# Patient Record
Sex: MALE | Race: WHITE | NOT HISPANIC OR LATINO | Employment: OTHER | URBAN - METROPOLITAN AREA
[De-identification: names, ages, dates, MRNs, and addresses within clinical notes are randomized per-mention and may not be internally consistent; named-entity substitution may affect disease eponyms.]

---

## 2017-04-04 ENCOUNTER — GENERIC CONVERSION - ENCOUNTER (OUTPATIENT)
Dept: OTHER | Facility: OTHER | Age: 72
End: 2017-04-04

## 2017-06-22 ENCOUNTER — ALLSCRIPTS OFFICE VISIT (OUTPATIENT)
Dept: OTHER | Facility: OTHER | Age: 72
End: 2017-06-22

## 2017-06-22 DIAGNOSIS — E11.9 TYPE 2 DIABETES MELLITUS WITHOUT COMPLICATIONS (HCC): ICD-10-CM

## 2017-06-23 ENCOUNTER — GENERIC CONVERSION - ENCOUNTER (OUTPATIENT)
Dept: OTHER | Facility: OTHER | Age: 72
End: 2017-06-23

## 2017-06-23 ENCOUNTER — LAB CONVERSION - ENCOUNTER (OUTPATIENT)
Dept: OTHER | Facility: OTHER | Age: 72
End: 2017-06-23

## 2017-06-23 LAB
A/G RATIO (HISTORICAL): 1.6 (CALC) (ref 1–2.5)
ALBUMIN SERPL BCP-MCNC: 4 G/DL (ref 3.6–5.1)
ALP SERPL-CCNC: 47 U/L (ref 40–115)
ALT SERPL W P-5'-P-CCNC: 16 U/L (ref 9–46)
AST SERPL W P-5'-P-CCNC: 14 U/L (ref 10–35)
BASOPHILS # BLD AUTO: 0.2 %
BASOPHILS # BLD AUTO: 16 CELLS/UL (ref 0–200)
BILIRUB SERPL-MCNC: 0.8 MG/DL (ref 0.2–1.2)
BUN SERPL-MCNC: 22 MG/DL (ref 7–25)
BUN/CREA RATIO (HISTORICAL): 17 (CALC) (ref 6–22)
CALCIUM SERPL-MCNC: 9.9 MG/DL (ref 8.6–10.3)
CHLORIDE SERPL-SCNC: 104 MMOL/L (ref 98–110)
CHOLEST SERPL-MCNC: 211 MG/DL (ref 125–200)
CHOLEST/HDLC SERPL: 4.8 (CALC)
CO2 SERPL-SCNC: 27 MMOL/L (ref 20–31)
CREAT SERPL-MCNC: 1.26 MG/DL (ref 0.7–1.18)
DEPRECATED RDW RBC AUTO: 14.3 % (ref 11–15)
EGFR AFRICAN AMERICAN (HISTORICAL): 66 ML/MIN/1.73M2
EGFR-AMERICAN CALC (HISTORICAL): 57 ML/MIN/1.73M2
EOSINOPHIL # BLD AUTO: 2.6 %
EOSINOPHIL # BLD AUTO: 203 CELLS/UL (ref 15–500)
GAMMA GLOBULIN (HISTORICAL): 2.5 G/DL (CALC) (ref 1.9–3.7)
GLUCOSE (HISTORICAL): 142 MG/DL (ref 65–99)
HBA1C MFR BLD HPLC: 7.4 % OF TOTAL HGB
HCT VFR BLD AUTO: 45 % (ref 38.5–50)
HDLC SERPL-MCNC: 44 MG/DL
HGB BLD-MCNC: 14.6 G/DL (ref 13.2–17.1)
LDL CHOLESTEROL (HISTORICAL): 130 MG/DL (CALC)
LYMPHOCYTES # BLD AUTO: 2332 CELLS/UL (ref 850–3900)
LYMPHOCYTES # BLD AUTO: 29.9 %
MCH RBC QN AUTO: 28.8 PG (ref 27–33)
MCHC RBC AUTO-ENTMCNC: 32.5 G/DL (ref 32–36)
MCV RBC AUTO: 88.8 FL (ref 80–100)
MONOCYTES # BLD AUTO: 538 CELLS/UL (ref 200–950)
MONOCYTES (HISTORICAL): 6.9 %
NEUTROPHILS # BLD AUTO: 4711 CELLS/UL (ref 1500–7800)
NEUTROPHILS # BLD AUTO: 60.4 %
NON-HDL-CHOL (CHOL-HDL) (HISTORICAL): 167 MG/DL (CALC)
PLATELET # BLD AUTO: 177 THOUSAND/UL (ref 140–400)
PMV BLD AUTO: 9.7 FL (ref 7.5–12.5)
POTASSIUM SERPL-SCNC: 4.3 MMOL/L (ref 3.5–5.3)
RBC # BLD AUTO: 5.07 MILLION/UL (ref 4.2–5.8)
SODIUM SERPL-SCNC: 141 MMOL/L (ref 135–146)
TOTAL PROTEIN (HISTORICAL): 6.5 G/DL (ref 6.1–8.1)
TRIGL SERPL-MCNC: 183 MG/DL
WBC # BLD AUTO: 7.8 THOUSAND/UL (ref 3.8–10.8)

## 2017-07-24 ENCOUNTER — ALLSCRIPTS OFFICE VISIT (OUTPATIENT)
Dept: OTHER | Facility: OTHER | Age: 72
End: 2017-07-24

## 2017-07-24 ENCOUNTER — HOSPITAL ENCOUNTER (OUTPATIENT)
Dept: RADIOLOGY | Facility: HOSPITAL | Age: 72
Discharge: HOME | DRG: 064 | End: 2017-07-24
Attending: ORTHOPAEDIC SURGERY
Payer: MEDICARE

## 2017-07-24 DIAGNOSIS — M25.511 PAIN IN RIGHT SHOULDER: ICD-10-CM

## 2017-07-24 DIAGNOSIS — M75.41 IMPINGEMENT SYNDROME OF RIGHT SHOULDER: ICD-10-CM

## 2017-07-24 PROCEDURE — 73030 X-RAY EXAM OF SHOULDER: CPT

## 2017-07-26 ENCOUNTER — APPOINTMENT (EMERGENCY)
Dept: RADIOLOGY | Facility: HOSPITAL | Age: 72
DRG: 064 | End: 2017-07-26
Payer: MEDICARE

## 2017-07-26 ENCOUNTER — APPOINTMENT (INPATIENT)
Dept: RADIOLOGY | Facility: HOSPITAL | Age: 72
DRG: 064 | End: 2017-07-26
Payer: MEDICARE

## 2017-07-26 ENCOUNTER — HOSPITAL ENCOUNTER (INPATIENT)
Facility: HOSPITAL | Age: 72
LOS: 2 days | DRG: 064 | End: 2017-07-28
Attending: EMERGENCY MEDICINE | Admitting: HOSPITALIST
Payer: MEDICARE

## 2017-07-26 DIAGNOSIS — I63.511 ACUTE RIGHT MCA STROKE (HCC): Primary | ICD-10-CM

## 2017-07-26 DIAGNOSIS — R53.1 LEFT-SIDED WEAKNESS: ICD-10-CM

## 2017-07-26 DIAGNOSIS — I65.21 THROMBOSIS OF RIGHT INTERNAL CAROTID ARTERY: ICD-10-CM

## 2017-07-26 DIAGNOSIS — I63.9 STROKE, ACUTE, EMBOLIC (HCC): ICD-10-CM

## 2017-07-26 PROBLEM — I10 ACCELERATED HYPERTENSION: Status: ACTIVE | Noted: 2017-07-26

## 2017-07-26 PROBLEM — J45.909 ASTHMA: Status: ACTIVE | Noted: 2017-07-26

## 2017-07-26 PROBLEM — I10 BENIGN ESSENTIAL HYPERTENSION: Chronic | Status: ACTIVE | Noted: 2017-07-26

## 2017-07-26 PROBLEM — E66.9 OBESITY: Status: ACTIVE | Noted: 2017-07-26

## 2017-07-26 PROBLEM — I10 BENIGN ESSENTIAL HYPERTENSION: Status: ACTIVE | Noted: 2017-07-26

## 2017-07-26 PROBLEM — J45.909 ASTHMA: Chronic | Status: ACTIVE | Noted: 2017-07-26

## 2017-07-26 PROBLEM — E11.9 TYPE 2 DIABETES MELLITUS (HCC): Status: ACTIVE | Noted: 2017-07-26

## 2017-07-26 PROBLEM — E78.5 HYPERLIPIDEMIA: Status: ACTIVE | Noted: 2017-07-26

## 2017-07-26 PROBLEM — E11.9 TYPE 2 DIABETES MELLITUS (HCC): Chronic | Status: ACTIVE | Noted: 2017-07-26

## 2017-07-26 LAB
ABO GROUP BLD: NORMAL
ANION GAP BLD CALC-SCNC: 14 MMOL/L (ref 4–13)
ANION GAP SERPL CALCULATED.3IONS-SCNC: 4 MMOL/L (ref 4–13)
APTT PPP: 25 SECONDS (ref 23–35)
ATRIAL RATE: 58 BPM
BLD GP AB SCN SERPL QL: NEGATIVE
BUN BLD-MCNC: 26 MG/DL (ref 5–25)
BUN SERPL-MCNC: 26 MG/DL (ref 5–25)
CA-I BLD-SCNC: 1.24 MMOL/L (ref 1.12–1.32)
CALCIUM SERPL-MCNC: 9 MG/DL (ref 8.3–10.1)
CHLORIDE BLD-SCNC: 103 MMOL/L (ref 100–108)
CHLORIDE SERPL-SCNC: 106 MMOL/L (ref 100–108)
CO2 SERPL-SCNC: 31 MMOL/L (ref 21–32)
CREAT BLD-MCNC: 1.1 MG/DL (ref 0.6–1.3)
CREAT SERPL-MCNC: 1.26 MG/DL (ref 0.6–1.3)
ERYTHROCYTE [DISTWIDTH] IN BLOOD BY AUTOMATED COUNT: 13.6 % (ref 11.6–15.1)
GFR SERPL CREATININE-BSD FRML MDRD: 57 ML/MIN/1.73SQ M
GFR SERPL CREATININE-BSD FRML MDRD: 67 ML/MIN/1.73SQ M
GLUCOSE SERPL-MCNC: 130 MG/DL (ref 65–140)
GLUCOSE SERPL-MCNC: 143 MG/DL (ref 65–140)
GLUCOSE SERPL-MCNC: 160 MG/DL (ref 65–140)
GLUCOSE SERPL-MCNC: 161 MG/DL (ref 65–140)
GLUCOSE SERPL-MCNC: 163 MG/DL (ref 65–140)
HCT VFR BLD AUTO: 39.9 % (ref 36.5–49.3)
HCT VFR BLD CALC: 39 % (ref 36.5–49.3)
HGB BLD-MCNC: 13.4 G/DL (ref 12–17)
HGB BLDA-MCNC: 13.3 G/DL (ref 12–17)
INR PPP: 0.95 (ref 0.86–1.16)
MCH RBC QN AUTO: 29.4 PG (ref 26.8–34.3)
MCHC RBC AUTO-ENTMCNC: 33.6 G/DL (ref 31.4–37.4)
MCV RBC AUTO: 88 FL (ref 82–98)
P AXIS: 74 DEGREES
PCO2 BLD: 29 MMOL/L (ref 21–32)
PLATELET # BLD AUTO: 179 THOUSANDS/UL (ref 149–390)
PMV BLD AUTO: 10.3 FL (ref 8.9–12.7)
POTASSIUM BLD-SCNC: 4.2 MMOL/L (ref 3.5–5.3)
POTASSIUM SERPL-SCNC: 4.2 MMOL/L (ref 3.5–5.3)
PR INTERVAL: 352 MS
PROTHROMBIN TIME: 12.7 SECONDS (ref 12.1–14.4)
QRS AXIS: 77 DEGREES
QRSD INTERVAL: 136 MS
QT INTERVAL: 436 MS
QTC INTERVAL: 428 MS
RBC # BLD AUTO: 4.56 MILLION/UL (ref 3.88–5.62)
RH BLD: POSITIVE
SODIUM BLD-SCNC: 141 MMOL/L (ref 136–145)
SODIUM SERPL-SCNC: 141 MMOL/L (ref 136–145)
SPECIMEN EXPIRATION DATE: NORMAL
SPECIMEN SOURCE: ABNORMAL
SPECIMEN SOURCE: NORMAL
T WAVE AXIS: 34 DEGREES
TROPONIN I BLD-MCNC: 0.02 NG/ML (ref 0–0.08)
VENTRICULAR RATE: 58 BPM
WBC # BLD AUTO: 11.11 THOUSAND/UL (ref 4.31–10.16)

## 2017-07-26 PROCEDURE — 85014 HEMATOCRIT: CPT

## 2017-07-26 PROCEDURE — 82948 REAGENT STRIP/BLOOD GLUCOSE: CPT

## 2017-07-26 PROCEDURE — 86900 BLOOD TYPING SEROLOGIC ABO: CPT | Performed by: EMERGENCY MEDICINE

## 2017-07-26 PROCEDURE — 85027 COMPLETE CBC AUTOMATED: CPT | Performed by: EMERGENCY MEDICINE

## 2017-07-26 PROCEDURE — 85730 THROMBOPLASTIN TIME PARTIAL: CPT | Performed by: EMERGENCY MEDICINE

## 2017-07-26 PROCEDURE — 80048 BASIC METABOLIC PNL TOTAL CA: CPT | Performed by: EMERGENCY MEDICINE

## 2017-07-26 PROCEDURE — 85610 PROTHROMBIN TIME: CPT | Performed by: EMERGENCY MEDICINE

## 2017-07-26 PROCEDURE — 36415 COLL VENOUS BLD VENIPUNCTURE: CPT | Performed by: EMERGENCY MEDICINE

## 2017-07-26 PROCEDURE — 86901 BLOOD TYPING SEROLOGIC RH(D): CPT | Performed by: EMERGENCY MEDICINE

## 2017-07-26 PROCEDURE — 71010 HB CHEST X-RAY 1 VIEW FRONTAL (PORTABLE): CPT

## 2017-07-26 PROCEDURE — 84484 ASSAY OF TROPONIN QUANT: CPT

## 2017-07-26 PROCEDURE — 70551 MRI BRAIN STEM W/O DYE: CPT

## 2017-07-26 PROCEDURE — 92610 EVALUATE SWALLOWING FUNCTION: CPT

## 2017-07-26 PROCEDURE — 70496 CT ANGIOGRAPHY HEAD: CPT

## 2017-07-26 PROCEDURE — 70450 CT HEAD/BRAIN W/O DYE: CPT

## 2017-07-26 PROCEDURE — 86850 RBC ANTIBODY SCREEN: CPT | Performed by: EMERGENCY MEDICINE

## 2017-07-26 PROCEDURE — 80047 BASIC METABLC PNL IONIZED CA: CPT

## 2017-07-26 PROCEDURE — 99291 CRITICAL CARE FIRST HOUR: CPT

## 2017-07-26 PROCEDURE — 70498 CT ANGIOGRAPHY NECK: CPT

## 2017-07-26 PROCEDURE — 93005 ELECTROCARDIOGRAM TRACING: CPT | Performed by: EMERGENCY MEDICINE

## 2017-07-26 RX ORDER — PRAVASTATIN SODIUM 40 MG
40 TABLET ORAL
Status: DISCONTINUED | OUTPATIENT
Start: 2017-07-26 | End: 2017-07-26

## 2017-07-26 RX ORDER — ASPIRIN 81 MG/1
81 TABLET, CHEWABLE ORAL DAILY
Status: DISCONTINUED | OUTPATIENT
Start: 2017-07-26 | End: 2017-07-28 | Stop reason: HOSPADM

## 2017-07-26 RX ORDER — DOCUSATE SODIUM 100 MG/1
100 CAPSULE, LIQUID FILLED ORAL 2 TIMES DAILY
Status: DISCONTINUED | OUTPATIENT
Start: 2017-07-26 | End: 2017-07-28 | Stop reason: HOSPADM

## 2017-07-26 RX ORDER — LORATADINE 10 MG/1
10 TABLET ORAL DAILY
Status: DISCONTINUED | OUTPATIENT
Start: 2017-07-26 | End: 2017-07-26

## 2017-07-26 RX ORDER — ATORVASTATIN CALCIUM 40 MG/1
40 TABLET, FILM COATED ORAL
Status: DISCONTINUED | OUTPATIENT
Start: 2017-07-26 | End: 2017-07-28

## 2017-07-26 RX ORDER — ASPIRIN 81 MG/1
324 TABLET, CHEWABLE ORAL ONCE
Status: COMPLETED | OUTPATIENT
Start: 2017-07-26 | End: 2017-07-26

## 2017-07-26 RX ORDER — BUDESONIDE AND FORMOTEROL FUMARATE DIHYDRATE 160; 4.5 UG/1; UG/1
2 AEROSOL RESPIRATORY (INHALATION)
Status: DISCONTINUED | OUTPATIENT
Start: 2017-07-26 | End: 2017-07-26

## 2017-07-26 RX ORDER — FLUTICASONE PROPIONATE 110 UG/1
2 AEROSOL, METERED RESPIRATORY (INHALATION)
Status: DISCONTINUED | OUTPATIENT
Start: 2017-07-26 | End: 2017-07-26

## 2017-07-26 RX ORDER — MONTELUKAST SODIUM 10 MG/1
10 TABLET ORAL
Status: DISCONTINUED | OUTPATIENT
Start: 2017-07-26 | End: 2017-07-26

## 2017-07-26 RX ORDER — MONTELUKAST SODIUM 10 MG/1
10 TABLET ORAL
COMMUNITY
End: 2017-07-26 | Stop reason: ALTCHOICE

## 2017-07-26 RX ORDER — ALBUTEROL SULFATE 90 UG/1
1 AEROSOL, METERED RESPIRATORY (INHALATION) EVERY 4 HOURS PRN
Status: DISCONTINUED | OUTPATIENT
Start: 2017-07-26 | End: 2017-07-28 | Stop reason: HOSPADM

## 2017-07-26 RX ORDER — SENNOSIDES 8.6 MG
1 TABLET ORAL DAILY
Status: DISCONTINUED | OUTPATIENT
Start: 2017-07-26 | End: 2017-07-28 | Stop reason: HOSPADM

## 2017-07-26 RX ORDER — LISINOPRIL AND HYDROCHLOROTHIAZIDE 12.5; 1 MG/1; MG/1
1 TABLET ORAL DAILY
COMMUNITY
End: 2017-08-23 | Stop reason: HOSPADM

## 2017-07-26 RX ORDER — ACETAMINOPHEN 325 MG/1
650 TABLET ORAL EVERY 6 HOURS PRN
Status: DISCONTINUED | OUTPATIENT
Start: 2017-07-26 | End: 2017-07-28 | Stop reason: HOSPADM

## 2017-07-26 RX ORDER — DESLORATADINE 5 MG/1
5 TABLET ORAL DAILY
COMMUNITY
End: 2017-07-26 | Stop reason: ALTCHOICE

## 2017-07-26 RX ORDER — SODIUM CHLORIDE 9 MG/ML
80 INJECTION, SOLUTION INTRAVENOUS CONTINUOUS
Status: DISPENSED | OUTPATIENT
Start: 2017-07-26 | End: 2017-07-26

## 2017-07-26 RX ORDER — MOMETASONE FUROATE 50 UG/1
2 SPRAY, METERED NASAL DAILY
COMMUNITY
End: 2017-08-23 | Stop reason: HOSPADM

## 2017-07-26 RX ORDER — METFORMIN HYDROCHLORIDE 1000 MG/1
1000 TABLET, FILM COATED, EXTENDED RELEASE ORAL
COMMUNITY
End: 2017-08-23 | Stop reason: HOSPADM

## 2017-07-26 RX ADMIN — SODIUM CHLORIDE 1000 ML: 0.9 INJECTION, SOLUTION INTRAVENOUS at 07:47

## 2017-07-26 RX ADMIN — SODIUM CHLORIDE 80 ML/HR: 0.9 INJECTION, SOLUTION INTRAVENOUS at 09:44

## 2017-07-26 RX ADMIN — INSULIN LISPRO 1 UNITS: 100 INJECTION, SOLUTION INTRAVENOUS; SUBCUTANEOUS at 22:01

## 2017-07-26 RX ADMIN — ENOXAPARIN SODIUM 40 MG: 40 INJECTION SUBCUTANEOUS at 13:07

## 2017-07-26 RX ADMIN — ATORVASTATIN CALCIUM 40 MG: 40 TABLET, FILM COATED ORAL at 17:21

## 2017-07-26 RX ADMIN — ASPIRIN 81 MG 324 MG: 81 TABLET ORAL at 07:40

## 2017-07-26 RX ADMIN — SENNOSIDES 8.6 MG: 8.6 TABLET, FILM COATED ORAL at 13:42

## 2017-07-26 RX ADMIN — DOCUSATE SODIUM 100 MG: 100 CAPSULE, LIQUID FILLED ORAL at 17:21

## 2017-07-26 RX ADMIN — LISINOPRIL: 10 TABLET ORAL at 13:43

## 2017-07-27 ENCOUNTER — APPOINTMENT (INPATIENT)
Dept: PHYSICAL THERAPY | Facility: HOSPITAL | Age: 72
DRG: 064 | End: 2017-07-27
Payer: MEDICARE

## 2017-07-27 LAB
ANION GAP SERPL CALCULATED.3IONS-SCNC: 5 MMOL/L (ref 4–13)
BUN SERPL-MCNC: 16 MG/DL (ref 5–25)
CALCIUM SERPL-MCNC: 8.1 MG/DL (ref 8.3–10.1)
CHLORIDE SERPL-SCNC: 109 MMOL/L (ref 100–108)
CHOLEST SERPL-MCNC: 151 MG/DL (ref 50–200)
CO2 SERPL-SCNC: 27 MMOL/L (ref 21–32)
CREAT SERPL-MCNC: 0.95 MG/DL (ref 0.6–1.3)
ERYTHROCYTE [DISTWIDTH] IN BLOOD BY AUTOMATED COUNT: 13.7 % (ref 11.6–15.1)
EST. AVERAGE GLUCOSE BLD GHB EST-MCNC: 180 MG/DL
GFR SERPL CREATININE-BSD FRML MDRD: 80 ML/MIN/1.73SQ M
GLUCOSE SERPL-MCNC: 117 MG/DL (ref 65–140)
GLUCOSE SERPL-MCNC: 127 MG/DL (ref 65–140)
GLUCOSE SERPL-MCNC: 148 MG/DL (ref 65–140)
GLUCOSE SERPL-MCNC: 173 MG/DL (ref 65–140)
HBA1C MFR BLD: 7.9 % (ref 4.2–6.3)
HCT VFR BLD AUTO: 34.6 % (ref 36.5–49.3)
HDLC SERPL-MCNC: 37 MG/DL (ref 40–60)
HGB BLD-MCNC: 11.2 G/DL (ref 12–17)
LDLC SERPL CALC-MCNC: 81 MG/DL (ref 0–100)
MCH RBC QN AUTO: 29 PG (ref 26.8–34.3)
MCHC RBC AUTO-ENTMCNC: 32.4 G/DL (ref 31.4–37.4)
MCV RBC AUTO: 90 FL (ref 82–98)
PLATELET # BLD AUTO: 148 THOUSANDS/UL (ref 149–390)
PMV BLD AUTO: 10.8 FL (ref 8.9–12.7)
POTASSIUM SERPL-SCNC: 3.5 MMOL/L (ref 3.5–5.3)
RBC # BLD AUTO: 3.86 MILLION/UL (ref 3.88–5.62)
SODIUM SERPL-SCNC: 141 MMOL/L (ref 136–145)
TRIGL SERPL-MCNC: 167 MG/DL
WBC # BLD AUTO: 8.24 THOUSAND/UL (ref 4.31–10.16)

## 2017-07-27 PROCEDURE — 85027 COMPLETE CBC AUTOMATED: CPT | Performed by: HOSPITALIST

## 2017-07-27 PROCEDURE — 97167 OT EVAL HIGH COMPLEX 60 MIN: CPT

## 2017-07-27 PROCEDURE — G8988 SELF CARE GOAL STATUS: HCPCS

## 2017-07-27 PROCEDURE — G8978 MOBILITY CURRENT STATUS: HCPCS

## 2017-07-27 PROCEDURE — 80048 BASIC METABOLIC PNL TOTAL CA: CPT | Performed by: HOSPITALIST

## 2017-07-27 PROCEDURE — 97163 PT EVAL HIGH COMPLEX 45 MIN: CPT

## 2017-07-27 PROCEDURE — 80061 LIPID PANEL: CPT | Performed by: HOSPITALIST

## 2017-07-27 PROCEDURE — G8987 SELF CARE CURRENT STATUS: HCPCS

## 2017-07-27 PROCEDURE — G8979 MOBILITY GOAL STATUS: HCPCS

## 2017-07-27 PROCEDURE — 83036 HEMOGLOBIN GLYCOSYLATED A1C: CPT | Performed by: HOSPITALIST

## 2017-07-27 PROCEDURE — 82948 REAGENT STRIP/BLOOD GLUCOSE: CPT

## 2017-07-27 RX ORDER — HYDRALAZINE HYDROCHLORIDE 20 MG/ML
5 INJECTION INTRAMUSCULAR; INTRAVENOUS EVERY 6 HOURS PRN
Status: DISCONTINUED | OUTPATIENT
Start: 2017-07-27 | End: 2017-07-28 | Stop reason: HOSPADM

## 2017-07-27 RX ADMIN — DOCUSATE SODIUM 100 MG: 100 CAPSULE, LIQUID FILLED ORAL at 18:15

## 2017-07-27 RX ADMIN — INSULIN LISPRO 1 UNITS: 100 INJECTION, SOLUTION INTRAVENOUS; SUBCUTANEOUS at 13:08

## 2017-07-27 RX ADMIN — ENOXAPARIN SODIUM 40 MG: 40 INJECTION SUBCUTANEOUS at 08:31

## 2017-07-27 RX ADMIN — LISINOPRIL: 10 TABLET ORAL at 08:31

## 2017-07-27 RX ADMIN — ASPIRIN 81 MG 81 MG: 81 TABLET ORAL at 08:30

## 2017-07-27 RX ADMIN — ATORVASTATIN CALCIUM 40 MG: 40 TABLET, FILM COATED ORAL at 16:52

## 2017-07-27 RX ADMIN — SENNOSIDES 8.6 MG: 8.6 TABLET, FILM COATED ORAL at 08:30

## 2017-07-27 RX ADMIN — DOCUSATE SODIUM 100 MG: 100 CAPSULE, LIQUID FILLED ORAL at 08:32

## 2017-07-28 ENCOUNTER — APPOINTMENT (INPATIENT)
Dept: NON INVASIVE DIAGNOSTICS | Facility: HOSPITAL | Age: 72
DRG: 064 | End: 2017-07-28
Payer: MEDICARE

## 2017-07-28 ENCOUNTER — APPOINTMENT (INPATIENT)
Dept: RADIOLOGY | Facility: HOSPITAL | Age: 72
DRG: 064 | End: 2017-07-28
Payer: MEDICARE

## 2017-07-28 ENCOUNTER — GENERIC CONVERSION - ENCOUNTER (OUTPATIENT)
Dept: OTHER | Facility: OTHER | Age: 72
End: 2017-07-28

## 2017-07-28 ENCOUNTER — HOSPITAL ENCOUNTER (INPATIENT)
Facility: HOSPITAL | Age: 72
LOS: 1 days | DRG: 056 | End: 2017-07-29
Attending: PHYSICAL MEDICINE & REHABILITATION | Admitting: PHYSICAL MEDICINE & REHABILITATION
Payer: MEDICARE

## 2017-07-28 VITALS
HEART RATE: 52 BPM | OXYGEN SATURATION: 95 % | DIASTOLIC BLOOD PRESSURE: 68 MMHG | BODY MASS INDEX: 35.25 KG/M2 | HEIGHT: 71 IN | SYSTOLIC BLOOD PRESSURE: 147 MMHG | WEIGHT: 251.77 LBS | TEMPERATURE: 97.5 F | RESPIRATION RATE: 20 BRPM

## 2017-07-28 DIAGNOSIS — I63.9 ACUTE CVA (CEREBROVASCULAR ACCIDENT) (HCC): Primary | ICD-10-CM

## 2017-07-28 LAB
ANION GAP SERPL CALCULATED.3IONS-SCNC: 10 MMOL/L (ref 4–13)
BASOPHILS # BLD AUTO: 0.02 THOUSANDS/ΜL (ref 0–0.1)
BASOPHILS NFR BLD AUTO: 0 % (ref 0–1)
BUN SERPL-MCNC: 17 MG/DL (ref 5–25)
CALCIUM SERPL-MCNC: 9.8 MG/DL (ref 8.3–10.1)
CHLORIDE SERPL-SCNC: 102 MMOL/L (ref 100–108)
CO2 SERPL-SCNC: 28 MMOL/L (ref 21–32)
CREAT SERPL-MCNC: 1.23 MG/DL (ref 0.6–1.3)
EOSINOPHIL # BLD AUTO: 0.15 THOUSAND/ΜL (ref 0–0.61)
EOSINOPHIL NFR BLD AUTO: 1 % (ref 0–6)
ERYTHROCYTE [DISTWIDTH] IN BLOOD BY AUTOMATED COUNT: 13.4 % (ref 11.6–15.1)
GFR SERPL CREATININE-BSD FRML MDRD: 58 ML/MIN/1.73SQ M
GLUCOSE SERPL-MCNC: 134 MG/DL (ref 65–140)
GLUCOSE SERPL-MCNC: 142 MG/DL (ref 65–140)
GLUCOSE SERPL-MCNC: 148 MG/DL (ref 65–140)
GLUCOSE SERPL-MCNC: 152 MG/DL (ref 65–140)
GLUCOSE SERPL-MCNC: 209 MG/DL (ref 65–140)
HCT VFR BLD AUTO: 43.1 % (ref 36.5–49.3)
HGB BLD-MCNC: 14.6 G/DL (ref 12–17)
LYMPHOCYTES # BLD AUTO: 3.29 THOUSANDS/ΜL (ref 0.6–4.47)
LYMPHOCYTES NFR BLD AUTO: 29 % (ref 14–44)
MAGNESIUM SERPL-MCNC: 2.2 MG/DL (ref 1.6–2.6)
MCH RBC QN AUTO: 29.2 PG (ref 26.8–34.3)
MCHC RBC AUTO-ENTMCNC: 33.9 G/DL (ref 31.4–37.4)
MCV RBC AUTO: 86 FL (ref 82–98)
MONOCYTES # BLD AUTO: 0.95 THOUSAND/ΜL (ref 0.17–1.22)
MONOCYTES NFR BLD AUTO: 8 % (ref 4–12)
NEUTROPHILS # BLD AUTO: 6.91 THOUSANDS/ΜL (ref 1.85–7.62)
NEUTS SEG NFR BLD AUTO: 62 % (ref 43–75)
NRBC BLD AUTO-RTO: 0 /100 WBCS
PHOSPHATE SERPL-MCNC: 3.4 MG/DL (ref 2.3–4.1)
PLATELET # BLD AUTO: 196 THOUSANDS/UL (ref 149–390)
PMV BLD AUTO: 10.6 FL (ref 8.9–12.7)
POTASSIUM SERPL-SCNC: 3.8 MMOL/L (ref 3.5–5.3)
RBC # BLD AUTO: 5 MILLION/UL (ref 3.88–5.62)
SODIUM SERPL-SCNC: 140 MMOL/L (ref 136–145)
WBC # BLD AUTO: 11.35 THOUSAND/UL (ref 4.31–10.16)

## 2017-07-28 PROCEDURE — 93880 EXTRACRANIAL BILAT STUDY: CPT

## 2017-07-28 PROCEDURE — 70450 CT HEAD/BRAIN W/O DYE: CPT

## 2017-07-28 PROCEDURE — 85025 COMPLETE CBC W/AUTO DIFF WBC: CPT | Performed by: INTERNAL MEDICINE

## 2017-07-28 PROCEDURE — 97530 THERAPEUTIC ACTIVITIES: CPT

## 2017-07-28 PROCEDURE — 97116 GAIT TRAINING THERAPY: CPT

## 2017-07-28 PROCEDURE — 82948 REAGENT STRIP/BLOOD GLUCOSE: CPT

## 2017-07-28 PROCEDURE — 97535 SELF CARE MNGMENT TRAINING: CPT

## 2017-07-28 PROCEDURE — 80048 BASIC METABOLIC PNL TOTAL CA: CPT | Performed by: INTERNAL MEDICINE

## 2017-07-28 PROCEDURE — 84100 ASSAY OF PHOSPHORUS: CPT | Performed by: INTERNAL MEDICINE

## 2017-07-28 PROCEDURE — 97110 THERAPEUTIC EXERCISES: CPT

## 2017-07-28 PROCEDURE — 83735 ASSAY OF MAGNESIUM: CPT | Performed by: INTERNAL MEDICINE

## 2017-07-28 RX ORDER — BISACODYL 10 MG
10 SUPPOSITORY, RECTAL RECTAL DAILY PRN
Status: DISCONTINUED | OUTPATIENT
Start: 2017-07-28 | End: 2017-07-29 | Stop reason: HOSPADM

## 2017-07-28 RX ORDER — ONDANSETRON 4 MG/1
4 TABLET, ORALLY DISINTEGRATING ORAL EVERY 6 HOURS PRN
Status: DISCONTINUED | OUTPATIENT
Start: 2017-07-28 | End: 2017-07-29 | Stop reason: HOSPADM

## 2017-07-28 RX ORDER — ALBUTEROL SULFATE 90 UG/1
1 AEROSOL, METERED RESPIRATORY (INHALATION) EVERY 4 HOURS PRN
Status: CANCELLED | OUTPATIENT
Start: 2017-07-28

## 2017-07-28 RX ORDER — SENNOSIDES 8.6 MG
1 TABLET ORAL DAILY
Status: CANCELLED | OUTPATIENT
Start: 2017-07-29

## 2017-07-28 RX ORDER — POLYETHYLENE GLYCOL 3350 17 G/17G
17 POWDER, FOR SOLUTION ORAL DAILY PRN
Status: DISCONTINUED | OUTPATIENT
Start: 2017-07-28 | End: 2017-07-29 | Stop reason: HOSPADM

## 2017-07-28 RX ORDER — ATORVASTATIN CALCIUM 80 MG/1
80 TABLET, FILM COATED ORAL
Status: DISCONTINUED | OUTPATIENT
Start: 2017-07-28 | End: 2017-07-29 | Stop reason: HOSPADM

## 2017-07-28 RX ORDER — ACETAMINOPHEN 325 MG/1
650 TABLET ORAL EVERY 6 HOURS PRN
Status: CANCELLED | OUTPATIENT
Start: 2017-07-28

## 2017-07-28 RX ORDER — SENNOSIDES 8.6 MG
1 TABLET ORAL DAILY
Status: DISCONTINUED | OUTPATIENT
Start: 2017-07-29 | End: 2017-07-28

## 2017-07-28 RX ORDER — ATORVASTATIN CALCIUM 80 MG/1
80 TABLET, FILM COATED ORAL
Status: CANCELLED | OUTPATIENT
Start: 2017-07-28

## 2017-07-28 RX ORDER — DOCUSATE SODIUM 100 MG/1
100 CAPSULE, LIQUID FILLED ORAL 2 TIMES DAILY
Status: DISCONTINUED | OUTPATIENT
Start: 2017-07-28 | End: 2017-07-29 | Stop reason: HOSPADM

## 2017-07-28 RX ORDER — ALBUTEROL SULFATE 90 UG/1
1 AEROSOL, METERED RESPIRATORY (INHALATION) EVERY 4 HOURS PRN
Status: DISCONTINUED | OUTPATIENT
Start: 2017-07-28 | End: 2017-07-29 | Stop reason: HOSPADM

## 2017-07-28 RX ORDER — ATORVASTATIN CALCIUM 80 MG/1
80 TABLET, FILM COATED ORAL
Status: DISCONTINUED | OUTPATIENT
Start: 2017-07-28 | End: 2017-07-28 | Stop reason: HOSPADM

## 2017-07-28 RX ORDER — ACETAMINOPHEN 325 MG/1
650 TABLET ORAL EVERY 6 HOURS PRN
Status: DISCONTINUED | OUTPATIENT
Start: 2017-07-28 | End: 2017-07-29 | Stop reason: HOSPADM

## 2017-07-28 RX ORDER — DOCUSATE SODIUM 100 MG/1
100 CAPSULE, LIQUID FILLED ORAL 2 TIMES DAILY
Status: CANCELLED | OUTPATIENT
Start: 2017-07-28

## 2017-07-28 RX ADMIN — ASPIRIN 81 MG 81 MG: 81 TABLET ORAL at 07:51

## 2017-07-28 RX ADMIN — INSULIN LISPRO 1 UNITS: 100 INJECTION, SOLUTION INTRAVENOUS; SUBCUTANEOUS at 21:20

## 2017-07-28 RX ADMIN — DOCUSATE SODIUM 100 MG: 100 CAPSULE, LIQUID FILLED ORAL at 17:08

## 2017-07-28 RX ADMIN — INSULIN LISPRO 1 UNITS: 100 INJECTION, SOLUTION INTRAVENOUS; SUBCUTANEOUS at 11:20

## 2017-07-28 RX ADMIN — DOCUSATE SODIUM 100 MG: 100 CAPSULE, LIQUID FILLED ORAL at 07:51

## 2017-07-28 RX ADMIN — ACETAMINOPHEN 650 MG: 325 TABLET, FILM COATED ORAL at 13:47

## 2017-07-28 RX ADMIN — ACETAMINOPHEN 650 MG: 325 TABLET, FILM COATED ORAL at 20:44

## 2017-07-28 RX ADMIN — ATORVASTATIN CALCIUM 80 MG: 80 TABLET, FILM COATED ORAL at 17:08

## 2017-07-28 RX ADMIN — SENNOSIDES 8.6 MG: 8.6 TABLET, FILM COATED ORAL at 07:51

## 2017-07-28 RX ADMIN — HYDRALAZINE HYDROCHLORIDE 5 MG: 20 INJECTION INTRAMUSCULAR; INTRAVENOUS at 00:15

## 2017-07-28 RX ADMIN — LISINOPRIL: 10 TABLET ORAL at 07:50
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
## 2017-07-29 ENCOUNTER — APPOINTMENT (INPATIENT)
Dept: RADIOLOGY | Facility: HOSPITAL | Age: 72
DRG: 056 | End: 2017-07-29
Payer: MEDICARE

## 2017-07-29 ENCOUNTER — HOSPITAL ENCOUNTER (INPATIENT)
Facility: HOSPITAL | Age: 72
LOS: 3 days | DRG: 064 | End: 2017-08-01
Attending: INTERNAL MEDICINE | Admitting: EMERGENCY MEDICINE
Payer: MEDICARE

## 2017-07-29 VITALS
HEIGHT: 71 IN | TEMPERATURE: 98 F | SYSTOLIC BLOOD PRESSURE: 140 MMHG | RESPIRATION RATE: 20 BRPM | OXYGEN SATURATION: 93 % | BODY MASS INDEX: 34.51 KG/M2 | DIASTOLIC BLOOD PRESSURE: 68 MMHG | WEIGHT: 246.47 LBS | HEART RATE: 63 BPM

## 2017-07-29 DIAGNOSIS — E78.5 HYPERLIPIDEMIA: ICD-10-CM

## 2017-07-29 DIAGNOSIS — I63.9 CVA (CEREBRAL VASCULAR ACCIDENT) (HCC): Primary | ICD-10-CM

## 2017-07-29 DIAGNOSIS — E11.9 TYPE 2 DIABETES MELLITUS (HCC): Chronic | ICD-10-CM

## 2017-07-29 DIAGNOSIS — E66.9 OBESITY: ICD-10-CM

## 2017-07-29 LAB
ALBUMIN SERPL BCP-MCNC: 3.5 G/DL (ref 3.5–5)
ALP SERPL-CCNC: 56 U/L (ref 46–116)
ALT SERPL W P-5'-P-CCNC: 27 U/L (ref 12–78)
ANION GAP SERPL CALCULATED.3IONS-SCNC: 9 MMOL/L (ref 4–13)
AST SERPL W P-5'-P-CCNC: 34 U/L (ref 5–45)
BILIRUB SERPL-MCNC: 0.78 MG/DL (ref 0.2–1)
BUN SERPL-MCNC: 30 MG/DL (ref 5–25)
CALCIUM SERPL-MCNC: 10.7 MG/DL (ref 8.3–10.1)
CHLORIDE SERPL-SCNC: 98 MMOL/L (ref 100–108)
CO2 SERPL-SCNC: 29 MMOL/L (ref 21–32)
CREAT SERPL-MCNC: 1.6 MG/DL (ref 0.6–1.3)
ERYTHROCYTE [DISTWIDTH] IN BLOOD BY AUTOMATED COUNT: 13.6 % (ref 11.6–15.1)
GFR SERPL CREATININE-BSD FRML MDRD: 42 ML/MIN/1.73SQ M
GLUCOSE SERPL-MCNC: 156 MG/DL (ref 65–140)
GLUCOSE SERPL-MCNC: 160 MG/DL (ref 65–140)
GLUCOSE SERPL-MCNC: 205 MG/DL (ref 65–140)
GLUCOSE SERPL-MCNC: 221 MG/DL (ref 65–140)
HCT VFR BLD AUTO: 46.2 % (ref 36.5–49.3)
HGB BLD-MCNC: 15.5 G/DL (ref 12–17)
MAGNESIUM SERPL-MCNC: 2.4 MG/DL (ref 1.6–2.6)
MCH RBC QN AUTO: 29.6 PG (ref 26.8–34.3)
MCHC RBC AUTO-ENTMCNC: 33.5 G/DL (ref 31.4–37.4)
MCV RBC AUTO: 88 FL (ref 82–98)
PHOSPHATE SERPL-MCNC: 4.6 MG/DL (ref 2.3–4.1)
PLATELET # BLD AUTO: 212 THOUSANDS/UL (ref 149–390)
PMV BLD AUTO: 11.2 FL (ref 8.9–12.7)
POTASSIUM SERPL-SCNC: 4.8 MMOL/L (ref 3.5–5.3)
PROT SERPL-MCNC: 7.5 G/DL (ref 6.4–8.2)
RBC # BLD AUTO: 5.23 MILLION/UL (ref 3.88–5.62)
SODIUM SERPL-SCNC: 136 MMOL/L (ref 136–145)
WBC # BLD AUTO: 12.29 THOUSAND/UL (ref 4.31–10.16)

## 2017-07-29 PROCEDURE — 97110 THERAPEUTIC EXERCISES: CPT

## 2017-07-29 PROCEDURE — 97112 NEUROMUSCULAR REEDUCATION: CPT

## 2017-07-29 PROCEDURE — 82948 REAGENT STRIP/BLOOD GLUCOSE: CPT

## 2017-07-29 PROCEDURE — 84100 ASSAY OF PHOSPHORUS: CPT | Performed by: INTERNAL MEDICINE

## 2017-07-29 PROCEDURE — 85027 COMPLETE CBC AUTOMATED: CPT | Performed by: INTERNAL MEDICINE

## 2017-07-29 PROCEDURE — 70450 CT HEAD/BRAIN W/O DYE: CPT

## 2017-07-29 PROCEDURE — 97530 THERAPEUTIC ACTIVITIES: CPT

## 2017-07-29 PROCEDURE — 80053 COMPREHEN METABOLIC PANEL: CPT | Performed by: INTERNAL MEDICINE

## 2017-07-29 PROCEDURE — 97535 SELF CARE MNGMENT TRAINING: CPT

## 2017-07-29 PROCEDURE — 97167 OT EVAL HIGH COMPLEX 60 MIN: CPT

## 2017-07-29 PROCEDURE — 83735 ASSAY OF MAGNESIUM: CPT | Performed by: INTERNAL MEDICINE

## 2017-07-29 PROCEDURE — 97163 PT EVAL HIGH COMPLEX 45 MIN: CPT

## 2017-07-29 PROCEDURE — 97116 GAIT TRAINING THERAPY: CPT

## 2017-07-29 RX ORDER — HYDRALAZINE HYDROCHLORIDE 20 MG/ML
5 INJECTION INTRAMUSCULAR; INTRAVENOUS EVERY 6 HOURS PRN
Status: DISCONTINUED | OUTPATIENT
Start: 2017-07-29 | End: 2017-07-29

## 2017-07-29 RX ORDER — ACETAMINOPHEN 650 MG/1
650 SUPPOSITORY RECTAL EVERY 6 HOURS PRN
Status: DISCONTINUED | OUTPATIENT
Start: 2017-07-29 | End: 2017-08-01 | Stop reason: HOSPADM

## 2017-07-29 RX ORDER — LEVALBUTEROL 1.25 MG/.5ML
1.25 SOLUTION, CONCENTRATE RESPIRATORY (INHALATION) EVERY 4 HOURS PRN
Status: DISCONTINUED | OUTPATIENT
Start: 2017-07-29 | End: 2017-08-01 | Stop reason: HOSPADM

## 2017-07-29 RX ORDER — ONDANSETRON 2 MG/ML
4 INJECTION INTRAMUSCULAR; INTRAVENOUS EVERY 6 HOURS PRN
Status: DISCONTINUED | OUTPATIENT
Start: 2017-07-29 | End: 2017-08-01 | Stop reason: HOSPADM

## 2017-07-29 RX ORDER — ACETAMINOPHEN 325 MG/1
650 TABLET ORAL EVERY 4 HOURS PRN
Status: DISCONTINUED | OUTPATIENT
Start: 2017-07-29 | End: 2017-07-29

## 2017-07-29 RX ORDER — FLUTICASONE PROPIONATE 50 MCG
2 SPRAY, SUSPENSION (ML) NASAL DAILY
Status: DISCONTINUED | OUTPATIENT
Start: 2017-07-30 | End: 2017-08-01 | Stop reason: HOSPADM

## 2017-07-29 RX ORDER — ATORVASTATIN CALCIUM 80 MG/1
80 TABLET, FILM COATED ORAL EVERY EVENING
Status: DISCONTINUED | OUTPATIENT
Start: 2017-07-29 | End: 2017-08-01 | Stop reason: HOSPADM

## 2017-07-29 RX ORDER — MORPHINE SULFATE 2 MG/ML
1 INJECTION, SOLUTION INTRAMUSCULAR; INTRAVENOUS EVERY 4 HOURS PRN
Status: DISCONTINUED | OUTPATIENT
Start: 2017-07-29 | End: 2017-08-01 | Stop reason: HOSPADM

## 2017-07-29 RX ORDER — FLUTICASONE PROPIONATE 220 UG/1
2 AEROSOL, METERED RESPIRATORY (INHALATION)
Status: DISCONTINUED | OUTPATIENT
Start: 2017-07-29 | End: 2017-08-01 | Stop reason: HOSPADM

## 2017-07-29 RX ORDER — ASPIRIN 300 MG/1
300 SUPPOSITORY RECTAL DAILY
Status: DISCONTINUED | OUTPATIENT
Start: 2017-07-29 | End: 2017-07-30

## 2017-07-29 RX ORDER — SODIUM CHLORIDE 9 MG/ML
125 INJECTION, SOLUTION INTRAVENOUS CONTINUOUS
Status: DISCONTINUED | OUTPATIENT
Start: 2017-07-29 | End: 2017-08-01

## 2017-07-29 RX ORDER — HYDRALAZINE HYDROCHLORIDE 20 MG/ML
5 INJECTION INTRAMUSCULAR; INTRAVENOUS EVERY 6 HOURS PRN
Status: DISCONTINUED | OUTPATIENT
Start: 2017-07-29 | End: 2017-07-30

## 2017-07-29 RX ADMIN — DOCUSATE SODIUM 100 MG: 100 CAPSULE, LIQUID FILLED ORAL at 10:06

## 2017-07-29 RX ADMIN — FLUTICASONE PROPIONATE 2 PUFF: 220 AEROSOL, METERED RESPIRATORY (INHALATION) at 22:03

## 2017-07-29 RX ADMIN — INSULIN LISPRO 1 UNITS: 100 INJECTION, SOLUTION INTRAVENOUS; SUBCUTANEOUS at 08:05

## 2017-07-29 RX ADMIN — ASPIRIN 300 MG: 300 SUPPOSITORY RECTAL at 20:43

## 2017-07-29 RX ADMIN — SODIUM CHLORIDE 75 ML/HR: 0.9 INJECTION, SOLUTION INTRAVENOUS at 20:03

## 2017-07-29 RX ADMIN — HYDROCHLOROTHIAZIDE: 12.5 TABLET ORAL at 10:05

## 2017-07-29 RX ADMIN — INSULIN LISPRO 1 UNITS: 100 INJECTION, SOLUTION INTRAVENOUS; SUBCUTANEOUS at 11:40

## 2017-07-29 NOTE — PROGRESS NOTES
PT Initial Eval     07/29/17 1300   Patient Data   Rehab Impairment L hemiparesis   Etiologic Diagnosis Right MCA CVA   Date of Onset 07/26/17   Home Setup   First Floor Bathroom Full;Tub   First Floor Setup Available Yes   Current Home Setup Safe Yes   Available Equipment Roller Walker;Rollator;Single Point Cane   Baseline Information   Type of Home Single Level   Prior Level of Function   Self-Care 3  Independent - Patient completed the activities by him/herself, with or without an assistive device, with no assistance from a helper  Indoor-Mobility (Ambulation) 3  Independent - Patient completed the activities by him/herself, with or without an assistive device, with no assistance from a helper  Stairs 3  Independent - Patient completed the activities by him/herself, with or without an assistive device, with no assistance from a helper  Functional Cognition 3  Independent - Patient completed the activities by him/herself, with or without an assistive device, with no assistance from a helper  Patient Preference   Nickname (Patient Preference) Cachorro   Psychosocial   Psychosocial (WDL) WDL   Patient Behaviors/Mood Flat affect   Needs Expressed Physical   Restrictions/Precautions   Weight Bearing Precautions No   Other Precautions Cognitive; Chair Alarm; Fall Risk   QI: Roll Left and Right   Assistance Needed Physical assistance   Assistance Provided by [place] 25%-49%   Roll Left and Right CARE Score 3   Bed Mobility   Able to Roll Left to Right;Right to Left   Positioning Concerns Hemiplegia; Neglect   Findings vc for LE positioning   QI: Sit to Lying   Assistance Needed Physical assistance   Assistance Provided by [place] 25%-49%   Sit to Lying CARE Score 3   QI: Lying to Sitting on Side of Bed   Assistance Needed Physical assistance   Assistance Provided by [place] 50%-74%   Lying to Sitting on Side of Bed CARE Score 2   QI: Sit to Stand   Assistance Needed Physical assistance   Assistance Provided by [place] 50%-74%   Sit to Stand CARE Score 2   QI: Chair/Bed-to-Chair Transfer   Assistance Needed Physical assistance   Assistance Provided by [place] 75% or more   Chair/Bed-to-Chair Transfer CARE Score 2   QI: Car Transfer   Reason if not Attempted Safety concerns   Car Transfer CARE Score 88   Transfer Bed/Chair/Wheelchair   Limitations Noted In Balance;Problem Solving;Sensation; Sequencing;UE Strength   Sit Pivot Maximum Assist   Sit to Stand Moderate Assist   Stand to Sit Moderate Assist   Supine to Sit Maximum Assist   Sit to Supine Moderate Assist   Findings requires vc for head/trunk relationship, sequencing   Bed, Chair, Wheelchair Transfer (FIM) 2 - [place] needs to lift or boost to rise AND assist to sit   QI: Walk 10 Feet   Assistance Needed Physical assistance   Assistance Provided by [place] 50%-74%   Walk 10 Feet CARE Score 2   QI: Walk 50 Feet with Two Turns   Reason if not Attempted Safety concerns   Walk 50 Feet with Two Turns CARE Score 88   QI: Walk 150 Feet   Reason if not Attempted Safety concerns   Walk 150 Feet CARE Score 88   QI: Walking 10 Feet on Uneven Surfaces   Reason if not Attempted Safety concerns   Walking 10 Feet on Uneven Surfaces CARE Score 88   Ambulation   Does the patient walk? 2  Yes   Discharge Locomotion Wheelchair;Walk   Walk Assist Level Moderate Assist;Maximum Assist   Gait Pattern L foot drag;L hemiparesis; Inconsistant Margarita; Improper weight shift;Narrow KUMAR; Forward Flexion;Decreased foot clearance   Assist Device Other  (hallway handrail)   Distance Walked 25 ft   Limitations Noted In Balance; Heel Strike;Posture; Safety; Sequencing;Speed;Strenght;Swing   Findings mod/maxA at handrail; maxA wtih RW (double-sided tape on handgrip), with A for RW management, prevent excessive L lateral wgt shift, achieve and maintain R lateral wgt shift   vc's for R wgt shift and increased L step length and heel strike   Walking (FIM) 1 - Patient requires assist of two people   QI: Wheel 50 Feet with [address]  Needed Physical assistance   Assistance Provided by [place] 75% or more   Wheel 50 Feet with Two Turns CARE Score 2   QI: Wheel 150 Feet   Assistance Needed Physical assistance   Assistance Provided by [place] Total assistance   Wheel 150 Feet CARE Score 1   Wheelchair mobility   QI: Does the patient use a wheelchair? 1  Yes   QI: Indicate the type of wheelchair 1  Manual   Wheelchair Assist Level Maximum Assist   Method Right upper extremity   Needs Assist With Remove Leg Rest;Replace Leg Rest;Obstacles; Other  (turning, prevent veering to left)   Distance Level Surface 75 ft   Wheelchair (FIM) 2 - Patient completes 25 - 49% of all tasks AND wheels distance 150 feet or more, no rest   QI: 1 Step (Curb)   Reason if not Attempted Safety concerns   1 Step (Curb) CARE Score 88   QI: 4 Steps   Reason if not Attempted Safety concerns   4 Steps CARE Score 88   QI: 12 Steps   Reason if not Attempted Safety concerns   12 Steps CARE Score 88   Stairs   Findings TBA as standing balance improves   Stairs (FIM) 1 - Patient completes less than 25% of all tasks   Comprehension   QI: Comprehension 3  Usually Understands: Understands most conversations, but misses some part/intent of message  Requires cues at times to understand  Comprehension (FIM) 5 - Needs help/cues, repetition only RARELY ( < 10% of the time)   Expression   QI: Expression 3   Exhibits some difficulty with expressing needs and ideas (e g , some words or finishing thoughts) or speech is not clear   Expression (FIM) 5 - Needs help/cues only RARELY (< 10% of the time)   Social Interaction   Social Interaction (FIM) 5 - Interacts appropriately with others 90% of time   Problem Solving   Problem solving (FIM) 2 - Solves basic problems 25-49% of time   Memory   Memory (FIM) 3 - Recognizes, recalls/performs 50-74%   RLE Assessment   RLE Assessment WFL   LLE Assessment   LLE Assessment X   Strength LLE   L Hip Flexion 3+/5   L Knee Flexion 4+/5   L Knee Extension 4+/5   L Ankle Dorsiflexion 4+/5   Coordination   Movements are Fluid and Coordinated 0   Sensation   Light Touch Partial deficits in the LUE   Sharp/Dull Partial deficits in the LUE   Stereognosis Partial deficits in the LUE   Perception   Inattention/Neglect Cues to attend left visual field;Cues to attend to left side of body;Cues to maintain midline in sitting;Cues to maintain midline in standing   Motor Planning Cues to use objects appropriately   Discharge Information   Patient's Discharge Plan home with wife, and support from family   Patient's Rehab Expectations return home with wife, regain strength, walk   Barriers to Discharge Home Decreased Strength; Safety Considerations; Other  (caregiver ability)   Impressions pt presents with good rehab potention to meet LTG and return home wtih family support  Refer to ICP for LTG's and daily PT note for additional details

## 2017-07-29 NOTE — PROGRESS NOTES
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   Date of Onset 07/26/17   Home Setup   First Floor Bathroom Full;Tub   Second Floor Bathroom None  (walk in shower in basement)   First Floor Setup Available Yes   Current Home Setup Safe Yes   Available Equipment Roller Walker;Single Point Cane   Baseline Information   Type of Home Single Level   Prior Level of Function   Self-Care 3  Independent - Patient completed the activities by him/herself, with or without an assistive device, with no assistance from a helper  Indoor-Mobility (Ambulation) 3  Independent - Patient completed the activities by him/herself, with or without an assistive device, with no assistance from a helper  Stairs 3  Independent - Patient completed the activities by him/herself, with or without an assistive device, with no assistance from a helper  Functional Cognition 3  Independent - Patient completed the activities by him/herself, with or without an assistive device, with no assistance from a helper  Patient Preference   Nickname (Patient Preference) Marlen Hernandez   Psychosocial   Patient Behaviors/Mood Flat affect   Restrictions/Precautions   Other Precautions Fall Risk;Cognitive; Chair Alarm; Bed Alarm   QI: Eating   Assistance Needed Supervision;Verbal cues   Eating CARE Score 4   Eating Assessment   Findings Pt is R handed    Eating (FIM) 5 - Patient requires supervision, cueing or coaxing   QI: Oral Hygiene   Assistance Needed Set-up / clean-up;Supervision   Oral Hygiene CARE Score 4   Grooming   Able To Brush/Clean Teeth;Wash/Dry Face;Wash/Dry Hands   Grooming (FIM) 5 - Patient requires supervision/monitoring   QI: Shower/Bathe Self   Assistance Needed Physical assistance   Assistance Provided by [place] 50%-74%   Shower/Bathe Self CARE Score 2   Bathing   Assessed Bath Style Sponge Bath   Anticipated D/C Bath Style Tub   Able to Gather/Transport No   Able to [employer] Temperature No   Able to Wash/Rinse/Dry (body part) Left Arm;L Upper Leg;R Upper Leg;Chest;Perineal Area; Abdomen   Limitations Noted in Balance; Endurance;Neglect;Problem Solving; Safety;ROM; Sequencing   Positioning Seated;Standing   Bathing (FIM) 3 - Patient completes 5/10  6/10 or 7/10 parts   QI: Upper Body Dressing   Assistance Needed Physical assistance   Assistance Provided by [place] 75% or more   Upper Body Dressing CARE Score 2   QI: Lower Body Dressing   Assistance Needed Physical assistance   Assistance Provided by [place] Total assistance   Lower Body Dressing CARE Score 1   QI: Putting On/Taking Off Footwear   Assistance Needed Physical assistance   Assistance Provided by [place] Total assistance   Putting On/Taking Off Footwear CARE Score 1   QI: Picking Up Object   Reason if not Attempted Safety concerns   Picking Up Object CARE Score 88   Dressing/Undressing [address]; Undergarment;Socks   Findings Pt demo significant L Lateral Lean in stance  MAX A for lateral balance, L knee block 2* intermittant buckling  Pt impulsive w/ dressing requires frequent cues for pacing/saafety/sequencing      UB Dressing (FIM) 2 - Patient completes 25-49% of all tasks   LB Dressing (FIM) 1 - Patient completes less than 25% of all tasks   QI: [address] Needed Physical assistance   Assistance Provided by [place] Total assistance   Toileting Hygiene CARE Score 1   Toileting   Toileting (FIM) 1 - Patient completes less than 25% of all tasks   QI: Lying to Sitting on Side of Bed   Assistance Needed Physical assistance   Assistance Provided by [place] 75% or more   Lying to Sitting on Side of Bed CARE Score 2   QI: Sit to Stand   Assistance Needed Physical assistance   Assistance Provided by [place] 75% or more   Sit to Stand CARE Score 2   QI: Chair/Bed-to-Chair Transfer Assistance Needed Physical assistance   Assistance Provided by [place] 75% or more   Chair/Bed-to-Chair Transfer CARE Score 2   Transfer Bed/Chair/Wheelchair   Limitations Noted In Balance; Endurance;Problem Solving;Sensation;UE Strength;LE Strength; Coordination   Adaptive Equipment None   Sit Pivot Maximum Assist   Sit to Stand Maximum Assist   Stand to Sit Moderate Assist   Supine to Sit Maximum Assist   Findings recommend Ax2 for safety   Bed, Chair, Wheelchair Transfer (FIM) 2 - [place] needs to lift or boost to rise AND assist to sit   QI: Toilet Transfer   Assistance Needed Physical assistance   Assistance Provided by [place] 75% or more   Toilet Transfer CARE Score 2   Toilet Transfer   Surface Assessed Bedside Commode   Limitations Noted In Balance; Endurance;Problem Solving; Safety; Sensation;UE Strength;LE Strength; Sequencing   Toilet Transfer (FIM) 2 - [place] needs to lift or boost to rise AND assist to sit   Tub/Shower Transfer   Not Assessed Sponge Bath;Safety   Tub Transfer (FIM) 0 - Activity does not occur   Shower Transfer (FIM) 0 - Activity does not occur   Comprehension   QI: Comprehension 2  Sometimes Understands: Understands only basic conversations or simple, direct phrases  Frequently requires cues to understand   Comprehension (FIM) 4 - Understands basic info/conversation 75-90% of time   Expression   QI: Expression 3   Exhibits some difficulty with expressing needs and ideas (e g , some words or finishing thoughts) or speech is not clear   Expression (FIM) 4 - Expresses basic info/needs 75-90% of time   Social Interaction   Social Interaction (FIM) 4 - Needs redirecting for appropriate language or to initiate interaction   Problem Solving   Problem solving (FIM) 2 - Bed alarm for safety issues more than half the time   Memory   Memory (FIM) 3 - Recognizes, recalls/performs 50-74%   RUE Assessment   RUE Assessment WFL   LUE Assessment   LUE Assessment X   AROM - LUE   L Shoulder Flexion Limited <30 degrees   L Shoulder Extension limited <1/4 range   L Shoulder ABduction limited <1/4 range   L Shoulder ADduction Limited 1/4 range   L Elbow Flexion Limited 1/4 range   L Wrist Flexion Limited 1/4 range   L Wrist Extension Limited 1/4 range   L Wrist ABduction Limited 1/4 range   PROM - LUE   L Shoulder Flexion Limited ~3/4 range   L Shoulder Extension WFL   L Shoulder ABduction WFL   L Shoulder ADduction WFL   L Elbow Flexion WFL   L Wrist Flexion WFL   L Wrist Extension WFL   L Wrist ABduction WFL   Strength - LUE   L Shoulder Flexion (2-/5)   L Shoulder Extension (2-/5)   L Shoulder ADduction (2/5)   L Elbow Flexion 2/5   L Wrist Flexion Impaired   L Wrist Extension Impaired   L Wrist ABduction Impaired   Pain Assessment   Pain Assessment No/denies pain   Pain Score No Pain   Coordination   Movements are Fluid and Coordinated 0   Coordination and Movement Description severe dysmetria, ataxia   Sensation   Light Touch Partial deficits in the LUE   Sharp/Dull Partial deficits in the LUE   Stereognosis Partial deficits in the LUE   Perception   Inattention/Neglect Cues to attend left visual field;Cues to attend to left side of body;Cues to maintain midline in sitting;Cues to maintain midline in standing   Motor Planning Hand over hand to sequence tasks   Discharge Information   Patient's Discharge Plan Home with Wife and family support   Patient's Rehab Expectations To ride my motorcycle   Barriers to Discharge Home Decreased Endurance;Decreased Strength;Decreased Cognitive Function; Safety Considerations   Impressions Pt presents w/ G rehab potentail to meet LTGS and return home w/ family support and assist as needed  Refer to ICP for LTGs and Daily note for further details

## 2017-07-29 NOTE — PROGRESS NOTES
Internal Medicine Progress Note  Patient: Sara Baig  Age/sex: 67 y o  male  Medical Record #: [id number]      ASSESSMENT/PLAN:  Sara Baig is seen and examined and mangement for following issues:    1  Rt MCA CVA:  Currently off ASA due to possible hemorrhagic conversion seen on head CT  Plan for repeat on Monday with resumption of ASA if stable  Continue atorvastatin      2   Rt ICA occlusion and Lt ICA stenosis:  No surgical intervention  Repeat doppler in 6 months      3  DM type 2:  HA1C 7 9  Pt takes metformin 1000mg bid as an outpt  Blood sugars have been stable off medication  Monitor blood sugars and continue SSI  Resume metformin 500mg BID  209 148 152     4  HTN:  Monitor blood pressure q shift  Continue Lisinopril 10mg daily and HCTZ 12 5mg daily  Pt has Prinzide at home      5  Asthma:  Continue Proventil prn      6   Leukocytosis:  Likely reactive  Will continue to monitor  Subjective: Patient seen and examined  No new or overnight issues  Didn't sleep well  Wife at bedside    ROS:   GI: denies abdominal pain, change bowel habits or reflux symptoms  Neuro: No new neurologic changes  Respiratory: No Cough, SOB  Cardiovascular: No CP, palpitations     Scheduled Meds:    atorvastatin 80 mg Oral Daily With Dinner   docusate sodium 100 mg Oral BID   insulin lispro 1-5 Units Subcutaneous TID AC   insulin lispro 1-5 Units Subcutaneous HS   lisinopril-hydrochlorothiazide (PRINZIDE 10/12  5) combo dose  Oral Daily   metFORMIN 500 mg Oral BID With Meals       Labs:       Results from last 7 days  Lab Units 07/28/17  0553 07/27/17  0440   WBC Thousand/uL 11 35* 8 24   HEMOGLOBIN g/dL 14 6 11 2*   HEMATOCRIT % 43 1 34 6*   PLATELETS Thousands/uL 196 148*       Results from last 7 days  Lab Units 07/28/17  0553 07/27/17  0440   SODIUM mmol/L 140 141   POTASSIUM mmol/L 3 8 3 5   CHLORIDE mmol/L 102 109*   CO2 mmol/L 28 27   BUN mg/dL 17 16   CREATININE mg/dL 1 23 0 95 GLUCOSE RANDOM mg/dL 142* 117   CALCIUM mg/dL 9 8 8 1*       Results from last 7 days  Lab Units 07/27/17  0440   HEMOGLOBIN A1C % 7 9*       Results from last 7 days  Lab Units 07/26/17  0641   INR  0 95        Glucose (mg/dL)   Date Value   07/28/2017 142 (H)   07/27/2017 117   07/26/2017 163 (H)   10/04/2016 149 (H)   07/27/2015 121   07/26/2015 191 (H)   02/28/2014 195 (H)   02/27/2014 127     Glucose, i-STAT (mg/dl)   Date Value   07/26/2017 160 (H)       Labs reviewed    Physical Examination:  Vitals:   Vitals:    07/28/17 1515 07/28/17 2026 07/29/17 0507 07/29/17 1003   BP: 142/89 137/73 131/89 113/69   Pulse: 55 55 59 59   Resp: 18 18 18    Temp: 97 7 °F (36 5 °C) 98 1 °F (36 7 °C) 98 2 °F (36 8 °C)    TempSrc: Oral Oral Oral    SpO2: 91% 93% 93%    Weight: 112 kg (246 lb 7 6 oz)      Height: 5' 11" (1 803 m)          GEN: NAD  HEENT: NC/AT, EOMI  RESP: CTAB, no R/R/W  CV: +S1 S2, regular rate, no rubs  ABD: soft, NT, ND, normal BS   : no abbasi  EXT: no LE edema  Skin: no rashes  Neuro: AAO x 3, Left neglect, Left tongue deviation and left facial droop  LUE/LLE 4/5 RUE/RLE 5/5      [x ] Total time spent: 30 Mins and greater than 50% of this time was spent counseling/coordinating care        Adriana Marte PA-C  Internal Medicine

## 2017-07-29 NOTE — PROGRESS NOTES
07/29/17 0830   Pain Assessment   Pain Assessment No/denies pain   Pain Score No Pain   Restrictions/Precautions   Other Precautions Fall Risk;Cognitive; Chair Alarm; Bed Alarm   Lifestyle   Autonomy reports I w/ ADLs, IADLs, driving PTA   Reciprocal Relationships Lives with wife who is present for session   Service to Others Pt reports he is retired   Intrinsic Gratification Pt reports enjoying riding his steve   QI: Eating   Assistance Needed Supervision;Verbal cues   Eating CARE Score 4   Eating Assessment   Findings Pt is R handed    Eating (FIM) 5 - Patient requires supervision, cueing or coaxing   QI: Oral Hygiene   Assistance Needed Set-up / clean-up;Supervision   Oral Hygiene CARE Score 4   Grooming   Able To Brush/Clean Teeth;Wash/Dry Face;Wash/Dry Hands   Grooming (FIM) 5 - Patient requires supervision/monitoring   QI: Shower/Bathe Self   Assistance Needed Physical assistance   Assistance Provided by [employer] 50%-74%   Shower/Bathe Self CARE Score 2   Bathing   Assessed Bath Style Sponge Bath   Anticipated D/C Bath Style Tub   Able to Gather/Transport No   Able to [employer] Temperature No   Able to Wash/Rinse/Dry (body part) Left Arm;L Upper Leg;R Upper Leg;Chest;Perineal Area; Abdomen   Limitations Noted in Balance; Endurance;Neglect;Problem Solving; Safety;ROM; Sequencing   Positioning Seated;Standing   Bathing (FIM) 3 - Patient completes 5/10  6/10 or 7/10 parts   Tub/Shower Transfer   Not Assessed Sponge Bath;Safety   Tub Transfer (FIM) 0 - Activity does not occur   Shower Transfer (FIM) 0 - Activity does not occur   QI: Upper Body Dressing   Assistance Needed Physical assistance   Assistance Provided by [place] 75% or more   Upper Body Dressing CARE Score 2   QI: Lower Body Dressing   Assistance Needed Physical assistance   Assistance Provided by [place] Total assistance   Lower Body Dressing CARE Score 1   QI: Putting On/Taking Off Footwear   Assistance Needed Physical assistance   Assistance Provided by [employer] Total assistance   Putting On/Taking Off Footwear CARE Score 1   QI: Picking Up Object   Reason if not Attempted Safety concerns   Picking Up Object CARE Score 88   Dressing/Undressing Clothing   [address] LB Clothes Shorts; Undergarment;Socks   Findings Pt demo significant L Lateral Lean in stance  MAX A for lateral balance, L knee block 2* intermittant buckling  Pt impulsive w/ dressing requires frequent cues for pacing/saafety/sequencing  UB Dressing (FIM) 2 - Patient completes 25-49% of all tasks   LB Dressing (FIM) 1 - Patient completes less than 25% of all tasks   QI: Lying to Sitting on Side of Bed   Assistance Needed Physical assistance   Assistance Provided by [employer] 75% or more   Lying to Sitting on Side of Bed CARE Score 2   QI: Sit to Stand   Assistance Needed Physical assistance   Assistance Provided by [employer] 75% or more   Sit to Stand CARE Score 2   QI: Chair/Bed-to-Chair Transfer   Assistance Needed Physical assistance   Assistance Provided by [employer] 75% or more   Chair/Bed-to-Chair Transfer CARE Score 2   Transfer Bed/Chair/Wheelchair   Limitations Noted In Balance; Endurance;Problem Solving;Sensation;UE Strength;LE Strength; Coordination   Adaptive Equipment None   Sit Pivot Maximum Assist   Sit to Stand Maximum Assist   Stand to Sit Moderate Assist   Supine to Sit Maximum Assist   Findings recommend Ax2 for safety   Bed, Chair, Wheelchair Transfer (FIM) 2 - [employer] needs to lift or boost to rise AND assist to sit   QI: [address] Needed Physical assistance   Assistance Provided by [employer] Total assistance   Toileting Hygiene CARE Score 1   Toileting   Toileting (FIM) 1 - Patient completes less than 25% of all tasks   QI: Toilet Transfer   Assistance Needed Physical assistance   Assistance Provided by [employer] 75% or more   Toilet Transfer CARE Score 2   Toilet Transfer   Surface Assessed Bedside Commode   Limitations Noted In Balance; Endurance;Problem Solving; Safety; Sensation;UE Strength;LE Strength; Sequencing   Toilet Transfer (FIM) 2 - [place] needs to lift or boost to rise AND assist to sit   Cognition   Overall Cognitive Status Impaired   Arousal/Participation Alert; Cooperative   Attention Attends with cues to redirect   Orientation Level Oriented X4   Memory Decreased short term memory;Decreased recall of recent events;Decreased recall of precautions   Following Commands Follows one step commands with increased time or repetition   Vision   Vision Comments lateral tracking severely limited to midline w/ no VC's, able to break L side midline w/ VC's for attention, decreaesed visual fixation, attention to L, R side head/eye gaze  difficulty w/ upper/lower tracking,    Assessment   Treatment Assessment OT evaluation and assessment of strength, ADL participation, and functional transfers completed  Pt reports I w/ ADLs, IADLs, and driving PTA  Pt resides in single level home w/ supportive wife  Pt using no DME at this time  Pt is currently limited by decreased standing Balance,poor midline orientation, R side head/visual gaze, limited activity tolerance, Limited strength, decreased sitting balance, decreased motor planning and dynamic weight shifting, L hemiparesis, decreased safety awareness  All impacting mobility, safety and  participation in I/ADLs  Educated Pt on safety precautions, importance of and how to use using call bell; currently using chair alarm  Educated Pt on role of occupational therapy in acute setting  Based on Pt extensive Occupational profile review and Pt's PLOF being independent,Pt benefits from from skilled OT services for I/ADL retraining to support the ability to safely participate in daily occupations safely and independently in the most least restrictive way  Anticipate 3 week length of stay  Prognosis Good   Problem List Decreased strength;Decreased endurance; Impaired balance;Decreased mobility; Decreased coordination;Decreased cognition; Impaired judgement;Decreased safety awareness; Impaired vision   Plan   Treatment/Interventions ADL retraining;Functional transfer training   OT Therapy Minutes   OT Time In 0830   OT Time Out 1000   OT Total Time (minutes) 90   OT Mode of treatment - Individual (minutes) 90   OT Mode of treatment - Concurrent (minutes) 0   OT Mode of treatment - Group (minutes) 0   OT Mode of treatment - Co-treat (minutes) 0   OT Mode of Teatment - Total time(minutes) 90 minutes   Therapy Time missed   Time missed?  No
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
## 2017-07-29 NOTE — PLAN OF CARE
Problem: Potential for Falls  Goal: Patient will remain free of falls  INTERVENTIONS:  - Assess patient frequently for physical needs  -  Identify cognitive and physical deficits and behaviors that affect risk of falls    -  [place] fall precautions as indicated by assessment   - Educate patient/family on patient safety including physical limitations  - Instruct patient to call for assistance with activity based on assessment  - Modify environment to reduce risk of injury  - Consider OT/PT consult to assist with strengthening/mobility   Outcome: Progressing      Problem: Prexisting or High Potential for Compromised Skin Integrity  Goal: Skin integrity is maintained or improved  INTERVENTIONS:  - Identify patients at risk for skin breakdown  - Assess and monitor skin integrity  - Assess and monitor nutrition and hydration status  - Monitor labs (i e  albumin)  - Assess for incontinence   - Turn and reposition patient  - Assist with mobility/ambulation  - Relieve pressure over bony prominences  - Avoid friction and shearing  - Provide appropriate hygiene as needed including keeping skin clean and dry  - Evaluate need for skin moisturizer/barrier cream  - Collaborate with interdisciplinary team (i e  Nutrition, Rehabilitation, etc )   - Patient/family teaching   Outcome: Progressing      Problem: PAIN - ADULT  Goal: Verbalizes/displays adequate comfort level or baseline comfort level  Interventions:  - Encourage patient to monitor pain and request assistance  - Assess pain using appropriate pain scale  - Administer analgesics based on type and severity of pain and evaluate response  - Implement non-pharmacological measures as appropriate and evaluate response  - Consider cultural and social influences on pain and pain management  - Notify physician/advanced practitioner if interventions unsuccessful or patient reports new pain   Outcome: Progressing      Problem: SAFETY ADULT  Goal: Maintain or return to baseline ADL function  INTERVENTIONS:  -  Assess patient's ability to carry out ADLs; assess patient's baseline for ADL function and identify physical deficits which impact ability to perform ADLs (bathing, care of mouth/teeth, toileting, grooming, dressing, etc )  - Assess/evaluate cause of self-care deficits   - Assess range of motion  - Assess patient's mobility; develop plan if impaired  - Assess patient's need for assistive devices and provide as appropriate  - Encourage maximum independence but intervene and supervise when necessary  ¯ Involve family in performance of ADLs  ¯ Assess for home care needs following discharge   ¯ Request OT consult to assist with ADL evaluation and planning for discharge  ¯ Provide patient education as appropriate   Outcome: Progressing    Goal: Maintain or return mobility status to optimal level  INTERVENTIONS:  - Assess patient's baseline mobility status (ambulation, transfers, stairs, etc )    - Identify cognitive and physical deficits and behaviors that affect mobility  - Identify mobility aids required to assist with transfers and/or ambulation (gait belt, sit-to-stand, lift, walker, cane, etc )  - [place] fall precautions as indicated by assessment  - Record patient progress and toleration of activity level on Mobility SBAR; progress patient to next Phase/Stage  - Instruct patient to call for assistance with activity based on assessment  - Request Rehabilitation consult to assist with strengthening/weightbearing, etc    Outcome: Progressing      Problem: DISCHARGE PLANNING  Goal: Discharge to home or other facility with appropriate resources  INTERVENTIONS:  - Identify barriers to discharge w/patient and caregiver  - Arrange for needed discharge resources and transportation as appropriate  - Identify discharge learning needs (meds, wound care, etc )  - Arrange for interpretive services to assist at discharge as needed  - Refer to Case Management Department for coordinating discharge planning if the patient needs post-hospital services based on physician/advanced practitioner order or complex needs related to functional status, cognitive ability, or social support system   Outcome: Progressing
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
## 2017-07-29 NOTE — PROGRESS NOTES
07/29/17 1300   Pain Assessment   Pain Assessment No/denies pain   Restrictions/Precautions   Weight Bearing Precautions No   Other Precautions Cognitive; Chair Alarm; Fall Risk   General   Chart Reviewed Yes   Cognition   Overall Cognitive Status Impaired   Arousal/Participation Alert; Responsive; Cooperative   Orientation Level Oriented X4   Following Commands Follows one step commands without difficulty   Subjective   Subjective pt is pleasant and motivated for PT   QI: Roll Left and Right   Assistance Needed Physical assistance   Assistance Provided by [place] 25%-49%   Roll Left and Right CARE Score 3   QI: Sit to Lying   Assistance Needed Physical assistance   Assistance Provided by [place] 25%-49%   Sit to Lying CARE Score 3   QI: Lying to Sitting on Side of Bed   Assistance Needed Physical assistance   Assistance Provided by [place] 50%-74%   Lying to Sitting on Side of Bed CARE Score 2   QI: Sit to Stand   Assistance Needed Physical assistance   Assistance Provided by [place] 50%-74%   Sit to Stand CARE Score 2   QI: Chair/Bed-to-Chair Transfer   Assistance Needed Physical assistance   Assistance Provided by [place] 75% or more   Chair/Bed-to-Chair Transfer CARE Score 2   Transfer Bed/Chair/Wheelchair   Limitations Noted In Balance; Coordination;Problem Solving;Sensation; Sequencing;UE Strength   Sit Pivot Maximum Assist   Sit to Stand Maximum Assist   Stand to Sit Moderate Assist   Supine to Sit Maximum Assist   Sit to Supine Moderate Assist   Bed, Chair, Wheelchair Transfer (FIM) 2 - [place] needs to lift or boost to rise AND assist to sit   QI: Car Transfer   Reason if not Attempted Safety concerns   Car Transfer CARE Score 88   QI: Walk 10 Feet   Assistance Needed Physical assistance   Assistance Provided by [place] 50%-74%   Walk 10 Feet CARE Score 2   QI: Walk 50 Feet with Two Turns   Reason if not Attempted Safety concerns   Walk 50 Feet with Two Turns CARE Score 88   QI: Walk 150 Feet   Reason if not Attempted Safety concerns   Walk 150 Feet CARE Score 88   QI: Walking 10 Feet on Uneven Surfaces   Reason if not Attempted Safety concerns   Walking 10 Feet on Uneven Surfaces CARE Score 88   Ambulation   Does the patient walk? 2  Yes   Discharge Locomotion Wheelchair;Walk   Walk Assist Level Moderate Assist;Maximum Assist   Gait Pattern L foot drag;L hemiparesis; Inconsistant Margarita; Improper weight shift;Narrow KUMAR; Forward Flexion;Decreased foot clearance   Assist Device Other  (hallway handrail)   Distance Walked 25 ft   Limitations Noted In Balance; Heel Strike;Posture; Safety; Sequencing;Speed;Strenght;Swing   Findings mod/maxA at handrail; maxA wtih RW (double-sided tape on handgrip), with A for RW management, prevent excessive L lateral wgt shift, achieve and maintain R lateral wgt shift  vc's for R wgt shift and increased L step length and heel strike   Walking (FIM) 1 - Patient requires assist of two people   QI: Wheel 50 Feet with [place] 149 assistance   Assistance Provided by [place] 75% or more   Wheel 50 Feet with Two Turns CARE Score 2   QI: Wheel 150 Feet   Assistance Needed Physical assistance   Assistance Provided by [place] Total assistance   Wheel 150 Feet CARE Score 1   Wheelchair mobility   QI: Does the patient use a wheelchair? 1  Yes   QI: Indicate the type of wheelchair 1  Manual   Wheelchair Assist Level Maximum Assist   Method Right upper extremity   Needs Assist With Remove Leg Rest;Replace Leg Rest;Obstacles; Other  (turning, prevent veering to left)   Distance Level Surface 75 ft   Wheelchair (FIM) 2 - Patient completes 25 - 49% of all tasks AND wheels distance 150 feet or more, no rest   QI: 1 Step (Curb)   Reason if not Attempted Safety concerns   1 Step (Curb) CARE Score 88   QI: 4 Steps   Reason if not Attempted Safety concerns   4 Steps CARE Score 88   QI: 12 Steps   Reason if not Attempted Safety concerns   12 Steps CARE Score 88   Stairs   Findings TBA as standing balance improves   Stairs (FIM) 1 - Patient completes less than 25% of all tasks   QI: Picking Up Object   Reason if not Attempted Safety concerns   Picking Up Object CARE Score 88   Endurance Deficit   Endurance Deficit Yes   Endurance Deficit Description fatigue after multiple repititions of standing   Exercises   Hip Abduction Supine;25 reps;AAROM; Left   Knee AROM Short Arc Quad Supine;25 reps;AROM; Bilateral   Bridging Supine;25 reps;AROM; Bilateral   Balance training  Sitting at EOM, pt able to maitnain sitting initially with CGA with vc's for midline, pt able to find middle without assistance  Then able to maintain x5min and accept moderate perturbations, reach moderately outside KUMAR with RUE and CGA/CS  Completed multiple reps of scannign to left side to locate objects  Other Comments   Comments Completed sit to/from 1/2 stands from EOM, min/modA with facilitation through LLE and to assist [facility] forward wgt shift, complete 2x5 reps + 10reps with lateral wgt shifting L + R   Assessment   Treatment Assessment PT evaluation completed [facility] assessement of strength, sitting/standing balance, and functional mobillity  pt resides in ran home [facility] 0 [address], with wife and can provide supervision  pt with regression of functional mobility and self care 2/2 impaired strength and coordination in LLE, imapired midline orietnation and standing balance, L neglect/inattention, and decreased motor planning  pt will benefit from skilled IP PT to maximize balance, mobility, and safety before returning home [facility] wife  pt is a good rehab candidate and expect 3-4 week stay  Prognosis Good   Problem List Decreased strength;Decreased endurance; Impaired balance;Decreased mobility; Decreased coordination; Impaired judgement;Decreased safety awareness; Impaired vision; Impaired sensation;Obesity   Barriers to Discharge Other (Comment)  (caregiver ability)   Plan   Treatment/Interventions Functional transfer training;LE strengthening/ROM; Elevations; Therapeutic exercise; Endurance training;Patient/family training;Equipment eval/education; Bed mobility;Gait training;Continued evaluation   Recommendation   Recommendation Post acute IP rehab;Outpatient PT   PT Therapy Minutes   PT Time In 1300   PT Time Out 1430   PT Total Time (minutes) 90   PT Mode of treatment - Individual (minutes) 90   PT Mode of treatment - Concurrent (minutes) 0   PT Mode of treatment - Group (minutes) 0   PT Mode of treatment - Co-treat (minutes) 0   PT Mode of Teatment - Total time(minutes) 90 minutes   Therapy Time missed   Time missed? No      07/29/17 1300   Pain Assessment   Pain Assessment No/denies pain   Restrictions/Precautions   Weight Bearing Precautions No   Other Precautions Cognitive; Chair Alarm; Fall Risk   General   Chart Reviewed Yes   Cognition   Overall Cognitive Status Impaired   Arousal/Participation Alert; Responsive; Cooperative   Orientation Level Oriented X4   Following Commands Follows one step commands without difficulty   Subjective   Subjective pt is pleasant and motivated for PT   QI: Roll Left and Right   Assistance Needed Physical assistance   Assistance Provided by [place] 25%-49%   Roll Left and Right CARE Score 3   QI: Sit to Lying   Assistance Needed Physical assistance   Assistance Provided by [place] 25%-49%   Sit to Lying CARE Score 3   QI: Lying to Sitting on Side of Bed   Assistance Needed Physical assistance   Assistance Provided by [place] 50%-74%   Lying to Sitting on Side of Bed CARE Score 2   QI: Sit to Stand   Assistance Needed Physical assistance   Assistance Provided by [place] 50%-74%   Sit to Stand CARE Score 2   QI: Chair/Bed-to-Chair Transfer   Assistance Needed Physical assistance   Assistance Provided by [place] 75% or more   Chair/Bed-to-Chair Transfer CARE Score 2   Transfer Bed/Chair/Wheelchair   Limitations Noted In Balance; Coordination;Problem Solving;Sensation; Sequencing;UE Strength   Sit Pivot Maximum Assist Sit to Stand Maximum Assist   Stand to Sit Moderate Assist   Supine to Sit Maximum Assist   Sit to Supine Moderate Assist   Bed, Chair, Wheelchair Transfer (FIM) 2 - [place] needs to lift or boost to rise AND assist to sit   QI: Car Transfer   Reason if not Attempted Safety concerns   Car Transfer CARE Score 88   QI: Walk 10 Feet   Assistance Needed Physical assistance   Assistance Provided by [place] 50%-74%   Walk 10 Feet CARE Score 2   QI: Walk 50 Feet with Two Turns   Reason if not Attempted Safety concerns   Walk 50 Feet with Two Turns CARE Score 88   QI: Walk 150 Feet   Reason if not Attempted Safety concerns   Walk 150 Feet CARE Score 88   QI: Walking 10 Feet on Uneven Surfaces   Reason if not Attempted Safety concerns   Walking 10 Feet on Uneven Surfaces CARE Score 88   Ambulation   Does the patient walk? 2  Yes   Discharge Locomotion Wheelchair;Walk   Walk Assist Level Moderate Assist;Maximum Assist   Gait Pattern L foot drag;L hemiparesis; Inconsistant Margarita; Improper weight shift;Narrow KUMAR; Forward Flexion;Decreased foot clearance   Assist Device Other  (hallway handrail)   Distance Walked 25 ft   Limitations Noted In Balance; Heel Strike;Posture; Safety; Sequencing;Speed;Strenght;Swing   Findings mod/maxA at handrail; maxA wtih RW (double-sided tape on handgrip), with A for RW management, prevent excessive L lateral wgt shift, achieve and maintain R lateral wgt shift  vc's for R wgt shift and increased L step length and heel strike   Walking (FIM) 1 - Patient requires assist of two people   QI: Wheel 50 Feet with [place] 149 assistance   Assistance Provided by [place] 75% or more   Wheel 50 Feet with Two Turns CARE Score 2   QI: Wheel 150 Feet   Assistance Needed Physical assistance   Assistance Provided by [place] Total assistance   Wheel 150 Feet CARE Score 1   Wheelchair mobility   QI: Does the patient use a wheelchair? 1  Yes   QI: Indicate the type of wheelchair 1   Manual Wheelchair Assist Level Maximum Assist   Method Right upper extremity   Needs Assist With Remove Leg Rest;Replace Leg Rest;Obstacles; Other  (turning, prevent veering to left)   Distance Level Surface 75 ft   Wheelchair (FIM) 2 - Patient completes 25 - 49% of all tasks AND wheels distance 150 feet or more, no rest   QI: 1 Step (Curb)   Reason if not Attempted Safety concerns   1 Step (Curb) CARE Score 88   QI: 4 Steps   Reason if not Attempted Safety concerns   4 Steps CARE Score 88   QI: 12 Steps   Reason if not Attempted Safety concerns   12 Steps CARE Score 88   Stairs   Findings TBA as standing balance improves   Stairs (FIM) 1 - Patient completes less than 25% of all tasks   QI: Picking Up Object   Reason if not Attempted Safety concerns   Picking Up Object CARE Score 88   Endurance Deficit   Endurance Deficit Yes   Endurance Deficit Description fatigue after multiple repititions of standing   Exercises   Hip Abduction Supine;25 reps;AAROM; Left   Knee AROM Short Arc Quad Supine;25 reps;AROM; Bilateral   Bridging Supine;25 reps;AROM; Bilateral   Balance training  Sitting at EOM, pt able to maitnain sitting initially with CGA with vc's for midline, pt able to find middle without assistance  Then able to maintain x5min and accept moderate perturbations, reach moderately outside KUMAR with RUE and CGA/CS  Completed multiple reps of scannign to left side to locate objects  Other Comments   Comments Completed sit to/from 1/2 stands from EOM, min/modA with facilitation through LLE and to assist wtih forward wgt shift, complete 2x5 reps + 10reps with lateral wgt shifting L + R   Assessment   Treatment Assessment PT evaluation completed [facility] assessement of strength, sitting/standing balance, and functional mobillity  pt resides in ranch home wtih 0 MARYA, with wife and can provide supervision   pt with regression of functional mobility and self care 2/2 impaired strength and coordination in LLE, imapired midline orietnation and standing balance, L neglect/inattention, and decreased motor planning  pt will benefit from skilled IP PT to maximize balance, mobility, and safety before returning home wtih wife  pt is a good rehab candidate and expect 3-4 week stay  Prognosis Good   Problem List Decreased strength;Decreased endurance; Impaired balance;Decreased mobility; Decreased coordination; Impaired judgement;Decreased safety awareness; Impaired vision; Impaired sensation;Obesity   Barriers to Discharge Other (Comment)  (caregiver ability)   Plan   Treatment/Interventions Functional transfer training;LE strengthening/ROM; Elevations; Therapeutic exercise; Endurance training;Patient/family training;Equipment eval/education; Bed mobility;Gait training;Continued evaluation   Recommendation   Recommendation Post acute IP rehab;Outpatient PT   PT Therapy Minutes   PT Time In 1300   PT Time Out 1430   PT Total Time (minutes) 90   PT Mode of treatment - Individual (minutes) 90   PT Mode of treatment - Concurrent (minutes) 0   PT Mode of treatment - Group (minutes) 0   PT Mode of treatment - Co-treat (minutes) 0   PT Mode of Teatment - Total time(minutes) 90 minutes   Therapy Time missed   Time missed?  No

## 2017-07-30 ENCOUNTER — APPOINTMENT (INPATIENT)
Dept: RADIOLOGY | Facility: HOSPITAL | Age: 72
DRG: 064 | End: 2017-07-30
Payer: MEDICARE

## 2017-07-30 ENCOUNTER — GENERIC CONVERSION - ENCOUNTER (OUTPATIENT)
Dept: OTHER | Facility: OTHER | Age: 72
End: 2017-07-30

## 2017-07-30 ENCOUNTER — APPOINTMENT (INPATIENT)
Dept: NON INVASIVE DIAGNOSTICS | Facility: HOSPITAL | Age: 72
DRG: 064 | End: 2017-07-30
Payer: MEDICARE

## 2017-07-30 LAB
ANION GAP SERPL CALCULATED.3IONS-SCNC: 8 MMOL/L (ref 4–13)
BUN SERPL-MCNC: 24 MG/DL (ref 5–25)
CALCIUM SERPL-MCNC: 9.8 MG/DL (ref 8.3–10.1)
CHLORIDE SERPL-SCNC: 102 MMOL/L (ref 100–108)
CO2 SERPL-SCNC: 30 MMOL/L (ref 21–32)
CREAT SERPL-MCNC: 1.29 MG/DL (ref 0.6–1.3)
ERYTHROCYTE [DISTWIDTH] IN BLOOD BY AUTOMATED COUNT: 13.5 % (ref 11.6–15.1)
GFR SERPL CREATININE-BSD FRML MDRD: 55 ML/MIN/1.73SQ M
GLUCOSE SERPL-MCNC: 122 MG/DL (ref 65–140)
GLUCOSE SERPL-MCNC: 131 MG/DL (ref 65–140)
GLUCOSE SERPL-MCNC: 138 MG/DL (ref 65–140)
GLUCOSE SERPL-MCNC: 139 MG/DL (ref 65–140)
GLUCOSE SERPL-MCNC: 155 MG/DL (ref 65–140)
HCT VFR BLD AUTO: 41.9 % (ref 36.5–49.3)
HGB BLD-MCNC: 14.1 G/DL (ref 12–17)
MCH RBC QN AUTO: 29.3 PG (ref 26.8–34.3)
MCHC RBC AUTO-ENTMCNC: 33.7 G/DL (ref 31.4–37.4)
MCV RBC AUTO: 87 FL (ref 82–98)
PLATELET # BLD AUTO: 191 THOUSANDS/UL (ref 149–390)
PMV BLD AUTO: 10.9 FL (ref 8.9–12.7)
POTASSIUM SERPL-SCNC: 3.9 MMOL/L (ref 3.5–5.3)
RBC # BLD AUTO: 4.82 MILLION/UL (ref 3.88–5.62)
SODIUM SERPL-SCNC: 140 MMOL/L (ref 136–145)
WBC # BLD AUTO: 12.14 THOUSAND/UL (ref 4.31–10.16)

## 2017-07-30 PROCEDURE — G8979 MOBILITY GOAL STATUS: HCPCS

## 2017-07-30 PROCEDURE — G8978 MOBILITY CURRENT STATUS: HCPCS

## 2017-07-30 PROCEDURE — G8988 SELF CARE GOAL STATUS: HCPCS

## 2017-07-30 PROCEDURE — 70450 CT HEAD/BRAIN W/O DYE: CPT

## 2017-07-30 PROCEDURE — 82948 REAGENT STRIP/BLOOD GLUCOSE: CPT

## 2017-07-30 PROCEDURE — 85027 COMPLETE CBC AUTOMATED: CPT | Performed by: INTERNAL MEDICINE

## 2017-07-30 PROCEDURE — 05HN33Z INSERTION OF INFUSION DEVICE INTO LEFT INTERNAL JUGULAR VEIN, PERCUTANEOUS APPROACH: ICD-10-PCS | Performed by: EMERGENCY MEDICINE

## 2017-07-30 PROCEDURE — 70551 MRI BRAIN STEM W/O DYE: CPT

## 2017-07-30 PROCEDURE — 97163 PT EVAL HIGH COMPLEX 45 MIN: CPT

## 2017-07-30 PROCEDURE — 93306 TTE W/DOPPLER COMPLETE: CPT

## 2017-07-30 PROCEDURE — 80048 BASIC METABOLIC PNL TOTAL CA: CPT | Performed by: INTERNAL MEDICINE

## 2017-07-30 PROCEDURE — G8987 SELF CARE CURRENT STATUS: HCPCS

## 2017-07-30 PROCEDURE — 71010 HB CHEST X-RAY 1 VIEW FRONTAL (PORTABLE): CPT

## 2017-07-30 PROCEDURE — 94760 N-INVAS EAR/PLS OXIMETRY 1: CPT

## 2017-07-30 PROCEDURE — 97167 OT EVAL HIGH COMPLEX 60 MIN: CPT

## 2017-07-30 RX ADMIN — FLUTICASONE PROPIONATE 2 PUFF: 220 AEROSOL, METERED RESPIRATORY (INHALATION) at 08:46

## 2017-07-30 RX ADMIN — SODIUM CHLORIDE 75 ML/HR: 0.9 INJECTION, SOLUTION INTRAVENOUS at 01:33

## 2017-07-30 RX ADMIN — ASPIRIN 300 MG: 300 SUPPOSITORY RECTAL at 08:45

## 2017-07-30 RX ADMIN — FLUTICASONE PROPIONATE 2 SPRAY: 50 SPRAY, METERED NASAL at 08:46

## 2017-07-30 RX ADMIN — SODIUM CHLORIDE 125 ML/HR: 0.9 INJECTION, SOLUTION INTRAVENOUS at 13:05

## 2017-07-31 ENCOUNTER — APPOINTMENT (INPATIENT)
Dept: RADIOLOGY | Facility: HOSPITAL | Age: 72
DRG: 064 | End: 2017-07-31
Payer: MEDICARE

## 2017-07-31 PROBLEM — I10 ACCELERATED HYPERTENSION: Status: RESOLVED | Noted: 2017-07-26 | Resolved: 2017-07-31

## 2017-07-31 LAB
ANION GAP SERPL CALCULATED.3IONS-SCNC: 9 MMOL/L (ref 4–13)
BASOPHILS # BLD AUTO: 0.02 THOUSANDS/ΜL (ref 0–0.1)
BASOPHILS NFR BLD AUTO: 0 % (ref 0–1)
BUN SERPL-MCNC: 22 MG/DL (ref 5–25)
CALCIUM SERPL-MCNC: 9.1 MG/DL (ref 8.3–10.1)
CHLORIDE SERPL-SCNC: 104 MMOL/L (ref 100–108)
CO2 SERPL-SCNC: 26 MMOL/L (ref 21–32)
CREAT SERPL-MCNC: 1.1 MG/DL (ref 0.6–1.3)
EOSINOPHIL # BLD AUTO: 0.16 THOUSAND/ΜL (ref 0–0.61)
EOSINOPHIL NFR BLD AUTO: 2 % (ref 0–6)
ERYTHROCYTE [DISTWIDTH] IN BLOOD BY AUTOMATED COUNT: 13.4 % (ref 11.6–15.1)
GFR SERPL CREATININE-BSD FRML MDRD: 67 ML/MIN/1.73SQ M
GLUCOSE SERPL-MCNC: 124 MG/DL (ref 65–140)
GLUCOSE SERPL-MCNC: 125 MG/DL (ref 65–140)
GLUCOSE SERPL-MCNC: 132 MG/DL (ref 65–140)
GLUCOSE SERPL-MCNC: 132 MG/DL (ref 65–140)
GLUCOSE SERPL-MCNC: 151 MG/DL (ref 65–140)
GLUCOSE SERPL-MCNC: 192 MG/DL (ref 65–140)
HCT VFR BLD AUTO: 43.2 % (ref 36.5–49.3)
HGB BLD-MCNC: 14.8 G/DL (ref 12–17)
LYMPHOCYTES # BLD AUTO: 2.56 THOUSANDS/ΜL (ref 0.6–4.47)
LYMPHOCYTES NFR BLD AUTO: 24 % (ref 14–44)
MAGNESIUM SERPL-MCNC: 1.9 MG/DL (ref 1.6–2.6)
MCH RBC QN AUTO: 29.8 PG (ref 26.8–34.3)
MCHC RBC AUTO-ENTMCNC: 34.3 G/DL (ref 31.4–37.4)
MCV RBC AUTO: 87 FL (ref 82–98)
MONOCYTES # BLD AUTO: 0.83 THOUSAND/ΜL (ref 0.17–1.22)
MONOCYTES NFR BLD AUTO: 8 % (ref 4–12)
NEUTROPHILS # BLD AUTO: 7.24 THOUSANDS/ΜL (ref 1.85–7.62)
NEUTS SEG NFR BLD AUTO: 66 % (ref 43–75)
NRBC BLD AUTO-RTO: 0 /100 WBCS
PHOSPHATE SERPL-MCNC: 2.6 MG/DL (ref 2.3–4.1)
PLATELET # BLD AUTO: 168 THOUSANDS/UL (ref 149–390)
PMV BLD AUTO: 10.5 FL (ref 8.9–12.7)
POTASSIUM SERPL-SCNC: 3.9 MMOL/L (ref 3.5–5.3)
RBC # BLD AUTO: 4.96 MILLION/UL (ref 3.88–5.62)
SODIUM SERPL-SCNC: 139 MMOL/L (ref 136–145)
WBC # BLD AUTO: 10.85 THOUSAND/UL (ref 4.31–10.16)

## 2017-07-31 PROCEDURE — 85025 COMPLETE CBC W/AUTO DIFF WBC: CPT | Performed by: INTERNAL MEDICINE

## 2017-07-31 PROCEDURE — 84100 ASSAY OF PHOSPHORUS: CPT | Performed by: INTERNAL MEDICINE

## 2017-07-31 PROCEDURE — 82948 REAGENT STRIP/BLOOD GLUCOSE: CPT

## 2017-07-31 PROCEDURE — 83735 ASSAY OF MAGNESIUM: CPT | Performed by: INTERNAL MEDICINE

## 2017-07-31 PROCEDURE — 80048 BASIC METABOLIC PNL TOTAL CA: CPT | Performed by: INTERNAL MEDICINE

## 2017-07-31 PROCEDURE — 92610 EVALUATE SWALLOWING FUNCTION: CPT

## 2017-07-31 PROCEDURE — 97530 THERAPEUTIC ACTIVITIES: CPT

## 2017-07-31 PROCEDURE — 97112 NEUROMUSCULAR REEDUCATION: CPT

## 2017-07-31 PROCEDURE — 94760 N-INVAS EAR/PLS OXIMETRY 1: CPT

## 2017-07-31 PROCEDURE — 97110 THERAPEUTIC EXERCISES: CPT

## 2017-07-31 PROCEDURE — 70450 CT HEAD/BRAIN W/O DYE: CPT

## 2017-07-31 RX ORDER — ASPIRIN 325 MG
325 TABLET ORAL DAILY
Status: DISCONTINUED | OUTPATIENT
Start: 2017-07-31 | End: 2017-08-01 | Stop reason: HOSPADM

## 2017-07-31 RX ADMIN — FLUTICASONE PROPIONATE 2 PUFF: 220 AEROSOL, METERED RESPIRATORY (INHALATION) at 19:32

## 2017-07-31 RX ADMIN — INSULIN LISPRO 1 UNITS: 100 INJECTION, SOLUTION INTRAVENOUS; SUBCUTANEOUS at 12:47

## 2017-07-31 RX ADMIN — INSULIN LISPRO 1 UNITS: 100 INJECTION, SOLUTION INTRAVENOUS; SUBCUTANEOUS at 18:17

## 2017-07-31 RX ADMIN — FLUTICASONE PROPIONATE 2 PUFF: 220 AEROSOL, METERED RESPIRATORY (INHALATION) at 09:00

## 2017-07-31 RX ADMIN — ATORVASTATIN CALCIUM 80 MG: 80 TABLET, FILM COATED ORAL at 18:17

## 2017-07-31 RX ADMIN — FLUTICASONE PROPIONATE 2 SPRAY: 50 SPRAY, METERED NASAL at 09:06

## 2017-07-31 RX ADMIN — FLUTICASONE PROPIONATE 2 PUFF: 220 AEROSOL, METERED RESPIRATORY (INHALATION) at 00:12

## 2017-07-31 RX ADMIN — ASPIRIN 325 MG: 325 TABLET ORAL at 18:00

## 2017-08-01 ENCOUNTER — HOSPITAL ENCOUNTER (INPATIENT)
Facility: HOSPITAL | Age: 72
LOS: 22 days | Discharge: NURSING, REHAB OR LONG-TERM CARE | DRG: 057 | End: 2017-08-23
Attending: PHYSICAL MEDICINE & REHABILITATION | Admitting: PHYSICAL MEDICINE & REHABILITATION
Payer: MEDICARE

## 2017-08-01 VITALS
OXYGEN SATURATION: 95 % | HEART RATE: 52 BPM | SYSTOLIC BLOOD PRESSURE: 131 MMHG | WEIGHT: 238.1 LBS | RESPIRATION RATE: 23 BRPM | DIASTOLIC BLOOD PRESSURE: 63 MMHG | HEIGHT: 71 IN | TEMPERATURE: 98.7 F | BODY MASS INDEX: 33.33 KG/M2

## 2017-08-01 DIAGNOSIS — I63.9 CVA (CEREBRAL VASCULAR ACCIDENT) (HCC): Primary | ICD-10-CM

## 2017-08-01 DIAGNOSIS — L60.2 OVERGROWN TOENAILS: ICD-10-CM

## 2017-08-01 LAB
GLUCOSE SERPL-MCNC: 139 MG/DL (ref 65–140)
GLUCOSE SERPL-MCNC: 198 MG/DL (ref 65–140)
GLUCOSE SERPL-MCNC: 211 MG/DL (ref 65–140)
GLUCOSE SERPL-MCNC: 221 MG/DL (ref 65–140)

## 2017-08-01 PROCEDURE — 92526 ORAL FUNCTION THERAPY: CPT

## 2017-08-01 PROCEDURE — 97110 THERAPEUTIC EXERCISES: CPT | Performed by: PHYSICAL THERAPIST

## 2017-08-01 PROCEDURE — 97535 SELF CARE MNGMENT TRAINING: CPT

## 2017-08-01 PROCEDURE — 82948 REAGENT STRIP/BLOOD GLUCOSE: CPT

## 2017-08-01 PROCEDURE — 97530 THERAPEUTIC ACTIVITIES: CPT

## 2017-08-01 PROCEDURE — 94760 N-INVAS EAR/PLS OXIMETRY 1: CPT

## 2017-08-01 PROCEDURE — 97116 GAIT TRAINING THERAPY: CPT | Performed by: PHYSICAL THERAPIST

## 2017-08-01 PROCEDURE — 97110 THERAPEUTIC EXERCISES: CPT

## 2017-08-01 PROCEDURE — 97530 THERAPEUTIC ACTIVITIES: CPT | Performed by: PHYSICAL THERAPIST

## 2017-08-01 RX ORDER — ACETAMINOPHEN 650 MG/1
650 SUPPOSITORY RECTAL EVERY 6 HOURS PRN
Status: CANCELLED | OUTPATIENT
Start: 2017-08-01

## 2017-08-01 RX ORDER — ATORVASTATIN CALCIUM 80 MG/1
80 TABLET, FILM COATED ORAL EVERY EVENING
Qty: 1 TABLET | Refills: 0
Start: 2017-08-01 | End: 2017-08-23 | Stop reason: HOSPADM

## 2017-08-01 RX ORDER — POLYETHYLENE GLYCOL 3350 17 G/17G
17 POWDER, FOR SOLUTION ORAL DAILY PRN
Status: DISCONTINUED | OUTPATIENT
Start: 2017-08-01 | End: 2017-08-23 | Stop reason: HOSPADM

## 2017-08-01 RX ORDER — FLUTICASONE PROPIONATE 220 UG/1
2 AEROSOL, METERED RESPIRATORY (INHALATION)
Status: CANCELLED | OUTPATIENT
Start: 2017-08-01

## 2017-08-01 RX ORDER — ACETAMINOPHEN 650 MG/1
650 SUPPOSITORY RECTAL EVERY 6 HOURS PRN
Status: DISCONTINUED | OUTPATIENT
Start: 2017-08-01 | End: 2017-08-03

## 2017-08-01 RX ORDER — LEVALBUTEROL 1.25 MG/.5ML
1.25 SOLUTION, CONCENTRATE RESPIRATORY (INHALATION) EVERY 4 HOURS PRN
Status: DISCONTINUED | OUTPATIENT
Start: 2017-08-01 | End: 2017-08-01

## 2017-08-01 RX ORDER — ASPIRIN 325 MG
325 TABLET ORAL DAILY
Status: DISCONTINUED | OUTPATIENT
Start: 2017-08-02 | End: 2017-08-02

## 2017-08-01 RX ORDER — ATORVASTATIN CALCIUM 80 MG/1
80 TABLET, FILM COATED ORAL EVERY EVENING
Status: DISCONTINUED | OUTPATIENT
Start: 2017-08-01 | End: 2017-08-04

## 2017-08-01 RX ORDER — DOCUSATE SODIUM 100 MG/1
100 CAPSULE, LIQUID FILLED ORAL 2 TIMES DAILY
Status: DISCONTINUED | OUTPATIENT
Start: 2017-08-01 | End: 2017-08-12

## 2017-08-01 RX ORDER — ATORVASTATIN CALCIUM 80 MG/1
80 TABLET, FILM COATED ORAL EVERY EVENING
Status: CANCELLED | OUTPATIENT
Start: 2017-08-01

## 2017-08-01 RX ORDER — ONDANSETRON 4 MG/1
4 TABLET, ORALLY DISINTEGRATING ORAL EVERY 6 HOURS PRN
Status: DISCONTINUED | OUTPATIENT
Start: 2017-08-01 | End: 2017-08-23 | Stop reason: HOSPADM

## 2017-08-01 RX ORDER — ALBUTEROL SULFATE 90 UG/1
2 AEROSOL, METERED RESPIRATORY (INHALATION) EVERY 4 HOURS PRN
Status: DISCONTINUED | OUTPATIENT
Start: 2017-08-01 | End: 2017-08-23 | Stop reason: HOSPADM

## 2017-08-01 RX ORDER — ASPIRIN 325 MG
325 TABLET ORAL DAILY
Status: CANCELLED | OUTPATIENT
Start: 2017-08-02

## 2017-08-01 RX ORDER — FLUTICASONE PROPIONATE 50 MCG
2 SPRAY, SUSPENSION (ML) NASAL DAILY
Status: CANCELLED | OUTPATIENT
Start: 2017-08-02

## 2017-08-01 RX ORDER — FLUTICASONE PROPIONATE 50 MCG
2 SPRAY, SUSPENSION (ML) NASAL DAILY
Status: DISCONTINUED | OUTPATIENT
Start: 2017-08-02 | End: 2017-08-23 | Stop reason: HOSPADM

## 2017-08-01 RX ORDER — LEVALBUTEROL 1.25 MG/.5ML
1.25 SOLUTION, CONCENTRATE RESPIRATORY (INHALATION) EVERY 4 HOURS PRN
Status: CANCELLED | OUTPATIENT
Start: 2017-08-01

## 2017-08-01 RX ORDER — BISACODYL 10 MG
10 SUPPOSITORY, RECTAL RECTAL DAILY PRN
Status: DISCONTINUED | OUTPATIENT
Start: 2017-08-01 | End: 2017-08-23 | Stop reason: HOSPADM

## 2017-08-01 RX ORDER — FLUTICASONE PROPIONATE 220 UG/1
2 AEROSOL, METERED RESPIRATORY (INHALATION)
Status: DISCONTINUED | OUTPATIENT
Start: 2017-08-01 | End: 2017-08-02

## 2017-08-01 RX ORDER — ASPIRIN 325 MG
325 TABLET ORAL DAILY
Qty: 1 TABLET | Refills: 0
Start: 2017-08-01 | End: 2017-08-23 | Stop reason: HOSPADM

## 2017-08-01 RX ADMIN — INSULIN LISPRO 1 UNITS: 100 INJECTION, SOLUTION INTRAVENOUS; SUBCUTANEOUS at 12:58

## 2017-08-01 RX ADMIN — FLUTICASONE PROPIONATE 2 SPRAY: 50 SPRAY, METERED NASAL at 09:01

## 2017-08-01 RX ADMIN — INSULIN LISPRO 1 UNITS: 100 INJECTION, SOLUTION INTRAVENOUS; SUBCUTANEOUS at 21:16

## 2017-08-01 RX ADMIN — ATORVASTATIN CALCIUM 80 MG: 80 TABLET, FILM COATED ORAL at 17:42

## 2017-08-01 RX ADMIN — ASPIRIN 325 MG: 325 TABLET ORAL at 09:00

## 2017-08-01 RX ADMIN — FLUTICASONE PROPIONATE 2 PUFF: 220 AEROSOL, METERED RESPIRATORY (INHALATION) at 10:07

## 2017-08-01 RX ADMIN — DOCUSATE SODIUM 100 MG: 100 CAPSULE, LIQUID FILLED ORAL at 17:40

## 2017-08-01 RX ADMIN — FLUTICASONE PROPIONATE 2 PUFF: 220 AEROSOL, METERED RESPIRATORY (INHALATION) at 20:37

## 2017-08-01 RX ADMIN — INSULIN LISPRO 1 UNITS: 100 INJECTION, SOLUTION INTRAVENOUS; SUBCUTANEOUS at 16:57

## 2017-08-02 ENCOUNTER — APPOINTMENT (INPATIENT)
Dept: RADIOLOGY | Facility: HOSPITAL | Age: 72
DRG: 057 | End: 2017-08-02
Payer: MEDICARE

## 2017-08-02 LAB
GLUCOSE SERPL-MCNC: 147 MG/DL (ref 65–140)
GLUCOSE SERPL-MCNC: 207 MG/DL (ref 65–140)
GLUCOSE SERPL-MCNC: 224 MG/DL (ref 65–140)
GLUCOSE SERPL-MCNC: 267 MG/DL (ref 65–140)

## 2017-08-02 PROCEDURE — 97112 NEUROMUSCULAR REEDUCATION: CPT

## 2017-08-02 PROCEDURE — 97542 WHEELCHAIR MNGMENT TRAINING: CPT

## 2017-08-02 PROCEDURE — 97530 THERAPEUTIC ACTIVITIES: CPT

## 2017-08-02 PROCEDURE — 92610 EVALUATE SWALLOWING FUNCTION: CPT

## 2017-08-02 PROCEDURE — 92522 EVALUATE SPEECH PRODUCTION: CPT

## 2017-08-02 PROCEDURE — 97162 PT EVAL MOD COMPLEX 30 MIN: CPT

## 2017-08-02 PROCEDURE — 97535 SELF CARE MNGMENT TRAINING: CPT

## 2017-08-02 PROCEDURE — 97167 OT EVAL HIGH COMPLEX 60 MIN: CPT

## 2017-08-02 PROCEDURE — 70450 CT HEAD/BRAIN W/O DYE: CPT

## 2017-08-02 PROCEDURE — 82948 REAGENT STRIP/BLOOD GLUCOSE: CPT

## 2017-08-02 RX ORDER — CLOPIDOGREL BISULFATE 75 MG/1
300 TABLET ORAL ONCE
Status: COMPLETED | OUTPATIENT
Start: 2017-08-02 | End: 2017-08-02

## 2017-08-02 RX ORDER — CLOPIDOGREL BISULFATE 75 MG/1
75 TABLET ORAL DAILY
Status: DISCONTINUED | OUTPATIENT
Start: 2017-08-03 | End: 2017-08-23 | Stop reason: HOSPADM

## 2017-08-02 RX ORDER — ASPIRIN 81 MG/1
162 TABLET, CHEWABLE ORAL DAILY
Status: DISCONTINUED | OUTPATIENT
Start: 2017-08-03 | End: 2017-08-23 | Stop reason: HOSPADM

## 2017-08-02 RX ADMIN — DOCUSATE SODIUM 100 MG: 100 CAPSULE, LIQUID FILLED ORAL at 18:05

## 2017-08-02 RX ADMIN — FLUTICASONE PROPIONATE 2 PUFF: 220 AEROSOL, METERED RESPIRATORY (INHALATION) at 08:37

## 2017-08-02 RX ADMIN — CLOPIDOGREL BISULFATE 300 MG: 75 TABLET ORAL at 17:07

## 2017-08-02 RX ADMIN — DOCUSATE SODIUM 100 MG: 100 CAPSULE, LIQUID FILLED ORAL at 08:37

## 2017-08-02 RX ADMIN — METFORMIN HYDROCHLORIDE 500 MG: 500 TABLET, FILM COATED ORAL at 17:07

## 2017-08-02 RX ADMIN — ATORVASTATIN CALCIUM 80 MG: 80 TABLET, FILM COATED ORAL at 18:04

## 2017-08-02 RX ADMIN — INSULIN LISPRO 1 UNITS: 100 INJECTION, SOLUTION INTRAVENOUS; SUBCUTANEOUS at 16:55

## 2017-08-02 RX ADMIN — NYSTATIN 500000 UNITS: 100000 SUSPENSION ORAL at 18:05

## 2017-08-02 RX ADMIN — FLUTICASONE PROPIONATE 2 SPRAY: 50 SPRAY, METERED NASAL at 08:37

## 2017-08-02 RX ADMIN — INSULIN LISPRO 1 UNITS: 100 INJECTION, SOLUTION INTRAVENOUS; SUBCUTANEOUS at 21:40

## 2017-08-02 RX ADMIN — NYSTATIN 500000 UNITS: 100000 SUSPENSION ORAL at 21:40

## 2017-08-02 RX ADMIN — ASPIRIN 325 MG: 325 TABLET ORAL at 08:37

## 2017-08-02 RX ADMIN — NYSTATIN 500000 UNITS: 100000 SUSPENSION ORAL at 14:13

## 2017-08-02 RX ADMIN — INSULIN LISPRO 2 UNITS: 100 INJECTION, SOLUTION INTRAVENOUS; SUBCUTANEOUS at 11:22

## 2017-08-03 LAB
ANION GAP SERPL CALCULATED.3IONS-SCNC: 8 MMOL/L (ref 4–13)
BASOPHILS # BLD AUTO: 0.03 THOUSANDS/ΜL (ref 0–0.1)
BASOPHILS NFR BLD AUTO: 0 % (ref 0–1)
BUN SERPL-MCNC: 19 MG/DL (ref 5–25)
CALCIUM SERPL-MCNC: 10 MG/DL (ref 8.3–10.1)
CHLORIDE SERPL-SCNC: 101 MMOL/L (ref 100–108)
CO2 SERPL-SCNC: 28 MMOL/L (ref 21–32)
CREAT SERPL-MCNC: 1.15 MG/DL (ref 0.6–1.3)
EOSINOPHIL # BLD AUTO: 0.13 THOUSAND/ΜL (ref 0–0.61)
EOSINOPHIL NFR BLD AUTO: 1 % (ref 0–6)
ERYTHROCYTE [DISTWIDTH] IN BLOOD BY AUTOMATED COUNT: 13.3 % (ref 11.6–15.1)
GFR SERPL CREATININE-BSD FRML MDRD: 63 ML/MIN/1.73SQ M
GLUCOSE SERPL-MCNC: 153 MG/DL (ref 65–140)
GLUCOSE SERPL-MCNC: 190 MG/DL (ref 65–140)
GLUCOSE SERPL-MCNC: 202 MG/DL (ref 65–140)
GLUCOSE SERPL-MCNC: 211 MG/DL (ref 65–140)
GLUCOSE SERPL-MCNC: 214 MG/DL (ref 65–140)
HCT VFR BLD AUTO: 43.5 % (ref 36.5–49.3)
HGB BLD-MCNC: 14.6 G/DL (ref 12–17)
LYMPHOCYTES # BLD AUTO: 2.95 THOUSANDS/ΜL (ref 0.6–4.47)
LYMPHOCYTES NFR BLD AUTO: 23 % (ref 14–44)
MCH RBC QN AUTO: 29.4 PG (ref 26.8–34.3)
MCHC RBC AUTO-ENTMCNC: 33.6 G/DL (ref 31.4–37.4)
MCV RBC AUTO: 88 FL (ref 82–98)
MONOCYTES # BLD AUTO: 1.23 THOUSAND/ΜL (ref 0.17–1.22)
MONOCYTES NFR BLD AUTO: 9 % (ref 4–12)
NEUTROPHILS # BLD AUTO: 8.66 THOUSANDS/ΜL (ref 1.85–7.62)
NEUTS SEG NFR BLD AUTO: 67 % (ref 43–75)
NRBC BLD AUTO-RTO: 0 /100 WBCS
PLATELET # BLD AUTO: 181 THOUSANDS/UL (ref 149–390)
PMV BLD AUTO: 11 FL (ref 8.9–12.7)
POTASSIUM SERPL-SCNC: 3.9 MMOL/L (ref 3.5–5.3)
RBC # BLD AUTO: 4.97 MILLION/UL (ref 3.88–5.62)
SODIUM SERPL-SCNC: 137 MMOL/L (ref 136–145)
WBC # BLD AUTO: 13.05 THOUSAND/UL (ref 4.31–10.16)

## 2017-08-03 PROCEDURE — 97532 HB COGNITIVE SKILLS DEVELOPMENT: CPT

## 2017-08-03 PROCEDURE — 92526 ORAL FUNCTION THERAPY: CPT

## 2017-08-03 PROCEDURE — 97112 NEUROMUSCULAR REEDUCATION: CPT

## 2017-08-03 PROCEDURE — 82948 REAGENT STRIP/BLOOD GLUCOSE: CPT

## 2017-08-03 PROCEDURE — 85025 COMPLETE CBC W/AUTO DIFF WBC: CPT | Performed by: NURSE PRACTITIONER

## 2017-08-03 PROCEDURE — 84550 ASSAY OF BLOOD/URIC ACID: CPT | Performed by: NURSE PRACTITIONER

## 2017-08-03 PROCEDURE — 97530 THERAPEUTIC ACTIVITIES: CPT

## 2017-08-03 PROCEDURE — 80048 BASIC METABOLIC PNL TOTAL CA: CPT | Performed by: NURSE PRACTITIONER

## 2017-08-03 RX ORDER — ACETAMINOPHEN 325 MG/1
650 TABLET ORAL EVERY 6 HOURS PRN
Status: DISCONTINUED | OUTPATIENT
Start: 2017-08-03 | End: 2017-08-23 | Stop reason: HOSPADM

## 2017-08-03 RX ORDER — LISINOPRIL 5 MG/1
5 TABLET ORAL DAILY
Status: DISCONTINUED | OUTPATIENT
Start: 2017-08-03 | End: 2017-08-14

## 2017-08-03 RX ADMIN — NYSTATIN 500000 UNITS: 100000 SUSPENSION ORAL at 12:01

## 2017-08-03 RX ADMIN — DOCUSATE SODIUM 100 MG: 100 CAPSULE, LIQUID FILLED ORAL at 17:56

## 2017-08-03 RX ADMIN — BISACODYL 10 MG: 10 SUPPOSITORY RECTAL at 20:31

## 2017-08-03 RX ADMIN — LISINOPRIL 5 MG: 5 TABLET ORAL at 12:01

## 2017-08-03 RX ADMIN — INSULIN LISPRO 1 UNITS: 100 INJECTION, SOLUTION INTRAVENOUS; SUBCUTANEOUS at 17:05

## 2017-08-03 RX ADMIN — NYSTATIN 500000 UNITS: 100000 SUSPENSION ORAL at 17:56

## 2017-08-03 RX ADMIN — NYSTATIN 500000 UNITS: 100000 SUSPENSION ORAL at 08:32

## 2017-08-03 RX ADMIN — DOCUSATE SODIUM 100 MG: 100 CAPSULE, LIQUID FILLED ORAL at 08:32

## 2017-08-03 RX ADMIN — NYSTATIN 500000 UNITS: 100000 SUSPENSION ORAL at 22:24

## 2017-08-03 RX ADMIN — INSULIN LISPRO 1 UNITS: 100 INJECTION, SOLUTION INTRAVENOUS; SUBCUTANEOUS at 22:25

## 2017-08-03 RX ADMIN — CLOPIDOGREL BISULFATE 75 MG: 75 TABLET ORAL at 08:32

## 2017-08-03 RX ADMIN — METFORMIN HYDROCHLORIDE 500 MG: 500 TABLET, FILM COATED ORAL at 08:32

## 2017-08-03 RX ADMIN — INSULIN LISPRO 1 UNITS: 100 INJECTION, SOLUTION INTRAVENOUS; SUBCUTANEOUS at 12:01

## 2017-08-03 RX ADMIN — ACETAMINOPHEN 650 MG: 650 SUPPOSITORY RECTAL at 06:15

## 2017-08-03 RX ADMIN — ASPIRIN 81 MG 162 MG: 81 TABLET ORAL at 08:32

## 2017-08-03 RX ADMIN — ATORVASTATIN CALCIUM 80 MG: 80 TABLET, FILM COATED ORAL at 17:56

## 2017-08-03 RX ADMIN — INSULIN LISPRO 1 UNITS: 100 INJECTION, SOLUTION INTRAVENOUS; SUBCUTANEOUS at 08:31

## 2017-08-03 RX ADMIN — METFORMIN HYDROCHLORIDE 1000 MG: 500 TABLET, FILM COATED ORAL at 17:56

## 2017-08-04 LAB
GLUCOSE SERPL-MCNC: 190 MG/DL (ref 65–140)
GLUCOSE SERPL-MCNC: 194 MG/DL (ref 65–140)
GLUCOSE SERPL-MCNC: 220 MG/DL (ref 65–140)
GLUCOSE SERPL-MCNC: 225 MG/DL (ref 65–140)
GLUCOSE SERPL-MCNC: <20 MG/DL (ref 65–140)

## 2017-08-04 PROCEDURE — 82948 REAGENT STRIP/BLOOD GLUCOSE: CPT

## 2017-08-04 PROCEDURE — 97530 THERAPEUTIC ACTIVITIES: CPT

## 2017-08-04 PROCEDURE — 97112 NEUROMUSCULAR REEDUCATION: CPT

## 2017-08-04 PROCEDURE — 97110 THERAPEUTIC EXERCISES: CPT

## 2017-08-04 PROCEDURE — 92526 ORAL FUNCTION THERAPY: CPT

## 2017-08-04 PROCEDURE — 97530 THERAPEUTIC ACTIVITIES: CPT | Performed by: OCCUPATIONAL THERAPY ASSISTANT

## 2017-08-04 RX ORDER — COLCHICINE 0.6 MG/1
0.6 TABLET ORAL EVERY 12 HOURS
Status: COMPLETED | OUTPATIENT
Start: 2017-08-04 | End: 2017-08-05

## 2017-08-04 RX ORDER — ATORVASTATIN CALCIUM 80 MG/1
80 TABLET, FILM COATED ORAL EVERY EVENING
Status: DISCONTINUED | OUTPATIENT
Start: 2017-08-05 | End: 2017-08-05

## 2017-08-04 RX ADMIN — DOCUSATE SODIUM 100 MG: 100 CAPSULE, LIQUID FILLED ORAL at 19:35

## 2017-08-04 RX ADMIN — ACETAMINOPHEN 650 MG: 325 TABLET, FILM COATED ORAL at 12:17

## 2017-08-04 RX ADMIN — METFORMIN HYDROCHLORIDE 1000 MG: 500 TABLET, FILM COATED ORAL at 17:10

## 2017-08-04 RX ADMIN — INSULIN LISPRO 1 UNITS: 100 INJECTION, SOLUTION INTRAVENOUS; SUBCUTANEOUS at 12:10

## 2017-08-04 RX ADMIN — METFORMIN HYDROCHLORIDE 1000 MG: 500 TABLET, FILM COATED ORAL at 08:55

## 2017-08-04 RX ADMIN — COLCHICINE 0.6 MG: 0.6 TABLET, FILM COATED ORAL at 19:36

## 2017-08-04 RX ADMIN — LISINOPRIL 5 MG: 5 TABLET ORAL at 08:59

## 2017-08-04 RX ADMIN — INSULIN LISPRO 1 UNITS: 100 INJECTION, SOLUTION INTRAVENOUS; SUBCUTANEOUS at 08:52

## 2017-08-04 RX ADMIN — INSULIN LISPRO 1 UNITS: 100 INJECTION, SOLUTION INTRAVENOUS; SUBCUTANEOUS at 17:10

## 2017-08-04 RX ADMIN — ASPIRIN 81 MG 162 MG: 81 TABLET ORAL at 08:55

## 2017-08-04 RX ADMIN — CLOPIDOGREL BISULFATE 75 MG: 75 TABLET ORAL at 08:55

## 2017-08-04 RX ADMIN — NYSTATIN 500000 UNITS: 100000 SUSPENSION ORAL at 14:13

## 2017-08-04 RX ADMIN — NYSTATIN 500000 UNITS: 100000 SUSPENSION ORAL at 19:36

## 2017-08-04 RX ADMIN — NYSTATIN 500000 UNITS: 100000 SUSPENSION ORAL at 22:07

## 2017-08-04 RX ADMIN — NYSTATIN 500000 UNITS: 100000 SUSPENSION ORAL at 08:55

## 2017-08-04 RX ADMIN — DOCUSATE SODIUM 100 MG: 100 CAPSULE, LIQUID FILLED ORAL at 08:59

## 2017-08-04 RX ADMIN — INSULIN LISPRO 2 UNITS: 100 INJECTION, SOLUTION INTRAVENOUS; SUBCUTANEOUS at 22:07

## 2017-08-05 LAB
BASOPHILS # BLD AUTO: 0.03 THOUSANDS/ΜL (ref 0–0.1)
BASOPHILS NFR BLD AUTO: 0 % (ref 0–1)
EOSINOPHIL # BLD AUTO: 0.11 THOUSAND/ΜL (ref 0–0.61)
EOSINOPHIL NFR BLD AUTO: 1 % (ref 0–6)
ERYTHROCYTE [DISTWIDTH] IN BLOOD BY AUTOMATED COUNT: 13.6 % (ref 11.6–15.1)
GLUCOSE SERPL-MCNC: 151 MG/DL (ref 65–140)
GLUCOSE SERPL-MCNC: 160 MG/DL (ref 65–140)
GLUCOSE SERPL-MCNC: 164 MG/DL (ref 65–140)
GLUCOSE SERPL-MCNC: 220 MG/DL (ref 65–140)
HCT VFR BLD AUTO: 41.1 % (ref 36.5–49.3)
HGB BLD-MCNC: 13.7 G/DL (ref 12–17)
LYMPHOCYTES # BLD AUTO: 2.93 THOUSANDS/ΜL (ref 0.6–4.47)
LYMPHOCYTES NFR BLD AUTO: 20 % (ref 14–44)
MCH RBC QN AUTO: 29 PG (ref 26.8–34.3)
MCHC RBC AUTO-ENTMCNC: 33.3 G/DL (ref 31.4–37.4)
MCV RBC AUTO: 87 FL (ref 82–98)
MONOCYTES # BLD AUTO: 1.3 THOUSAND/ΜL (ref 0.17–1.22)
MONOCYTES NFR BLD AUTO: 9 % (ref 4–12)
NEUTROPHILS # BLD AUTO: 10.51 THOUSANDS/ΜL (ref 1.85–7.62)
NEUTS SEG NFR BLD AUTO: 70 % (ref 43–75)
NRBC BLD AUTO-RTO: 0 /100 WBCS
PLATELET # BLD AUTO: 196 THOUSANDS/UL (ref 149–390)
PMV BLD AUTO: 11.1 FL (ref 8.9–12.7)
RBC # BLD AUTO: 4.73 MILLION/UL (ref 3.88–5.62)
URATE SERPL-MCNC: 7.7 MG/DL (ref 4.2–8)
URATE SERPL-MCNC: 8.8 MG/DL (ref 4.2–8)
WBC # BLD AUTO: 14.93 THOUSAND/UL (ref 4.31–10.16)

## 2017-08-05 PROCEDURE — 97112 NEUROMUSCULAR REEDUCATION: CPT

## 2017-08-05 PROCEDURE — 85025 COMPLETE CBC W/AUTO DIFF WBC: CPT | Performed by: NURSE PRACTITIONER

## 2017-08-05 PROCEDURE — 84550 ASSAY OF BLOOD/URIC ACID: CPT | Performed by: NURSE PRACTITIONER

## 2017-08-05 PROCEDURE — 82948 REAGENT STRIP/BLOOD GLUCOSE: CPT

## 2017-08-05 PROCEDURE — 92526 ORAL FUNCTION THERAPY: CPT

## 2017-08-05 PROCEDURE — 97530 THERAPEUTIC ACTIVITIES: CPT

## 2017-08-05 RX ORDER — ATORVASTATIN CALCIUM 80 MG/1
80 TABLET, FILM COATED ORAL EVERY EVENING
Status: DISCONTINUED | OUTPATIENT
Start: 2017-08-06 | End: 2017-08-23 | Stop reason: HOSPADM

## 2017-08-05 RX ADMIN — METFORMIN HYDROCHLORIDE 1000 MG: 500 TABLET, FILM COATED ORAL at 16:48

## 2017-08-05 RX ADMIN — NYSTATIN 500000 UNITS: 100000 SUSPENSION ORAL at 11:54

## 2017-08-05 RX ADMIN — ASPIRIN 81 MG 162 MG: 81 TABLET ORAL at 09:36

## 2017-08-05 RX ADMIN — NYSTATIN 500000 UNITS: 100000 SUSPENSION ORAL at 17:43

## 2017-08-05 RX ADMIN — NYSTATIN 500000 UNITS: 100000 SUSPENSION ORAL at 09:36

## 2017-08-05 RX ADMIN — INSULIN LISPRO 1 UNITS: 100 INJECTION, SOLUTION INTRAVENOUS; SUBCUTANEOUS at 21:48

## 2017-08-05 RX ADMIN — INSULIN LISPRO 1 UNITS: 100 INJECTION, SOLUTION INTRAVENOUS; SUBCUTANEOUS at 16:48

## 2017-08-05 RX ADMIN — LISINOPRIL 5 MG: 5 TABLET ORAL at 09:36

## 2017-08-05 RX ADMIN — METFORMIN HYDROCHLORIDE 1000 MG: 500 TABLET, FILM COATED ORAL at 09:37

## 2017-08-05 RX ADMIN — NYSTATIN 500000 UNITS: 100000 SUSPENSION ORAL at 21:48

## 2017-08-05 RX ADMIN — INSULIN LISPRO 1 UNITS: 100 INJECTION, SOLUTION INTRAVENOUS; SUBCUTANEOUS at 09:38

## 2017-08-05 RX ADMIN — COLCHICINE 0.6 MG: 0.6 TABLET, FILM COATED ORAL at 05:18

## 2017-08-05 RX ADMIN — CLOPIDOGREL BISULFATE 75 MG: 75 TABLET ORAL at 09:37

## 2017-08-05 RX ADMIN — DOCUSATE SODIUM 100 MG: 100 CAPSULE, LIQUID FILLED ORAL at 17:43

## 2017-08-05 RX ADMIN — INSULIN LISPRO 1 UNITS: 100 INJECTION, SOLUTION INTRAVENOUS; SUBCUTANEOUS at 11:54

## 2017-08-05 RX ADMIN — FLUTICASONE PROPIONATE 2 SPRAY: 50 SPRAY, METERED NASAL at 09:38

## 2017-08-06 ENCOUNTER — APPOINTMENT (INPATIENT)
Dept: RADIOLOGY | Facility: HOSPITAL | Age: 72
DRG: 057 | End: 2017-08-06
Payer: MEDICARE

## 2017-08-06 LAB
GLUCOSE SERPL-MCNC: 147 MG/DL (ref 65–140)
GLUCOSE SERPL-MCNC: 182 MG/DL (ref 65–140)
GLUCOSE SERPL-MCNC: 197 MG/DL (ref 65–140)
GLUCOSE SERPL-MCNC: 206 MG/DL (ref 65–140)
PREALB SERPL-MCNC: 18.7 MG/DL (ref 18–40)

## 2017-08-06 PROCEDURE — 97530 THERAPEUTIC ACTIVITIES: CPT

## 2017-08-06 PROCEDURE — 97116 GAIT TRAINING THERAPY: CPT

## 2017-08-06 PROCEDURE — 97112 NEUROMUSCULAR REEDUCATION: CPT

## 2017-08-06 PROCEDURE — 82948 REAGENT STRIP/BLOOD GLUCOSE: CPT

## 2017-08-06 PROCEDURE — 97110 THERAPEUTIC EXERCISES: CPT

## 2017-08-06 PROCEDURE — 84134 ASSAY OF PREALBUMIN: CPT | Performed by: STUDENT IN AN ORGANIZED HEALTH CARE EDUCATION/TRAINING PROGRAM

## 2017-08-06 PROCEDURE — 73630 X-RAY EXAM OF FOOT: CPT

## 2017-08-06 RX ORDER — LIDOCAINE HYDROCHLORIDE 10 MG/ML
10 INJECTION, SOLUTION EPIDURAL; INFILTRATION; INTRACAUDAL; PERINEURAL ONCE
Status: DISCONTINUED | OUTPATIENT
Start: 2017-08-07 | End: 2017-08-07

## 2017-08-06 RX ORDER — DEXAMETHASONE SODIUM PHOSPHATE 4 MG/ML
4 INJECTION, SOLUTION INTRA-ARTICULAR; INTRALESIONAL; INTRAMUSCULAR; INTRAVENOUS; SOFT TISSUE ONCE
Status: DISCONTINUED | OUTPATIENT
Start: 2017-08-07 | End: 2017-08-21

## 2017-08-06 RX ADMIN — ATORVASTATIN CALCIUM 80 MG: 80 TABLET, FILM COATED ORAL at 17:54

## 2017-08-06 RX ADMIN — DOCUSATE SODIUM 100 MG: 100 CAPSULE, LIQUID FILLED ORAL at 08:55

## 2017-08-06 RX ADMIN — METFORMIN HYDROCHLORIDE 1000 MG: 500 TABLET, FILM COATED ORAL at 16:52

## 2017-08-06 RX ADMIN — INSULIN LISPRO 1 UNITS: 100 INJECTION, SOLUTION INTRAVENOUS; SUBCUTANEOUS at 12:16

## 2017-08-06 RX ADMIN — INSULIN LISPRO 1 UNITS: 100 INJECTION, SOLUTION INTRAVENOUS; SUBCUTANEOUS at 21:44

## 2017-08-06 RX ADMIN — NYSTATIN 500000 UNITS: 100000 SUSPENSION ORAL at 21:45

## 2017-08-06 RX ADMIN — ASPIRIN 81 MG 162 MG: 81 TABLET ORAL at 08:55

## 2017-08-06 RX ADMIN — NYSTATIN 500000 UNITS: 100000 SUSPENSION ORAL at 08:51

## 2017-08-06 RX ADMIN — FLUTICASONE PROPIONATE 2 SPRAY: 50 SPRAY, METERED NASAL at 08:51

## 2017-08-06 RX ADMIN — LISINOPRIL 5 MG: 5 TABLET ORAL at 08:54

## 2017-08-06 RX ADMIN — NYSTATIN 500000 UNITS: 100000 SUSPENSION ORAL at 17:54

## 2017-08-06 RX ADMIN — METFORMIN HYDROCHLORIDE 1000 MG: 500 TABLET, FILM COATED ORAL at 08:55

## 2017-08-06 RX ADMIN — INSULIN LISPRO 1 UNITS: 100 INJECTION, SOLUTION INTRAVENOUS; SUBCUTANEOUS at 16:52

## 2017-08-06 RX ADMIN — CLOPIDOGREL BISULFATE 75 MG: 75 TABLET ORAL at 08:55

## 2017-08-06 RX ADMIN — NYSTATIN 500000 UNITS: 100000 SUSPENSION ORAL at 12:16

## 2017-08-07 LAB
ANION GAP SERPL CALCULATED.3IONS-SCNC: 9 MMOL/L (ref 4–13)
BASOPHILS # BLD AUTO: 0.02 THOUSANDS/ΜL (ref 0–0.1)
BASOPHILS NFR BLD AUTO: 0 % (ref 0–1)
BUN SERPL-MCNC: 28 MG/DL (ref 5–25)
CALCIUM SERPL-MCNC: 9.8 MG/DL (ref 8.3–10.1)
CHLORIDE SERPL-SCNC: 103 MMOL/L (ref 100–108)
CO2 SERPL-SCNC: 27 MMOL/L (ref 21–32)
CREAT SERPL-MCNC: 1.13 MG/DL (ref 0.6–1.3)
EOSINOPHIL # BLD AUTO: 0.22 THOUSAND/ΜL (ref 0–0.61)
EOSINOPHIL NFR BLD AUTO: 2 % (ref 0–6)
ERYTHROCYTE [DISTWIDTH] IN BLOOD BY AUTOMATED COUNT: 13.5 % (ref 11.6–15.1)
GFR SERPL CREATININE-BSD FRML MDRD: 65 ML/MIN/1.73SQ M
GLUCOSE P FAST SERPL-MCNC: 136 MG/DL (ref 65–99)
GLUCOSE SERPL-MCNC: 119 MG/DL (ref 65–140)
GLUCOSE SERPL-MCNC: 136 MG/DL (ref 65–140)
GLUCOSE SERPL-MCNC: 137 MG/DL (ref 65–140)
GLUCOSE SERPL-MCNC: 184 MG/DL (ref 65–140)
GLUCOSE SERPL-MCNC: 222 MG/DL (ref 65–140)
HCT VFR BLD AUTO: 40.9 % (ref 36.5–49.3)
HGB BLD-MCNC: 13.6 G/DL (ref 12–17)
LYMPHOCYTES # BLD AUTO: 2.66 THOUSANDS/ΜL (ref 0.6–4.47)
LYMPHOCYTES NFR BLD AUTO: 26 % (ref 14–44)
MCH RBC QN AUTO: 29.2 PG (ref 26.8–34.3)
MCHC RBC AUTO-ENTMCNC: 33.3 G/DL (ref 31.4–37.4)
MCV RBC AUTO: 88 FL (ref 82–98)
MONOCYTES # BLD AUTO: 0.82 THOUSAND/ΜL (ref 0.17–1.22)
MONOCYTES NFR BLD AUTO: 8 % (ref 4–12)
NEUTROPHILS # BLD AUTO: 6.4 THOUSANDS/ΜL (ref 1.85–7.62)
NEUTS SEG NFR BLD AUTO: 64 % (ref 43–75)
NRBC BLD AUTO-RTO: 0 /100 WBCS
PLATELET # BLD AUTO: 224 THOUSANDS/UL (ref 149–390)
PMV BLD AUTO: 11 FL (ref 8.9–12.7)
POTASSIUM SERPL-SCNC: 3.9 MMOL/L (ref 3.5–5.3)
RBC # BLD AUTO: 4.66 MILLION/UL (ref 3.88–5.62)
SODIUM SERPL-SCNC: 139 MMOL/L (ref 136–145)
WBC # BLD AUTO: 10.17 THOUSAND/UL (ref 4.31–10.16)

## 2017-08-07 PROCEDURE — 97530 THERAPEUTIC ACTIVITIES: CPT

## 2017-08-07 PROCEDURE — 97116 GAIT TRAINING THERAPY: CPT

## 2017-08-07 PROCEDURE — 3E0U3BZ INTRODUCTION OF ANESTHETIC AGENT INTO JOINTS, PERCUTANEOUS APPROACH: ICD-10-PCS | Performed by: PODIATRIST

## 2017-08-07 PROCEDURE — 80048 BASIC METABOLIC PNL TOTAL CA: CPT | Performed by: PHYSICIAN ASSISTANT

## 2017-08-07 PROCEDURE — 97535 SELF CARE MNGMENT TRAINING: CPT

## 2017-08-07 PROCEDURE — 82948 REAGENT STRIP/BLOOD GLUCOSE: CPT

## 2017-08-07 PROCEDURE — 97032 APPL MODALITY 1+ESTIM EA 15: CPT

## 2017-08-07 PROCEDURE — 85025 COMPLETE CBC W/AUTO DIFF WBC: CPT | Performed by: PHYSICIAN ASSISTANT

## 2017-08-07 PROCEDURE — 3E0U33Z INTRODUCTION OF ANTI-INFLAMMATORY INTO JOINTS, PERCUTANEOUS APPROACH: ICD-10-PCS | Performed by: PODIATRIST

## 2017-08-07 PROCEDURE — 92526 ORAL FUNCTION THERAPY: CPT

## 2017-08-07 PROCEDURE — 97110 THERAPEUTIC EXERCISES: CPT

## 2017-08-07 RX ORDER — HEPARIN SODIUM 5000 [USP'U]/ML
5000 INJECTION, SOLUTION INTRAVENOUS; SUBCUTANEOUS EVERY 8 HOURS SCHEDULED
Status: DISCONTINUED | OUTPATIENT
Start: 2017-08-07 | End: 2017-08-23 | Stop reason: HOSPADM

## 2017-08-07 RX ORDER — LIDOCAINE HYDROCHLORIDE 10 MG/ML
10 INJECTION, SOLUTION EPIDURAL; INFILTRATION; INTRACAUDAL; PERINEURAL ONCE
Status: DISCONTINUED | OUTPATIENT
Start: 2017-08-07 | End: 2017-08-21

## 2017-08-07 RX ADMIN — CLOPIDOGREL BISULFATE 75 MG: 75 TABLET ORAL at 08:34

## 2017-08-07 RX ADMIN — METFORMIN HYDROCHLORIDE 1000 MG: 500 TABLET, FILM COATED ORAL at 16:49

## 2017-08-07 RX ADMIN — INSULIN LISPRO 1 UNITS: 100 INJECTION, SOLUTION INTRAVENOUS; SUBCUTANEOUS at 12:29

## 2017-08-07 RX ADMIN — LISINOPRIL 5 MG: 5 TABLET ORAL at 08:34

## 2017-08-07 RX ADMIN — NYSTATIN 500000 UNITS: 100000 SUSPENSION ORAL at 22:11

## 2017-08-07 RX ADMIN — DOCUSATE SODIUM 100 MG: 100 CAPSULE, LIQUID FILLED ORAL at 08:34

## 2017-08-07 RX ADMIN — FLUTICASONE PROPIONATE 2 SPRAY: 50 SPRAY, METERED NASAL at 08:30

## 2017-08-07 RX ADMIN — METFORMIN HYDROCHLORIDE 1000 MG: 500 TABLET, FILM COATED ORAL at 08:34

## 2017-08-07 RX ADMIN — HEPARIN SODIUM 5000 UNITS: 5000 INJECTION, SOLUTION INTRAVENOUS; SUBCUTANEOUS at 22:11

## 2017-08-07 RX ADMIN — HEPARIN SODIUM 5000 UNITS: 5000 INJECTION, SOLUTION INTRAVENOUS; SUBCUTANEOUS at 13:56

## 2017-08-07 RX ADMIN — NYSTATIN 500000 UNITS: 100000 SUSPENSION ORAL at 12:29

## 2017-08-07 RX ADMIN — ASPIRIN 81 MG 162 MG: 81 TABLET ORAL at 08:34

## 2017-08-07 RX ADMIN — INSULIN LISPRO 1 UNITS: 100 INJECTION, SOLUTION INTRAVENOUS; SUBCUTANEOUS at 16:49

## 2017-08-07 RX ADMIN — NYSTATIN 500000 UNITS: 100000 SUSPENSION ORAL at 08:34

## 2017-08-07 RX ADMIN — ATORVASTATIN CALCIUM 80 MG: 80 TABLET, FILM COATED ORAL at 18:29

## 2017-08-07 RX ADMIN — NYSTATIN 500000 UNITS: 100000 SUSPENSION ORAL at 18:29

## 2017-08-08 LAB
GLUCOSE SERPL-MCNC: 123 MG/DL (ref 65–140)
GLUCOSE SERPL-MCNC: 150 MG/DL (ref 65–140)
GLUCOSE SERPL-MCNC: 162 MG/DL (ref 65–140)
GLUCOSE SERPL-MCNC: 97 MG/DL (ref 65–140)

## 2017-08-08 PROCEDURE — 97530 THERAPEUTIC ACTIVITIES: CPT | Performed by: OCCUPATIONAL THERAPY ASSISTANT

## 2017-08-08 PROCEDURE — 92508 TX SP LANG VOICE COMM GROUP: CPT

## 2017-08-08 PROCEDURE — 82948 REAGENT STRIP/BLOOD GLUCOSE: CPT

## 2017-08-08 PROCEDURE — 97530 THERAPEUTIC ACTIVITIES: CPT

## 2017-08-08 PROCEDURE — 92526 ORAL FUNCTION THERAPY: CPT

## 2017-08-08 PROCEDURE — 97112 NEUROMUSCULAR REEDUCATION: CPT | Performed by: OCCUPATIONAL THERAPY ASSISTANT

## 2017-08-08 PROCEDURE — 97542 WHEELCHAIR MNGMENT TRAINING: CPT

## 2017-08-08 PROCEDURE — 97116 GAIT TRAINING THERAPY: CPT

## 2017-08-08 RX ADMIN — HEPARIN SODIUM 5000 UNITS: 5000 INJECTION, SOLUTION INTRAVENOUS; SUBCUTANEOUS at 22:21

## 2017-08-08 RX ADMIN — NYSTATIN 500000 UNITS: 100000 SUSPENSION ORAL at 22:21

## 2017-08-08 RX ADMIN — DOCUSATE SODIUM 100 MG: 100 CAPSULE, LIQUID FILLED ORAL at 08:25

## 2017-08-08 RX ADMIN — LISINOPRIL 5 MG: 5 TABLET ORAL at 08:25

## 2017-08-08 RX ADMIN — ATORVASTATIN CALCIUM 80 MG: 80 TABLET, FILM COATED ORAL at 17:09

## 2017-08-08 RX ADMIN — METFORMIN HYDROCHLORIDE 1000 MG: 500 TABLET, FILM COATED ORAL at 17:09

## 2017-08-08 RX ADMIN — INSULIN LISPRO 1 UNITS: 100 INJECTION, SOLUTION INTRAVENOUS; SUBCUTANEOUS at 17:08

## 2017-08-08 RX ADMIN — CLOPIDOGREL BISULFATE 75 MG: 75 TABLET ORAL at 08:25

## 2017-08-08 RX ADMIN — FLUTICASONE PROPIONATE 2 SPRAY: 50 SPRAY, METERED NASAL at 08:25

## 2017-08-08 RX ADMIN — METFORMIN HYDROCHLORIDE 1000 MG: 500 TABLET, FILM COATED ORAL at 08:25

## 2017-08-08 RX ADMIN — HEPARIN SODIUM 5000 UNITS: 5000 INJECTION, SOLUTION INTRAVENOUS; SUBCUTANEOUS at 14:01

## 2017-08-08 RX ADMIN — NYSTATIN 500000 UNITS: 100000 SUSPENSION ORAL at 08:25

## 2017-08-08 RX ADMIN — ASPIRIN 81 MG 162 MG: 81 TABLET ORAL at 08:25

## 2017-08-08 RX ADMIN — INSULIN LISPRO 1 UNITS: 100 INJECTION, SOLUTION INTRAVENOUS; SUBCUTANEOUS at 12:18

## 2017-08-08 RX ADMIN — NYSTATIN 500000 UNITS: 100000 SUSPENSION ORAL at 17:10

## 2017-08-08 RX ADMIN — NYSTATIN 500000 UNITS: 100000 SUSPENSION ORAL at 12:17

## 2017-08-08 RX ADMIN — HEPARIN SODIUM 5000 UNITS: 5000 INJECTION, SOLUTION INTRAVENOUS; SUBCUTANEOUS at 06:22

## 2017-08-09 LAB
GLUCOSE SERPL-MCNC: 114 MG/DL (ref 65–140)
GLUCOSE SERPL-MCNC: 114 MG/DL (ref 65–140)
GLUCOSE SERPL-MCNC: 119 MG/DL (ref 65–140)
GLUCOSE SERPL-MCNC: 184 MG/DL (ref 65–140)

## 2017-08-09 PROCEDURE — 97530 THERAPEUTIC ACTIVITIES: CPT

## 2017-08-09 PROCEDURE — 97112 NEUROMUSCULAR REEDUCATION: CPT

## 2017-08-09 PROCEDURE — 97110 THERAPEUTIC EXERCISES: CPT

## 2017-08-09 PROCEDURE — 82948 REAGENT STRIP/BLOOD GLUCOSE: CPT

## 2017-08-09 PROCEDURE — 92508 TX SP LANG VOICE COMM GROUP: CPT

## 2017-08-09 RX ADMIN — HEPARIN SODIUM 5000 UNITS: 5000 INJECTION, SOLUTION INTRAVENOUS; SUBCUTANEOUS at 13:16

## 2017-08-09 RX ADMIN — INSULIN LISPRO 1 UNITS: 100 INJECTION, SOLUTION INTRAVENOUS; SUBCUTANEOUS at 11:22

## 2017-08-09 RX ADMIN — DOCUSATE SODIUM 100 MG: 100 CAPSULE, LIQUID FILLED ORAL at 17:22

## 2017-08-09 RX ADMIN — CLOPIDOGREL BISULFATE 75 MG: 75 TABLET ORAL at 08:34

## 2017-08-09 RX ADMIN — ATORVASTATIN CALCIUM 80 MG: 80 TABLET, FILM COATED ORAL at 17:22

## 2017-08-09 RX ADMIN — LISINOPRIL 5 MG: 5 TABLET ORAL at 08:34

## 2017-08-09 RX ADMIN — HEPARIN SODIUM 5000 UNITS: 5000 INJECTION, SOLUTION INTRAVENOUS; SUBCUTANEOUS at 05:58

## 2017-08-09 RX ADMIN — NYSTATIN 500000 UNITS: 100000 SUSPENSION ORAL at 08:33

## 2017-08-09 RX ADMIN — HEPARIN SODIUM 5000 UNITS: 5000 INJECTION, SOLUTION INTRAVENOUS; SUBCUTANEOUS at 22:23

## 2017-08-09 RX ADMIN — DOCUSATE SODIUM 100 MG: 100 CAPSULE, LIQUID FILLED ORAL at 08:33

## 2017-08-09 RX ADMIN — METFORMIN HYDROCHLORIDE 1000 MG: 500 TABLET, FILM COATED ORAL at 08:34

## 2017-08-09 RX ADMIN — METFORMIN HYDROCHLORIDE 1000 MG: 500 TABLET, FILM COATED ORAL at 17:21

## 2017-08-09 RX ADMIN — ASPIRIN 81 MG 162 MG: 81 TABLET ORAL at 08:34

## 2017-08-09 RX ADMIN — FLUTICASONE PROPIONATE 2 SPRAY: 50 SPRAY, METERED NASAL at 08:34

## 2017-08-10 LAB
ANION GAP SERPL CALCULATED.3IONS-SCNC: 7 MMOL/L (ref 4–13)
BASOPHILS # BLD AUTO: 0.04 THOUSANDS/ΜL (ref 0–0.1)
BASOPHILS NFR BLD AUTO: 0 % (ref 0–1)
BUN SERPL-MCNC: 24 MG/DL (ref 5–25)
CALCIUM SERPL-MCNC: 10.1 MG/DL (ref 8.3–10.1)
CHLORIDE SERPL-SCNC: 101 MMOL/L (ref 100–108)
CO2 SERPL-SCNC: 31 MMOL/L (ref 21–32)
CREAT SERPL-MCNC: 1.14 MG/DL (ref 0.6–1.3)
EOSINOPHIL # BLD AUTO: 0.22 THOUSAND/ΜL (ref 0–0.61)
EOSINOPHIL NFR BLD AUTO: 2 % (ref 0–6)
ERYTHROCYTE [DISTWIDTH] IN BLOOD BY AUTOMATED COUNT: 13.1 % (ref 11.6–15.1)
GFR SERPL CREATININE-BSD FRML MDRD: 64 ML/MIN/1.73SQ M
GLUCOSE SERPL-MCNC: 104 MG/DL (ref 65–140)
GLUCOSE SERPL-MCNC: 126 MG/DL (ref 65–140)
GLUCOSE SERPL-MCNC: 131 MG/DL (ref 65–140)
GLUCOSE SERPL-MCNC: 145 MG/DL (ref 65–140)
GLUCOSE SERPL-MCNC: 181 MG/DL (ref 65–140)
HCT VFR BLD AUTO: 41.9 % (ref 36.5–49.3)
HGB BLD-MCNC: 13.9 G/DL (ref 12–17)
LYMPHOCYTES # BLD AUTO: 2.98 THOUSANDS/ΜL (ref 0.6–4.47)
LYMPHOCYTES NFR BLD AUTO: 32 % (ref 14–44)
MCH RBC QN AUTO: 29 PG (ref 26.8–34.3)
MCHC RBC AUTO-ENTMCNC: 33.2 G/DL (ref 31.4–37.4)
MCV RBC AUTO: 87 FL (ref 82–98)
MONOCYTES # BLD AUTO: 0.7 THOUSAND/ΜL (ref 0.17–1.22)
MONOCYTES NFR BLD AUTO: 8 % (ref 4–12)
NEUTROPHILS # BLD AUTO: 5.36 THOUSANDS/ΜL (ref 1.85–7.62)
NEUTS SEG NFR BLD AUTO: 58 % (ref 43–75)
NRBC BLD AUTO-RTO: 0 /100 WBCS
PLATELET # BLD AUTO: 222 THOUSANDS/UL (ref 149–390)
PMV BLD AUTO: 10.7 FL (ref 8.9–12.7)
POTASSIUM SERPL-SCNC: 4 MMOL/L (ref 3.5–5.3)
RBC # BLD AUTO: 4.8 MILLION/UL (ref 3.88–5.62)
SODIUM SERPL-SCNC: 139 MMOL/L (ref 136–145)
WBC # BLD AUTO: 9.33 THOUSAND/UL (ref 4.31–10.16)

## 2017-08-10 PROCEDURE — 82948 REAGENT STRIP/BLOOD GLUCOSE: CPT

## 2017-08-10 PROCEDURE — 97530 THERAPEUTIC ACTIVITIES: CPT

## 2017-08-10 PROCEDURE — 85025 COMPLETE CBC W/AUTO DIFF WBC: CPT | Performed by: NURSE PRACTITIONER

## 2017-08-10 PROCEDURE — 92508 TX SP LANG VOICE COMM GROUP: CPT

## 2017-08-10 PROCEDURE — 97532 HB COGNITIVE SKILLS DEVELOPMENT: CPT

## 2017-08-10 PROCEDURE — 80048 BASIC METABOLIC PNL TOTAL CA: CPT | Performed by: NURSE PRACTITIONER

## 2017-08-10 PROCEDURE — 97112 NEUROMUSCULAR REEDUCATION: CPT

## 2017-08-10 RX ORDER — DIAPER,BRIEF,INFANT-TODD,DISP
EACH MISCELLANEOUS 4 TIMES DAILY PRN
Status: DISCONTINUED | OUTPATIENT
Start: 2017-08-10 | End: 2017-08-23 | Stop reason: HOSPADM

## 2017-08-10 RX ORDER — FLUOXETINE HYDROCHLORIDE 20 MG/1
20 CAPSULE ORAL
Status: DISCONTINUED | OUTPATIENT
Start: 2017-08-10 | End: 2017-08-11

## 2017-08-10 RX ORDER — DIAPER,BRIEF,INFANT-TODD,DISP
EACH MISCELLANEOUS 4 TIMES DAILY PRN
Status: DISCONTINUED | OUTPATIENT
Start: 2017-08-10 | End: 2017-08-10

## 2017-08-10 RX ADMIN — HEPARIN SODIUM 5000 UNITS: 5000 INJECTION, SOLUTION INTRAVENOUS; SUBCUTANEOUS at 14:35

## 2017-08-10 RX ADMIN — DOCUSATE SODIUM 100 MG: 100 CAPSULE, LIQUID FILLED ORAL at 17:56

## 2017-08-10 RX ADMIN — METFORMIN HYDROCHLORIDE 1000 MG: 500 TABLET, FILM COATED ORAL at 17:57

## 2017-08-10 RX ADMIN — FLUOXETINE 20 MG: 20 CAPSULE ORAL at 21:37

## 2017-08-10 RX ADMIN — ATORVASTATIN CALCIUM 80 MG: 80 TABLET, FILM COATED ORAL at 17:57

## 2017-08-10 RX ADMIN — HEPARIN SODIUM 5000 UNITS: 5000 INJECTION, SOLUTION INTRAVENOUS; SUBCUTANEOUS at 21:37

## 2017-08-10 RX ADMIN — METFORMIN HYDROCHLORIDE 1000 MG: 500 TABLET, FILM COATED ORAL at 08:49

## 2017-08-10 RX ADMIN — INSULIN LISPRO 1 UNITS: 100 INJECTION, SOLUTION INTRAVENOUS; SUBCUTANEOUS at 13:10

## 2017-08-10 RX ADMIN — HEPARIN SODIUM 5000 UNITS: 5000 INJECTION, SOLUTION INTRAVENOUS; SUBCUTANEOUS at 06:05

## 2017-08-10 RX ADMIN — CLOPIDOGREL BISULFATE 75 MG: 75 TABLET ORAL at 08:49

## 2017-08-10 RX ADMIN — ASPIRIN 81 MG 162 MG: 81 TABLET ORAL at 08:49

## 2017-08-10 RX ADMIN — FLUTICASONE PROPIONATE 2 SPRAY: 50 SPRAY, METERED NASAL at 08:51

## 2017-08-10 RX ADMIN — LISINOPRIL 5 MG: 5 TABLET ORAL at 08:48

## 2017-08-11 LAB
GLUCOSE SERPL-MCNC: 113 MG/DL (ref 65–140)
GLUCOSE SERPL-MCNC: 126 MG/DL (ref 65–140)
GLUCOSE SERPL-MCNC: 133 MG/DL (ref 65–140)
GLUCOSE SERPL-MCNC: 165 MG/DL (ref 65–140)

## 2017-08-11 PROCEDURE — 97110 THERAPEUTIC EXERCISES: CPT

## 2017-08-11 PROCEDURE — 97530 THERAPEUTIC ACTIVITIES: CPT

## 2017-08-11 PROCEDURE — 97535 SELF CARE MNGMENT TRAINING: CPT

## 2017-08-11 PROCEDURE — 97542 WHEELCHAIR MNGMENT TRAINING: CPT

## 2017-08-11 PROCEDURE — 82948 REAGENT STRIP/BLOOD GLUCOSE: CPT

## 2017-08-11 PROCEDURE — 92508 TX SP LANG VOICE COMM GROUP: CPT

## 2017-08-11 PROCEDURE — 97116 GAIT TRAINING THERAPY: CPT

## 2017-08-11 RX ORDER — FLUOXETINE 10 MG/1
10 CAPSULE ORAL
Status: DISCONTINUED | OUTPATIENT
Start: 2017-08-11 | End: 2017-08-12

## 2017-08-11 RX ADMIN — HYDROCORTISONE 1 APPLICATION: 1 CREAM TOPICAL at 16:38

## 2017-08-11 RX ADMIN — METFORMIN HYDROCHLORIDE 1000 MG: 500 TABLET, FILM COATED ORAL at 08:38

## 2017-08-11 RX ADMIN — ATORVASTATIN CALCIUM 80 MG: 80 TABLET, FILM COATED ORAL at 19:47

## 2017-08-11 RX ADMIN — HEPARIN SODIUM 5000 UNITS: 5000 INJECTION, SOLUTION INTRAVENOUS; SUBCUTANEOUS at 14:06

## 2017-08-11 RX ADMIN — INSULIN LISPRO 1 UNITS: 100 INJECTION, SOLUTION INTRAVENOUS; SUBCUTANEOUS at 12:10

## 2017-08-11 RX ADMIN — FLUOXETINE 10 MG: 10 CAPSULE ORAL at 22:23

## 2017-08-11 RX ADMIN — FLUTICASONE PROPIONATE 2 SPRAY: 50 SPRAY, METERED NASAL at 08:40

## 2017-08-11 RX ADMIN — METFORMIN HYDROCHLORIDE 1000 MG: 500 TABLET, FILM COATED ORAL at 16:35

## 2017-08-11 RX ADMIN — HEPARIN SODIUM 5000 UNITS: 5000 INJECTION, SOLUTION INTRAVENOUS; SUBCUTANEOUS at 05:35

## 2017-08-11 RX ADMIN — HEPARIN SODIUM 5000 UNITS: 5000 INJECTION, SOLUTION INTRAVENOUS; SUBCUTANEOUS at 22:22

## 2017-08-11 RX ADMIN — CLOPIDOGREL BISULFATE 75 MG: 75 TABLET ORAL at 08:38

## 2017-08-11 RX ADMIN — ASPIRIN 81 MG 162 MG: 81 TABLET ORAL at 08:38

## 2017-08-12 LAB
GLUCOSE SERPL-MCNC: 118 MG/DL (ref 65–140)
GLUCOSE SERPL-MCNC: 120 MG/DL (ref 65–140)
GLUCOSE SERPL-MCNC: 128 MG/DL (ref 65–140)
GLUCOSE SERPL-MCNC: 169 MG/DL (ref 65–140)

## 2017-08-12 PROCEDURE — 97112 NEUROMUSCULAR REEDUCATION: CPT

## 2017-08-12 PROCEDURE — 92508 TX SP LANG VOICE COMM GROUP: CPT

## 2017-08-12 PROCEDURE — 82948 REAGENT STRIP/BLOOD GLUCOSE: CPT

## 2017-08-12 RX ORDER — DOCUSATE SODIUM 100 MG/1
100 CAPSULE, LIQUID FILLED ORAL 2 TIMES DAILY PRN
Status: DISCONTINUED | OUTPATIENT
Start: 2017-08-12 | End: 2017-08-23 | Stop reason: HOSPADM

## 2017-08-12 RX ADMIN — HEPARIN SODIUM 5000 UNITS: 5000 INJECTION, SOLUTION INTRAVENOUS; SUBCUTANEOUS at 05:22

## 2017-08-12 RX ADMIN — HEPARIN SODIUM 5000 UNITS: 5000 INJECTION, SOLUTION INTRAVENOUS; SUBCUTANEOUS at 22:47

## 2017-08-12 RX ADMIN — METFORMIN HYDROCHLORIDE 1000 MG: 500 TABLET, FILM COATED ORAL at 09:17

## 2017-08-12 RX ADMIN — ATORVASTATIN CALCIUM 80 MG: 80 TABLET, FILM COATED ORAL at 18:30

## 2017-08-12 RX ADMIN — INSULIN LISPRO 1 UNITS: 100 INJECTION, SOLUTION INTRAVENOUS; SUBCUTANEOUS at 10:59

## 2017-08-12 RX ADMIN — HEPARIN SODIUM 5000 UNITS: 5000 INJECTION, SOLUTION INTRAVENOUS; SUBCUTANEOUS at 14:37

## 2017-08-12 RX ADMIN — HYDROCORTISONE: 1 CREAM TOPICAL at 09:15

## 2017-08-12 RX ADMIN — CLOPIDOGREL BISULFATE 75 MG: 75 TABLET ORAL at 09:17

## 2017-08-12 RX ADMIN — ASPIRIN 81 MG 162 MG: 81 TABLET ORAL at 09:17

## 2017-08-12 RX ADMIN — METFORMIN HYDROCHLORIDE 1000 MG: 500 TABLET, FILM COATED ORAL at 16:36

## 2017-08-12 RX ADMIN — LISINOPRIL 5 MG: 5 TABLET ORAL at 09:17

## 2017-08-12 RX ADMIN — FLUTICASONE PROPIONATE 2 SPRAY: 50 SPRAY, METERED NASAL at 09:19

## 2017-08-13 LAB
GLUCOSE SERPL-MCNC: 125 MG/DL (ref 65–140)
GLUCOSE SERPL-MCNC: 127 MG/DL (ref 65–140)
GLUCOSE SERPL-MCNC: 128 MG/DL (ref 65–140)
GLUCOSE SERPL-MCNC: 98 MG/DL (ref 65–140)

## 2017-08-13 PROCEDURE — 97112 NEUROMUSCULAR REEDUCATION: CPT

## 2017-08-13 PROCEDURE — 97530 THERAPEUTIC ACTIVITIES: CPT

## 2017-08-13 PROCEDURE — 97116 GAIT TRAINING THERAPY: CPT

## 2017-08-13 PROCEDURE — 82948 REAGENT STRIP/BLOOD GLUCOSE: CPT

## 2017-08-13 RX ADMIN — HEPARIN SODIUM 5000 UNITS: 5000 INJECTION, SOLUTION INTRAVENOUS; SUBCUTANEOUS at 05:47

## 2017-08-13 RX ADMIN — HEPARIN SODIUM 5000 UNITS: 5000 INJECTION, SOLUTION INTRAVENOUS; SUBCUTANEOUS at 22:53

## 2017-08-13 RX ADMIN — ASPIRIN 81 MG 162 MG: 81 TABLET ORAL at 08:45

## 2017-08-13 RX ADMIN — LISINOPRIL 5 MG: 5 TABLET ORAL at 08:45

## 2017-08-13 RX ADMIN — HEPARIN SODIUM 5000 UNITS: 5000 INJECTION, SOLUTION INTRAVENOUS; SUBCUTANEOUS at 13:26

## 2017-08-13 RX ADMIN — METFORMIN HYDROCHLORIDE 1000 MG: 500 TABLET, FILM COATED ORAL at 16:28

## 2017-08-13 RX ADMIN — FLUTICASONE PROPIONATE 2 SPRAY: 50 SPRAY, METERED NASAL at 08:45

## 2017-08-13 RX ADMIN — CLOPIDOGREL BISULFATE 75 MG: 75 TABLET ORAL at 08:44

## 2017-08-13 RX ADMIN — HYDROCORTISONE: 1 CREAM TOPICAL at 08:45

## 2017-08-13 RX ADMIN — ATORVASTATIN CALCIUM 80 MG: 80 TABLET, FILM COATED ORAL at 18:25

## 2017-08-13 RX ADMIN — METFORMIN HYDROCHLORIDE 1000 MG: 500 TABLET, FILM COATED ORAL at 07:38

## 2017-08-14 LAB
ANION GAP SERPL CALCULATED.3IONS-SCNC: 6 MMOL/L (ref 4–13)
BASOPHILS # BLD AUTO: 0.02 THOUSANDS/ΜL (ref 0–0.1)
BASOPHILS NFR BLD AUTO: 0 % (ref 0–1)
BUN SERPL-MCNC: 21 MG/DL (ref 5–25)
CALCIUM SERPL-MCNC: 9.9 MG/DL (ref 8.3–10.1)
CHLORIDE SERPL-SCNC: 101 MMOL/L (ref 100–108)
CO2 SERPL-SCNC: 31 MMOL/L (ref 21–32)
CREAT SERPL-MCNC: 1.16 MG/DL (ref 0.6–1.3)
EOSINOPHIL # BLD AUTO: 0.29 THOUSAND/ΜL (ref 0–0.61)
EOSINOPHIL NFR BLD AUTO: 3 % (ref 0–6)
ERYTHROCYTE [DISTWIDTH] IN BLOOD BY AUTOMATED COUNT: 13.4 % (ref 11.6–15.1)
GFR SERPL CREATININE-BSD FRML MDRD: 63 ML/MIN/1.73SQ M
GLUCOSE P FAST SERPL-MCNC: 115 MG/DL (ref 65–99)
GLUCOSE SERPL-MCNC: 115 MG/DL (ref 65–140)
GLUCOSE SERPL-MCNC: 116 MG/DL (ref 65–140)
GLUCOSE SERPL-MCNC: 129 MG/DL (ref 65–140)
GLUCOSE SERPL-MCNC: 151 MG/DL (ref 65–140)
GLUCOSE SERPL-MCNC: 182 MG/DL (ref 65–140)
HCT VFR BLD AUTO: 40.1 % (ref 36.5–49.3)
HGB BLD-MCNC: 13.5 G/DL (ref 12–17)
LYMPHOCYTES # BLD AUTO: 2.42 THOUSANDS/ΜL (ref 0.6–4.47)
LYMPHOCYTES NFR BLD AUTO: 26 % (ref 14–44)
MCH RBC QN AUTO: 29.2 PG (ref 26.8–34.3)
MCHC RBC AUTO-ENTMCNC: 33.7 G/DL (ref 31.4–37.4)
MCV RBC AUTO: 87 FL (ref 82–98)
MONOCYTES # BLD AUTO: 0.69 THOUSAND/ΜL (ref 0.17–1.22)
MONOCYTES NFR BLD AUTO: 7 % (ref 4–12)
NEUTROPHILS # BLD AUTO: 5.96 THOUSANDS/ΜL (ref 1.85–7.62)
NEUTS SEG NFR BLD AUTO: 64 % (ref 43–75)
NRBC BLD AUTO-RTO: 0 /100 WBCS
PLATELET # BLD AUTO: 200 THOUSANDS/UL (ref 149–390)
PMV BLD AUTO: 11 FL (ref 8.9–12.7)
POTASSIUM SERPL-SCNC: 4 MMOL/L (ref 3.5–5.3)
RBC # BLD AUTO: 4.62 MILLION/UL (ref 3.88–5.62)
SODIUM SERPL-SCNC: 138 MMOL/L (ref 136–145)
WBC # BLD AUTO: 9.43 THOUSAND/UL (ref 4.31–10.16)

## 2017-08-14 PROCEDURE — 97110 THERAPEUTIC EXERCISES: CPT

## 2017-08-14 PROCEDURE — 85025 COMPLETE CBC W/AUTO DIFF WBC: CPT | Performed by: PHYSICIAN ASSISTANT

## 2017-08-14 PROCEDURE — 97530 THERAPEUTIC ACTIVITIES: CPT

## 2017-08-14 PROCEDURE — 97112 NEUROMUSCULAR REEDUCATION: CPT

## 2017-08-14 PROCEDURE — 97116 GAIT TRAINING THERAPY: CPT

## 2017-08-14 PROCEDURE — 92526 ORAL FUNCTION THERAPY: CPT

## 2017-08-14 PROCEDURE — 97542 WHEELCHAIR MNGMENT TRAINING: CPT

## 2017-08-14 PROCEDURE — 80048 BASIC METABOLIC PNL TOTAL CA: CPT | Performed by: PHYSICIAN ASSISTANT

## 2017-08-14 PROCEDURE — 97535 SELF CARE MNGMENT TRAINING: CPT

## 2017-08-14 PROCEDURE — 82948 REAGENT STRIP/BLOOD GLUCOSE: CPT

## 2017-08-14 RX ORDER — LISINOPRIL 2.5 MG/1
2.5 TABLET ORAL DAILY
Status: DISCONTINUED | OUTPATIENT
Start: 2017-08-15 | End: 2017-08-23 | Stop reason: HOSPADM

## 2017-08-14 RX ADMIN — ATORVASTATIN CALCIUM 80 MG: 80 TABLET, FILM COATED ORAL at 17:57

## 2017-08-14 RX ADMIN — HEPARIN SODIUM 5000 UNITS: 5000 INJECTION, SOLUTION INTRAVENOUS; SUBCUTANEOUS at 21:50

## 2017-08-14 RX ADMIN — INSULIN LISPRO 1 UNITS: 100 INJECTION, SOLUTION INTRAVENOUS; SUBCUTANEOUS at 21:49

## 2017-08-14 RX ADMIN — ASPIRIN 81 MG 162 MG: 81 TABLET ORAL at 09:08

## 2017-08-14 RX ADMIN — FLUTICASONE PROPIONATE 2 SPRAY: 50 SPRAY, METERED NASAL at 09:08

## 2017-08-14 RX ADMIN — HEPARIN SODIUM 5000 UNITS: 5000 INJECTION, SOLUTION INTRAVENOUS; SUBCUTANEOUS at 05:45

## 2017-08-14 RX ADMIN — HEPARIN SODIUM 5000 UNITS: 5000 INJECTION, SOLUTION INTRAVENOUS; SUBCUTANEOUS at 15:11

## 2017-08-14 RX ADMIN — METFORMIN HYDROCHLORIDE 1000 MG: 500 TABLET, FILM COATED ORAL at 09:08

## 2017-08-14 RX ADMIN — CLOPIDOGREL BISULFATE 75 MG: 75 TABLET ORAL at 09:08

## 2017-08-14 RX ADMIN — INSULIN LISPRO 1 UNITS: 100 INJECTION, SOLUTION INTRAVENOUS; SUBCUTANEOUS at 11:38

## 2017-08-14 RX ADMIN — METFORMIN HYDROCHLORIDE 1000 MG: 500 TABLET, FILM COATED ORAL at 17:57

## 2017-08-15 LAB
GLUCOSE SERPL-MCNC: 102 MG/DL (ref 65–140)
GLUCOSE SERPL-MCNC: 119 MG/DL (ref 65–140)
GLUCOSE SERPL-MCNC: 198 MG/DL (ref 65–140)
GLUCOSE SERPL-MCNC: 81 MG/DL (ref 65–140)

## 2017-08-15 PROCEDURE — 97116 GAIT TRAINING THERAPY: CPT

## 2017-08-15 PROCEDURE — 97530 THERAPEUTIC ACTIVITIES: CPT

## 2017-08-15 PROCEDURE — 97535 SELF CARE MNGMENT TRAINING: CPT

## 2017-08-15 PROCEDURE — 97110 THERAPEUTIC EXERCISES: CPT

## 2017-08-15 PROCEDURE — 97112 NEUROMUSCULAR REEDUCATION: CPT

## 2017-08-15 PROCEDURE — 82948 REAGENT STRIP/BLOOD GLUCOSE: CPT

## 2017-08-15 RX ADMIN — LISINOPRIL 2.5 MG: 2.5 TABLET ORAL at 09:13

## 2017-08-15 RX ADMIN — ATORVASTATIN CALCIUM 80 MG: 80 TABLET, FILM COATED ORAL at 17:11

## 2017-08-15 RX ADMIN — INSULIN LISPRO 1 UNITS: 100 INJECTION, SOLUTION INTRAVENOUS; SUBCUTANEOUS at 12:16

## 2017-08-15 RX ADMIN — CLOPIDOGREL BISULFATE 75 MG: 75 TABLET ORAL at 09:14

## 2017-08-15 RX ADMIN — HEPARIN SODIUM 5000 UNITS: 5000 INJECTION, SOLUTION INTRAVENOUS; SUBCUTANEOUS at 06:03

## 2017-08-15 RX ADMIN — FLUTICASONE PROPIONATE 2 SPRAY: 50 SPRAY, METERED NASAL at 09:14

## 2017-08-15 RX ADMIN — HEPARIN SODIUM 5000 UNITS: 5000 INJECTION, SOLUTION INTRAVENOUS; SUBCUTANEOUS at 15:35

## 2017-08-15 RX ADMIN — METFORMIN HYDROCHLORIDE 1000 MG: 500 TABLET, FILM COATED ORAL at 17:11

## 2017-08-15 RX ADMIN — METFORMIN HYDROCHLORIDE 1000 MG: 500 TABLET, FILM COATED ORAL at 09:13

## 2017-08-15 RX ADMIN — HEPARIN SODIUM 5000 UNITS: 5000 INJECTION, SOLUTION INTRAVENOUS; SUBCUTANEOUS at 22:09

## 2017-08-15 RX ADMIN — ASPIRIN 81 MG 162 MG: 81 TABLET ORAL at 09:14

## 2017-08-16 LAB
GLUCOSE SERPL-MCNC: 109 MG/DL (ref 65–140)
GLUCOSE SERPL-MCNC: 116 MG/DL (ref 65–140)
GLUCOSE SERPL-MCNC: 129 MG/DL (ref 65–140)
GLUCOSE SERPL-MCNC: 130 MG/DL (ref 65–140)

## 2017-08-16 PROCEDURE — 97530 THERAPEUTIC ACTIVITIES: CPT

## 2017-08-16 PROCEDURE — 82948 REAGENT STRIP/BLOOD GLUCOSE: CPT

## 2017-08-16 PROCEDURE — 97110 THERAPEUTIC EXERCISES: CPT

## 2017-08-16 PROCEDURE — 97112 NEUROMUSCULAR REEDUCATION: CPT

## 2017-08-16 PROCEDURE — 97116 GAIT TRAINING THERAPY: CPT

## 2017-08-16 RX ADMIN — FLUTICASONE PROPIONATE 2 SPRAY: 50 SPRAY, METERED NASAL at 09:38

## 2017-08-16 RX ADMIN — ASPIRIN 81 MG 162 MG: 81 TABLET ORAL at 09:18

## 2017-08-16 RX ADMIN — METFORMIN HYDROCHLORIDE 1000 MG: 500 TABLET, FILM COATED ORAL at 17:13

## 2017-08-16 RX ADMIN — METFORMIN HYDROCHLORIDE 1000 MG: 500 TABLET, FILM COATED ORAL at 09:17

## 2017-08-16 RX ADMIN — HEPARIN SODIUM 5000 UNITS: 5000 INJECTION, SOLUTION INTRAVENOUS; SUBCUTANEOUS at 21:31

## 2017-08-16 RX ADMIN — HEPARIN SODIUM 5000 UNITS: 5000 INJECTION, SOLUTION INTRAVENOUS; SUBCUTANEOUS at 05:43

## 2017-08-16 RX ADMIN — CLOPIDOGREL BISULFATE 75 MG: 75 TABLET ORAL at 09:18

## 2017-08-16 RX ADMIN — ATORVASTATIN CALCIUM 80 MG: 80 TABLET, FILM COATED ORAL at 17:13

## 2017-08-16 RX ADMIN — POLYETHYLENE GLYCOL 3350 17 G: 17 POWDER, FOR SOLUTION ORAL at 17:17

## 2017-08-16 RX ADMIN — HEPARIN SODIUM 5000 UNITS: 5000 INJECTION, SOLUTION INTRAVENOUS; SUBCUTANEOUS at 14:17

## 2017-08-16 RX ADMIN — DOCUSATE SODIUM 100 MG: 100 CAPSULE, LIQUID FILLED ORAL at 17:17

## 2017-08-17 LAB
ANION GAP SERPL CALCULATED.3IONS-SCNC: 7 MMOL/L (ref 4–13)
BASOPHILS # BLD AUTO: 0.02 THOUSANDS/ΜL (ref 0–0.1)
BASOPHILS NFR BLD AUTO: 0 % (ref 0–1)
BUN SERPL-MCNC: 23 MG/DL (ref 5–25)
CALCIUM SERPL-MCNC: 10.2 MG/DL (ref 8.3–10.1)
CHLORIDE SERPL-SCNC: 100 MMOL/L (ref 100–108)
CO2 SERPL-SCNC: 31 MMOL/L (ref 21–32)
CREAT SERPL-MCNC: 1.16 MG/DL (ref 0.6–1.3)
EOSINOPHIL # BLD AUTO: 0.35 THOUSAND/ΜL (ref 0–0.61)
EOSINOPHIL NFR BLD AUTO: 4 % (ref 0–6)
ERYTHROCYTE [DISTWIDTH] IN BLOOD BY AUTOMATED COUNT: 13.3 % (ref 11.6–15.1)
GFR SERPL CREATININE-BSD FRML MDRD: 63 ML/MIN/1.73SQ M
GLUCOSE SERPL-MCNC: 109 MG/DL (ref 65–140)
GLUCOSE SERPL-MCNC: 118 MG/DL (ref 65–140)
GLUCOSE SERPL-MCNC: 124 MG/DL (ref 65–140)
GLUCOSE SERPL-MCNC: 131 MG/DL (ref 65–140)
GLUCOSE SERPL-MCNC: 149 MG/DL (ref 65–140)
HCT VFR BLD AUTO: 41 % (ref 36.5–49.3)
HGB BLD-MCNC: 13.9 G/DL (ref 12–17)
LYMPHOCYTES # BLD AUTO: 2.57 THOUSANDS/ΜL (ref 0.6–4.47)
LYMPHOCYTES NFR BLD AUTO: 28 % (ref 14–44)
MCH RBC QN AUTO: 29.3 PG (ref 26.8–34.3)
MCHC RBC AUTO-ENTMCNC: 33.9 G/DL (ref 31.4–37.4)
MCV RBC AUTO: 86 FL (ref 82–98)
MONOCYTES # BLD AUTO: 0.71 THOUSAND/ΜL (ref 0.17–1.22)
MONOCYTES NFR BLD AUTO: 8 % (ref 4–12)
NEUTROPHILS # BLD AUTO: 5.49 THOUSANDS/ΜL (ref 1.85–7.62)
NEUTS SEG NFR BLD AUTO: 60 % (ref 43–75)
NRBC BLD AUTO-RTO: 0 /100 WBCS
PLATELET # BLD AUTO: 208 THOUSANDS/UL (ref 149–390)
PMV BLD AUTO: 10.9 FL (ref 8.9–12.7)
POTASSIUM SERPL-SCNC: 4.3 MMOL/L (ref 3.5–5.3)
RBC # BLD AUTO: 4.75 MILLION/UL (ref 3.88–5.62)
SODIUM SERPL-SCNC: 138 MMOL/L (ref 136–145)
WBC # BLD AUTO: 9.18 THOUSAND/UL (ref 4.31–10.16)

## 2017-08-17 PROCEDURE — 97112 NEUROMUSCULAR REEDUCATION: CPT

## 2017-08-17 PROCEDURE — 97116 GAIT TRAINING THERAPY: CPT

## 2017-08-17 PROCEDURE — 80048 BASIC METABOLIC PNL TOTAL CA: CPT | Performed by: PHYSICIAN ASSISTANT

## 2017-08-17 PROCEDURE — 85025 COMPLETE CBC W/AUTO DIFF WBC: CPT | Performed by: PHYSICIAN ASSISTANT

## 2017-08-17 PROCEDURE — 97535 SELF CARE MNGMENT TRAINING: CPT

## 2017-08-17 PROCEDURE — 82948 REAGENT STRIP/BLOOD GLUCOSE: CPT

## 2017-08-17 PROCEDURE — 97530 THERAPEUTIC ACTIVITIES: CPT

## 2017-08-17 RX ADMIN — METFORMIN HYDROCHLORIDE 1000 MG: 500 TABLET, FILM COATED ORAL at 08:44

## 2017-08-17 RX ADMIN — ATORVASTATIN CALCIUM 80 MG: 80 TABLET, FILM COATED ORAL at 17:20

## 2017-08-17 RX ADMIN — HEPARIN SODIUM 5000 UNITS: 5000 INJECTION, SOLUTION INTRAVENOUS; SUBCUTANEOUS at 05:29

## 2017-08-17 RX ADMIN — FLUTICASONE PROPIONATE 2 SPRAY: 50 SPRAY, METERED NASAL at 08:43

## 2017-08-17 RX ADMIN — ASPIRIN 81 MG 162 MG: 81 TABLET ORAL at 08:44

## 2017-08-17 RX ADMIN — POLYETHYLENE GLYCOL 3350 17 G: 17 POWDER, FOR SOLUTION ORAL at 12:24

## 2017-08-17 RX ADMIN — HEPARIN SODIUM 5000 UNITS: 5000 INJECTION, SOLUTION INTRAVENOUS; SUBCUTANEOUS at 21:13

## 2017-08-17 RX ADMIN — CLOPIDOGREL BISULFATE 75 MG: 75 TABLET ORAL at 08:44

## 2017-08-17 RX ADMIN — HEPARIN SODIUM 5000 UNITS: 5000 INJECTION, SOLUTION INTRAVENOUS; SUBCUTANEOUS at 15:02

## 2017-08-17 RX ADMIN — DOCUSATE SODIUM 100 MG: 100 CAPSULE, LIQUID FILLED ORAL at 12:24

## 2017-08-17 RX ADMIN — METFORMIN HYDROCHLORIDE 1000 MG: 500 TABLET, FILM COATED ORAL at 17:20

## 2017-08-17 RX ADMIN — LISINOPRIL 2.5 MG: 2.5 TABLET ORAL at 08:44

## 2017-08-18 LAB
GLUCOSE SERPL-MCNC: 118 MG/DL (ref 65–140)
GLUCOSE SERPL-MCNC: 125 MG/DL (ref 65–140)
GLUCOSE SERPL-MCNC: 133 MG/DL (ref 65–140)
GLUCOSE SERPL-MCNC: 93 MG/DL (ref 65–140)

## 2017-08-18 PROCEDURE — 97542 WHEELCHAIR MNGMENT TRAINING: CPT

## 2017-08-18 PROCEDURE — 97535 SELF CARE MNGMENT TRAINING: CPT | Performed by: OCCUPATIONAL THERAPY ASSISTANT

## 2017-08-18 PROCEDURE — 97110 THERAPEUTIC EXERCISES: CPT

## 2017-08-18 PROCEDURE — 97530 THERAPEUTIC ACTIVITIES: CPT | Performed by: OCCUPATIONAL THERAPY ASSISTANT

## 2017-08-18 PROCEDURE — 97530 THERAPEUTIC ACTIVITIES: CPT

## 2017-08-18 PROCEDURE — 82948 REAGENT STRIP/BLOOD GLUCOSE: CPT

## 2017-08-18 RX ADMIN — ASPIRIN 81 MG 162 MG: 81 TABLET ORAL at 09:02

## 2017-08-18 RX ADMIN — LISINOPRIL 2.5 MG: 2.5 TABLET ORAL at 09:02

## 2017-08-18 RX ADMIN — ATORVASTATIN CALCIUM 80 MG: 80 TABLET, FILM COATED ORAL at 17:47

## 2017-08-18 RX ADMIN — HEPARIN SODIUM 5000 UNITS: 5000 INJECTION, SOLUTION INTRAVENOUS; SUBCUTANEOUS at 15:07

## 2017-08-18 RX ADMIN — CLOPIDOGREL BISULFATE 75 MG: 75 TABLET ORAL at 09:02

## 2017-08-18 RX ADMIN — FLUTICASONE PROPIONATE 2 SPRAY: 50 SPRAY, METERED NASAL at 09:11

## 2017-08-18 RX ADMIN — METFORMIN HYDROCHLORIDE 1000 MG: 500 TABLET, FILM COATED ORAL at 09:02

## 2017-08-18 RX ADMIN — HEPARIN SODIUM 5000 UNITS: 5000 INJECTION, SOLUTION INTRAVENOUS; SUBCUTANEOUS at 21:32

## 2017-08-18 RX ADMIN — METFORMIN HYDROCHLORIDE 1000 MG: 500 TABLET, FILM COATED ORAL at 17:47

## 2017-08-18 RX ADMIN — HEPARIN SODIUM 5000 UNITS: 5000 INJECTION, SOLUTION INTRAVENOUS; SUBCUTANEOUS at 05:14

## 2017-08-19 LAB
GLUCOSE SERPL-MCNC: 106 MG/DL (ref 65–140)
GLUCOSE SERPL-MCNC: 113 MG/DL (ref 65–140)
GLUCOSE SERPL-MCNC: 135 MG/DL (ref 65–140)
GLUCOSE SERPL-MCNC: 85 MG/DL (ref 65–140)

## 2017-08-19 PROCEDURE — 97112 NEUROMUSCULAR REEDUCATION: CPT

## 2017-08-19 PROCEDURE — 97530 THERAPEUTIC ACTIVITIES: CPT

## 2017-08-19 PROCEDURE — 82948 REAGENT STRIP/BLOOD GLUCOSE: CPT

## 2017-08-19 PROCEDURE — 97116 GAIT TRAINING THERAPY: CPT

## 2017-08-19 RX ADMIN — HEPARIN SODIUM 5000 UNITS: 5000 INJECTION, SOLUTION INTRAVENOUS; SUBCUTANEOUS at 15:14

## 2017-08-19 RX ADMIN — METFORMIN HYDROCHLORIDE 1000 MG: 500 TABLET, FILM COATED ORAL at 17:03

## 2017-08-19 RX ADMIN — ASPIRIN 81 MG 162 MG: 81 TABLET ORAL at 08:53

## 2017-08-19 RX ADMIN — FLUTICASONE PROPIONATE 2 SPRAY: 50 SPRAY, METERED NASAL at 08:56

## 2017-08-19 RX ADMIN — LISINOPRIL 2.5 MG: 2.5 TABLET ORAL at 08:53

## 2017-08-19 RX ADMIN — ATORVASTATIN CALCIUM 80 MG: 80 TABLET, FILM COATED ORAL at 17:03

## 2017-08-19 RX ADMIN — HEPARIN SODIUM 5000 UNITS: 5000 INJECTION, SOLUTION INTRAVENOUS; SUBCUTANEOUS at 05:16

## 2017-08-19 RX ADMIN — HEPARIN SODIUM 5000 UNITS: 5000 INJECTION, SOLUTION INTRAVENOUS; SUBCUTANEOUS at 21:44

## 2017-08-19 RX ADMIN — METFORMIN HYDROCHLORIDE 1000 MG: 500 TABLET, FILM COATED ORAL at 08:53

## 2017-08-19 RX ADMIN — CLOPIDOGREL BISULFATE 75 MG: 75 TABLET ORAL at 08:53

## 2017-08-20 LAB
GLUCOSE SERPL-MCNC: 122 MG/DL (ref 65–140)
GLUCOSE SERPL-MCNC: 88 MG/DL (ref 65–140)

## 2017-08-20 PROCEDURE — 97112 NEUROMUSCULAR REEDUCATION: CPT

## 2017-08-20 PROCEDURE — 97116 GAIT TRAINING THERAPY: CPT

## 2017-08-20 PROCEDURE — 97530 THERAPEUTIC ACTIVITIES: CPT

## 2017-08-20 PROCEDURE — 97110 THERAPEUTIC EXERCISES: CPT

## 2017-08-20 PROCEDURE — 82948 REAGENT STRIP/BLOOD GLUCOSE: CPT

## 2017-08-20 RX ADMIN — FLUTICASONE PROPIONATE 2 SPRAY: 50 SPRAY, METERED NASAL at 09:14

## 2017-08-20 RX ADMIN — LISINOPRIL 2.5 MG: 2.5 TABLET ORAL at 09:14

## 2017-08-20 RX ADMIN — METFORMIN HYDROCHLORIDE 1000 MG: 500 TABLET, FILM COATED ORAL at 09:13

## 2017-08-20 RX ADMIN — ATORVASTATIN CALCIUM 80 MG: 80 TABLET, FILM COATED ORAL at 17:38

## 2017-08-20 RX ADMIN — HEPARIN SODIUM 5000 UNITS: 5000 INJECTION, SOLUTION INTRAVENOUS; SUBCUTANEOUS at 22:04

## 2017-08-20 RX ADMIN — ASPIRIN 81 MG 162 MG: 81 TABLET ORAL at 09:14

## 2017-08-20 RX ADMIN — METFORMIN HYDROCHLORIDE 1000 MG: 500 TABLET, FILM COATED ORAL at 17:38

## 2017-08-20 RX ADMIN — HEPARIN SODIUM 5000 UNITS: 5000 INJECTION, SOLUTION INTRAVENOUS; SUBCUTANEOUS at 15:07

## 2017-08-20 RX ADMIN — HEPARIN SODIUM 5000 UNITS: 5000 INJECTION, SOLUTION INTRAVENOUS; SUBCUTANEOUS at 05:39

## 2017-08-20 RX ADMIN — CLOPIDOGREL BISULFATE 75 MG: 75 TABLET ORAL at 09:14

## 2017-08-21 LAB
ANION GAP SERPL CALCULATED.3IONS-SCNC: 6 MMOL/L (ref 4–13)
BASOPHILS # BLD AUTO: 0.02 THOUSANDS/ΜL (ref 0–0.1)
BASOPHILS NFR BLD AUTO: 0 % (ref 0–1)
BUN SERPL-MCNC: 18 MG/DL (ref 5–25)
CALCIUM SERPL-MCNC: 9.5 MG/DL (ref 8.3–10.1)
CHLORIDE SERPL-SCNC: 104 MMOL/L (ref 100–108)
CO2 SERPL-SCNC: 28 MMOL/L (ref 21–32)
CREAT SERPL-MCNC: 1.02 MG/DL (ref 0.6–1.3)
EOSINOPHIL # BLD AUTO: 0.36 THOUSAND/ΜL (ref 0–0.61)
EOSINOPHIL NFR BLD AUTO: 4 % (ref 0–6)
ERYTHROCYTE [DISTWIDTH] IN BLOOD BY AUTOMATED COUNT: 13.5 % (ref 11.6–15.1)
GFR SERPL CREATININE-BSD FRML MDRD: 73 ML/MIN/1.73SQ M
GLUCOSE P FAST SERPL-MCNC: 104 MG/DL (ref 65–99)
GLUCOSE SERPL-MCNC: 104 MG/DL (ref 65–140)
HCT VFR BLD AUTO: 40.1 % (ref 36.5–49.3)
HGB BLD-MCNC: 13.4 G/DL (ref 12–17)
LYMPHOCYTES # BLD AUTO: 2.52 THOUSANDS/ΜL (ref 0.6–4.47)
LYMPHOCYTES NFR BLD AUTO: 30 % (ref 14–44)
MCH RBC QN AUTO: 28.9 PG (ref 26.8–34.3)
MCHC RBC AUTO-ENTMCNC: 33.4 G/DL (ref 31.4–37.4)
MCV RBC AUTO: 87 FL (ref 82–98)
MONOCYTES # BLD AUTO: 0.62 THOUSAND/ΜL (ref 0.17–1.22)
MONOCYTES NFR BLD AUTO: 7 % (ref 4–12)
NEUTROPHILS # BLD AUTO: 4.82 THOUSANDS/ΜL (ref 1.85–7.62)
NEUTS SEG NFR BLD AUTO: 59 % (ref 43–75)
NRBC BLD AUTO-RTO: 0 /100 WBCS
PLATELET # BLD AUTO: 202 THOUSANDS/UL (ref 149–390)
PMV BLD AUTO: 10.6 FL (ref 8.9–12.7)
POTASSIUM SERPL-SCNC: 3.9 MMOL/L (ref 3.5–5.3)
RBC # BLD AUTO: 4.63 MILLION/UL (ref 3.88–5.62)
SODIUM SERPL-SCNC: 138 MMOL/L (ref 136–145)
WBC # BLD AUTO: 8.37 THOUSAND/UL (ref 4.31–10.16)

## 2017-08-21 PROCEDURE — 97112 NEUROMUSCULAR REEDUCATION: CPT

## 2017-08-21 PROCEDURE — 85025 COMPLETE CBC W/AUTO DIFF WBC: CPT | Performed by: PHYSICIAN ASSISTANT

## 2017-08-21 PROCEDURE — 97116 GAIT TRAINING THERAPY: CPT

## 2017-08-21 PROCEDURE — 80048 BASIC METABOLIC PNL TOTAL CA: CPT | Performed by: PHYSICIAN ASSISTANT

## 2017-08-21 PROCEDURE — 97110 THERAPEUTIC EXERCISES: CPT

## 2017-08-21 PROCEDURE — 97530 THERAPEUTIC ACTIVITIES: CPT

## 2017-08-21 RX ADMIN — CLOPIDOGREL BISULFATE 75 MG: 75 TABLET ORAL at 09:20

## 2017-08-21 RX ADMIN — METFORMIN HYDROCHLORIDE 1000 MG: 500 TABLET, FILM COATED ORAL at 09:20

## 2017-08-21 RX ADMIN — HEPARIN SODIUM 5000 UNITS: 5000 INJECTION, SOLUTION INTRAVENOUS; SUBCUTANEOUS at 14:50

## 2017-08-21 RX ADMIN — ASPIRIN 81 MG 162 MG: 81 TABLET ORAL at 09:20

## 2017-08-21 RX ADMIN — ATORVASTATIN CALCIUM 80 MG: 80 TABLET, FILM COATED ORAL at 18:45

## 2017-08-21 RX ADMIN — FLUTICASONE PROPIONATE 2 SPRAY: 50 SPRAY, METERED NASAL at 09:20

## 2017-08-21 RX ADMIN — HEPARIN SODIUM 5000 UNITS: 5000 INJECTION, SOLUTION INTRAVENOUS; SUBCUTANEOUS at 05:15

## 2017-08-21 RX ADMIN — HEPARIN SODIUM 5000 UNITS: 5000 INJECTION, SOLUTION INTRAVENOUS; SUBCUTANEOUS at 21:35

## 2017-08-21 RX ADMIN — METFORMIN HYDROCHLORIDE 1000 MG: 500 TABLET, FILM COATED ORAL at 18:45

## 2017-08-22 LAB — GLUCOSE SERPL-MCNC: 114 MG/DL (ref 65–140)

## 2017-08-22 PROCEDURE — 82948 REAGENT STRIP/BLOOD GLUCOSE: CPT

## 2017-08-22 PROCEDURE — 97530 THERAPEUTIC ACTIVITIES: CPT

## 2017-08-22 PROCEDURE — 97116 GAIT TRAINING THERAPY: CPT

## 2017-08-22 PROCEDURE — 97112 NEUROMUSCULAR REEDUCATION: CPT

## 2017-08-22 PROCEDURE — 97535 SELF CARE MNGMENT TRAINING: CPT

## 2017-08-22 RX ADMIN — CLOPIDOGREL BISULFATE 75 MG: 75 TABLET ORAL at 08:50

## 2017-08-22 RX ADMIN — HEPARIN SODIUM 5000 UNITS: 5000 INJECTION, SOLUTION INTRAVENOUS; SUBCUTANEOUS at 05:50

## 2017-08-22 RX ADMIN — LISINOPRIL 2.5 MG: 2.5 TABLET ORAL at 08:50

## 2017-08-22 RX ADMIN — ASPIRIN 81 MG 162 MG: 81 TABLET ORAL at 08:50

## 2017-08-22 RX ADMIN — FLUTICASONE PROPIONATE 2 SPRAY: 50 SPRAY, METERED NASAL at 08:54

## 2017-08-22 RX ADMIN — ATORVASTATIN CALCIUM 80 MG: 80 TABLET, FILM COATED ORAL at 17:12

## 2017-08-22 RX ADMIN — HEPARIN SODIUM 5000 UNITS: 5000 INJECTION, SOLUTION INTRAVENOUS; SUBCUTANEOUS at 14:37

## 2017-08-22 RX ADMIN — HEPARIN SODIUM 5000 UNITS: 5000 INJECTION, SOLUTION INTRAVENOUS; SUBCUTANEOUS at 21:43

## 2017-08-22 RX ADMIN — METFORMIN HYDROCHLORIDE 1000 MG: 500 TABLET, FILM COATED ORAL at 17:12

## 2017-08-22 RX ADMIN — METFORMIN HYDROCHLORIDE 1000 MG: 500 TABLET, FILM COATED ORAL at 08:50

## 2017-08-23 VITALS
DIASTOLIC BLOOD PRESSURE: 55 MMHG | WEIGHT: 233.69 LBS | HEIGHT: 71 IN | TEMPERATURE: 98 F | RESPIRATION RATE: 20 BRPM | BODY MASS INDEX: 32.72 KG/M2 | SYSTOLIC BLOOD PRESSURE: 105 MMHG | HEART RATE: 62 BPM | OXYGEN SATURATION: 95 %

## 2017-08-23 PROCEDURE — 97530 THERAPEUTIC ACTIVITIES: CPT

## 2017-08-23 PROCEDURE — 97535 SELF CARE MNGMENT TRAINING: CPT

## 2017-08-23 PROCEDURE — 97112 NEUROMUSCULAR REEDUCATION: CPT

## 2017-08-23 PROCEDURE — 97110 THERAPEUTIC EXERCISES: CPT

## 2017-08-23 RX ORDER — ASPIRIN 81 MG/1
162 TABLET, CHEWABLE ORAL DAILY
Refills: 0 | Status: ON HOLD | OUTPATIENT
Start: 2017-08-23 | End: 2019-10-16 | Stop reason: SDUPTHER

## 2017-08-23 RX ORDER — ONDANSETRON 4 MG/1
4 TABLET, ORALLY DISINTEGRATING ORAL EVERY 6 HOURS PRN
Qty: 20 TABLET | Refills: 0 | Status: SHIPPED | OUTPATIENT
Start: 2017-08-23 | End: 2017-10-07

## 2017-08-23 RX ORDER — DOCUSATE SODIUM 100 MG/1
100 CAPSULE, LIQUID FILLED ORAL 2 TIMES DAILY PRN
Qty: 10 CAPSULE | Refills: 0 | Status: SHIPPED | OUTPATIENT
Start: 2017-08-23 | End: 2018-02-15 | Stop reason: ALTCHOICE

## 2017-08-23 RX ORDER — ATORVASTATIN CALCIUM 80 MG/1
80 TABLET, FILM COATED ORAL EVERY EVENING
Refills: 0 | Status: SHIPPED | OUTPATIENT
Start: 2017-08-23 | End: 2018-02-15 | Stop reason: ALTCHOICE

## 2017-08-23 RX ORDER — LISINOPRIL 2.5 MG/1
2.5 TABLET ORAL DAILY
Refills: 0 | Status: SHIPPED | OUTPATIENT
Start: 2017-08-23 | End: 2017-10-07

## 2017-08-23 RX ORDER — CLOPIDOGREL BISULFATE 75 MG/1
75 TABLET ORAL DAILY
Refills: 0 | Status: SHIPPED | OUTPATIENT
Start: 2017-08-23 | End: 2018-10-08 | Stop reason: SDDI

## 2017-08-23 RX ORDER — FLUTICASONE PROPIONATE 50 MCG
2 SPRAY, SUSPENSION (ML) NASAL DAILY
Qty: 16 G | Refills: 0 | Status: SHIPPED | OUTPATIENT
Start: 2017-08-23 | End: 2018-02-15 | Stop reason: ALTCHOICE

## 2017-08-23 RX ORDER — ALBUTEROL SULFATE 90 UG/1
2 AEROSOL, METERED RESPIRATORY (INHALATION) EVERY 4 HOURS PRN
Refills: 0 | Status: SHIPPED | OUTPATIENT
Start: 2017-08-23 | End: 2017-10-07

## 2017-08-23 RX ORDER — HEPARIN SODIUM 5000 [USP'U]/ML
5000 INJECTION, SOLUTION INTRAVENOUS; SUBCUTANEOUS EVERY 8 HOURS SCHEDULED
Qty: 1 ML | Refills: 0 | Status: SHIPPED | OUTPATIENT
Start: 2017-08-23 | End: 2017-10-07

## 2017-08-23 RX ORDER — POLYETHYLENE GLYCOL 3350 17 G/17G
17 POWDER, FOR SOLUTION ORAL DAILY PRN
Qty: 14 EACH | Refills: 0 | Status: SHIPPED | OUTPATIENT
Start: 2017-08-23 | End: 2018-02-15 | Stop reason: ALTCHOICE

## 2017-08-23 RX ORDER — ACETAMINOPHEN 325 MG/1
650 TABLET ORAL EVERY 6 HOURS PRN
Qty: 30 TABLET | Refills: 0 | Status: SHIPPED | OUTPATIENT
Start: 2017-08-23 | End: 2018-10-08 | Stop reason: ALTCHOICE

## 2017-08-23 RX ORDER — DIAPER,BRIEF,INFANT-TODD,DISP
EACH MISCELLANEOUS 4 TIMES DAILY PRN
Qty: 30 G | Refills: 0 | Status: SHIPPED | OUTPATIENT
Start: 2017-08-23 | End: 2018-02-15 | Stop reason: ALTCHOICE

## 2017-08-23 RX ADMIN — ASPIRIN 81 MG 162 MG: 81 TABLET ORAL at 08:19

## 2017-08-23 RX ADMIN — METFORMIN HYDROCHLORIDE 1000 MG: 500 TABLET, FILM COATED ORAL at 08:19

## 2017-08-23 RX ADMIN — HEPARIN SODIUM 5000 UNITS: 5000 INJECTION, SOLUTION INTRAVENOUS; SUBCUTANEOUS at 05:14

## 2017-08-23 RX ADMIN — HEPARIN SODIUM 5000 UNITS: 5000 INJECTION, SOLUTION INTRAVENOUS; SUBCUTANEOUS at 12:58

## 2017-08-23 RX ADMIN — CLOPIDOGREL BISULFATE 75 MG: 75 TABLET ORAL at 08:20

## 2017-08-23 RX ADMIN — FLUTICASONE PROPIONATE 2 SPRAY: 50 SPRAY, METERED NASAL at 08:21

## 2017-08-24 ENCOUNTER — GENERIC CONVERSION - ENCOUNTER (OUTPATIENT)
Dept: OTHER | Facility: OTHER | Age: 72
End: 2017-08-24

## 2017-09-08 DIAGNOSIS — I63.233: ICD-10-CM

## 2017-09-15 ENCOUNTER — ALLSCRIPTS OFFICE VISIT (OUTPATIENT)
Dept: OTHER | Facility: OTHER | Age: 72
End: 2017-09-15

## 2017-09-26 ENCOUNTER — APPOINTMENT (OUTPATIENT)
Dept: PHYSICAL THERAPY | Facility: CLINIC | Age: 72
End: 2017-09-26
Payer: MEDICARE

## 2017-09-26 ENCOUNTER — APPOINTMENT (OUTPATIENT)
Dept: OCCUPATIONAL THERAPY | Facility: CLINIC | Age: 72
End: 2017-09-26
Payer: MEDICARE

## 2017-09-26 DIAGNOSIS — I63.233: ICD-10-CM

## 2017-09-26 PROCEDURE — G8991 OTHER PT/OT GOAL STATUS: HCPCS

## 2017-09-26 PROCEDURE — 97162 PT EVAL MOD COMPLEX 30 MIN: CPT

## 2017-09-26 PROCEDURE — G8978 MOBILITY CURRENT STATUS: HCPCS

## 2017-09-26 PROCEDURE — G8979 MOBILITY GOAL STATUS: HCPCS

## 2017-09-26 PROCEDURE — G8990 OTHER PT/OT CURRENT STATUS: HCPCS

## 2017-09-26 PROCEDURE — 97166 OT EVAL MOD COMPLEX 45 MIN: CPT

## 2017-09-29 ENCOUNTER — APPOINTMENT (OUTPATIENT)
Dept: OCCUPATIONAL THERAPY | Facility: CLINIC | Age: 72
End: 2017-09-29
Payer: MEDICARE

## 2017-09-29 ENCOUNTER — APPOINTMENT (OUTPATIENT)
Dept: PHYSICAL THERAPY | Facility: CLINIC | Age: 72
End: 2017-09-29
Payer: MEDICARE

## 2017-09-29 PROCEDURE — 97112 NEUROMUSCULAR REEDUCATION: CPT

## 2017-09-29 PROCEDURE — 97535 SELF CARE MNGMENT TRAINING: CPT

## 2017-09-29 PROCEDURE — 97116 GAIT TRAINING THERAPY: CPT

## 2017-09-29 PROCEDURE — 97140 MANUAL THERAPY 1/> REGIONS: CPT

## 2017-10-02 ENCOUNTER — APPOINTMENT (OUTPATIENT)
Dept: PHYSICAL THERAPY | Facility: CLINIC | Age: 72
End: 2017-10-02
Payer: MEDICARE

## 2017-10-02 ENCOUNTER — APPOINTMENT (OUTPATIENT)
Dept: OCCUPATIONAL THERAPY | Facility: CLINIC | Age: 72
End: 2017-10-02
Payer: MEDICARE

## 2017-10-02 PROCEDURE — 97110 THERAPEUTIC EXERCISES: CPT

## 2017-10-02 PROCEDURE — 97535 SELF CARE MNGMENT TRAINING: CPT

## 2017-10-02 PROCEDURE — 97112 NEUROMUSCULAR REEDUCATION: CPT

## 2017-10-04 ENCOUNTER — APPOINTMENT (OUTPATIENT)
Dept: OCCUPATIONAL THERAPY | Facility: CLINIC | Age: 72
End: 2017-10-04
Payer: MEDICARE

## 2017-10-04 ENCOUNTER — APPOINTMENT (OUTPATIENT)
Dept: PHYSICAL THERAPY | Facility: CLINIC | Age: 72
End: 2017-10-04
Payer: MEDICARE

## 2017-10-04 PROCEDURE — 97112 NEUROMUSCULAR REEDUCATION: CPT

## 2017-10-04 PROCEDURE — 97535 SELF CARE MNGMENT TRAINING: CPT

## 2017-10-07 ENCOUNTER — APPOINTMENT (EMERGENCY)
Dept: RADIOLOGY | Facility: HOSPITAL | Age: 72
End: 2017-10-07
Payer: MEDICARE

## 2017-10-07 ENCOUNTER — HOSPITAL ENCOUNTER (EMERGENCY)
Facility: HOSPITAL | Age: 72
Discharge: HOME | End: 2017-10-07
Attending: EMERGENCY MEDICINE | Admitting: EMERGENCY MEDICINE
Payer: MEDICARE

## 2017-10-07 VITALS
RESPIRATION RATE: 18 BRPM | DIASTOLIC BLOOD PRESSURE: 77 MMHG | SYSTOLIC BLOOD PRESSURE: 152 MMHG | OXYGEN SATURATION: 93 % | BODY MASS INDEX: 31.22 KG/M2 | WEIGHT: 223 LBS | HEART RATE: 66 BPM | HEIGHT: 71 IN | TEMPERATURE: 97.6 F

## 2017-10-07 DIAGNOSIS — M10.9 GOUT: ICD-10-CM

## 2017-10-07 DIAGNOSIS — M79.674 GREAT TOE PAIN, RIGHT: Primary | ICD-10-CM

## 2017-10-07 LAB
ANION GAP SERPL CALCULATED.3IONS-SCNC: 7 MMOL/L (ref 4–13)
BASOPHILS # BLD AUTO: 0.01 THOUSANDS/ΜL (ref 0–0.1)
BASOPHILS NFR BLD AUTO: 0 % (ref 0–1)
BUN SERPL-MCNC: 12 MG/DL (ref 5–25)
CALCIUM SERPL-MCNC: 9.7 MG/DL (ref 8.3–10.1)
CHLORIDE SERPL-SCNC: 105 MMOL/L (ref 100–108)
CO2 SERPL-SCNC: 29 MMOL/L (ref 21–32)
CREAT SERPL-MCNC: 1 MG/DL (ref 0.6–1.3)
EOSINOPHIL # BLD AUTO: 0.12 THOUSAND/ΜL (ref 0–0.61)
EOSINOPHIL NFR BLD AUTO: 2 % (ref 0–6)
ERYTHROCYTE [DISTWIDTH] IN BLOOD BY AUTOMATED COUNT: 13.4 % (ref 11.6–15.1)
GFR SERPL CREATININE-BSD FRML MDRD: 75 ML/MIN/1.73SQ M
GLUCOSE SERPL-MCNC: 129 MG/DL (ref 65–140)
HCT VFR BLD AUTO: 42.6 % (ref 36.5–49.3)
HGB BLD-MCNC: 13.7 G/DL (ref 12–17)
LYMPHOCYTES # BLD AUTO: 2.19 THOUSANDS/ΜL (ref 0.6–4.47)
LYMPHOCYTES NFR BLD AUTO: 31 % (ref 14–44)
MCH RBC QN AUTO: 28.2 PG (ref 26.8–34.3)
MCHC RBC AUTO-ENTMCNC: 32.2 G/DL (ref 31.4–37.4)
MCV RBC AUTO: 88 FL (ref 82–98)
MONOCYTES # BLD AUTO: 0.56 THOUSAND/ΜL (ref 0.17–1.22)
MONOCYTES NFR BLD AUTO: 8 % (ref 4–12)
NEUTROPHILS # BLD AUTO: 4.11 THOUSANDS/ΜL (ref 1.85–7.62)
NEUTS SEG NFR BLD AUTO: 59 % (ref 43–75)
NRBC BLD AUTO-RTO: 0 /100 WBCS
PLATELET # BLD AUTO: 170 THOUSANDS/UL (ref 149–390)
PMV BLD AUTO: 10.7 FL (ref 8.9–12.7)
POTASSIUM SERPL-SCNC: 4.1 MMOL/L (ref 3.5–5.3)
RBC # BLD AUTO: 4.85 MILLION/UL (ref 3.88–5.62)
SODIUM SERPL-SCNC: 141 MMOL/L (ref 136–145)
WBC # BLD AUTO: 7.01 THOUSAND/UL (ref 4.31–10.16)

## 2017-10-07 PROCEDURE — 99283 EMERGENCY DEPT VISIT LOW MDM: CPT

## 2017-10-07 PROCEDURE — 36415 COLL VENOUS BLD VENIPUNCTURE: CPT | Performed by: EMERGENCY MEDICINE

## 2017-10-07 PROCEDURE — 80048 BASIC METABOLIC PNL TOTAL CA: CPT | Performed by: EMERGENCY MEDICINE

## 2017-10-07 PROCEDURE — 73630 X-RAY EXAM OF FOOT: CPT

## 2017-10-07 PROCEDURE — 85025 COMPLETE CBC W/AUTO DIFF WBC: CPT | Performed by: EMERGENCY MEDICINE

## 2017-10-07 RX ORDER — ALBUTEROL SULFATE 2.5 MG/3ML
2.5 SOLUTION RESPIRATORY (INHALATION) EVERY 6 HOURS PRN
COMMUNITY
End: 2018-02-15 | Stop reason: ALTCHOICE

## 2017-10-07 RX ORDER — COLCHICINE 0.6 MG/1
0.6 TABLET ORAL DAILY
Qty: 30 TABLET | Refills: 0 | Status: SHIPPED | OUTPATIENT
Start: 2017-10-07 | End: 2018-02-15 | Stop reason: ALTCHOICE

## 2017-10-07 RX ORDER — IBUPROFEN 400 MG/1
400 TABLET ORAL ONCE
Status: COMPLETED | OUTPATIENT
Start: 2017-10-07 | End: 2017-10-07

## 2017-10-07 RX ADMIN — IBUPROFEN 400 MG: 400 TABLET, FILM COATED ORAL at 12:23
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
## 2017-10-07 NOTE — DISCHARGE INSTRUCTIONS
Follow up with primary care doctor in 1-3 days  If erythema exceeeds outline of brandie, return to the ED immediately

## 2017-10-11 ENCOUNTER — APPOINTMENT (OUTPATIENT)
Dept: OCCUPATIONAL THERAPY | Facility: CLINIC | Age: 72
End: 2017-10-11
Payer: MEDICARE

## 2017-10-11 ENCOUNTER — APPOINTMENT (OUTPATIENT)
Dept: PHYSICAL THERAPY | Facility: CLINIC | Age: 72
End: 2017-10-11
Payer: MEDICARE

## 2017-10-11 PROCEDURE — 97110 THERAPEUTIC EXERCISES: CPT

## 2017-10-11 PROCEDURE — 97112 NEUROMUSCULAR REEDUCATION: CPT

## 2017-10-11 PROCEDURE — 97535 SELF CARE MNGMENT TRAINING: CPT

## 2017-10-13 ENCOUNTER — APPOINTMENT (OUTPATIENT)
Dept: OCCUPATIONAL THERAPY | Facility: CLINIC | Age: 72
End: 2017-10-13
Payer: MEDICARE

## 2017-10-13 ENCOUNTER — APPOINTMENT (OUTPATIENT)
Dept: PHYSICAL THERAPY | Facility: CLINIC | Age: 72
End: 2017-10-13
Payer: MEDICARE

## 2017-10-13 PROCEDURE — 97116 GAIT TRAINING THERAPY: CPT

## 2017-10-13 PROCEDURE — 97110 THERAPEUTIC EXERCISES: CPT

## 2017-10-13 PROCEDURE — 97112 NEUROMUSCULAR REEDUCATION: CPT

## 2017-10-13 PROCEDURE — 97530 THERAPEUTIC ACTIVITIES: CPT

## 2017-10-13 NOTE — ED ATTENDING ATTESTATION
Bridget Rico MD, saw and evaluated the patient  I have discussed the patient with the resident/non-physician practitioner and agree with the resident's/non-physician practitioner's findings, Plan of Care, and MDM as documented in the resident's/non-physician practitioner's note, except where noted  All available labs and Radiology studies were reviewed  At this point I agree with the current assessment done in the Emergency Department  I have conducted an independent evaluation of this patient a history and physical is as follows:    Patient presents for evaluation of 2 days of right 1st toe pain  Patient states that he has been going to physical therapy and noticed it the night following physical therapy session  He denies any trauma or any overuse  He does report having a history in the past of gout  No additional complaints  Exam: AAOx3, NAD, right 1st MTP with tenderness, pain with range of motion, erythema, and warmth  A/P:  Right great toe pain  Symptoms likely due to gout; however, cannot completely rule out early cellulitis  I will treat patient with colchicine for gout and recommended that he monitor the area closely  If he has any worsening of symptoms or any fevers, I have instructed him to return for recheck      Critical Care Time  CritCare Time

## 2017-10-17 ENCOUNTER — APPOINTMENT (OUTPATIENT)
Dept: OCCUPATIONAL THERAPY | Facility: CLINIC | Age: 72
End: 2017-10-17
Payer: MEDICARE

## 2017-10-17 ENCOUNTER — APPOINTMENT (OUTPATIENT)
Dept: PHYSICAL THERAPY | Facility: CLINIC | Age: 72
End: 2017-10-17
Payer: MEDICARE

## 2017-10-17 PROCEDURE — 97112 NEUROMUSCULAR REEDUCATION: CPT

## 2017-10-17 PROCEDURE — 97140 MANUAL THERAPY 1/> REGIONS: CPT

## 2017-10-17 PROCEDURE — 97110 THERAPEUTIC EXERCISES: CPT

## 2017-10-17 PROCEDURE — 97535 SELF CARE MNGMENT TRAINING: CPT

## 2017-10-20 ENCOUNTER — APPOINTMENT (OUTPATIENT)
Dept: OCCUPATIONAL THERAPY | Facility: CLINIC | Age: 72
End: 2017-10-20
Payer: MEDICARE

## 2017-10-20 ENCOUNTER — APPOINTMENT (OUTPATIENT)
Dept: PHYSICAL THERAPY | Facility: CLINIC | Age: 72
End: 2017-10-20
Payer: MEDICARE

## 2017-10-20 PROCEDURE — 97112 NEUROMUSCULAR REEDUCATION: CPT

## 2017-10-20 PROCEDURE — 97530 THERAPEUTIC ACTIVITIES: CPT

## 2017-10-20 PROCEDURE — 97535 SELF CARE MNGMENT TRAINING: CPT

## 2017-10-20 PROCEDURE — 97116 GAIT TRAINING THERAPY: CPT

## 2017-10-23 ENCOUNTER — APPOINTMENT (OUTPATIENT)
Dept: OCCUPATIONAL THERAPY | Facility: CLINIC | Age: 72
End: 2017-10-23
Payer: MEDICARE

## 2017-10-23 ENCOUNTER — APPOINTMENT (OUTPATIENT)
Dept: PHYSICAL THERAPY | Facility: CLINIC | Age: 72
End: 2017-10-23
Payer: MEDICARE

## 2017-10-23 PROCEDURE — 97110 THERAPEUTIC EXERCISES: CPT

## 2017-10-23 PROCEDURE — 97535 SELF CARE MNGMENT TRAINING: CPT

## 2017-10-23 PROCEDURE — 97112 NEUROMUSCULAR REEDUCATION: CPT

## 2017-10-25 ENCOUNTER — APPOINTMENT (OUTPATIENT)
Dept: OCCUPATIONAL THERAPY | Facility: CLINIC | Age: 72
End: 2017-10-25
Payer: MEDICARE

## 2017-10-25 ENCOUNTER — APPOINTMENT (OUTPATIENT)
Dept: PHYSICAL THERAPY | Facility: CLINIC | Age: 72
End: 2017-10-25
Payer: MEDICARE

## 2017-10-25 PROCEDURE — G8990 OTHER PT/OT CURRENT STATUS: HCPCS

## 2017-10-25 PROCEDURE — 97110 THERAPEUTIC EXERCISES: CPT

## 2017-10-25 PROCEDURE — 97112 NEUROMUSCULAR REEDUCATION: CPT

## 2017-10-25 PROCEDURE — G8991 OTHER PT/OT GOAL STATUS: HCPCS

## 2017-10-25 PROCEDURE — G8979 MOBILITY GOAL STATUS: HCPCS

## 2017-10-25 PROCEDURE — G8978 MOBILITY CURRENT STATUS: HCPCS

## 2017-10-25 PROCEDURE — 97750 PHYSICAL PERFORMANCE TEST: CPT

## 2017-10-26 ENCOUNTER — ALLSCRIPTS OFFICE VISIT (OUTPATIENT)
Dept: OTHER | Facility: OTHER | Age: 72
End: 2017-10-26

## 2017-10-27 NOTE — PROGRESS NOTES
Assessment  1  Cerebrovascular accident (CVA) due to bilateral occlusion of carotid arteries (433 31)   (B39 [id number])    Plan  Cerebrovascular accident (CVA) due to bilateral occlusion of carotid arteries    · VAS CAROTID COMPLETE STUDY; SIDE:Bilateral; Status:Hold For - Scheduling;  Requested for:18Jan2018; Perform:[facility]; ETN:[id number];AKXUHPH; For:Cerebrovascular accident (CVA) due to bilateral occlusion of carotid arteries; Ordered By:Napoleon Vanegas;   · Follow-up visit in 3 months Evaluation and Treatment  Follow-up  Status: Hold For -  Scheduling  Requested for: 26Oct2017   Ordered; For: Cerebrovascular accident (CVA) due to bilateral occlusion of carotid arteries; Ordered By: Mariel Garcia Performed:  Due: [id number]    Discussion/Summary  Discussion Summary:   I discussed in detail with the patient and his wife the risks benefits of secondary prevention principally that I would now only revert to a single antiplatelet agent namely aspirin 81 mg a day  I reviewed with them both the near term and long-term data of the use of dual antiplatelets and especially the long-term secondary prevention data not warranting tool agents  this time we have to closely follow the left internal carotid and I scheduled a vascular procedure i e  carotid ultrasound in 3 months along with follow-up with me  I did review for them in detail the cardinal warning signs of stroke and to immediately activate 911  Counseling Documentation With Imm: The patient, patient's family was counseled regarding diagnostic results,-- instructions for management,-- risk factor reductions,-- prognosis,-- patient and family education,-- impressions,-- risks and benefits of treatment options,-- importance of compliance with treatment  total time of encounter was 45 minutes-- and-- 35 minutes was spent counseling        Chief Complaint  Chief Complaint Free Text Note Form: Patient present for Hospital follow up for Stroke      History of Present Illness  HPI: Patient is accompanied by his wife  gentleman with a history of hypertension and diabetes mellitus type 2 sustained a right middle cerebral artery infarction in August of this year secondary to total occlusion of the right internal carotid artery  He was not an interventional candidate  was discharged from hospital on both aspirin 81 mg a day and clopidogrel 75 mg a day along with a statin  He was treated in the acute rehabilitation facility and now outpatient rehabilitation and is doing quite well  He still has a left hemiparesis but improving arm greater than face greater than leg in addition to some very minimal pseudo-bulbar affect  evaluation revealed the entirely occluded right internal carotid but up to 69% asymptomatic some stenosis in the left internal carotid artery extracranial  continues on both aspirin and clopidogrel of which there is no evidence that long-term therapy after the first one to 3 months is beneficial and in fact triples the risk versus benefit  has not had any bleeding issues      Review of Systems  Neurological ROS:   Constitutional: no fever, no chills, no recent weight gain, no recent weight loss, no complaints of feeling tired, no changes in appetite  HEENT:  no sinus problems, not feeling congested, no blurred vision, no dryness of the eyes, no eye pain, no hearing loss, no tinnitus, no mouth sores, no sore throat, no hoarseness, no dysphagia, no masses, no bleeding  Cardiovascular:  no chest pain or pressure, no palpitations present, the heart rate was not rapid or irregular, no swelling in the arms or legs, no poor circulation  Respiratory:  no unusual or persistant cough, no shortness of breath with or without exertion  Gastrointestinal:  no nausea, no vomiting, no diarrhea, no abdominal pain, no changes in bowel habits, no melena, no loss of bowel control     Genitourinary:  no incontinence, no feelings of urinary urgency, no increase in frequency, no urinary hesitancy, no dysuria, no hematuria  Musculoskeletal:  no arthralgias, no myalgias, no immobility or loss of function, no head/neck/back pain, no pain while walking  Integumentary  no masses, no rash, no skin lesions, no livedo reticularis  Psychiatric:  no anxiety, no depression, no mood swings, no psychiatric hospitalizations, no sleep problems  Endocrine  no unusual weight loss or gain, no excessive urination, no excessive thirst, no hair loss or gain, no hot or cold intolerance, no menstrual period change or irregularity, no loss of sexual ability or drive, no erection difficulty, no nipple discharge  Hematologic/Lymphatic:  no unusual bleeding, no tendency for easy bruising, no clotting skin or lumps  Neurological General:  no headache, no nausea or vomiting, no lightheadedness, no convulsions, no blackouts, no syncope, no trauma, no photopsia, no increased sleepiness, no trouble falling asleep, no snoring, no awakening at night  Neurological Mental Status:  no confusion, no mood swings, no alteration or loss of consciousness, no difficulty expressing/understanding speech, no memory problems  Neurological Cranial Nerves:  no blurry or double vision, no loss of vision, no face drooping, no facial numbness or weakness, no taste or smell loss/changes, no hearing loss or ringing, no vertigo or dizziness, no dysphagia, no slurred speech  Neurological Motor findings include:  no tremor, no twitching, no cramping(pre/post exercise), no atrophy  Neurological Coordination:  no unsteadiness, no vertigo or dizziness, no clumsiness, no problems reaching for objects  Neurological Sensory:  no numbness, no pain, no tingling, does not fall when eyes closed or taking a shower  Neurological Gait:  no difficulty walking, not falling to one side, no sensation of being pushed, has not had falls  Active Problems  1  Acquired hallux malleus of right foot (735 3) (M20 31)   2  Allergic rhinitis (477 9) (J30 9)   3  Arthritis (716 90) (M19 90)   4  Asthma (493 90) (J45 909)   5  Benign essential hypertension (401 1) (I10)   6  Cerebrovascular accident (CVA) due to bilateral occlusion of carotid arteries (433 31)   (T67 779)   7  Chronic renal disease, stage I (585 1) (N18 1)   8  Diabetic nephropathy associated with type 2 diabetes mellitus (250 40,583 81) (E11 21)   9  DMII (diabetes mellitus, type 2) (250 00) (E11 9)   10  Gout (274 9) (M10 9)   11  Hyperlipidemia (272 4) (E78 5)   12  Impingement syndrome of right shoulder (726 2) (M75 41)   13  Non-rheumatic aortic sclerosis (440 0) (I70 0)   14  Obstructive sleep apnea (327 23) (G47 33)   15  Popliteal cyst, left (727 51) (M71 22)   16  Pre-ulcerative corn or callous (700) (L84)   17  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)   18  Shoulder pain, right (719 41) (M25 511)    Surgical History  1  History of Shoulder Arthroscopy With Rotator Cuff Repair    Family History  Mother    1  No pertinent family history    Social History   · Denied: Alcohol Use (History)   · Denied: Daily Coffee Consumption (___ Cups/Day)   · Never A Smoker    Current Meds   1  Albuterol Sulfate (2 5 MG/3ML) 0 083% Inhalation Nebulization Solution; Therapy: 12SYC4345 to Recorded   2  Aspirin 81 MG TABS; take 2 tablet daily; Therapy: (Recorded:01Lbt2054) to Recorded   3  Atorvastatin Calcium 80 MG Oral Tablet; Take 1 tablet daily; Therapy: 92Vnc6393 to (Evaluate:08Apr2018)  Requested for: 31PXX9844; Last   Rx:10Oct2017 Ordered   4  Clopidogrel Bisulfate 75 MG Oral Tablet; Take 1 tablet daily; Therapy: 67Ehk7952 to (Evaluate:08Apr2018)  Requested for: 22VHR7706; Last   Rx:10Oct2017 Ordered   5  Fluticasone Propionate 50 MCG/ACT Nasal Suspension; USE 2 SPRAYS IN EACH   NOSTRIL ONCE DAILY; Therapy: (Recorded:82Vtn7104) to Recorded   6  MetFORMIN HCl - 1000 MG Oral Tablet; take 1 tablet twice a day;    Therapy: 91NPX9426 to (Evaluate:35Clb1594)  Requested for: 36UHD9878; Last   GO:59NUP5719 Ordered   7  OneTouch Delica Lancets MISC; TEST BLOOD SUGAR TWICE DAILY; Therapy: 37VVT9919 to (Evaluate:68Ftu5553)  Requested for: 94RHN3378; Last   Rx:18Mar2013 Ordered   8  OneTouch Ultra Blue In Vitro Strip; TEST ONCE DAILY; Therapy: 13OEM8603 to (Evaluate:10Jun2017)  Requested for: 22Nov2016; Last   Rx:22Nov2016 Ordered   9  Proventil  (90 Base) MCG/ACT Inhalation Aerosol Solution; INHALE 2 PUFFS   EVERY 4-6 HOURS AS NEEDED; Therapy: 93QUF2707 to (Last Rx:18Mar2013) Ordered    Allergies  1  Penicillins    Vitals  Signs   Recorded: 97WJD9549 02:22PM   Heart Rate: 96  Systolic: 468  Diastolic: 70  Height: 5 ft 11 in  Weight: 227 lb 2 oz  BMI Calculated: 31 68  BSA Calculated: 2 23    Physical Exam    Musculoskeletal   Gait and station: Abnormal  -- Left isabel-Radha us with mild spasticity arm greater than face greater than leg  Muscle strength: Abnormal     Muscle tone: Abnormal        Future Appointments    Date/Time Provider Specialty Site   11/03/2017 01:00 PM KATIE Calderón   Family Medicine [facility]     Signatures   Electronically signed by : Redd Reddy MD; Oct 26 2017  2:43PM EST                       (Author)

## 2017-10-30 ENCOUNTER — APPOINTMENT (OUTPATIENT)
Dept: OCCUPATIONAL THERAPY | Facility: CLINIC | Age: 72
End: 2017-10-30
Payer: MEDICARE

## 2017-10-30 ENCOUNTER — APPOINTMENT (OUTPATIENT)
Dept: PHYSICAL THERAPY | Facility: CLINIC | Age: 72
End: 2017-10-30
Payer: MEDICARE

## 2017-10-30 PROCEDURE — 97535 SELF CARE MNGMENT TRAINING: CPT

## 2017-10-30 PROCEDURE — 97116 GAIT TRAINING THERAPY: CPT

## 2017-10-30 PROCEDURE — 97112 NEUROMUSCULAR REEDUCATION: CPT

## 2017-11-01 ENCOUNTER — APPOINTMENT (OUTPATIENT)
Dept: PHYSICAL THERAPY | Facility: CLINIC | Age: 72
End: 2017-11-01
Payer: MEDICARE

## 2017-11-01 ENCOUNTER — APPOINTMENT (OUTPATIENT)
Dept: OCCUPATIONAL THERAPY | Facility: CLINIC | Age: 72
End: 2017-11-01
Payer: MEDICARE

## 2017-11-01 PROCEDURE — 97112 NEUROMUSCULAR REEDUCATION: CPT

## 2017-11-01 PROCEDURE — 97116 GAIT TRAINING THERAPY: CPT

## 2017-11-01 PROCEDURE — 97535 SELF CARE MNGMENT TRAINING: CPT

## 2017-11-07 ENCOUNTER — APPOINTMENT (OUTPATIENT)
Dept: PHYSICAL THERAPY | Facility: CLINIC | Age: 72
End: 2017-11-07
Payer: MEDICARE

## 2017-11-07 ENCOUNTER — APPOINTMENT (OUTPATIENT)
Dept: OCCUPATIONAL THERAPY | Facility: CLINIC | Age: 72
End: 2017-11-07
Payer: MEDICARE

## 2017-11-07 PROCEDURE — 97140 MANUAL THERAPY 1/> REGIONS: CPT

## 2017-11-07 PROCEDURE — 97535 SELF CARE MNGMENT TRAINING: CPT

## 2017-11-07 PROCEDURE — 97112 NEUROMUSCULAR REEDUCATION: CPT

## 2017-11-07 PROCEDURE — 97110 THERAPEUTIC EXERCISES: CPT

## 2017-11-09 ENCOUNTER — APPOINTMENT (OUTPATIENT)
Dept: OCCUPATIONAL THERAPY | Facility: CLINIC | Age: 72
End: 2017-11-09
Payer: MEDICARE

## 2017-11-09 ENCOUNTER — APPOINTMENT (OUTPATIENT)
Dept: PHYSICAL THERAPY | Facility: CLINIC | Age: 72
End: 2017-11-09
Payer: MEDICARE

## 2017-11-09 PROCEDURE — 97140 MANUAL THERAPY 1/> REGIONS: CPT

## 2017-11-09 PROCEDURE — 97110 THERAPEUTIC EXERCISES: CPT

## 2017-11-09 PROCEDURE — 97112 NEUROMUSCULAR REEDUCATION: CPT

## 2017-11-09 PROCEDURE — 97116 GAIT TRAINING THERAPY: CPT

## 2017-11-10 ENCOUNTER — GENERIC CONVERSION - ENCOUNTER (OUTPATIENT)
Dept: OTHER | Facility: OTHER | Age: 72
End: 2017-11-10

## 2017-11-10 ENCOUNTER — ALLSCRIPTS OFFICE VISIT (OUTPATIENT)
Dept: OTHER | Facility: OTHER | Age: 72
End: 2017-11-10

## 2017-11-10 DIAGNOSIS — M10.9 GOUT: ICD-10-CM

## 2017-11-10 DIAGNOSIS — E11.9 TYPE 2 DIABETES MELLITUS WITHOUT COMPLICATIONS (HCC): ICD-10-CM

## 2017-11-14 ENCOUNTER — LAB CONVERSION - ENCOUNTER (OUTPATIENT)
Dept: OTHER | Facility: OTHER | Age: 72
End: 2017-11-14

## 2017-11-14 LAB
A/G RATIO (HISTORICAL): 1.4 (CALC) (ref 1–2.5)
ALBUMIN SERPL BCP-MCNC: 3.9 G/DL (ref 3.6–5.1)
ALP SERPL-CCNC: 74 U/L (ref 40–115)
ALT SERPL W P-5'-P-CCNC: 15 U/L (ref 9–46)
AST SERPL W P-5'-P-CCNC: 14 U/L (ref 10–35)
BASOPHILS # BLD AUTO: 0.4 %
BASOPHILS # BLD AUTO: 37 CELLS/UL (ref 0–200)
BILIRUB SERPL-MCNC: 0.7 MG/DL (ref 0.2–1.2)
BUN SERPL-MCNC: 18 MG/DL (ref 7–25)
BUN/CREA RATIO (HISTORICAL): ABNORMAL (CALC) (ref 6–22)
CALCIUM SERPL-MCNC: 9.8 MG/DL (ref 8.6–10.3)
CHLORIDE SERPL-SCNC: 105 MMOL/L (ref 98–110)
CHOLEST SERPL-MCNC: 116 MG/DL
CHOLEST/HDLC SERPL: 3 (CALC)
CO2 SERPL-SCNC: 26 MMOL/L (ref 20–31)
CREAT SERPL-MCNC: 1.08 MG/DL (ref 0.7–1.18)
DEPRECATED RDW RBC AUTO: 13.3 % (ref 11–15)
EGFR AFRICAN AMERICAN (HISTORICAL): 79 ML/MIN/1.73M2
EGFR-AMERICAN CALC (HISTORICAL): 68 ML/MIN/1.73M2
EOSINOPHIL # BLD AUTO: 1.6 %
EOSINOPHIL # BLD AUTO: 147 CELLS/UL (ref 15–500)
GAMMA GLOBULIN (HISTORICAL): 2.7 G/DL (CALC) (ref 1.9–3.7)
GLUCOSE (HISTORICAL): 106 MG/DL (ref 65–99)
HBA1C MFR BLD HPLC: 6.2 % OF TOTAL HGB
HCT VFR BLD AUTO: 45.1 % (ref 38.5–50)
HDLC SERPL-MCNC: 39 MG/DL
HGB BLD-MCNC: 15 G/DL (ref 13.2–17.1)
LDL CHOLESTEROL (HISTORICAL): 54 MG/DL (CALC)
LYMPHOCYTES # BLD AUTO: 2650 CELLS/UL (ref 850–3900)
LYMPHOCYTES # BLD AUTO: 28.8 %
MCH RBC QN AUTO: 28 PG (ref 27–33)
MCHC RBC AUTO-ENTMCNC: 33.3 G/DL (ref 32–36)
MCV RBC AUTO: 84.3 FL (ref 80–100)
MONOCYTES # BLD AUTO: 782 CELLS/UL (ref 200–950)
MONOCYTES (HISTORICAL): 8.5 %
NEUTROPHILS # BLD AUTO: 5584 CELLS/UL (ref 1500–7800)
NEUTROPHILS # BLD AUTO: 60.7 %
NON-HDL-CHOL (CHOL-HDL) (HISTORICAL): 77 MG/DL (CALC)
PLATELET # BLD AUTO: 213 THOUSAND/UL (ref 140–400)
PMV BLD AUTO: 11.3 FL (ref 7.5–12.5)
POTASSIUM SERPL-SCNC: 4.6 MMOL/L (ref 3.5–5.3)
RBC # BLD AUTO: 5.35 MILLION/UL (ref 4.2–5.8)
SODIUM SERPL-SCNC: 142 MMOL/L (ref 135–146)
TOTAL PROTEIN (HISTORICAL): 6.6 G/DL (ref 6.1–8.1)
TRIGL SERPL-MCNC: 145 MG/DL
URIC ACID (HISTORICAL): 7.3 MG/DL (ref 4–8)
WBC # BLD AUTO: 9.2 THOUSAND/UL (ref 3.8–10.8)

## 2017-11-15 ENCOUNTER — APPOINTMENT (OUTPATIENT)
Dept: OCCUPATIONAL THERAPY | Facility: CLINIC | Age: 72
End: 2017-11-15
Payer: MEDICARE

## 2017-11-15 ENCOUNTER — APPOINTMENT (OUTPATIENT)
Dept: PHYSICAL THERAPY | Facility: CLINIC | Age: 72
End: 2017-11-15
Payer: MEDICARE

## 2017-11-15 PROCEDURE — 97140 MANUAL THERAPY 1/> REGIONS: CPT

## 2017-11-15 PROCEDURE — 97535 SELF CARE MNGMENT TRAINING: CPT

## 2017-11-15 PROCEDURE — 97112 NEUROMUSCULAR REEDUCATION: CPT

## 2017-11-15 PROCEDURE — 97110 THERAPEUTIC EXERCISES: CPT

## 2017-11-16 ENCOUNTER — APPOINTMENT (OUTPATIENT)
Dept: OCCUPATIONAL THERAPY | Facility: CLINIC | Age: 72
End: 2017-11-16
Payer: MEDICARE

## 2017-11-16 ENCOUNTER — APPOINTMENT (OUTPATIENT)
Dept: PHYSICAL THERAPY | Facility: CLINIC | Age: 72
End: 2017-11-16
Payer: MEDICARE

## 2017-11-16 PROCEDURE — 97112 NEUROMUSCULAR REEDUCATION: CPT

## 2017-11-16 PROCEDURE — 97110 THERAPEUTIC EXERCISES: CPT

## 2017-11-21 ENCOUNTER — APPOINTMENT (OUTPATIENT)
Dept: OCCUPATIONAL THERAPY | Facility: CLINIC | Age: 72
End: 2017-11-21
Payer: MEDICARE

## 2017-11-21 ENCOUNTER — APPOINTMENT (OUTPATIENT)
Dept: PHYSICAL THERAPY | Facility: CLINIC | Age: 72
End: 2017-11-21
Payer: MEDICARE

## 2017-11-21 PROCEDURE — 97112 NEUROMUSCULAR REEDUCATION: CPT

## 2017-11-21 PROCEDURE — 97150 GROUP THERAPEUTIC PROCEDURES: CPT

## 2017-11-21 PROCEDURE — 97110 THERAPEUTIC EXERCISES: CPT

## 2017-11-24 ENCOUNTER — APPOINTMENT (OUTPATIENT)
Dept: PHYSICAL THERAPY | Facility: CLINIC | Age: 72
End: 2017-11-24
Payer: MEDICARE

## 2017-11-24 PROCEDURE — 97150 GROUP THERAPEUTIC PROCEDURES: CPT

## 2017-11-24 PROCEDURE — 97110 THERAPEUTIC EXERCISES: CPT

## 2017-11-24 PROCEDURE — 97112 NEUROMUSCULAR REEDUCATION: CPT

## 2017-11-28 ENCOUNTER — APPOINTMENT (OUTPATIENT)
Dept: PHYSICAL THERAPY | Facility: CLINIC | Age: 72
End: 2017-11-28
Payer: MEDICARE

## 2017-11-28 ENCOUNTER — APPOINTMENT (OUTPATIENT)
Dept: OCCUPATIONAL THERAPY | Facility: CLINIC | Age: 72
End: 2017-11-28
Payer: MEDICARE

## 2017-11-28 PROCEDURE — 97112 NEUROMUSCULAR REEDUCATION: CPT

## 2017-11-28 PROCEDURE — 97535 SELF CARE MNGMENT TRAINING: CPT

## 2017-11-28 PROCEDURE — G8991 OTHER PT/OT GOAL STATUS: HCPCS

## 2017-11-28 PROCEDURE — G8978 MOBILITY CURRENT STATUS: HCPCS

## 2017-11-28 PROCEDURE — G8990 OTHER PT/OT CURRENT STATUS: HCPCS

## 2017-11-28 PROCEDURE — G8979 MOBILITY GOAL STATUS: HCPCS

## 2017-11-30 ENCOUNTER — APPOINTMENT (OUTPATIENT)
Dept: OCCUPATIONAL THERAPY | Facility: CLINIC | Age: 72
End: 2017-11-30
Payer: MEDICARE

## 2017-11-30 ENCOUNTER — APPOINTMENT (OUTPATIENT)
Dept: PHYSICAL THERAPY | Facility: CLINIC | Age: 72
End: 2017-11-30
Payer: MEDICARE

## 2017-11-30 PROCEDURE — 97535 SELF CARE MNGMENT TRAINING: CPT

## 2017-11-30 PROCEDURE — 97112 NEUROMUSCULAR REEDUCATION: CPT

## 2017-11-30 PROCEDURE — 97110 THERAPEUTIC EXERCISES: CPT

## 2017-12-04 ENCOUNTER — APPOINTMENT (OUTPATIENT)
Dept: OCCUPATIONAL THERAPY | Facility: CLINIC | Age: 72
End: 2017-12-04
Payer: MEDICARE

## 2017-12-04 ENCOUNTER — APPOINTMENT (OUTPATIENT)
Dept: PHYSICAL THERAPY | Facility: CLINIC | Age: 72
End: 2017-12-04
Payer: MEDICARE

## 2017-12-04 PROCEDURE — 97110 THERAPEUTIC EXERCISES: CPT

## 2017-12-04 PROCEDURE — 97112 NEUROMUSCULAR REEDUCATION: CPT

## 2017-12-04 PROCEDURE — G8979 MOBILITY GOAL STATUS: HCPCS | Performed by: PHYSICAL THERAPIST

## 2017-12-04 PROCEDURE — 97530 THERAPEUTIC ACTIVITIES: CPT

## 2017-12-04 PROCEDURE — G8978 MOBILITY CURRENT STATUS: HCPCS | Performed by: PHYSICAL THERAPIST

## 2017-12-06 ENCOUNTER — APPOINTMENT (OUTPATIENT)
Dept: PHYSICAL THERAPY | Facility: CLINIC | Age: 72
End: 2017-12-06
Payer: MEDICARE

## 2017-12-06 ENCOUNTER — APPOINTMENT (OUTPATIENT)
Dept: OCCUPATIONAL THERAPY | Facility: CLINIC | Age: 72
End: 2017-12-06
Payer: MEDICARE

## 2017-12-06 PROCEDURE — 97112 NEUROMUSCULAR REEDUCATION: CPT

## 2017-12-06 PROCEDURE — 97140 MANUAL THERAPY 1/> REGIONS: CPT

## 2017-12-11 ENCOUNTER — APPOINTMENT (OUTPATIENT)
Dept: OCCUPATIONAL THERAPY | Facility: CLINIC | Age: 72
End: 2017-12-11
Payer: MEDICARE

## 2017-12-11 ENCOUNTER — APPOINTMENT (OUTPATIENT)
Dept: PHYSICAL THERAPY | Facility: CLINIC | Age: 72
End: 2017-12-11
Payer: MEDICARE

## 2017-12-11 PROCEDURE — 97112 NEUROMUSCULAR REEDUCATION: CPT

## 2017-12-11 PROCEDURE — 97530 THERAPEUTIC ACTIVITIES: CPT

## 2017-12-11 PROCEDURE — 97110 THERAPEUTIC EXERCISES: CPT

## 2017-12-13 ENCOUNTER — APPOINTMENT (OUTPATIENT)
Dept: OCCUPATIONAL THERAPY | Facility: CLINIC | Age: 72
End: 2017-12-13
Payer: MEDICARE

## 2017-12-13 ENCOUNTER — APPOINTMENT (OUTPATIENT)
Dept: PHYSICAL THERAPY | Facility: CLINIC | Age: 72
End: 2017-12-13
Payer: MEDICARE

## 2017-12-13 PROCEDURE — 97112 NEUROMUSCULAR REEDUCATION: CPT

## 2017-12-13 PROCEDURE — 97110 THERAPEUTIC EXERCISES: CPT

## 2017-12-13 PROCEDURE — 97535 SELF CARE MNGMENT TRAINING: CPT

## 2017-12-18 ENCOUNTER — APPOINTMENT (OUTPATIENT)
Dept: PHYSICAL THERAPY | Facility: CLINIC | Age: 72
End: 2017-12-18
Payer: MEDICARE

## 2017-12-18 ENCOUNTER — APPOINTMENT (OUTPATIENT)
Dept: OCCUPATIONAL THERAPY | Facility: CLINIC | Age: 72
End: 2017-12-18
Payer: MEDICARE

## 2017-12-18 PROCEDURE — 97535 SELF CARE MNGMENT TRAINING: CPT

## 2017-12-18 PROCEDURE — 97110 THERAPEUTIC EXERCISES: CPT

## 2017-12-18 PROCEDURE — 97112 NEUROMUSCULAR REEDUCATION: CPT

## 2017-12-20 ENCOUNTER — GENERIC CONVERSION - ENCOUNTER (OUTPATIENT)
Dept: OTHER | Facility: OTHER | Age: 72
End: 2017-12-20

## 2017-12-20 ENCOUNTER — APPOINTMENT (OUTPATIENT)
Dept: OCCUPATIONAL THERAPY | Facility: CLINIC | Age: 72
End: 2017-12-20
Payer: MEDICARE

## 2017-12-20 ENCOUNTER — APPOINTMENT (OUTPATIENT)
Dept: PHYSICAL THERAPY | Facility: CLINIC | Age: 72
End: 2017-12-20
Payer: MEDICARE

## 2017-12-20 PROCEDURE — 97112 NEUROMUSCULAR REEDUCATION: CPT

## 2017-12-20 PROCEDURE — G8990 OTHER PT/OT CURRENT STATUS: HCPCS

## 2017-12-20 PROCEDURE — 97530 THERAPEUTIC ACTIVITIES: CPT

## 2017-12-20 PROCEDURE — 97110 THERAPEUTIC EXERCISES: CPT

## 2017-12-20 PROCEDURE — G8991 OTHER PT/OT GOAL STATUS: HCPCS

## 2017-12-20 PROCEDURE — 97535 SELF CARE MNGMENT TRAINING: CPT

## 2017-12-20 PROCEDURE — 97116 GAIT TRAINING THERAPY: CPT

## 2017-12-27 ENCOUNTER — APPOINTMENT (OUTPATIENT)
Dept: PHYSICAL THERAPY | Facility: CLINIC | Age: 72
End: 2017-12-27
Payer: MEDICARE

## 2017-12-27 ENCOUNTER — APPOINTMENT (OUTPATIENT)
Dept: OCCUPATIONAL THERAPY | Facility: CLINIC | Age: 72
End: 2017-12-27
Payer: MEDICARE

## 2017-12-27 PROCEDURE — G8979 MOBILITY GOAL STATUS: HCPCS

## 2017-12-27 PROCEDURE — 97535 SELF CARE MNGMENT TRAINING: CPT

## 2017-12-27 PROCEDURE — 97116 GAIT TRAINING THERAPY: CPT

## 2017-12-27 PROCEDURE — 97112 NEUROMUSCULAR REEDUCATION: CPT

## 2017-12-27 PROCEDURE — G8978 MOBILITY CURRENT STATUS: HCPCS

## 2017-12-27 PROCEDURE — 97110 THERAPEUTIC EXERCISES: CPT

## 2017-12-28 ENCOUNTER — APPOINTMENT (OUTPATIENT)
Dept: OCCUPATIONAL THERAPY | Facility: CLINIC | Age: 72
End: 2017-12-28
Payer: MEDICARE

## 2017-12-28 ENCOUNTER — APPOINTMENT (OUTPATIENT)
Dept: PHYSICAL THERAPY | Facility: CLINIC | Age: 72
End: 2017-12-28
Payer: MEDICARE

## 2017-12-28 PROCEDURE — 97530 THERAPEUTIC ACTIVITIES: CPT

## 2017-12-28 PROCEDURE — 97110 THERAPEUTIC EXERCISES: CPT

## 2017-12-28 PROCEDURE — 97112 NEUROMUSCULAR REEDUCATION: CPT

## 2018-01-03 ENCOUNTER — APPOINTMENT (OUTPATIENT)
Dept: PHYSICAL THERAPY | Facility: CLINIC | Age: 73
End: 2018-01-03
Payer: MEDICARE

## 2018-01-03 ENCOUNTER — APPOINTMENT (OUTPATIENT)
Dept: OCCUPATIONAL THERAPY | Facility: CLINIC | Age: 73
End: 2018-01-03
Payer: MEDICARE

## 2018-01-03 PROCEDURE — 97750 PHYSICAL PERFORMANCE TEST: CPT

## 2018-01-03 PROCEDURE — 97110 THERAPEUTIC EXERCISES: CPT

## 2018-01-03 PROCEDURE — 97112 NEUROMUSCULAR REEDUCATION: CPT

## 2018-01-03 PROCEDURE — G8991 OTHER PT/OT GOAL STATUS: HCPCS

## 2018-01-03 PROCEDURE — G8990 OTHER PT/OT CURRENT STATUS: HCPCS

## 2018-01-05 ENCOUNTER — APPOINTMENT (OUTPATIENT)
Dept: OCCUPATIONAL THERAPY | Facility: CLINIC | Age: 73
End: 2018-01-05
Payer: MEDICARE

## 2018-01-05 ENCOUNTER — APPOINTMENT (OUTPATIENT)
Dept: PHYSICAL THERAPY | Facility: CLINIC | Age: 73
End: 2018-01-05
Payer: MEDICARE

## 2018-01-05 PROCEDURE — 97530 THERAPEUTIC ACTIVITIES: CPT

## 2018-01-05 PROCEDURE — 97116 GAIT TRAINING THERAPY: CPT

## 2018-01-05 PROCEDURE — 97535 SELF CARE MNGMENT TRAINING: CPT

## 2018-01-05 PROCEDURE — 97112 NEUROMUSCULAR REEDUCATION: CPT

## 2018-01-08 ENCOUNTER — APPOINTMENT (OUTPATIENT)
Dept: PHYSICAL THERAPY | Facility: CLINIC | Age: 73
End: 2018-01-08
Payer: MEDICARE

## 2018-01-08 ENCOUNTER — APPOINTMENT (OUTPATIENT)
Dept: OCCUPATIONAL THERAPY | Facility: CLINIC | Age: 73
End: 2018-01-08
Payer: MEDICARE

## 2018-01-08 PROCEDURE — 97110 THERAPEUTIC EXERCISES: CPT

## 2018-01-08 PROCEDURE — 97112 NEUROMUSCULAR REEDUCATION: CPT

## 2018-01-08 PROCEDURE — 97535 SELF CARE MNGMENT TRAINING: CPT

## 2018-01-11 ENCOUNTER — APPOINTMENT (OUTPATIENT)
Dept: OCCUPATIONAL THERAPY | Facility: CLINIC | Age: 73
End: 2018-01-11
Payer: MEDICARE

## 2018-01-11 ENCOUNTER — APPOINTMENT (OUTPATIENT)
Dept: PHYSICAL THERAPY | Facility: CLINIC | Age: 73
End: 2018-01-11
Payer: MEDICARE

## 2018-01-11 PROCEDURE — 97112 NEUROMUSCULAR REEDUCATION: CPT

## 2018-01-11 PROCEDURE — 97110 THERAPEUTIC EXERCISES: CPT

## 2018-01-11 PROCEDURE — 97535 SELF CARE MNGMENT TRAINING: CPT

## 2018-01-11 NOTE — MISCELLANEOUS
Assessment    1  Cerebrovascular accident (CVA) due to bilateral occlusion of carotid arteries (433 31)   (Q55 877)   2  DMII (diabetes mellitus, type 2) (250 00) (E11 9)   3  Benign essential hypertension (401 1) (I10)   4  Gout (274 9) (M10 9)   5  Asthma (493 90) (J45 909)    Plan  Gout    · Colchicine 0 6 MG Oral Tablet; TAKE 1 TABLET Every twelve hours PRN   Rx By: Aishwarya Cardozo; Dispense: 15 Days ; #:30 Tablet; Refill: 1; For: Gout; SNOW = N; Verified Transmission to [address]; Last Updated By: SystemEasy Vino; 9/15/2017 10:56:05 AM    Discussion/Summary  Discussion Summary:   In summary then this is a 42-year-old male here for transition of care visit  He suffered a right middle cerebral artery CVA in late July  Converted to hemorrhagic stroke in early August  He was also noted to have a complete occlusion of his right internal carotid as well as a 50-69% stenosis of his left  He's been maintained on atorvastatin 80 as well as low-dose aspirin and Plavix since that time  He continues on metformin for his diabetes and his fasting blood sugars have been below 120  He does have an acute flare of gout presently we'll start him on colchicine when necessary  He is advised as to the risk of myopathy in combination with atorvastatin  His blood pressure is borderline high from a systolic standpoint  His lisinopril was discontinued in the rehabilitation facility based on hypotension according to the patient and his wife  I don't see evidence of this on the transfer summary but we will continue to observe through [facility] and we'll have him return in 4-6 weeks for reevaluation with consideration of instituting a small dose of lisinopril again  His creatinine/renal function is normal  We'll perform fasting blood work at his follow-up visit  He's encouraged to continue with his home rehabilitation exercises  Based on his initial description appears he's made dramatic improvement in his left hemiparesis   He did become tearful briefly twice during the visit  He denies being depressed currently  He does not want to be treated with an SSRI  He has a bright outlook and feels that he will be fully recovered by the spring  We encouraged him to continue with this attitude  Chief Complaint  Chief Complaint Free Text Note Form: C/o R foot pain due to gout  Colchicine for gout      History of Present Illness  TCM Communication [facility]: The patient is being contacted for follow-up after hospitalization  Hospital records were reviewed  He was hospitalized at [facility]  The date of admission: 8/23/17, date of discharge: 9/8/17  Diagnosis: Right MCA Stroke with left hemiparesis and dysarthria  Complete occlusion of the right internal carotid artery and a 50-69% occlusion of the left internal carotid artery  He was discharged to home  He scheduled a follow up appointment  Counseling was provided to patient's family  Topics counseled included diagnostic results, instructions for management, prognosis, patient and family education, activities of daily living and importance of compliance with treatment  Communication performed and completed by Ethyl Massed   HPI: The patient is 72-year-old male with a history of asthma diabetes and hypertension who was admitted to [facility] the end of July with an acute right middle cerebral artery occlusion with resultant left hemiparesis as well as dysarthria  He was found to have a complete right internal carotid artery occlusion as well as a 50-69% stenosis on the left  He was also noted to have uncontrolled blood pressure  His condition was stabilized he was transferred to acute rehabilitation where he developed deterioration shortly after arrival  He was transferred back to [place] [address] and was found that his ischemic CVA was now hemorrhagic  Anticoagulation was temporarily discontinued   His condition stabilized he was transferred back to acute rehabilitation where he remained until the end of August  He was transferred to [facility] for inpatient rehabilitation where he did well and he was discharged to home on September 9  His condition has significantly improved  His dysarthria has essentially resolved though his wife states when he gets very tired he slurs little  His left hemiparesis is much improved  Left lower extremity essentially back to normal  He does have some left upper extremity weakness and ataxia  He cannot  well with his left hand yet but he is much improved  He does have a hemiwalker which he's been using successfully  He did have a gouty flare in the rehabilitation facility which was treated with colchicine effectively  Since arrival home is noted that he does have pain in his right great toe again  There's been some redness and swelling which has inhibited his ambulation somewhat  Any cardiovascular pulmonary complaint  His blood sugars have been well controlled at less than 120 fasting  He has lost nearly 30 pounds since his admission  He's watching his diet at home  Current medications include the addition of a atorvastatin 80, aspirin 162 daily, Plavix 75  He remains on metformin 1000 twice a day as well as Proventil/albuterol for his asthma  Active Problems    1  Acquired hallux malleus of right foot (735 3) (M20 31)   2  Allergic rhinitis (477 9) (J30 9)   3  Arthritis (716 90) (M19 90)   4  Asthma (493 90) (J45 909)   5  Benign essential hypertension (401 1) (I10)   6  Chronic renal disease, stage I (585 1) (N18 1)   7  Diabetic nephropathy associated with type 2 diabetes mellitus (250 40,583 81) (E11 21)   8  DMII (diabetes mellitus, type 2) (250 00) (E11 9)   9  Gout (274 9) (M10 9)   10  Hyperlipidemia (272 4) (E78 5)   11  Impingement syndrome of right shoulder (726 2) (M75 41)   12  Non-rheumatic aortic sclerosis (440 0) (I70 0)   13  Obstructive sleep apnea (327 23) (G47 33)   14  Popliteal cyst, left (727 51) (M71 22)   15   Pre-ulcerative corn or callous (700) (L84)   16  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)   17  Shoulder pain, right (719 41) (M24 511)    Surgical History    1  History of Shoulder Arthroscopy With Rotator Cuff Repair    Family History  Mother    1  No pertinent family history    Social History    · Denied: Alcohol Use (History)   · Denied: Daily Coffee Consumption (___ Cups/Day)   · Never A Smoker    Current Meds   1  Albuterol Sulfate (2 5 MG/3ML) 0 083% Inhalation Nebulization Solution; Therapy: 33OVQ6125 to Recorded   2  Aspirin 81 MG TABS; take 2 tablet daily; Therapy: (Recorded:15Sep2017) to Recorded   3  Fluticasone Propionate 50 MCG/ACT Nasal Suspension; USE 2 SPRAYS IN EACH   NOSTRIL ONCE DAILY; Therapy: (Recorded:15Sep2017) to Recorded   4  MetFORMIN HCl - 1000 MG Oral Tablet; take 1 tablet twice a day; Therapy: 63RFH8037 to (Evaluate:16Obg5382)  Requested for: 52ICE5577; Last   Rx:22May2017 Ordered   5  OneTouch Delica Lancets MISC; TEST BLOOD SUGAR TWICE DAILY; Therapy: 17PFN2481 to (Evaluate:71Zia5308)  Requested for: 21WYB6669; Last   Rx:18Mar2013 Ordered   6  OneTouch Ultra Blue In Vitro Strip; TEST ONCE DAILY; Therapy: 56EUO0906 to (Evaluate:10Jun2017)  Requested for: 22Nov2016; Last   Rx:22Nov2016 Ordered   7  Proventil  (90 Base) MCG/ACT Inhalation Aerosol Solution; INHALE 2 PUFFS   EVERY 4-6 HOURS AS NEEDED; Therapy: 91MOE7174 to (Last Rx:18Mar2013) Ordered    Allergies    1  Penicillins    Vitals  Signs   Recorded: 80VPW2189 96:93KW   Systolic: 628  Diastolic: 82  Height: 5 ft 11 in  Weight: 226 lb   BMI Calculated: 31 52  BSA Calculated: 2 22    Physical Exam    Constitutional   General appearance: Abnormal   Obese and in no acute distress  Eyes   Conjunctiva and lids: No swelling, erythema, or discharge  Ears, Nose, Mouth, and Throat   Otoscopic examination: Tympanic membrance translucent with normal light reflex  Canals patent without erythema      Oropharynx: Normal with no erythema, edema, exudate or lesions  Pulmonary   Respiratory effort: No increased work of breathing or signs of respiratory distress  Auscultation of lungs: Clear to auscultation, equal breath sounds bilaterally, no wheezes, no rales, no rhonci  Lungs are clear to auscultation  Cardiovascular   Auscultation of heart: Normal rate and rhythm, normal S1 and S2, without murmurs  Heart rhythm is regular with a rate in the 70s  Examination of extremities for edema and/or varicosities: Normal     Carotid pulses: Abnormal   He has diminished carotid upstroke bilaterally  Musculoskeletal   Gait and station: Abnormal   He has a hemiparetic gait and he is using a hemiwalker with right upper extremity successfully  He can rise out of chair without assist from other  Digits and nails: Normal without clubbing or cyanosis  Inspection/palpation of joints, bones, and muscles: Abnormal   Right first MTP joint is red swollen and tender  Skin   Skin and subcutaneous tissue: Abnormal   See above  Neurologic   Cranial nerves: Cranial nerves 2-12 intact  Cranial nerve examination appears normal  Sensation and motor function in the face are normal  I do not detect any aphasia presently  Psychiatric   Orientation to person, place and time: Normal     Mood and affect: Abnormal   Becomes tearful at times but regains composure quickly  Future Appointments    Date/Time Provider Specialty Site   11/03/2017 01:00 PM KATIE Anaya   Family Medicine [facility]   10/26/2017 02:10 PM Vinayak Tellez MD Neurology [address]     Signatures   Electronically signed by : KATIE Barragan ; Sep 15 2017  1:02PM EST                       (Author)

## 2018-01-12 VITALS
WEIGHT: 227.13 LBS | DIASTOLIC BLOOD PRESSURE: 70 MMHG | SYSTOLIC BLOOD PRESSURE: 142 MMHG | BODY MASS INDEX: 31.8 KG/M2 | HEART RATE: 96 BPM | HEIGHT: 71 IN

## 2018-01-12 NOTE — MISCELLANEOUS
History of Present Illness  TCM Communication [facility]: The patient is being contacted for follow-up after hospitalization  Hospital records were reviewed  He was hospitalized at [facility]  The date of admission: 8/1/17, date of discharge: 8/23/17  Diagnosis: Right MCA CVA with hemorrhagic conversion  He was discharged to a rehabilitation center, SNF at [facility]  Medications were not reviewed today  He did not schedule a follow up appointment  Communication performed and completed by Suki Turk      Active Problems    1  Acquired hallux malleus of right foot (735 3) (M20 31)   2  Allergic rhinitis (477 9) (J30 9)   3  Arthritis (716 90) (M19 90)   4  Asthma (493 90) (J45 909)   5  Benign essential hypertension (401 1) (I10)   6  Chronic renal disease, stage I (585 1) (N18 1)   7  Diabetic nephropathy associated with type 2 diabetes mellitus (250 40,583 81) (E11 21)   8  DMII (diabetes mellitus, type 2) (250 00) (E11 9)   9  Gout (274 9) (M10 9)   10  Hyperlipidemia (272 4) (E78 5)   11  Impingement syndrome of right shoulder (726 2) (M75 41)   12  Non-rheumatic aortic sclerosis (440 0) (I70 0)   13  Obstructive sleep apnea (327 23) (G47 33)   14  Popliteal cyst, left (727 51) (M71 22)   15  Pre-ulcerative corn or callous (700) (L84)   16  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)   17  Shoulder pain, right (719 41) (M25 511)    Surgical History    1  History of Shoulder Arthroscopy With Rotator Cuff Repair    Family History  Mother    1  No pertinent family history    Social History    · Denied: Alcohol Use (History)   · Denied: Daily Coffee Consumption (___ Cups/Day)   · Never A Smoker    Current Meds   1  Albuterol Sulfate (2 5 MG/3ML) 0 083% Inhalation Nebulization Solution; Therapy: 21ILN2008 to Recorded   2  Lisinopril-Hydrochlorothiazide 10-12 5 MG Oral Tablet; TAKE 1 TABLET AT BEDTIME; Therapy: 37VYH6156 to (Evaluate:56Lku9334)  Requested for: 12Jun2017; Last   Rx:12Jun2017 Ordered   3  MetFORMIN HCl - 1000 MG Oral Tablet; take 1 tablet twice a day; Therapy: 07ITY9708 to (Evaluate:72Jin1526)  Requested for: 86LWH0569; Last   Rx:19Cot4955 Ordered   4  OneTouch Delica Lancets MISC; TEST BLOOD SUGAR TWICE DAILY; Therapy: 79UON6205 to (Evaluate:26Dpk9993)  Requested for: 58FUG6925; Last   Rx:18Mar2013 Ordered   5  OneTouch Ultra Blue In Vitro Strip; TEST ONCE DAILY; Therapy: 69OQP2360 to (Evaluate:10Jun2017)  Requested for: 22Nov2016; Last   Rx:22Nov2016 Ordered   6  Proventil  (90 Base) MCG/ACT Inhalation Aerosol Solution; INHALE 2 PUFFS   EVERY 4-6 HOURS AS NEEDED; Therapy: 45RJL6477 to (Last Rx:18Mar2013) Ordered    Allergies    1  Penicillins    Message   Recorded as Task   Date: 08/23/2017 01:54 PM, Created By: System   Task Name: Hospital [facility]   Assigned To: Gaurav Flores   Regarding Patient: Duy Bell, Status: Active   Comment:    System - 23 Aug 2017 1:54 PM     Patient discharged from hospital   Patient Name: Anna Troncoso  Patient YOB: 1945  Discharge Date: 8/23/2017  [facility]     Future Appointments    Date/Time Provider Specialty Site   09/25/2017 10:40 AM KATIE Guardado   Orthopedic Surgery [facility]   10/26/2017 02:10 PM Lefty Torres MD Neurology [address]     Signatures   Electronically signed by : KATIE Ch ; Aug 25 2017  3:30PM EST                       (Author)

## 2018-01-12 NOTE — RESULT NOTES
Verified Results  (1) CBC/PLT/DIFF 04Oct2016 04:00PM Xochilt Brandee Order Number: [id number]     Test Name Result Flag Reference   WBC COUNT 7 75 Thousand/uL  4 31-10 16   RBC COUNT 5 07 Million/uL  3 88-5 62   HEMOGLOBIN 15 1 g/dL  12 0-17 0   HEMATOCRIT 44 6 %  36 5-49 3   MCV 88 fL  82-98   MCH 29 8 pg  26 8-34 3   MCHC 33 9 g/dL  31 4-37 4   RDW 13 6 %  11 6-15 1   MPV 11 2 fL  8 9-12 7   PLATELET COUNT 826 Thousands/uL  149-390   NEUTROPHILS RELATIVE PERCENT 57 %  43-75   LYMPHOCYTES RELATIVE PERCENT 32 %  14-44   MONOCYTES RELATIVE PERCENT 9 %  4-12   EOSINOPHILS RELATIVE PERCENT 2 %  0-6   BASOPHILS RELATIVE PERCENT 0 %  0-1   NEUTROPHILS ABSOLUTE COUNT 4 40 Thousands/?L  1 85-7 62   LYMPHOCYTES ABSOLUTE COUNT 2 50 Thousands/?L  0 60-4 47   MONOCYTES ABSOLUTE COUNT 0 66 Thousand/?L  0 17-1 22   EOSINOPHILS ABSOLUTE COUNT 0 16 Thousand/?L  0 00-0 61   BASOPHILS ABSOLUTE COUNT 0 02 Thousands/?L  0 00-0 10   - Patient Instructions: This bloodwork is non-fasting  Please drink two glasses of water morning of bloodwork  - Patient Instructions: This bloodwork is non-fasting  Please drink two glasses of water morning of bloodwork  (1) COMPREHENSIVE METABOLIC PANEL 52OJY0382 99:11EO Xochilt Brandee Order Number: [id number]     Test Name Result Flag Reference   GLUCOSE,RANDM 149 mg/dL H    If the patient is fasting, the ADA then defines impaired fasting glucose as > 100 mg/dL and diabetes as > or equal to 123 mg/dL     SODIUM 137 mmol/L  136-145   POTASSIUM 4 3 mmol/L  3 5-5 3   CHLORIDE 102 mmol/L  100-108   CARBON DIOXIDE 29 mmol/L  21-32   ANION GAP (CALC) 6 mmol/L  4-13   BLOOD UREA NITROGEN 25 mg/dL  5-25   CREATININE 1 42 mg/dL H 0 60-1 30   Standardized to IDMS reference method   CALCIUM 9 4 mg/dL  8 3-10 1   BILI, TOTAL 0 60 mg/dL  0 20-1 00   ALK PHOSPHATAS 58 U/L     ALT (SGPT) 24 U/L  12-78   AST(SGOT) 11 U/L  5-45   ALBUMIN 3 9 g/dL  3 5-5 0   TOTAL PROTEIN 7 0 g/dL  6 4-8 2   eGFR Non-African American 49 1 ml/min/1 73sq m     - Patient Instructions: This is a fasting blood test  Water,black tea or black  coffee only after 9:00pm the night before test Drink 2 glasses of water the morning of test   National Kidney Disease Education Program recommendations are as follows:  GFR calculation is accurate only with a steady state creatinine  Chronic Kidney disease less than 60 ml/min/1 73 sq  meters  Kidney failure less than 15 ml/min/1 73 sq  meters  (1) LIPID PANEL, FASTING 04Oct2016 04:00PM Sarah Borja Order Number: [id number]     Test Name Result Flag Reference   CHOLESTEROL 237 mg/dL H    HDL,DIRECT 48 mg/dL  40-60   Specimen collection should occur prior to Metamizole administration due to the potential for falsely depressed results  LDL CHOLESTEROL CALCULATED 156 mg/dL H 0-100   - Patient Instructions: This is a fasting blood test  Water,black tea or black  coffee only after 9:00pm the night before test   Drink 2 glasses of water the morning of test     - Patient Instructions: This is a fasting blood test  Water,black tea or black  coffee only after 9:00pm the night before test Drink 2 glasses of water the morning of test   Triglyceride:         Normal              <150 mg/dl       Borderline High    150-199 mg/dl       High               200-499 mg/dl       Very High          >499 mg/dl  Cholesterol:         Desirable        <200 mg/dl      Borderline High  200-239 mg/dl      High             >239 mg/dl  HDL Cholesterol:        High    >59 mg/dL      Low     <41 mg/dL  LDL CALCULATED:    This screening LDL is a calculated result  It does not have the accuracy of the Direct Measured LDL in the monitoring of patients with hyperlipidemia and/or statin therapy  Direct Measure LDL (UEI908) must be ordered separately in these patients     TRIGLYCERIDES 166 mg/dL H <=150   Specimen collection should occur prior to N-Acetylcysteine or Metamizole administration due to the potential for falsely depressed results  (1) HEMOGLOBIN A1C 04Oct2016 04:00PM Fincastle Rosa Elena Order Number: [id number]     Test Name Result Flag Reference   HEMOGLOBIN A1C 7 3 % H 4 2-6 3   EST  AVG   GLUCOSE 163 mg/dl

## 2018-01-13 VITALS — DIASTOLIC BLOOD PRESSURE: 73 MMHG | HEART RATE: 63 BPM | SYSTOLIC BLOOD PRESSURE: 167 MMHG

## 2018-01-13 NOTE — RESULT NOTES
Verified Results  (1) CBC/PLT/DIFF 46EBC5434 12:00AM Nino Wilson     Test Name Result Flag Reference   WHITE BLOOD CELL COUNT 7 8 Thousand/uL  3 8-10 8   RED BLOOD CELL COUNT 5 07 Million/uL  4 20-5 80   HEMOGLOBIN 14 6 g/dL  13 2-17 1   HEMATOCRIT 45 0 %  38 5-50 0   MCV 88 8 fL  80 0-100 0   MCH 28 8 pg  27 0-33 0   MCHC 32 5 g/dL  32 0-36 0   RDW 14 3 %  11 0-15 0   PLATELET COUNT 131 Thousand/uL  140-400   ABSOLUTE NEUTROPHILS 4711 cells/uL  5510-5351   ABSOLUTE LYMPHOCYTES 2332 cells/uL  850-3900   ABSOLUTE MONOCYTES 538 cells/uL  200-950   ABSOLUTE EOSINOPHILS 203 cells/uL     ABSOLUTE BASOPHILS 16 cells/uL  0-200   NEUTROPHILS 60 4 %     LYMPHOCYTES 29 9 %     MONOCYTES 6 9 %     EOSINOPHILS 2 6 %     BASOPHILS 0 2 %     MPV 9 7 fL  7 5-12 5     (1) COMPREHENSIVE METABOLIC PANEL 53FWK6167 31:85AT Neo Gomez     Test Name Result Flag Reference   GLUCOSE 142 mg/dL H 65-99   Fasting reference interval     For someone without known diabetes, a glucose  value >125 mg/dL indicates that they may have  diabetes and this should be confirmed with a  follow-up test    UREA NITROGEN (BUN) 22 mg/dL  7-25   CREATININE 1 26 mg/dL H 0 70-1 18   For patients >52years of age, the reference limit  for Creatinine is approximately 13% higher for people  identified as -American  eGFR NON-AFR   AMERICAN 57 mL/min/1 73m2 L > OR = 60   eGFR AFRICAN AMERICAN 66 mL/min/1 73m2  > OR = 60   BUN/CREATININE RATIO 17 (calc)  6-22   SODIUM 141 mmol/L  135-146   POTASSIUM 4 3 mmol/L  3 5-5 3   CHLORIDE 104 mmol/L     CARBON DIOXIDE 27 mmol/L  20-31   CALCIUM 9 9 mg/dL  8 6-10 3   PROTEIN, TOTAL 6 5 g/dL  6 1-8 1   ALBUMIN 4 0 g/dL  3 6-5 1   GLOBULIN 2 5 g/dL (calc)  1 9-3 7   ALBUMIN/GLOBULIN RATIO 1 6 (calc)  1 0-2 5   BILIRUBIN, TOTAL 0 8 mg/dL  0 2-1 2   ALKALINE PHOSPHATASE 47 U/L     AST 14 U/L  10-35   ALT 16 U/L  9-46     (1) LIPID PANEL, FASTING 67NWI1443 12:00AM Neo Jason     Test Name Result Flag Reference   CHOLESTEROL, TOTAL 211 mg/dL H 125-200   HDL CHOLESTEROL 44 mg/dL  > OR = 40   TRIGLICERIDES 383 mg/dL H <150   LDL-CHOLESTEROL 130 mg/dL (calc) H <130   Desirable range <100 mg/dL for patients with CHD or  diabetes and <70 mg/dL for diabetic patients with  known heart disease  CHOL/HDLC RATIO 4 8 (calc)  < OR = 5 0   NON HDL CHOLESTEROL 167 mg/dL (calc) H    Target for non-HDL cholesterol is 30 mg/dL higher than   LDL cholesterol target  (Q) HEMOGLOBIN A1c 22Jun2017 12:00AM Danilo Massey     Test Name Result Flag Reference   HEMOGLOBIN A1c 7 4 % of total Hgb H <5 7   For someone without known diabetes, a hemoglobin A1c  value of 6 5% or greater indicates that they may have   diabetes and this should be confirmed with a follow-up   test      For someone with known diabetes, a value <7% indicates   that their diabetes is well controlled and a value   greater than or equal to 7% indicates suboptimal   control  A1c targets should be individualized based on   duration of diabetes, age, comorbid conditions, and   other considerations  Currently, no consensus exists regarding use of  hemoglobin A1c for diagnosis of diabetes for children

## 2018-01-13 NOTE — MISCELLANEOUS
History of Present Illness  TCM Communication [facility]: The patient is being contacted for follow-up after hospitalization  Hospital records were reviewed  He was hospitalized at [facility]  The date of admission: 7/26/17, date of discharge: 7/28/17  Diagnosis: Acute CVA  He was discharged to a rehabilitation center, [facility]  Medications were not reviewed today  He did not schedule a follow up appointment  Communication performed and completed by Marguerite Neal      Active Problems    1  Acquired hallux malleus of right foot (735 3) (M20 31)   2  Allergic rhinitis (477 9) (J30 9)   3  Arthritis (716 90) (M19 90)   4  Asthma (493 90) (J45 909)   5  Benign essential hypertension (401 1) (I10)   6  Chronic renal disease, stage I (585 1) (N18 1)   7  Diabetic nephropathy associated with type 2 diabetes mellitus (250 40,583 81) (E11 21)   8  DMII (diabetes mellitus, type 2) (250 00) (E11 9)   9  Gout (274 9) (M10 9)   10  Hyperlipidemia (272 4) (E78 5)   11  Impingement syndrome of right shoulder (726 2) (M75 41)   12  Non-rheumatic aortic sclerosis (440 0) (I70 0)   13  Obstructive sleep apnea (327 23) (G47 33)   14  Popliteal cyst, left (727 51) (M71 22)   15  Pre-ulcerative corn or callous (700) (L84)   16  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)   17  Shoulder pain, right (719 41) (M25 511)    Surgical History    1  History of Shoulder Arthroscopy With Rotator Cuff Repair    Family History  Mother    1  No pertinent family history    Social History    · Denied: Alcohol Use (History)   · Denied: Daily Coffee Consumption (___ Cups/Day)   · Never A Smoker    Current Meds   1  Albuterol Sulfate (2 5 MG/3ML) 0 083% Inhalation Nebulization Solution; Therapy: 63ZUS9121 to Recorded   2  Lisinopril-Hydrochlorothiazide 10-12 5 MG Oral Tablet; TAKE 1 TABLET AT BEDTIME; Therapy: 21QWG8564 to (Evaluate:42Tnp5450)  Requested for: 12Jun2017; Last   Rx:12Jun2017 Ordered   3   MetFORMIN HCl - 1000 MG Oral Tablet; take 1 tablet twice a day; Therapy: 12CWI6642 to (Evaluate:74Kfg1619)  Requested for: 29VNG3662; Last   Rx:22May2017 Ordered   4  OneTouch Delica Lancets MISC; TEST BLOOD SUGAR TWICE DAILY; Therapy: 29KHJ9707 to (Evaluate:58Qwu2420)  Requested for: 15KWY5675; Last   Rx:18Mar2013 Ordered   5  OneTouch Ultra Blue In Vitro Strip; TEST ONCE DAILY; Therapy: 71CZM4149 to (Evaluate:10Jun2017)  Requested for: 22Nov2016; Last   Rx:22Nov2016 Ordered   6  Proventil  (90 Base) MCG/ACT Inhalation Aerosol Solution; INHALE 2 PUFFS   EVERY 4-6 HOURS AS NEEDED; Therapy: 75YXC7650 to (Last Rx:18Mar2013) Ordered    Allergies    1  Penicillins    Future Appointments    Date/Time Provider Specialty Site   09/25/2017 10:40 AM KATIE Wagner   Orthopedic Surgery [address]     Signatures   Electronically signed by : KATIE Doyle ; Jul 28 2017  5:40PM EST                       (Author)

## 2018-01-14 VITALS
DIASTOLIC BLOOD PRESSURE: 82 MMHG | HEIGHT: 71 IN | SYSTOLIC BLOOD PRESSURE: 156 MMHG | WEIGHT: 226 LBS | BODY MASS INDEX: 31.64 KG/M2

## 2018-01-15 ENCOUNTER — APPOINTMENT (OUTPATIENT)
Dept: PHYSICAL THERAPY | Facility: CLINIC | Age: 73
End: 2018-01-15
Payer: MEDICARE

## 2018-01-15 ENCOUNTER — APPOINTMENT (OUTPATIENT)
Dept: OCCUPATIONAL THERAPY | Facility: CLINIC | Age: 73
End: 2018-01-15
Payer: MEDICARE

## 2018-01-15 VITALS
HEIGHT: 71 IN | DIASTOLIC BLOOD PRESSURE: 70 MMHG | BODY MASS INDEX: 34.3 KG/M2 | WEIGHT: 245 LBS | TEMPERATURE: 98.1 F | SYSTOLIC BLOOD PRESSURE: 130 MMHG

## 2018-01-15 PROCEDURE — 97112 NEUROMUSCULAR REEDUCATION: CPT

## 2018-01-15 PROCEDURE — 97116 GAIT TRAINING THERAPY: CPT

## 2018-01-15 PROCEDURE — 97535 SELF CARE MNGMENT TRAINING: CPT

## 2018-01-15 PROCEDURE — 97110 THERAPEUTIC EXERCISES: CPT

## 2018-01-16 ENCOUNTER — GENERIC CONVERSION - ENCOUNTER (OUTPATIENT)
Dept: OTHER | Facility: OTHER | Age: 73
End: 2018-01-16

## 2018-01-16 ENCOUNTER — ALLSCRIPTS OFFICE VISIT (OUTPATIENT)
Dept: OTHER | Facility: OTHER | Age: 73
End: 2018-01-16

## 2018-01-16 DIAGNOSIS — M79.10 MYALGIA: ICD-10-CM

## 2018-01-16 NOTE — PROCEDURES
Procedures by Sonia Daily PA-C at 7/30/2017  3:44 PM      Author:  Sonia Daily PA-C Service:  Critical Care/ICU Author Type:  Physician Assistant    Filed:  7/30/2017  3:46 PM Date of Service:  7/30/2017  3:44 PM Status:  Attested    :  Sonia Daily PA-C (Physician Assistant)  Cosigner:  Ovidio Kapadia MD at 7/30/2017  5:52 PM      Procedure Orders:       1  CENTRAL LINE [[id number]] ordered by Sonia Daily PA-C at 07/30/17 1544                 Post-procedure Diagnoses:       1  CVA (cerebral vascular accident) [I63 9]              Attestation signed by Ovidio Kapadia MD at 7/30/2017  5:52 PM      I was present and supervised procedure  Time excludes critical care time  Central Line  Insertion  Date/Time: 7/30/2017 3:44 PM  Performed by: Joseph España by: Maira Kurtz     Patient location:  Bedside  Other Assisting Provider:  Yes (comment) Annie Green    Consent:     Consent obtained:  Written    Consent given by:  Patient and spouse    Risks discussed:  Incorrect placement, arterial puncture, infection, nerve damage, pneumothorax and bleeding    Alternatives discussed:  No treatment, alternative treatment and delayed treatment  Universal protocol:     Site/side marked: yes      Immediately prior to procedure, a time out was called: yes      Patient identity confirmed:  Arm band  Pre-procedure details:     Hand hygiene: Hand hygiene performed prior to insertion      Sterile barrier technique: All elements of maximal sterile technique followed      Skin preparation:  ChloraPrep    Skin preparation agent: Skin preparation agent completely dried prior to procedure    Indications:     Central line indications: treatment therapy    Anesthesia (see MAR for exact dosages):      Anesthesia method:  Local infiltration    Local anesthetic:  Lidocaine 1% w/o epi  Procedure details:     Location:  Left internal jugular    Vessel type: vein Laterality:  Left    Approach: percutaneous technique used      Patient position:  Trendelenburg    Catheter type:  Triple lumen 20cm    Catheter size:  7 Fr    Landmarks identified: yes      Ultrasound guidance: yes      Sterile ultrasound techniques: Sterile gel and sterile probe covers were used      Number of attempts:  2    Successful placement: yes      Vessel of catheter tip end:  Svc  Post-procedure details:     Post-procedure:  Line sutured and dressing applied    Assessment:  Blood return through all ports, no pneumothorax on x-ray, placement verified by x-ray and free fluid flow    Patient tolerance of procedure:   Tolerated well, no immediate complications    Observer: Yes      Observer name:  Lino Bernal                      Received for:Provider  [facility]   Jul 30 2017  5:53PM [facility] Standard Time

## 2018-01-17 ENCOUNTER — LAB CONVERSION - ENCOUNTER (OUTPATIENT)
Dept: OTHER | Facility: OTHER | Age: 73
End: 2018-01-17

## 2018-01-17 ENCOUNTER — GENERIC CONVERSION - ENCOUNTER (OUTPATIENT)
Dept: OTHER | Facility: OTHER | Age: 73
End: 2018-01-17

## 2018-01-17 LAB
CK SERPL-CCNC: 63 U/L (ref 44–196)
TSH SERPL DL<=0.05 MIU/L-ACNC: 1.94 MIU/L (ref 0.4–4.5)

## 2018-01-18 ENCOUNTER — APPOINTMENT (OUTPATIENT)
Dept: OCCUPATIONAL THERAPY | Facility: CLINIC | Age: 73
End: 2018-01-18
Payer: MEDICARE

## 2018-01-18 ENCOUNTER — APPOINTMENT (OUTPATIENT)
Dept: PHYSICAL THERAPY | Facility: CLINIC | Age: 73
End: 2018-01-18
Payer: MEDICARE

## 2018-01-18 DIAGNOSIS — I63.233: ICD-10-CM

## 2018-01-18 PROCEDURE — 97530 THERAPEUTIC ACTIVITIES: CPT

## 2018-01-18 PROCEDURE — 97112 NEUROMUSCULAR REEDUCATION: CPT

## 2018-01-18 PROCEDURE — 97110 THERAPEUTIC EXERCISES: CPT

## 2018-01-22 ENCOUNTER — APPOINTMENT (OUTPATIENT)
Dept: PHYSICAL THERAPY | Facility: CLINIC | Age: 73
End: 2018-01-22
Payer: MEDICARE

## 2018-01-22 ENCOUNTER — APPOINTMENT (OUTPATIENT)
Dept: OCCUPATIONAL THERAPY | Facility: CLINIC | Age: 73
End: 2018-01-22
Payer: MEDICARE

## 2018-01-22 VITALS
WEIGHT: 224 LBS | HEIGHT: 71 IN | SYSTOLIC BLOOD PRESSURE: 124 MMHG | BODY MASS INDEX: 31.36 KG/M2 | DIASTOLIC BLOOD PRESSURE: 78 MMHG

## 2018-01-22 PROCEDURE — 97112 NEUROMUSCULAR REEDUCATION: CPT

## 2018-01-22 PROCEDURE — 97110 THERAPEUTIC EXERCISES: CPT

## 2018-01-22 PROCEDURE — 97535 SELF CARE MNGMENT TRAINING: CPT

## 2018-01-22 PROCEDURE — 97116 GAIT TRAINING THERAPY: CPT

## 2018-01-23 NOTE — RESULT NOTES
Verified Results  (1) CBC/PLT/DIFF [id number] 12:00AM Nino Wilson     Test Name Result Flag Reference   WHITE BLOOD CELL COUNT 9 2 Thousand/uL  3 8-10 8   RED BLOOD CELL COUNT 5 35 Million/uL  4 20-5 80   HEMOGLOBIN 15 0 g/dL  13 2-17 1   HEMATOCRIT 45 1 %  38 5-50 0   MCV 84 3 fL  80 0-100 0   MCH 28 0 pg  27 0-33 0   MCHC 33 3 g/dL  32 0-36 0   RDW 13 3 %  11 0-15 0   PLATELET COUNT 670 Thousand/uL  140-400   ABSOLUTE NEUTROPHILS 5584 cells/uL  3430-4920   ABSOLUTE LYMPHOCYTES 2650 cells/uL  850-3900   ABSOLUTE MONOCYTES 782 cells/uL  200-950   ABSOLUTE EOSINOPHILS 147 cells/uL     ABSOLUTE BASOPHILS 37 cells/uL  0-200   NEUTROPHILS 60 7 %     LYMPHOCYTES 28 8 %     MONOCYTES 8 5 %     EOSINOPHILS 1 6 %     BASOPHILS 0 4 %     MPV 11 3 fL  7 5-12 5     (1) COMPREHENSIVE METABOLIC PANEL [id number] 01:54VP Nino Wilson     Test Name Result Flag Reference   GLUCOSE 106 mg/dL H 65-99   Fasting reference interval     For someone without known diabetes, a glucose value  between 100 and 125 mg/dL is consistent with  prediabetes and should be confirmed with a  follow-up test    UREA NITROGEN (BUN) 18 mg/dL  7-25   CREATININE 1 08 mg/dL  0 70-1 18   For patients >52years of age, the reference limit  for Creatinine is approximately 13% higher for people  identified as -American  eGFR NON-AFR   AMERICAN 68 mL/min/1 73m2  > OR = 60   eGFR AFRICAN AMERICAN 79 mL/min/1 73m2  > OR = 60   BUN/CREATININE RATIO   3-81   NOT APPLICABLE (calc)   SODIUM 142 mmol/L  135-146   POTASSIUM 4 6 mmol/L  3 5-5 3   CHLORIDE 105 mmol/L     CARBON DIOXIDE 26 mmol/L  20-31   CALCIUM 9 8 mg/dL  8 6-10 3   PROTEIN, TOTAL 6 6 g/dL  6 1-8 1   ALBUMIN 3 9 g/dL  3 6-5 1   GLOBULIN 2 7 g/dL (calc)  1 9-3 7   ALBUMIN/GLOBULIN RATIO 1 4 (calc)  1 0-2 5   BILIRUBIN, TOTAL 0 7 mg/dL  0 2-1 2   ALKALINE PHOSPHATASE 74 U/L     AST 14 U/L  10-35   ALT 15 U/L  9-46     (1) LIPID PANEL, FASTING [id number] 12:00AM Ariel Wasserman Test Name Result Flag Reference   CHOLESTEROL, TOTAL 116 mg/dL  <200   HDL CHOLESTEROL 39 mg/dL L >98   TRIGLICERIDES 108 mg/dL  <150   LDL-CHOLESTEROL 54 mg/dL (calc)     Reference range: <100     Desirable range <100 mg/dL for patients with CHD or  diabetes and <70 mg/dL for diabetic patients with  known heart disease  LDL-C is now calculated using the [place]   calculation, which is a validated novel method providing   better accuracy than the Friedewald equation in the   estimation of LDL-C  Rosemarie Gupta  Carlanhi Rothman  [id number]): [id number]   (http://.[employer]/faq/ITO307)   CHOL/HDLC RATIO 3 0 (calc)  <5 0   NON HDL CHOLESTEROL 77 mg/dL (calc)  <130   For patients with diabetes plus 1 major ASCVD risk   factor, treating to a non-HDL-C goal of <100 mg/dL   (LDL-C of <70 mg/dL) is considered a therapeutic   option      (1) URIC ACID [id number] 12:00AM Vara Sofia     Test Name Result Flag Reference   URIC ACID 7 3 mg/dL  4 0-8 0   Therapeutic target for gout patients: <6 0 mg/dL     (Q) HEMOGLOBIN A1c [id number] 12:00AM Nino Wilson     Test Name Result Flag Reference   HEMOGLOBIN A1c 6 2 % of total Hgb H <5 7   For someone without known diabetes, a hemoglobin   A1c value between 5 7% and 6 4% is consistent with  prediabetes and should be confirmed with a   follow-up test      For someone with known diabetes, a value <7%  indicates that their diabetes is well controlled  A1c  targets should be individualized based on duration of  diabetes, age, comorbid conditions, and other  considerations  This assay result is consistent with an increased risk  of diabetes  Currently, no consensus exists regarding use of  hemoglobin A1c for diagnosis of diabetes for children

## 2018-01-23 NOTE — RESULT NOTES
Verified Results  (1) CK (CPK) [id number] 12:00AM [employer]     Test Name Result Flag Reference   CREATINE KINASE, TOTAL 63 U/L       (Q) TSH, 3RD GENERATION W/REFLEX TO FT4 [id number] 12:00AM [employer]     Test Name Result Flag Reference   TSH W/REFLEX TO FT4 1 94 mIU/L  0 40-4 50

## 2018-01-24 VITALS
HEIGHT: 71 IN | WEIGHT: 222 LBS | BODY MASS INDEX: 31.08 KG/M2 | SYSTOLIC BLOOD PRESSURE: 136 MMHG | DIASTOLIC BLOOD PRESSURE: 76 MMHG

## 2018-01-25 ENCOUNTER — HOSPITAL ENCOUNTER (OUTPATIENT)
Dept: NON INVASIVE DIAGNOSTICS | Facility: CLINIC | Age: 73
Discharge: HOME | End: 2018-01-25
Payer: MEDICARE

## 2018-01-25 DIAGNOSIS — I63.233: ICD-10-CM

## 2018-01-25 PROCEDURE — 93880 EXTRACRANIAL BILAT STUDY: CPT | Performed by: SURGERY

## 2018-01-25 PROCEDURE — 93880 EXTRACRANIAL BILAT STUDY: CPT

## 2018-01-26 ENCOUNTER — OFFICE VISIT (OUTPATIENT)
Dept: OCCUPATIONAL THERAPY | Facility: CLINIC | Age: 73
End: 2018-01-26
Payer: MEDICARE

## 2018-01-26 ENCOUNTER — EVALUATION (OUTPATIENT)
Dept: PHYSICAL THERAPY | Facility: CLINIC | Age: 73
End: 2018-01-26
Payer: MEDICARE

## 2018-01-26 DIAGNOSIS — I63.9 CEREBROVASCULAR ACCIDENT (CVA), UNSPECIFIED MECHANISM (HCC): Primary | ICD-10-CM

## 2018-01-26 DIAGNOSIS — I63.233: Primary | ICD-10-CM

## 2018-01-26 PROCEDURE — 97164 PT RE-EVAL EST PLAN CARE: CPT

## 2018-01-26 PROCEDURE — G8979 MOBILITY GOAL STATUS: HCPCS

## 2018-01-26 PROCEDURE — 97112 NEUROMUSCULAR REEDUCATION: CPT

## 2018-01-26 PROCEDURE — 97110 THERAPEUTIC EXERCISES: CPT

## 2018-01-26 PROCEDURE — G8980 MOBILITY D/C STATUS: HCPCS

## 2018-01-26 PROCEDURE — 97535 SELF CARE MNGMENT TRAINING: CPT

## 2018-01-26 NOTE — PROGRESS NOTES
PT Discharge    Today's date: 2018  Patient name: Erich Burton  : 1945  MRN: [id number]  Referring provider: Judson Black MD  Dx:   Encounter Diagnosis   Name Primary?  Cerebrovascular accident (CV[facility]), unspecified [facility] ([facility] ) Yes                Assessment  Impairments: abnormal gait, impaired physical strength and lacks appropriate home exercise program    Assessment details: Compared to initial evaluation patient demonstrates signficant improvements in strength per MMT and the 5x STS, improved balance per the TUG which indicated he is at no increased risk for falls with no AD, and improved gait speed and endurance per the 6 MWT  Patient demonstrates minimal functional progress since last re-evaluation and appears to have a plateau in progress  Discussed discharge from PT to updated HEP with patient and his wife, they were in agreement at this time  Understanding of Dx/Px/POC: good   Prognosis: good    Plan  Plan details: Discharge from PT to updated HEP  Subjective Evaluation    History of Present Illness  Mechanism of injury: Patient reports that PT has been going well, reports that he is doing much better with balance and has been walking without a SPC  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  Patient reports pain in the left knee from performing step downs last session     Quality of life: good    Pain  Current pain ratin  At best pain ratin  At worst pain ratin  Location: Left knee  Quality: discomfort and dull ache  Progression: improved    Treatments  Previous treatment: occupational therapy and physical therapy  Current treatment: physical therapy and occupational therapy        Objective     Strength/Myotome Testing     Left Hip   Planes of Motion   Flexion: 4+  Extension: 5  Abduction: 4  External rotation: 5    Right Hip   Planes of Motion   Flexion: 5  Extension: 4-  Abduction: 5  External rotation: 5    Left Knee   Flexion: 4+  Extension: 4    Right Knee   Flexion: 5  Extension: 5    Left Ankle/Foot   Dorsiflexion: 5  Plantar flexion: 3+    Right Ankle/Foot   Dorsiflexion: 5  Plantar flexion: 5    Additional Strength Details  5x STS: 11 50 sec with no UE     Ambulation     Quality of Movement During Gait   Trunk    Trunk (Right): Positive lateral lean over stance limb       Additional Quality of Movement During Gait Details  Decreased left arm swing    Neuro Exam :     Functional Outcome Measures   Gait speed (feet per second):  4  6 minute walk:  1198  TU 9      Precautions: Fall risk, CVA    Daily Treatment Diary     Manual                                                                                   Exercise Diary  18            STS feet staggered 15x2            Lunges 15x2            LAQ 3# L LE 20x            L TKE BTB  20x3"            TM 3 5% 1 6 mph 10 min                                                                                                                                                                                                                   Modalities                                                               Flowsheet Rows    Flowsheet Row Most Recent Value   PT G-Codes   Current Score  53 2   Projected Score  53 2   FOTO information reviewed  Yes   Assessment Type  Discharge   G code set  Mobility: Walking & Moving Around   Mobility: Walking and Moving Around Goal Status ()  CK   Mobility: Walking and Moving Around Discharge Status ()  CK

## 2018-01-26 NOTE — PROGRESS NOTES
Daily Note     Today's date: 2018  Patient name: Reinaldo Deleon  : 1945  MRN: [id number]  Referring provider: Lee Ann Fournier MD  Dx:   Encounter Diagnosis   Name Primary?  Cerebral infarction due to unspecified occlusion or stenosis of bilateral carotid arteries (HCC) Yes                  Subjective: "I can't hold the fork to cut up my meat "      Objective: See treatment diary below      Assessment: Tolerated treatment well  In stance, pt completed L hand-to-target task placing blocks on cones and pegs on blocks with focus on FF of shoulder, extension of elbow, and sustained grasp of hand  Pt frequently knocked cones over due to compensating shoulder flexion with trunk flexion  Pt utilized 20lb hand gripper to remove blocks, downgraded to 10lbs due to hand fatigue, frequent droppage noted during sustained grasp  Bilateral 4lb ball toss/catch using rebounder, improvement noted with mass practice  Plan: Continued skilled OT per POC with focus on strengthening of L hand and improve LUE AROM with dynamic shoulder stabilization and proximal strengthening           INTERVENTION COMMENTS:  Diagnosis: Cerebral infarction due to unspecified 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

## 2018-01-29 ENCOUNTER — APPOINTMENT (OUTPATIENT)
Dept: PHYSICAL THERAPY | Facility: CLINIC | Age: 73
End: 2018-01-29
Payer: MEDICARE

## 2018-01-29 ENCOUNTER — OFFICE VISIT (OUTPATIENT)
Dept: OCCUPATIONAL THERAPY | Facility: CLINIC | Age: 73
End: 2018-01-29
Payer: MEDICARE

## 2018-01-29 DIAGNOSIS — I63.9 CEREBROVASCULAR ACCIDENT (CVA), UNSPECIFIED MECHANISM (HCC): Primary | ICD-10-CM

## 2018-01-29 PROCEDURE — 97112 NEUROMUSCULAR REEDUCATION: CPT

## 2018-01-29 PROCEDURE — 97110 THERAPEUTIC EXERCISES: CPT

## 2018-01-29 NOTE — PROGRESS NOTES
Daily Note     Today's date: 2018  Patient name: Lenin Morales  : 1945  MRN: [id number]  Referring provider: Carolina Marvin MD  Dx:   Encounter Diagnosis   Name Primary?  Cerebrovascular accident (CVA), unspecified mechanism [facility] Yes                  Subjective: "I was raking over the weekend "      Objective: See treatment diary below      Assessment: Tolerated treatment fair  Patient may be nearing discharge from skilled OT  Re-eval scheduled for next week  Completed 5 minutes forward and 5 minutes backward on UEB for endurance training and sustained gross grasp on handle  IASTM to upper traps due to increased stiffness in neck limiting rom when attending to left side  Educated and instructed patient on cervical rom stretches for increasing patient ability to scanning and attend to left side functional tasks  Quadruped "ladder" climbing on mat reaching for bean bag and passing behind in stance for external rotation and attention to left side  Plan: Continued skilled OT per POC with focus on left sided attention, [facility]  INTERVENTION COMMENTS:  Diagnosis: Cerebrovascular accident (CVA), unspecified mechanism [facility] [I63 9]  Precautions:  FOTO:  Insurance  9 of 10  visits, PN due and scheduled on

## 2018-01-31 ENCOUNTER — TELEPHONE (OUTPATIENT)
Dept: NEUROLOGY | Facility: CLINIC | Age: 73
End: 2018-01-31

## 2018-02-01 ENCOUNTER — OFFICE VISIT (OUTPATIENT)
Dept: OCCUPATIONAL THERAPY | Facility: CLINIC | Age: 73
End: 2018-02-01
Payer: MEDICARE

## 2018-02-01 ENCOUNTER — APPOINTMENT (OUTPATIENT)
Dept: PHYSICAL THERAPY | Facility: CLINIC | Age: 73
End: 2018-02-01
Payer: MEDICARE

## 2018-02-01 DIAGNOSIS — I63.9 CEREBROVASCULAR ACCIDENT (CVA), UNSPECIFIED MECHANISM (HCC): Primary | ICD-10-CM

## 2018-02-01 PROCEDURE — 97112 NEUROMUSCULAR REEDUCATION: CPT

## 2018-02-01 PROCEDURE — 97110 THERAPEUTIC EXERCISES: CPT

## 2018-02-01 NOTE — PROGRESS NOTES
Daily Note     Today's date: 2018  Patient name: Reinaldo Deleon  : 1945  MRN: [id number]  Referring provider: Lee Ann Fournier MD  Dx:   Encounter Diagnosis   Name Primary?  Cerebrovascular accident (CVA), unspecified mechanism ([facility] ) Yes                  Subjective: "i've been doing those stretches "        Objective: See treatment diary below      Assessment: Tolerated treatment well  Patient would benefit from transition to HEP  UEB for 5 minutes forward and 5 minutes backward at 1 8 resistance  IASTM to upper traps for tone reduction  5 reps cervical stretches to the left for functional rom and scanning  Prone task of tic-tack-toe for proximal strengthening and accuracy of grasp and release with LUE  Dart throwing with focus on accuracy of LUE and release; moderate difficulty noted and required massed practice  [address] task with focus on manipulation of screwdriver and noted with moderate difficulty and poor supination  Plan: Continued skilled OT per POC with focus on transition to HEP       INTERVENTION COMMENTS:  Diagnosis: Cerebrovascular accident (CVA), unspecified mechanism ([facility] ) [I63 9]  Precautions: L Eh  FOTO:  Insurance: Medicare  10 of 10 visits, PN due

## 2018-02-05 ENCOUNTER — EVALUATION (OUTPATIENT)
Dept: OCCUPATIONAL THERAPY | Facility: CLINIC | Age: 73
End: 2018-02-05
Payer: MEDICARE

## 2018-02-05 ENCOUNTER — APPOINTMENT (OUTPATIENT)
Dept: PHYSICAL THERAPY | Facility: CLINIC | Age: 73
End: 2018-02-05
Payer: MEDICARE

## 2018-02-05 DIAGNOSIS — I63.9 CEREBROVASCULAR ACCIDENT (CVA), UNSPECIFIED MECHANISM (HCC): ICD-10-CM

## 2018-02-05 PROCEDURE — G8990 OTHER PT/OT CURRENT STATUS: HCPCS | Performed by: OCCUPATIONAL THERAPIST

## 2018-02-05 PROCEDURE — G8991 OTHER PT/OT GOAL STATUS: HCPCS | Performed by: OCCUPATIONAL THERAPIST

## 2018-02-05 PROCEDURE — 97535 SELF CARE MNGMENT TRAINING: CPT | Performed by: OCCUPATIONAL THERAPIST

## 2018-02-05 PROCEDURE — 97112 NEUROMUSCULAR REEDUCATION: CPT | Performed by: OCCUPATIONAL THERAPIST

## 2018-02-05 NOTE — PROGRESS NOTES
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 beyond the carotid bifurcation  Patient admitted to the [facility] on   Pt continues with decreased strength/Range of Motion, coordination LUE  Pt has macular degeneration, macular hole in R eye    HOUSE:  1 SH  BATH:  1st floor  LIVES WITH:  wife  GRUPO FRANZ PRIOR/CURRENT:  Pt active and able to ambulate I'ly with no AD Prior; Uses isabel -cane for ambulation now  HAND DOMINANCE:  R handed   PRIOR/CURRENT:  Pt lost  role  WORK:  retired  ADL: Pt requires assisted with bathing and dressing  IADL: Pt used to perform Instrumental activities of daily living previously but unable to perform currently  LEISURE PURSUITS:  working in the yard      PMH:   Past Medical History:   Diagnosis Date    Asthma     DM [facility]diabetes mellitus), type 2 ([facility]     HLD (hyperlipidemia)     Hypertension     Murmur, cardiac     Stroke ([facility] )          Pain Levels:     Restin    With Activity:  5    Objective     Functional Assessment     Comments  See impairment section for further details  Impairment Observations:    1  decreased LUE strength/Range of Motion   improved L hand strength  2  decreased LUE coordination/proprioception  Hardeman Lopez Hardeman Lopez Pt presents with continued poor L proprioception and coordination affecting B/L integrative tasks  3  decreased sensation   resolved  4  Loss of  role  Hardeman Lopez Everardo Lopez Pt reports trialing driving with wife, Pt and Pt's wife reports doing "okay"  5  Decreased Activities of daily living, requires assist with bathing and dressing  Nikki Mandeville Nikki Mandeville Pt performs about 50% of bathing and 80% of dressing    Pt continues to require A with bathing 2* difficulties with washing under BUE and back  Pt with increased abilities to perform dressing routine independently with occasional A from wife to don shirt    Pt independent with bathing and dressing at this time  Pt's wife reports A with washing back from time to time  6  Decreased Instrumental activities of daily living  Wife performs all cooking, cleaning  Nikki Mandeville Nikki Mandeville Nikki Mandeville Pt attempted vacuuming and outside work but unsuccessful    Pt reported increased abilities to vacuum and riding low mower to cut grass with minimal difficulties removing LLE from   Pt reports increased engagement in IADLs-- Pt reports vacuumed and snow plowed yesterday with no difficulties noted at this time  1  Convergence Insufficiency Symptom Survey (CISS):  FAIL    2  Concussion Cognitive Checklist: self report symptom checklist  MEMORY:  -Remembering peoples names  ATTENTION:  - None reported  PROCESSING:  - None reported  EXECUTIVE FUNCTIONING:  - None reported  COMMUNICATION:  - None reported  VISUAL:  -None reported  - EMOTIONAL:  - None reported  INCREASED SENSITIVITIES TO:  - None reported  3  Vikash Cognitive Assessment Version 8 1 (MoCA V 8 1)  Visuospatial/executive functionin/5  Naming: 3/3  Memory: 1st trial: 4/5, 2nd trial: 5/5  Attention/concentration: 2/2  List of letters: 1/1  Serial Seven Subtraction: 3/3 w/ 2 errors  Language/sentence repetition: 2/2  Language Fluency: 0/1  Abstract/Correlational Thinkin/2  Delayed Recall: 4/5  Orientation 6/6  MoCA V1 8 1 Raw Score: 27/30 indicative of normal neurocognitive functioning    4   Vision Screening Recording Form:   vision screen: + glasses progressive worn @ all times, present for OT eval, h/o wet macular degeneration R eye, L inattention since CVA  near acuity: R 20/100, L 20/70   binocularity far: orthophoria  binocularity near:   low exophoria  red green fusion: diplopia @ 3  near point of convergence: diplopia @ 2   sridhar string:  misalignment   pursuits: slightly jerky pursuits with dysmetria, rebeounds to retrieve targets well   saccades: accurate in vertical/horizontal/diagonal planes, no c/o dizziness/HA  ocular ROM:  full/intact  Visual Perceptual Midline shift: midline shift to the L and above horizon, + L perceptual inattention, no neglect or field cut, peripheral vision intact    Fuentes Monreal OTR will be discontinuing all vision goals at this time, as Pt and Pt's wife desire to focus solely on LUE  Dynamometer Position #2:   R: 56  L: 23    Action:  3 point pinch: R 13 and L 5  2 point pinch: R 11 and L 5  Lateral pinch: R 16  and L 9    9-hole Peg Test: RUE: 22 56 seconds, LUE: 1 minute and 25 seconds    Myofilaments:  R hand - inconsistent; 2 83 3 rd and 4th digit; 3 61 palm, 2nd and 5th; 4 31 thumb   3  61 all digits  L hand - 1st and 2nd 4 31; 3, 4, 5th 3 61   3  61 all digits  Myofilaments: intact B/L UEs 3 61    AROM:  LUE - no sublux  scap elevation:  near full   full  seated FF:   1/2 range with short lever  Fuentes Dill [facility] 3/4 range with long lever    near full with long lever    Full with long lever  abd:  1/4 range  Fuentes Dill [facility] 3/4 range with short lever    near full with long lever    Full with long lever  IR full  Fuentes Dill [facility] 3/4 range    near full    full  ER 3/4 range   near full    full  elbow flex:  full   remains  elbow ext:  full   remains  sup:  3/4 range   near full    full  pro:  full   remains  wrist flex: 1/4 range    1/2 range    1/4  Fuentes Monreal Near 1/2  wrist ext:   1/4 range    1/2 range    near full  gross grasp: near full   full  mass extension:  full   full  hook fist: 1/4 range    1/2  dysdiadokokinesis:   decreased coordination LUE, slower pace   remains  sequential  opposition:  thumb to tip of index and lateral of 3rd digit   can oppose all digits, near full to tip 3rd ,4th and 5th digit    Able to oppose each digit to thumb at slowed pace    Pt with significant decreased coordination in L hand affecting B/L integrative tasks  Proprioception:  Finger nose - eyes open:  Remains  Pt continues to present with poor proprioception with vision and vision occluded         Pt demo with fair + functional progression towards goals in POC  Pt continues to demo with poor L proprioception, L neglect, decreased L coordination, decreased /pinch strength, and poor FMC/prehension affecting functional use of LUE  OTR recommending Pt to continue skilled OT services 2x/week for 4 weeks with focus on L attention, FMC/prehension with L hand, increased proprioception of LUE, and increased gross coordination to improve on the above deficits and enhance QOL  Assessment    Assessment details: See Skilled Analysis for further details  Skilled Analysis:  Pt is a 67  y o  male s/p CVA  presenting with decreased LUE strength/Range of Motion, coordination/proprioception and sensation which affects Activities of daily living, Instrumental activities of daily living and  role  Pt to continue to benefit from skilled OT  2x a week x8 weeks for ADL training, neuro re-ed, modalities, manual tx, visual perceptual retraining, and compensatory strategy education  to address above mentioned deficits for return to previous QOL      Short Term Goals:  -  Motor function is improved-- PARTIALLY MET        - Increase automaticity of  LUE to 50% for improved grasp release of tabletop items in 4 weeks-- PARTIALLY MET        - Pt will increase rate of manipulation for all FM tests for improved in 4 weeks-- MET        - Pt will increase LUE to functional assist with <20% cuing for tabletop tasks in 4 weeks-- PARTIALLY MET        - Pt will increase LUE to Gross Assist with <20% cuing for tabletop tasks in 4 weeks-- PARTIALLY MET        - Pt will increase prehension patterns for improved tripod with utencil management with <20% droppage 4 weeks-- PARTIALLY MET        - Pt will increase proprioception of LUE hand to target for improved functional reach vision occluded with ADL tasks, threading coat, axillary hygiene 4 weeks-- NOT MET        - Pt will tolerate BIONESS for improved motor and sensory performance for overall improved hand to target with 80% accuracy 4 weeks- PARTIALLY MET       - Demo good carryover of low vision strategies with grooming, bathing, dressing and meal prep for increased overall safety with functional tasks 4 weeks-- discontinuing         - increase auditory processing to take notes while listening in multimodal classroom symptom free at baseline performance-- MET       - Pt will increase attention to 2+ tasks for improved note taking, pre driving skills-- PARTIALLY MET       -  pt will increase insight into deficits for improved carryover of recommendations, accommodations, improved rate of healing 4 weeks-- MET      - Pt will increase oculomotor control for improved saccades, con/divergent tasks for improved reading, board to table tasks with minimal increase in symptoms-- discontinuing      - Pt will tolerate multimodal envt x 15 min with 80% accuracy of cog load and min increase of symptoms of 2 levels in HA/dizziness/nausea-- MET      Long Term Goals:        -  ADL performance in related activities in improved-- MET        -  Bathing performance in related activities in improved-- MET        -  IADL performance in related activities in improved-- MET          - Pt will increase rate of prehension for all FM tasks for improved use of utencils, writing, ADL fasteners-- PARTIALLY MET        - Ability to perform cooking and cleaning tasks with no discomfort-- NOT MET        - Decrease hypertonicity of LUE to WNL for automatic grasp/ release  and functional reach-- PARTIALLY MET        - Demo good carryover of HEP-- MET        - Increase automaticity of LUE to 100% for resumption of B integrative tasks-- NOT MET        - Patient will be able to dress lower body independently -- MET         - Patient will be able to dress upper body independently-- MET        - Increase oculomotor control for improved dynamic activities with head turns, board/screen to table tasks symptom free, imporoved VMI and return to baseline handwriting-- discontinuing        - Increase oculomotor control for WNL saccades, con/divergent tasks symptom free-- discontinuing    Intervention Comments:  Diagnosis: CVA  Precautions: L isabel, L neglect  FOTO: 46% limitation in UE function  Insurance:   10 of 10 visits, PN due 03/05/2018

## 2018-02-08 ENCOUNTER — APPOINTMENT (OUTPATIENT)
Dept: PHYSICAL THERAPY | Facility: CLINIC | Age: 73
End: 2018-02-08
Payer: MEDICARE

## 2018-02-08 ENCOUNTER — OFFICE VISIT (OUTPATIENT)
Dept: OCCUPATIONAL THERAPY | Facility: CLINIC | Age: 73
End: 2018-02-08
Payer: MEDICARE

## 2018-02-08 DIAGNOSIS — I63.9 CEREBROVASCULAR ACCIDENT (CVA), UNSPECIFIED MECHANISM (HCC): Primary | ICD-10-CM

## 2018-02-08 PROCEDURE — 97112 NEUROMUSCULAR REEDUCATION: CPT

## 2018-02-08 PROCEDURE — 97535 SELF CARE MNGMENT TRAINING: CPT

## 2018-02-08 NOTE — PROGRESS NOTES
Daily Note     Today's date: 2018  Patient name: Charmaine Wilhelm  : 1945  MRN: [id number]  Referring provider: Ronni Watson MD  Dx:   Encounter Diagnosis   Name Primary?  Cerebrovascular accident (CVA), unspecified mechanism ([facility] ) Yes                  Subjective: "I can push the snow but its hard, I have to use my right hand"  Objective: See treatment diary below      Assessment: Tolerated treatment well  UEB for 5 min each way with mod difficulty for sustained grasp on handle  Hand to target using tennis ball and, red and yellow resistive clamps with focus on sustained  and eye hand coordination  Mod droppage noted due to fatigue, cues for wrist extension and dynamic stabilization of shoulder to increase functional movement  No noted shoulder compensation during task  UER on wall mount (AG) for proximal strengthening  Continued skilled OT per POC with focus on coordination of movement and proximal strengthening  Plan: Continue skilled OT with focus on coordination of movement and proximal strengthening       INTERVENTION COMMENTS:  Diagnosis: CVA  Precautions: L isabel, L neglect  Insurance: [facility]  1 of 10 visits, PN due 2018

## 2018-02-12 ENCOUNTER — OFFICE VISIT (OUTPATIENT)
Dept: OCCUPATIONAL THERAPY | Facility: CLINIC | Age: 73
End: 2018-02-12
Payer: MEDICARE

## 2018-02-12 ENCOUNTER — APPOINTMENT (OUTPATIENT)
Dept: PHYSICAL THERAPY | Facility: CLINIC | Age: 73
End: 2018-02-12
Payer: MEDICARE

## 2018-02-12 DIAGNOSIS — I63.9 CEREBROVASCULAR ACCIDENT (CVA), UNSPECIFIED MECHANISM (HCC): ICD-10-CM

## 2018-02-12 PROCEDURE — 97112 NEUROMUSCULAR REEDUCATION: CPT | Performed by: OCCUPATIONAL THERAPIST

## 2018-02-12 PROCEDURE — 97535 SELF CARE MNGMENT TRAINING: CPT | Performed by: OCCUPATIONAL THERAPIST

## 2018-02-12 NOTE — PROGRESS NOTES
Daily Note     Today's date: 2018  Patient name: Chinedu Gutierrez  : 1945  MRN: [id number]  Referring provider: Coco Bah MD  Dx: No diagnosis found  Subjective: "My neck hurts a little bit; I must have slept on it wrong"  Objective: See treatment description below      Assessment: Tolerated treatment well  Patient would benefit from continued OT   Pt with reports of pain on R cervical region 2/10-- Reports sleeping incorrectly  In stance functional reach crossing midline for dowel/peg/marble retrieval and placement in peg board with visual cues (mirror) for improved L attention-- Pt with frequent verbal cues and encouragement to utilize mirror to reduce L inattention  Pt with frequent bouts of poor spatial awareness and L proprioception with knocking over dowels  Hand to target with marble retrieval and placement on pegs utilizing resistive clothes pin with focus on improved FMC/prehension, pinch strength, and improved L proprioception  Pt with frequent dysmetria noted with hand to target demands resulting in frequent droppage x45%  Seated functional forward reach chest level and OH with simulated shoe tying with focus on L attention, FMC/prehension, and B/L coordination-- Pt with Max difficulties with B/L coordination with verbal cues required to attend to LUE for refined A  Ball bounce with focus on B/L integration and coordination--  Pt  With significant improved B/L integration with massed practice with significant increased control of ball  OTR encouraging Pt to perform in home environment for improved functional performance  Plan: Continue per plan of care          INTERVENTION COMMENTS:  Stan Mcdaniel isabel, L neglect  Insurance: [address]  2 ZQ 32 [id number], PN due 2018

## 2018-02-15 ENCOUNTER — OFFICE VISIT (OUTPATIENT)
Dept: NEUROLOGY | Facility: CLINIC | Age: 73
End: 2018-02-15
Payer: MEDICARE

## 2018-02-15 ENCOUNTER — APPOINTMENT (OUTPATIENT)
Dept: PHYSICAL THERAPY | Facility: CLINIC | Age: 73
End: 2018-02-15
Payer: MEDICARE

## 2018-02-15 ENCOUNTER — OFFICE VISIT (OUTPATIENT)
Dept: OCCUPATIONAL THERAPY | Facility: CLINIC | Age: 73
End: 2018-02-15
Payer: MEDICARE

## 2018-02-15 VITALS
WEIGHT: 219 LBS | HEART RATE: 55 BPM | DIASTOLIC BLOOD PRESSURE: 64 MMHG | SYSTOLIC BLOOD PRESSURE: 138 MMHG | BODY MASS INDEX: 30.66 KG/M2 | HEIGHT: 71 IN

## 2018-02-15 DIAGNOSIS — I63.231 CEREBRAL INFARCTION DUE TO OCCLUSION OF RIGHT CAROTID ARTERY (HCC): Primary | ICD-10-CM

## 2018-02-15 DIAGNOSIS — I63.9 CEREBROVASCULAR ACCIDENT (CVA), UNSPECIFIED MECHANISM (HCC): Primary | ICD-10-CM

## 2018-02-15 PROCEDURE — 97112 NEUROMUSCULAR REEDUCATION: CPT

## 2018-02-15 PROCEDURE — 99212 OFFICE O/P EST SF 10 MIN: CPT | Performed by: PSYCHIATRY & NEUROLOGY

## 2018-02-15 PROCEDURE — 97535 SELF CARE MNGMENT TRAINING: CPT

## 2018-02-15 NOTE — PROGRESS NOTES
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    The patient's recent carotid ultrasound reveals complete occlusion of the right carotid artery with 60-69% stenosis of the contralateral left internal carotid artery unchanged at this time we are going to continue with medical therapy  Follow-up in 6 months            Subjective:    HPI patient and his wife report continued improvement in left-sided strength and mobility  He is continuing his physical therapy which he learned as an inpatient at home independently  He states that he is quite active with this performing it daily  He has not had any new focal neurologic deficits  Objective:    Blood pressure 138/64, pulse 55, height 5' 11" (1 803 m), weight 99 3 kg (219 lb)  Physical Exam    Neurological Exam the patient has mild left-sided inattention  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

## 2018-02-15 NOTE — PROGRESS NOTES
Daily Note     Today's date: 2/15/2018  Patient name: Sharmin Ellington  : 1945  MRN: [id number]  Referring provider: Kirby English MD  Dx:   Encounter Diagnosis   Name Primary?  Cerebrovascular accident (CVA), unspecified mechanism ([facility] ) Yes                  Subjective: "I like this one "      Objective: See treatment diary below      Assessment: Tolerated treatment [facility] tasks with knot tying in theraband and screwdriver task  Completed timed trials with manual dexterity test as 3 height levels and provided 30 seconds for 5 trials  Noted with inability to complete level 3    Level 1:  1  3 2  4 3 5 4  3 5  5  Level 2:  1  6 2  5 3  5 4  6 5  4  Level 3  1  0 2  0   Plan: Continued skilled OT per POC with focus on     INTERVENTION COMMENTS:  Diagnosis: Cerebrovascular accident (CVA), unspecified mechanism ([facility] ) [I63 9]  Precautions: L isabel, L inattention  FOTO:  4 of 10 visits, PN due 3/5

## 2018-02-19 ENCOUNTER — OFFICE VISIT (OUTPATIENT)
Dept: OCCUPATIONAL THERAPY | Facility: CLINIC | Age: 73
End: 2018-02-19
Payer: MEDICARE

## 2018-02-19 ENCOUNTER — APPOINTMENT (OUTPATIENT)
Dept: PHYSICAL THERAPY | Facility: CLINIC | Age: 73
End: 2018-02-19
Payer: MEDICARE

## 2018-02-19 DIAGNOSIS — I63.9 CEREBROVASCULAR ACCIDENT (CVA), UNSPECIFIED MECHANISM (HCC): Primary | ICD-10-CM

## 2018-02-19 PROCEDURE — 97112 NEUROMUSCULAR REEDUCATION: CPT

## 2018-02-19 PROCEDURE — 97110 THERAPEUTIC EXERCISES: CPT

## 2018-02-19 NOTE — PROGRESS NOTES
Occupational Therapy Daily Note     Today's date: 2018  Patient name: Lenin Morales  : 1945  MRN: [id number]  Referring provider: Carolina Marvin MD  Dx:   Encounter Diagnosis   Name Primary?  Cerebrovascular accident (CVA), unspecified mechanism ([facility] ) Yes     Subjective: "I think they said the cap increased so I can keep coming back for more therapy"  Objective: See treatment diary below  Assessment: Pt seen for OT treatment session focusing on activity tolerance, UEB prograde/retrograde x5 minutes per direction, Bioness for neuro-edu modulation for 8 minutes w/ large green therapy ball for B/L UE integration and B/L overhead stretching/reachingw/ shoulder flexion as prerequisite task w/ visual feedback via mirror for good body alignment  Pt engaged in stance high level dynamic balance task w/ instruction for LUE to perform lateral pincer grasp supination/pronation tabletop w/ colored abraham cards to turn cards over on L side w/ min cues for increasing L sided awareness and decreasing L inattention, placed abraham card on theraband w/ initially green resistive clips w/ lateral pincer grasp, c/o mild  fatigue, downgraded to yellow resistive clips, retrieve w/ large tongs in LUE matching colored block, frequent supination/pronation w/ LUE w/ large tongs to find matching number from abraham card on colored block w/ eventual overhead dynamic reaching w/ large tongs placement of colored block to top of mirror  Pt tolerated treatment well  Patient would benefit from continued skilled OT      Plan: Continued skilled OT per POC with focus on the following:  Cognition:  Divided, alternating and sustained attention  Internal/external memory strategies  Multimodal envt    Vision:  L peripheral field inattention re-training    ADL:  Adaptive equipment education  LB dressing  UB dressing  Writing    Neuro:  Bioness fitting  IASTM/Graston  FMC/prehension  Seated functional reach: crossing midline  Sidelying scap mobs  Supine/sidelying, seated neuro re-education  Splinting to be established PRN    Modalities:  NMES to tricep for longer lever and/or BIONESS for distal NMES  Fluido for sensory re-ed x10 min    INTERVENTION COMMENTS:  Diagnosis: Cerebrovascular accident (CVA), unspecified mechanism ([facility] ) [I63 9] (R MCA CVA)  Precautions: L isabel, L inattention  FOTO: 58% with 42% limitation  5 of 10 visits, PN due 3/5/2018    Thank you for the consult!   Please call if you have any questions: [phone]  Arian Daily, OTD, OTR/L, C-GCM, CSRS  Director of Outpatient Neuro Occupational Therapy

## 2018-02-19 NOTE — PROGRESS NOTES
Occupational Therapy Daily Note     Today's date: 2018  Patient name: Kirill Michaud  : 1945  MRN: [id number]  Referring provider: Frederic Monaco MD  Dx: Cerebrovascular accident (CVA), unspecified mechanism ([facility] ) [I63 9]    Subjective: ***    Objective: See treatment diary below  Assessment:     Pt tolerated treatment well  Patient would benefit from continued skilled OT  Plan: Continued skilled OT per POC with focus on the following:  Cognition:  Divided, alternating and sustained attention  Temporal awareness: organize the hour    Concussion:  Educate on cog/vision apps  Hypersensitivity education  Internal/external memory strategies  Multimodal envt    Vision:  Ashlie Schmitt scanning sheets  Multimatrix for saccades/visual clutter/attention  Oculomotor control: saccades, conv/divergence  Tracking tube w/ convergence/divergence board to tube    ADL:  Adaptive equipment education  LB dressing  UB dressing  Writing    Neuro:  Bioness fitting  IASTM/Graston  FMC/prehension  Seated functional reach: crossing midline  Sidelying scap mobs  Supine/sidelying, seated neuro re-education  Splinting to be established PRN    Modalities:  NMES to tricep for longer lever and/or BIONESS for distal NMES  Fluido for sensory re-ed x10 min    INTERVENTION COMMENTS:  Diagnosis: Cerebrovascular accident (CVA), unspecified mechanism ([facility] ) [I63 9] (R MCA CVA)  Precautions: L isabel, L inattention  FOTO: 58% with 42% limitation  5 of 10 visits, PN due 3/5    Thank you for the consult!   Please call if you have any questions: [phone]  Teresa Ca, OTGURINDER, OTR/L, C-GCM, CSRS  Director of Outpatient Neuro Occupational Therapy

## 2018-02-22 ENCOUNTER — APPOINTMENT (OUTPATIENT)
Dept: PHYSICAL THERAPY | Facility: CLINIC | Age: 73
End: 2018-02-22
Payer: MEDICARE

## 2018-02-22 ENCOUNTER — OFFICE VISIT (OUTPATIENT)
Dept: OCCUPATIONAL THERAPY | Facility: CLINIC | Age: 73
End: 2018-02-22
Payer: MEDICARE

## 2018-02-22 DIAGNOSIS — I63.9 CEREBROVASCULAR ACCIDENT (CVA), UNSPECIFIED MECHANISM (HCC): Primary | ICD-10-CM

## 2018-02-22 PROCEDURE — 97535 SELF CARE MNGMENT TRAINING: CPT

## 2018-02-22 PROCEDURE — 97112 NEUROMUSCULAR REEDUCATION: CPT

## 2018-02-22 NOTE — PROGRESS NOTES
Daily Note     Today's date: 2018  Patient name: Jacob Chappell  : 1945  MRN: [id number]  Referring provider: Amanda Segura MD  Dx:   Encounter Diagnosis   Name Primary?  Cerebrovascular accident (CVA), unspecified mechanism ([facility] ) Yes                  Subjective: "The grass could grow faster that I raked it"  Objective: See treatment diary below      Assessment: Tolerated treatment well  Patient would benefit from continued OT  UEB for 5 min each way on level 2 5  Pt engaged in [facility] with focus on cylindrical/ Spherical grasp, pad/key pinch and sustained gross  to manipulate the controls upon command of game attending to the left for direction follow  Pt demo-ability to sustain grasp and alternate between various prehension patterns with mod difficulty  Max difficulty to manipulate expandable arm with handle bars to follow target  Environment modified to accomodate visual deficits  G dynamic stabilization of LUE throughout session  Pt completed about 60% of session in stance  Plan: Continued skilled OT per POC with focus on attending to L side and UE strengthening       INTERVENTION COMMENTS:  Diagnosis: Cerebrovascular accident (CVA), unspecified mechanism ([facility] ) [I63 9] (R MCA CVA)  Precautions: L isabel, L inattention  6 of 10 visits, PN due 3/5/2018

## 2018-02-26 ENCOUNTER — APPOINTMENT (OUTPATIENT)
Dept: PHYSICAL THERAPY | Facility: CLINIC | Age: 73
End: 2018-02-26
Payer: MEDICARE

## 2018-02-26 ENCOUNTER — OFFICE VISIT (OUTPATIENT)
Dept: OCCUPATIONAL THERAPY | Facility: CLINIC | Age: 73
End: 2018-02-26
Payer: MEDICARE

## 2018-02-26 DIAGNOSIS — I63.9 CEREBROVASCULAR ACCIDENT (CVA), UNSPECIFIED MECHANISM (HCC): Primary | ICD-10-CM

## 2018-02-26 PROCEDURE — 97110 THERAPEUTIC EXERCISES: CPT

## 2018-02-26 PROCEDURE — 97535 SELF CARE MNGMENT TRAINING: CPT

## 2018-02-26 NOTE — PROGRESS NOTES
Daily Note     Today's date: 2018  Patient name: Lenin Morales  : 1945  MRN: [id number]  Referring provider: Carolina Marvin MD  Dx:   Encounter Diagnosis   Name Primary?  Cerebrovascular accident (CVA), unspecified mechanism ([facility] ) Yes                   Subjective: "I heard your voice saying look to the left side "      Objective: See treatment diary below      Assessment: Tolerated treatment fair  5 minutes each direction on UEB at 2 5 resistance - noted with decreased attention to left side  Completed nut and bolt helicopter building task with moderate difficulty and required assist from [address]  Encouraged use of left hand to tighten nuts  Encouraged patient and wife to continue utilizing LUE at home for increased functional use       Plan: Continued skilled OT per POC with focus on transition to HEP    INTERVENTION COMMENTS:  Diagnosis: Cerebrovascular accident (CVA), unspecified mechanism ([facility] ) [I63 9]  Precautions: L inattention, CVA   FOTO:  7 of 10 visits, PN due 3/6 RE scheduled

## 2018-03-01 ENCOUNTER — OFFICE VISIT (OUTPATIENT)
Dept: OCCUPATIONAL THERAPY | Facility: CLINIC | Age: 73
End: 2018-03-01
Payer: MEDICARE

## 2018-03-01 ENCOUNTER — APPOINTMENT (OUTPATIENT)
Dept: PHYSICAL THERAPY | Facility: CLINIC | Age: 73
End: 2018-03-01
Payer: MEDICARE

## 2018-03-01 DIAGNOSIS — I63.9 CEREBROVASCULAR ACCIDENT (CVA), UNSPECIFIED MECHANISM (HCC): Primary | ICD-10-CM

## 2018-03-01 PROCEDURE — 97110 THERAPEUTIC EXERCISES: CPT

## 2018-03-01 PROCEDURE — 97535 SELF CARE MNGMENT TRAINING: CPT

## 2018-03-01 PROCEDURE — 97112 NEUROMUSCULAR REEDUCATION: CPT

## 2018-03-06 ENCOUNTER — EVALUATION (OUTPATIENT)
Dept: OCCUPATIONAL THERAPY | Facility: CLINIC | Age: 73
End: 2018-03-06
Payer: MEDICARE

## 2018-03-06 DIAGNOSIS — I63.9 CEREBROVASCULAR ACCIDENT (CVA), UNSPECIFIED MECHANISM (HCC): ICD-10-CM

## 2018-03-06 PROCEDURE — 97112 NEUROMUSCULAR REEDUCATION: CPT | Performed by: OCCUPATIONAL THERAPIST

## 2018-03-06 PROCEDURE — G8991 OTHER PT/OT GOAL STATUS: HCPCS | Performed by: OCCUPATIONAL THERAPIST

## 2018-03-06 PROCEDURE — G8992 OTHER PT/OT  D/C STATUS: HCPCS | Performed by: OCCUPATIONAL THERAPIST

## 2018-03-06 PROCEDURE — G8990 OTHER PT/OT CURRENT STATUS: HCPCS | Performed by: OCCUPATIONAL THERAPIST

## 2018-03-06 NOTE — PROGRESS NOTES
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 on   Pt continues with decreased strength/Range of Motion, coordination LUE  Pt has macular degeneration, macular hole in R eye    HOUSE:  1 SH  BATH:  1st floor  LIVES WITH:  wife  GRUPO FRANZ PRIOR/CURRENT:  Pt active and able to ambulate I'ly with no AD Prior; Uses isabel -cane for ambulation now  HAND DOMINANCE:  R handed   PRIOR/CURRENT:  Pt lost  role  WORK:  retired  ADL: Pt requires assisted with bathing and dressing  IADL: Pt used to perform Instrumental activities of daily living previously but unable to perform currently  LEISURE PURSUITS:  working in the yard      PMH:   Past Medical History:   Diagnosis Date    Asthma     DM (diabetes mellitus), type 2 ([address] )     HLD (hyperlipidemia)     Hypertension     Murmur, cardiac     Stroke [address] [address]          Pain Levels:     Restin    With Activity:  2    Objective     Functional Assessment     Comments  See impairment section for further details  Impairment Observations:    1  decreased LUE strength/Range of Motion   improved L hand strength    improving  2  decreased LUE coordination/proprioception  [place] Organ [place] Organ Pt presents with continued poor L proprioception and coordination affecting B/L integrative tasks    remains  3  decreased sensation   resolved  4  Loss of  role  [place] Organ [place] Organ Pt reports trialing driving with wife, Pt and Pt's wife reports doing "okay"    Pt reports no difficulties noted with trialing of driving with wife in the car; wife is still main transportation at this time  5  Decreased Activities of daily living, requires assist with bathing and dressing  Jimy Neville Pt performs about 50% of bathing and 80% of dressing    Pt continues to require A with bathing 2* difficulties with washing under BUE and back  Pt with increased abilities to perform dressing routine independently with occasional A from wife to don shirt    Pt independent with bathing and dressing at this time  Pt's wife reports A with washing back from time to time    remains  6  Decreased Instrumental activities of daily living  Wife performs all cooking, cleaning  Jimy Neville Pt attempted vacuuming and outside work but unsuccessful    Pt reported increased abilities to vacuum and riding low mower to cut grass with minimal difficulties removing LLE from   Pt reports increased engagement in IADLs-- Pt reports vacuumed and snow plowed yesterday with no difficulties noted at this time    Pt with improved abilities with completing dishes and assembling projects in garage  1  Convergence Insufficiency Symptom Survey (CISS):  FAIL    2  Concussion Cognitive Checklist: self report symptom checklist  MEMORY:  -Remembering peoples names  ATTENTION:  - None reported  PROCESSING:  - None reported  EXECUTIVE FUNCTIONING:  - None reported  COMMUNICATION:  - None reported  VISUAL:  -None reported  - EMOTIONAL:  - None reported  INCREASED SENSITIVITIES TO:  - None reported      3  [place] Cognitive Assessment Version 8 1 (MoCA V 8 1)  Visuospatial/executive functionin5  Naming: 3/3  Memory: 1st trial: , 2nd trial: 5  Attention/concentration: 2/2  List of letters:   Serial Seven Subtraction: 3/3 w/ 2 errors  Language/sentence repetition: 2/2  Language Fluency: 0/1  Abstract/Correlational Thinkin/2  Delayed Recall:   Orientation 6/6  MoCA V1 8 1 Raw Score: 27/30 indicative of normal neurocognitive functioning    4  Vision Screening Recording Form:   vision screen: + glasses progressive worn @ all times, present for OT eval, h/o wet macular degeneration R eye, L inattention since CVA  near acuity: R 20/100, L 20/70   binocularity far: orthophoria  binocularity near:   low exophoria  red green fusion: diplopia @ 3  near point of convergence: diplopia @ 2   sridhar string:  misalignment   pursuits: slightly jerky pursuits with dysmetria, rebeounds to retrieve targets well   saccades: accurate in vertical/horizontal/diagonal planes, no c/o dizziness/HA  ocular ROM:  full/intact  Visual Perceptual Midline shift: midline shift to the L and above horizon, + L perceptual inattention, no neglect or field cut, peripheral vision intact    Carlos Ray OTR will be discontinuing all vision goals at this time, as Pt and Pt's wife desire to focus solely on LUE  Dynamometer Position #2:   R: 56    64  L: 23  25    Action:  3 point pinch: R 13 and L 5  R 11, L 6  2 point pinch: R 11 and L 5  R 13, L 5 5  Lateral pinch: R 16  and L 9  R 17 5, L 9 5    9-hole Peg Test: RUE: 22 56 seconds, LUE: 1 minute and 25 seconds    RUE: 25 09 seconds, LUE: 1 minute and 18 seconds    Myofilaments:  R hand - inconsistent; 2 83 3 rd and 4th digit; 3 61 palm, 2nd and 5th; 4 31 thumb   3  61 all digits  L hand - 1st and 2nd 4 31; 3, 4, 5th 3 61   3  61 all digits  Myofilaments: intact B/L UEs 3 61    AROM:  LUE - no sublux  scap elevation:  near full   full  seated FF:   1/2 range with short lever  Carlos Ray 3/4 range with long lever    near full with long lever    Full with long lever  abd:  1/4 range  Gonzalez Cory Carlos Cory 3/4 range with short lever    near full with long lever    Full with long lever  IR full  Carlos Ray 3/4 range    near full    full  ER 3/4 range   near full    full  elbow flex:  full   remains  elbow ext:  full   remains  sup:  3/4 range   near full    remains  pro:  full   remains  wrist flex: 1/4 range    1/2 range    1/4  Uriel Side Near 1/2    1/2  wrist ext:   1/4 range    1/2 range    near full    full  gross grasp: near full   full  mass extension:  full   full  hook fist: 1/4 range   1/2    remains  dysdiadokokinesis:   decreased coordination LUE, slower pace   remains  sequential  opposition:  thumb to tip of index and lateral of 3rd digit   can oppose all digits, near full to tip 3rd ,4th and 5th digit    Able to oppose each digit to thumb at slowed pace    Pt with significant decreased coordination in L hand affecting B/L integrative tasks, remains with delayed reaction with LUE  Proprioception:  Finger nose - eyes open:  Remains  Pt continues to present with poor proprioception with vision and vision occluded    remains    Pt with be D/Reuben at this time due to Maximal level reached and Goals Met  Pt continues with improved engagement in salient tasks with improving functional performance  OTR educated Pt and his wife to notify staff if regression is noted  Assessment    Assessment details: See Skilled Analysis for further details  Skilled Analysis:  Pt is a 67  y o  male s/p CVA  presenting with decreased LUE strength/Range of Motion, coordination/proprioception and sensation which affects Activities of daily living, Instrumental activities of daily living and  role  Pt to continue to benefit from skilled OT  2x a week x8 weeks for ADL training, neuro re-ed, modalities, manual tx, visual perceptual retraining, and compensatory strategy education  to address above mentioned deficits for return to previous QOL      Short Term Goals:  -  Motor function is improved-- MET        - Increase automaticity of  LUE to 50% for improved grasp release of tabletop items in 4 weeks-- PARTIALLY MET        - Pt will increase rate of manipulation for all FM tests for improved in 4 weeks-- MET        - Pt will increase LUE to functional assist with <20% cuing for tabletop tasks in 4 weeks-- MET        - Pt will increase LUE to Gross Assist with <20% cuing for tabletop tasks in 4 weeks-- MET        - Pt will increase prehension patterns for improved tripod with utencil management with <20% droppage 4 weeks-- PARTIALLY MET        - Pt will increase proprioception of LUE hand to target for improved functional reach vision occluded with ADL tasks, threading coat, axillary hygiene 4 weeks-- PARTIALLY MET        - Pt will tolerate BIONESS for improved motor and sensory performance for overall improved hand to target with 80% accuracy 4 weeks- MET       - Demo good carryover of low vision strategies with grooming, bathing, dressing and meal prep for increased overall safety with functional tasks 4 weeks-- discontinuing         - increase auditory processing to take notes while listening in multimodal classroom symptom free at baseline performance-- MET       - Pt will increase attention to 2+ tasks for improved note taking, pre driving skills-- PARTIALLY MET       -  pt will increase insight into deficits for improved carryover of recommendations, accommodations, improved rate of healing 4 weeks-- MET      - Pt will increase oculomotor control for improved saccades, con/divergent tasks for improved reading, board to table tasks with minimal increase in symptoms-- discontinuing      - Pt will tolerate multimodal envt x 15 min with 80% accuracy of cog load and min increase of symptoms of 2 levels in HA/dizziness/nausea-- MET      Long Term Goals:        -  ADL performance in related activities in improved-- MET        -  Bathing performance in related activities in improved-- MET        -  IADL performance in related activities in improved-- MET          - Pt will increase rate of prehension for all FM tasks for improved use of utencils, writing, ADL fasteners-- MET        - Ability to perform cooking and cleaning tasks with no discomfort-- PARTIALLY MET        - Decrease hypertonicity of LUE to WNL for automatic grasp/ release  and functional reach--PARTIALLY MET        - Demo good carryover of HEP-- MET        - Increase automaticity of LUE to 100% for resumption of B integrative tasks-- PARTIALLY MET        - Patient will be able to dress lower body independently -- MET         - Patient will be able to dress upper body independently-- MET        - Increase oculomotor control for improved dynamic activities with head turns, board/screen to table tasks symptom free, imporoved VMI and return to baseline handwriting-- discontinuing        - Increase oculomotor control for WNL saccades, con/divergent tasks symptom free-- discontinuing    Intervention Comments:  Diagnosis: CVA  Precautions: L isabel, L neglect  FOTO: 46% limitation in UE function    43% limitation in UE function  Insurance: [address]  10 of 10 visits

## 2018-03-29 DIAGNOSIS — E11.9 TYPE 2 DIABETES MELLITUS WITHOUT COMPLICATION, UNSPECIFIED LONG TERM INSULIN USE STATUS: Primary | ICD-10-CM

## 2018-04-09 ENCOUNTER — APPOINTMENT (EMERGENCY)
Dept: RADIOLOGY | Facility: HOSPITAL | Age: 73
End: 2018-04-09
Payer: MEDICARE

## 2018-04-09 ENCOUNTER — HOSPITAL ENCOUNTER (EMERGENCY)
Facility: HOSPITAL | Age: 73
Discharge: HOME | End: 2018-04-09
Attending: EMERGENCY MEDICINE | Admitting: EMERGENCY MEDICINE
Payer: MEDICARE

## 2018-04-09 VITALS
DIASTOLIC BLOOD PRESSURE: 63 MMHG | RESPIRATION RATE: 18 BRPM | OXYGEN SATURATION: 99 % | TEMPERATURE: 97.2 F | HEART RATE: 50 BPM | SYSTOLIC BLOOD PRESSURE: 135 MMHG

## 2018-04-09 DIAGNOSIS — M54.50 ACUTE RIGHT-SIDED LOW BACK PAIN WITHOUT SCIATICA: Primary | ICD-10-CM

## 2018-04-09 PROCEDURE — 72100 X-RAY EXAM L-S SPINE 2/3 VWS: CPT

## 2018-04-09 PROCEDURE — 99283 EMERGENCY DEPT VISIT LOW MDM: CPT

## 2018-04-09 RX ORDER — LIDOCAINE 50 MG/G
1 PATCH TOPICAL ONCE
Status: DISCONTINUED | OUTPATIENT
Start: 2018-04-09 | End: 2018-04-09 | Stop reason: HOSPADM

## 2018-04-09 RX ORDER — CYCLOBENZAPRINE HCL 10 MG
10 TABLET ORAL ONCE
Status: COMPLETED | OUTPATIENT
Start: 2018-04-09 | End: 2018-04-09

## 2018-04-09 RX ORDER — CYCLOBENZAPRINE HCL 5 MG
TABLET ORAL
Qty: 12 TABLET | Refills: 0 | Status: SHIPPED | OUTPATIENT
Start: 2018-04-09 | End: 2018-10-08 | Stop reason: ALTCHOICE

## 2018-04-09 RX ADMIN — LIDOCAINE 1 PATCH: 50 PATCH TOPICAL at 15:52

## 2018-04-09 RX ADMIN — CYCLOBENZAPRINE HYDROCHLORIDE 10 MG: 10 TABLET, FILM COATED ORAL at 15:53
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
## 2018-04-09 NOTE — DISCHARGE INSTRUCTIONS
Acute Low Back Pain   WHAT YOU NEED TO KNOW:   Acute low back pain is sudden discomfort in your lower back area that lasts for up to 6 weeks  The discomfort makes it difficult to tolerate activity  DISCHARGE INSTRUCTIONS:   Return to the emergency department if:   · You have severe pain  · You have sudden stiffness and heaviness on both buttocks down to both legs  · You have numbness or weakness in one leg, or pain in both legs  · You have numbness in your genital area or across your lower back  · You cannot control your urine or bowel movements  Contact your healthcare provider if:   · You have a fever  · You have pain at night or when you rest     · Your pain does not get better with treatment  · You have pain that worsens when you cough or sneeze  · You suddenly feel something pop or snap in your back  · You have questions or concerns about your condition or care  Medicines: The following medicines may be ordered by your healthcare provider:  · Acetaminophen  decreases pain  It is available without a doctor's order  Ask how much to take and how often to take it  Follow directions  Acetaminophen can cause liver damage if not taken correctly  · NSAIDs  help decrease swelling and pain  This medicine is available with or without a doctor's order  NSAIDs can cause stomach bleeding or kidney problems in certain people  If you take blood thinner medicine, always ask your healthcare provider if NSAIDs are safe for you  Always read the medicine label and follow directions  · Prescription pain medicine  may be given  Ask your healthcare provider how to take this medicine safely  · Muscle relaxers  decrease pain by relaxing the muscles in your lower spine  · Take your medicine as directed  Contact your healthcare provider if you think your medicine is not helping or if you have side effects  Tell him of her if you are allergic to any medicine   Keep a list of the medicines, vitamins, and herbs you take  Include the amounts, and when and why you take them  Bring the list or the pill bottles to follow-up visits  Carry your medicine list with you in case of an emergency  Self-care:   · Stay active  as much as you can without causing more pain  Bed rest could make your back pain worse  Start with some light exercises such as walking  Avoid heavy lifting until your pain is gone  Ask for more information about the activities or exercises that are right for you  · Ice  helps decrease swelling, pain, and muscle spams  Put crushed ice in a plastic bag  Cover it with a towel  Place it on your lower back for 20 to 30 minutes every 2 hours  Do this for about 2 to 3 days after your pain starts, or as directed  · Heat  helps decrease pain and muscle spasms  Start to use heat after treatment with ice has stopped  Use a small towel dampened with warm water or a heating pad, or sit in a warm bath  Apply heat on the area for 20 to 30 minutes every 2 hours for as many days as directed  Alternate heat and ice  Prevent acute low back pain:   · Use proper body mechanics  ¨ Bend at the hips and knees when you  objects  Do not bend from the waist  Use your leg muscles as you lift the load  Do not use your back  Keep the object close to your chest as you lift it  Try not to twist or lift anything above your waist     ¨ Change your position often when you stand for long periods of time  Rest one foot on a small box or footrest, and then switch to the other foot often  ¨ Try not to sit for long periods of time  When you do, sit in a straight-backed chair with your feet flat on the floor  Never reach, pull, or push while you are sitting  · Do exercises that strengthen your back muscles  Warm up before you exercise  Ask your healthcare provider the best exercises for you  · Maintain a healthy weight  Ask your healthcare provider how much you should weigh   Ask him to help you create a weight loss plan if you are overweight  Follow up with your healthcare provider as directed:  Return for a follow-up visit if you still have pain after 1 to 3 weeks of treatment  You may need to visit an orthopedist if your back pain lasts more than 12 weeks  Write down your questions so you remember to ask them during your visits  © 2017 [address] [employer] Information is for End User's use only and may not be sold, redistributed or otherwise used for commercial purposes  All illustrations and images included in CareNotes® are the copyrighted property of [employer]  or [facility]  The above information is an  only  It is not intended as medical advice for individual conditions or treatments  Talk to your doctor, nurse or pharmacist before following any medical regimen to see if it is safe and effective for you

## 2018-06-04 ENCOUNTER — OFFICE VISIT (OUTPATIENT)
Dept: FAMILY MEDICINE CLINIC | Facility: CLINIC | Age: 73
End: 2018-06-04
Payer: MEDICARE

## 2018-06-04 VITALS
TEMPERATURE: 97.6 F | SYSTOLIC BLOOD PRESSURE: 130 MMHG | HEIGHT: 71 IN | WEIGHT: 222 LBS | BODY MASS INDEX: 31.08 KG/M2 | HEART RATE: 57 BPM | DIASTOLIC BLOOD PRESSURE: 70 MMHG | OXYGEN SATURATION: 95 %

## 2018-06-04 DIAGNOSIS — M72.2 PLANTAR FASCIITIS: ICD-10-CM

## 2018-06-04 DIAGNOSIS — E11.9 TYPE 2 DIABETES MELLITUS WITHOUT COMPLICATION, UNSPECIFIED WHETHER LONG TERM INSULIN USE (HCC): Primary | ICD-10-CM

## 2018-06-04 DIAGNOSIS — B35.6 TINEA CRURIS: ICD-10-CM

## 2018-06-04 DIAGNOSIS — R25.2 CRAMPS OF LEFT LOWER EXTREMITY: ICD-10-CM

## 2018-06-04 PROBLEM — I35.8 NON-RHEUMATIC AORTIC SCLEROSIS: Status: ACTIVE | Noted: 2017-06-22

## 2018-06-04 PROBLEM — M25.511 SHOULDER PAIN, RIGHT: Status: ACTIVE | Noted: 2017-07-24

## 2018-06-04 PROBLEM — I63.233 CEREBROVASCULAR ACCIDENT (CVA) DUE TO BILATERAL OCCLUSION OF CAROTID ARTERIES (HCC): Status: ACTIVE | Noted: 2017-09-15

## 2018-06-04 PROBLEM — M25.511 SHOULDER PAIN, RIGHT: Status: RESOLVED | Noted: 2017-07-24 | Resolved: 2018-06-04

## 2018-06-04 PROBLEM — E11.21 DIABETIC NEPHROPATHY ASSOCIATED WITH TYPE 2 DIABETES MELLITUS (HCC): Status: ACTIVE | Noted: 2017-06-22

## 2018-06-04 PROBLEM — M75.41 IMPINGEMENT SYNDROME OF RIGHT SHOULDER: Status: ACTIVE | Noted: 2017-07-24

## 2018-06-04 PROBLEM — K59.00 CONSTIPATION: Status: ACTIVE | Noted: 2018-01-16

## 2018-06-04 PROBLEM — L21.9 SEBORRHEIC DERMATITIS: Status: ACTIVE | Noted: 2017-11-10

## 2018-06-04 LAB — SL AMB POCT HEMOGLOBIN AIC: 6.5

## 2018-06-04 PROCEDURE — 99214 OFFICE O/P EST MOD 30 MIN: CPT | Performed by: FAMILY MEDICINE

## 2018-06-04 PROCEDURE — 83036 HEMOGLOBIN GLYCOSYLATED A1C: CPT | Performed by: FAMILY MEDICINE

## 2018-06-04 RX ORDER — GABAPENTIN 300 MG/1
300 CAPSULE ORAL 3 TIMES DAILY
Qty: 30 CAPSULE | Refills: 0 | Status: SHIPPED | OUTPATIENT
Start: 2018-06-04 | End: 2018-10-08 | Stop reason: SDUPTHER

## 2018-06-04 RX ORDER — CLOTRIMAZOLE 1 %
CREAM (GRAM) TOPICAL 2 TIMES DAILY
Qty: 30 G | Refills: 0 | Status: SHIPPED | OUTPATIENT
Start: 2018-06-04 | End: 2018-10-08 | Stop reason: ALTCHOICE

## 2018-06-04 NOTE — PATIENT INSTRUCTIONS
Please set up your appointment to re-evaluate your several medical issues  In the meantime use gabapentin for nocturnal cramping as well as plantar fasciitis  Please read handout about plantar fasciitis and follow these instructions  We will re-evaluate at your general medical check later this month  Call sooner as needed

## 2018-06-04 NOTE — ASSESSMENT & PLAN NOTE
Patient appears to have plantar fasciitis  We discussed the etiology of this with him and gave him a handout with exercises another information associated with this condition  He is asked to make sure that he wear shoes with good arch is in the future and not moccasin like he has today, Flats or bare feet  He is going to have follow-up visit within the next month and we will re-evaluate at that time

## 2018-06-04 NOTE — PROGRESS NOTES
Assessment/Plan:  Plantar fasciitis  Patient appears to have plantar fasciitis  We discussed the etiology of this with him and gave him a handout with exercises another information associated with this condition  He is asked to make sure that he wear shoes with good arch is in the future and not moccasin like he has today, Flats or bare feet  He is going to have follow-up visit within the next month and we will re-evaluate at that time  Diabetic nephropathy associated with type 2 diabetes mellitus ([address] )  He had a hemoglobin A1c today which was 6 5 and he was congratulated for this  His diabetic foot exam was normal as well today  Will have a follow-up visit within the month for his general medical check  Cramps of left lower extremity  We gave him some gabapentin to try 300 mg HS  We are going to follow up with him and his check up later this month  Will call sooner as needed  We did discussed possibility of sedation related to this medication  Diagnoses and all orders for this visit:    Type 2 diabetes mellitus without complication, unspecified whether long term insulin use (HCC)  -     POCT hemoglobin A1c  -     Microalbumin, Random Urine (W/Creatinine)    Cramps of left lower extremity  -     gabapentin (NEURONTIN) 300 mg capsule; Take 1 capsule (300 mg total) by mouth 3 (three) times a day    Plantar fasciitis    Tinea cruris  -     clotrimazole (LOTRIMIN) 1 % cream; Apply topically 2 (two) times a day          Subjective:   Chief Complaint   Patient presents with    Leg Pain     pt c/o bilateral foot pain with pain up into his L calf  pt states he L foot is worse than right  pt needs his diabetic foot exam done  i will do his hba1c today  A1c was 6 5     Patient ID: Erick Massey is a 68 y o  male  Rash in groin  Saw dermatologist who said change soap and dont take hot showers     HPI  The patient is a 57-year-old male with type 2 diabetes, essential hypertension, asthma as well as a history of a CVA last year who presents today stating that he has had pain in his left calf which awakens him from sleep at night at times  He also has radiation into his left foot which began recently  He states that in the morning when he 1st tries to walk on the foot it is worse  He has had no injuries  The pain in the calf is described as worse than a cramp  It is not claudicatory by description  He also complains of rash in his Crural region bilaterally  He saw Dermatology who told him that he should stop taking hot showers and change soap  He states that he continues to have symptoms in his left arm as well after his CVA  The following portions of the patient's history were reviewed and updated as appropriate: allergies, current medications, past medical history, past social history and problem list     Review of Systems   Constitution: Negative for decreased appetite, weight gain and weight loss  Cardiovascular: Negative for chest pain, leg swelling, orthopnea and paroxysmal nocturnal dyspnea  Respiratory: Negative for cough, shortness of breath and wheezing  Musculoskeletal: Positive for muscle cramps and muscle weakness  Negative for joint pain  Gastrointestinal: Negative for bowel incontinence  Genitourinary: Negative for bladder incontinence  Neurological: Negative for dizziness and headaches  Psychiatric/Behavioral: Negative for altered mental status and depression  The patient is not nervous/anxious  Objective:    Physical Exam   Constitutional: He is oriented to person, place, and time  He appears well-developed and well-nourished  No distress  Cardiovascular: Pulses are no weak pulses  Pulses:       Dorsalis pedis pulses are 2+ on the right side, and 2+ on the left side  He has normal capillary refill  He is normal dorsalis pedis pulses  Musculoskeletal: He exhibits tenderness  He exhibits no edema or deformity  He has no Teri sign and no cord    No calf tenderness presently  He does have plantar fascial tenderness  He has normal arch  Normal calcaneus  Feet:   Right Foot:   Skin Integrity: Negative for callus or dry skin  Left Foot:   Skin Integrity: Negative for callus or dry skin  Neurological: He is alert and oriented to person, place, and time  Skin: Skin is warm and dry  Rash noted  There is erythema  Crural region bilaterally and to the upper thighs reveals an erythematous eruption with advancing edge which appears consistent with tinea cruris  Psychiatric: He has a normal mood and affect  Thought content normal      Patient's shoes and socks removed  Right Foot/Ankle   Right Foot Inspection  Skin Exam: no dry skin, no callus and no callus                            Sensory       Monofilament testing: intact  Vascular  Capillary refills: < 3 seconds  The right DP pulse is 2+  Left Foot/Ankle  Left Foot Inspection  Skin Exam: no dry skin and no callus                                         Sensory       Monofilament: intact  Vascular  Capillary refills: < 3 seconds  The left DP pulse is 2+  Assign Risk Category:  No deformity present; No loss of protective sensation;  No weak pulses       Risk: 0

## 2018-06-04 NOTE — ASSESSMENT & PLAN NOTE
He had a hemoglobin A1c today which was 6 5 and he was congratulated for this  His diabetic foot exam was normal as well today  Will have a follow-up visit within the month for his general medical check

## 2018-06-04 NOTE — ASSESSMENT & PLAN NOTE
We gave him some gabapentin to try 300 mg HS  We are going to follow up with him and his check up later this month  Will call sooner as needed  We did discussed possibility of sedation related to this medication

## 2018-06-05 LAB
ALBUMIN/CREAT UR: 2 MCG/MG CREAT
CREAT UR-MCNC: 140 MG/DL (ref 20–370)
MICROALBUMIN UR-MCNC: 0.3 MG/DL

## 2018-06-06 LAB
LEFT EYE DIABETIC RETINOPATHY: NORMAL
RIGHT EYE DIABETIC RETINOPATHY: NORMAL

## 2018-08-13 DIAGNOSIS — E11.9 TYPE 2 DIABETES MELLITUS WITHOUT COMPLICATION (HCC): ICD-10-CM

## 2018-08-23 ENCOUNTER — OFFICE VISIT (OUTPATIENT)
Dept: NEUROLOGY | Facility: CLINIC | Age: 73
End: 2018-08-23
Payer: MEDICARE

## 2018-08-23 VITALS
BODY MASS INDEX: 31.06 KG/M2 | WEIGHT: 222.66 LBS | HEART RATE: 78 BPM | DIASTOLIC BLOOD PRESSURE: 68 MMHG | SYSTOLIC BLOOD PRESSURE: 122 MMHG

## 2018-08-23 DIAGNOSIS — I63.411 STROKE DUE TO EMBOLISM OF RIGHT MIDDLE CEREBRAL ARTERY (HCC): Primary | ICD-10-CM

## 2018-08-23 PROCEDURE — 99212 OFFICE O/P EST SF 10 MIN: CPT | Performed by: PSYCHIATRY & NEUROLOGY

## 2018-08-23 NOTE — PROGRESS NOTES
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 greater than leg showing continued improvement  Subjective:    HPI      Patient is status post  Right middle cerebral artery stroke  Doing well  Of very compliant with medication and no symptoms or signs of repeat ischemia       Objective: There were no vitals taken for this visit  Physical Exam    Neurological Exam  A mild left arm greater than leg weakness 4r over 5      ROS:    Review of Systems   Constitutional: Positive for fatigue  HENT: Negative  Eyes: Negative  Respiratory: Negative  Cardiovascular: Negative  Gastrointestinal: Negative  Endocrine: Positive for cold intolerance  Genitourinary: Negative  Musculoskeletal: Negative  Negative for neck pain  Calf Pain sitting on recliner cramping pains 9/10   Skin: Negative  Allergic/Immunologic: Negative  Neurological: Negative  Hematological: Negative  Psychiatric/Behavioral: Negative

## 2018-10-08 ENCOUNTER — OFFICE VISIT (OUTPATIENT)
Dept: FAMILY MEDICINE CLINIC | Facility: CLINIC | Age: 73
End: 2018-10-08
Payer: MEDICARE

## 2018-10-08 VITALS
OXYGEN SATURATION: 95 % | HEIGHT: 71 IN | HEART RATE: 49 BPM | TEMPERATURE: 97.8 F | SYSTOLIC BLOOD PRESSURE: 128 MMHG | BODY MASS INDEX: 32.06 KG/M2 | DIASTOLIC BLOOD PRESSURE: 72 MMHG | WEIGHT: 229 LBS

## 2018-10-08 DIAGNOSIS — I63.233 CEREBROVASCULAR ACCIDENT (CVA) DUE TO BILATERAL OCCLUSION OF CAROTID ARTERIES (HCC): ICD-10-CM

## 2018-10-08 DIAGNOSIS — Z11.59 NEED FOR HEPATITIS C SCREENING TEST: ICD-10-CM

## 2018-10-08 DIAGNOSIS — E78.5 HYPERLIPIDEMIA, UNSPECIFIED HYPERLIPIDEMIA TYPE: ICD-10-CM

## 2018-10-08 DIAGNOSIS — J45.20 MILD INTERMITTENT ASTHMA WITHOUT COMPLICATION: Chronic | ICD-10-CM

## 2018-10-08 DIAGNOSIS — I65.23 BILATERAL CAROTID ARTERY STENOSIS: ICD-10-CM

## 2018-10-08 DIAGNOSIS — Z23 NEED FOR INFLUENZA VACCINATION: Primary | ICD-10-CM

## 2018-10-08 DIAGNOSIS — E11.21 DIABETIC NEPHROPATHY ASSOCIATED WITH TYPE 2 DIABETES MELLITUS (HCC): ICD-10-CM

## 2018-10-08 DIAGNOSIS — I10 BENIGN ESSENTIAL HYPERTENSION: Chronic | ICD-10-CM

## 2018-10-08 DIAGNOSIS — R25.2 CRAMPS OF LEFT LOWER EXTREMITY: ICD-10-CM

## 2018-10-08 DIAGNOSIS — L98.9 DERMATOSIS: ICD-10-CM

## 2018-10-08 DIAGNOSIS — Z13.0 SCREENING FOR IRON DEFICIENCY ANEMIA: ICD-10-CM

## 2018-10-08 PROCEDURE — 36415 COLL VENOUS BLD VENIPUNCTURE: CPT | Performed by: FAMILY MEDICINE

## 2018-10-08 PROCEDURE — 99214 OFFICE O/P EST MOD 30 MIN: CPT | Performed by: FAMILY MEDICINE

## 2018-10-08 PROCEDURE — G0008 ADMIN INFLUENZA VIRUS VAC: HCPCS

## 2018-10-08 PROCEDURE — 90662 IIV NO PRSV INCREASED AG IM: CPT

## 2018-10-08 RX ORDER — ATORVASTATIN CALCIUM 80 MG/1
80 TABLET, FILM COATED ORAL EVERY EVENING
Qty: 30 TABLET | Refills: 5 | Status: SHIPPED | OUTPATIENT
Start: 2018-10-08 | End: 2019-06-21

## 2018-10-08 RX ORDER — GABAPENTIN 300 MG/1
300 CAPSULE ORAL 3 TIMES DAILY
Qty: 30 CAPSULE | Refills: 5 | Status: SHIPPED | OUTPATIENT
Start: 2018-10-08 | End: 2019-01-10 | Stop reason: ALTCHOICE

## 2018-10-08 NOTE — PROGRESS NOTES
Assessment/Plan:  Diabetic nephropathy associated with type 2 diabetes mellitus ([facility] )  Lab Results   Component Value Date    HGBA1C 6 5 06/04/2018     We are going to get hemoglobin A1c today in addition to CBC CMP lipids  He is also going to resume Neurontin  Will follow up with him when blood work results are available  No results for input(s): POCGLU in the last 72 hours  Blood Sugar Average: Last 72 hrs:      Asthma  Currently quiescent  Benign essential hypertension  Blood pressure acceptable today off blood pressure medication  Cerebrovascular accident (CVA) due to bilateral occlusion of carotid arteries ([facility] )  Continue to follow with Neurology  Continue aspirin, reinstitute atorvastatin  Chronic renal disease, stage I  CMP today    Hyperlipidemia  Lipids today  Dermatosis  He is going to try Lotrimin  If no improvement in 7-10 days will call  He and wife agree  A1c       Diagnoses and all orders for this visit:    Need for influenza vaccination  -     influenza vaccine, [id number], high-dose, PF 0 5 mL, for patients 65 yr+ (FLUZONE HIGH-DOSE)    Bilateral carotid artery stenosis  -     CBC  -     Comprehensive metabolic panel  -     Lipid panel; Future  -     VAS carotid complete study; Future  -     atorvastatin (LIPITOR) 80 mg tablet; Take 1 tablet (80 mg total) by mouth every evening  -     co-enzyme Q-10 30 MG capsule; Take 1 capsule (30 mg total) by mouth daily    Need for hepatitis C screening test  -     Hepatitis C Ab W/Refl To HCV RNA, Qn, PCR    Diabetic nephropathy associated with type 2 diabetes mellitus (HCC)    Mild intermittent asthma without complication    Cerebrovascular accident (CVA) due to bilateral occlusion of carotid arteries (HCC)    Hyperlipidemia, unspecified hyperlipidemia type  -     CBC  -     Comprehensive metabolic panel  -     Lipid panel; Future  -     VAS carotid complete study; Future  -     atorvastatin (LIPITOR) 80 mg tablet;  Take 1 tablet (80 mg total) by mouth every evening  -     co-enzyme Q-10 30 MG capsule; Take 1 capsule (30 mg total) by mouth daily    Cramps of left lower extremity  -     gabapentin (NEURONTIN) 300 mg capsule; Take 1 capsule (300 mg total) by mouth 3 (three) times a day Begin by taking 1 at bedtime  Advance dose slowly  Benign essential hypertension    Dermatosis      we spent 35 minutes with the patient  Twenty-five was spent counseling  Subjective:   Chief Complaint   Patient presents with    Diabetes     pt here for his diabetic check and fbw  he will get a flu shot today  Patient ID: Jacob Chappell is a 68 y o  male  My tailbone hurts from riding the tractor  I have 2 sores on my butt  I pee a lot during the day and at night  SMBG always   Slow stream  Neurontin effective for foot pain  Statin caused myalgias  HPI  The patient is a 42-year-old male who presents today for follow-up of multiple medical problems including type 2 diabetes, hyperlipidemia, asthma, history of stroke with left sided weakness of arm greater than leg  He did see Neurology recently  By review his only medications currently are aspirin and metformin  He did decrease his dose of metformin to once a day  We reviewed neurology's note and it is not clear whether Neurology was aware that he had discontinued multiple medications  He did try Neurontin at his last visit as we had encouraged for his diabetic neuropathy  He states that he felt that it was helpful but apparently when it ran out he discontinued  He had myalgias from atorvastatin  We suggested he try Coke you 10 which did not happen  Complains of a sore on the right side of his gluteal cleft which he would like checked  He denies any recurrent /progressive neurologic symptoms  He has no cardiovascular pulmonary complaint  He states that his asthma has been relatively asymptomatic    He does admit to some increased frequency of urination as well as nocturia  The following portions of the patient's history were reviewed and updated as appropriate: allergies, current medications, past medical history, past social history, past surgical history and problem list     ROS    Review of systems as per HPI  Objective:    Physical Exam   Constitutional: He is oriented to person, place, and time  He appears well-developed and well-nourished  No distress  Overweight   Eyes: Right eye exhibits no discharge  Left eye exhibits no discharge  No scleral icterus  Neck: Neck supple  No JVD present  No thyromegaly present  Cardiovascular: Normal rate and regular rhythm  Pulmonary/Chest: Effort normal and breath sounds normal  No respiratory distress  He has no wheezes  He has no rales  Musculoskeletal: He exhibits no edema  Lymphadenopathy:     He has no cervical adenopathy  Neurological: He is alert and oriented to person, place, and time  Some mild weakness of left  strength as well as left leg raise  Skin: Rash noted  He has annular area adjacent to his right superior gluteal cleft  It does appear to have a raised edge with some central scaling  It appears more consistent with tinea corporis than eczema and does not appear consistent with pressure sore currently  Psychiatric: He has a normal mood and affect

## 2018-10-08 NOTE — ASSESSMENT & PLAN NOTE
Lab Results   Component Value Date    HGBA1C 6 5 06/04/2018     We are going to get hemoglobin A1c today in addition to CBC CMP lipids  He is also going to resume Neurontin  Will follow up with him when blood work results are available  No results for input(s): POCGLU in the last 72 hours      Blood Sugar Average: Last 72 hrs:

## 2018-10-09 LAB
ALBUMIN SERPL-MCNC: 4 G/DL (ref 3.6–5.1)
ALBUMIN/GLOB SERPL: 1.7 (CALC) (ref 1–2.5)
ALP SERPL-CCNC: 53 U/L (ref 40–115)
ALT SERPL-CCNC: 13 U/L (ref 9–46)
AST SERPL-CCNC: 14 U/L (ref 10–35)
BILIRUB SERPL-MCNC: 0.6 MG/DL (ref 0.2–1.2)
BUN SERPL-MCNC: 17 MG/DL (ref 7–25)
BUN/CREAT SERPL: ABNORMAL (CALC) (ref 6–22)
CALCIUM SERPL-MCNC: 9.6 MG/DL (ref 8.6–10.3)
CHLORIDE SERPL-SCNC: 106 MMOL/L (ref 98–110)
CHOLEST SERPL-MCNC: 195 MG/DL
CHOLEST/HDLC SERPL: 3.9 (CALC)
CO2 SERPL-SCNC: 25 MMOL/L (ref 20–32)
CREAT SERPL-MCNC: 1.18 MG/DL (ref 0.7–1.18)
ERYTHROCYTE [DISTWIDTH] IN BLOOD BY AUTOMATED COUNT: 13.3 % (ref 11–15)
GLOBULIN SER CALC-MCNC: 2.3 G/DL (CALC) (ref 1.9–3.7)
GLUCOSE SERPL-MCNC: 113 MG/DL (ref 65–99)
HBA1C MFR BLD: 6.4 % OF TOTAL HGB
HCT VFR BLD AUTO: 45.7 % (ref 38.5–50)
HCV AB S/CO SERPL IA: 0.11
HCV AB SERPL QL IA: NORMAL
HDLC SERPL-MCNC: 50 MG/DL
HGB BLD-MCNC: 15.2 G/DL (ref 13.2–17.1)
LDLC SERPL CALC-MCNC: 121 MG/DL (CALC)
MCH RBC QN AUTO: 29.3 PG (ref 27–33)
MCHC RBC AUTO-ENTMCNC: 33.3 G/DL (ref 32–36)
MCV RBC AUTO: 88.1 FL (ref 80–100)
NONHDLC SERPL-MCNC: 145 MG/DL (CALC)
PLATELET # BLD AUTO: 185 THOUSAND/UL (ref 140–400)
PMV BLD REES-ECKER: 10.3 FL (ref 7.5–12.5)
POTASSIUM SERPL-SCNC: 4.2 MMOL/L (ref 3.5–5.3)
PROT SERPL-MCNC: 6.3 G/DL (ref 6.1–8.1)
RBC # BLD AUTO: 5.19 MILLION/UL (ref 4.2–5.8)
SL AMB EGFR AFRICAN AMERICAN: 71 ML/MIN/1.73M2
SL AMB EGFR NON AFRICAN AMERICAN: 61 ML/MIN/1.73M2
SODIUM SERPL-SCNC: 143 MMOL/L (ref 135–146)
TRIGL SERPL-MCNC: 125 MG/DL
WBC # BLD AUTO: 7.6 THOUSAND/UL (ref 3.8–10.8)

## 2018-10-16 ENCOUNTER — HOSPITAL ENCOUNTER (OUTPATIENT)
Dept: NON INVASIVE DIAGNOSTICS | Facility: CLINIC | Age: 73
Discharge: HOME | End: 2018-10-16
Payer: MEDICARE

## 2018-10-16 DIAGNOSIS — I65.23 BILATERAL CAROTID ARTERY STENOSIS: ICD-10-CM

## 2018-10-16 DIAGNOSIS — E78.5 HYPERLIPIDEMIA, UNSPECIFIED HYPERLIPIDEMIA TYPE: ICD-10-CM

## 2018-10-16 PROCEDURE — 93880 EXTRACRANIAL BILAT STUDY: CPT

## 2018-10-16 PROCEDURE — 93880 EXTRACRANIAL BILAT STUDY: CPT | Performed by: SURGERY

## 2018-12-05 LAB
LEFT EYE DIABETIC RETINOPATHY: NORMAL
RIGHT EYE DIABETIC RETINOPATHY: NORMAL
SEVERITY (EYE EXAM): NORMAL

## 2019-01-10 ENCOUNTER — OFFICE VISIT (OUTPATIENT)
Dept: FAMILY MEDICINE CLINIC | Facility: CLINIC | Age: 74
End: 2019-01-10
Payer: MEDICARE

## 2019-01-10 VITALS
HEART RATE: 63 BPM | SYSTOLIC BLOOD PRESSURE: 118 MMHG | WEIGHT: 230 LBS | OXYGEN SATURATION: 95 % | DIASTOLIC BLOOD PRESSURE: 72 MMHG | BODY MASS INDEX: 32.2 KG/M2 | TEMPERATURE: 99.4 F | HEIGHT: 71 IN

## 2019-01-10 DIAGNOSIS — I63.233 CEREBROVASCULAR ACCIDENT (CVA) DUE TO BILATERAL OCCLUSION OF CAROTID ARTERIES (HCC): ICD-10-CM

## 2019-01-10 DIAGNOSIS — J06.9 VIRAL URI: Primary | ICD-10-CM

## 2019-01-10 DIAGNOSIS — E11.21 DIABETIC NEPHROPATHY ASSOCIATED WITH TYPE 2 DIABETES MELLITUS (HCC): ICD-10-CM

## 2019-01-10 DIAGNOSIS — M17.12 PRIMARY OSTEOARTHRITIS OF LEFT KNEE: ICD-10-CM

## 2019-01-10 DIAGNOSIS — E11.8 TYPE 2 DIABETES MELLITUS WITH COMPLICATION, WITHOUT LONG-TERM CURRENT USE OF INSULIN (HCC): ICD-10-CM

## 2019-01-10 DIAGNOSIS — J45.20 MILD INTERMITTENT ASTHMA WITHOUT COMPLICATION: Chronic | ICD-10-CM

## 2019-01-10 LAB — SL AMB POCT HEMOGLOBIN AIC: 6.6 (ref ?–6.5)

## 2019-01-10 PROCEDURE — 83036 HEMOGLOBIN GLYCOSYLATED A1C: CPT | Performed by: FAMILY MEDICINE

## 2019-01-10 PROCEDURE — 99214 OFFICE O/P EST MOD 30 MIN: CPT | Performed by: FAMILY MEDICINE

## 2019-01-10 NOTE — ASSESSMENT & PLAN NOTE
No recurrence of cerebral vascular symptomatology  He did discontinue atorvastatin due to myalgias  We are going to suggested he try Co Q10 to see if that will alleviate his symptomatology

## 2019-01-10 NOTE — ASSESSMENT & PLAN NOTE
Using nebulizer due to tightness in chest   Continues to be effective  Will continue for observation of resolution of symptoms

## 2019-01-10 NOTE — ASSESSMENT & PLAN NOTE
Lab Results   Component Value Date    HGBA1C 6 6 (A) 01/10/2019    Hemoglobin A1c today is at goal   His last GFR was 61 in October  Continue with management of diabetes  No results for input(s): POCGLU in the last 72 hours      Blood Sugar Average: Last 72 hrs:

## 2019-01-10 NOTE — PROGRESS NOTES
Assessment/Plan:  Type 2 diabetes mellitus ([place] [address] )  Lab Results   Component Value Date    HGBA1C 6 6 (A) 01/10/2019    His hemoglobin A1c today is 6 6 at goal   He is going to continue with metformin once a day  Will re-evaluate 6 months  No results for input(s): POCGLU in the last 72 hours  Blood Sugar Average: Last 72 hrs:      Diabetic nephropathy associated with type 2 diabetes mellitus ([place] [address] )  Lab Results   Component Value Date    HGBA1C 6 6 (A) 01/10/2019    Hemoglobin A1c today is at goal   His last GFR was 61 in October  Continue with management of diabetes  No results for input(s): POCGLU in the last 72 hours  Blood Sugar Average: Last 72 hrs:      Asthma  Using nebulizer due to tightness in chest   Continues to be effective  Will continue for observation of resolution of symptoms  Benign essential hypertension  Blood pressure remains well controlled off of agents presently  Continue with salt restriction  CVA (cerebral vascular accident) ([place] [address] )  No recurrence of cerebral vascular symptomatology  He did discontinue atorvastatin due to myalgias  We are going to suggested he try Co Q10 to see if that will alleviate his symptomatology  Viral URI  The patient is going to push fluids and rest   He will use Mucinex  He is asked to call if symptoms have not resolved in a week or more  Will call sooner should his condition deteriorate or seek more urgent medical attention as needed  He agrees  Diagnoses and all orders for this visit:    Viral URI    Type 2 diabetes mellitus with complication, without long-term current use of insulin (HCC)  -     POCT hemoglobin A1c    Diabetic nephropathy associated with type 2 diabetes mellitus (HCC)    Mild intermittent asthma without complication    Cerebrovascular accident (CVA) due to bilateral occlusion of carotid arteries (HCC)  -     co-enzyme Q-10 50 MG capsule;  Take 1 capsule (50 mg total) by mouth daily    Primary osteoarthritis of left knee  -     Ambulatory referral to Orthopedic Surgery; Future          Subjective:   Chief Complaint   Patient presents with    Sinus Problem     head congestion, st better  pt c/o L knee pain    I started with a cold 2 days ago  Had flu vaccine  Stopped Lipitor due to leg aches  Did not try CoQ10  Cough but no sputum  Nasal d/c is clear  No fever  I feel tight though no wheeze or dyspnea  Patient ID: Loreta Garcia is a 68 y o  male  HPI  The patient is a 70-year-old male who presents today with complaint of upper respiratory symptoms of 2 days duration  He did have an influenza vaccine  He also complains that he has chronic left knee pain and has had no follow-up with Orthopedic surgery for some time  Additionally he states that he stopped his atorvastatin due to myalgias in his legs  Regards to his respiratory infection he states he has a cough but no sputum  He does feel tightness chest and has been using his albuterol by nebulization  Prior to this he was having no issues with his asthma such as wheezing coughing X cetera  He has clear nasal discharge  He has had no fever, headaches, myalgias X cetera  No facial pressure  He states that his knee is him daily discomfort  He uses a cane which gives him some relief  He had previously talked about total joint replacement with Orthopedic surgery but was afraid when he had discussion relation to MRSA  He states a friend of his had knee replacement, developed MRSA and had a lot of difficulty related to this  Regards to his asthma he has had no symptoms prior to developing this respiratory infection  Now he has nocturnal tightness in his chest   It is not exertional in nature  He states he had myalgias in his legs which resolved when he stopped his atorvastatin  He has been anxious due to his history of CVA and vascular disease  He has had no headache diplopia or other focal neurologic symptom    The following portions of the patient's history were reviewed and updated as appropriate: allergies, current medications, past family history, past medical history, past social history, past surgical history and problem list     Review of Systems   Constitution: Negative for chills, decreased appetite and fever  Cardiovascular: Negative for chest pain and leg swelling  Respiratory: Positive for cough  Negative for shortness of breath, sputum production and wheezing  Skin: Negative for rash  Gastrointestinal: Negative for constipation, diarrhea, nausea and vomiting  Genitourinary: Positive for frequency and nocturia  Nocturia x 4  Daytime frequency as well  Neurological: Negative for dizziness and headaches  Psychiatric/Behavioral: Negative for depression  The patient does not have insomnia and is not nervous/anxious  Objective:    Physical Exam   Constitutional: He is oriented to person, place, and time  He appears well-developed and well-nourished  No distress  HENT:   Mouth/Throat: No oropharyngeal exudate  He has no ulcer exudate or lesion  Mucosa of oral cavity and oropharynx is moist   He does have some nasal bogginess with watery nasal discharge  No purulent nasal discharge  No paranasal tenderness  Eyes: Conjunctivae are normal  Right eye exhibits no discharge  Left eye exhibits no discharge  Neck: Neck supple  No JVD present  No thyromegaly present  Cardiovascular: Normal rate, regular rhythm and normal heart sounds  Exam reveals no gallop  No murmur heard  Pulmonary/Chest: Effort normal    He has some mild expiratory delay  He has no crackle rhonchi or wheeze presently  Musculoskeletal: He exhibits tenderness and deformity  He has some left medial joint line tenderness  He has synovial thickening  No effusion  Range of motion is relatively normal    Lymphadenopathy:     He has no cervical adenopathy  Neurological: He is alert and oriented to person, place, and time     Psychiatric: He has a normal mood and affect  Thought content normal    Nursing note and vitals reviewed

## 2019-01-10 NOTE — ASSESSMENT & PLAN NOTE
The patient is going to push fluids and rest   He will use Mucinex  He is asked to call if symptoms have not resolved in a week or more  Will call sooner should his condition deteriorate or seek more urgent medical attention as needed  He agrees

## 2019-01-10 NOTE — ASSESSMENT & PLAN NOTE
Lab Results   Component Value Date    HGBA1C 6 6 (A) 01/10/2019    His hemoglobin A1c today is 6 6 at goal   He is going to continue with metformin once a day  Will re-evaluate 6 months  No results for input(s): POCGLU in the last 72 hours      Blood Sugar Average: Last 72 hrs:

## 2019-01-28 ENCOUNTER — OFFICE VISIT (OUTPATIENT)
Dept: OBGYN CLINIC | Facility: CLINIC | Age: 74
End: 2019-01-28
Payer: MEDICARE

## 2019-01-28 ENCOUNTER — APPOINTMENT (OUTPATIENT)
Dept: RADIOLOGY | Facility: CLINIC | Age: 74
End: 2019-01-28
Payer: MEDICARE

## 2019-01-28 VITALS
HEART RATE: 52 BPM | WEIGHT: 230 LBS | BODY MASS INDEX: 32.2 KG/M2 | SYSTOLIC BLOOD PRESSURE: 176 MMHG | DIASTOLIC BLOOD PRESSURE: 78 MMHG | HEIGHT: 71 IN

## 2019-01-28 DIAGNOSIS — M17.12 PRIMARY OSTEOARTHRITIS OF LEFT KNEE: ICD-10-CM

## 2019-01-28 DIAGNOSIS — M17.12 PRIMARY OSTEOARTHRITIS OF LEFT KNEE: Primary | ICD-10-CM

## 2019-01-28 PROCEDURE — 73562 X-RAY EXAM OF KNEE 3: CPT

## 2019-01-28 PROCEDURE — 20610 DRAIN/INJ JOINT/BURSA W/O US: CPT | Performed by: ORTHOPAEDIC SURGERY

## 2019-01-28 PROCEDURE — 99213 OFFICE O/P EST LOW 20 MIN: CPT | Performed by: ORTHOPAEDIC SURGERY

## 2019-01-28 RX ORDER — BUPIVACAINE HYDROCHLORIDE 2.5 MG/ML
1 INJECTION, SOLUTION INFILTRATION; PERINEURAL
Status: COMPLETED | OUTPATIENT
Start: 2019-01-28 | End: 2019-01-28

## 2019-01-28 RX ORDER — BETAMETHASONE SODIUM PHOSPHATE AND BETAMETHASONE ACETATE 3; 3 MG/ML; MG/ML
12 INJECTION, SUSPENSION INTRA-ARTICULAR; INTRALESIONAL; INTRAMUSCULAR; SOFT TISSUE
Status: COMPLETED | OUTPATIENT
Start: 2019-01-28 | End: 2019-01-28

## 2019-01-28 RX ORDER — LIDOCAINE HYDROCHLORIDE 10 MG/ML
1 INJECTION, SOLUTION INFILTRATION; PERINEURAL
Status: COMPLETED | OUTPATIENT
Start: 2019-01-28 | End: 2019-01-28

## 2019-01-28 RX ORDER — MELOXICAM 15 MG/1
15 TABLET ORAL DAILY
Qty: 30 TABLET | Refills: 0 | Status: SHIPPED | OUTPATIENT
Start: 2019-01-28 | End: 2019-01-28

## 2019-01-28 RX ADMIN — BETAMETHASONE SODIUM PHOSPHATE AND BETAMETHASONE ACETATE 12 MG: 3; 3 INJECTION, SUSPENSION INTRA-ARTICULAR; INTRALESIONAL; INTRAMUSCULAR; SOFT TISSUE at 12:31

## 2019-01-28 RX ADMIN — LIDOCAINE HYDROCHLORIDE 1 ML: 10 INJECTION, SOLUTION INFILTRATION; PERINEURAL at 12:31

## 2019-01-28 RX ADMIN — BUPIVACAINE HYDROCHLORIDE 1 ML: 2.5 INJECTION, SOLUTION INFILTRATION; PERINEURAL at 12:31
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
## 2019-04-04 ENCOUNTER — TELEPHONE (OUTPATIENT)
Dept: OBGYN CLINIC | Facility: HOSPITAL | Age: 74
End: 2019-04-04

## 2019-04-19 ENCOUNTER — OFFICE VISIT (OUTPATIENT)
Dept: FAMILY MEDICINE CLINIC | Facility: CLINIC | Age: 74
End: 2019-04-19
Payer: MEDICARE

## 2019-04-19 VITALS
HEART RATE: 50 BPM | TEMPERATURE: 97.8 F | WEIGHT: 231 LBS | BODY MASS INDEX: 32.34 KG/M2 | OXYGEN SATURATION: 97 % | HEIGHT: 71 IN | DIASTOLIC BLOOD PRESSURE: 76 MMHG | SYSTOLIC BLOOD PRESSURE: 130 MMHG

## 2019-04-19 DIAGNOSIS — Z12.11 COLON CANCER SCREENING: ICD-10-CM

## 2019-04-19 DIAGNOSIS — Z23 ENCOUNTER FOR IMMUNIZATION: ICD-10-CM

## 2019-04-19 DIAGNOSIS — I73.9 CLAUDICATION OF BOTH LOWER EXTREMITIES (HCC): ICD-10-CM

## 2019-04-19 DIAGNOSIS — I63.9 CEREBROVASCULAR ACCIDENT (CVA), UNSPECIFIED MECHANISM (HCC): ICD-10-CM

## 2019-04-19 DIAGNOSIS — Z12.5 SCREENING FOR PROSTATE CANCER: ICD-10-CM

## 2019-04-19 DIAGNOSIS — Z13.0 SCREENING FOR IRON DEFICIENCY ANEMIA: ICD-10-CM

## 2019-04-19 DIAGNOSIS — Z00.00 MEDICARE ANNUAL WELLNESS VISIT, INITIAL: Primary | ICD-10-CM

## 2019-04-19 DIAGNOSIS — E11.65 TYPE 2 DIABETES MELLITUS WITH HYPERGLYCEMIA, WITHOUT LONG-TERM CURRENT USE OF INSULIN (HCC): Chronic | ICD-10-CM

## 2019-04-19 PROBLEM — J06.9 VIRAL URI: Status: RESOLVED | Noted: 2019-01-10 | Resolved: 2019-04-19

## 2019-04-19 LAB — SL AMB POCT HEMOGLOBIN AIC: 6.7 (ref ?–6.5)

## 2019-04-19 PROCEDURE — 83036 HEMOGLOBIN GLYCOSYLATED A1C: CPT | Performed by: FAMILY MEDICINE

## 2019-04-19 PROCEDURE — 99214 OFFICE O/P EST MOD 30 MIN: CPT | Performed by: FAMILY MEDICINE

## 2019-04-19 PROCEDURE — G0009 ADMIN PNEUMOCOCCAL VACCINE: HCPCS | Performed by: FAMILY MEDICINE

## 2019-04-19 PROCEDURE — 90670 PCV13 VACCINE IM: CPT | Performed by: FAMILY MEDICINE

## 2019-04-19 PROCEDURE — G0438 PPPS, INITIAL VISIT: HCPCS | Performed by: FAMILY MEDICINE

## 2019-04-20 LAB
ALBUMIN SERPL-MCNC: 3.8 G/DL (ref 3.6–5.1)
ALBUMIN/GLOB SERPL: 1.6 (CALC) (ref 1–2.5)
ALP SERPL-CCNC: 61 U/L (ref 40–115)
ALT SERPL-CCNC: 18 U/L (ref 9–46)
AST SERPL-CCNC: 20 U/L (ref 10–35)
BILIRUB SERPL-MCNC: 0.6 MG/DL (ref 0.2–1.2)
BUN SERPL-MCNC: 13 MG/DL (ref 7–25)
BUN/CREAT SERPL: ABNORMAL (CALC) (ref 6–22)
CALCIUM SERPL-MCNC: 9.3 MG/DL (ref 8.6–10.3)
CHLORIDE SERPL-SCNC: 108 MMOL/L (ref 98–110)
CHOLEST SERPL-MCNC: 106 MG/DL
CHOLEST/HDLC SERPL: 2.4 (CALC)
CO2 SERPL-SCNC: 26 MMOL/L (ref 20–32)
CREAT SERPL-MCNC: 1.09 MG/DL (ref 0.7–1.18)
ERYTHROCYTE [DISTWIDTH] IN BLOOD BY AUTOMATED COUNT: 13.5 % (ref 11–15)
GLOBULIN SER CALC-MCNC: 2.4 G/DL (CALC) (ref 1.9–3.7)
GLUCOSE SERPL-MCNC: 129 MG/DL (ref 65–99)
HCT VFR BLD AUTO: 44.3 % (ref 38.5–50)
HDLC SERPL-MCNC: 45 MG/DL
HGB BLD-MCNC: 14.5 G/DL (ref 13.2–17.1)
LDLC SERPL CALC-MCNC: 47 MG/DL (CALC)
MCH RBC QN AUTO: 28.5 PG (ref 27–33)
MCHC RBC AUTO-ENTMCNC: 32.7 G/DL (ref 32–36)
MCV RBC AUTO: 87 FL (ref 80–100)
NONHDLC SERPL-MCNC: 61 MG/DL (CALC)
PLATELET # BLD AUTO: 171 THOUSAND/UL (ref 140–400)
PMV BLD REES-ECKER: 10.9 FL (ref 7.5–12.5)
POTASSIUM SERPL-SCNC: 4.3 MMOL/L (ref 3.5–5.3)
PROT SERPL-MCNC: 6.2 G/DL (ref 6.1–8.1)
PSA SERPL-MCNC: 1 NG/ML
RBC # BLD AUTO: 5.09 MILLION/UL (ref 4.2–5.8)
SL AMB EGFR AFRICAN AMERICAN: 78 ML/MIN/1.73M2
SL AMB EGFR NON AFRICAN AMERICAN: 67 ML/MIN/1.73M2
SODIUM SERPL-SCNC: 144 MMOL/L (ref 135–146)
TRIGL SERPL-MCNC: 56 MG/DL
WBC # BLD AUTO: 6.6 THOUSAND/UL (ref 3.8–10.8)

## 2019-04-22 ENCOUNTER — OFFICE VISIT (OUTPATIENT)
Dept: OBGYN CLINIC | Facility: MEDICAL CENTER | Age: 74
End: 2019-04-22
Payer: MEDICARE

## 2019-04-22 DIAGNOSIS — M17.12 PRIMARY OSTEOARTHRITIS OF LEFT KNEE: Primary | ICD-10-CM

## 2019-04-22 PROCEDURE — 20610 DRAIN/INJ JOINT/BURSA W/O US: CPT | Performed by: ORTHOPAEDIC SURGERY

## 2019-04-22 RX ORDER — HYALURONATE SODIUM 10 MG/ML
20 SYRINGE (ML) INTRAARTICULAR
Status: COMPLETED | OUTPATIENT
Start: 2019-04-22 | End: 2019-04-22

## 2019-04-22 RX ADMIN — Medication 20 MG: at 13:14

## 2019-04-23 DIAGNOSIS — E11.9 TYPE 2 DIABETES MELLITUS WITHOUT COMPLICATION (HCC): ICD-10-CM

## 2019-04-24 LAB
LEFT EYE DIABETIC RETINOPATHY: NORMAL
RIGHT EYE DIABETIC RETINOPATHY: NORMAL

## 2019-05-01 ENCOUNTER — OFFICE VISIT (OUTPATIENT)
Dept: OBGYN CLINIC | Facility: MEDICAL CENTER | Age: 74
End: 2019-05-01
Payer: MEDICARE

## 2019-05-01 DIAGNOSIS — M17.12 PRIMARY OSTEOARTHRITIS OF LEFT KNEE: Primary | ICD-10-CM

## 2019-05-01 PROCEDURE — 20610 DRAIN/INJ JOINT/BURSA W/O US: CPT | Performed by: ORTHOPAEDIC SURGERY

## 2019-05-01 RX ORDER — HYALURONATE SODIUM 10 MG/ML
20 SYRINGE (ML) INTRAARTICULAR
Status: COMPLETED | OUTPATIENT
Start: 2019-05-01 | End: 2019-05-01

## 2019-05-01 RX ADMIN — Medication 20 MG: at 11:33

## 2019-05-06 ENCOUNTER — OFFICE VISIT (OUTPATIENT)
Dept: OBGYN CLINIC | Facility: MEDICAL CENTER | Age: 74
End: 2019-05-06
Payer: MEDICARE

## 2019-05-06 DIAGNOSIS — M17.12 PRIMARY OSTEOARTHRITIS OF LEFT KNEE: Primary | ICD-10-CM

## 2019-05-06 PROCEDURE — 20610 DRAIN/INJ JOINT/BURSA W/O US: CPT | Performed by: ORTHOPAEDIC SURGERY

## 2019-05-06 RX ORDER — HYALURONATE SODIUM 10 MG/ML
20 SYRINGE (ML) INTRAARTICULAR
Status: COMPLETED | OUTPATIENT
Start: 2019-05-06 | End: 2019-05-06

## 2019-05-06 RX ADMIN — Medication 20 MG: at 13:18

## 2019-05-14 ENCOUNTER — HOSPITAL ENCOUNTER (EMERGENCY)
Facility: HOSPITAL | Age: 74
Discharge: HOME | End: 2019-05-14
Attending: EMERGENCY MEDICINE | Admitting: EMERGENCY MEDICINE
Payer: MEDICARE

## 2019-05-14 ENCOUNTER — APPOINTMENT (EMERGENCY)
Dept: RADIOLOGY | Facility: HOSPITAL | Age: 74
End: 2019-05-14
Payer: MEDICARE

## 2019-05-14 VITALS
OXYGEN SATURATION: 96 % | BODY MASS INDEX: 32.08 KG/M2 | TEMPERATURE: 97 F | HEART RATE: 48 BPM | SYSTOLIC BLOOD PRESSURE: 135 MMHG | WEIGHT: 230 LBS | RESPIRATION RATE: 16 BRPM | DIASTOLIC BLOOD PRESSURE: 66 MMHG

## 2019-05-14 DIAGNOSIS — R10.9 RIGHT FLANK PAIN: Primary | ICD-10-CM

## 2019-05-14 DIAGNOSIS — R10.31 RIGHT GROIN PAIN: ICD-10-CM

## 2019-05-14 LAB
ALBUMIN SERPL BCP-MCNC: 3.5 G/DL (ref 3.5–5)
ALP SERPL-CCNC: 75 U/L (ref 46–116)
ALT SERPL W P-5'-P-CCNC: 26 U/L (ref 12–78)
ANION GAP SERPL CALCULATED.3IONS-SCNC: 3 MMOL/L (ref 4–13)
AST SERPL W P-5'-P-CCNC: 20 U/L (ref 5–45)
BASOPHILS # BLD AUTO: 0.02 THOUSANDS/ΜL (ref 0–0.1)
BASOPHILS NFR BLD AUTO: 0 % (ref 0–1)
BILIRUB SERPL-MCNC: 0.7 MG/DL (ref 0.2–1)
BILIRUB UR QL STRIP: NEGATIVE
BUN SERPL-MCNC: 17 MG/DL (ref 5–25)
CALCIUM SERPL-MCNC: 9 MG/DL (ref 8.3–10.1)
CHLORIDE SERPL-SCNC: 107 MMOL/L (ref 100–108)
CLARITY UR: CLEAR
CLARITY, POC: CLEAR
CO2 SERPL-SCNC: 30 MMOL/L (ref 21–32)
COLOR UR: YELLOW
COLOR, POC: YELLOW
CREAT SERPL-MCNC: 1.2 MG/DL (ref 0.6–1.3)
EOSINOPHIL # BLD AUTO: 0.12 THOUSAND/ΜL (ref 0–0.61)
EOSINOPHIL NFR BLD AUTO: 2 % (ref 0–6)
ERYTHROCYTE [DISTWIDTH] IN BLOOD BY AUTOMATED COUNT: 13.3 % (ref 11.6–15.1)
GFR SERPL CREATININE-BSD FRML MDRD: 59 ML/MIN/1.73SQ M
GLUCOSE SERPL-MCNC: 141 MG/DL (ref 65–140)
GLUCOSE UR STRIP-MCNC: NEGATIVE MG/DL
HCT VFR BLD AUTO: 47.5 % (ref 36.5–49.3)
HGB BLD-MCNC: 15.5 G/DL (ref 12–17)
HGB UR QL STRIP.AUTO: NEGATIVE
IMM GRANULOCYTES # BLD AUTO: 0.02 THOUSAND/UL (ref 0–0.2)
IMM GRANULOCYTES NFR BLD AUTO: 0 % (ref 0–2)
KETONES UR STRIP-MCNC: NEGATIVE MG/DL
LEUKOCYTE ESTERASE UR QL STRIP: NEGATIVE
LYMPHOCYTES # BLD AUTO: 2.44 THOUSANDS/ΜL (ref 0.6–4.47)
LYMPHOCYTES NFR BLD AUTO: 30 % (ref 14–44)
MCH RBC QN AUTO: 28.6 PG (ref 26.8–34.3)
MCHC RBC AUTO-ENTMCNC: 32.6 G/DL (ref 31.4–37.4)
MCV RBC AUTO: 88 FL (ref 82–98)
MONOCYTES # BLD AUTO: 0.67 THOUSAND/ΜL (ref 0.17–1.22)
MONOCYTES NFR BLD AUTO: 8 % (ref 4–12)
NEUTROPHILS # BLD AUTO: 4.83 THOUSANDS/ΜL (ref 1.85–7.62)
NEUTS SEG NFR BLD AUTO: 60 % (ref 43–75)
NITRITE UR QL STRIP: NEGATIVE
NRBC BLD AUTO-RTO: 0 /100 WBCS
PH UR STRIP.AUTO: 5 [PH] (ref 4.5–8)
PLATELET # BLD AUTO: 169 THOUSANDS/UL (ref 149–390)
PMV BLD AUTO: 10.1 FL (ref 8.9–12.7)
POTASSIUM SERPL-SCNC: 4 MMOL/L (ref 3.5–5.3)
PROT SERPL-MCNC: 7.2 G/DL (ref 6.4–8.2)
PROT UR STRIP-MCNC: NEGATIVE MG/DL
RBC # BLD AUTO: 5.42 MILLION/UL (ref 3.88–5.62)
SODIUM SERPL-SCNC: 140 MMOL/L (ref 136–145)
SP GR UR STRIP.AUTO: >=1.03 (ref 1–1.03)
UROBILINOGEN UR QL STRIP.AUTO: 0.2 E.U./DL
WBC # BLD AUTO: 8.1 THOUSAND/UL (ref 4.31–10.16)

## 2019-05-14 PROCEDURE — 85025 COMPLETE CBC W/AUTO DIFF WBC: CPT | Performed by: EMERGENCY MEDICINE

## 2019-05-14 PROCEDURE — 99284 EMERGENCY DEPT VISIT MOD MDM: CPT | Performed by: EMERGENCY MEDICINE

## 2019-05-14 PROCEDURE — 74177 CT ABD & PELVIS W/CONTRAST: CPT

## 2019-05-14 PROCEDURE — 36415 COLL VENOUS BLD VENIPUNCTURE: CPT | Performed by: EMERGENCY MEDICINE

## 2019-05-14 PROCEDURE — 81003 URINALYSIS AUTO W/O SCOPE: CPT

## 2019-05-14 PROCEDURE — 99284 EMERGENCY DEPT VISIT MOD MDM: CPT

## 2019-05-14 PROCEDURE — 80053 COMPREHEN METABOLIC PANEL: CPT | Performed by: EMERGENCY MEDICINE

## 2019-05-14 RX ADMIN — IOHEXOL 100 ML: 350 INJECTION, SOLUTION INTRAVENOUS at 11:57

## 2019-05-17 ENCOUNTER — VBI (OUTPATIENT)
Dept: ADMINISTRATIVE | Facility: OTHER | Age: 74
End: 2019-05-17

## 2019-05-20 ENCOUNTER — HOSPITAL ENCOUNTER (OUTPATIENT)
Dept: ULTRASOUND IMAGING | Facility: HOSPITAL | Age: 74
Discharge: HOME | End: 2019-05-20
Payer: MEDICARE

## 2019-05-20 ENCOUNTER — HOSPITAL ENCOUNTER (OUTPATIENT)
Dept: NON INVASIVE DIAGNOSTICS | Facility: CLINIC | Age: 74
Discharge: HOME | End: 2019-05-20
Payer: MEDICARE

## 2019-05-20 DIAGNOSIS — I73.9 CLAUDICATION OF BOTH LOWER EXTREMITIES (HCC): ICD-10-CM

## 2019-05-20 DIAGNOSIS — E11.65 TYPE 2 DIABETES MELLITUS WITH HYPERGLYCEMIA, WITHOUT LONG-TERM CURRENT USE OF INSULIN (HCC): Chronic | ICD-10-CM

## 2019-05-20 PROCEDURE — 76775 US EXAM ABDO BACK WALL LIM: CPT

## 2019-05-20 PROCEDURE — 93923 UPR/LXTR ART STDY 3+ LVLS: CPT

## 2019-05-20 PROCEDURE — 93923 UPR/LXTR ART STDY 3+ LVLS: CPT | Performed by: SURGERY

## 2019-06-20 ENCOUNTER — ANESTHESIA EVENT (OUTPATIENT)
Dept: GASTROENTEROLOGY | Facility: HOSPITAL | Age: 74
End: 2019-06-20

## 2019-06-21 ENCOUNTER — ANESTHESIA (OUTPATIENT)
Dept: GASTROENTEROLOGY | Facility: HOSPITAL | Age: 74
End: 2019-06-21

## 2019-06-21 ENCOUNTER — HOSPITAL ENCOUNTER (OUTPATIENT)
Dept: GASTROENTEROLOGY | Facility: HOSPITAL | Age: 74
Setting detail: OUTPATIENT SURGERY
Discharge: HOME | End: 2019-06-21
Attending: COLON & RECTAL SURGERY | Admitting: COLON & RECTAL SURGERY
Payer: MEDICARE

## 2019-06-21 VITALS
RESPIRATION RATE: 18 BRPM | HEIGHT: 71 IN | TEMPERATURE: 97.2 F | SYSTOLIC BLOOD PRESSURE: 109 MMHG | OXYGEN SATURATION: 97 % | DIASTOLIC BLOOD PRESSURE: 64 MMHG | HEART RATE: 50 BPM | WEIGHT: 230 LBS | BODY MASS INDEX: 32.2 KG/M2

## 2019-06-21 DIAGNOSIS — Z12.11 COLON CANCER SCREENING: ICD-10-CM

## 2019-06-21 LAB
ATRIAL RATE: 53 BPM
P AXIS: 35 DEGREES
PR INTERVAL: 328 MS
QRS AXIS: 57 DEGREES
QRSD INTERVAL: 142 MS
QT INTERVAL: 488 MS
QTC INTERVAL: 457 MS
T WAVE AXIS: 12 DEGREES
VENTRICULAR RATE: 53 BPM

## 2019-06-21 PROCEDURE — 45385 COLONOSCOPY W/LESION REMOVAL: CPT | Performed by: COLON & RECTAL SURGERY

## 2019-06-21 PROCEDURE — 93005 ELECTROCARDIOGRAM TRACING: CPT

## 2019-06-21 PROCEDURE — 99203 OFFICE O/P NEW LOW 30 MIN: CPT | Performed by: COLON & RECTAL SURGERY

## 2019-06-21 PROCEDURE — 88305 TISSUE EXAM BY PATHOLOGIST: CPT | Performed by: PATHOLOGY

## 2019-06-21 PROCEDURE — 93010 ELECTROCARDIOGRAM REPORT: CPT | Performed by: INTERNAL MEDICINE

## 2019-06-21 PROCEDURE — 1123F ACP DISCUSS/DSCN MKR DOCD: CPT | Performed by: INTERNAL MEDICINE

## 2019-06-21 PROCEDURE — 45384 COLONOSCOPY W/LESION REMOVAL: CPT | Performed by: COLON & RECTAL SURGERY

## 2019-06-21 RX ORDER — PROPOFOL 10 MG/ML
INJECTION, EMULSION INTRAVENOUS CONTINUOUS PRN
Status: DISCONTINUED | OUTPATIENT
Start: 2019-06-21 | End: 2019-06-21 | Stop reason: SURG

## 2019-06-21 RX ORDER — PROPOFOL 10 MG/ML
INJECTION, EMULSION INTRAVENOUS AS NEEDED
Status: DISCONTINUED | OUTPATIENT
Start: 2019-06-21 | End: 2019-06-21 | Stop reason: SURG

## 2019-06-21 RX ORDER — SODIUM CHLORIDE 9 MG/ML
125 INJECTION, SOLUTION INTRAVENOUS CONTINUOUS
Status: DISCONTINUED | OUTPATIENT
Start: 2019-06-21 | End: 2019-06-25 | Stop reason: HOSPADM

## 2019-06-21 RX ORDER — LIDOCAINE HYDROCHLORIDE 10 MG/ML
INJECTION, SOLUTION INFILTRATION; PERINEURAL AS NEEDED
Status: DISCONTINUED | OUTPATIENT
Start: 2019-06-21 | End: 2019-06-21 | Stop reason: SURG

## 2019-06-21 RX ORDER — EPHEDRINE SULFATE 50 MG/ML
INJECTION INTRAVENOUS AS NEEDED
Status: DISCONTINUED | OUTPATIENT
Start: 2019-06-21 | End: 2019-06-21 | Stop reason: SURG

## 2019-06-21 RX ADMIN — EPHEDRINE SULFATE 5 MG: 50 INJECTION, SOLUTION INTRAVENOUS at 08:22

## 2019-06-21 RX ADMIN — LIDOCAINE HYDROCHLORIDE 50 MG: 10 INJECTION, SOLUTION INFILTRATION; PERINEURAL at 07:44

## 2019-06-21 RX ADMIN — PROPOFOL 30 MG: 10 INJECTION, EMULSION INTRAVENOUS at 08:11

## 2019-06-21 RX ADMIN — EPHEDRINE SULFATE 5 MG: 50 INJECTION, SOLUTION INTRAVENOUS at 08:06

## 2019-06-21 RX ADMIN — EPHEDRINE SULFATE 5 MG: 50 INJECTION, SOLUTION INTRAVENOUS at 07:58

## 2019-06-21 RX ADMIN — PROPOFOL 70 MG: 10 INJECTION, EMULSION INTRAVENOUS at 07:45

## 2019-06-21 RX ADMIN — EPHEDRINE SULFATE 5 MG: 50 INJECTION, SOLUTION INTRAVENOUS at 08:26

## 2019-06-21 RX ADMIN — EPHEDRINE SULFATE 10 MG: 50 INJECTION, SOLUTION INTRAVENOUS at 08:10

## 2019-06-21 RX ADMIN — SODIUM CHLORIDE: 0.9 INJECTION, SOLUTION INTRAVENOUS at 07:36

## 2019-06-21 RX ADMIN — EPHEDRINE SULFATE 5 MG: 50 INJECTION, SOLUTION INTRAVENOUS at 07:53

## 2019-06-21 RX ADMIN — PROPOFOL 100 MCG/KG/MIN: 10 INJECTION, EMULSION INTRAVENOUS at 07:46

## 2019-09-11 ENCOUNTER — TELEPHONE (OUTPATIENT)
Dept: NEUROLOGY | Facility: CLINIC | Age: 74
End: 2019-09-11

## 2019-09-11 NOTE — TELEPHONE ENCOUNTER
Patient seen by Dr Benedicto Belcher in the past  Patient asked to be scheduled with Dr Jesus Ragland  Patient scheduled 10/23 in [place] at 1:30 pm  Are you agreeable to this  Or would you like to reschedule

## 2019-10-14 ENCOUNTER — APPOINTMENT (EMERGENCY)
Dept: RADIOLOGY | Facility: HOSPITAL | Age: 74
DRG: 101 | End: 2019-10-14
Payer: MEDICARE

## 2019-10-14 ENCOUNTER — APPOINTMENT (INPATIENT)
Dept: RADIOLOGY | Facility: HOSPITAL | Age: 74
DRG: 101 | End: 2019-10-14
Payer: MEDICARE

## 2019-10-14 ENCOUNTER — APPOINTMENT (INPATIENT)
Dept: NON INVASIVE DIAGNOSTICS | Facility: HOSPITAL | Age: 74
DRG: 101 | End: 2019-10-14
Payer: MEDICARE

## 2019-10-14 ENCOUNTER — HOSPITAL ENCOUNTER (INPATIENT)
Facility: HOSPITAL | Age: 74
LOS: 2 days | Discharge: HOME | DRG: 101 | End: 2019-10-16
Attending: EMERGENCY MEDICINE | Admitting: INTERNAL MEDICINE
Payer: MEDICARE

## 2019-10-14 DIAGNOSIS — I63.9 CEREBROVASCULAR ACCIDENT (CVA), UNSPECIFIED MECHANISM (HCC): ICD-10-CM

## 2019-10-14 DIAGNOSIS — I63.9 STROKE DETERMINED BY CLINICAL ASSESSMENT (HCC): ICD-10-CM

## 2019-10-14 DIAGNOSIS — G45.9 TIA (TRANSIENT ISCHEMIC ATTACK): Primary | ICD-10-CM

## 2019-10-14 PROBLEM — N17.9 AKI (ACUTE KIDNEY INJURY) (HCC): Status: ACTIVE | Noted: 2019-10-14

## 2019-10-14 LAB
ALBUMIN SERPL BCP-MCNC: 3.5 G/DL (ref 3.5–5)
ALP SERPL-CCNC: 49 U/L (ref 46–116)
ALT SERPL W P-5'-P-CCNC: 17 U/L (ref 12–78)
ANION GAP BLD CALC-SCNC: 16 MMOL/L (ref 4–13)
ANION GAP SERPL CALCULATED.3IONS-SCNC: 6 MMOL/L (ref 4–13)
ANION GAP SERPL CALCULATED.3IONS-SCNC: 9 MMOL/L (ref 4–13)
APTT PPP: 19 SECONDS (ref 23–37)
AST SERPL W P-5'-P-CCNC: 13 U/L (ref 5–45)
ATRIAL RATE: 67 BPM
BASE EXCESS BLDA CALC-SCNC: 3 MMOL/L (ref -2–3)
BASOPHILS # BLD AUTO: 0.02 THOUSANDS/ΜL (ref 0–0.1)
BASOPHILS NFR BLD AUTO: 0 % (ref 0–1)
BILIRUB SERPL-MCNC: 0.5 MG/DL (ref 0.2–1)
BILIRUB UR QL STRIP: NEGATIVE
BUN BLD-MCNC: 29 MG/DL (ref 5–25)
BUN SERPL-MCNC: 18 MG/DL (ref 5–25)
BUN SERPL-MCNC: 24 MG/DL (ref 5–25)
CA-I BLD-SCNC: 1.19 MMOL/L (ref 1.12–1.32)
CA-I BLD-SCNC: 1.19 MMOL/L (ref 1.12–1.32)
CALCIUM SERPL-MCNC: 8.7 MG/DL (ref 8.3–10.1)
CALCIUM SERPL-MCNC: 9.5 MG/DL (ref 8.3–10.1)
CHLORIDE BLD-SCNC: 102 MMOL/L (ref 100–108)
CHLORIDE SERPL-SCNC: 106 MMOL/L (ref 100–108)
CHLORIDE SERPL-SCNC: 108 MMOL/L (ref 100–108)
CHOLEST SERPL-MCNC: 159 MG/DL (ref 50–200)
CLARITY UR: CLEAR
CO2 SERPL-SCNC: 28 MMOL/L (ref 21–32)
CO2 SERPL-SCNC: 29 MMOL/L (ref 21–32)
COLOR UR: YELLOW
CREAT BLD-MCNC: 1.2 MG/DL (ref 0.6–1.3)
CREAT SERPL-MCNC: 1.22 MG/DL (ref 0.6–1.3)
CREAT SERPL-MCNC: 1.39 MG/DL (ref 0.6–1.3)
EOSINOPHIL # BLD AUTO: 0.07 THOUSAND/ΜL (ref 0–0.61)
EOSINOPHIL NFR BLD AUTO: 1 % (ref 0–6)
ERYTHROCYTE [DISTWIDTH] IN BLOOD BY AUTOMATED COUNT: 13.2 % (ref 11.6–15.1)
ERYTHROCYTE [DISTWIDTH] IN BLOOD BY AUTOMATED COUNT: 13.2 % (ref 11.6–15.1)
EST. AVERAGE GLUCOSE BLD GHB EST-MCNC: 146 MG/DL
GFR SERPL CREATININE-BSD FRML MDRD: 50 ML/MIN/1.73SQ M
GFR SERPL CREATININE-BSD FRML MDRD: 58 ML/MIN/1.73SQ M
GFR SERPL CREATININE-BSD FRML MDRD: 59 ML/MIN/1.73SQ M
GLUCOSE SERPL-MCNC: 111 MG/DL (ref 65–140)
GLUCOSE SERPL-MCNC: 115 MG/DL (ref 65–140)
GLUCOSE SERPL-MCNC: 115 MG/DL (ref 65–140)
GLUCOSE SERPL-MCNC: 179 MG/DL (ref 65–140)
GLUCOSE SERPL-MCNC: 181 MG/DL (ref 65–140)
GLUCOSE SERPL-MCNC: 183 MG/DL (ref 65–140)
GLUCOSE SERPL-MCNC: 203 MG/DL (ref 65–140)
GLUCOSE SERPL-MCNC: 207 MG/DL (ref 65–140)
GLUCOSE SERPL-MCNC: 95 MG/DL (ref 65–140)
GLUCOSE UR STRIP-MCNC: ABNORMAL MG/DL
HBA1C MFR BLD: 6.7 % (ref 4.2–6.3)
HCO3 BLDA-SCNC: 29.5 MMOL/L (ref 24–30)
HCT VFR BLD AUTO: 41.3 % (ref 36.5–49.3)
HCT VFR BLD AUTO: 47.3 % (ref 36.5–49.3)
HCT VFR BLD CALC: 46 % (ref 36.5–49.3)
HCT VFR BLD CALC: 47 % (ref 36.5–49.3)
HDLC SERPL-MCNC: 40 MG/DL (ref 40–60)
HGB BLD-MCNC: 13.4 G/DL (ref 12–17)
HGB BLD-MCNC: 15.3 G/DL (ref 12–17)
HGB BLDA-MCNC: 15.6 G/DL (ref 12–17)
HGB BLDA-MCNC: 16 G/DL (ref 12–17)
HGB UR QL STRIP.AUTO: NEGATIVE
IMM GRANULOCYTES # BLD AUTO: 0.02 THOUSAND/UL (ref 0–0.2)
IMM GRANULOCYTES NFR BLD AUTO: 0 % (ref 0–2)
INR PPP: 0.95 (ref 0.84–1.19)
KETONES UR STRIP-MCNC: NEGATIVE MG/DL
LDLC SERPL CALC-MCNC: 86 MG/DL (ref 0–100)
LEUKOCYTE ESTERASE UR QL STRIP: NEGATIVE
LYMPHOCYTES # BLD AUTO: 1.83 THOUSANDS/ΜL (ref 0.6–4.47)
LYMPHOCYTES NFR BLD AUTO: 26 % (ref 14–44)
MAGNESIUM SERPL-MCNC: 1.9 MG/DL (ref 1.6–2.6)
MCH RBC QN AUTO: 28.9 PG (ref 26.8–34.3)
MCH RBC QN AUTO: 28.9 PG (ref 26.8–34.3)
MCHC RBC AUTO-ENTMCNC: 32.3 G/DL (ref 31.4–37.4)
MCHC RBC AUTO-ENTMCNC: 32.4 G/DL (ref 31.4–37.4)
MCV RBC AUTO: 89 FL (ref 82–98)
MCV RBC AUTO: 89 FL (ref 82–98)
MONOCYTES # BLD AUTO: 0.46 THOUSAND/ΜL (ref 0.17–1.22)
MONOCYTES NFR BLD AUTO: 7 % (ref 4–12)
NEUTROPHILS # BLD AUTO: 4.6 THOUSANDS/ΜL (ref 1.85–7.62)
NEUTS SEG NFR BLD AUTO: 66 % (ref 43–75)
NITRITE UR QL STRIP: NEGATIVE
NRBC BLD AUTO-RTO: 0 /100 WBCS
P AXIS: 75 DEGREES
PCO2 BLD: 29 MMOL/L (ref 21–32)
PCO2 BLD: 31 MMOL/L (ref 21–32)
PCO2 BLD: 50.2 MM HG (ref 42–50)
PH BLD: 7.38 [PH] (ref 7.3–7.4)
PH UR STRIP.AUTO: 5.5 [PH]
PHOSPHATE SERPL-MCNC: 2.7 MG/DL (ref 2.3–4.1)
PLATELET # BLD AUTO: 141 THOUSANDS/UL (ref 149–390)
PLATELET # BLD AUTO: 166 THOUSANDS/UL (ref 149–390)
PMV BLD AUTO: 10.3 FL (ref 8.9–12.7)
PMV BLD AUTO: 10.5 FL (ref 8.9–12.7)
PO2 BLD: 25 MM HG (ref 35–45)
POTASSIUM BLD-SCNC: 4 MMOL/L (ref 3.5–5.3)
POTASSIUM BLD-SCNC: 4.1 MMOL/L (ref 3.5–5.3)
POTASSIUM SERPL-SCNC: 3.8 MMOL/L (ref 3.5–5.3)
POTASSIUM SERPL-SCNC: 4.1 MMOL/L (ref 3.5–5.3)
PR INTERVAL: 384 MS
PROT SERPL-MCNC: 6.1 G/DL (ref 6.4–8.2)
PROT UR STRIP-MCNC: NEGATIVE MG/DL
PROTHROMBIN TIME: 12.3 SECONDS (ref 11.6–14.5)
QRS AXIS: 83 DEGREES
QRSD INTERVAL: 136 MS
QT INTERVAL: 424 MS
QTC INTERVAL: 448 MS
RBC # BLD AUTO: 4.64 MILLION/UL (ref 3.88–5.62)
RBC # BLD AUTO: 5.3 MILLION/UL (ref 3.88–5.62)
SAO2 % BLD FROM PO2: 43 % (ref 60–85)
SODIUM BLD-SCNC: 142 MMOL/L (ref 136–145)
SODIUM BLD-SCNC: 142 MMOL/L (ref 136–145)
SODIUM SERPL-SCNC: 142 MMOL/L (ref 136–145)
SODIUM SERPL-SCNC: 144 MMOL/L (ref 136–145)
SP GR UR STRIP.AUTO: >1.045 (ref 1–1.03)
SPECIMEN SOURCE: ABNORMAL
SPECIMEN SOURCE: ABNORMAL
T WAVE AXIS: 22 DEGREES
TRIGL SERPL-MCNC: 167 MG/DL
TROPONIN I SERPL-MCNC: <0.02 NG/ML
TSH SERPL DL<=0.05 MIU/L-ACNC: 3.63 UIU/ML (ref 0.36–3.74)
UROBILINOGEN UR QL STRIP.AUTO: 1 E.U./DL
VENTRICULAR RATE: 67 BPM
WBC # BLD AUTO: 7 THOUSAND/UL (ref 4.31–10.16)
WBC # BLD AUTO: 7.02 THOUSAND/UL (ref 4.31–10.16)

## 2019-10-14 PROCEDURE — 82330 ASSAY OF CALCIUM: CPT

## 2019-10-14 PROCEDURE — 85027 COMPLETE CBC AUTOMATED: CPT | Performed by: EMERGENCY MEDICINE

## 2019-10-14 PROCEDURE — 80048 BASIC METABOLIC PNL TOTAL CA: CPT | Performed by: EMERGENCY MEDICINE

## 2019-10-14 PROCEDURE — 96374 THER/PROPH/DIAG INJ IV PUSH: CPT

## 2019-10-14 PROCEDURE — 70498 CT ANGIOGRAPHY NECK: CPT

## 2019-10-14 PROCEDURE — 85610 PROTHROMBIN TIME: CPT | Performed by: EMERGENCY MEDICINE

## 2019-10-14 PROCEDURE — 83735 ASSAY OF MAGNESIUM: CPT | Performed by: INTERNAL MEDICINE

## 2019-10-14 PROCEDURE — 85025 COMPLETE CBC W/AUTO DIFF WBC: CPT | Performed by: INTERNAL MEDICINE

## 2019-10-14 PROCEDURE — 83036 HEMOGLOBIN GLYCOSYLATED A1C: CPT | Performed by: INTERNAL MEDICINE

## 2019-10-14 PROCEDURE — 81003 URINALYSIS AUTO W/O SCOPE: CPT | Performed by: INTERNAL MEDICINE

## 2019-10-14 PROCEDURE — 84443 ASSAY THYROID STIM HORMONE: CPT | Performed by: INTERNAL MEDICINE

## 2019-10-14 PROCEDURE — 99291 CRITICAL CARE FIRST HOUR: CPT

## 2019-10-14 PROCEDURE — 99233 SBSQ HOSP IP/OBS HIGH 50: CPT | Performed by: INTERNAL MEDICINE

## 2019-10-14 PROCEDURE — 82948 REAGENT STRIP/BLOOD GLUCOSE: CPT

## 2019-10-14 PROCEDURE — 85014 HEMATOCRIT: CPT

## 2019-10-14 PROCEDURE — 99223 1ST HOSP IP/OBS HIGH 75: CPT | Performed by: INTERNAL MEDICINE

## 2019-10-14 PROCEDURE — 99223 1ST HOSP IP/OBS HIGH 75: CPT | Performed by: PHYSICAL MEDICINE & REHABILITATION

## 2019-10-14 PROCEDURE — 36415 COLL VENOUS BLD VENIPUNCTURE: CPT | Performed by: EMERGENCY MEDICINE

## 2019-10-14 PROCEDURE — 70551 MRI BRAIN STEM W/O DYE: CPT

## 2019-10-14 PROCEDURE — 99223 1ST HOSP IP/OBS HIGH 75: CPT | Performed by: EMERGENCY MEDICINE

## 2019-10-14 PROCEDURE — 70496 CT ANGIOGRAPHY HEAD: CPT

## 2019-10-14 PROCEDURE — 99291 CRITICAL CARE FIRST HOUR: CPT | Performed by: EMERGENCY MEDICINE

## 2019-10-14 PROCEDURE — 80053 COMPREHEN METABOLIC PANEL: CPT | Performed by: INTERNAL MEDICINE

## 2019-10-14 PROCEDURE — 93010 ELECTROCARDIOGRAM REPORT: CPT | Performed by: INTERNAL MEDICINE

## 2019-10-14 PROCEDURE — 93306 TTE W/DOPPLER COMPLETE: CPT

## 2019-10-14 PROCEDURE — 85730 THROMBOPLASTIN TIME PARTIAL: CPT | Performed by: EMERGENCY MEDICINE

## 2019-10-14 PROCEDURE — 80061 LIPID PANEL: CPT | Performed by: INTERNAL MEDICINE

## 2019-10-14 PROCEDURE — 84295 ASSAY OF SERUM SODIUM: CPT

## 2019-10-14 PROCEDURE — 80047 BASIC METABLC PNL IONIZED CA: CPT

## 2019-10-14 PROCEDURE — 84484 ASSAY OF TROPONIN QUANT: CPT | Performed by: EMERGENCY MEDICINE

## 2019-10-14 PROCEDURE — 93306 TTE W/DOPPLER COMPLETE: CPT | Performed by: INTERNAL MEDICINE

## 2019-10-14 PROCEDURE — 84100 ASSAY OF PHOSPHORUS: CPT | Performed by: INTERNAL MEDICINE

## 2019-10-14 PROCEDURE — 82947 ASSAY GLUCOSE BLOOD QUANT: CPT

## 2019-10-14 PROCEDURE — 82803 BLOOD GASES ANY COMBINATION: CPT

## 2019-10-14 PROCEDURE — 99223 1ST HOSP IP/OBS HIGH 75: CPT | Performed by: PSYCHIATRY & NEUROLOGY

## 2019-10-14 PROCEDURE — 93005 ELECTROCARDIOGRAM TRACING: CPT

## 2019-10-14 PROCEDURE — 84132 ASSAY OF SERUM POTASSIUM: CPT

## 2019-10-14 PROCEDURE — 71045 X-RAY EXAM CHEST 1 VIEW: CPT

## 2019-10-14 RX ORDER — ATORVASTATIN CALCIUM 40 MG/1
40 TABLET, FILM COATED ORAL EVERY EVENING
Status: DISCONTINUED | OUTPATIENT
Start: 2019-10-14 | End: 2019-10-16 | Stop reason: HOSPADM

## 2019-10-14 RX ORDER — ACETAMINOPHEN 325 MG/1
650 TABLET ORAL EVERY 4 HOURS PRN
Status: DISCONTINUED | OUTPATIENT
Start: 2019-10-14 | End: 2019-10-14

## 2019-10-14 RX ORDER — LABETALOL 20 MG/4 ML (5 MG/ML) INTRAVENOUS SYRINGE
10 ONCE
Status: COMPLETED | OUTPATIENT
Start: 2019-10-14 | End: 2019-10-14

## 2019-10-14 RX ORDER — POLYVINYL ALCOHOL 14 MG/ML
1 SOLUTION/ DROPS OPHTHALMIC
Status: DISCONTINUED | OUTPATIENT
Start: 2019-10-14 | End: 2019-10-16 | Stop reason: HOSPADM

## 2019-10-14 RX ORDER — ACETAMINOPHEN 325 MG/1
650 TABLET ORAL EVERY 4 HOURS PRN
Status: DISCONTINUED | OUTPATIENT
Start: 2019-10-14 | End: 2019-10-16 | Stop reason: HOSPADM

## 2019-10-14 RX ORDER — DOCUSATE SODIUM 100 MG/1
100 CAPSULE, LIQUID FILLED ORAL 2 TIMES DAILY PRN
Status: DISCONTINUED | OUTPATIENT
Start: 2019-10-14 | End: 2019-10-16 | Stop reason: HOSPADM

## 2019-10-14 RX ORDER — ACETAMINOPHEN 325 MG/1
650 TABLET ORAL EVERY 6 HOURS PRN
Status: DISCONTINUED | OUTPATIENT
Start: 2019-10-14 | End: 2019-10-14 | Stop reason: SDUPTHER

## 2019-10-14 RX ORDER — SODIUM CHLORIDE, SODIUM GLUCONATE, SODIUM ACETATE, POTASSIUM CHLORIDE, MAGNESIUM CHLORIDE, SODIUM PHOSPHATE, DIBASIC, AND POTASSIUM PHOSPHATE .53; .5; .37; .037; .03; .012; .00082 G/100ML; G/100ML; G/100ML; G/100ML; G/100ML; G/100ML; G/100ML
100 INJECTION, SOLUTION INTRAVENOUS CONTINUOUS
Status: DISPENSED | OUTPATIENT
Start: 2019-10-14 | End: 2019-10-14

## 2019-10-14 RX ORDER — SODIUM CHLORIDE 9 MG/ML
100 INJECTION, SOLUTION INTRAVENOUS ONCE
Status: COMPLETED | OUTPATIENT
Start: 2019-10-14 | End: 2019-10-14

## 2019-10-14 RX ORDER — LABETALOL 20 MG/4 ML (5 MG/ML) INTRAVENOUS SYRINGE
10 EVERY 4 HOURS PRN
Status: DISCONTINUED | OUTPATIENT
Start: 2019-10-14 | End: 2019-10-15

## 2019-10-14 RX ORDER — ONDANSETRON 2 MG/ML
4 INJECTION INTRAMUSCULAR; INTRAVENOUS EVERY 6 HOURS PRN
Status: DISCONTINUED | OUTPATIENT
Start: 2019-10-14 | End: 2019-10-16 | Stop reason: HOSPADM

## 2019-10-14 RX ORDER — MAGNESIUM HYDROXIDE/ALUMINUM HYDROXICE/SIMETHICONE 120; 1200; 1200 MG/30ML; MG/30ML; MG/30ML
30 SUSPENSION ORAL EVERY 6 HOURS PRN
Status: DISCONTINUED | OUTPATIENT
Start: 2019-10-14 | End: 2019-10-16 | Stop reason: HOSPADM

## 2019-10-14 RX ADMIN — SODIUM CHLORIDE, SODIUM GLUCONATE, SODIUM ACETATE, POTASSIUM CHLORIDE, MAGNESIUM CHLORIDE, SODIUM PHOSPHATE, DIBASIC, AND POTASSIUM PHOSPHATE 100 ML/HR: .53; .5; .37; .037; .03; .012; .00082 INJECTION, SOLUTION INTRAVENOUS at 03:58

## 2019-10-14 RX ADMIN — ATORVASTATIN CALCIUM 40 MG: 40 TABLET, FILM COATED ORAL at 18:00

## 2019-10-14 RX ADMIN — SODIUM CHLORIDE 100 ML/HR: 0.9 INJECTION, SOLUTION INTRAVENOUS at 02:23

## 2019-10-14 RX ADMIN — ACETAMINOPHEN 650 MG: 325 TABLET ORAL at 12:51

## 2019-10-14 RX ADMIN — ALTEPLASE 81 MG: KIT at 01:23

## 2019-10-14 RX ADMIN — IOHEXOL 100 ML: 350 INJECTION, SOLUTION INTRAVENOUS at 00:32

## 2019-10-14 RX ADMIN — LABETALOL 20 MG/4 ML (5 MG/ML) INTRAVENOUS SYRINGE 10 MG: at 01:08

## 2019-10-14 NOTE — PLAN OF CARE
Problem: Potential for Falls  Goal: Patient will remain free of falls  Description  INTERVENTIONS:  - Assess patient frequently for physical needs  -  Identify cognitive and physical deficits and behaviors that affect risk of falls  -  [place] fall precautions as indicated by assessment   - Educate patient/family on patient safety including physical limitations  - Instruct patient to call for assistance with activity based on assessment  - Modify environment to reduce risk of injury  - Consider OT/PT consult to assist with strengthening/mobility  Outcome: Progressing     Problem: Prexisting or High Potential for Compromised Skin Integrity  Goal: Skin integrity is maintained or improved  Description  INTERVENTIONS:  - Identify patients at risk for skin breakdown  - Assess and monitor skin integrity  - Assess and monitor nutrition and hydration status  - Monitor labs   - Assess for incontinence   - Turn and reposition patient  - Assist with mobility/ambulation  - Relieve pressure over bony prominences  - Avoid friction and shearing  - Provide appropriate hygiene as needed including keeping skin clean and dry  - Evaluate need for skin moisturizer/barrier cream  - Collaborate with interdisciplinary team   - Patient/family teaching  - Consider wound care consult   Outcome: Progressing     Problem: NEUROSENSORY - ADULT  Goal: Achieves stable or improved neurological status  Description  INTERVENTIONS  - Monitor and report changes in neurological status  - Monitor vital signs such as temperature, blood pressure, glucose, and any other labs ordered      Outcome: Progressing  Goal: Achieves maximal functionality and self care  Description  INTERVENTIONS  - Monitor swallowing and airway patency with patient fatigue and changes in neurological status  - Encourage and assist patient to increase activity and self care     - Encourage visually impaired, hearing impaired and aphasic patients to use assistive/communication devices  Outcome: Progressing
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
## 2019-10-14 NOTE — SPEECH THERAPY NOTE
Speech Language/Pathology    Speech/Language Pathology Progress Note    Patient Name: Huyen Membreno  VVCKO'E Date: 10/14/2019     Problem List  Principal Problem:    CVA (cerebral vascular accident) [facility])  Active Problems:    Benign essential hypertension    Type 2 diabetes mellitus ([facility] [address] )    Hyperlipidemia      Consult received and chart reviewed  Patient is a 77 y/o male with a history of CVA  Patient admitted on stroke pathway with initial left-sided weakness and hemineglect  Patient s/p tPA with subsequent improvement in symptoms per chart review  MRI pending  Patient passed RN dysphagia screening and placed on a regular textured diet  Patient denies difficulty swallowing at this time  Patient informally screened for speech/langauge  Patient previously required speech therapy for cognitive-linguistic skills following stroke in 2017  Patient currently oriented to self, location, situation, month, date, and year  Patient able to correctly name items in environment, follow 1-2 step directions, recall digits forwards and backwards, and count backwards from 10  Patient denies changes in speech, comprehension, expression, or memory  Patient reported he needs to "think for an extra second" in order to obtain the answer  Patient reported he lives with his wife and son and does not drive  RN reported no significant concerns at this time  Speech and language skills appear at/near patient's baseline with informal screening  No further speech or language testing warranted at this time  Please re-consult if needed
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
## 2019-10-14 NOTE — ASSESSMENT & PLAN NOTE
Lab Results   Component Value Date    HGBA1C 6 7 (H) 10/14/2019      check blood sugars before meals and bedtime  Insulin sliding scale    Hold oral hypoglycemics  Recent Labs     10/14/19  0055 10/14/19  0719 10/14/19  1115 10/14/19  1608   POCGLU 207* 95 115 111       Blood Sugar Average: Last 72 hrs:  (P) 132

## 2019-10-14 NOTE — SOCIAL WORK
Met with pt and pt's wife Tom Madden to discuss the role of CM and to discuss any help pt may need prior to dc  Pt lives with his wife Tom Madden and son Mortimer Salk in a 1st floor home with no MARYA  Pt performed ADL's indptly pta, no use of DME  Pt has a hx of -[facility]  Pt's preferred pharmacy is [facility] in [place] or [place] in [facility]  Pt's PCP is Dr Levon Pierson  No hx of mental health or D&A treatment  Contact: Tom Madden (wife) [phone]  Tom Madden will transport home at dc  CM reviewed d/c planning process including the following: identifying help at home, patient preference for d/c planning needs, Discharge Lounge, [facility] Meds to Bed program, availability of treatment team to discuss questions or concerns patient and/or family may have regarding understanding medications and recognizing signs and symptoms once discharged  CM also encouraged patient to follow up with all recommended appointments after discharge  Patient advised of importance for patient and family to participate in managing patients medical well being  Patient/caregiver received discharge checklist  Content reviewed  Patient/caregiver encouraged to participate in discharge plan of care prior to discharge home

## 2019-10-14 NOTE — ED ATTENDING ATTESTATION
10/14/2019  ICristo MD, saw and evaluated the patient  I have discussed the patient with the resident and agree with the resident's findings, Plan of Care, and MDM as documented in the resident's note, unless otherwise documented below  All available labs and Radiology studies were reviewed by myself  I was present for key portions of any procedure(s) performed by the resident and I was immediately available to provide assistance  I agree with the current assessment done in the Emergency Department  I have conducted an independent evaluation of this patient  Briefly, this is a 76 y o  male with PMH of DM, prior R MCA ischemic stroke with hemorrhagic conversion on 7/26/2017 presenting with quivering of his left lip and some facial asymmetry  LKN 2330  According to wife and son, patient has had residual LUE and LLE weakness and some facial asymmetry  Patient was sitting on the sofa this evening, proceeded to have quivering of left lip, was unable to get up, and lost control of his bladder  Remained awake and alert  EMS was called  He had exacerbation of LUE and LLE weakness and his gaze was with rightward preference  He was noted to be extremely hypertensive  He arrives for further evaluation  To EMS, there was concern for confusion as patient stated the year is 2000      Physical Exam  Vitals:    10/14/19 0025 10/14/19 0030 10/14/19 0045 10/14/19 0056   BP: (!) 221/94 (!) 202/89 (!) 175/78    TempSrc:    Oral   Pulse: 72 74 71    Resp: 20 20 16    Patient Position - Orthostatic VS:   Lying    Temp:    98 5 °F (36 9 °C)     Stroke Assessment     Row Name 10/14/19 0024             NIH Stroke Scale    Interval        Level of Consciousness (1a )  0      LOC Questions (1b )  1      LOC Commands (1c )  0      Best Gaze (2 )  0      Visual (3 )  0      Facial Palsy (4 )  1      Motor Arm, Left (5a )  2      Motor Arm, Right (5b )  0      Motor Leg, Left (6a )  2      Motor Leg, Right (6b )  0      Limb Ataxia (7 )  0      Sensory (8 )  1      Best Language (9 )  0      Dysarthria (10 )  0      Extinction and Inattention (11 ) (Formerly Neglect)  1      Total  8            Constitutional:  Awake, alert, reports year is 2000, able to state full name and report he is at the hospital   No acute distress  HEENT:  Normocephalic, atraumatic  Sclera anicteric, conjunctiva not injected  Moist oral mucosa  Cardiac:  Regular rate and rhythm, no murmurs, rubs, or gallops  2+ radial pulses  Respiratory:  Lungs are clear to auscultation bilaterally, no wheezes or rales  Abdomen:  Nondistended  Bowel sounds present  No tenderness to palpation  No rebound or guarding  Extremities:  No deformities, no edema  Integument:  No rashes or exposed areas, cap refill less than 2 seconds  Neurologic:  Awake, alert, oriented to self and location, reports year is 2000  Pupils are 2 mm and reactive  Visual field on the left is limited (no blinking with confrontation on the left), rightward gaze preference without nystagmus  LUE with drift, as is LLE  Extinction is present  See above for NIHSS score  Psychiatric:  Normal affect    ED Course    EKG: NSR, VR 67,  c/w known 1st degree AV block,  with RBBB, QTc 448, normal axis, no ST/T abnormality, no STEMI  76 y o  male presenting with neurologic deficits and hypertension with initial /94  Patient immediately placed on continuous EKG, SpO2 and intermittent BP monitoring and evaluated  On resident physician and my evaluation, concern for stroke, stroke alert called  NIHSS 8  LKN within timeframe for tPA  CT head reveals no intracranial hemorrhage  Patient's blood pressure is spontaneously decreasing  We avoided antihypertensives as SBP was now below 220 and we wanted to allow for permissive hypertension until further guidance from Neurology  Neurologist recommends tPA   In discussion with Neurology, patient still a candidate for tPA even though he had an ischemic stroke with hemorrhagic conversion in the past  I met with patient, his wife, and one of his sons to discuss the risks and benefits  Per son, patient's rightward gaze, LUE and LLE weakness are very similar to his baseline, perhaps mildly worse, but not by much  Given this, I discussed benefits and risks of tPA in a great detail, since there may not have been as much to gain by giving tPA but the risk of bleeding and death were still not negligible  After discussion, family would like to proceed with tPA  I reviewed patient's CXR which revealed no cardiomediastinal widening to point to dissection  CTA head/neck revealed no dissection  Labs are as follows Cr 1 39, glucose 179, rest of BMP WNL, trop WNL, CBC without anemia or thrombocytopenia, Coags WNL  tPA deemed safe to administer and was started within 3 hour window of LKN  Patient admitted to appropriate level of care      Critical Care Time  CriticalCare Time  Performed by: Mara Tran MD  Authorized by: Mara Tran MD     Critical care provider statement:     Critical care time (minutes):  42    Critical care start time:  10/14/2019 12:13 AM    Critical care end time:  10/14/2019 1:44 AM    Critical care time was exclusive of:  Separately billable procedures and treating other patients and teaching time    Critical care was necessary to treat or prevent imminent or life-threatening deterioration of the following conditions:  CNS failure or compromise    Critical care was time spent personally by me on the following activities:  Blood draw for specimens, obtaining history from patient or surrogate, development of treatment plan with patient or surrogate, discussions with consultants, evaluation of patient's response to treatment, examination of patient, ordering and performing treatments and interventions, ordering and review of laboratory studies, ordering and review of radiographic studies, re-evaluation of patient's condition and review of old charts        Clinical Impression  Final diagnoses:   Cerebrovascular accident (CVA), unspecified mechanism ([facility] [address] )

## 2019-10-14 NOTE — QUICK NOTE
Reviewed chart, patient on stroke pathway  Attempted to meet with patient to discuss follow up care with outpatient neurology and stroke education  Patient getting echo at this time  Left a stroke education binder and my card at bedside and introduced myself  Patient familiar with stroke due to personal history  Seen by Dr Del Angel outpatient, I did speak with patient on 9/11 to schedule appointment with Dr Aletha Simpson for 10/23  Patient intends to keep appointment  Will follow up regarding stroke education and additional care after echo

## 2019-10-14 NOTE — UTILIZATION REVIEW
Initial Clinical Review    Admission: Date/Time/Statement: Inpatient Admission Orders (From admission, onward)     Ordered        10/14/19 0225  Inpatient Admission  Once                   Orders Placed This Encounter   Procedures    Inpatient Admission     Standing Status:   Standing     Number of Occurrences:   1     Order Specific Question:   Admitting Physician     Answer:   Angelika Escalante [[id number]]     Order Specific Question:   Level of Care     Answer:   Level 1 Stepdown [13]     Order Specific Question:   Bed request comments     Answer:   post TPA - MICu will follow consult note     Order Specific Question:   Estimated length of stay     Answer:   More than 2 Midnights     Order Specific Question:   Certification     Answer:   I certify that inpatient services are medically necessary for this patient for a duration of greater than two midnights  See H&P and MD Progress Notes for additional information about the patient's course of treatment  ED Arrival Information     Expected Arrival Acuity Means of Arrival Escorted By Service Admission Type    - 10/14/2019 00:12 Immediate Ambulance [facility] General Medicine Emergency    Arrival Complaint    stroke        Chief Complaint   Patient presents with    CVA/TIA-like Symptoms     history of stroke, wife called ems due to facial quivering, Upon ems arrival increased left sided weakness left sided facial droop, not following commands, altered LOC, oriented to person, but not surroundings  EMS noticed a right sided gaze, blood sugar = 257, 20 guage in right ac     Assessment/Plan    76 y o  male who has a past medical history of CVA and this involved the right MCA territory and currently with encephalomalacia at that region  Supposedly, the patient has hypertension however not on any antihypertensive meds  He also has CKD stage 1  Hyperlipidemia  Diabetes mellitus type 2    Asthma      Earlier this evening, the patient had dinner consisting of ham   Within the next hour or so the patient developed dysarthria with dysphonia dysphagia as well as left-sided weakness with note of hemineglect  As the patient's family were concerned about the situation, he was therefore brought to the emergency room of [facility]  He has an elevated blood pressure at that time initially being 206, 221/91 or 94  Labetalol 10 mg was initially given in order to prepare him for the administration of tPA  When the blood pressure was lower tPA was therefore administered  There was subsequent improvement in symptoms  This is also along with the improvement of blood pressure at about 150/72  As the patient became better and vital signs were stable, he was then referred to ICU for possible admission which then deferred to [facility] for step-down 1 admission with them as follow-up  Awaiting Neurology follow-up as well        CVA (cerebral vascular accident) [address]  Initially presenting with left-sided weakness and hemineglect, was noted also to have elevated blood pressure which then became better with administration of labetalol  There was improvement in symptoms as well  However decision was made by Neurology and ER service to administer tPA with subsequent improvement of patient's symptoms  As of moment, patient is admitted under internal medicine service with stroke protocol; MRI, echocardiogram, lipids, TSH and A1c  Neuro checks as per protocol  Occupational and physical therapy with case management    Awaiting further Neurology opinion      Post tPA administration, the patient will need repeat CT scan of the head without contrast in 24 hours time      As per Neurology notes, maintenance of blood pressure should be between 160 to 180       ED Triage Vitals   Temperature Pulse Respirations Blood Pressure SpO2   10/14/19 0056 10/14/19 0016 10/14/19 0016 10/14/19 0016 10/14/19 0016   98 5 °F (36 9 °C) 76 18 (!) 206/91 93 %      Temp Source Heart Rate Source Patient Position - Orthostatic VS BP Location FiO2 (%)   10/14/19 0056 10/14/19 0025 10/14/19 0045 10/14/19 0300 --   Oral Monitor Lying Left arm       Pain Score       10/14/19 0016       No Pain        Wt Readings from Last 1 Encounters:   10/14/19 104 kg (228 lb 2 8 oz)     Additional Vital Signs:   Date/Time  Temp  Pulse  Resp  BP  SpO2  O2 Device  Patient Position - Orthostatic VS   10/14/19 1400    50Abnormal   20  139/59  97 %  None (Room air)  Lying   10/14/19 1300    51Abnormal   18  136/52  96 %  None (Room air)  Lying   10/14/19 1200    50Abnormal   18  128/62  94 %  None (Room air)  Lying   10/14/19 1100    57  18  115/56      Lying   10/14/19 1000  98 2 °F (36 8 °C)  50Abnormal   16  137/54  93 %  None (Room air)  Lying   10/14/19 0930      16  103/63      Lying   10/14/19 0920    52Abnormal              10/14/19 0900      18  118/78      Lying   10/14/19 08:28:31    50Abnormal   18  145/60  96 %    Lying   10/14/19 0800      18  137/72  97 %  None (Room air)  Lying   10/14/19 0751    51Abnormal   16  122/64  97 %    Lying   10/14/19 0700    51Abnormal   16  149/69  96 %  None (Room air)  Lying   10/14/19 0630    54Abnormal   16  138/60  97 %  None (Room air)  Lying   10/14/19 0600    54Abnormal   16  150/76      Lying   10/14/19 0530    50Abnormal   16  137/66  94 %  None (Room air)  Lying   10/14/19 0500    54Abnormal   16  132/65      Lying   10/14/19 0430    53Abnormal   16  163/75      Lying   10/14/19 0400    56  16  161/76      Lying   10/14/19 0330  98 7 °F (37 1 °C)  55  16  184/85Abnormal   96 %  None (Room air)  Lying   10/14/19 0300    54Abnormal   18  181/76Abnormal   95 %  None (Room air)  Lying   10/14/19 0245    54Abnormal   18  164/72  96 %    Lying   10/14/19 0230    54Abnormal   18  158/57  96 %    Lying   10/14/19 0215    56  18  171/74Abnormal   95 %    Lying   10/14/19 0200    58  16  150/72  94 %    Lying 10/14/19 0145    58  16  159/68  94 %    Lying   10/14/19 0130    58  18  175/74Abnormal   94 %    Lying   10/14/19 0115    62  18  166/76  96 %    Lying   10/14/19 0100    67  16  184/72Abnormal   94 %    Lying   10/14/19 0056  98 5 °F (36 9 °C)               10/14/19 0045    71  16  175/78Abnormal   95 %    Lying   10/14/19 0030    74  20  202/89Abnormal   94 %       10/14/19 0025    72  20  221/94Abnormal   97 %             Pertinent Labs/Diagnostic Test Results:     EEG awake or drowsy routine___  MRI pending   CTA  Head and neck  No evidence of acute intracranial hemorrhage  There is a large area of encephalomalacia within the right MCA territory, consistent with patient's history of prior old right MCA territory infarction  There is moderate microangiopathic change  Clinical   correlation is recommended  If there is concern for new infarction, MRI with diffusion-weighted imaging would be suggested, if there are no contraindications      There is either short segment occlusion versus severe stenosis involving the proximal right ICA, just beyond the bifurcation  Contrast is, however, seen more distally within the cervical right ICA  The petrous portion of the right ICA and the cavernous   portion of the right ICA are rather diminutive compared with the contralateral left side, however, both of these areas do demonstrate contrast opacification  On the July 26, 2017 examination, the petrous and cavernous portions of the right ICA were not   opacified and appeared to be occluded  Neuro-interventional consultation is suggested     echo     LEFT VENTRICLE:  Systolic function was normal  Ejection fraction was estimated to be 60 %  There were no regional wall motion abnormalities  Wall thickness was mildly to moderately increased  There was mild concentric hypertrophy    Features were consistent with a pseudonormal left ventricular filling pattern, with concomitant abnormal relaxation and increased filling pressure (grade 2 diastolic dysfunction)      LEFT ATRIUM:  The atrium was mildly dilated      RIGHT ATRIUM:  The atrium was mildly dilated      MITRAL VALVE:  There was mild annular calcification  There was mild regurgitation      AORTIC VALVE:  There was moderate stenosis  Valve mean gradient was 21 mmHg  Estimated aortic valve area (by VTI) was 1 1 cmï¾²  Estimated aortic valve area (by Vmax) was 1 1 cmï¾²  Estimated aortic valve area (by Vmean) was 1 07 cmï¾²     TRICUSPID VALVE:  There was trace regurgitation        Results from last 7 days   Lab Units 10/14/19  1300 10/14/19  0029 10/14/19  0023   WBC Thousand/uL 7 00  --  7 02   HEMOGLOBIN g/dL 13 4  --  15 3   I STAT HEMOGLOBIN g/dl  --  16 0  15 6  --    HEMATOCRIT % 41 3  --  47 3   HEMATOCRIT, ISTAT %  --  47  46  --    PLATELETS Thousands/uL 141*  --  166   NEUTROS ABS Thousands/µL 4 60  --   --          Results from last 7 days   Lab Units 10/14/19  1300 10/14/19  0029 10/14/19  0023   SODIUM mmol/L 142  --  144   POTASSIUM mmol/L 3 8  --  4 1   CHLORIDE mmol/L 108  --  106   CO2 mmol/L 28  --  29   CO2, I-STAT mmol/L  --  29  31  --    AGAP mmol/L  --  16*  --    ANION GAP mmol/L 6  --  9   BUN mg/dL 18  --  24   CREATININE mg/dL 1 22  --  1 39*   EGFR ml/min/1 73sq m 58 59 50   CALCIUM mg/dL 8 7  --  9 5   CALCIUM, IONIZED, ISTAT mmol/L  --  1 19  1 19  --    MAGNESIUM mg/dL 1 9  --   --    PHOSPHORUS mg/dL 2 7  --   --      Results from last 7 days   Lab Units 10/14/19  1300   AST U/L 13   ALT U/L 17   ALK PHOS U/L 49   TOTAL PROTEIN g/dL 6 1*   ALBUMIN g/dL 3 5   TOTAL BILIRUBIN mg/dL 0 50     Results from last 7 days   Lab Units 10/14/19  1115 10/14/19  0719 10/14/19  0055   POC GLUCOSE mg/dl 115 95 207*     Results from last 7 days   Lab Units 10/14/19  1300 10/14/19  0023   GLUCOSE RANDOM mg/dL 203* 179*         Results from last 7 days   Lab Units 10/14/19  0247   HEMOGLOBIN A1C % 6 7*   EAG mg/dl 146     No results found for: BETA-HYDROXYBUTYRATE           Results from last 7 days   Lab Units 10/14/19  0029   PH, ONEYDA I-STAT  7 378   PCO2, ONEYDA ISTAT mm HG 50 2*   PO2, ONEYDA ISTAT mm HG 25 0*   HCO3, ONEYDA ISTAT mmol/L 29 5   I STAT BASE EXC mmol/L 3   I STAT O2 SAT % 43*         Results from last 7 days   Lab Units 10/14/19  0023   TROPONIN I ng/mL <0 02         Results from last 7 days   Lab Units 10/14/19  0023   PROTIME seconds 12 3   INR  0 95   PTT seconds 19*     Results from last 7 days   Lab Units 10/14/19  0247   TSH 3RD GENERATON uIU/mL 3 630         Results from last 7 days   Lab Units 10/14/19  0252   CLARITY UA  Clear   COLOR UA  Yellow   SPEC GRAV UA  >1 045*   PH UA  5 5   GLUCOSE UA mg/dl 100 (1/10%)*   KETONES UA mg/dl Negative   BLOOD UA  Negative   PROTEIN UA mg/dl Negative   NITRITE UA  Negative   BILIRUBIN UA  Negative   UROBILINOGEN UA E U /dl 1 0   LEUKOCYTES UA  Negative       ED Treatment:   Medication Administration from 10/14/2019 0012 to 10/14/2019 0308       Date/Time Order Dose Route     10/14/2019 0122 alteplase (ACTIVASE) bolus 9 mg 9 mg Intravenous     10/14/2019 0123 alteplase (ACTIVASE) infusion 81 mg 81 mg Intravenous     10/14/2019 0223 sodium chloride 0 9 % infusion 100 mL/hr Intravenous     10/14/2019 0108 Labetalol HCl (NORMODYNE) injection 10 mg 10 mg Intravenous     10/14/2019 0053 Labetalol HCl (NORMODYNE) injection 10 mg 0 mg Intravenous        Past Medical History:   Diagnosis Date    Asthma     DM [facility]diabetes mellitus), type 2 ([facility] )     HLD (hyperlipidemia)     Hypertension     Murmur, cardiac     Stroke [facility])      Present on Admission:   Benign essential hypertension   [facility]   CVA (cerebral vascular accident) ([facility] )   Hyperlipidemia      Admitting Diagnosis: Stroke-like symptom [R29 90]  Cerebrovascular accident (CVA), unspecified mechanism ([place] [id number] ) [I63 9]     Age/Sex: 76 y o  male     Admission Orders:    Telemetry  Neuro checks   Every 1 hour x 4 hours, then every 2 hours x 8 hours, then every 4 hours x 72 hours  PT OT Speech eval and treat  Sequential compression device       Medications:  atorvastatin 40 mg Oral QPM   insulin lispro 1-5 Units Subcutaneous HS   insulin lispro 1-6 Units Subcutaneous TID AC       multi-electrolyte 100 mL/hr Intravenous Continuous       acetaminophen 650 mg Oral Q4H PRN   aluminum-magnesium hydroxide-simethicone 30 mL Oral Q6H PRN   docusate sodium 100 mg Oral BID PRN   Labetalol HCl 10 mg Intravenous Q4H PRN   ondansetron 4 mg Intravenous Q6H PRN       IP CONSULT TO NEUROLOGY  IP CONSULT TO PHYSICAL MEDICINE REHAB  IP CONSULT TO NEUROLOGY  IP CONSULT TO CASE MANAGEMENT  IP CONSULT TO NUTRITION SERVICES  IP CONSULT TO MEDICAL CRITICAL CARE    Network Utilization Review Department  Phone: [phone]; Fax [phone]  [email or web address]  ATTENTION: Please call with any questions or concerns to [phone]  and carefully listen to the prompts so that you are directed to the right person  Send all requests for admission clinical reviews, approved or denied determinations and any other requests to fax [phone]   All voicemails are confidential

## 2019-10-14 NOTE — PROGRESS NOTES
Progress Note - Helene Bueno 1945, 76 y o  male MRN: [id number]    Unit/Bed#: [facility] 722-01 Encounter: [id number]    Primary Care Provider: Mortimer Boards, MD   Date and time admitted to hospital: 10/14/2019 12:13 AM        * CVA (cerebral vascular accident) [facility])  Assessment & Plan  Initially presenting with left-sided weakness and hemineglect, status post tPA  Repeat CT scan tonight, monitor blood pressure, keep SBP less than 180  Neuro checks  PT/OT  Neurology consult, appreciate their recommendations  Continue statin, will start Plavix when patient is 24 hours after tPA administration      LB (acute kidney injury) ([facility] )  Assessment & Plan  Creatinine on admission is 1 4  Likely pre renal  Received IV fluid  Monitor BUN creatinine  Avoid nephrotoxin agents    Hyperlipidemia  Assessment & Plan  Currently on Lipitor 40 mg  Type 2 diabetes mellitus (HCC)  Assessment & Plan  Lab Results   Component Value Date    HGBA1C 6 7 (H) 10/14/2019      check blood sugars before meals and bedtime  Insulin sliding scale  Hold oral hypoglycemics  Recent Labs     10/14/19  0055 10/14/19  0719 10/14/19  1115 10/14/19  1608   POCGLU 207* 95 115 111       Blood Sugar Average: Last 72 hrs:  (P) 132    Benign essential hypertension  Assessment & Plan  Patient is to be on blood pressure medications but has not been for last few months  Will likely need to start on blood pressure medications on discharge      VTE Pharmacologic Prophylaxis:   Pharmacologic: Pharmacologic VTE Prophylaxis contraindicated due to TPA administered  Mechanical VTE Prophylaxis in Place: Yes    Patient Centered Rounds: I have performed bedside rounds with nursing staff today  Discussions with Specialists or Other Care Team Provider:  None    Education and Discussions with Family / Patient:  Patient, wife, son    Time Spent for Care: 30 minutes    More than 50% of total time spent on counseling and coordination of care as described above     Current Length of Stay: 0 day(s)    Current Patient Status: Inpatient   Certification Statement: The patient will continue to require additional inpatient hospital stay due to Further monitoring and testing    Discharge Plan:  Likely home within 2-3 days    Code Status: Level 1 - Full Code      Subjective:   No acute overnight events  Patient states he feels well  He denies any chest pain, lightheadedness, shortness of breath, nausea, blurry vision  Objective:     Vitals:   Temp (24hrs), Av 5 °F (36 9 °C), Min:98 2 °F (36 8 °C), Max:98 7 °F (37 1 °C)    Temp:  [98 2 °F (36 8 °C)-98 7 °F (37 1 °C)] 98 2 °F (36 8 °C)  HR:  [50-76] 50  Resp:  [16-20] 19  BP: (103-221)/(52-94) 146/55  SpO2:  [93 %-97 %] 97 %  Body mass index is 31 82 kg/m²  Input and Output Summary (last 24 hours): Intake/Output Summary (Last 24 hours) at 10/14/2019 1715  Last data filed at 10/14/2019 1600  Gross per 24 hour   Intake 406 ml   Output 1150 ml   Net -744 ml       Physical Exam:     Physical Exam   Constitutional: He is oriented to person, place, and time  He appears well-developed and well-nourished  HENT:   Head: Normocephalic and atraumatic  Mouth/Throat: Oropharynx is clear and moist    Eyes: Pupils are equal, round, and reactive to light  Conjunctivae are normal    Neck: Neck supple  No JVD present  Cardiovascular: Normal rate, regular rhythm, normal heart sounds and intact distal pulses  Pulmonary/Chest: Effort normal and breath sounds normal    Abdominal: Soft  Bowel sounds are normal    Musculoskeletal: He exhibits no edema  Neurological: He is alert and oriented to person, place, and time  RUE strength 5/5  LUE strength 4/5  BLE strength 5/5   Skin: Skin is warm and dry  Capillary refill takes less than 2 seconds  Psychiatric: He has a normal mood and affect  His behavior is normal  Judgment and thought content normal    Nursing note and vitals reviewed          Additional Data: Labs:    Results from last 7 days   Lab Units 10/14/19  1300   WBC Thousand/uL 7 00   HEMOGLOBIN g/dL 13 4   HEMATOCRIT % 41 3   PLATELETS Thousands/uL 141*   NEUTROS PCT % 66   LYMPHS PCT % 26   MONOS PCT % 7   EOS PCT % 1     Results from last 7 days   Lab Units 10/14/19  1300   SODIUM mmol/L 142   POTASSIUM mmol/L 3 8   CHLORIDE mmol/L 108   CO2 mmol/L 28   BUN mg/dL 18   CREATININE mg/dL 1 22   ANION GAP mmol/L 6   CALCIUM mg/dL 8 7   ALBUMIN g/dL 3 5   TOTAL BILIRUBIN mg/dL 0 50   ALK PHOS U/L 49   ALT U/L 17   AST U/L 13   GLUCOSE RANDOM mg/dL 203*     Results from last 7 days   Lab Units 10/14/19  0023   INR  0 95     Results from last 7 days   Lab Units 10/14/19  1608 10/14/19  1115 10/14/19  0719 10/14/19  0055   POC GLUCOSE mg/dl 111 115 95 207*     Results from last 7 days   Lab Units 10/14/19  0247   HEMOGLOBIN A1C % 6 7*               * I Have Reviewed All Lab Data Listed Above  * Additional Pertinent Lab Tests Reviewed: All Labs Within Last 24 Hours Reviewed    Imaging:    Imaging Reports Reviewed Today Include: none  Imaging Personally Reviewed by Myself Includes:  none    Recent Cultures (last 7 days):           Last 24 Hours Medication List:     Current Facility-Administered Medications:  acetaminophen 650 mg Oral Q4H PRN Chema Esquivel MD   aluminum-magnesium hydroxide-simethicone 30 mL Oral Q6H PRN Chema Esquivel MD   atorvastatin 40 mg Oral QPM Chema Esquivel MD   docusate sodium 100 mg Oral BID PRN Chema Esquivel MD   insulin lispro 1-5 Units Subcutaneous HS Cheam Esquivel MD   insulin lispro 1-6 Units Subcutaneous TID AC Chema Esquivel MD   Labetalol HCl 10 mg Intravenous Q4H PRN Chema Esquivel MD   ondansetron 4 mg Intravenous Q6H PRN Chema Esquivel MD        Today, Patient Was Seen By: Army Samantha MD    ** Please Note: Dictation voice to text software may have been used in the creation of this document   **

## 2019-10-14 NOTE — ASSESSMENT & PLAN NOTE
Initially presenting with left-sided weakness and hemineglect, was noted also to have elevated blood pressure which then became better with administration of labetalol  There was improvement in symptoms as well  However decision was made by Neurology and ER service to administer tPA with subsequent improvement of patient's symptoms  As of moment, patient is admitted under internal medicine service with stroke protocol; MRI, echocardiogram, lipids, TSH and A1c  Neuro checks as per protocol  Occupational and physical therapy with case management  Awaiting further Neurology opinion  Post tPA administration, the patient will need repeat CT scan of the head without contrast in 24 hours time  As per Neurology notes, maintenance of blood pressure should be between 160 to 180  Consider whether this is recur the sense of stroke secondary to elevated blood pressure on presentation
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
## 2019-10-14 NOTE — ASSESSMENT & PLAN NOTE
Creatinine on admission is 1 4  Likely pre renal  Received IV fluid  Monitor BUN creatinine  Avoid nephrotoxin agents

## 2019-10-14 NOTE — PLAN OF CARE
Problem: Potential for Falls  Goal: Patient will remain free of falls  Description  INTERVENTIONS:  - Assess patient frequently for physical needs  -  Identify cognitive and physical deficits and behaviors that affect risk of falls  -  [place] fall precautions as indicated by assessment   - Educate patient/family on patient safety including physical limitations  - Instruct patient to call for assistance with activity based on assessment  - Modify environment to reduce risk of injury  - Consider OT/PT consult to assist with strengthening/mobility  Outcome: Progressing     Problem: Prexisting or High Potential for Compromised Skin Integrity  Goal: Skin integrity is maintained or improved  Description  INTERVENTIONS:  - Identify patients at risk for skin breakdown  - Assess and monitor skin integrity  - Assess and monitor nutrition and hydration status  - Monitor labs   - Assess for incontinence   - Turn and reposition patient  - Assist with mobility/ambulation  - Relieve pressure over bony prominences  - Avoid friction and shearing  - Provide appropriate hygiene as needed including keeping skin clean and dry  - Evaluate need for skin moisturizer/barrier cream  - Collaborate with interdisciplinary team   - Patient/family teaching  - Consider wound care consult   Outcome: Progressing     Problem: NEUROSENSORY - ADULT  Goal: Achieves stable or improved neurological status  Description  INTERVENTIONS  - Monitor and report changes in neurological status  - Monitor vital signs such as temperature, blood pressure, glucose, and any other labs ordered      Outcome: Progressing  Goal: Achieves maximal functionality and self care  Description  INTERVENTIONS  - Monitor swallowing and airway patency with patient fatigue and changes in neurological status  - Encourage and assist patient to increase activity and self care     - Encourage visually impaired, hearing impaired and aphasic patients to use assistive/communication devices  Outcome: Progressing     Problem: PAIN - ADULT  Goal: Verbalizes/displays adequate comfort level or baseline comfort level  Description  Interventions:  - Encourage patient to monitor pain and request assistance  - Assess pain using appropriate pain scale  - Administer analgesics based on type and severity of pain and evaluate response  - Implement non-pharmacological measures as appropriate and evaluate response  - Consider cultural and social influences on pain and pain management  - Notify physician/advanced practitioner if interventions unsuccessful or patient reports new pain  Outcome: Progressing     Problem: INFECTION - ADULT  Goal: Absence or prevention of progression during hospitalization  Description  INTERVENTIONS:  - Assess and monitor for signs and symptoms of infection  - Monitor lab/diagnostic results  - Monitor all insertion sites, i e  indwelling lines, tubes, and drains  - Monitor endotracheal if appropriate and nasal secretions for changes in amount and color  - [place] appropriate cooling/warming therapies per order  - Administer medications as ordered  - Instruct and encourage patient and family to use good hand hygiene technique  - Identify and instruct in appropriate isolation precautions for identified infection/condition  Outcome: Progressing  Goal: Absence of fever/infection during neutropenic period  Description  INTERVENTIONS:  - Monitor WBC    Outcome: Progressing     Problem: SAFETY ADULT  Goal: Maintain or return to baseline ADL function  Description  INTERVENTIONS:  -  Assess patient's ability to carry out ADLs; assess patient's baseline for ADL function and identify physical deficits which impact ability to perform ADLs (bathing, care of mouth/teeth, toileting, grooming, dressing, etc )  - Assess/evaluate cause of self-care deficits   - Assess range of motion  - Assess patient's mobility; develop plan if impaired  - Assess patient's need for assistive devices and provide as appropriate  - Encourage maximum independence but intervene and supervise when necessary  - Involve family in performance of ADLs  - Assess for home care needs following discharge   - Consider OT consult to assist with ADL evaluation and planning for discharge  - Provide patient education as appropriate  Outcome: Progressing  Goal: Maintain or return mobility status to optimal level  Description  INTERVENTIONS:  - Assess patient's baseline mobility status (ambulation, transfers, stairs, etc )    - Identify cognitive and physical deficits and behaviors that affect mobility  - Identify mobility aids required to assist with transfers and/or ambulation (gait belt, sit-to-stand, lift, walker, cane, etc )  - [place] fall precautions as indicated by assessment  - Record patient progress and toleration of activity level on Mobility SBAR; progress patient to next Phase/Stage  - Instruct patient to call for assistance with activity based on assessment  - Consider rehabilitation consult to assist with strengthening/weightbearing, etc   Outcome: Progressing     Problem: DISCHARGE PLANNING  Goal: Discharge to home or other facility with appropriate resources  Description  INTERVENTIONS:  - Identify barriers to discharge w/patient and caregiver  - Arrange for needed discharge resources and transportation as appropriate  - Identify discharge learning needs (meds, wound care, etc )  - Arrange for interpretive services to assist at discharge as needed  - Refer to Case Management Department for coordinating discharge planning if the patient needs post-hospital services based on physician/advanced practitioner order or complex needs related to functional status, cognitive ability, or social support system  Outcome: Progressing     Problem: Knowledge Deficit  Goal: Patient/family/caregiver demonstrates understanding of disease process, treatment plan, medications, and discharge instructions  Description  Complete learning assessment and assess knowledge base  Interventions:  - Provide teaching at level of understanding  - Provide teaching via preferred learning methods  Outcome: Progressing     Problem: Nutrition/Hydration-ADULT  Goal: Nutrient/Hydration intake appropriate for improving, restoring or maintaining nutritional needs  Description  Monitor and assess patient's nutrition/hydration status for malnutrition  Collaborate with interdisciplinary team and initiate plan and interventions as ordered  Monitor patient's weight and dietary intake as ordered or per policy  Utilize nutrition screening tool and intervene as necessary  Determine patient's food preferences and provide high-protein, high-caloric foods as appropriate       INTERVENTIONS:  - Monitor oral intake, urinary output, labs, and treatment plans  - Assess nutrition and hydration status and recommend course of action  - Evaluate amount of meals eaten  - Assist patient with eating if necessary   - Allow adequate time for meals  - Recommend/ encourage appropriate diets, oral nutritional supplements, and vitamin/mineral supplements  - Order, calculate, and assess calorie counts as needed  - Recommend, monitor, and adjust tube feedings and TPN/PPN based on assessed needs  - Assess need for intravenous fluids  - Provide specific nutrition/hydration education as appropriate  - Include patient/family/caregiver in decisions related to nutrition  Outcome: Progressing

## 2019-10-14 NOTE — ASSESSMENT & PLAN NOTE
Lab Results   Component Value Date    HGBA1C 6 7 (A) 04/19/2019     A1c, check blood sugars before meals and bedtime  Insulin sliding scale  Continue metformin    Recent Labs     10/14/19  0055   POCGLU 207*       Blood Sugar Average: Last 72 hrs:  (P) 207

## 2019-10-14 NOTE — OCCUPATIONAL THERAPY NOTE
Occupational Therapy Cancellation Note  OT orders received, pt's chart reviewed  Per neurology, pt not currently appropriate for OT evaluation at this time due to receiving tPA <24 hours ago  OT to continue to follow and attempt initial OT evaluation as clinically appropriate      LISY Jean, OTR/L

## 2019-10-14 NOTE — ASSESSMENT & PLAN NOTE
Continue to watch blood pressure  After a dose of labetalol, the patient's blood pressure is at 158  Systolic

## 2019-10-14 NOTE — ASSESSMENT & PLAN NOTE
Patient is to be on blood pressure medications but has not been for last few months  Will likely need to start on blood pressure medications on discharge

## 2019-10-14 NOTE — ASSESSMENT & PLAN NOTE
Initially presenting with left-sided weakness and hemineglect, status post tPA  Repeat CT scan tonight, monitor blood pressure, keep SBP less than 180  Neuro checks  PT/OT  Neurology consult, appreciate their recommendations  Continue statin, will start Plavix when patient is 24 hours after tPA administration

## 2019-10-14 NOTE — PHYSICAL THERAPY NOTE
Physical Therapy Cancellation Note  PT orders received; Chart reviewed; Per Neurology, Pt is currently not appropriate secondary to receiving tPA on 10/14/19  PT to continue to follow and will re-attempt PT initial evaluation when medically appropriate       Katy Wilkerson DPT, PT
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
## 2019-10-14 NOTE — QUICK NOTE
Stroke Alert    Paged 12:19am  Responded 12:21am    75 y/o M with hx of prior R MCA territory infarction in 2017 secondary to R ICA occlusion that presents with acute onset of worsened L sided weakness  LKN 11:30pm  NIHSS 12, notable for R gaze preference and neglect (reportedly new) along with worsened hemiparesis on the L from baseline  Last exam from last year reports 4/5 strength on L however unable to sustain antigravity now  /90  HCT and CTA reviewed - no evidence of acute ischemia in addition to chronic ischemia noted when compared to prior CT scan more than 2 years ago; no evidence of hemorrhage; 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

## 2019-10-15 ENCOUNTER — APPOINTMENT (INPATIENT)
Dept: NON INVASIVE DIAGNOSTICS | Facility: HOSPITAL | Age: 74
DRG: 101 | End: 2019-10-15
Payer: MEDICARE

## 2019-10-15 ENCOUNTER — APPOINTMENT (INPATIENT)
Dept: RADIOLOGY | Facility: HOSPITAL | Age: 74
DRG: 101 | End: 2019-10-15
Payer: MEDICARE

## 2019-10-15 ENCOUNTER — APPOINTMENT (INPATIENT)
Dept: NEUROLOGY | Facility: CLINIC | Age: 74
DRG: 101 | End: 2019-10-15
Payer: MEDICARE

## 2019-10-15 LAB
ANION GAP SERPL CALCULATED.3IONS-SCNC: 7 MMOL/L (ref 4–13)
APTT PPP: 25 SECONDS (ref 23–37)
BUN SERPL-MCNC: 16 MG/DL (ref 5–25)
CALCIUM SERPL-MCNC: 9.3 MG/DL (ref 8.3–10.1)
CHLORIDE SERPL-SCNC: 106 MMOL/L (ref 100–108)
CO2 SERPL-SCNC: 30 MMOL/L (ref 21–32)
CREAT SERPL-MCNC: 1.2 MG/DL (ref 0.6–1.3)
ERYTHROCYTE [DISTWIDTH] IN BLOOD BY AUTOMATED COUNT: 13.3 % (ref 11.6–15.1)
GFR SERPL CREATININE-BSD FRML MDRD: 59 ML/MIN/1.73SQ M
GLUCOSE SERPL-MCNC: 104 MG/DL (ref 65–140)
GLUCOSE SERPL-MCNC: 107 MG/DL (ref 65–140)
GLUCOSE SERPL-MCNC: 110 MG/DL (ref 65–140)
GLUCOSE SERPL-MCNC: 131 MG/DL (ref 65–140)
GLUCOSE SERPL-MCNC: 154 MG/DL (ref 65–140)
GLUCOSE SERPL-MCNC: 155 MG/DL (ref 65–140)
HCT VFR BLD AUTO: 42.5 % (ref 36.5–49.3)
HGB BLD-MCNC: 13.7 G/DL (ref 12–17)
INR PPP: 1.06 (ref 0.84–1.19)
MCH RBC QN AUTO: 28.7 PG (ref 26.8–34.3)
MCHC RBC AUTO-ENTMCNC: 32.2 G/DL (ref 31.4–37.4)
MCV RBC AUTO: 89 FL (ref 82–98)
PLATELET # BLD AUTO: 146 THOUSANDS/UL (ref 149–390)
PMV BLD AUTO: 10.5 FL (ref 8.9–12.7)
POTASSIUM SERPL-SCNC: 3.7 MMOL/L (ref 3.5–5.3)
PROTHROMBIN TIME: 13.4 SECONDS (ref 11.6–14.5)
RBC # BLD AUTO: 4.78 MILLION/UL (ref 3.88–5.62)
SODIUM SERPL-SCNC: 143 MMOL/L (ref 136–145)
WBC # BLD AUTO: 7.83 THOUSAND/UL (ref 4.31–10.16)

## 2019-10-15 PROCEDURE — 82948 REAGENT STRIP/BLOOD GLUCOSE: CPT

## 2019-10-15 PROCEDURE — 70450 CT HEAD/BRAIN W/O DYE: CPT

## 2019-10-15 PROCEDURE — G0008 ADMIN INFLUENZA VIRUS VAC: HCPCS | Performed by: INTERNAL MEDICINE

## 2019-10-15 PROCEDURE — 97167 OT EVAL HIGH COMPLEX 60 MIN: CPT

## 2019-10-15 PROCEDURE — 99232 SBSQ HOSP IP/OBS MODERATE 35: CPT | Performed by: PSYCHIATRY & NEUROLOGY

## 2019-10-15 PROCEDURE — G8978 MOBILITY CURRENT STATUS: HCPCS

## 2019-10-15 PROCEDURE — 95819 EEG AWAKE AND ASLEEP: CPT | Performed by: PSYCHIATRY & NEUROLOGY

## 2019-10-15 PROCEDURE — 85610 PROTHROMBIN TIME: CPT | Performed by: INTERNAL MEDICINE

## 2019-10-15 PROCEDURE — 93880 EXTRACRANIAL BILAT STUDY: CPT

## 2019-10-15 PROCEDURE — 90662 IIV NO PRSV INCREASED AG IM: CPT | Performed by: INTERNAL MEDICINE

## 2019-10-15 PROCEDURE — 97163 PT EVAL HIGH COMPLEX 45 MIN: CPT

## 2019-10-15 PROCEDURE — 93880 EXTRACRANIAL BILAT STUDY: CPT | Performed by: SURGERY

## 2019-10-15 PROCEDURE — 85730 THROMBOPLASTIN TIME PARTIAL: CPT | Performed by: INTERNAL MEDICINE

## 2019-10-15 PROCEDURE — G8979 MOBILITY GOAL STATUS: HCPCS

## 2019-10-15 PROCEDURE — 85027 COMPLETE CBC AUTOMATED: CPT | Performed by: INTERNAL MEDICINE

## 2019-10-15 PROCEDURE — NC001 PR NO CHARGE: Performed by: PSYCHIATRY & NEUROLOGY

## 2019-10-15 PROCEDURE — 95816 EEG AWAKE AND DROWSY: CPT

## 2019-10-15 PROCEDURE — G8988 SELF CARE GOAL STATUS: HCPCS

## 2019-10-15 PROCEDURE — 93005 ELECTROCARDIOGRAM TRACING: CPT

## 2019-10-15 PROCEDURE — 80048 BASIC METABOLIC PNL TOTAL CA: CPT | Performed by: INTERNAL MEDICINE

## 2019-10-15 PROCEDURE — 99232 SBSQ HOSP IP/OBS MODERATE 35: CPT | Performed by: INTERNAL MEDICINE

## 2019-10-15 PROCEDURE — G8987 SELF CARE CURRENT STATUS: HCPCS

## 2019-10-15 RX ORDER — ASPIRIN 81 MG/1
81 TABLET ORAL DAILY
Status: DISCONTINUED | OUTPATIENT
Start: 2019-10-16 | End: 2019-10-16 | Stop reason: HOSPADM

## 2019-10-15 RX ORDER — LIDOCAINE 50 MG/G
1 PATCH TOPICAL DAILY
Status: DISCONTINUED | OUTPATIENT
Start: 2019-10-15 | End: 2019-10-16 | Stop reason: HOSPADM

## 2019-10-15 RX ADMIN — LIDOCAINE 1 PATCH: 50 PATCH CUTANEOUS at 17:21

## 2019-10-15 RX ADMIN — INFLUENZA A VIRUS A/MICHIGAN/45/2015 X-275 (H1N1) ANTIGEN (FORMALDEHYDE INACTIVATED), INFLUENZA A VIRUS A/SINGAPORE/INFIMH-16-0019/2016 IVR-186 (H3N2) ANTIGEN (FORMALDEHYDE INACTIVATED), AND INFLUENZA B VIRUS B/MARYLAND/15/2016 BX-69A (A B/COLORADO/6/2017-LIKE VIRUS) ANTIGEN (FORMALDEHYDE INACTIVATED) 0.5 ML: 60; 60; 60 INJECTION, SUSPENSION INTRAMUSCULAR at 08:14

## 2019-10-15 RX ADMIN — ATORVASTATIN CALCIUM 40 MG: 40 TABLET, FILM COATED ORAL at 17:21

## 2019-10-15 RX ADMIN — INSULIN LISPRO 1 UNITS: 100 INJECTION, SOLUTION INTRAVENOUS; SUBCUTANEOUS at 22:57

## 2019-10-15 NOTE — QUICK NOTE
Patient > 24 hours post tpa for stroke symptoms  No new neuro symptoms  CT of the brain today at 0033 appears stable  CCM will sign-off  Please call with any concerns

## 2019-10-15 NOTE — PLAN OF CARE
Problem: OCCUPATIONAL THERAPY ADULT  Goal: Performs self-care activities at highest level of function for planned discharge setting  See evaluation for individualized goals  Description  Treatment Interventions: ADL retraining, Functional transfer training, Endurance training, Patient/family training, Equipment evaluation/education, Compensatory technique education, Continued evaluation, Activityengagement          See flowsheet documentation for full assessment, interventions and recommendations  Note:   Limitation: Decreased ADL status, Decreased self-care trans, Decreased high-level ADLs, Decreased Safe judgement during ADL, Decreased endurance  Prognosis: Good  Assessment: Pt is a 76year old male seen for initial OT evaluation s/p admission to [facility] with L sided weakness, R gaze preference and neglect  tPA administered 10/14/19 at 12:42 AM   CT head 24 hours after tPA appears stable  MRI pending  Additional active problems include: LB, HLD, DM, and HTN  PMHx includes h/o CVA with residual L sided weakness (although no baseline weakness per pt)  Pt resides with his wife and child in a [facility] with ramped entrance  Pt has a full bathroom on the first floor, however prefers to shower in the walk-in shower in the basement  Pt reports being I with ADLs, IADLs, and functional mobility without use of DME PTA  Pt is not a  and wife provides transport  Pt is currently demonstrating the following occupational deficits: UB ADLs with supervision, LB dressing with Juan M, bed mobility with Juan M, functional transfers with modA, and functional mobility with modA with use of rw  Factors currently limiting pt's independence in these areas include: L sided weakness, L sided coordination deficits, balance, visual deficits, functional mobility, and cognitive deficits  Overall, pt scored 65/100 on the Barthel Index    From an OT standpoint, recommend outpatient OT, increased assistance (wife able to provide assist), and use of a shower chair upon d/c (already owns)  Pt is to continue to benefit from skilled occupational therapy services while in the hospital to maximize functioning and independence with daily activities  See below for OT goals to be addressed 3-5x/wk       OT Discharge Recommendation: Outpatient OT  OT - OK to Discharge: Yes(when medically stable)

## 2019-10-15 NOTE — ASSESSMENT & PLAN NOTE
- Initially presenting with left-sided weakness and hemineglect, status post tPA  - Hx of previous right sided MCA stroke 2 years ago with mild residual LLE weakness  - CT 10/14: No acute ICH, sequela of remote right sided MCA infarction noted  - Repeat CT 10/15: Unchanged from 10/14 findings  - MRI Brain w/o contrast 10/14:  No acute infarction  Poor flow void within the right distal cervical and intracranial ICA consistent with the reported history of ipsilateral cervical ICA high-grade stenosis versus occlusion  Sequela of remote right MCA distribution infarction  Remote hematoma cavity in the right subinsular extending into the corona radiata with ipsilateral ex vacuo dilatation and Wallerian degeneration  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  - VAS Carotid complete study 10/15 revealed: RIGHT: 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  Vertebral artery flow is antegrade  There is no significant subclavian artery disease  NOTE: There is an approximate 50% stenosis in the proximal external carotid  Artery  LEFT: There is 50-69% stenosis noted in the internal carotid artery  Plaque is  heterogenous/calcified and irregular  Vertebral artery flow is antegrade   There is no significant subclavian artery disease   - Continue to monitor BP, keep between 140-160 SBP  - Continue regular neuro checks  - PT/OT following, recommend at home therapy on d/c   - Neurology is following, TIA vs Seizure at this time, with absence of CVA evidence on imaging during this admission  - Continue Atorvastatin 40 mg, ASA at 24 hour post tPA administration brandie  - On d/c consider switch to different statin given history of statin induced myalgias leading to discontinuation of statin use in past
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
## 2019-10-15 NOTE — PROGRESS NOTES
[address] Internal Medicine Progress Note  Patient: Antonia Huynh 76 y o  male   MRN: [id number]  PCP: Dexter Quevedo MD  Unit/Bed#: [facility] 722-01 Encounter: [id number]  Date Of Visit: 10/15/19    Assessment/Plan:    LB (acute kidney injury) ([facility] )  Assessment & Plan  - Creatinine on admission is 1 4  - Creatinine down to 1 2 today  - Likely pre renal etiology  - Pt has received IV fluid  - Avoid nephrotoxic agents, renally dose medications  - Continue to monitor/ trend BMP    Hyperlipidemia  Assessment & Plan  - Continue Atorvastatin 40mg  - Lipid panel: Total cholesterol 159, triglycerides 167, HDL 40, LDL 86    Type 2 diabetes mellitus (HCC)  Assessment & Plan  Lab Results   Component Value Date    HGBA1C 6 7 (H) 10/14/2019       Recent Labs     10/14/19  1608 10/14/19  2101 10/15/19  0644 10/15/19  1013   POCGLU 111 115 110 155*       Blood Sugar Average: Last 72 hrs:  (P) 129 [id number]     - Continue Long acting insulin with SSI as needed with meals  - Continue to hold oral hypoglycemics while in hospital  - Continue to monitor Accuchecks    Benign essential hypertension  Assessment & Plan  - Hx of HTN, not currently on any medications  - Continue Hydralazine 10mg IV q4h as needed when SBP over 180  - Goal BP is to maintain 140-160 SBP  - Will need to start BP meds at home on d/c      * CVA (cerebral vascular accident) [facility])  Assessment & Plan  - Initially presenting with left-sided weakness and hemineglect, status post tPA  - Hx of previous right sided MCA stroke 2 years ago with mild residual LLE weakness  - CT 10/14: No acute ICH, sequela of remote right sided MCA infarction noted  - Repeat CT 10/15: Unchanged from 10/14 findings  - MRI Brain w/o contrast 10/14:  No acute infarction  Poor flow void within the right distal cervical and intracranial ICA consistent with the reported history of ipsilateral cervical ICA high-grade stenosis versus occlusion    Sequela of remote right MCA distribution infarction  Remote hematoma cavity in the right subinsular extending into the corona radiata with ipsilateral ex vacuo dilatation and Wallerian degeneration  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  - VAS Carotid complete study ordered  - Continue to monitor BP, keep between 140-160 SBP  - Continue regular neuro checks  - PT/OT following, recommend at home therapy on d/c   - Neurology is following  - Continue Atorvastatin 40 mg, ASA at 24 hour post tPA administration brandie            VTE Pharmacologic Prophylaxis:   Pharmacologic: Pharmacologic VTE Prophylaxis contraindicated due to CVA workup  Mechanical VTE Prophylaxis in Place: Yes    Patient Centered Rounds: I have performed bedside rounds with nursing staff today  Discussions with Specialists or Other Care Team Provider: Neurology, Critical care, PMNR    Education and Discussions with Family / Patient: N/a    Time Spent for Care: 30 minutes  More than 50% of total time spent on counseling and coordination of care as described above  Current Length of Stay: 1 day(s)    Current Patient Status: Inpatient   Certification Statement: The patient, who was admitted as an inpatient, is being discharged sooner than originally anticipated due to: no further intervention needed per neuro    Discharge Plan / Estimated Discharge Date: 10/16/2019    Code Status: Level 1 - Full Code      Subjective:   Pt resting comfortably in bed  Reports continued weakness of left side although he says it has improved some since yesterday  Denies any headache, dizziness, lightheadedness, CP, SOB, abdominal pain, nausea, vomiting, or diarrhea  Only other complaint is some mild pain in his back above his right hip which he is asking for a lidocaine patch      Objective:     Vitals:   Temp (24hrs), Av 3 °F (36 8 °C), Min:98 °F (36 7 °C), Max:98 6 °F (37 °C)    Temp:  [98 °F (36 7 °C)-98 6 °F (37 °C)] 98 4 °F (36 9 °C)  HR:  [48-56] 53  Resp:  [16-20] 16  BP: (134-161)/(62-75) 149/75  SpO2:  [95 %-98 %] 95 %  Body mass index is 31 67 kg/m²  Input and Output Summary (last 24 hours): Intake/Output Summary (Last 24 hours) at 10/15/2019 1607  Last data filed at 10/15/2019 1200  Gross per 24 hour   Intake 540 ml   Output 1250 ml   Net -710 ml       Physical Exam:     Physical Exam   Constitutional: He is oriented to person, place, and time  He appears well-developed and well-nourished  No distress  HENT:   Head: Normocephalic and atraumatic  Eyes: Pupils are equal, round, and reactive to light  EOM are normal    Neck: Neck supple  Cardiovascular: Normal rate and intact distal pulses  Murmur heard  Irregular rhythm  Pulmonary/Chest: Effort normal and breath sounds normal    Abdominal: Soft  Bowel sounds are normal    Musculoskeletal: He exhibits no edema  Neurological: He is alert and oriented to person, place, and time  No cranial nerve deficit or sensory deficit  Left sided weakness on exam, LE weakness more pronounced than UE  Positive left sided pronator drift  Skin: Skin is warm and dry  Psychiatric: He has a normal mood and affect   His behavior is normal        Additional Data:     Labs:    Results from last 7 days   Lab Units 10/15/19  0543 10/14/19  1300   WBC Thousand/uL 7 83 7 00   HEMOGLOBIN g/dL 13 7 13 4   HEMATOCRIT % 42 5 41 3   PLATELETS Thousands/uL 146* 141*   NEUTROS PCT %  --  66   LYMPHS PCT %  --  26   MONOS PCT %  --  7   EOS PCT %  --  1     Results from last 7 days   Lab Units 10/15/19  0543 10/14/19  1300 10/14/19  0029   POTASSIUM mmol/L 3 7 3 8  --    CHLORIDE mmol/L 106 108  --    CO2 mmol/L 30 28  --    CO2, I-STAT mmol/L  --   --  29  31   BUN mg/dL 16 18  --    CREATININE mg/dL 1 20 1 22  --    CALCIUM mg/dL 9 3 8 7  --    ALK PHOS U/L  --  49  --    ALT U/L  --  17  --    AST U/L  --  13  --    GLUCOSE, ISTAT mg/dl  --   --  183*  181*     Results from last 7 days   Lab Units 10/15/19  0543   INR  1 06       * I Have Reviewed All Lab Data Listed Above  * Additional Pertinent Lab Tests Reviewed: [facility] Admission Reviewed    Imaging:    Imaging Reports Reviewed Today Include: CT head, CTA head, MRI brain  Imaging Personally Reviewed by Myself Includes:  CT head, CTA head, MRI brain    Recent Cultures (last 7 days):           Last 24 Hours Medication List:     Current Facility-Administered Medications:  acetaminophen 650 mg Oral Q4H PRN Virgil Ramey MD   aluminum-magnesium hydroxide-simethicone 30 mL Oral Q6H PRN Virgil Ramey MD   atorvastatin 40 mg Oral QPM Virgil Ramey MD   docusate sodium 100 mg Oral BID PRN Virgil Ramey MD   insulin lispro 1-5 Units Subcutaneous HS Virgil Ramey MD   insulin lispro 1-6 Units Subcutaneous TID AC Virgil Ramey MD   Labetalol HCl 10 mg Intravenous Q4H PRN Virgil Ramey MD   lidocaine 1 patch Topical Daily Aba Jeronimo MD   ondansetron 4 mg Intravenous Q6H PRN Virgil Ramey MD   polyvinyl alcohol 1 drop Both Eyes Q3H PRN Abdias Nieves PA-C        Today, Patient Was Seen By: Stew Salas    ** Please Note: This note has been constructed using a voice recognition system   **

## 2019-10-15 NOTE — ASSESSMENT & PLAN NOTE
- Creatinine on admission is 1 4  - Creatinine down to 1 2 today  - Likely pre renal etiology  - Pt has received IV fluid  - Avoid nephrotoxic agents, renally dose medications  - Continue to monitor/ trend BMP

## 2019-10-15 NOTE — ASSESSMENT & PLAN NOTE
Lab Results   Component Value Date    HGBA1C 6 7 (H) 10/14/2019       Recent Labs     10/15/19  1133 10/15/19  1601 10/15/19  2106 10/16/19  0620   POCGLU 131 104 154* 123       Blood Sugar Average: Last 72 hrs:  (P) 129 [id number]     - Continue Long acting insulin with SSI as needed with meals  - Continue to hold oral hypoglycemics while in hospital  - Continue to monitor Accuchecks

## 2019-10-15 NOTE — PLAN OF CARE
Problem: PHYSICAL THERAPY ADULT  Goal: Performs mobility at highest level of function for planned discharge setting  See evaluation for individualized goals  Description  Treatment/Interventions: Functional transfer training, Elevations, Therapeutic exercise, Endurance training, Patient/family training, Gait training, Compensatory technique education, Bed mobility, LE strengthening/ROM          See flowsheet documentation for full assessment, interventions and recommendations  Note:   Prognosis: Good  Problem List: Decreased strength, Decreased endurance, Impaired balance, Decreased mobility, Decreased coordination, Decreased safety awareness, Impaired judgement, Impaired vision  Assessment: Pt is 76 y o  male seen for high complexity PT evaluation s/p [facility] on 10/14/19 for left sided weakness and isabel-neglect, and elevated BP, tPA administered; CT-head negative for acute ischemia, chronic R proximal ICA occlusion with near occlusion at terminal ICA found  Comorbidities affecting pt's physical performance at time of assessment include: h/o CVA, benign essential HTN, type 2DM, and hyperlipidemia  Per hospitalist note, keep SBP less than 180, pre-activity BP: 145/61, post activity BP: 187/77, asymptomatic, RN notified  At baseline, Pt independent with ADLs, IADLs, and functional mobility  Pt reports does not use AD however has hemiwalker, walker, and SPC  Pt lives with his wife and son in [facility] home with 0 MARYA and full bath on the first floor and basement  Upon evaluation, Pt required Min A - supervision for bed mobility, mod A for transfers, min A for toilet transfers, and min a for ambulation  Pt ambulated 150 feet x2 with RW and min A; denies any dizziness or SOB during ambulation  Pt demonstrates decreased BLE hip/knee flexion during swing phase, decreased ankle DF at start of stance phase, and flat foot strike  Pt able to self-correct increased ankle DF with VCs   Patient's decreased mobility level and increased fall risk is secondary to deficits in blood pressure, static/dynamic balance, coordination, L sided strength, gait, gait speed, judgement, safety, activity tolerance, and endurance  Current clinical presentation is unstable/unpredictable seen in pt's presentation of ongoing medical management, use of RW compared to no AD at baseline, ongoing monitoring of blood pressure,  increased reliance on assistance compared to PLOF, impaired judgement/safety awareness, and significant PMH  Pt to benefit from continued PT tx to address deficits as defined above and maximize level of functional independent mobility and consistency  From PT/mobility standpoint, recommendation at time of d/c would be home with increased family support and OOPT pending progress in order to facilitate return to PLOF  End of session patient supine in bed with SCDs on, call bell/phone in reach, and chair/bed alarm on  Recommendation: Home with family support, Outpatient PT     PT - OK to Discharge: Yes(once medically stable)    See flowsheet documentation for full assessment

## 2019-10-15 NOTE — ASSESSMENT & PLAN NOTE
- Continue Atorvastatin 40mg  - Lipid panel:  Total cholesterol 159, triglycerides 167, HDL 40, LDL 86

## 2019-10-15 NOTE — PLAN OF CARE
Problem: Potential for Falls  Goal: Patient will remain free of falls  Description  INTERVENTIONS:  - Assess patient frequently for physical needs  -  Identify cognitive and physical deficits and behaviors that affect risk of falls  -  [place] fall precautions as indicated by assessment   - Educate patient/family on patient safety including physical limitations  - Instruct patient to call for assistance with activity based on assessment  - Modify environment to reduce risk of injury  - Consider OT/PT consult to assist with strengthening/mobility  Outcome: Progressing     Problem: Prexisting or High Potential for Compromised Skin Integrity  Goal: Skin integrity is maintained or improved  Description  INTERVENTIONS:  - Identify patients at risk for skin breakdown  - Assess and monitor skin integrity  - Assess and monitor nutrition and hydration status  - Monitor labs   - Assess for incontinence   - Turn and reposition patient  - Assist with mobility/ambulation  - Relieve pressure over bony prominences  - Avoid friction and shearing  - Provide appropriate hygiene as needed including keeping skin clean and dry  - Evaluate need for skin moisturizer/barrier cream  - Collaborate with interdisciplinary team   - Patient/family teaching  - Consider wound care consult   Outcome: Progressing     Problem: NEUROSENSORY - ADULT  Goal: Achieves stable or improved neurological status  Description  INTERVENTIONS  - Monitor and report changes in neurological status  - Monitor vital signs such as temperature, blood pressure, glucose, and any other labs ordered      Outcome: Progressing  Goal: Achieves maximal functionality and self care  Description  INTERVENTIONS  - Monitor swallowing and airway patency with patient fatigue and changes in neurological status  - Encourage and assist patient to increase activity and self care     - Encourage visually impaired, hearing impaired and aphasic patients to use assistive/communication devices  Outcome: Progressing     Problem: PAIN - ADULT  Goal: Verbalizes/displays adequate comfort level or baseline comfort level  Description  Interventions:  - Encourage patient to monitor pain and request assistance  - Assess pain using appropriate pain scale  - Administer analgesics based on type and severity of pain and evaluate response  - Implement non-pharmacological measures as appropriate and evaluate response  - Notify physician/advanced practitioner if interventions unsuccessful or patient reports new pain   Outcome: Progressing     Problem: INFECTION - ADULT  Goal: Absence or prevention of progression during hospitalization  Description  INTERVENTIONS:  - Assess and monitor for signs and symptoms of infection  - Monitor lab/diagnostic results  - Monitor all insertion sites, i e  indwelling lines, tubes, and drains  - Administer medications as ordered  - Instruct and encourage patient and family to use good hand hygiene technique  - Identify and instruct in appropriate isolation precautions for identified infection/condition   Outcome: Progressing  Goal: Absence of fever/infection during neutropenic period  Description  INTERVENTIONS:  - Monitor WBC    Outcome: Progressing     Problem: SAFETY ADULT  Goal: Maintain or return to baseline ADL function  Description  INTERVENTIONS:  -  Assess patient's ability to carry out ADLs; assess patient's baseline for ADL function and identify physical deficits which impact ability to perform ADLs (bathing, care of mouth/teeth, toileting, grooming, dressing, etc )  - Assess/evaluate cause of self-care deficits   - Assess range of motion  - Assess patient's mobility; develop plan if impaired  - Assess patient's need for assistive devices and provide as appropriate  - Encourage maximum independence but intervene and supervise when necessary  - Involve family in performance of ADLs  - Assess for home care needs following discharge   - Consider OT consult to assist with ADL evaluation and planning for discharge  - Provide patient education as appropriate  Outcome: Progressing  Goal: Maintain or return mobility status to optimal level  Description  INTERVENTIONS:  - Assess patient's baseline mobility status (ambulation, transfers, stairs, etc )    - Identify cognitive and physical deficits and behaviors that affect mobility  - Identify mobility aids required to assist with transfers and/or ambulation (gait belt, sit-to-stand, lift, walker, cane, etc )  - [place] fall precautions as indicated by assessment  - Record patient progress and toleration of activity level on Mobility SBAR; progress patient to next Phase/Stage  - Instruct patient to call for assistance with activity based on assessment  - Consider rehabilitation consult to assist with strengthening/weightbearing, etc   Outcome: Progressing     Problem: DISCHARGE PLANNING  Goal: Discharge to home or other facility with appropriate resources  Description  INTERVENTIONS:  - Identify barriers to discharge w/patient and caregiver  - Arrange for needed discharge resources and transportation as appropriate  - Identify discharge learning needs (meds, wound care, etc )  - Arrange for interpretive services to assist at discharge as needed  - Refer to Case Management Department for coordinating discharge planning if the patient needs post-hospital services based on physician/advanced practitioner order or complex needs related to functional status, cognitive ability, or social support system  Outcome: Progressing     Problem: Knowledge Deficit  Goal: Patient/family/caregiver demonstrates understanding of disease process, treatment plan, medications, and discharge instructions  Description  Complete learning assessment and assess knowledge base    Interventions:  - Provide teaching at level of understanding  - Provide teaching via preferred learning methods  Outcome: Progressing     Problem: Nutrition/Hydration-ADULT  Goal: Nutrient/Hydration intake appropriate for improving, restoring or maintaining nutritional needs  Description  Monitor and assess patient's nutrition/hydration status for malnutrition  Collaborate with interdisciplinary team and initiate plan and interventions as ordered  Monitor patient's weight and dietary intake as ordered or per policy  Utilize nutrition screening tool and intervene as necessary  Determine patient's food preferences and provide high-protein, high-caloric foods as appropriate  INTERVENTIONS:  - Monitor oral intake, urinary output, labs, and treatment plans  - Assess nutrition and hydration status and recommend course of action  - Evaluate amount of meals eaten  - Assist patient with eating if necessary   - Allow adequate time for meals  - Recommend/ encourage appropriate diets, oral nutritional supplements, and vitamin/mineral supplements  - Order, calculate, and assess calorie counts as needed  - Recommend, monitor, and adjust tube feedings and TPN/PPN based on assessed needs  - Assess need for intravenous fluids  - Provide specific nutrition/hydration education as appropriate  - Include patient/family/caregiver in decisions related to nutrition  Outcome: Progressing     Problem: Neurological Deficit  Goal: Neurological status is stable or improving  Description  Interventions:  - Monitor and assess patient's level of consciousness, motor function, sensory function, and level of assistance needed for ADLs  - Monitor and report changes from baseline  Collaborate with interdisciplinary team to initiate plan and implement interventions as ordered  - Provide and maintain a safe environment  - Consider fall precautions  - Consider aspiration precautions  - Consider bleeding precautions  Outcome: Progressing     Problem:  Activity Intolerance/Impaired Mobility  Goal: Mobility/activity is maintained at optimum level for patient  Description  Interventions:  - Assess and monitor patient barriers to mobility and need for assistive/adaptive devices  - Assess patient's emotional response to limitations  - Collaborate with interdisciplinary team and initiate plans and interventions as ordered  - Encourage independent activity per ability   - Maintain proper body alignment  - Perform active/passive rom as tolerated/ordered  - Plan activities to conserve energy   - Turn patient as appropriate  Outcome: Progressing     Problem: Communication Impairment  Goal: Ability to express needs and understand communication  Description  Assess patient's communication skills and ability to understand information  Patient will demonstrate use of effective communication techniques, alternative methods of communication and understanding even if not able to speak  - Encourage communication and provide alternate methods of communication as needed  - Collaborate with case management/ for discharge needs  - Include patient/family/caregiver in decisions related to communication    Outcome: Progressing

## 2019-10-15 NOTE — ASSESSMENT & PLAN NOTE
- Hx of HTN, not currently on any medications  - Continue Hydralazine 10mg IV q4h as needed when SBP over 180  - Goal BP is to maintain 140-160 SBP  - Will need to start BP meds at home on d/c  - Runs low HR, in 50s  - Orthostatic hypotension testing negative

## 2019-10-15 NOTE — PROGRESS NOTES
Met with patient to discuss stroke education  Patient still has stroke education binder at bedside  Explained nurse navigator role  Patient competent in stroke symptoms, risk factors, and some medications  Educated on all personal risk factors, medications, and resources  Patient verbalizes understanding of information  Difficulty reading small print, states he uses a magnifying glass to read  States his wife Mamie Boucher assists with some reading materials  He is independent with medications and care  Answered all his questions  No further questions or concerns at this time  Will follow up

## 2019-10-15 NOTE — PROGRESS NOTES
Progress Note - Neurology   Christine Ryder 76 y o  male MRN: [id number]  Unit/Bed#: [facility] 722-01 Encounter: [id number]    Assessment:  Episode of L  Facial twitching with worsened L sided weakness due to Seizure vs  Hypoperfusion (Stroke ruled out)    MRI: no evidence of new diffusion restriction  Gliosis and encephalomalacia in the R  Frontal and insular cortices  Cavernous angioma in R  Frontal lobe  CTA-head/neck: R  Carotid occlusion or severe stenosis  No large vessel occlusion  Echo: EF 60%, no regional wall motion abnormalities  Plan:  Carotid duplex today - to evaluate flow  Pending routine EEG  Resume ASA 81 mg today(24 hrs after tPA)   Repeat CT-head today (completed, pending results)  PT/OT/ST  con't frequent neuro checks  Maintain SBP < 180  continue medical management as per primary team, call for changes    Subjective:   Pt is awake and alert, denies any acute overnight events  He denies any re-occurrence of symptoms and reports good strength  ROS:  negative, migraine headaches, syncope, seizures, numbness or tingling of hands, numbness or tingling of feet, involuntary movements, tremor, paresis, spasticity    Vitals: Blood pressure 142/64, pulse (!) 52, temperature 98 6 °F (37 °C), temperature source Oral, resp  rate 18, height 5' 11" (1 803 m), weight 103 kg (227 lb 1 2 oz), SpO2 98 %  ,Body mass index is 31 67 kg/m²  Physical Exam:  Neurologic Exam     Mental Status   Oriented to person, place, and time  Attention: normal  Concentration: normal    Speech: speech is normal   Level of consciousness: alert  Knowledge: good  Able to perform simple calculations  Able to name object  Able to read  Able to repeat  Able to write  Normal comprehension  Cranial Nerves   Cranial nerves II through XII intact  Motor Exam   Muscle bulk: normal  Overall muscle tone: normal  Left arm pronator drift: present    Strength   Strength 5/5 throughout       Sensory Exam   Light touch normal  Vibration normal    Proprioception normal    Pinprick normal      Gait, Coordination, and Reflexes     Coordination   Finger to nose coordination: normal  Heel to shin coordination: normal    Reflexes: 2+ throughout, except 1+ on RLE    Lab, Imaging and other studies:   I have personally reviewed pertinent reports  , CBC:   Results from last 7 days   Lab Units 10/15/19  0543 10/14/19  1300 10/14/19  0029 10/14/19  0023   WBC Thousand/uL 7 83 7 00  --  7 02   RBC Million/uL 4 78 4 64  --  5 30   HEMOGLOBIN g/dL 13 7 13 4  --  15 3   I STAT HEMOGLOBIN g/dl  --   --  16 0  15 6  --    HEMATOCRIT % 42 5 41 3  --  47 3   HEMATOCRIT, ISTAT %  --   --  47  46  --    MCV fL 89 89  --  89   PLATELETS Thousands/uL 146* 141*  --  166   , BMP/CMP:   Results from last 7 days   Lab Units 10/15/19  0543 10/14/19  1300 10/14/19  0029 10/14/19  0023   SODIUM mmol/L 143 142  --  144   POTASSIUM mmol/L 3 7 3 8  --  4 1   CHLORIDE mmol/L 106 108  --  106   CO2 mmol/L 30 28  --  29   CO2, I-STAT mmol/L  --   --  29  31  --    BUN mg/dL 16 18  --  24   CREATININE mg/dL 1 20 1 22  --  1 39*   GLUCOSE, ISTAT mg/dl  --   --  183*  181*  --    CALCIUM mg/dL 9 3 8 7  --  9 5   AST U/L  --  13  --   --    ALT U/L  --  17  --   --    ALK PHOS U/L  --  49  --   --    EGFR ml/min/1 73sq m 59 58 59 50     VTE Prophylaxis: Sequential compression device (Venodyne)  and Reason for no pharmacologic prophylaxis pt received tPA    Counseling / Coordination of Care  Total time spent today 45 minutes  Greater than 50% of total time was spent with the patient and / or family counseling and / or coordination of care   A description of the counseling / coordination of care: as above

## 2019-10-15 NOTE — CONSULTS
Duplicate order  Please see original consult performed by Dr Mohit Tidwell and Dr Karla Huynh on 10/14  Neurology to follow
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
## 2019-10-16 VITALS
HEART RATE: 57 BPM | SYSTOLIC BLOOD PRESSURE: 147 MMHG | HEIGHT: 71 IN | TEMPERATURE: 98.8 F | WEIGHT: 226.63 LBS | BODY MASS INDEX: 31.73 KG/M2 | OXYGEN SATURATION: 97 % | DIASTOLIC BLOOD PRESSURE: 68 MMHG | RESPIRATION RATE: 18 BRPM

## 2019-10-16 PROBLEM — G45.9 TIA (TRANSIENT ISCHEMIC ATTACK): Status: ACTIVE | Noted: 2017-07-26

## 2019-10-16 PROBLEM — N17.9 AKI (ACUTE KIDNEY INJURY) (HCC): Status: RESOLVED | Noted: 2019-10-14 | Resolved: 2019-10-16

## 2019-10-16 LAB
ATRIAL RATE: 54 BPM
GLUCOSE SERPL-MCNC: 123 MG/DL (ref 65–140)
P AXIS: 39 DEGREES
QRS AXIS: 61 DEGREES
QRSD INTERVAL: 144 MS
QT INTERVAL: 462 MS
QTC INTERVAL: 438 MS
T WAVE AXIS: 22 DEGREES
VENTRICULAR RATE: 54 BPM

## 2019-10-16 PROCEDURE — 99239 HOSP IP/OBS DSCHRG MGMT >30: CPT | Performed by: INTERNAL MEDICINE

## 2019-10-16 PROCEDURE — 93010 ELECTROCARDIOGRAM REPORT: CPT | Performed by: INTERNAL MEDICINE

## 2019-10-16 PROCEDURE — 82948 REAGENT STRIP/BLOOD GLUCOSE: CPT

## 2019-10-16 RX ORDER — ASPIRIN 81 MG/1
81 TABLET, CHEWABLE ORAL DAILY
Start: 2019-10-16

## 2019-10-16 RX ORDER — ATORVASTATIN CALCIUM 40 MG/1
40 TABLET, FILM COATED ORAL EVERY EVENING
Qty: 30 TABLET | Refills: 0 | Status: SHIPPED | OUTPATIENT
Start: 2019-10-16 | End: 2019-10-25 | Stop reason: SDUPTHER

## 2019-10-16 RX ADMIN — ASPIRIN 81 MG: 81 TABLET, COATED ORAL at 09:29

## 2019-10-16 NOTE — RESTORATIVE TECHNICIAN NOTE
Restorative Specialist Mobility Note       Activity: Ambulate in garcia, Ambulate in room, Bathroom privileges, Chair, Dangle, Stand at bedside(Educated/encouraged pt to ambulate with assistance 3-4 x's/day   Pt callbell, phone/tray within reach )     Assistive Device: Front wheel walker    Jayjay BOYD, Restorative Technician, [employer]

## 2019-10-16 NOTE — SOCIAL WORK
Met with pt to discuss therapies recommendation for outpt therapy  Script and provider list given  Pt's wife to transport home at dc

## 2019-10-16 NOTE — PROGRESS NOTES
[address] Internal Medicine Progress Note  Patient: Kole Dunbar 76 y o  male   MRN: [id number]  PCP: Jermain Ragland MD  Unit/Bed#: [facility] 722-01 Encounter: [id number]  Date Of Visit: 10/16/19    Assessment/Plan:    Hyperlipidemia  Assessment & Plan  - Continue Atorvastatin 40mg  - Lipid panel: Total cholesterol 159, triglycerides 167, HDL 40, LDL 86    Type 2 diabetes mellitus ([address] )  Assessment & Plan  Lab Results   Component Value Date    HGBA1C 6 7 (H) 10/14/2019       Recent Labs     10/15/19  1133 10/15/19  1601 10/15/19  2106 10/16/19  0620   POCGLU 131 104 154* 123       Blood Sugar Average: Last 72 hrs:  (P) 129 [id number]     - Continue Long acting insulin with SSI as needed with meals  - Continue to hold oral hypoglycemics while in hospital  - Continue to monitor Accuchecks    Benign essential hypertension  Assessment & Plan  - Hx of HTN, not currently on any medications  - Continue Hydralazine 10mg IV q4h as needed when SBP over 180  - Goal BP is to maintain 140-160 SBP  - Will need to start BP meds at home on d/c  - Runs low HR, in 50s  - Orthostatic hypotension testing negative      * Right MCA syndome  Assessment & Plan  - Initially presenting with left-sided weakness and hemineglect, status post tPA  - Hx of previous right sided MCA stroke 2 years ago with mild residual LLE weakness  - CT 10/14: No acute ICH, sequela of remote right sided MCA infarction noted  - Repeat CT 10/15: Unchanged from 10/14 findings  - MRI Brain w/o contrast 10/14:  No acute infarction  Poor flow void within the right distal cervical and intracranial ICA consistent with the reported history of ipsilateral cervical ICA high-grade stenosis versus occlusion  Sequela of remote right MCA distribution infarction  Remote hematoma cavity in the right subinsular extending into the corona radiata with ipsilateral ex vacuo dilatation and Wallerian degeneration  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    - VAS Carotid complete study 10/15 revealed: RIGHT: 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  Vertebral artery flow is antegrade  There is no significant subclavian artery disease  NOTE: There is an approximate 50% stenosis in the proximal external carotid  Artery  LEFT: There is 50-69% stenosis noted in the internal carotid artery  Plaque is  heterogenous/calcified and irregular  Vertebral artery flow is antegrade  There is no significant subclavian artery disease   - Continue to monitor BP, keep between 140-160 SBP  - Continue regular neuro checks  - PT/OT following, recommend at home therapy on d/c   - Neurology is following, TIA vs Seizure at this time, with absence of CVA evidence on imaging during this admission  - Continue Atorvastatin 40 mg, ASA at 24 hour post tPA administration brandie  - On d/c consider switch to different statin given history of statin induced myalgias leading to discontinuation of statin use in past      LB (acute kidney injury) (HCC)resolved as of 10/16/2019  Assessment & Plan  - Creatinine on admission is 1 4  - Creatinine down to 1 2 today  - Likely pre renal etiology  - Pt has received IV fluid  - Avoid nephrotoxic agents, renally dose medications  - Continue to monitor/ trend BMP        VTE Pharmacologic Prophylaxis:   Pharmacologic: Pharmacologic VTE Prophylaxis contraindicated due to CVA workup  Mechanical VTE Prophylaxis in Place: Yes    Patient Centered Rounds: I have performed bedside rounds with nursing staff today  Discussions with Specialists or Other Care Team Provider: Neurology, Critical care, PT/OT    Education and Discussions with Family / Patient: Follow up with PCP and Neurology outpatient, PT/OT recommendations    Time Spent for Care: 30 minutes  More than 50% of total time spent on counseling and coordination of care as described above  Current Length of Stay: 2 day(s)    Current Patient Status: Inpatient   Certification Statement:  The patient, who was admitted as an inpatient, is being discharged sooner than originally anticipated due to: Negative CVA workup    Discharge Plan / Estimated Discharge Date: 10/16/19    Code Status: Level 1 - Full Code      Subjective:   Pt resting comfortably in bed this morning with wife at bedside  He has no new complaints  Pt had questions on whether or not he would need to follow up with cardiology outpatient  Reports that he feels he may need some PT after d/c because he is not confident he can walk as well without a walker now compared to before this admission  Objective:     Vitals:   Temp (24hrs), Av 4 °F (36 9 °C), Min:98 °F (36 7 °C), Max:98 8 °F (37 1 °C)    Temp:  [98 °F (36 7 °C)-98 8 °F (37 1 °C)] 98 8 °F (37 1 °C)  HR:  [53-61] 57  Resp:  [16-20] 18  BP: (134-153)/(66-79) 147/68  SpO2:  [95 %-98 %] 97 %  Body mass index is 31 61 kg/m²  Input and Output Summary (last 24 hours): Intake/Output Summary (Last 24 hours) at 10/16/2019 0948  Last data filed at 10/16/2019 0800  Gross per 24 hour   Intake 1080 ml   Output 525 ml   Net 555 ml       Physical Exam:     Physical Exam   Constitutional: He is oriented to person, place, and time  He appears well-developed and well-nourished  No distress  HENT:   Head: Normocephalic and atraumatic  Eyes: Pupils are equal, round, and reactive to light  EOM are normal    Neck: Neck supple  No JVD present  Cardiovascular: Intact distal pulses  Murmur heard  Bradycardic, irregular rhythm   Pulmonary/Chest: Effort normal and breath sounds normal    Abdominal: Soft  Bowel sounds are normal    Musculoskeletal: He exhibits no edema  Neurological: He is alert and oriented to person, place, and time  No cranial nerve deficit  Left sided weakness on exam, LE weakness more pronounced than UE  Mild left sided pronator drift  No facial asymmetry noted  Skin: Skin is warm and dry  Psychiatric: He has a normal mood and affect   His behavior is normal        Additional Data:     Labs:    Results from last 7 days   Lab Units 10/15/19  0543 10/14/19  1300   WBC Thousand/uL 7 83 7 00   HEMOGLOBIN g/dL 13 7 13 4   HEMATOCRIT % 42 5 41 3   PLATELETS Thousands/uL 146* 141*   NEUTROS PCT %  --  66   LYMPHS PCT %  --  26   MONOS PCT %  --  7   EOS PCT %  --  1     Results from last 7 days   Lab Units 10/15/19  0543 10/14/19  1300 10/14/19  0029   POTASSIUM mmol/L 3 7 3 8  --    CHLORIDE mmol/L 106 108  --    CO2 mmol/L 30 28  --    CO2, I-STAT mmol/L  --   --  29  31   BUN mg/dL 16 18  --    CREATININE mg/dL 1 20 1 22  --    CALCIUM mg/dL 9 3 8 7  --    ALK PHOS U/L  --  49  --    ALT U/L  --  17  --    AST U/L  --  13  --    GLUCOSE, ISTAT mg/dl  --   --  183*  181*     Results from last 7 days   Lab Units 10/15/19  0543   INR  1 06       * I Have Reviewed All Lab Data Listed Above  * Additional Pertinent Lab Tests Reviewed: [address] Admission Reviewed    Imaging:    Imaging Reports Reviewed Today Include: VAS carotid study  Imaging Personally Reviewed by Myself Includes:  VAS carotid study    Recent Cultures (last 7 days):           Last 24 Hours Medication List:     Current Facility-Administered Medications:  acetaminophen 650 mg Oral Q4H PRN Tressa Ventura MD   aluminum-magnesium hydroxide-simethicone 30 mL Oral Q6H PRN Tressa Ventura MD   aspirin 81 mg Oral Daily Darwin Thompson MD   atorvastatin 40 mg Oral QPM Tressa Ventura MD   docusate sodium 100 mg Oral BID PRN Tressa Ventura MD   insulin lispro 1-5 Units Subcutaneous HS Tressa Ventura MD   insulin lispro 1-6 Units Subcutaneous TID AC Tressa Ventura MD   lidocaine 1 patch Topical Daily Jordan Rich MD   ondansetron 4 mg Intravenous Q6H PRN Tressa Ventura MD   polyvinyl alcohol 1 drop Both Eyes Q3H PRN Eliecer Lang PA-C        Today, Patient Was Seen By: Rosibel Zaman    ** Please Note: This note has been constructed using a voice recognition system   **

## 2019-10-17 ENCOUNTER — TRANSITIONAL CARE MANAGEMENT (OUTPATIENT)
Dept: FAMILY MEDICINE CLINIC | Facility: CLINIC | Age: 74
End: 2019-10-17

## 2019-10-17 NOTE — DISCHARGE SUMMARY
Discharge Summary - [facility] Internal Medicine    Patient Information: Shireen Abreu 76 y o  male MRN: [id number]  Unit/Bed#: [address] 722-01 Encounter: [id number]    Discharging Physician / Practitioner: Suki Robles MD  PCP: Joleen Kerr MD  Admission Date: 10/14/2019  Discharge Date: 10/16/19    Disposition:     Home    Reason for Admission: Left sided weakness, Rigth gaze preference    Discharge Diagnoses:     Principal Problem:    Right MCA syndome  Active Problems:    Benign essential hypertension    Type 2 diabetes mellitus ([address] )    Hyperlipidemia    Carotid artery disease  Resolved Problems:    LB (acute kidney injury) [facility])      Consultations During Hospital Stay:  · Neurology  · PMR  · Critical care    Procedures Performed:     · TPA administration    Significant Findings / Test Results:     CTA head and neck: No evidence of acute intracranial hemorrhage  There is a large area of encephalomalacia within the right MCA territory, consistent with patient's history of prior old right MCA territory infarction  There is moderate microangiopathic change  Clinical   correlation is recommended  If there is concern for new infarction, MRI with diffusion-weighted imaging would be suggested, if there are no contraindications   Massillon Porteous is either short segment occlusion versus severe stenosis involving the proximal right ICA, just beyond the bifurcation  Contrast is, however, seen more distally within the cervical right ICA  The petrous portion of the right ICA and the cavernous   portion of the right ICA are rather diminutive compared with the contralateral left side, however, both of these areas do demonstrate contrast opacification  On the July 26, 2017 examination, the petrous and cavernous portions of the right ICA were not   opacified and appeared to be occluded  Neuro-interventional consultation is suggested  There is approximately 60% stenosis of the proximal left ICA, just beyond the bifurcation  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, unchanged  MRI brain: No acute infarction  Poor flow void within the right distal cervical and intracranial ICA consistent with the reported history of ipsilateral cervical ICA high-grade stenosis versus occlusion  Sequela of remote right MCA distribution infarction  Remote hematoma cavity in the right subinsular extending into the corona radiata with ipsilateral ex vacuo dilatation and Wallerian degeneration  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  Carotid Doppler: RIGHT: 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  Vertebral artery flow is antegrade  There is no significant subclavian artery disease  NOTE: There is an approximate 50% stenosis in the proximal external carotid artery  LEFT:There is 50-69% stenosis noted in the internal carotid artery  Plaque is heterogenous/calcified and irregular  Vertebral artery flow is antegrade  There is no significant subclavian artery disease  Incidental Findings:   · None    Test Results Pending at Discharge (will require follow up): · None     Outpatient Tests Requested:  · Carotid doppler in 1 year    Complications:  None    Hospital Course:     Tatiana Benavidez is a 76 y o  male patient who originally presented to the hospital on 10/14/2019 due to left hemiparesis and neglect  He had a CT scan and CTA in the ED which showed no new findings  He was given TPA and admitted to level 1 step down  Prior to arrival his symptoms have been improved and returned to baseline neurological status  A repeat CT head was unremarkable  Due to known carotid disease a doppler was preformed and remained stable from his prior study  Due to the possibility of seizure causing his symptoms a routine EEG was performed and negative for seizure activity   He was noted to be mildly bradycardic during his stay, EKG showed sinus bradycardia and echo showed a preserved EF with grade 2 diastolic dysfunction  Orthostatic Bps were negative and he asymptomatic in that regard  Atorvastatin, which he had stopped prior to admission du to myalgias was restarted and he was discharged home to follow up with neurology outpatient  Condition at Discharge: stable     Discharge Day Visit / Exam:     Subjective:  Denies any new neurologic changes, headache, chest pain, dizziness or shortness of breath  Vitals: Blood Pressure: 147/68 (10/16/19 0814)  Pulse: 57 (10/16/19 0814)  Temperature: 98 8 °F (37 1 °C) (10/16/19 0814)  Temp Source: Oral (10/16/19 0814)  Respirations: 18 (10/16/19 0814)  Height: 5' 11" (180 3 cm) (10/14/19 0330)  Weight - Scale: 103 kg (226 lb 10 1 oz) (10/16/19 0549)  SpO2: 97 % (10/16/19 0814)  Exam:   Physical Exam   Constitutional: He is oriented to person, place, and time  He appears well-developed and well-nourished  HENT:   Head: Normocephalic and atraumatic  Eyes: Pupils are equal, round, and reactive to light  EOM are normal    Neck: Neck supple  Cardiovascular: Normal rate and regular rhythm  Pulmonary/Chest: Effort normal    Abdominal: Soft  Musculoskeletal: He exhibits no edema  Neurological: He is alert and oriented to person, place, and time  No cranial nerve deficit  Mild left pronator drift   Skin: Skin is warm and dry  Discussion with Family: wife at bedside    Discharge instructions/Information to patient and family:   See after visit summary for information provided to patient and family  Provisions for Follow-Up Care:  See after visit summary for information related to follow-up care and any pertinent home health orders  Planned Readmission: None     Discharge Statement:  I spent 45 minutes discharging the patient  This time was spent on the day of discharge  I had direct contact with the patient on the day of discharge   Greater than 50% of the total time was spent examining patient, answering all patient questions, arranging and discussing plan of care with patient as well as directly providing post-discharge instructions  Additional time then spent on discharge activities  Discharge Medications:  See after visit summary for reconciled discharge medications provided to patient and family        ** Please Note: This note has been constructed using a voice recognition system **

## 2019-10-23 ENCOUNTER — OFFICE VISIT (OUTPATIENT)
Dept: NEUROLOGY | Facility: CLINIC | Age: 74
End: 2019-10-23
Payer: MEDICARE

## 2019-10-23 VITALS
BODY MASS INDEX: 32.26 KG/M2 | WEIGHT: 230.4 LBS | SYSTOLIC BLOOD PRESSURE: 147 MMHG | DIASTOLIC BLOOD PRESSURE: 50 MMHG | HEIGHT: 71 IN | HEART RATE: 70 BPM

## 2019-10-23 DIAGNOSIS — I10 BENIGN ESSENTIAL HYPERTENSION: Chronic | ICD-10-CM

## 2019-10-23 DIAGNOSIS — G47.33 OBSTRUCTIVE SLEEP APNEA: ICD-10-CM

## 2019-10-23 DIAGNOSIS — E11.49 TYPE 2 DIABETES MELLITUS WITH OTHER NEUROLOGIC COMPLICATION, WITHOUT LONG-TERM CURRENT USE OF INSULIN (HCC): Primary | Chronic | ICD-10-CM

## 2019-10-23 DIAGNOSIS — E78.5 HYPERLIPIDEMIA, UNSPECIFIED HYPERLIPIDEMIA TYPE: ICD-10-CM

## 2019-10-23 DIAGNOSIS — Z86.73 HISTORY OF STROKE: ICD-10-CM

## 2019-10-23 PROCEDURE — 99214 OFFICE O/P EST MOD 30 MIN: CPT | Performed by: PSYCHIATRY & NEUROLOGY

## 2019-10-23 NOTE — PROGRESS NOTES
Patient ID: Antonia Huynh is a 76 y o  male  Assessment/Plan:    No problem-specific Assessment & Plan notes found for this encounter  Diagnoses and all orders for this visit:    Type 2 diabetes mellitus with other neurologic complication, without long-term current use of insulin (HCC)    Obstructive sleep apnea    Benign essential hypertension    Hyperlipidemia, unspecified hyperlipidemia type    History of stroke  -     Ambulatory referral to Physical Therapy; Future  -     Ambulatory referral to Occupational Therapy; Future         Patient Instructions   Stroke:  Christen Larry presents for follow-up with regard to his prior history of stroke  Most recently he came to the hospital with an increase in his left upper extremity weakness with worsening of his right gaze preference and a new onset of left facial twitching with some abnormal sensations in the left face and vertigo  Ultimately he was treated with tPA and the MRI did not reveal any new damage  He reports that his symptoms actually relatively self-limited, and that the new symptoms that were not related to his prior stroke had even resolved prior to him receiving the tPA  Notably he was quite hypertensive when he arrived at the hospital   I would suggest that his symptoms could potentially be consistent with a recrudescence of his prior stroke related to hypertension versus possibly a small seizure versus less likely a transient ischemic attack  At this point his stroke risk factors are well controlled and he reports that he is nearly back to his original post stroke baseline although he continues to have some difficulties with bilateral leg weakness when he wants to arise from a chair and some issues with his left hand      - we will plan to continue his current combination of aspirin, statin, and appropriate glycemic control with regard to secondary stroke prevention     -I would like him to check his blood pressure from time to time and work with his primary care team to target a blood pressure of less than 130/80 on a regular basis  - 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 and left carotid artery stenosis  This is unknown phenomenon for him and has been stable for the last year  We will plan to repeat a Doppler ultrasound in 1 year's time     -although it is possible that this represents a small focal seizure that is far from definite and his EEG is not suggestive of a high risk for recurrent seizure events  His risk is slightly higher than the general population because of the scar related to his old stroke  We will plan for a time of watchful waiting without an antiseizure medicine at this point, however if he would like to begin [address] at a reasonable dose taken twice daily that would also be quite appropriate     - his creatinine was somewhat elevated in the hospital compared to his prior baseline  I would like for him to review this and his blood pressure numbers with his primary care team   We will also defer to the good judgment of his primary care team for monitoring of his cholesterol panel and hemoglobin A1c  - he would benefit from a course of physical and occupational therapy and I will provide him with those prescriptions  I will plan for him to return to the office to see me in 6 months time but I would be happy to see him sooner if the need should arise  If he does have any symptoms concerning for TIA or stroke such as sudden painless loss of vision or double vision, difficulty speaking or swallowing, vertigo/ room spinning that does not quickly resolve, weakness /numbness in 1 side of the face or body that is new, he should proceed by ambulance to the nearest emergency room immediately  Subjective:    WILMAN  John Osman is a 77 y/o M, presenting to the outpatient office for follow-up for evaluation of previous right MCA infarct secondary to right ICA occlusion in 2017, hypertension and hyperlipidemia   He presented to [facility] as a stroke alert on 10/14/19, complaining of acute worsening of baseline left sided UE/LE isabel-neglect in addition to new onset of left sided facial droop/twitching, dysarthria and disorientation described as inattentiveness  In the ED, it was noted that he demonstrated right preferential gaze; he states this is remnant from his previous stroke  CT head demonstrated no acute ischemia or hemorrhage  VAS doppler demonstrated right sided occlusion and 50-69% stenosis of left ICA; no interval change in comparison to 10/16/18   In the ED, his neurologic symptoms resolved to baseline; he received tPA (NIHSS score of 12)  He endorses no new focal neurologic deficits aside from his residual left-sided weakness  He adheres to his medication regimen consisting of low dose Aspirin and Lipitor  He denies bleeding or bruising  Throughout his appointment, we discussed his risk factors for stroke and continuing his medication regimen  We recommend repeat US doppler in 1 year in addition for his right ICA occlusion/left ICA stenosis to consideration to initiate appropriate dose Keppra, seeing as seizure cannot be definitively excluded in the presence of an unremarkable EEG  Also, we agree that Marrion Jews would benefit from a course of physical/occupational therapy  Recommend follow-up in 6 months or earlier if deemed appropriate by the patient  I personally saw and examined the patient  I agree with the history and physical as documented by [place] [facility] D [facility]       At this point, considering his prior area of encephalomalacia it is possible that this represents a small focal seizure although his EEG did not show epileptiform abnormalities    Ultimately his vascular risk factors are well controlled at this point in time and after discussion with regard to potential risks and benefits he has opted not for initial treatment with an antiseizure medication however we will continue to monitor clinically            Past Medical History:   Diagnosis Date    Asthma     DM (diabetes mellitus), type 2 ([facility] )     HLD (hyperlipidemia)     Hypertension     Murmur, cardiac     Stroke ([facility] )        Social History     Socioeconomic History    Marital status: /Civil Union     Spouse name: None    Number of children: None    Years of education: None    Highest education level: None   Occupational History    None   Social Needs    Financial resource strain: None    Food insecurity:     Worry: None     Inability: None    Transportation needs:     Medical: None     Non-medical: None   Tobacco Use    Smoking status: Never Smoker    Smokeless tobacco: Never Used   Substance and Sexual Activity    Alcohol use: Not Currently    Drug use: No    Sexual activity: None   Lifestyle    Physical activity:     Days per week: None     Minutes per session: None    Stress: None   Relationships    Social connections:     Talks on phone: None     Gets together: None     Attends [employer] service: None     Active member of club or organization: None     Attends meetings of clubs or organizations: None     Relationship status: None    Intimate partner violence:     Fear of current or ex partner: None     Emotionally abused: None     Physically abused: None     Forced sexual activity: None   Other Topics Concern    None   Social History Narrative    None       Family History   Problem Relation Age of Onset    Mental illness Son     Cancer Mother     Cancer Brother          Current Outpatient Medications:     aspirin 81 mg chewable tablet, Chew 1 tablet (81 mg total) daily, Disp: , Rfl:     atorvastatin (LIPITOR) 40 mg tablet, Take 1 tablet (40 mg total) by mouth every evening, Disp: 30 tablet, Rfl: 0    metFORMIN (GLUCOPHAGE) 1000 MG tablet, Take 1 tablet (1,000 mg total) by mouth daily, Disp: 90 tablet, Rfl: 1    The following portions of the patient's history were reviewed: allergies, current medications, past family history, past medical history, past social history and problem list          Objective:    Blood pressure 147/50, pulse 70, height 5' 11" (1 803 m), weight 105 kg (230 lb 6 4 oz)  Physical Exam    Neurological Exam  Alert and in no acute distress  Fluent speech; no dysarthria noted  No word-finding difficulty noted  CN 2-12 intact except mild left-sided facial droop noted  Muscle strength 5/5 consisting of his  strength, deltoids, biceps, triceps, iliopsoas, quadriceps, hamstrings and dorsiflexors  Sensory intact to crude touch in his upper/lower extremities bilateral   No ataxia noted  Sateliting noted of his right arm about the left  Stable gait  ROS:    Review of Systems   Constitutional: Negative  Negative for appetite change and fever  HENT: Negative  Negative for hearing loss, tinnitus, trouble swallowing and voice change  Eyes: Negative  Negative for photophobia and pain  Respiratory: Negative  Negative for shortness of breath  Cardiovascular: Negative  Negative for palpitations  Gastrointestinal: Negative  Negative for nausea and vomiting  Endocrine: Negative  Negative for cold intolerance and heat intolerance  Genitourinary: Positive for frequency and urgency (Sometimes)  Negative for dysuria  Musculoskeletal: Positive for back pain  Negative for myalgias and neck pain  Skin: Negative  Negative for rash  Neurological: Negative  Negative for dizziness, tremors, seizures, syncope, facial asymmetry, speech difficulty, weakness, light-headedness, numbness and headaches  Left foot drop   Hematological: Negative  Does not bruise/bleed easily  Psychiatric/Behavioral: Negative  Negative for confusion, hallucinations and sleep disturbance  Reviewed ROS as entered by medical assistant

## 2019-10-23 NOTE — PATIENT INSTRUCTIONS
Stroke:  Redd Lema presents for follow-up with regard to his prior history of stroke  Most recently he came to the hospital with an increase in his left upper extremity weakness with worsening of his right gaze preference and a new onset of left facial twitching with some abnormal sensations in the left face and vertigo  Ultimately he was treated with tPA and the MRI did not reveal any new damage  He reports that his symptoms actually relatively self-limited, and that the new symptoms that were not related to his prior stroke had even resolved prior to him receiving the tPA  Notably he was quite hypertensive when he arrived at the hospital   I would suggest that his symptoms could potentially be consistent with a recrudescence of his prior stroke related to hypertension versus possibly a small seizure versus less likely a transient ischemic attack  At this point his stroke risk factors are well controlled and he reports that he is nearly back to his original post stroke baseline although he continues to have some difficulties with bilateral leg weakness when he wants to arise from a chair and some issues with his left hand  - we will plan to continue his current combination of aspirin, statin, and appropriate glycemic control with regard to secondary stroke prevention     -I would like him to check his blood pressure from time to time and work with his primary care team to target a blood pressure of less than 130/80 on a regular basis  - 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 and left carotid artery stenosis  This is unknown phenomenon for him and has been stable for the last year  We will plan to repeat a Doppler ultrasound in 1 year's time     -although it is possible that this represents a small focal seizure that is far from definite and his EEG is not suggestive of a high risk for recurrent seizure events    His risk is slightly higher than the general population because of the scar related to his old stroke  We will plan for a time of watchful waiting without an antiseizure medicine at this point, however if he would like to begin [address] at a reasonable dose taken twice daily that would also be quite appropriate     - his creatinine was somewhat elevated in the hospital compared to his prior baseline  I would like for him to review this and his blood pressure numbers with his primary care team   We will also defer to the good judgment of his primary care team for monitoring of his cholesterol panel and hemoglobin A1c  - he would benefit from a course of physical and occupational therapy and I will provide him with those prescriptions  I will plan for him to return to the office to see me in 6 months time but I would be happy to see him sooner if the need should arise  If he does have any symptoms concerning for TIA or stroke such as sudden painless loss of vision or double vision, difficulty speaking or swallowing, vertigo/ room spinning that does not quickly resolve, weakness /numbness in 1 side of the face or body that is new, he should proceed by ambulance to the nearest emergency room immediately

## 2019-10-25 ENCOUNTER — OFFICE VISIT (OUTPATIENT)
Dept: FAMILY MEDICINE CLINIC | Facility: CLINIC | Age: 74
End: 2019-10-25
Payer: MEDICARE

## 2019-10-25 VITALS
BODY MASS INDEX: 32.2 KG/M2 | OXYGEN SATURATION: 97 % | SYSTOLIC BLOOD PRESSURE: 150 MMHG | DIASTOLIC BLOOD PRESSURE: 70 MMHG | HEART RATE: 52 BPM | WEIGHT: 230 LBS | HEIGHT: 71 IN | TEMPERATURE: 96.9 F

## 2019-10-25 DIAGNOSIS — I10 ESSENTIAL HYPERTENSION: ICD-10-CM

## 2019-10-25 DIAGNOSIS — I10 BENIGN ESSENTIAL HYPERTENSION: Chronic | ICD-10-CM

## 2019-10-25 DIAGNOSIS — I65.23 BILATERAL CAROTID ARTERY STENOSIS: ICD-10-CM

## 2019-10-25 DIAGNOSIS — G45.9 TIA (TRANSIENT ISCHEMIC ATTACK): ICD-10-CM

## 2019-10-25 DIAGNOSIS — I35.8 NON-RHEUMATIC AORTIC SCLEROSIS: ICD-10-CM

## 2019-10-25 DIAGNOSIS — E11.49 TYPE 2 DIABETES MELLITUS WITH OTHER NEUROLOGIC COMPLICATION, WITHOUT LONG-TERM CURRENT USE OF INSULIN (HCC): Primary | Chronic | ICD-10-CM

## 2019-10-25 DIAGNOSIS — E11.9 TYPE 2 DIABETES MELLITUS WITHOUT COMPLICATION (HCC): ICD-10-CM

## 2019-10-25 PROCEDURE — 99495 TRANSJ CARE MGMT MOD F2F 14D: CPT | Performed by: FAMILY MEDICINE

## 2019-10-25 RX ORDER — ATORVASTATIN CALCIUM 40 MG/1
40 TABLET, FILM COATED ORAL EVERY EVENING
Qty: 90 TABLET | Refills: 1 | Status: SHIPPED | OUTPATIENT
Start: 2019-10-25 | End: 2020-05-11

## 2019-10-25 RX ORDER — LISINOPRIL 5 MG/1
5 TABLET ORAL DAILY
Qty: 30 TABLET | Refills: 0 | Status: SHIPPED | OUTPATIENT
Start: 2019-10-25 | End: 2019-11-26 | Stop reason: SDUPTHER

## 2019-10-25 NOTE — PROGRESS NOTES
Assessment/Plan:     Right MCA syndome  The patient's neurologic status has returned to baseline  He is going to continue to follow with Neurology  Continues to follow with vascular surgery in regards to his carotid disease  He is going to continue on atorvastatin and aspirin  Additionally we are going to add lisinopril today due to his elevated blood pressure  Benign essential hypertension  We are going to resume lisinopril at 5 mg HS  We asked him to return in 3 weeks for blood pressure check as well as fasting blood work to include CMP    Type 2 diabetes mellitus ([facility] [address] )  The patient has A1c was 6 7 consistent with excellent control  He is going to continue with his metformin  We are also going to have him continue with atorvastatin as well as resume lisinopril  Lab Results   Component Value Date    HGBA1C 6 7 (H) 10/14/2019       Non-rheumatic aortic sclerosis (HCC)  We discussed this with patient and his wife today  He has moderate AS with a calculated valve area of 1 1 cm squared  Currently he is asymptomatic  This should be re-evaluated in 6-12 months  They are in agreement with this plan  Bilateral carotid artery stenosis  Continued surveillance  Continue with statin, aspirin, diabetes and hypertension control       Diagnoses and all orders for this visit:    Type 2 diabetes mellitus with other neurologic complication, without long-term current use of insulin (HCC)  -     Microalbumin, Random Urine (W/Creatinine)    Type 2 diabetes mellitus without complication (HCC)  -     metFORMIN (GLUCOPHAGE) 1000 MG tablet; Take 1 tablet (1,000 mg total) by mouth daily    Right MCA syndome  -     atorvastatin (LIPITOR) 40 mg tablet; Take 1 tablet (40 mg total) by mouth every evening    Essential hypertension  -     lisinopril (ZESTRIL) 5 mg tablet;  Take 1 tablet (5 mg total) by mouth daily    Benign essential hypertension    Non-rheumatic aortic sclerosis (HCC)    Bilateral carotid artery stenosis Subjective:     Patient ID: Brayden Mathur is a 76 y o  male  HPI  The patient is a 80-year-old male who presents today for transition of care visit  Recently he was admitted to [facility] with a stroke-like syndrome  He had a history of a right middle cerebral artery CVA  He suffered some recurrent symptoms of facial weakness and dysarthria  His wife called 46 but by the time paramedics arrived they stated that his symptoms had resolved  He was seen in the emergency room he was felt to have increased left hemiparesis and neglect and was given tPA at that point  CT scan showed encephalomalacia consistent with old CVA, no evidence of new infarct  Subsequent MRI confirmed no new infarct  He ultimately he was felt to have returned to baseline  He was seen by Neurology in the hospital and subsequently in follow-up 2 days ago  There was question as to whether this represented TIA, hypertensive urgency or possible seizure equivalent  Was recommended that he be maintained on statin which had been discontinued previously due to myalgias, aspirin, control of blood pressure and blood sugar  Patient wife did state that since he has been home he feels back to baseline  Overall he feels well  He has no cardiovascular pulmonary complaint  They question his cardiac status in regards to his aortic stenosis  Review of Systems   Constitutional: Negative  Respiratory: Negative  Cardiovascular: Negative  Gastrointestinal: Negative  Endocrine: Negative  Genitourinary: Negative  Neurological: Positive for weakness and numbness  Negative for dizziness, seizures and headaches  Hematological: Negative  Psychiatric/Behavioral: Negative  Objective:     Physical Exam   Constitutional: He is oriented to person, place, and time  He appears well-developed and well-nourished  Obese in no distress   Neck: Neck supple  No JVD present     Cardiovascular: Normal rate and regular rhythm  Murmur heard  Injection murmur at base which sounds consistent with aortic stenosis  S2 is preserved   Pulmonary/Chest: Effort normal and breath sounds normal  He has no wheezes  He has no rales  Neurological: He is alert and oriented to person, place, and time  Psychiatric: He has a normal mood and affect  Thought content normal    Nursing note and vitals reviewed  Vitals:    10/25/19 1102 10/25/19 1134 10/25/19 1139   BP: 130/84  150/70   BP Location:   Right arm   Patient Position:   Sitting   Cuff Size:   Large   Pulse: (!) 44 (!) 52    Temp: (!) 96 9 °F (36 1 °C)     SpO2: 99% 97%    Weight: 104 kg (230 lb)     Height: 5' 11" (1 803 m)         Transitional Care Management Review:  Mckenna Banks is a 76 y o  male here for TCM follow up  During the TCM phone call patient stated:    TCM Call (since 9/24/2019)     Date and time call was made  10/17/2019  8:44 AM    Hospital care reviewed  Records reviewed    Patient was hospitialized at  [facility]    Date of Admission  10/14/19    Date of discharge  10/16/19    Diagnosis  Right MCA Syndrome    Disposition  Home    Were the patients medications reviewed and updated  No    Current Symptoms  None      TCM Call (since 9/24/2019)     Post hospital issues  None    Scheduled for follow up?   Yes    Did you obtain your prescribed medications  Yes    Do you need help managing your prescriptions or medications  No    I have advised the patient to call PCP with any new or worsening symptoms  Deepika Lewis MD

## 2019-10-25 NOTE — ASSESSMENT & PLAN NOTE
The patient's neurologic status has returned to baseline  He is going to continue to follow with Neurology  Continues to follow with vascular surgery in regards to his carotid disease  He is going to continue on atorvastatin and aspirin  Additionally we are going to add lisinopril today due to his elevated blood pressure

## 2019-10-25 NOTE — PROGRESS NOTES
BMI Counseling: Body mass index is 32 08 kg/m²  The BMI is above normal  Nutrition recommendations include decreasing portion sizes, consuming healthier snacks and moderation in carbohydrate intake  Exercise recommendations include moderate physical activity 150 minutes/week and exercising 3-5 times per week  No pharmacotherapy was ordered

## 2019-10-25 NOTE — ASSESSMENT & PLAN NOTE
We discussed this with patient and his wife today  He has moderate AS with a calculated valve area of 1 1 cm squared  Currently he is asymptomatic  This should be re-evaluated in 6-12 months  They are in agreement with this plan

## 2019-10-25 NOTE — ASSESSMENT & PLAN NOTE
The patient has A1c was 6 7 consistent with excellent control  He is going to continue with his metformin  We are also going to have him continue with atorvastatin as well as resume lisinopril    Lab Results   Component Value Date    HGBA1C 6 7 (H) 10/14/2019

## 2019-10-25 NOTE — ASSESSMENT & PLAN NOTE
We are going to resume lisinopril at 5 mg HS    We asked him to return in 3 weeks for blood pressure check as well as fasting blood work to include CMP

## 2019-10-28 LAB
ALBUMIN/CREAT UR: 2 MCG/MG CREAT
CREAT UR-MCNC: 161 MG/DL (ref 20–320)
MICROALBUMIN UR-MCNC: 0.4 MG/DL

## 2019-11-01 DIAGNOSIS — Z71.89 COMPLEX CARE COORDINATION: Primary | ICD-10-CM

## 2019-11-03 ENCOUNTER — HOSPITAL ENCOUNTER (EMERGENCY)
Facility: HOSPITAL | Age: 74
Discharge: HOME | End: 2019-11-03
Attending: EMERGENCY MEDICINE | Admitting: EMERGENCY MEDICINE
Payer: MEDICARE

## 2019-11-03 VITALS
RESPIRATION RATE: 20 BRPM | TEMPERATURE: 97.6 F | HEART RATE: 46 BPM | DIASTOLIC BLOOD PRESSURE: 57 MMHG | WEIGHT: 230 LBS | OXYGEN SATURATION: 97 % | SYSTOLIC BLOOD PRESSURE: 116 MMHG | BODY MASS INDEX: 32.08 KG/M2

## 2019-11-03 DIAGNOSIS — M79.2 NEURALGIA: ICD-10-CM

## 2019-11-03 DIAGNOSIS — S09.90XA INJURY OF HEAD, INITIAL ENCOUNTER: Primary | ICD-10-CM

## 2019-11-03 LAB
ATRIAL RATE: 58 BPM
QRS AXIS: 61 DEGREES
QRSD INTERVAL: 140 MS
QT INTERVAL: 496 MS
QTC INTERVAL: 448 MS
T WAVE AXIS: 1 DEGREES
VENTRICULAR RATE: 49 BPM

## 2019-11-03 PROCEDURE — 99284 EMERGENCY DEPT VISIT MOD MDM: CPT | Performed by: EMERGENCY MEDICINE

## 2019-11-03 PROCEDURE — 99283 EMERGENCY DEPT VISIT LOW MDM: CPT

## 2019-11-03 PROCEDURE — 93010 ELECTROCARDIOGRAM REPORT: CPT | Performed by: INTERNAL MEDICINE

## 2019-11-03 PROCEDURE — 93005 ELECTROCARDIOGRAM TRACING: CPT

## 2019-11-03 RX ORDER — ACETAMINOPHEN 325 MG/1
975 TABLET ORAL ONCE
Status: COMPLETED | OUTPATIENT
Start: 2019-11-03 | End: 2019-11-03

## 2019-11-03 RX ORDER — LIDOCAINE 40 MG/G
CREAM TOPICAL ONCE
Status: COMPLETED | OUTPATIENT
Start: 2019-11-03 | End: 2019-11-03

## 2019-11-03 RX ORDER — DIAZEPAM 2 MG/1
2 TABLET ORAL ONCE
Status: COMPLETED | OUTPATIENT
Start: 2019-11-03 | End: 2019-11-03

## 2019-11-03 RX ADMIN — ACETAMINOPHEN 975 MG: 325 TABLET ORAL at 13:22

## 2019-11-03 RX ADMIN — LIDOCAINE 4%: 4 CREAM TOPICAL at 13:22

## 2019-11-03 RX ADMIN — DIAZEPAM 2 MG: 2 TABLET ORAL at 13:22

## 2019-11-03 NOTE — ED ATTENDING ATTESTATION
Kaiser Parker MD, saw and evaluated the patient  All available labs and X-rays were ordered by me or the resident and have been reviewed by myself  I discussed the patient with the resident / non-physician and agree with the resident's / non-physician practitioner's findings and plan as documented in the resident's / non-physician practicitioner's note, except where noted  At this point, I agree with the current assessment done in the ED  I was present during key portions of all procedures performed unless otherwise stated  Chief Complaint   Patient presents with    Head Injury     Pt states that he hit his head a few weeks ago on a low ceiling  Pt states the pain went away and started with new pain on Wednesday  no relief with Tylenol  No Loc Pt on 81mg ASA     This is a 72-year-old male presenting for evaluation pain located in his scalp on the right temporal occipital region  He states that about 1 month ago he remembers hitting cabinet that was solid okay at this region  He had a little bit of pain but it went away  2 weeks ago he had a stroke evaluation/TIA evaluation done  He had she came in on October 14th for evaluation of those symptoms  He had a CT non con done which was negative for calvarium abnormalities  He had the stroke alert CTA done which was abnormal and had a diffusion-weighted MRI done  He had a repeat CT scan done on the 15th  All the scans were demonstrating chronic changes but nothing new that would explain his symptoms  The pain was essentially gone until maybe Wednesday when it started again  When go with Tylenol  No fevers chills nausea vomiting chest pain shortness of breath  No belly pain  No falls no injury since the 1 month ago injury  No urinary tract infection symptoms  No numbness or tingling down the arms or legs  No speech changes  He is actually feeling perfectly well except for that pain    Does mention that maybe there is a week worth of right paravertebral lumbar level back pain which feels like a muscle spasm to him  It is only there when he is rotating  In terms of the head pain is only there if he presses on it  If he sitting up without his head on a pillow he has 0 pain  PE:  Vitals:    11/03/19 1138 11/03/19 1200 11/03/19 1321 11/03/19 1330   BP: 170/91 149/68 116/57 116/57   BP Location:  Right arm Right arm Right arm   Pulse: 60 (!) 48 (!) 46 (!) 46   Resp: 18 18 18 20   Temp: 97 6 °F (36 4 °C)      TempSrc: Oral      SpO2: 99% 95% 95% 97%   Weight: 104 kg (230 lb)      General: VSS, NAD, awake, alert  Well-nourished, well-developed  Appears stated age  Speaking normally in full sentences  Head: Normocephalic, atraumatic, nontender  Eyes: PERRL, EOM-I  No diplopia  No hyphema  No subconjunctival hemorrhages  Symmetrical lids  ENT: Atraumatic external nose and ears  MMM  No malocclusion  No stridor  Normal phonation  No drooling  Normal swallowing  Neck: Symmetric, trachea midline  No JVD  CV: RRR  +S1/S2  No murmurs or gallops  Peripheral pulses +2 throughout  No chest wall tenderness  Lungs:   Unlabored No retractions  CTAB, lungs sounds equal bilateral    No tachypnea  Abd: +BS, soft, NT/ND    MSK:   FROM   Back:   No rashes  Skin: Dry, intact  Neuro: AAOx3, GCS 15, CN II-XII grossly intact  Motor grossly intact  Sensory grossly intact  At the right temporooccipotal area there's a divit (it's bilateral) but this is tender  The rest of the head is non-tender  Ranging neck normally  Psychiatric/Behavioral: Appropriate mood and affect   Exam: deferred  A:  - Scalp pain, not at a hair follicle  P:  - LMX for topical pain  - Reviewed CT and MRI reads ? negative  - Will DC  - discussed MSK relaxant for right paralumbar tenderness  - 13 point ROS was performed and all are normal unless stated in the history above  - Nursing note reviewed  Vitals reviewed     - Orders placed by myself and/or advanced practitioner / resident     - Previous chart was reviewed  - No language barrier    - History obtained from patient wife  - There are no limitations to the history obtained  - Critical care time: Not applicable for this patient  Code Status: Prior  Advance Directive and Living Will:      Power of :    POLST:      Final Diagnosis:  1  Injury of head, initial encounter    2  Neuralgia           Medications   lidocaine (LMX) 4 % cream ( Topical Given 11/3/19 1322)   diazepam (VALIUM) tablet 2 mg (2 mg Oral Given 11/3/19 1322)   acetaminophen (TYLENOL) tablet 975 mg (975 mg Oral Given 11/3/19 1322)     No orders to display     Orders Placed This Encounter   Procedures    ED ECG Documentation Only    EKG RESULTS    ECG 12 lead     Labs Reviewed - No data to display  Time reflects when diagnosis was documented in both MDM as applicable and the Disposition within this note     Time User Action Codes Description Comment    11/3/2019  1:35 PM Deena Diss Add [S09 90XA] Injury of head, initial encounter     11/3/2019  1:35 PM Deena Diss Add [M79 2] Neuralgia       ED Disposition     ED Disposition Condition Date/Time Comment    Discharge Stable Sun Nov 3, 2019  1:35 PM Angie Klinefelter discharge to home/self care              Follow-up Information     Follow up With Specialties Details Why Contact Info Additional Information    Destiny Hinojosa MD Family Medicine Call in 1 day  [address] [place] [address] Emergency Department Emergency Medicine Go to  As needed, If symptoms worsen [address]  [phone]  ED, [address] [place] [address], [id number]   [phone]        Discharge Medication List as of 11/3/2019  1:36 PM      CONTINUE these medications which have NOT CHANGED    Details   aspirin 81 mg chewable tablet Chew 1 tablet (81 mg total) daily, Starting Wed 10/16/2019, No Print      atorvastatin (LIPITOR) 40 mg tablet Take 1 tablet (40 mg total) by mouth every evening, Starting Fri 10/25/2019, Normal      lisinopril (ZESTRIL) 5 mg tablet Take 1 tablet (5 mg total) by mouth daily, Starting Fri 10/25/2019, Normal      metFORMIN (GLUCOPHAGE) 1000 MG tablet Take 1 tablet (1,000 mg total) by mouth daily, Starting Fri 10/25/2019, Normal           No discharge procedures on file  Prior to Admission Medications   Prescriptions Last Dose Informant Patient Reported? Taking?   aspirin 81 mg chewable tablet 11/3/2019 at Unknown time  No Yes   Sig: Chew 1 tablet (81 mg total) daily   atorvastatin (LIPITOR) 40 mg tablet 11/3/2019 at Unknown time  No Yes   Sig: Take 1 tablet (40 mg total) by mouth every evening   lisinopril (ZESTRIL) 5 mg tablet 11/3/2019 at Unknown time  No Yes   Sig: Take 1 tablet (5 mg total) by mouth daily   metFORMIN (GLUCOPHAGE) 1000 MG tablet 11/3/2019 at Unknown time  No Yes   Sig: Take 1 tablet (1,000 mg total) by mouth daily      Facility-Administered Medications: None       Portions of the record may have been created with voice recognition software  Occasional wrong word or "sound a like" substitutions may have occurred due to the inherent limitations of voice recognition software  Read the chart carefully and recognize, using context, where substitutions have occurred      Electronically signed by:  Michoacano Peraza
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
## 2019-11-04 ENCOUNTER — PATIENT OUTREACH (OUTPATIENT)
Dept: CASE MANAGEMENT | Facility: OTHER | Age: 74
End: 2019-11-04

## 2019-11-04 NOTE — PROGRESS NOTES
First outreach attempt  I left a message on patient's voicemail with my contact information requesting a call back

## 2019-11-05 ENCOUNTER — PATIENT OUTREACH (OUTPATIENT)
Dept: CASE MANAGEMENT | Facility: OTHER | Age: 74
End: 2019-11-05

## 2019-11-06 ENCOUNTER — PATIENT OUTREACH (OUTPATIENT)
Dept: CASE MANAGEMENT | Facility: OTHER | Age: 74
End: 2019-11-06

## 2019-11-06 ENCOUNTER — VBI (OUTPATIENT)
Dept: ADMINISTRATIVE | Facility: OTHER | Age: 74
End: 2019-11-06

## 2019-11-06 NOTE — TELEPHONE ENCOUNTER
Latha Hernandez    ED Visit Information     Ed visit date: 11/03/2019  Diagnosis Description: Injury of head, initial encounter; Neuralgia  In Network? Yes [facility]  Discharge status: Home  Discharged with meds ? No  Number of ED visits to date: 3 (1 ED to admission)   ED Severity:n/a     Outreach Information    Outreach successful: Yes 1  Date letter mailed:n/a  Date Finalized:11/06/2019    Care Coordination    Follow up appointment with pcp: yes 11/15/2019  Transportation issues ? No    Value Base Outreach    Reason Patient went to ED instead of Urgent Care or PCP?:  Perceived Severity of Illness  Urgent care Education?:  No  11/06/2019 11:20 AM Phone (Nitesh Fanning) Yoav Bowman (Self) [phone] (H)  Call Complete  Personal communication with patient regarding recent ED visit on 11/3 for Injury of head, initial encounter; Neuralgia  Patient was discharged without medication and advised to follow up with PCP  Patient stated that he is still experiencing head pain  Patient is scheduled to follow up with Dr Phuong Skinner on 11/15/2019  Patient is enrolled in [facility]  Patient and spouse are aware of PCP after hours on-call service  Patient and spouse are aware of their nearest [facility] urgent care facility and what conditions may be treated there

## 2019-11-07 ENCOUNTER — PATIENT OUTREACH (OUTPATIENT)
Dept: CASE MANAGEMENT | Facility: OTHER | Age: 74
End: 2019-11-07

## 2019-11-07 NOTE — LETTER
Date: 11/07/19    Dear Shahnaz Walton,   My name is Celia Barrios; I am a registered nurse care manager working with [facility]  I have not been able to reach you and would like to set a time that I can talk with you over the phone  My work is to help patients that have complex medical conditions get the care they need  This includes patients who may have been in the hospital or emergency room  I have enclosed information for you  Please call me with any questions you may have  I look forward to meeting with you    Sincerely,  Cheli Malin RN  Outpatient Care Manager  Phone: [phone]

## 2019-11-12 ENCOUNTER — PATIENT OUTREACH (OUTPATIENT)
Dept: CASE MANAGEMENT | Facility: OTHER | Age: 74
End: 2019-11-12

## 2019-11-12 NOTE — PROGRESS NOTES
I called patient after receiving a voicemail from him  I introduced myself and explained care management  He declined  I advised him to save my contact information and call me if needs arise

## 2019-11-15 ENCOUNTER — OFFICE VISIT (OUTPATIENT)
Dept: FAMILY MEDICINE CLINIC | Facility: CLINIC | Age: 74
End: 2019-11-15
Payer: MEDICARE

## 2019-11-15 VITALS
TEMPERATURE: 96.4 F | SYSTOLIC BLOOD PRESSURE: 112 MMHG | DIASTOLIC BLOOD PRESSURE: 70 MMHG | HEIGHT: 71 IN | OXYGEN SATURATION: 98 % | BODY MASS INDEX: 31.78 KG/M2 | HEART RATE: 50 BPM | WEIGHT: 227 LBS

## 2019-11-15 DIAGNOSIS — I10 ESSENTIAL HYPERTENSION: ICD-10-CM

## 2019-11-15 DIAGNOSIS — E11.49 TYPE 2 DIABETES MELLITUS WITH OTHER NEUROLOGIC COMPLICATION, WITHOUT LONG-TERM CURRENT USE OF INSULIN (HCC): Primary | Chronic | ICD-10-CM

## 2019-11-15 PROCEDURE — 99213 OFFICE O/P EST LOW 20 MIN: CPT | Performed by: FAMILY MEDICINE

## 2019-11-15 PROCEDURE — 36415 COLL VENOUS BLD VENIPUNCTURE: CPT | Performed by: FAMILY MEDICINE

## 2019-11-15 NOTE — PROGRESS NOTES
Assessment/Plan:  Type 2 diabetes mellitus ([facility] [address] )    Lab Results   Component Value Date    HGBA1C 6 7 (H) 10/14/2019   Blood sugars excellently controlled  Continue with same plan  Benign essential hypertension  Blood pressure appears well controlled with lisinopril  He is going to continue with same  We are going to get a CMP this morning  Right MCA syndome  No recurrence of symptoms  Hyperlipidemia  Continue with atorvastatin         Diagnoses and all orders for this visit:    Type 2 diabetes mellitus with other neurologic complication, without long-term current use of insulin (HCC)    Essential hypertension          Subjective:   Chief Complaint   Patient presents with    Follow-up     here for a 3wk recheck     FBS this am 103  Range    Patient ID: Eric Mcgill is a 76 y o  male  HPI  The patient is a 19-year-old male with type 2 diabetes, essential hypertension, recent CVA in addition to asthma X cetera who was recently started on lisinopril for hypertension  He states he has had no complaints since starting the lisinopril  He has had no dizzy spells no cough X cetera  He states his blood sugars have been running excellent in the  range fasting  It was 103 this morning  He had had headache in the interim went to the emergency room but that has resolved  He states that overall he is feeling well  He is concerned about his wife who was hospitalized yesterday with possible CVA  The following portions of the patient's history were reviewed and updated as appropriate: allergies, current medications, past medical history, past social history, past surgical history and problem list     Review of Systems   Constitution: Negative  Cardiovascular: Negative  Respiratory: Negative  Endocrine: Negative  Hematologic/Lymphatic: Negative  Neurological:        Unchanged from previous visit   Psychiatric/Behavioral: Negative for depression  The patient is nervous/anxious  Objective:    Physical Exam   Constitutional: He is oriented to person, place, and time  He appears well-developed and well-nourished  Overweight in no distress   Neck: No JVD present  Cardiovascular: Normal rate, regular rhythm and normal heart sounds  Pulmonary/Chest: Effort normal and breath sounds normal  No respiratory distress  He has no wheezes  He has no rales  Musculoskeletal: He exhibits no edema  Lymphadenopathy:     He has no cervical adenopathy  Neurological: He is alert and oriented to person, place, and time     Psychiatric: Thought content normal    Dysphoric affect

## 2019-11-15 NOTE — ASSESSMENT & PLAN NOTE
Lab Results   Component Value Date    HGBA1C 6 7 (H) 10/14/2019   Blood sugars excellently controlled  Continue with same plan

## 2019-11-15 NOTE — ASSESSMENT & PLAN NOTE
Blood pressure appears well controlled with lisinopril  He is going to continue with same  We are going to get a CMP this morning

## 2019-11-16 LAB
ALBUMIN SERPL-MCNC: 4.2 G/DL (ref 3.6–5.1)
ALBUMIN/GLOB SERPL: 1.6 (CALC) (ref 1–2.5)
ALP SERPL-CCNC: 55 U/L (ref 40–115)
ALT SERPL-CCNC: 21 U/L (ref 9–46)
AST SERPL-CCNC: 18 U/L (ref 10–35)
BILIRUB SERPL-MCNC: 0.9 MG/DL (ref 0.2–1.2)
BUN SERPL-MCNC: 22 MG/DL (ref 7–25)
BUN/CREAT SERPL: ABNORMAL (CALC) (ref 6–22)
CALCIUM SERPL-MCNC: 10.1 MG/DL (ref 8.6–10.3)
CHLORIDE SERPL-SCNC: 106 MMOL/L (ref 98–110)
CO2 SERPL-SCNC: 31 MMOL/L (ref 20–32)
CREAT SERPL-MCNC: 1.17 MG/DL (ref 0.7–1.18)
GLOBULIN SER CALC-MCNC: 2.7 G/DL (CALC) (ref 1.9–3.7)
GLUCOSE SERPL-MCNC: 130 MG/DL (ref 65–99)
POTASSIUM SERPL-SCNC: 4.5 MMOL/L (ref 3.5–5.3)
PROT SERPL-MCNC: 6.9 G/DL (ref 6.1–8.1)
SL AMB EGFR AFRICAN AMERICAN: 71 ML/MIN/1.73M2
SL AMB EGFR NON AFRICAN AMERICAN: 61 ML/MIN/1.73M2
SODIUM SERPL-SCNC: 143 MMOL/L (ref 135–146)

## 2019-11-26 DIAGNOSIS — I10 ESSENTIAL HYPERTENSION: ICD-10-CM

## 2019-11-26 RX ORDER — LISINOPRIL 5 MG/1
TABLET ORAL
Qty: 30 TABLET | Refills: 0 | Status: SHIPPED | OUTPATIENT
Start: 2019-11-26 | End: 2019-12-16 | Stop reason: SDUPTHER

## 2019-12-16 DIAGNOSIS — I10 ESSENTIAL HYPERTENSION: ICD-10-CM

## 2019-12-16 RX ORDER — LISINOPRIL 5 MG/1
TABLET ORAL
Qty: 30 TABLET | Refills: 0 | Status: SHIPPED | OUTPATIENT
Start: 2019-12-16 | End: 2020-01-27

## 2020-01-25 DIAGNOSIS — I10 ESSENTIAL HYPERTENSION: ICD-10-CM

## 2020-01-27 RX ORDER — LISINOPRIL 5 MG/1
TABLET ORAL
Qty: 30 TABLET | Refills: 0 | Status: SHIPPED | OUTPATIENT
Start: 2020-01-27 | End: 2020-02-21 | Stop reason: DRUGHIGH

## 2020-02-03 ENCOUNTER — EVALUATION (OUTPATIENT)
Dept: OCCUPATIONAL THERAPY | Facility: CLINIC | Age: 75
End: 2020-02-03
Payer: MEDICARE

## 2020-02-03 DIAGNOSIS — Z86.73 HISTORY OF CVA (CEREBROVASCULAR ACCIDENT): Primary | ICD-10-CM

## 2020-02-03 PROCEDURE — 97165 OT EVAL LOW COMPLEX 30 MIN: CPT

## 2020-02-03 NOTE — PROGRESS NOTES
OCCUPATIONAL THERAPY INITIAL EVALUATION:    2/3/2020  Daphane Gosselin  1945  [id number]   Referring Provider: Dr Antonella Lim  1  History of CVA (cerebrovascular accident)        Subjective    "They think I had a mini stroke last year"    PATIENT GOAL: "To use my L hand better, have it be stronger"    HISTORY OF PRESENT ILLNESS:   Pt is a 75 y/o male referred to OP OT services from Dr Senait Araujo office  Pt with a history of right MCA infarct secondary to right ICA occlusion in , hypertension and hyperlipidemia  In , pt presented to [facility] as a stroke alert on, complaining of acute worsening of baseline left sided UE/LE isabel-neglect in addition to new onset of left sided facial droop/twitching, dysarthria and disorientation described as inattentiveness  Pt seen by Dr Antonella Lim following hospitalization with recommendations to complete OT/PT OP treatment  PMH:   Past Medical History:   Diagnosis Date    Asthma     DM [facility]diabetes mellitus), type 2 ([facility] [address] )     HLD (hyperlipidemia)     Hypertension     Murmur, cardiac     Stroke ([facility] [address] )        Lifestyle Performance Model:  Autonomy: Indep with all aspects with exception to fasteners    Reciprocal Relationships: supportive spouse Dylon Sal    Service to Others: retired     Intrinsic Gratification: spending time with spouse, son    Home Setup: [facility] style, no MARYA, full flight to basement, walk in shower in basement level     BP taken today:   145/85    Pain Levels:     Restin    With Activity:  3 (low back)    Objective    Impairment Observations:      UE ROM and Strength Testing (2nd position):     1   Dynamometer (2nd position) and Pinch Meter (in lbs)    R UE Intact     Gross Grasp: 55 lbs     Pincer: 9 lbs     Tripod: 7 lbs     Lateral: 12 lbs      L UE Impaired  Gross Grasp: 40 lbs   Pincer: 5 lbs   Tripod: 7lbs   Lateral: 9lbs    2     9-hole Peg Test:     RUE:   21 8 seconds      LUE: 58 36 seconds      2  Functional dexterity test:     RUE:   26 82 seconds      LUE:    75 42 seconds    3  Myofilaments(3 61 WNL):     R UE: 3 61      L UE:  4 31 along thumb and index finger       4  AROM: Affected UE left    Dominant Hand: right     Shoulder elevation: near full,   Shoulder FF: >3/4,    Shoulder ABD: 3/4  ER/IR: full  Elbow ext/flex:full  Sup/Pron: 3/4 supination  Wrist flex/ext: 3/4 extension  Composite: full  Hook: full  Opposition: bradykinetic,   Finger to nose: impaired proprioception with vision occluded  Dysdiadochokinesia: impaired    4  MMT  Shoulder elevation: 3+/5   Shoulder FF: 3/5    Shoulder ABD: 3+/5    Elbow ext/flex:3+/5  Sup/Pron: 3-/5  Wrist flex/ext: 4/5  Composite:4-/5      Psychosocial    DEFER, PATIENT PREFERENCE    Cognitive Function  DEFER, PT AND SPOUSE REPORT AT [facility] BASELINE FOR COGNITION AND VISION, WISH TO FOCUS ON L UE        Assessment/Plan    Skilled Analysis:  Pt is a 76 y o  male referred to Occupational Therapy s/p History of CVA (cerebrovascular accident) [Z86 73]  Pt participated in skilled OT evaluation and following formalized testing, presents with the following areas of deficit: endurance, forward functional reach, decreased I w/ ADLS/IADLS, strength, ROM, sensation deficits, impaired fine motor skills, coordination deficits and impaired proprioception, dec in hand manipulation  Pt does demo the need for skilled Occupational Therapy services 2x/week for 4-8 weeks with focus on LUE fxnl strength, in hand manipulation, fxnl dexterity, digit isolation, automaticity,fastener use  to address the goals as listed below        Goals:  Short Term Goals: (4 weeks) = LTGs  Tone:  - Pt will demo good carryover of clinic and home tone reduction strategies to for improved AROM initiation with functional reach, dressing, hygiene  Modalities:  - Pt will tolerate BIONESS/NMES/IASTM for improved motor and sensory performance for overall improved hand to target with 80% accuracy  -   Sensation:  - Pt will increase proprioception of  L hand to target for improved functional reach vision occluded with ADL tasks  Coordination:  - Pt will increase rate of manipulation for all FM tests for improved functional performance (pinch pad, tripod, and lateral pincer grasps) with salient and fx'l I/ADL/leisure tasks  ROM/Function:  - Pt will increase L  UE to refined functional assist, with <20% cuing for tabletop tasks for improved functional performance of life roles and salient tasks  - Pt will demo with G carryover of Home Exercise Program for improved functional use of  LUE            INTERVENTION COMMENTS:  Diagnosis: History of CVA (cerebrovascular accident) [Z86 73]  Precautions:  Fall risk    Insurance: Payor: Genevieve Jacob / Plan: MEDICARE A AND B / Product Type: Medicare A & B Fee for Service /   1 of 10 visits, PN due 3/3/2020

## 2020-02-05 ENCOUNTER — OFFICE VISIT (OUTPATIENT)
Dept: OCCUPATIONAL THERAPY | Facility: CLINIC | Age: 75
End: 2020-02-05
Payer: MEDICARE

## 2020-02-05 DIAGNOSIS — Z86.73 HISTORY OF CVA (CEREBROVASCULAR ACCIDENT): Primary | ICD-10-CM

## 2020-02-05 PROCEDURE — 97112 NEUROMUSCULAR REEDUCATION: CPT

## 2020-02-05 PROCEDURE — 97110 THERAPEUTIC EXERCISES: CPT

## 2020-02-05 NOTE — PROGRESS NOTES
Daily Note     Today's date: 2020  Patient name: Ashanti Teran  : 1945  MRN: [id number]  Referring provider: Gavin Gomez MD  Dx:   Encounter Diagnosis   Name Primary?  History of CVA (cerebrovascular accident) Yes                  Subjective: "Wow  I'm really uncoordinated "      Objective: See treatment below  5 minutes prograde and 5 minutes retrograde on UBE at 1 5 resistance to increase proximal strength and endurance  Red flexbar and blue power web for hand strengthening with 3x10 reps in all directions  Large pegboard with use of black resistive grippers and black tongs for strength and coordination  Assessment: Tolerated treatment fair  Moderate droppage of marbles  Plan: Continued skilled OT per POC      INTERVENTION COMMENTS:  Diagnosis: History of CVA (cerebrovascular accident) [Z86 73]  Precautions: Fall risk  2 of 10 visits, PN due 3/3

## 2020-02-07 ENCOUNTER — APPOINTMENT (OUTPATIENT)
Dept: OCCUPATIONAL THERAPY | Facility: CLINIC | Age: 75
End: 2020-02-07
Payer: MEDICARE

## 2020-02-11 ENCOUNTER — OFFICE VISIT (OUTPATIENT)
Dept: OCCUPATIONAL THERAPY | Facility: CLINIC | Age: 75
End: 2020-02-11
Payer: MEDICARE

## 2020-02-11 DIAGNOSIS — Z86.73 HISTORY OF CVA (CEREBROVASCULAR ACCIDENT): Primary | ICD-10-CM

## 2020-02-11 PROCEDURE — 97112 NEUROMUSCULAR REEDUCATION: CPT

## 2020-02-11 PROCEDURE — 97150 GROUP THERAPEUTIC PROCEDURES: CPT

## 2020-02-11 NOTE — PROGRESS NOTES
Daily Note     Today's date: 2020  Patient name: Helene Bueno  : 1945  MRN: [id number]  Referring provider: Brook Mitchell MD  Dx:   Encounter Diagnosis     ICD-10-CM    1  History of CVA (cerebrovascular accident) Z86 73                   Subjective: "These exercises are good "      Objective: See treatment description below    UBE x 5 min prograde and x 5 min retrograde bilaterally in seated position with 1 5 resistance to increase L>RUE proximal strengthening and endurance  OTS educated pt on theraputty (75% red, 25% green) HEP for distal strengthening of LUE  Velcro board activity completed for LUE proprioceptive input, lateral pinch, 3-jaw jeff, and 2-point pinch for simulation of salient tasks in home environment  Hand to target [facility] activity of placing marbles with tripod grasp using yellow resistance clamp onto board for improved LUE coordination, prehension patterns, and dexterity  Assessment: Tolerated treatment well  Patient would benefit from continued OT    Pt tolerated UBE fair with B/L fatigue noted at 7 min, though able to tolerate full 10 min  Pt demo G understanding of theraputty HEP  Pt demo 3/4 full pronation with cylindrical velcro board and G technique of 3-jaw jeff and lateral pinch grasps  Pt with min droppage of marbles in [facility] activity 2* mod fatigue of LUE at end of session  Plan: Continue per plan of care        INTERVENTION COMMENTS:  Diagnosis: History of CVA (cerebrovascular accident) [Z86 73]  Precautions: Fall risk  3 of 10 visits, PN due 3/3

## 2020-02-14 ENCOUNTER — OFFICE VISIT (OUTPATIENT)
Dept: OCCUPATIONAL THERAPY | Facility: CLINIC | Age: 75
End: 2020-02-14
Payer: MEDICARE

## 2020-02-14 DIAGNOSIS — Z86.73 HISTORY OF CVA (CEREBROVASCULAR ACCIDENT): Primary | ICD-10-CM

## 2020-02-14 PROCEDURE — 97530 THERAPEUTIC ACTIVITIES: CPT

## 2020-02-14 PROCEDURE — 97110 THERAPEUTIC EXERCISES: CPT

## 2020-02-14 NOTE — PROGRESS NOTES
Daily Note     Today's date: 2020  Patient name: Wilson Hays  : 1945  MRN: [id number]  Referring provider: Mel Davey MD  Dx:   Encounter Diagnosis   Name Primary?  History of CVA (cerebrovascular accident) Yes                  Subjective: "I can do these at home, I have coins"  Objective: See treatment diary below    Pt participated in skilled OT focusing on strengthening and FMS/FMC  Pt completed therex using 2#tbar focusing on proximal strengthening, endurance, BL integration and symmetrical UE movements, MMP, fwd/bwd rows, 2x10  Blue flex right for  strengthening w/4 sec hold    Pt engaged in hand manipulation task facilitating digit extension holding 2" disc w/sperical grasp stabilizing with D1,4&5 rotating w/D 1&2 to 180*  In hand manipulation with palm to digit translation shifting coin with D1&2 transitioning to foam blocks and small octagon screws  Assessment: Tolerated treatment well  Patient would benefit from continued OT   Pt presented with small lag to LUE when engaging in UE symmetrical movements  Max difficulty initially to rotate 2"disc, improving w/mass practice  Pt unable to complete in-hand shift w/coin, SIMON downgraded task to using small foam bocks grading up w/use of med size screws  Pt able to translate screws palm to digit with difficulty shifting w/thumb to D1  Educated pt on utilizing coins and screws for inhand manipulation carryover at home to improve engagement in self care tasks  Pt expressed difficulty in stabilizing fork in left hand to cut meat  Trialed task w/pt w/minimal difficulty noted, offering pt foam to apply to fork to increase  on fork handle  Pt declined stating he would continue to work on strengthening his        Plan: Continued skilled OT per POC     INTERVENTION COMMENTS:  Diagnosis: History of CVA (cerebrovascular accident) [Z86 73]  Precautions: Fall risk  4 of 10 visits, PN due 3/3

## 2020-02-18 ENCOUNTER — OFFICE VISIT (OUTPATIENT)
Dept: OCCUPATIONAL THERAPY | Facility: CLINIC | Age: 75
End: 2020-02-18
Payer: MEDICARE

## 2020-02-18 DIAGNOSIS — Z86.73 HISTORY OF CVA (CEREBROVASCULAR ACCIDENT): Primary | ICD-10-CM

## 2020-02-18 PROCEDURE — 97110 THERAPEUTIC EXERCISES: CPT

## 2020-02-18 PROCEDURE — 97112 NEUROMUSCULAR REEDUCATION: CPT

## 2020-02-18 NOTE — PROGRESS NOTES
Daily Note     Today's date: 2020  Patient name: Shireen Abreu  : 1945  MRN: [id number]  Referring provider: Birgit Seo MD  Dx:   Encounter Diagnosis   Name Primary?  History of CVA (cerebrovascular accident) Yes                  Subjective: "I was picking up sticks in the back yard yesterday "      Objective: See treatment below  Initiated session with 5 minutes prograde and 5 minutes retrograde on UBE with upgrade to 2 0 resistance  2# free weight for wrist flex/ext/deviation with 20 reps in each direction for strengthening to complete functional tasks at home  Red putty pull with pegs to increase pincer grasp and [facility]  Engaged patient in letter block match with use of reacher for sustained hand grasp and midline crossing simulating picking up sticks from yard  Assessment: Tolerated treatment well  No fatigue at end of session  Making good progress in sessions  Plan: Continued skilled OT per POC      INTERVENTION COMMENTS:  Diagnosis: History of CVA (cerebrovascular accident) [Z86 73]  Precautions: Fall risk  5 of 10 visits, PN scheduled 3/6

## 2020-02-21 ENCOUNTER — OFFICE VISIT (OUTPATIENT)
Dept: OCCUPATIONAL THERAPY | Facility: CLINIC | Age: 75
End: 2020-02-21
Payer: MEDICARE

## 2020-02-21 DIAGNOSIS — I10 BENIGN ESSENTIAL HYPERTENSION: Primary | Chronic | ICD-10-CM

## 2020-02-21 DIAGNOSIS — Z86.73 HISTORY OF CVA (CEREBROVASCULAR ACCIDENT): Primary | ICD-10-CM

## 2020-02-21 PROCEDURE — 97112 NEUROMUSCULAR REEDUCATION: CPT

## 2020-02-21 PROCEDURE — 97110 THERAPEUTIC EXERCISES: CPT

## 2020-02-21 RX ORDER — LISINOPRIL 10 MG/1
10 TABLET ORAL DAILY
Qty: 90 TABLET | Refills: 1 | Status: SHIPPED | OUTPATIENT
Start: 2020-02-21 | End: 2020-08-19

## 2020-02-21 NOTE — PROGRESS NOTES
Occupational Therapy Daily Note:    Today's date: 2020  Patient name: Carolina Tucker  : 1945  MRN: [id number]  Referring provider: Mayur Mcneil MD  Dx:   Encounter Diagnosis   Name Primary?  History of CVA (cerebrovascular accident) Yes                  Subjective: "I cant cut my food"    Objective: Pt seen for OT treatment session focusing on UE endurance, L UE coordination, FMS, Hand to target, and fxnl tool use  Pt tolerated 50 x 3 repetitions of reciprocal ball toss with 5# dowel with focus on promoting UE endurance, proximal strengthening and hand to target  Pt seated at tabletop to perform theraputty tool exercises to simulate management of door knob, key, and scoop  Cutting exercise with use of weighted tools and tputty  OT edu pt on use of dycem to reduce movement of plate with food management  Pt completed with inc ease and improved coordination    BITS for hand to target with 1# wrist wt, downgraded to no wt 2* presence of compensatory shoulder hiking  Assessment: Tolerated treatment well  Patient would benefit from continued skilled OT  Plan: Continued skilled OT per POC      INTERVENTION COMMENTS:  Diagnosis: History of CVA (cerebrovascular accident) [Z86 73]  Precautions: Fall risk  6 of 10 visits, PN scheduled 3/6

## 2020-02-25 ENCOUNTER — OFFICE VISIT (OUTPATIENT)
Dept: OCCUPATIONAL THERAPY | Facility: CLINIC | Age: 75
End: 2020-02-25
Payer: MEDICARE

## 2020-02-25 DIAGNOSIS — Z86.73 HISTORY OF CVA (CEREBROVASCULAR ACCIDENT): Primary | ICD-10-CM

## 2020-02-25 PROCEDURE — 97530 THERAPEUTIC ACTIVITIES: CPT

## 2020-02-25 PROCEDURE — 97112 NEUROMUSCULAR REEDUCATION: CPT

## 2020-02-25 PROCEDURE — 97110 THERAPEUTIC EXERCISES: CPT

## 2020-02-25 NOTE — PROGRESS NOTES
Daily Note     Today's date: 2020  Patient name: Huyen Membreno  : 1945  MRN: [id number]  Referring provider: Arabella Go MD  Dx:   Encounter Diagnosis   Name Primary?  History of CVA (cerebrovascular accident) Yes                  Subjective: "I need a little tune up "      Objective: See treatment below  Initiated session with 5 minutes prograde and 5 minutes retrograde on UBE at 1 8 resistance to increase proximal strength and endurance, as well as shoulder ROM  Seated at [facility], engaged patient in IR/ER with 1# weighted ball, followed by MMP for dynamic stabilization and proximal strengthening  OH reach to place letter blocks on top of vertical mirror board to increase shoulder flexion and decrease compensation of shoulder hike  Pattern match with pieces behind back and distractor pieces to challenge proprioception of left hand  Assessment: Tolerated treatment well  Noted with flexor pattern when placing pieces close to body  Plan: Continued skilled OT per POC      INTERVENTION COMMENTS:  Precautions: Fall risk  7 of 10 visits, PN due 3/

## 2020-02-28 ENCOUNTER — OFFICE VISIT (OUTPATIENT)
Dept: OCCUPATIONAL THERAPY | Facility: CLINIC | Age: 75
End: 2020-02-28
Payer: MEDICARE

## 2020-02-28 DIAGNOSIS — Z86.73 HISTORY OF CVA (CEREBROVASCULAR ACCIDENT): Primary | ICD-10-CM

## 2020-02-28 PROCEDURE — 97110 THERAPEUTIC EXERCISES: CPT

## 2020-02-28 PROCEDURE — 97112 NEUROMUSCULAR REEDUCATION: CPT

## 2020-02-28 NOTE — PROGRESS NOTES
Occupational Therapy Daily Note:    Today's date: 2020  Patient name: Donald Lindsay  : 1945  MRN: [id number]  Referring provider: Romel Madrigal MD  Dx:   Encounter Diagnosis   Name Primary?  History of CVA (cerebrovascular accident) Yes                  Subjective: "Can we not do the arm bike today, my back is bothering me from doing yard work"    Objective: Pt seen for OT treatment session focusing on L UE [address], bimanual coordination, fxnl tool use, [address], in hand manipulation, and proprioceptive awareness  Pt engaged in nuts and bolts board with vision occluded with L UE to place and complete complex rotation isolating pincer grasp  Large pegboard activity with use of hand gripper on 2nd setting and tongs to place/remove pegs to target with L UE  Pt demo mod droppage with use of standard tongs with apraxia with coordination of peg to target  Pt utilizing pincer grasp to then place/remove marbles to pegtop  Impaired proprioception noted with moderate droppage of marbles, improving to min with repetition and demonstration  Assessment: Tolerated treatment well  Continue to address L UE to target and FMC/in hand manipulation tasks to improve fxnl skills such as container management  Pt reports G success with use of dycem under plates in home environment  Pt c/o back pain today s/p yardwork completed over the weekend  BP taken today as pt's medications were adjusted, /89  Patient would benefit from continued skilled OT  Plan: Continued skilled OT per POC      INTERVENTION COMMENTS:  Precautions: Fall risk  Insurance   8 of 10 visits, PN due 3/

## 2020-03-02 ENCOUNTER — OFFICE VISIT (OUTPATIENT)
Dept: OCCUPATIONAL THERAPY | Facility: CLINIC | Age: 75
End: 2020-03-02
Payer: MEDICARE

## 2020-03-02 DIAGNOSIS — Z86.73 HISTORY OF CVA (CEREBROVASCULAR ACCIDENT): Primary | ICD-10-CM

## 2020-03-02 PROCEDURE — 97110 THERAPEUTIC EXERCISES: CPT

## 2020-03-02 PROCEDURE — 97530 THERAPEUTIC ACTIVITIES: CPT

## 2020-03-02 NOTE — PROGRESS NOTES
Daily Note     Today's date: 3/2/2020  Patient name: Eric Mcgill  : 1945  MRN: [id number]  Referring provider: Perla Phan MD  Dx:   Encounter Diagnosis     ICD-10-CM    1  History of CVA (cerebrovascular accident) Z86 73                   Subjective: "Yesterday I did some therapy at home changing my motor but it was hard to hold things with my left hand "      Objective: See treatment description below    Pt engaged in recriprocal ball toss using 3lb dowel for R>LUE proximal muscle strengthening, endurance, and hand to target x 2 sets of 50 reps  Large letters blocks placed on letter board using tongs for improved LUE tripod grasp and shoulder FF abilities for utensil management and salient tasks  Pt completed [address] activity with tweezers to place beads onto small pegs to improve tripod grasp followed by lacing board with LUE to improve proprioception skills with ADL tasks in home environment  Pt engaged in x 2 timed trials with small pegboard using 9 pegs to improve rate of manipulation for FM tests and LUE tripod grasp for ADL management  9 peg Timed Trials with LUE:  Trial 1: 3 min 25 sec  Trial 2: 1 min 11 sec    Assessment: Tolerated treatment well  Patient would benefit from continued OT    Pt required mod v/c to keep LUE in 90* shoulder FF with dowel activity though demo G LUE proximal muscle endurance  Pt with min droppage of large letter blocks 2* poor endurance to maintain tripod grasp on tongs  Pt with mod difficulties in [address] to place beads onto small pegs with tweezers evident by mod droppage  Pt demo G proprioceptive abilities with LUE to complete lacing board activity  Pt with improved FM manipulation speed of small pegs with mass repetition though min droppage evident  Plan: Continue per plan of care        INTERVENTION COMMENTS:  Precautions: Fall risk  9 of 10 visits, PN due 3/6         Written and treated by ASH Yates under direct supervision by Juan Romero MS, OTR/L

## 2020-03-06 ENCOUNTER — EVALUATION (OUTPATIENT)
Dept: OCCUPATIONAL THERAPY | Facility: CLINIC | Age: 75
End: 2020-03-06
Payer: MEDICARE

## 2020-03-06 DIAGNOSIS — Z86.73 HISTORY OF CVA (CEREBROVASCULAR ACCIDENT): Primary | ICD-10-CM

## 2020-03-06 PROCEDURE — 97530 THERAPEUTIC ACTIVITIES: CPT

## 2020-03-06 NOTE — PROGRESS NOTES
OCCUPATIONAL THERAPY PROGRESS UPDATE #1:    3/6/2020  Cintia Moore  1945  [id number]   Referring Provider: Dr Lisa Garcia  1  History of CVA (cerebrovascular accident)         Subjective    "Im able to  coins now"    PATIENT GOAL: "To use my L hand better, have it be stronger"         PMH:   Past Medical History:   Diagnosis Date    Asthma     DM (diabetes mellitus), type 2 ([address] )     HLD (hyperlipidemia)     Hypertension     Murmur, cardiac     Stroke (HCC)          Pain Levels:     Restin    With Activity:  3 (low back)    Objective  See below    Impairment Observations:        UE ROM and Strength Testing (2nd position):     1  Dynamometer (2nd position) and Pinch Meter (in lbs)    R UE Intact     Gross Grasp: 55 lbs   62lbs     Pincer: 9 lbs    11lbs     Tripod: 7 lbs   11lbs     Lateral: 12 lbs    15lbs      L UE Impaired  Gross Grasp: 40 lbs   50lbs   Pincer: 5 lbs   7lbs   Tripod: 7lbs   9lbs   Lateral: 9lbs   12lbs    2     9-hole Peg Test:     RUE:   21 8 22 6 seconds      LUE:    58 36   48 29 seconds      2  Functional dexterity test:     RUE:   26 82   27 24 seconds      LUE:    75 42  Audrea Ramming  78 72 seconds    3  Myofilaments(3 61 WNL):     R UE: 3 61      L UE:  4 31 along thumb and index finger       4  AROM: Affected UE left    Dominant Hand: right     Shoulder elevation: near full,   Shoulder FF: near full    Shoulder ABD: near full  ER/IR: full  Elbow ext/flex:full  Sup/Pron: 3/4 supination  Wrist flex/ext: 3/4 extension  Composite: full  Hook: full  Opposition: bradykinetic,   Finger to nose: impaired proprioception with vision occluded however improved targeting  Dysdiadochokinesia: impaired  +pronator drift    4  MMT  Shoulder elevation: 4/5       Shoulder FF: 4/5    Shoulder ABD: 4-/5    Elbow ext/flex:4/5  Sup/Pron: 3+/5  Wrist flex/ext: 4/5  Composite:4/5      Psychosocial    DEFER, PATIENT PREFERENCE    Cognitive Function  DEFER, PT AND SPOUSE REPORT AT FXNL BASELINE FOR COGNITION AND VISION, WISH TO FOCUS ON L UE        Assessment/Plan    Skilled Analysis:  Pt is a 76 y o  male referred to Occupational Therapy s/p History of CVA (cerebrovascular accident) [Z86 73]  Pt participated in skilled OT re-evaluation and progress update  Pt reports he has improved in the areas of retrieving small items including coins with L UE and improved bimanual coordination   Following formalized testing, presents with improved FMS/FMC/GMC/GMS and hand to target tasks Pt continues to present with impaired LUE FMC/FMS/in hand manipulation and GMS/GMC however fxn and quality of movement has  has drastically improved  Pt appropriate for D/C at this time, no further skilled OT services warranted at this time    Goals:  Short Term Goals: (4 weeks) = LTGs  Tone:  - Pt will demo good carryover of clinic and home tone reduction strategies to for improved AROM initiation with functional reach, dressing, hygiene: MET  Modalities:  - Pt will tolerate BIONESS/NMES/IASTM for improved motor and sensory performance for overall improved hand to target with 80% accuracy: MET  -   Sensation:  - Pt will increase proprioception of  L hand to target for improved functional reach vision occluded with ADL tasks: MET  Coordination:  - Pt will increase rate of manipulation for all FM tests for improved functional performance (pinch pad, tripod, and lateral pincer grasps) with salient and fx'l I/ADL/leisure tasks: MET  ROM/Function:  - Pt will increase L  UE to refined functional assist, with <20% cuing for tabletop tasks for improved functional performance of life roles and salient tasks: MET  - Pt will demo with G carryover of Home Exercise Program for improved functional use of  LUE: MET          INTERVENTION COMMENTS:  Precautions: Fall risk  10 of 10 visits, D/C 3/6

## 2020-04-20 DIAGNOSIS — E11.9 TYPE 2 DIABETES MELLITUS WITHOUT COMPLICATION (HCC): ICD-10-CM

## 2020-05-11 DIAGNOSIS — G45.9 TIA (TRANSIENT ISCHEMIC ATTACK): ICD-10-CM

## 2020-05-11 RX ORDER — ATORVASTATIN CALCIUM 40 MG/1
TABLET, FILM COATED ORAL
Qty: 90 TABLET | Refills: 0 | Status: SHIPPED | OUTPATIENT
Start: 2020-05-11 | End: 2020-08-19

## 2020-06-18 ENCOUNTER — OFFICE VISIT (OUTPATIENT)
Dept: FAMILY MEDICINE CLINIC | Facility: CLINIC | Age: 75
End: 2020-06-18
Payer: MEDICARE

## 2020-06-18 VITALS
DIASTOLIC BLOOD PRESSURE: 70 MMHG | BODY MASS INDEX: 31.15 KG/M2 | SYSTOLIC BLOOD PRESSURE: 134 MMHG | WEIGHT: 222.5 LBS | HEIGHT: 71 IN

## 2020-06-18 DIAGNOSIS — I35.8 NON-RHEUMATIC AORTIC SCLEROSIS: ICD-10-CM

## 2020-06-18 DIAGNOSIS — I10 BENIGN ESSENTIAL HYPERTENSION: Chronic | ICD-10-CM

## 2020-06-18 DIAGNOSIS — R35.1 BENIGN PROSTATIC HYPERPLASIA WITH NOCTURIA: ICD-10-CM

## 2020-06-18 DIAGNOSIS — N18.1 CHRONIC RENAL DISEASE, STAGE I: ICD-10-CM

## 2020-06-18 DIAGNOSIS — E78.5 HYPERLIPIDEMIA, UNSPECIFIED HYPERLIPIDEMIA TYPE: ICD-10-CM

## 2020-06-18 DIAGNOSIS — I70.0 ATHEROSCLEROSIS OF AORTA (HCC): ICD-10-CM

## 2020-06-18 DIAGNOSIS — I69.354 HEMIPLEGIA AND HEMIPARESIS FOLLOWING CEREBRAL INFARCTION AFFECTING LEFT NON-DOMINANT SIDE (HCC): ICD-10-CM

## 2020-06-18 DIAGNOSIS — E11.49 TYPE 2 DIABETES MELLITUS WITH OTHER NEUROLOGIC COMPLICATION, WITHOUT LONG-TERM CURRENT USE OF INSULIN (HCC): Primary | Chronic | ICD-10-CM

## 2020-06-18 DIAGNOSIS — I65.23 BILATERAL CAROTID ARTERY STENOSIS: ICD-10-CM

## 2020-06-18 DIAGNOSIS — N40.1 BENIGN PROSTATIC HYPERPLASIA WITH NOCTURIA: ICD-10-CM

## 2020-06-18 LAB — SL AMB POCT HEMOGLOBIN AIC: 6.8 (ref ?–6.5)

## 2020-06-18 PROCEDURE — 4040F PNEUMOC VAC/ADMIN/RCVD: CPT | Performed by: FAMILY MEDICINE

## 2020-06-18 PROCEDURE — 2022F DILAT RTA XM EVC RTNOPTHY: CPT | Performed by: FAMILY MEDICINE

## 2020-06-18 PROCEDURE — 1160F RVW MEDS BY RX/DR IN RCRD: CPT | Performed by: FAMILY MEDICINE

## 2020-06-18 PROCEDURE — 99214 OFFICE O/P EST MOD 30 MIN: CPT | Performed by: FAMILY MEDICINE

## 2020-06-18 PROCEDURE — 36415 COLL VENOUS BLD VENIPUNCTURE: CPT | Performed by: FAMILY MEDICINE

## 2020-06-18 PROCEDURE — 83036 HEMOGLOBIN GLYCOSYLATED A1C: CPT | Performed by: FAMILY MEDICINE

## 2020-06-18 PROCEDURE — 1036F TOBACCO NON-USER: CPT | Performed by: FAMILY MEDICINE

## 2020-06-18 PROCEDURE — 3008F BODY MASS INDEX DOCD: CPT | Performed by: FAMILY MEDICINE

## 2020-06-18 PROCEDURE — 3075F SYST BP GE 130 - 139MM HG: CPT | Performed by: FAMILY MEDICINE

## 2020-06-18 PROCEDURE — 3044F HG A1C LEVEL LT 7.0%: CPT | Performed by: FAMILY MEDICINE

## 2020-06-18 PROCEDURE — 3078F DIAST BP <80 MM HG: CPT | Performed by: FAMILY MEDICINE

## 2020-06-18 PROCEDURE — 3066F NEPHROPATHY DOC TX: CPT | Performed by: FAMILY MEDICINE

## 2020-06-18 RX ORDER — TAMSULOSIN HYDROCHLORIDE 0.4 MG/1
0.4 CAPSULE ORAL
Qty: 30 CAPSULE | Refills: 5 | Status: SHIPPED | OUTPATIENT
Start: 2020-06-18 | End: 2021-06-10 | Stop reason: ALTCHOICE

## 2020-06-19 LAB
ALBUMIN SERPL-MCNC: 4.1 G/DL (ref 3.6–5.1)
ALBUMIN/GLOB SERPL: 1.6 (CALC) (ref 1–2.5)
ALP SERPL-CCNC: 68 U/L (ref 35–144)
ALT SERPL-CCNC: 18 U/L (ref 9–46)
AST SERPL-CCNC: 18 U/L (ref 10–35)
BILIRUB SERPL-MCNC: 0.8 MG/DL (ref 0.2–1.2)
BUN SERPL-MCNC: 16 MG/DL (ref 7–25)
BUN/CREAT SERPL: ABNORMAL (CALC) (ref 6–22)
CALCIUM SERPL-MCNC: 9.4 MG/DL (ref 8.6–10.3)
CHLORIDE SERPL-SCNC: 106 MMOL/L (ref 98–110)
CHOLEST SERPL-MCNC: 109 MG/DL
CHOLEST/HDLC SERPL: 2.5 (CALC)
CO2 SERPL-SCNC: 29 MMOL/L (ref 20–32)
CREAT SERPL-MCNC: 1.18 MG/DL (ref 0.7–1.18)
ERYTHROCYTE [DISTWIDTH] IN BLOOD BY AUTOMATED COUNT: 13.3 % (ref 11–15)
GLOBULIN SER CALC-MCNC: 2.5 G/DL (CALC) (ref 1.9–3.7)
GLUCOSE SERPL-MCNC: 118 MG/DL (ref 65–99)
HCT VFR BLD AUTO: 45.7 % (ref 38.5–50)
HDLC SERPL-MCNC: 43 MG/DL
HGB BLD-MCNC: 15 G/DL (ref 13.2–17.1)
LDLC SERPL CALC-MCNC: 52 MG/DL (CALC)
MCH RBC QN AUTO: 29.1 PG (ref 27–33)
MCHC RBC AUTO-ENTMCNC: 32.8 G/DL (ref 32–36)
MCV RBC AUTO: 88.7 FL (ref 80–100)
NONHDLC SERPL-MCNC: 66 MG/DL (CALC)
PLATELET # BLD AUTO: 158 THOUSAND/UL (ref 140–400)
PMV BLD REES-ECKER: 10.5 FL (ref 7.5–12.5)
POTASSIUM SERPL-SCNC: 4.6 MMOL/L (ref 3.5–5.3)
PROT SERPL-MCNC: 6.6 G/DL (ref 6.1–8.1)
RBC # BLD AUTO: 5.15 MILLION/UL (ref 4.2–5.8)
SL AMB EGFR AFRICAN AMERICAN: 70 ML/MIN/1.73M2
SL AMB EGFR NON AFRICAN AMERICAN: 60 ML/MIN/1.73M2
SODIUM SERPL-SCNC: 143 MMOL/L (ref 135–146)
TRIGL SERPL-MCNC: 63 MG/DL
WBC # BLD AUTO: 7 THOUSAND/UL (ref 3.8–10.8)

## 2020-07-06 DIAGNOSIS — E11.9 TYPE 2 DIABETES MELLITUS WITHOUT COMPLICATION (HCC): ICD-10-CM

## 2020-08-13 ENCOUNTER — OFFICE VISIT (OUTPATIENT)
Dept: OBGYN CLINIC | Facility: CLINIC | Age: 75
End: 2020-08-13
Payer: MEDICARE

## 2020-08-13 VITALS
BODY MASS INDEX: 31.33 KG/M2 | SYSTOLIC BLOOD PRESSURE: 122 MMHG | WEIGHT: 223.8 LBS | HEART RATE: 44 BPM | HEIGHT: 71 IN | DIASTOLIC BLOOD PRESSURE: 76 MMHG

## 2020-08-13 DIAGNOSIS — M17.12 PRIMARY OSTEOARTHRITIS OF LEFT KNEE: Primary | ICD-10-CM

## 2020-08-13 PROCEDURE — 1036F TOBACCO NON-USER: CPT | Performed by: ORTHOPAEDIC SURGERY

## 2020-08-13 PROCEDURE — 20610 DRAIN/INJ JOINT/BURSA W/O US: CPT | Performed by: ORTHOPAEDIC SURGERY

## 2020-08-13 PROCEDURE — 99213 OFFICE O/P EST LOW 20 MIN: CPT | Performed by: ORTHOPAEDIC SURGERY

## 2020-08-13 PROCEDURE — 3044F HG A1C LEVEL LT 7.0%: CPT | Performed by: ORTHOPAEDIC SURGERY

## 2020-08-13 PROCEDURE — 2022F DILAT RTA XM EVC RTNOPTHY: CPT | Performed by: ORTHOPAEDIC SURGERY

## 2020-08-13 PROCEDURE — 1160F RVW MEDS BY RX/DR IN RCRD: CPT | Performed by: ORTHOPAEDIC SURGERY

## 2020-08-13 PROCEDURE — 3074F SYST BP LT 130 MM HG: CPT | Performed by: ORTHOPAEDIC SURGERY

## 2020-08-13 PROCEDURE — 3008F BODY MASS INDEX DOCD: CPT | Performed by: ORTHOPAEDIC SURGERY

## 2020-08-13 PROCEDURE — 4040F PNEUMOC VAC/ADMIN/RCVD: CPT | Performed by: ORTHOPAEDIC SURGERY

## 2020-08-13 PROCEDURE — 3078F DIAST BP <80 MM HG: CPT | Performed by: ORTHOPAEDIC SURGERY

## 2020-08-13 PROCEDURE — 3066F NEPHROPATHY DOC TX: CPT | Performed by: ORTHOPAEDIC SURGERY

## 2020-08-13 RX ORDER — HYALURONATE SODIUM 10 MG/ML
20 SYRINGE (ML) INTRAARTICULAR
Status: COMPLETED | OUTPATIENT
Start: 2020-08-13 | End: 2020-08-13

## 2020-08-13 RX ADMIN — Medication 20 MG: at 13:29
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
## 2020-08-19 DIAGNOSIS — I10 BENIGN ESSENTIAL HYPERTENSION: Chronic | ICD-10-CM

## 2020-08-19 DIAGNOSIS — G45.9 TIA (TRANSIENT ISCHEMIC ATTACK): ICD-10-CM

## 2020-08-19 RX ORDER — ATORVASTATIN CALCIUM 40 MG/1
TABLET, FILM COATED ORAL
Qty: 90 TABLET | Refills: 0 | Status: SHIPPED | OUTPATIENT
Start: 2020-08-19 | End: 2020-11-17

## 2020-08-19 RX ORDER — LISINOPRIL 10 MG/1
TABLET ORAL
Qty: 90 TABLET | Refills: 0 | Status: SHIPPED | OUTPATIENT
Start: 2020-08-19 | End: 2020-11-17

## 2020-08-20 ENCOUNTER — OFFICE VISIT (OUTPATIENT)
Dept: OBGYN CLINIC | Facility: CLINIC | Age: 75
End: 2020-08-20
Payer: MEDICARE

## 2020-08-20 VITALS
BODY MASS INDEX: 31.36 KG/M2 | SYSTOLIC BLOOD PRESSURE: 122 MMHG | HEART RATE: 47 BPM | HEIGHT: 71 IN | WEIGHT: 224 LBS | DIASTOLIC BLOOD PRESSURE: 68 MMHG

## 2020-08-20 DIAGNOSIS — M17.12 PRIMARY OSTEOARTHRITIS OF LEFT KNEE: Primary | ICD-10-CM

## 2020-08-20 PROCEDURE — 20610 DRAIN/INJ JOINT/BURSA W/O US: CPT | Performed by: PHYSICIAN ASSISTANT

## 2020-08-20 RX ORDER — HYALURONATE SODIUM 10 MG/ML
20 SYRINGE (ML) INTRAARTICULAR
Status: COMPLETED | OUTPATIENT
Start: 2020-08-20 | End: 2020-08-20

## 2020-08-20 RX ADMIN — Medication 20 MG: at 13:14

## 2020-08-28 ENCOUNTER — OFFICE VISIT (OUTPATIENT)
Dept: OBGYN CLINIC | Facility: CLINIC | Age: 75
End: 2020-08-28
Payer: MEDICARE

## 2020-08-28 VITALS
BODY MASS INDEX: 31.78 KG/M2 | SYSTOLIC BLOOD PRESSURE: 144 MMHG | WEIGHT: 227 LBS | HEART RATE: 44 BPM | HEIGHT: 71 IN | DIASTOLIC BLOOD PRESSURE: 80 MMHG

## 2020-08-28 DIAGNOSIS — M17.12 PRIMARY OSTEOARTHRITIS OF LEFT KNEE: Primary | ICD-10-CM

## 2020-08-28 PROCEDURE — 20610 DRAIN/INJ JOINT/BURSA W/O US: CPT | Performed by: ORTHOPAEDIC SURGERY

## 2020-08-28 RX ORDER — HYALURONATE SODIUM 10 MG/ML
20 SYRINGE (ML) INTRAARTICULAR
Status: COMPLETED | OUTPATIENT
Start: 2020-08-28 | End: 2020-08-28

## 2020-08-28 RX ADMIN — Medication 20 MG: at 12:01

## 2020-08-28 NOTE — PROGRESS NOTES
1  Primary osteoarthritis of left knee  diclofenac sodium (VOLTAREN) 1 %    Large joint arthrocentesis     Patient is here for his  3rd injection of  Euflexxa into the left knee  Patient reports improvements  Physical exam of the knee shows no effusion no ecchymosis  Large joint arthrocentesis: L knee  Date/Time: 8/28/2020 12:01 PM  Consent given by: patient  Site marked: site marked  Timeout: Immediately prior to procedure a time out was called to verify the correct patient, procedure, equipment, support staff and site/side marked as required   Supporting Documentation  Indications: pain   Procedure Details  Location: knee - L knee  Preparation: Patient was prepped and draped in the usual sterile fashion  Needle size: 22 G  Ultrasound guidance: no  Approach: anterolateral  Medications administered: 20 mg Sodium Hyaluronate 20 MG/2ML    Patient tolerance: patient tolerated the procedure well with no immediate complications  Dressing:  Sterile dressing applied          Patient tolerated procedure follow up  P r n

## 2020-09-15 ENCOUNTER — OFFICE VISIT (OUTPATIENT)
Dept: NEUROLOGY | Facility: CLINIC | Age: 75
End: 2020-09-15
Payer: MEDICARE

## 2020-09-15 ENCOUNTER — TELEPHONE (OUTPATIENT)
Dept: NEUROLOGY | Facility: CLINIC | Age: 75
End: 2020-09-15

## 2020-09-15 DIAGNOSIS — Z86.73 HISTORY OF STROKE: ICD-10-CM

## 2020-09-15 DIAGNOSIS — G45.9 TIA (TRANSIENT ISCHEMIC ATTACK): ICD-10-CM

## 2020-09-15 DIAGNOSIS — I65.23 BILATERAL CAROTID ARTERY STENOSIS: Primary | ICD-10-CM

## 2020-09-15 DIAGNOSIS — E78.5 HYPERLIPIDEMIA, UNSPECIFIED HYPERLIPIDEMIA TYPE: ICD-10-CM

## 2020-09-15 DIAGNOSIS — E11.49 TYPE 2 DIABETES MELLITUS WITH OTHER NEUROLOGIC COMPLICATION, WITHOUT LONG-TERM CURRENT USE OF INSULIN (HCC): Chronic | ICD-10-CM

## 2020-09-15 PROCEDURE — 99441 PR PHYS/QHP TELEPHONE EVALUATION 5-10 MIN: CPT | Performed by: PSYCHIATRY & NEUROLOGY

## 2020-09-15 NOTE — TELEPHONE ENCOUNTER
Scheduled f/u as requested by patient for [facility] office  Wife states she does not want to schedule his VAS carotid study right now  Mailed out AVS with appointment reminder card and order for VAS carotid complete study (highlighted phone number for scheduling)
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
## 2020-09-15 NOTE — PATIENT INSTRUCTIONS
Stroke:  Jonh Salter is contacted for follow-up with regard to his prior stroke  He reports no new symptoms concerning for recurrent TIA or stroke  He takes his medications as prescribed  He did not endorse any bleeding or bruising issues  He confirms that he is feeling relatively well overall but did endorse some concerns with regard to his carotid stenosis  He does have a known right carotid occlusion and 50-69% stenosis of the left internal carotid artery   -for ongoing stroke prevention should continue his current combination of aspirin, Lipitor, and appropriate blood pressure and glycemic control   -I will defer to the good judgment of his primary care team for monitoring of his cholesterol panel and blood sugar numbers  His current hemoglobin A1c and cholesterol panel are in an excellent range  I would suggest monitoring his blood pressure occasionally outside of the doctor's office setting and targeting numbers less than 130/80   -I would like him to remain physically active in as much she feels capable of doing so  -he is due to have his carotid Doppler ultrasound repeated  I will request that to be performed and we will contact him when those results are available  I will plan for him to return to the office in 8 months time but would be happy to see him sooner if the need should arise  If he has any symptoms concerning for TIA or stroke including sudden painless loss of vision or double vision, difficulty speaking or swallowing, vertigo/room spinning that does not quickly resolve, or weakness/numbness affecting 1 side of the face or body he should proceed by ambulance to the nearest emergency room immediately

## 2020-10-03 DIAGNOSIS — E11.9 TYPE 2 DIABETES MELLITUS WITHOUT COMPLICATION (HCC): ICD-10-CM

## 2020-10-06 ENCOUNTER — IMMUNIZATIONS (OUTPATIENT)
Dept: FAMILY MEDICINE CLINIC | Facility: CLINIC | Age: 75
End: 2020-10-06
Payer: MEDICARE

## 2020-10-06 DIAGNOSIS — Z23 ENCOUNTER FOR IMMUNIZATION: ICD-10-CM

## 2020-10-06 PROCEDURE — 90662 IIV NO PRSV INCREASED AG IM: CPT

## 2020-10-06 PROCEDURE — G0008 ADMIN INFLUENZA VIRUS VAC: HCPCS

## 2020-10-08 ENCOUNTER — APPOINTMENT (OUTPATIENT)
Dept: RADIOLOGY | Facility: AMBULARY SURGERY CENTER | Age: 75
End: 2020-10-08
Attending: ORTHOPAEDIC SURGERY
Payer: MEDICARE

## 2020-10-08 ENCOUNTER — OFFICE VISIT (OUTPATIENT)
Dept: OBGYN CLINIC | Facility: CLINIC | Age: 75
End: 2020-10-08
Payer: MEDICARE

## 2020-10-08 VITALS
WEIGHT: 228 LBS | SYSTOLIC BLOOD PRESSURE: 147 MMHG | BODY MASS INDEX: 31.8 KG/M2 | TEMPERATURE: 98.1 F | DIASTOLIC BLOOD PRESSURE: 69 MMHG | HEART RATE: 46 BPM

## 2020-10-08 DIAGNOSIS — M17.12 PRIMARY OSTEOARTHRITIS OF LEFT KNEE: Primary | ICD-10-CM

## 2020-10-08 DIAGNOSIS — M17.12 PRIMARY OSTEOARTHRITIS OF LEFT KNEE: ICD-10-CM

## 2020-10-08 PROCEDURE — 99212 OFFICE O/P EST SF 10 MIN: CPT | Performed by: ORTHOPAEDIC SURGERY

## 2020-10-08 PROCEDURE — 73562 X-RAY EXAM OF KNEE 3: CPT

## 2020-10-16 ENCOUNTER — HOSPITAL ENCOUNTER (OUTPATIENT)
Dept: NON INVASIVE DIAGNOSTICS | Facility: CLINIC | Age: 75
Discharge: HOME | End: 2020-10-16
Payer: MEDICARE

## 2020-10-16 DIAGNOSIS — I65.23 BILATERAL CAROTID ARTERY STENOSIS: ICD-10-CM

## 2020-10-16 PROCEDURE — 93880 EXTRACRANIAL BILAT STUDY: CPT | Performed by: SURGERY

## 2020-10-16 PROCEDURE — 93880 EXTRACRANIAL BILAT STUDY: CPT

## 2020-10-19 ENCOUNTER — TELEPHONE (OUTPATIENT)
Dept: NEUROLOGY | Facility: CLINIC | Age: 75
End: 2020-10-19

## 2020-11-17 DIAGNOSIS — I10 BENIGN ESSENTIAL HYPERTENSION: Chronic | ICD-10-CM

## 2020-11-17 DIAGNOSIS — G45.9 TIA (TRANSIENT ISCHEMIC ATTACK): ICD-10-CM

## 2020-11-17 RX ORDER — ATORVASTATIN CALCIUM 40 MG/1
TABLET, FILM COATED ORAL
Qty: 90 TABLET | Refills: 0 | Status: SHIPPED | OUTPATIENT
Start: 2020-11-17 | End: 2021-02-24

## 2020-11-17 RX ORDER — LISINOPRIL 10 MG/1
TABLET ORAL
Qty: 90 TABLET | Refills: 0 | Status: SHIPPED | OUTPATIENT
Start: 2020-11-17 | End: 2021-02-24

## 2020-12-10 ENCOUNTER — OFFICE VISIT (OUTPATIENT)
Dept: FAMILY MEDICINE CLINIC | Facility: CLINIC | Age: 75
End: 2020-12-10
Payer: MEDICARE

## 2020-12-10 VITALS
SYSTOLIC BLOOD PRESSURE: 128 MMHG | BODY MASS INDEX: 31.64 KG/M2 | DIASTOLIC BLOOD PRESSURE: 72 MMHG | HEIGHT: 71 IN | TEMPERATURE: 98.6 F | WEIGHT: 226 LBS

## 2020-12-10 DIAGNOSIS — E11.21 DIABETIC NEPHROPATHY ASSOCIATED WITH TYPE 2 DIABETES MELLITUS (HCC): ICD-10-CM

## 2020-12-10 DIAGNOSIS — N18.1 CHRONIC RENAL DISEASE, STAGE I: ICD-10-CM

## 2020-12-10 DIAGNOSIS — E78.5 HYPERLIPIDEMIA, UNSPECIFIED HYPERLIPIDEMIA TYPE: ICD-10-CM

## 2020-12-10 DIAGNOSIS — I10 BENIGN ESSENTIAL HYPERTENSION: Chronic | ICD-10-CM

## 2020-12-10 DIAGNOSIS — I35.8 NON-RHEUMATIC AORTIC SCLEROSIS: ICD-10-CM

## 2020-12-10 DIAGNOSIS — G45.9 TIA (TRANSIENT ISCHEMIC ATTACK): ICD-10-CM

## 2020-12-10 DIAGNOSIS — E11.49 TYPE 2 DIABETES MELLITUS WITH OTHER NEUROLOGIC COMPLICATION, WITHOUT LONG-TERM CURRENT USE OF INSULIN (HCC): Primary | Chronic | ICD-10-CM

## 2020-12-10 DIAGNOSIS — R35.1 BENIGN PROSTATIC HYPERPLASIA WITH NOCTURIA: ICD-10-CM

## 2020-12-10 DIAGNOSIS — N40.1 BENIGN PROSTATIC HYPERPLASIA WITH NOCTURIA: ICD-10-CM

## 2020-12-10 DIAGNOSIS — J45.20 MILD INTERMITTENT ASTHMA WITHOUT COMPLICATION: Chronic | ICD-10-CM

## 2020-12-10 LAB — SL AMB POCT HEMOGLOBIN AIC: 6.5 (ref ?–6.5)

## 2020-12-10 PROCEDURE — 99214 OFFICE O/P EST MOD 30 MIN: CPT | Performed by: FAMILY MEDICINE

## 2020-12-10 PROCEDURE — 36415 COLL VENOUS BLD VENIPUNCTURE: CPT | Performed by: FAMILY MEDICINE

## 2020-12-10 PROCEDURE — 83036 HEMOGLOBIN GLYCOSYLATED A1C: CPT | Performed by: FAMILY MEDICINE

## 2020-12-11 LAB
ALBUMIN SERPL-MCNC: 3.9 G/DL (ref 3.6–5.1)
ALBUMIN/GLOB SERPL: 1.5 (CALC) (ref 1–2.5)
ALP SERPL-CCNC: 62 U/L (ref 35–144)
ALT SERPL-CCNC: 17 U/L (ref 9–46)
AST SERPL-CCNC: 18 U/L (ref 10–35)
BILIRUB SERPL-MCNC: 0.8 MG/DL (ref 0.2–1.2)
BUN SERPL-MCNC: 17 MG/DL (ref 7–25)
BUN/CREAT SERPL: ABNORMAL (CALC) (ref 6–22)
CALCIUM SERPL-MCNC: 9.8 MG/DL (ref 8.6–10.3)
CHLORIDE SERPL-SCNC: 104 MMOL/L (ref 98–110)
CHOLEST SERPL-MCNC: 123 MG/DL
CHOLEST/HDLC SERPL: 2.6 (CALC)
CO2 SERPL-SCNC: 29 MMOL/L (ref 20–32)
CREAT SERPL-MCNC: 1.14 MG/DL (ref 0.7–1.18)
ERYTHROCYTE [DISTWIDTH] IN BLOOD BY AUTOMATED COUNT: 13 % (ref 11–15)
GLOBULIN SER CALC-MCNC: 2.6 G/DL (CALC) (ref 1.9–3.7)
GLUCOSE SERPL-MCNC: 120 MG/DL (ref 65–99)
HCT VFR BLD AUTO: 47.3 % (ref 38.5–50)
HDLC SERPL-MCNC: 48 MG/DL
HGB BLD-MCNC: 15.5 G/DL (ref 13.2–17.1)
LDLC SERPL CALC-MCNC: 60 MG/DL (CALC)
MCH RBC QN AUTO: 29.3 PG (ref 27–33)
MCHC RBC AUTO-ENTMCNC: 32.8 G/DL (ref 32–36)
MCV RBC AUTO: 89.4 FL (ref 80–100)
NONHDLC SERPL-MCNC: 75 MG/DL (CALC)
PLATELET # BLD AUTO: 161 THOUSAND/UL (ref 140–400)
PMV BLD REES-ECKER: 11.4 FL (ref 7.5–12.5)
POTASSIUM SERPL-SCNC: 4.8 MMOL/L (ref 3.5–5.3)
PROT SERPL-MCNC: 6.5 G/DL (ref 6.1–8.1)
RBC # BLD AUTO: 5.29 MILLION/UL (ref 4.2–5.8)
SL AMB EGFR AFRICAN AMERICAN: 73 ML/MIN/1.73M2
SL AMB EGFR NON AFRICAN AMERICAN: 63 ML/MIN/1.73M2
SODIUM SERPL-SCNC: 143 MMOL/L (ref 135–146)
TRIGL SERPL-MCNC: 69 MG/DL
WBC # BLD AUTO: 7.3 THOUSAND/UL (ref 3.8–10.8)

## 2020-12-22 DIAGNOSIS — E11.9 TYPE 2 DIABETES MELLITUS WITHOUT COMPLICATION (HCC): ICD-10-CM

## 2021-02-19 ENCOUNTER — HOSPITAL ENCOUNTER (EMERGENCY)
Facility: HOSPITAL | Age: 76
Discharge: HOME | End: 2021-02-19
Attending: EMERGENCY MEDICINE | Admitting: EMERGENCY MEDICINE
Payer: MEDICARE

## 2021-02-19 ENCOUNTER — APPOINTMENT (EMERGENCY)
Dept: RADIOLOGY | Facility: HOSPITAL | Age: 76
End: 2021-02-19
Payer: MEDICARE

## 2021-02-19 VITALS
HEIGHT: 71 IN | HEART RATE: 54 BPM | RESPIRATION RATE: 18 BRPM | BODY MASS INDEX: 31.64 KG/M2 | DIASTOLIC BLOOD PRESSURE: 72 MMHG | SYSTOLIC BLOOD PRESSURE: 156 MMHG | OXYGEN SATURATION: 99 % | WEIGHT: 226 LBS | TEMPERATURE: 97.8 F

## 2021-02-19 DIAGNOSIS — M54.50 ACUTE RIGHT-SIDED LOW BACK PAIN WITHOUT SCIATICA: Primary | ICD-10-CM

## 2021-02-19 PROCEDURE — 99284 EMERGENCY DEPT VISIT MOD MDM: CPT | Performed by: PHYSICIAN ASSISTANT

## 2021-02-19 PROCEDURE — 99283 EMERGENCY DEPT VISIT LOW MDM: CPT

## 2021-02-19 PROCEDURE — 72100 X-RAY EXAM L-S SPINE 2/3 VWS: CPT

## 2021-02-19 RX ORDER — LIDOCAINE 50 MG/G
1 PATCH TOPICAL ONCE
Status: DISCONTINUED | OUTPATIENT
Start: 2021-02-19 | End: 2021-02-19 | Stop reason: HOSPADM

## 2021-02-19 RX ORDER — METHOCARBAMOL 500 MG/1
500 TABLET, FILM COATED ORAL 2 TIMES DAILY
Qty: 20 TABLET | Refills: 0 | Status: SHIPPED | OUTPATIENT
Start: 2021-02-19 | End: 2021-06-10 | Stop reason: ALTCHOICE

## 2021-02-19 RX ORDER — METHOCARBAMOL 500 MG/1
500 TABLET, FILM COATED ORAL ONCE
Status: COMPLETED | OUTPATIENT
Start: 2021-02-19 | End: 2021-02-19

## 2021-02-19 RX ADMIN — METHOCARBAMOL TABLETS 500 MG: 500 TABLET, COATED ORAL at 16:53

## 2021-02-19 RX ADMIN — LIDOCAINE 5% 1 PATCH: 700 PATCH TOPICAL at 16:53
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
## 2021-02-22 ENCOUNTER — TELEPHONE (OUTPATIENT)
Dept: PHYSICAL THERAPY | Facility: OTHER | Age: 76
End: 2021-02-22

## 2021-02-22 NOTE — TELEPHONE ENCOUNTER
Call placed to the patient per [facility] referral     Female answered the phone and stated the patient was not there  I asked her to take a message for the patient and she stated "he is not interested in your program" and disconnected the call  This is the 1st attempt to reach the patient  Since did not directly speak to the patient will defer per protocol

## 2021-02-23 ENCOUNTER — VBI (OUTPATIENT)
Dept: FAMILY MEDICINE CLINIC | Facility: CLINIC | Age: 76
End: 2021-02-23

## 2021-02-23 NOTE — TELEPHONE ENCOUNTER
Diony Chamorro    ED Visit Information     Ed visit date: 2/19/2021  Diagnosis Description: Acute right-sided low back pain without sciatica  In Network? Yes [address] [facility]  Discharge status: Home  Discharged with meds ? Yes  Number of ED visits to date: 1  ED Severity:NA     Outreach Information    Outreach successful: Yes 1  Date letter mailed:NA  Date Finalized:2/23/2021    Care Coordination    Follow up appointment with pcp: no Declined to schedule but stated will call and schedule if not improving  Transportation issues ? No    [employer] type: 7 Day Outreach  Emergent necessity warranted by diagnosis: No  [facility] PCP: Yes  Transportation: Friend/Family Transport  Called PCP first?: No  Feels able to call PCP for urgent problems ?: Yes  Understands what emergencies can be handled by PCP ?: Yes  Ever any problems getting appointment with PCP for minor emergency/urgency problems?: No  Practice Contacted Patient ?: No  Pt had ED follow up with pcp/staff ?: No    Seen for follow-up out of network ?: No  Reason Patient went to ED instead of Urgent Care or PCP?: Perceived Severity of Illness  Urgent care Education?: Yes  02/23/2021 03:14 PM Phone (Yang Underwood) Nataliia Ward (Self) [phone] (H)   Call Complete  Personal communication with patient regarding his recent ED visit on 2/19/2021 for Acute right-sided low back pain without sciatica  Patient was discharged with methocarbamol and advised to follow up with PCP  Patient stated that his back is still bothersome but declined to schedule a follow up appt with PCP at this time  Patient stated that he will give it some time and call PCP if needed  Patient stated that he was told at ED that his pain was due to arthritis but AVS stateds injury  Patient asked if I could explain information to him  I explained that I am not able to do so  He requested I message PCP requesting a call back  Patient does not meet [facility]   Urgent care education given

## 2021-02-24 ENCOUNTER — TELEPHONE (OUTPATIENT)
Dept: ADMINISTRATIVE | Facility: OTHER | Age: 76
End: 2021-02-24

## 2021-02-24 DIAGNOSIS — I10 BENIGN ESSENTIAL HYPERTENSION: Chronic | ICD-10-CM

## 2021-02-24 DIAGNOSIS — G45.9 TIA (TRANSIENT ISCHEMIC ATTACK): ICD-10-CM

## 2021-02-24 RX ORDER — ATORVASTATIN CALCIUM 40 MG/1
TABLET, FILM COATED ORAL
Qty: 90 TABLET | Refills: 0 | Status: SHIPPED | OUTPATIENT
Start: 2021-02-24 | End: 2021-05-26

## 2021-02-24 RX ORDER — LISINOPRIL 10 MG/1
TABLET ORAL
Qty: 90 TABLET | Refills: 0 | Status: SHIPPED | OUTPATIENT
Start: 2021-02-24 | End: 2021-05-26

## 2021-02-24 NOTE — TELEPHONE ENCOUNTER
----- Message from Rafael Membreno sent at 2/23/2021  5:17 PM EST -----  Regarding: RE: ED Follow up  I spoke with Dr Angus Vang and he advised me that if the patient has any questions he needs to be seen in the office for a followup   ----- Message -----  From: Enmanuel Hill  Sent: 2/23/2021   3:26 PM EST  To: [facility]  Subject: ED Follow up                                     Aydin Harden,    I have recently spoken with Suzette Dykes regarding his recent ED visit on 2/19/2021 for Acute right-sided low back pain without sciatica  Patient was discharged with methocarbamol and advised to follow up with PCP  Patient stated that his back is still bothersome but declined to schedule a follow up appt with PCP at this time  Patient stated that he will give it some time and call PCP if needed  Patient stated that he was told at ED that his pain was due to arthritis but AVS states injury  Patient asked if I could explain information to him  I explained that I am not able to do so  He requested I message PCP requesting a call back  Please call patient to discuss      Thanks,  [employer]

## 2021-02-24 NOTE — TELEPHONE ENCOUNTER
Left message for patient asking him to call and schedule a follow up appt with Dr Salomón Mcbride per office reply

## 2021-02-25 ENCOUNTER — TELEPHONE (OUTPATIENT)
Dept: PHYSICAL THERAPY | Facility: OTHER | Age: 76
End: 2021-02-25

## 2021-02-25 NOTE — TELEPHONE ENCOUNTER
Nurse reached out to discuss recent referral entered for  [facility] program and offerings  Female stated pt not interested in program when called yesterday by other RN  This nurse able to discuss program and recent eD visit  Patient asked nurse if this was muscle problem or an injury  Nurse asked if the provider reviewed imaging with him  Patient stated they did, but does not understand  Nurse let him know there was questionable CF per note, but dx for [facility] is for acute low back pain  Patient is not interested in triage for PT eval at this time  Nurse suggested he reach out to PCP to review imaging and ED visit for further guidance-possible referral to Ortho  Patient agreed and thanked nurse for calling  Patient declined to participate in the program at this time  Nurse confirmed patient has [facility] contact information and encouraged to call program back if needed in the future  Patient agreed and very appreciative of call and discussion  Nurse wished him well and referral closed per protocol

## 2021-03-24 DIAGNOSIS — E11.9 TYPE 2 DIABETES MELLITUS WITHOUT COMPLICATION (HCC): ICD-10-CM

## 2021-03-30 ENCOUNTER — OFFICE VISIT (OUTPATIENT)
Dept: OBGYN CLINIC | Facility: CLINIC | Age: 76
End: 2021-03-30
Payer: MEDICARE

## 2021-03-30 VITALS
WEIGHT: 224 LBS | DIASTOLIC BLOOD PRESSURE: 73 MMHG | HEART RATE: 56 BPM | BODY MASS INDEX: 31.36 KG/M2 | SYSTOLIC BLOOD PRESSURE: 133 MMHG | HEIGHT: 71 IN

## 2021-03-30 DIAGNOSIS — G89.29 CHRONIC BILATERAL LOW BACK PAIN WITHOUT SCIATICA: Primary | ICD-10-CM

## 2021-03-30 DIAGNOSIS — M54.50 CHRONIC BILATERAL LOW BACK PAIN WITHOUT SCIATICA: Primary | ICD-10-CM

## 2021-03-30 PROCEDURE — 99214 OFFICE O/P EST MOD 30 MIN: CPT | Performed by: PHYSICAL MEDICINE & REHABILITATION

## 2021-03-30 RX ORDER — METHOCARBAMOL 500 MG/1
500 TABLET, FILM COATED ORAL
Qty: 20 TABLET | Refills: 0 | Status: SHIPPED | OUTPATIENT
Start: 2021-03-30 | End: 2021-06-10 | Stop reason: ALTCHOICE

## 2021-03-30 RX ORDER — LIDOCAINE 50 MG/G
1 PATCH TOPICAL DAILY
Qty: 30 PATCH | Refills: 0 | Status: SHIPPED | OUTPATIENT
Start: 2021-03-30 | End: 2021-06-10 | Stop reason: ALTCHOICE
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
## 2021-03-30 NOTE — PATIENT INSTRUCTIONS
You will be starting physical therapy  It is important to do home exercises as given by your physical therapist as you go through PT to speed up your recovery  Physical therapy addresses the problems that are causing your pain, instead of covering them up, as some other treatment options do  We will see you back after completing physical therapy  Warm soaks with epsom salt can also help with muscle tightness/cramping  Epsom salt releases magnesium which can be helpful  Over-the-counter salonpas patches can be applied to the area of discomfort to help with pain  There are two types of patches; one contains lidocaine 4% and the other contains menthol, camphor and methyl salicylate  You can try one or the other and see which one works best for you  You can obtain Voltaren (diclofenac) cream for your discomfort over the counter if it is not covered by insurance  This is now available over the counter at [employer] and online at [employer]  If it is more expensive to get it through prescription, it is better to get it over the counter  You can use this up to four times per day  It is best to wash the area with water and then pat dry  The cream absorbs better after this  Be careful not to let any pets or children get in contact with the cream as it is a medication and can be harmful to them    If you develop a skin reaction, stop using the cream

## 2021-04-06 ENCOUNTER — EVALUATION (OUTPATIENT)
Dept: PHYSICAL THERAPY | Facility: REHABILITATION | Age: 76
End: 2021-04-06
Payer: MEDICARE

## 2021-04-06 DIAGNOSIS — G89.29 CHRONIC BILATERAL LOW BACK PAIN WITHOUT SCIATICA: Primary | ICD-10-CM

## 2021-04-06 DIAGNOSIS — M54.50 CHRONIC BILATERAL LOW BACK PAIN WITHOUT SCIATICA: Primary | ICD-10-CM

## 2021-04-06 PROCEDURE — 97161 PT EVAL LOW COMPLEX 20 MIN: CPT

## 2021-04-06 NOTE — PROGRESS NOTES
PT Evaluation     Today's date: 2021  Patient name: Charmaine Wilhelm  : 1945  MRN: [id number]  Referring provider: Giselle Lunsford DO  Dx:   Encounter Diagnosis     ICD-10-CM    1  Chronic bilateral low back pain without sciatica  M54 5 Ambulatory referral to Physical Therapy    G89 [id number]        Start Time: 0945  Stop Time: 1045  Total time in clinic (min): 60 minutes    Assessment  Assessment details: Dionne Hernandez is a 77 yo male referred to PT for R sided LBP and hip pain without radiating symptoms  Pain seems to be positional in nature  Pain is worst with prolonged sitting or standing  Unable to reproduce pain with lumbar AROM and repeated motions inconclusive  Moderate restrictions in lumbar AROM in all directions  Significant tightness in hips throughout  Discussed POC and given HEP  Impairments: abnormal gait, abnormal or restricted ROM, activity intolerance, impaired physical strength, lacks appropriate home exercise program, pain with function and poor body mechanics    Symptom irritability: moderateUnderstanding of Dx/Px/POC: good   Prognosis: good    Goals  Short Term Goals (Week 4):  1  Decreased pain by 50%  2  Improve Hip PROM by 10 degrees in all directions  3  Improve hip strength by 1/2 measure in all directions  Long Term Goals (8 weeks):  1  Pain free with prolonged sitting and standing  2  FOTO score exceeds predicted score  3  Fully independent with HEP by discharge      Plan  Plan details: Plan to see patient 2x/week for the next 8 weeks     Patient would benefit from: skilled physical therapy  Planned modality interventions: low level laser therapy, thermotherapy: hydrocollator packs, electrical stimulation/Russian stimulation and cryotherapy  Planned therapy interventions: ADL training, manual therapy, massage, joint mobilization, IADL retraining, abdominal trunk stabilization, activity modification, balance, neuromuscular re-education, patient education, strengthening, stretching, therapeutic activities, therapeutic exercise, functional ROM exercises, flexibility, graded activity, graded exercise, graded motor and home exercise program  Frequency: 2x week  Duration in weeks: 8  Treatment plan discussed with: patient        Subjective Evaluation    History of Present Illness  Mechanism of injury: Started  following a fall onto the L hip  Hip and midline back pain at times  X-rays in the back revealed DJD  No MRIs done  Hx of R sided CVA with L sided weakness  Pain is primarily on R side in Hip and low back  Pain  Current pain ratin  At worst pain ratin  Quality: dull ache  Relieving factors: heat and medications  Aggravating factors: sitting, standing and walking (bending over )  Progression: no change      Diagnostic Tests  X-ray: abnormal  Patient Goals  Patient goals for therapy: decreased pain, improved balance, increased motion, increased strength and independence with ADLs/IADLs  Patient goal: riding on the lawn mower        Objective     Static Posture     Comments        Passive Range of Motion   Left Hip   Flexion: 100 degrees   Extension: -5 degrees   External rotation (90/90): 45 degrees   Internal rotation (90/90): 15 degrees     Right Hip   Flexion: 110 degrees   Extension: -5 degrees   External rotation (90/90): 45 degrees   Internal rotation (90/90): 15 degrees     General Comments:      Lumbar Comments  Neal  FIS: (no pain, No Change)  Rep FIS: (No pain, No Change)  EIS: (No pain, No Change)  Rep EIS: (No pain, No Change)    Lumbar ROM  Moderate restrictions throughout all lumbar AROM, no reps     Myotomes  4+/5 throughout, pain reproduced with hip flexion    Strength WNL bilaterally  Hip Abduction 3+/5    Dermatomes  Sensation intact bilaterally      Reflexes  WNL on R, increased on L has a hx of CVA    L/S Special Tests:  SLR= (+ for neural tension), Femoral Nerve Traction= (-), TTP= (piriformis, greater troch, glut med), PA Pressures= (-), Catching/Locking= (-)    Hip Special Tests:  FADIRS= (-), FABERS= (+), Scours= (-), Distraction= (-)    SIJ Special Tests:  Compression= (+, on R), FABERS= (+), Gaenslans= (-), Sacral Thrust/PA Pressure= (-)             Precautions: hx of CVA with mild L side weakness      Manuals 4/6            PA Pressures As needed                                                   Neuro Re-Ed             Cat Camel nv            LTR nv            Birddogs nv            VG nv                                                   Ther Ex             Supine Piriformis stretch 30"x3            Hamstring Stretch 30"x3            Bridges 2x10            SLR Flexion 2x10            Clamshells 2x10            SLR abduction nv            STS nv                         Ther Activity                                       Gait Training                                       Modalities

## 2021-04-12 ENCOUNTER — OFFICE VISIT (OUTPATIENT)
Dept: PHYSICAL THERAPY | Facility: REHABILITATION | Age: 76
End: 2021-04-12
Payer: MEDICARE

## 2021-04-12 DIAGNOSIS — G89.29 CHRONIC BILATERAL LOW BACK PAIN WITHOUT SCIATICA: Primary | ICD-10-CM

## 2021-04-12 DIAGNOSIS — M54.50 CHRONIC BILATERAL LOW BACK PAIN WITHOUT SCIATICA: Primary | ICD-10-CM

## 2021-04-12 PROCEDURE — 97110 THERAPEUTIC EXERCISES: CPT

## 2021-04-12 PROCEDURE — 97112 NEUROMUSCULAR REEDUCATION: CPT

## 2021-04-12 NOTE — PROGRESS NOTES
Daily Note     Today's date: 2021  Patient name: Ronda Chawla  : 1945  MRN: [id number]  Referring provider: Christin Garg DO  Dx:   Encounter Diagnosis     ICD-10-CM    1  Chronic bilateral low back pain without sciatica  M54 5     G89 29        Start Time: 1105  Stop Time: 1150  Total time in clinic (min): 45 minutes    Subjective: Patient states he performed HEP but was not as compliant as he could have  States no issues with yardwork over the weekend  Objective: See treatment diary below      Assessment: No improvement in L sided pain today with PA Pressures  Tolerated treatment well  Patient demonstrated fatigue post treatment      Plan: Continue per plan of care        Precautions: hx of CVA with mild L side weakness      Manuals            PA Pressures As needed TN           Sidelying Rotation  nv                                     Neuro Re-Ed             Cat Camel nv 10x           LTR nv 10"x10           Birddogs nv 10x           VG nv 40x DL           Kelsy Pallof Press             Resisted Side Steps  4 laps YHB 10 feet                        Ther Ex             Supine Piriformis stretch 30"x3 HEP           Hamstring Stretch 30"x3 HEP           Bridges 2x10 2x10           SLR Flexion 2x10 2x10           Clamshells 2x10 2x10           SLR abduction nv 2x10           STS nv 20x 7#kb                        Ther Activity                                       Gait Training                                       Modalities

## 2021-04-14 ENCOUNTER — OFFICE VISIT (OUTPATIENT)
Dept: PHYSICAL THERAPY | Facility: REHABILITATION | Age: 76
End: 2021-04-14
Payer: MEDICARE

## 2021-04-14 DIAGNOSIS — G89.29 CHRONIC BILATERAL LOW BACK PAIN WITHOUT SCIATICA: Primary | ICD-10-CM

## 2021-04-14 DIAGNOSIS — M54.50 CHRONIC BILATERAL LOW BACK PAIN WITHOUT SCIATICA: Primary | ICD-10-CM

## 2021-04-14 PROCEDURE — 97112 NEUROMUSCULAR REEDUCATION: CPT

## 2021-04-14 PROCEDURE — 97110 THERAPEUTIC EXERCISES: CPT

## 2021-04-14 NOTE — PROGRESS NOTES
Daily Note     Today's date: 2021  Patient name: Melissa Worrell  : 1945  MRN: [id number]  Referring provider: Magnolia Garrett DO  Dx:   Encounter Diagnosis     ICD-10-CM    1  Chronic bilateral low back pain without sciatica  M54 5     G89 29        Start Time: 1445  Stop Time: 1530  Total time in clinic (min): 45 minutes    Subjective: States his back was feeling good since last visit feels like performing the HEP more helped  Objective: See treatment diary below      Assessment: No increased pain or soreness with progressions  Tolerated treatment well  Patient demonstrated fatigue post treatment and would benefit from continued PT      Plan: Continue per plan of care        Precautions: hx of CVA with mild L side weakness      Manuals           PA Pressures As needed MN           Sidelying Rotation  nv                                     Neuro Re-Ed             Cat Camel nv 10x 20x          LTR nv 10"x10 10"x10          Birddogs nv 10x 20x          VG nv 40x DL 40x DL          Kelsy Pallof Press   7# 20x          Resisted Side Steps  4 laps YHB 10 feet 4 laps YHB 10 feet                       Ther Ex             Supine Piriformis stretch 30"x3 HEP           Hamstring Stretch 30"x3 HEP           Bridges 2x10 2x10 2x10 w/ ball iso          SLR Flexion 2x10 2x10 HEP          Clamshells 2x10 2x10 2x10 otb          SLR abduction nv 2x10 HEP          STS nv 20x 7#kb 20x 7# kb          Pball Rollout   5"x10          Bike   5'          Ther Activity                                       Gait Training                                       Modalities

## 2021-04-19 ENCOUNTER — OFFICE VISIT (OUTPATIENT)
Dept: PHYSICAL THERAPY | Facility: REHABILITATION | Age: 76
End: 2021-04-19
Payer: MEDICARE

## 2021-04-19 DIAGNOSIS — M54.50 CHRONIC BILATERAL LOW BACK PAIN WITHOUT SCIATICA: Primary | ICD-10-CM

## 2021-04-19 DIAGNOSIS — G89.29 CHRONIC BILATERAL LOW BACK PAIN WITHOUT SCIATICA: Primary | ICD-10-CM

## 2021-04-19 PROCEDURE — 97110 THERAPEUTIC EXERCISES: CPT

## 2021-04-19 PROCEDURE — 97112 NEUROMUSCULAR REEDUCATION: CPT

## 2021-04-19 NOTE — PROGRESS NOTES
Daily Note     Today's date: 2021  Patient name: Saint Patch  : 1945  MRN: [id number]  Referring provider: Snow Mora DO  Dx:   Encounter Diagnosis     ICD-10-CM    1  Chronic bilateral low back pain without sciatica  M54 5 PT plan of care cert/re-cert    [id number]        Start Time: 1100  Stop Time: 1145  Total time in clinic (min): 45 minutes    Subjective: Patient states his back continues to feel good  Objective: See treatment diary below      Assessment: Responding well to all progressions  Continue to progress as tolerated  Tolerated treatment well  Patient demonstrated fatigue post treatment and would benefit from continued PT      Plan: Continue per plan of care        Precautions: hx of CVA with mild L side weakness      Manuals          PA Pressures As needed [place]           Sidelying Rotation  nv                                     Neuro Re-Ed             Cat Camel nv 10x 20x np resume         LTR nv 10"x10 10"x10 10"x10         Birddogs nv 10x 20x np resume         VG nv 40x DL 40x DL 40x DL         Kelsy Pallof Press   7# 20x RTB 20x         Resisted Side Steps  4 laps YHB 10 feet 4 laps YHB 10 feet 4 laps yhb 20 feet         Unilateral Farmer Carries    20# 2 laps 40 feet         Wobbleboard Balance    10x ml         Ther Ex             Supine Piriformis stretch 30"x3 HEP           Hamstring Stretch 30"x3 HEP           Bridges 2x10 2x10 2x10 w/ ball iso 2x10 w/ ball iso         SLR Flexion 2x10 2x10 HEP          Clamshells 2x10 2x10 2x10 otb 2x10 OTB         SLR abduction nv 2x10 HEP          STS nv 20x 7#kb 20x 7# kb 20x 10# kb         Pball Rollout   5"x10 5"x10         Bike   5' 5'         Ther Activity                                       Gait Training                                       Modalities

## 2021-04-21 ENCOUNTER — OFFICE VISIT (OUTPATIENT)
Dept: PHYSICAL THERAPY | Facility: REHABILITATION | Age: 76
End: 2021-04-21
Payer: MEDICARE

## 2021-04-21 DIAGNOSIS — G89.29 CHRONIC BILATERAL LOW BACK PAIN WITHOUT SCIATICA: Primary | ICD-10-CM

## 2021-04-21 DIAGNOSIS — M54.50 CHRONIC BILATERAL LOW BACK PAIN WITHOUT SCIATICA: Primary | ICD-10-CM

## 2021-04-21 PROCEDURE — 97110 THERAPEUTIC EXERCISES: CPT

## 2021-04-21 PROCEDURE — 97112 NEUROMUSCULAR REEDUCATION: CPT

## 2021-04-21 NOTE — PROGRESS NOTES
Daily Note     Today's date: 2021  Patient name: Sharmin Ellington  : 1945  MRN: [id number]  Referring provider: Mirna Corona DO  Dx:   Encounter Diagnosis     ICD-10-CM    1  Chronic bilateral low back pain without sciatica  M54 5     G89 29        Start Time: 1400  Stop Time: 1445  Total time in clinic (min): 45 minutes    Subjective: Patient reports he was able to wash shed yesterday states back did good, mild discomfort but resolved quickly  Objective: See treatment diary below      Assessment: Patient responding well to current program  Plan to update HEP next visit and focus on transitioning patient to HEP and potential d/c in 2 weeks  Tolerated treatment well  Patient demonstrated fatigue post treatment      Plan: Continue per plan of care        Precautions: hx of CVA with mild L side weakness      Manuals         PA Pressures As needed MN           Sidelying Rotation  nv                                     Neuro Re-Ed             Cat Camel nv 10x 20x np resume         LTR nv 10"x10 10"x10 10"x10 10"x10        Birddogs nv 10x 20x np resume         VG nv 40x DL 40x DL 40x DL 5#kb        Marion Pallof Press   7# 20x RTB 20x RTB 20x        Resisted Side Steps  4 laps YHB 10 feet 4 laps YHB 10 feet 4 laps yhb 20 feet 4 laps YHB 30 feet        Unilateral Farmer Carries    20# 2 laps 40 feet 20# 2 laps 40 feet        Rockerboard Balance    10x ml 20x M/L        Ther Ex             Supine Piriformis stretch 30"x3 HEP           Hamstring Stretch 30"x3 HEP           Bridges 2x10 2x10 2x10 w/ ball iso 2x10 w/ ball iso 3x10 w/ ball iso        SLR Flexion 2x10 2x10 HEP          Clamshells 2x10 2x10 2x10 otb 2x10 OTB 2x10 OTB        SLR abduction nv 2x10 HEP          STS nv 20x 7#kb 20x 7# kb 20x 10# kb 20x 10# kb        Pball Rollout   5"x10 5"x10 np        Bike   5' 5' 7'        Ther Activity                                       Gait Training Modalities

## 2021-04-26 ENCOUNTER — OFFICE VISIT (OUTPATIENT)
Dept: PHYSICAL THERAPY | Facility: REHABILITATION | Age: 76
End: 2021-04-26
Payer: MEDICARE

## 2021-04-26 DIAGNOSIS — G89.29 CHRONIC BILATERAL LOW BACK PAIN WITHOUT SCIATICA: Primary | ICD-10-CM

## 2021-04-26 DIAGNOSIS — M54.50 CHRONIC BILATERAL LOW BACK PAIN WITHOUT SCIATICA: Primary | ICD-10-CM

## 2021-04-26 PROCEDURE — 97112 NEUROMUSCULAR REEDUCATION: CPT

## 2021-04-26 PROCEDURE — 97110 THERAPEUTIC EXERCISES: CPT

## 2021-04-26 NOTE — PROGRESS NOTES
Daily Note     Today's date: 2021  Patient name: Kirill Michaud  : 1945  MRN: [id number]  Referring provider: Niharika Livingston DO  Dx:   Encounter Diagnosis     ICD-10-CM    1  Chronic bilateral low back pain without sciatica  M54 5     G89 29        Start Time: 1015  Stop Time: 1100  Total time in clinic (min): 45 minutes    Subjective: Patient had no new complaints of pain or soreness  Objective: See treatment diary below      Assessment: Will discuss POC with patient next visit  Tolerated treatment well  Patient demonstrated fatigue post treatment and would benefit from continued PT      Plan: Continue per plan of care        Precautions: hx of CVA with mild L side weakness      Manuals        PA Pressures As needed RI           Sidelying Rotation  nv                                     Neuro Re-Ed             Cat Camel nv 10x 20x np resume         LTR nv 10"x10 10"x10 10"x10 10"x10 10"x10       Birddogs nv 10x 20x np resume         VG Squats nv 40x DL 40x DL 40x DL 5#kb 5# KB DL       [place] Pallof Press   7# 20x RTB 20x 10# 20x 10# 20x       Resisted Side Steps  4 laps YHB 10 feet 4 laps YHB 10 feet 4 laps yhb 20 feet 4 laps YHB 30 feet 4 laps YHB 30 feet       Unilateral Farmer Carries    20# 2 laps 40 feet 20# 2 laps 40 feet 20# 3 laps 40 feet       Rockerboard Balance    10x ml 20x M/L 20z m/l       Ther Ex             Supine Piriformis stretch 30"x3 HEP           Hamstring Stretch 30"x3 HEP           Bridges 2x10 2x10 2x10 w/ ball iso 2x10 w/ ball iso 3x10 w/ ball iso 3x10 w/ ball iso       SLR Flexion 2x10 2x10 HEP          Clamshells 2x10 2x10 2x10 otb 2x10 OTB 2x10 OTB 2x10 yhb       SLR abduction nv 2x10 HEP          STS nv 20x 7#kb 20x 7# kb 20x 10# kb 20x 10# kb 2x15 10# kb       Pball Rollout   5"x10 5"x10 np        Bike   5' 5' 7' 5'       Ther Activity                                       Gait Training                                       Modalities

## 2021-04-29 ENCOUNTER — OFFICE VISIT (OUTPATIENT)
Dept: PHYSICAL THERAPY | Facility: REHABILITATION | Age: 76
End: 2021-04-29
Payer: MEDICARE

## 2021-04-29 DIAGNOSIS — G89.29 CHRONIC BILATERAL LOW BACK PAIN WITHOUT SCIATICA: Primary | ICD-10-CM

## 2021-04-29 DIAGNOSIS — M54.50 CHRONIC BILATERAL LOW BACK PAIN WITHOUT SCIATICA: Primary | ICD-10-CM

## 2021-04-29 PROCEDURE — 97110 THERAPEUTIC EXERCISES: CPT

## 2021-04-29 NOTE — PROGRESS NOTES
Daily Note     Today's date: 2021  Patient name: Kirill Michaud  : 1945  MRN: [id number]  Referring provider: Niharika Livingston DO  Dx:   Encounter Diagnosis     ICD-10-CM    1  Chronic bilateral low back pain without sciatica  M54 5     G89 29        Start Time: 1100  Stop Time: 1145  Total time in clinic (min): 45 minutes   Patient 1 on 1 with PT from [id number]    Subjective: Patient states a GROC of 100%            Objective: See treatment diary below      Assessment: Patient reporting 100% improvement at this time and states he has been without pain for over a week  Plan to d/c patient next week  Added resisted side stepping and resisted clamshells to HEP  Tolerated treatment well  Patient demonstrated fatigue post treatment and would benefit from continued PT      Plan: Continue per plan of care        Precautions: hx of CVA with mild L side weakness      Manuals       PA Pressures As needed [place]           Sidelying Rotation  nv                                     Neuro Re-Ed             Cat Camel nv 10x 20x np resume         LTR nv 10"x10 10"x10 10"x10 10"x10 10"x10 10"x10      Birddogs nv 10x 20x np resume         VG Squats nv 40x DL 40x DL 40x DL 40x 5#kb 40x 5# KB DL 20x 10# KB DL      Kelsy Pallof Press   7# 20x RTB 20x 10# 20x 10# 20x ptb 30x      Resisted Side Steps  4 laps YHB 10 feet 4 laps YHB 10 feet 4 laps yhb 20 feet 4 laps YHB 30 feet 4 laps YHB 30 feet 4 laps yhb 30 feet      Unilateral Aggarwal Carries    20# 2 laps 40 feet 20# 2 laps 40 feet 20# 3 laps 40 feet 20# 4 laps 40 feet      Rockerboard Balance    10x ml 20x M/L 20z m/l 20x m/l      Ther Ex             Supine Piriformis stretch 30"x3 HEP           Hamstring Stretch 30"x3 HEP           Bridges 2x10 2x10 2x10 w/ ball iso 2x10 w/ ball iso 3x10 w/ ball iso 3x10 w/ ball iso 3x10 w/ ball iso      SLR Flexion 2x10 2x10 HEP          Clamshells 2x10 2x10 2x10 otb 2x10 OTB 2x10 OTB 2x10 yhb 2x10 yhb SLR abduction nv 2x10 HEP          STS nv 20x 7#kb 20x 7# kb 20x 10# kb 20x 10# kb 2x15 10# kb 2x15 10# kb      Pball Rollout   5"x10 5"x10 np        Bike   5' 5' 7' 5' 5'      Ther Activity                                       Gait Training                                       Modalities

## 2021-05-03 ENCOUNTER — OFFICE VISIT (OUTPATIENT)
Dept: PHYSICAL THERAPY | Facility: REHABILITATION | Age: 76
End: 2021-05-03
Payer: MEDICARE

## 2021-05-03 DIAGNOSIS — G89.29 CHRONIC BILATERAL LOW BACK PAIN WITHOUT SCIATICA: Primary | ICD-10-CM

## 2021-05-03 DIAGNOSIS — M54.50 CHRONIC BILATERAL LOW BACK PAIN WITHOUT SCIATICA: Primary | ICD-10-CM

## 2021-05-03 PROCEDURE — 97112 NEUROMUSCULAR REEDUCATION: CPT

## 2021-05-03 PROCEDURE — 97110 THERAPEUTIC EXERCISES: CPT

## 2021-05-03 NOTE — PROGRESS NOTES
Daily Note     Today's date: 5/3/2021  Patient name: Sheri Hinton  : 1945  MRN: [id number]  Referring provider: Sofy Luke DO  Dx:   Encounter Diagnosis     ICD-10-CM    1  Chronic bilateral low back pain without sciatica  M54 5     G89 29        Start Time: 0855  Stop Time: 0940  Total time in clinic (min): 45 minutes    Subjective: Patient reports some pain in the back over the weekend after doing some yardwork  Performed HEP next morning and stated significant improvements in pain following  States back was feeling good today  Objective: See treatment diary below      Assessment: Held on progressions this visit secondary to some increased knee and leg soreness from this weekend  Based on how patient is presenting next visit, will plan for a d/c then  Tolerated treatment well  Patient demonstrated fatigue post treatment and would benefit from continued PT      Plan: Continue per plan of care  , Potential Discharge Next Visit        Precautions: hx of CVA with mild L side weakness      Manuals      PA Pressures As needed [facility]           Sidelying Rotation  nv                                     Neuro Re-Ed             Cat Camel nv 10x 20x np resume         LTR nv 10"x10 10"x10 10"x10 10"x10 10"x10 10"x10 10"x10     Birddogs nv 10x 20x np resume         VG Squats nv 40x DL 40x DL 40x DL 40x 5#kb 40x 5# KB DL 20x 10# KB DL 30x 10# KB DL     Kelsy Pallof Press   7# 20x RTB 20x 10# 20x 10# 20x ptb 30x 10# 30x     Resisted Side Steps  4 laps YHB 10 feet 4 laps YHB 10 feet 4 laps yhb 20 feet 4 laps YHB 30 feet 4 laps YHB 30 feet 4 laps yhb 30 feet 4 laps rhb 30 feet     Unilateral Aggarwal Carries    20# 2 laps 40 feet 20# 2 laps 40 feet 20# 3 laps 40 feet 20# 4 laps 40 feet 20# 4laps 40 feet     Rockerboard Balance    10x ml 20x M/L 20z m/l 20x m/l 30x m/l     Ther Ex             Supine Piriformis stretch 30"x3 HEP           Hamstring Stretch 30"x3 HEP Bridges 2x10 2x10 2x10 w/ ball iso 2x10 w/ ball iso 3x10 w/ ball iso 3x10 w/ ball iso 3x10 w/ ball iso 2x20 w/ ball iso     SLR Flexion 2x10 2x10 HEP          Clamshells 2x10 2x10 2x10 otb 2x10 OTB 2x10 OTB 2x10 yhb 2x10 yhb 3x10 yhb     SLR abduction nv 2x10 HEP          STS nv 20x 7#kb 20x 7# kb 20x 10# kb 20x 10# kb 2x15 10# kb 2x15 10# kb 2x10 10# kb     Pball Rollout   5"x10 5"x10 np        Bike   5' 5' 7' 5' 5' 5'     Ther Activity                                       Gait Training                                       Modalities

## 2021-05-05 LAB — RIGHT EYE DIABETIC RETINOPATHY: NORMAL

## 2021-05-06 ENCOUNTER — OFFICE VISIT (OUTPATIENT)
Dept: PHYSICAL THERAPY | Facility: REHABILITATION | Age: 76
End: 2021-05-06
Payer: MEDICARE

## 2021-05-06 DIAGNOSIS — G89.29 CHRONIC BILATERAL LOW BACK PAIN WITHOUT SCIATICA: Primary | ICD-10-CM

## 2021-05-06 DIAGNOSIS — M54.50 CHRONIC BILATERAL LOW BACK PAIN WITHOUT SCIATICA: Primary | ICD-10-CM

## 2021-05-06 PROCEDURE — 97110 THERAPEUTIC EXERCISES: CPT

## 2021-05-06 NOTE — PROGRESS NOTES
PT Discharge    Today's date: 2021  Patient name: Lillian Richey  : 1945  MRN: [id number]  Referring provider: Huber Bennett DO  Dx:   Encounter Diagnosis     ICD-10-CM    1  Chronic bilateral low back pain without sciatica  M54 5     G89 29        Start Time: 1000  Stop Time: 1035  Total time in clinic (min): 35 minutes    Assessment  Assessment details: Nery Claros is a 77 yo male referred to PT for R sided LBP and hip pain without radiating symptoms  Pain has been actively participating in PT for a month now with significant improvement in LBP at this time  GROC 100% today with pain free lumbar AROM  No tenderness in back or hip  Pain free hip ROM  Updated HEP to facilitate improvements made in PT  Will discharge patient at this time as he has met all of PTs goals and his goals  Goals  Short Term Goals (Week 4):  1  Decreased pain by 50% (met)  2  Improve Hip PROM by 10 degrees in all directions (met)  3  Improve hip strength by 1/2 measure in all directions (met)      Long Term Goals (8 weeks):  1  Pain free with prolonged sitting and standing (met)  2  FOTO score exceeds predicted score  (met)  3  Fully independent with HEP by discharge (met)          Subjective Evaluation    History of Present Illness  Mechanism of injury: Started  following a fall onto the L hip  Hip and midline back pain at times  X-rays in the back revealed DJD  No MRIs done  Hx of R sided CVA with L sided weakness  Pain is primarily on R side in Hip and low back  RE:  Patient reporting 100% improvement in back pain  States some occasional soreness after mowing lawn but able to reduce this with HEP  Patient reports he is able to perform all ADLs/IADLs pain free and perform light and heavy activities around the house without any pain     Pain  Exacerbated by: bending over           Objective     Static Posture     Comments        Passive Range of Motion   Left Hip   Flexion: 120 degrees   Extension: -5 degrees External rotation (90/90): 45 degrees   Internal rotation (90/90): 35 degrees     Right Hip   Flexion: 120 degrees   Extension: -5 degrees   External rotation (90/90): 45 degrees   Internal rotation (90/90): 25 degrees     General Comments:      Lumbar Comments  Neal  FIS: (no pain, No Change)  Rep FIS: (No pain, No Change)  EIS: (No pain, No Change)  Rep EIS: (No pain, No Change)    Lumbar ROM  Minimal restrictions throughout all lumbar AROM    Myotomes  4+/5 throughout    Hip Abduction 4/5    Dermatomes  Sensation intact bilaterally      Reflexes  WNL on R, increased on L has a hx of CVA    L/S Special Tests:  SLR= (-), Femoral Nerve Traction= (-), TTP= (-), PA Pressures= (-), Catching/Locking= (-)    Hip Special Tests:  FADIRS= (-), FABERS= (-), Scours= (-), Distraction= (-)    SIJ Special Tests:  Compression= (-), FABERS= (-), Gaenslans= (-), Sacral Thrust/PA Pressure= (-)      Flowsheet Rows      Most Recent Value   PT/OT G-Codes   Current Score  88   Projected Score  58             Precautions: hx of CVA with mild L side weakness    Manuals 4/6 4/12 4/14 4/19 4/21 4/26 4/29 5/03 05/06    PA Pressures As needed [facility]           Sidelying Rotation  nv                        Re-Evaluation Performed         VA 35'    Neuro Re-Ed             Cat Camel nv 10x 20x np resume         LTR nv 10"x10 10"x10 10"x10 10"x10 10"x10 10"x10 10"x10     Birddogs nv 10x 20x np resume         VG Squats nv 40x DL 40x DL 40x DL 40x 5#kb 40x 5# KB DL 20x 10# KB DL 30x 10# KB DL     Kelsy Pallof Press   7# 20x RTB 20x 10# 20x 10# 20x ptb 30x 10# 30x     Resisted Side Steps  4 laps YHB 10 feet 4 laps YHB 10 feet 4 laps yhb 20 feet 4 laps YHB 30 feet 4 laps YHB 30 feet 4 laps yhb 30 feet 4 laps rhb 30 feet     Unilateral Aggarwal Carries    20# 2 laps 40 feet 20# 2 laps 40 feet 20# 3 laps 40 feet 20# 4 laps 40 feet 20# 4laps 40 feet     Rockerboard Balance    10x ml 20x M/L 20z m/l 20x m/l 30x m/l     Ther Ex             Supine Piriformis stretch 30"x3 HEP           Hamstring Stretch 30"x3 HEP           Bridges 2x10 2x10 2x10 w/ ball iso 2x10 w/ ball iso 3x10 w/ ball iso 3x10 w/ ball iso 3x10 w/ ball iso 2x20 w/ ball iso     SLR Flexion 2x10 2x10 HEP          Clamshells 2x10 2x10 2x10 otb 2x10 OTB 2x10 OTB 2x10 yhb 2x10 yhb 3x10 yhb     SLR abduction nv 2x10 HEP          STS nv 20x 7#kb 20x 7# kb 20x 10# kb 20x 10# kb 2x15 10# kb 2x15 10# kb 2x10 10# kb     Pball Rollout   5"x10 5"x10 np        Bike   5' 5' 7' 5' 5' 5'     Ther Activity                                       Gait Training                                       Modalities

## 2021-05-07 PROBLEM — E11.3299 MILD NONPROLIFERATIVE DIABETIC RETINOPATHY ASSOCIATED WITH TYPE 2 DIABETES MELLITUS (HCC): Status: ACTIVE | Noted: 2021-05-07

## 2021-05-10 ENCOUNTER — APPOINTMENT (OUTPATIENT)
Dept: PHYSICAL THERAPY | Facility: REHABILITATION | Age: 76
End: 2021-05-10
Payer: MEDICARE

## 2021-05-11 ENCOUNTER — OFFICE VISIT (OUTPATIENT)
Dept: OBGYN CLINIC | Facility: CLINIC | Age: 76
End: 2021-05-11
Payer: MEDICARE

## 2021-05-11 VITALS
HEART RATE: 47 BPM | DIASTOLIC BLOOD PRESSURE: 72 MMHG | WEIGHT: 224 LBS | SYSTOLIC BLOOD PRESSURE: 132 MMHG | BODY MASS INDEX: 31.36 KG/M2 | HEIGHT: 71 IN

## 2021-05-11 DIAGNOSIS — M54.50 CHRONIC BILATERAL LOW BACK PAIN WITHOUT SCIATICA: Primary | ICD-10-CM

## 2021-05-11 DIAGNOSIS — G89.29 CHRONIC BILATERAL LOW BACK PAIN WITHOUT SCIATICA: Primary | ICD-10-CM

## 2021-05-11 PROCEDURE — 99213 OFFICE O/P EST LOW 20 MIN: CPT | Performed by: PHYSICAL MEDICINE & REHABILITATION
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
## 2021-05-13 ENCOUNTER — APPOINTMENT (OUTPATIENT)
Dept: PHYSICAL THERAPY | Facility: REHABILITATION | Age: 76
End: 2021-05-13
Payer: MEDICARE

## 2021-05-18 ENCOUNTER — OFFICE VISIT (OUTPATIENT)
Dept: OBGYN CLINIC | Facility: CLINIC | Age: 76
End: 2021-05-18
Payer: MEDICARE

## 2021-05-18 VITALS
DIASTOLIC BLOOD PRESSURE: 80 MMHG | HEIGHT: 71 IN | SYSTOLIC BLOOD PRESSURE: 130 MMHG | HEART RATE: 80 BPM | BODY MASS INDEX: 31.78 KG/M2 | WEIGHT: 227 LBS

## 2021-05-18 DIAGNOSIS — N18.1 CHRONIC RENAL DISEASE, STAGE I: Primary | ICD-10-CM

## 2021-05-18 DIAGNOSIS — M17.12 PRIMARY OSTEOARTHRITIS OF LEFT KNEE: ICD-10-CM

## 2021-05-18 PROCEDURE — 99212 OFFICE O/P EST SF 10 MIN: CPT | Performed by: ORTHOPAEDIC SURGERY
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
## 2021-05-26 DIAGNOSIS — I10 BENIGN ESSENTIAL HYPERTENSION: Chronic | ICD-10-CM

## 2021-05-26 DIAGNOSIS — G45.9 TIA (TRANSIENT ISCHEMIC ATTACK): ICD-10-CM

## 2021-05-26 RX ORDER — ATORVASTATIN CALCIUM 40 MG/1
TABLET, FILM COATED ORAL
Qty: 90 TABLET | Refills: 0 | Status: SHIPPED | OUTPATIENT
Start: 2021-05-26 | End: 2021-12-16 | Stop reason: ALTCHOICE

## 2021-05-26 RX ORDER — LISINOPRIL 10 MG/1
TABLET ORAL
Qty: 90 TABLET | Refills: 0 | Status: SHIPPED | OUTPATIENT
Start: 2021-05-26 | End: 2021-08-24 | Stop reason: SDUPTHER

## 2021-06-10 ENCOUNTER — OFFICE VISIT (OUTPATIENT)
Dept: FAMILY MEDICINE CLINIC | Facility: CLINIC | Age: 76
End: 2021-06-10
Payer: MEDICARE

## 2021-06-10 VITALS
OXYGEN SATURATION: 95 % | DIASTOLIC BLOOD PRESSURE: 80 MMHG | SYSTOLIC BLOOD PRESSURE: 128 MMHG | HEIGHT: 71 IN | WEIGHT: 224 LBS | TEMPERATURE: 97.5 F | HEART RATE: 52 BPM | BODY MASS INDEX: 31.36 KG/M2

## 2021-06-10 DIAGNOSIS — I10 BENIGN ESSENTIAL HYPERTENSION: Chronic | ICD-10-CM

## 2021-06-10 DIAGNOSIS — E11.21 DIABETIC NEPHROPATHY ASSOCIATED WITH TYPE 2 DIABETES MELLITUS (HCC): ICD-10-CM

## 2021-06-10 DIAGNOSIS — Z00.00 MEDICARE ANNUAL WELLNESS VISIT, SUBSEQUENT: Primary | ICD-10-CM

## 2021-06-10 DIAGNOSIS — E78.5 HYPERLIPIDEMIA, UNSPECIFIED HYPERLIPIDEMIA TYPE: ICD-10-CM

## 2021-06-10 DIAGNOSIS — I65.23 BILATERAL CAROTID ARTERY STENOSIS: ICD-10-CM

## 2021-06-10 DIAGNOSIS — I69.354 HEMIPLEGIA AND HEMIPARESIS FOLLOWING CEREBRAL INFARCTION AFFECTING LEFT NON-DOMINANT SIDE (HCC): ICD-10-CM

## 2021-06-10 DIAGNOSIS — I35.8 NON-RHEUMATIC AORTIC SCLEROSIS: ICD-10-CM

## 2021-06-10 DIAGNOSIS — E11.49 TYPE 2 DIABETES MELLITUS WITH OTHER NEUROLOGIC COMPLICATION, WITHOUT LONG-TERM CURRENT USE OF INSULIN (HCC): Chronic | ICD-10-CM

## 2021-06-10 DIAGNOSIS — J45.20 MILD INTERMITTENT ASTHMA WITHOUT COMPLICATION: Chronic | ICD-10-CM

## 2021-06-10 LAB
ALBUMIN SERPL BCP-MCNC: 3.6 G/DL (ref 3.5–5)
ALP SERPL-CCNC: 64 U/L (ref 46–116)
ALT SERPL W P-5'-P-CCNC: 26 U/L (ref 12–78)
ANION GAP SERPL CALCULATED.3IONS-SCNC: 6 MMOL/L (ref 4–13)
AST SERPL W P-5'-P-CCNC: 17 U/L (ref 5–45)
BILIRUB SERPL-MCNC: 0.68 MG/DL (ref 0.2–1)
BUN SERPL-MCNC: 22 MG/DL (ref 5–25)
CALCIUM SERPL-MCNC: 9.8 MG/DL (ref 8.3–10.1)
CHLORIDE SERPL-SCNC: 107 MMOL/L (ref 100–108)
CHOLEST SERPL-MCNC: 121 MG/DL (ref 50–200)
CO2 SERPL-SCNC: 27 MMOL/L (ref 21–32)
CREAT SERPL-MCNC: 1.06 MG/DL (ref 0.6–1.3)
ERYTHROCYTE [DISTWIDTH] IN BLOOD BY AUTOMATED COUNT: 13.2 % (ref 11.6–15.1)
GFR SERPL CREATININE-BSD FRML MDRD: 68 ML/MIN/1.73SQ M
GLUCOSE P FAST SERPL-MCNC: 117 MG/DL (ref 65–99)
HCT VFR BLD AUTO: 46.7 % (ref 36.5–49.3)
HDLC SERPL-MCNC: 47 MG/DL
HGB BLD-MCNC: 15.2 G/DL (ref 12–17)
LDLC SERPL CALC-MCNC: 59 MG/DL (ref 0–100)
MCH RBC QN AUTO: 29.5 PG (ref 26.8–34.3)
MCHC RBC AUTO-ENTMCNC: 32.5 G/DL (ref 31.4–37.4)
MCV RBC AUTO: 91 FL (ref 82–98)
NONHDLC SERPL-MCNC: 74 MG/DL
PLATELET # BLD AUTO: 161 THOUSANDS/UL (ref 149–390)
PMV BLD AUTO: 11 FL (ref 8.9–12.7)
POTASSIUM SERPL-SCNC: 4.1 MMOL/L (ref 3.5–5.3)
PROT SERPL-MCNC: 7.1 G/DL (ref 6.4–8.2)
RBC # BLD AUTO: 5.15 MILLION/UL (ref 3.88–5.62)
SL AMB POCT HEMOGLOBIN AIC: 6.7 (ref ?–6.5)
SODIUM SERPL-SCNC: 140 MMOL/L (ref 136–145)
TRIGL SERPL-MCNC: 74 MG/DL
WBC # BLD AUTO: 6.76 THOUSAND/UL (ref 4.31–10.16)

## 2021-06-10 PROCEDURE — 80053 COMPREHEN METABOLIC PANEL: CPT | Performed by: FAMILY MEDICINE

## 2021-06-10 PROCEDURE — NC001 PR NO CHARGE: Performed by: FAMILY MEDICINE

## 2021-06-10 PROCEDURE — 85027 COMPLETE CBC AUTOMATED: CPT | Performed by: FAMILY MEDICINE

## 2021-06-10 PROCEDURE — 1123F ACP DISCUSS/DSCN MKR DOCD: CPT | Performed by: FAMILY MEDICINE

## 2021-06-10 PROCEDURE — 99214 OFFICE O/P EST MOD 30 MIN: CPT | Performed by: FAMILY MEDICINE

## 2021-06-10 PROCEDURE — G0439 PPPS, SUBSEQ VISIT: HCPCS | Performed by: FAMILY MEDICINE

## 2021-06-10 PROCEDURE — 83036 HEMOGLOBIN GLYCOSYLATED A1C: CPT | Performed by: FAMILY MEDICINE

## 2021-06-10 PROCEDURE — 80061 LIPID PANEL: CPT | Performed by: FAMILY MEDICINE

## 2021-06-10 NOTE — PATIENT INSTRUCTIONS
Medicare Preventive Visit Patient Instructions  Thank you for completing your Welcome to Medicare Visit or Medicare Annual Wellness Visit today  Your next wellness visit will be due in one year (6/11/2022)  The screening/preventive services that you may require over the next 5-10 years are detailed below  Some tests may not apply to you based off risk factors and/or age  Screening tests ordered at today's visit but not completed yet may show as past due  Also, please note that scanned in results may not display below  Preventive Screenings:  Service Recommendations Previous Testing/Comments   Colorectal Cancer Screening  · Colonoscopy    · Fecal Occult Blood Test (FOBT)/Fecal Immunochemical Test (FIT)  · Fecal DNA/Cologuard Test  · Flexible Sigmoidoscopy Age: 54-65 years old   Colonoscopy: every 10 years (May be performed more frequently if at higher risk)  OR  FOBT/FIT: every 1 year  OR  Cologuard: every 3 years  OR  Sigmoidoscopy: every 5 years  Screening may be recommended earlier than age 48 if at higher risk for colorectal cancer  Also, an individualized decision between you and your healthcare provider will decide whether screening between the ages of 74-80 would be appropriate   Colonoscopy: 06/21/2019  FOBT/FIT: Not on file  Cologuard: Not on file  Sigmoidoscopy: Not on file          Prostate Cancer Screening Individualized decision between patient and health care provider in men between ages of 53-78   Medicare will cover every 12 months beginning on the day after your 50th birthday PSA: 1 0 ng/mL     Screening Not Indicated     Hepatitis C Screening Once for adults born between 1945 and 1965  More frequently in patients at high risk for Hepatitis C Hep C Antibody: 10/08/2018    Screening Current   Diabetes Screening 1-2 times per year if you're at risk for diabetes or have pre-diabetes Fasting glucose: 104 mg/dL   A1C: 6 5    Screening Not Indicated  History Diabetes   Cholesterol Screening Once every 5 years if you don't have a lipid disorder  May order more often based on risk factors  Lipid panel: 12/10/2020    Screening Not Indicated  History Lipid Disorder      Other Preventive Screenings Covered by Medicare:  1  Abdominal Aortic Aneurysm (AAA) Screening: covered once if your at risk  You're considered to be at risk if you have a family history of AAA or a male between the age of 73-68 who smoking at least 100 cigarettes in your lifetime  2  Lung Cancer Screening: covers low dose CT scan once per year if you meet all of the following conditions: (1) Age 50-69; (2) No signs or symptoms of lung cancer; (3) Current smoker or have quit smoking within the last 15 years; (4) You have a tobacco smoking history of at least 30 pack years (packs per day x number of years you smoked); (5) You get a written order from a healthcare provider  3  Glaucoma Screening: covered annually if you're considered high risk: (1) You have diabetes OR (2) Family history of glaucoma OR (3)  aged 48 and older OR (3)  American aged 72 and older  3  Osteoporosis Screening: covered every 2 years if you meet one of the following conditions: (1) Have a vertebral abnormality; (2) On glucocorticoid therapy for more than 3 months; (3) Have primary hyperparathyroidism; (4) On osteoporosis medications and need to assess response to drug therapy  5  HIV Screening: covered annually if you're between the age of 12-76  Also covered annually if you are younger than 13 and older than 72 with risk factors for HIV infection  For pregnant patients, it is covered up to 3 times per pregnancy      Immunizations:  Immunization Recommendations   Influenza Vaccine Annual influenza vaccination during flu season is recommended for all persons aged >= 6 months who do not have contraindications   Pneumococcal Vaccine (Prevnar and Pneumovax)  * Prevnar = PCV13  * Pneumovax = PPSV23 Adults 25-60 years old: 1-3 doses may be recommended based on certain risk factors  Adults 72 years old: Prevnar (PCV13) vaccine recommended followed by Pneumovax (PPSV23) vaccine  If already received PPSV23 since turning 65, then PCV13 recommended at least one year after PPSV23 dose  Hepatitis B Vaccine 3 dose series if at intermediate or high risk (ex: diabetes, end stage renal disease, liver disease)   Tetanus (Td) Vaccine - COST NOT COVERED BY MEDICARE PART B Following completion of primary series, a booster dose should be given every 10 years to maintain immunity against tetanus  Td may also be given as tetanus wound prophylaxis  Tdap Vaccine - COST NOT COVERED BY MEDICARE PART B Recommended at least once for all adults  For pregnant patients, recommended with each pregnancy  Shingles Vaccine (Shingrix) - COST NOT COVERED BY MEDICARE PART B  2 shot series recommended in those aged 48 and above     Health Maintenance Due:      Topic Date Due    Hepatitis C Screening  Completed     Immunizations Due:  There are no preventive care reminders to display for this patient  Advance Directives   What are advance directives? Advance directives are legal documents that state your wishes and plans for medical care  These plans are made ahead of time in case you lose your ability to make decisions for yourself  Advance directives can apply to any medical decision, such as the treatments you want, and if you want to donate organs  What are the types of advance directives? There are many types of advance directives, and each state has rules about how to use them  You may choose a combination of any of the following:  · Living will: This is a written record of the treatment you want  You can also choose which treatments you do not want, which to limit, and which to stop at a certain time  This includes surgery, medicine, IV fluid, and tube feedings  · Durable power of  for healthcare [place] [facility]):   This is a written record that states who you want to make healthcare choices for you when you are unable to make them for yourself  This person, called a proxy, is usually a family member or a friend  You may choose more than 1 proxy  · Do not resuscitate (DNR) order:  A DNR order is used in case your heart stops beating or you stop breathing  It is a request not to have certain forms of treatment, such as CPR  A DNR order may be included in other types of advance directives  · Medical directive: This covers the care that you want if you are in a coma, near death, or unable to make decisions for yourself  You can list the treatments you want for each condition  Treatment may include pain medicine, surgery, blood transfusions, dialysis, IV or tube feedings, and a ventilator (breathing machine)  · Values history: This document has questions about your views, beliefs, and how you feel and think about life  This information can help others choose the care that you would choose  Why are advance directives important? An advance directive helps you control your care  Although spoken wishes may be used, it is better to have your wishes written down  Spoken wishes can be misunderstood, or not followed  Treatments may be given even if you do not want them  An advance directive may make it easier for your family to make difficult choices about your care  Fall Prevention    Fall prevention  includes ways to make your home and other areas safer  It also includes ways you can move more carefully to prevent a fall  Health conditions that cause changes in your blood pressure, vision, or muscle strength and coordination may increase your risk for falls  Medicines may also increase your risk for falls if they make you dizzy, weak, or sleepy  Fall prevention tips:   · Stand or sit up slowly  · Use assistive devices as directed  · Wear shoes that fit well and have soles that   · Wear a personal alarm  · Stay active  · Manage your medical conditions      Home Safety Tips:  · Add items to prevent falls in the bathroom  · Keep paths clear  · Install bright lights in your home  · Keep items you use often on shelves within reach  · Paint or place reflective tape on the edges of your stairs  Weight Management   Why it is important to manage your weight:  Being overweight increases your risk of health conditions such as heart disease, high blood pressure, type 2 diabetes, and certain types of cancer  It can also increase your risk for osteoarthritis, sleep apnea, and other respiratory problems  Aim for a slow, steady weight loss  Even a small amount of weight loss can lower your risk of health problems  How to lose weight safely:  A safe and healthy way to lose weight is to eat fewer calories and get regular exercise  You can lose up about 1 pound a week by decreasing the number of calories you eat by 500 calories each day  Healthy meal plan for weight management:  A healthy meal plan includes a variety of foods, contains fewer calories, and helps you stay healthy  A healthy meal plan includes the following:  · Eat whole-grain foods more often  A healthy meal plan should contain fiber  Fiber is the part of grains, fruits, and vegetables that is not broken down by your body  Whole-grain foods are healthy and provide extra fiber in your diet  Some examples of whole-grain foods are whole-wheat breads and pastas, oatmeal, brown rice, and bulgur  · Eat a variety of vegetables every day  Include dark, leafy greens such as spinach, kale, carlito greens, and mustard greens  Eat yellow and orange vegetables such as carrots, sweet potatoes, and winter squash  · Eat a variety of fruits every day  Choose fresh or canned fruit (canned in its own juice or light syrup) instead of juice  Fruit juice has very little or no fiber  · Eat low-fat dairy foods  Drink fat-free (skim) milk or 1% milk  Eat fat-free yogurt and low-fat cottage cheese   Try low-fat cheeses such as mozzarella and other reduced-fat cheeses  · Choose meat and other protein foods that are low in fat  Choose beans or other legumes such as split peas or lentils  Choose fish, skinless poultry (chicken or turkey), or lean cuts of red meat (beef or pork)  Before you cook meat or poultry, cut off any visible fat  · Use less fat and oil  Try baking foods instead of frying them  Add less fat, such as margarine, sour cream, regular salad dressing and mayonnaise to foods  Eat fewer high-fat foods  Some examples of high-fat foods include french fries, doughnuts, ice cream, and cakes  · Eat fewer sweets  Limit foods and drinks that are high in sugar  This includes candy, cookies, regular soda, and sweetened drinks  Exercise:  Exercise at least 30 minutes per day on most days of the week  Some examples of exercise include walking, biking, dancing, and swimming  You can also fit in more physical activity by taking the stairs instead of the elevator or parking farther away from stores  Ask your healthcare provider about the best exercise plan for you  © Copyright [address] 2018 Information is for End User's use only and may not be sold, redistributed or otherwise used for commercial purposes  All illustrations and images included in CareNotes® are the copyrighted property of [employer]  or [facility] Preventive Visit Patient Instructions  Thank you for completing your Welcome to Medicare Visit or Medicare Annual Wellness Visit today  Your next wellness visit will be due in one year (6/11/2022)  The screening/preventive services that you may require over the next 5-10 years are detailed below  Some tests may not apply to you based off risk factors and/or age  Screening tests ordered at today's visit but not completed yet may show as past due  Also, please note that scanned in results may not display below    Preventive Screenings:  Service Recommendations Previous Testing/Comments Colorectal Cancer Screening  · Colonoscopy    · Fecal Occult Blood Test (FOBT)/Fecal Immunochemical Test (FIT)  · Fecal DNA/Cologuard Test  · Flexible Sigmoidoscopy Age: 54-65 years old   Colonoscopy: every 10 years (May be performed more frequently if at higher risk)  OR  FOBT/FIT: every 1 year  OR  Cologuard: every 3 years  OR  Sigmoidoscopy: every 5 years  Screening may be recommended earlier than age 48 if at higher risk for colorectal cancer  Also, an individualized decision between you and your healthcare provider will decide whether screening between the ages of 74-80 would be appropriate  Colonoscopy: 06/21/2019  FOBT/FIT: Not on file  Cologuard: Not on file  Sigmoidoscopy: Not on file          Prostate Cancer Screening Individualized decision between patient and health care provider in men between ages of 53-78   Medicare will cover every 12 months beginning on the day after your 50th birthday PSA: 1 0 ng/mL     Screening Not Indicated     Hepatitis C Screening Once for adults born between 1945 and 1965  More frequently in patients at high risk for Hepatitis C Hep C Antibody: 10/08/2018    Screening Current   Diabetes Screening 1-2 times per year if you're at risk for diabetes or have pre-diabetes Fasting glucose: 104 mg/dL   A1C: 6 7    Screening Not Indicated  History Diabetes   Cholesterol Screening Once every 5 years if you don't have a lipid disorder  May order more often based on risk factors  Lipid panel: 12/10/2020    Screening Not Indicated  History Lipid Disorder      Other Preventive Screenings Covered by Medicare:  6  Abdominal Aortic Aneurysm (AAA) Screening: covered once if your at risk  You're considered to be at risk if you have a family history of AAA or a male between the age of 73-68 who smoking at least 100 cigarettes in your lifetime    7  Lung Cancer Screening: covers low dose CT scan once per year if you meet all of the following conditions: (1) Age 50-69; (2) No signs or symptoms of lung cancer; (3) Current smoker or have quit smoking within the last 15 years; (4) You have a tobacco smoking history of at least 30 pack years (packs per day x number of years you smoked); (5) You get a written order from a healthcare provider  8  Glaucoma Screening: covered annually if you're considered high risk: (1) You have diabetes OR (2) Family history of glaucoma OR (3)  aged 48 and older OR (3)  American aged 72 and older  5  Osteoporosis Screening: covered every 2 years if you meet one of the following conditions: (1) Have a vertebral abnormality; (2) On glucocorticoid therapy for more than 3 months; (3) Have primary hyperparathyroidism; (4) On osteoporosis medications and need to assess response to drug therapy  10  HIV Screening: covered annually if you're between the age of 12-76  Also covered annually if you are younger than 13 and older than 72 with risk factors for HIV infection  For pregnant patients, it is covered up to 3 times per pregnancy  Immunizations:  Immunization Recommendations   Influenza Vaccine Annual influenza vaccination during flu season is recommended for all persons aged >= 6 months who do not have contraindications   Pneumococcal Vaccine (Prevnar and Pneumovax)  * Prevnar = PCV13  * Pneumovax = PPSV23 Adults 25-60 years old: 1-3 doses may be recommended based on certain risk factors  Adults 72 years old: Prevnar (PCV13) vaccine recommended followed by Pneumovax (PPSV23) vaccine  If already received PPSV23 since turning 65, then PCV13 recommended at least one year after PPSV23 dose  Hepatitis B Vaccine 3 dose series if at intermediate or high risk (ex: diabetes, end stage renal disease, liver disease)   Tetanus (Td) Vaccine - COST NOT COVERED BY MEDICARE PART B Following completion of primary series, a booster dose should be given every 10 years to maintain immunity against tetanus  Td may also be given as tetanus wound prophylaxis     Tdap Vaccine - COST NOT COVERED BY MEDICARE PART B Recommended at least once for all adults  For pregnant patients, recommended with each pregnancy  Shingles Vaccine (Shingrix) - COST NOT COVERED BY MEDICARE PART B  2 shot series recommended in those aged 48 and above     Health Maintenance Due:      Topic Date Due    Hepatitis C Screening  Completed     Immunizations Due:  There are no preventive care reminders to display for this patient  Advance Directives   What are advance directives? Advance directives are legal documents that state your wishes and plans for medical care  These plans are made ahead of time in case you lose your ability to make decisions for yourself  Advance directives can apply to any medical decision, such as the treatments you want, and if you want to donate organs  What are the types of advance directives? There are many types of advance directives, and each state has rules about how to use them  You may choose a combination of any of the following:  · Living will: This is a written record of the treatment you want  You can also choose which treatments you do not want, which to limit, and which to stop at a certain time  This includes surgery, medicine, IV fluid, and tube feedings  · Durable power of  for healthcare [place] [facility]): This is a written record that states who you want to make healthcare choices for you when you are unable to make them for yourself  This person, called a proxy, is usually a family member or a friend  You may choose more than 1 proxy  · Do not resuscitate (DNR) order:  A DNR order is used in case your heart stops beating or you stop breathing  It is a request not to have certain forms of treatment, such as CPR  A DNR order may be included in other types of advance directives  · Medical directive: This covers the care that you want if you are in a coma, near death, or unable to make decisions for yourself  You can list the treatments you want for each condition  Treatment may include pain medicine, surgery, blood transfusions, dialysis, IV or tube feedings, and a ventilator (breathing machine)  · Values history: This document has questions about your views, beliefs, and how you feel and think about life  This information can help others choose the care that you would choose  Why are advance directives important? An advance directive helps you control your care  Although spoken wishes may be used, it is better to have your wishes written down  Spoken wishes can be misunderstood, or not followed  Treatments may be given even if you do not want them  An advance directive may make it easier for your family to make difficult choices about your care  Fall Prevention    Fall prevention  includes ways to make your home and other areas safer  It also includes ways you can move more carefully to prevent a fall  Health conditions that cause changes in your blood pressure, vision, or muscle strength and coordination may increase your risk for falls  Medicines may also increase your risk for falls if they make you dizzy, weak, or sleepy  Fall prevention tips:   · Stand or sit up slowly  · Use assistive devices as directed  · Wear shoes that fit well and have soles that   · Wear a personal alarm  · Stay active  · Manage your medical conditions  Home Safety Tips:  · Add items to prevent falls in the bathroom  · Keep paths clear  · Install bright lights in your home  · Keep items you use often on shelves within reach  · Paint or place reflective tape on the edges of your stairs  Weight Management   Why it is important to manage your weight:  Being overweight increases your risk of health conditions such as heart disease, high blood pressure, type 2 diabetes, and certain types of cancer  It can also increase your risk for osteoarthritis, sleep apnea, and other respiratory problems  Aim for a slow, steady weight loss   Even a small amount of weight loss can lower your risk of health problems  How to lose weight safely:  A safe and healthy way to lose weight is to eat fewer calories and get regular exercise  You can lose up about 1 pound a week by decreasing the number of calories you eat by 500 calories each day  Healthy meal plan for weight management:  A healthy meal plan includes a variety of foods, contains fewer calories, and helps you stay healthy  A healthy meal plan includes the following:  · Eat whole-grain foods more often  A healthy meal plan should contain fiber  Fiber is the part of grains, fruits, and vegetables that is not broken down by your body  Whole-grain foods are healthy and provide extra fiber in your diet  Some examples of whole-grain foods are whole-wheat breads and pastas, oatmeal, brown rice, and bulgur  · Eat a variety of vegetables every day  Include dark, leafy greens such as spinach, kale, carlito greens, and mustard greens  Eat yellow and orange vegetables such as carrots, sweet potatoes, and winter squash  · Eat a variety of fruits every day  Choose fresh or canned fruit (canned in its own juice or light syrup) instead of juice  Fruit juice has very little or no fiber  · Eat low-fat dairy foods  Drink fat-free (skim) milk or 1% milk  Eat fat-free yogurt and low-fat cottage cheese  Try low-fat cheeses such as mozzarella and other reduced-fat cheeses  · Choose meat and other protein foods that are low in fat  Choose beans or other legumes such as split peas or lentils  Choose fish, skinless poultry (chicken or turkey), or lean cuts of red meat (beef or pork)  Before you cook meat or poultry, cut off any visible fat  · Use less fat and oil  Try baking foods instead of frying them  Add less fat, such as margarine, sour cream, regular salad dressing and mayonnaise to foods  Eat fewer high-fat foods  Some examples of high-fat foods include french fries, doughnuts, ice cream, and cakes  · Eat fewer sweets    Limit foods and drinks that are high in sugar  This includes candy, cookies, regular soda, and sweetened drinks  Exercise:  Exercise at least 30 minutes per day on most days of the week  Some examples of exercise include walking, biking, dancing, and swimming  You can also fit in more physical activity by taking the stairs instead of the elevator or parking farther away from stores  Ask your healthcare provider about the best exercise plan for you  © Copyright [address] 2018 Information is for End User's use only and may not be sold, redistributed or otherwise used for commercial purposes   All illustrations and images included in CareNotes® are the copyrighted property of [employer]  or [address] [place] [address]

## 2021-06-10 NOTE — ASSESSMENT & PLAN NOTE
Lab Results   Component Value Date    HGBA1C 6 7 (A) 06/10/2021   His A1c remains at goal at 6 7  We asked him to continue to watch his diet and remain compliant with his metformin  We also asked him to try to improve his exercise regimen

## 2021-06-10 NOTE — PROGRESS NOTES
Assessment/Plan:  Diabetic nephropathy associated with type 2 diabetes mellitus ([facility] [address] )    Lab Results   Component Value Date    HGBA1C 6 7 (A) 06/10/2021   His A1c remains at goal at 6 7  We asked him to continue to watch his diet and remain compliant with his metformin  We also asked him to try to improve his exercise regimen  Asthma  His symptoms presently remain quiescent  Benign essential hypertension  Blood pressure remains under good control  Continue with lisinopril  CBC and CMP today  Hemiplegia and hemiparesis following cerebral infarction affecting left non-dominant side ([facility] [address] )  He continues with minimal residual   No recurrence/progression  Will continue with his atorvastatin as well as aspirin  Lipid profile today  Bilateral carotid artery stenosis  Surveillance exam recommended  Hyperlipidemia  Continue atorvastatin lipid profile today  Non-rheumatic aortic sclerosis ([facility] )  He remains asymptomatic and the murmur sounds unchanged         Diagnoses and all orders for this visit:    Medicare annual wellness visit, subsequent    Type 2 diabetes mellitus with other neurologic complication, without long-term current use of insulin ([facility] )  -     Comprehensive metabolic panel  -     POCT hemoglobin A1c    Hyperlipidemia, unspecified hyperlipidemia type  -     Lipid panel    Benign essential hypertension  -     CBC    Diabetic nephropathy associated with type 2 diabetes mellitus (HCC)    Mild intermittent asthma without complication    Hemiplegia and hemiparesis following cerebral infarction affecting left non-dominant side (HCC)    Bilateral carotid artery stenosis    Non-rheumatic aortic sclerosis          Subjective:   Chief Complaint   Patient presents with   [facility] Wellness Visit     pt here for subsequent medicare wellness   Follow-up     6m med check and fbw  pt due for diabetic foot exam  bmi f/u plan needed        Patient ID: Surinder Mcmillan is a 68 y o  male   Watching diet and physical work in yard  SMBG 90s  BMI Counseling: Body mass index is 31 24 kg/m²  The BMI is above normal  Nutrition recommendations include decreasing portion sizes, encouraging healthy choices of fruits and vegetables, consuming healthier snacks, limiting drinks that contain sugar and moderation in carbohydrate intake  Exercise recommendations include moderate physical activity 150 minutes/week and exercising 3-5 times per week  No pharmacotherapy was ordered  ADA diet          HPI  Patient is a 70-year-old male who presents today for routine follow-up of multiple medical problems including type 2 diabetes with nephropathy as well as retinopathy in addition to asthma, allergies, essential hypertension, history of carotid stenosis with CVA and hemiplegia as well as gout, BPH among other  He states that overall he has been doing well  He denies any cardiovascular pulmonary complaint  No headache diplopia or focal neurologic symptom  He does complain of stiffness in his knee joint as well as pain which prevents him from being overly physically active though he continues to work around his home with [employer] work [employer]  Does complain of polyuria as well as nocturia times  The following portions of the patient's history were reviewed and updated as appropriate: allergies, current medications, past family history, past medical history, past social history, past surgical history and problem list     Review of Systems   Constitution: Negative for decreased appetite, malaise/fatigue, weight gain and weight loss  Cardiovascular: Negative for chest pain, irregular heartbeat, leg swelling, orthopnea and paroxysmal nocturnal dyspnea  Respiratory: Negative for cough, shortness of breath and wheezing  Endocrine: Positive for polyuria  Negative for polydipsia and polyphagia  Musculoskeletal: Positive for joint pain and stiffness  Gastrointestinal: Negative for constipation, diarrhea and dysphagia  Genitourinary: Positive for frequency and hesitancy  Negative for bladder incontinence  Neurological: Negative for dizziness, headaches and paresthesias  Psychiatric/Behavioral: Negative for depression  The patient does not have insomnia and is not nervous/anxious  Objective:    Physical Exam   Constitutional: He is oriented to person, place, and time  He appears well-developed  Overweight and in NAD   Neck: No JVD present  No thyromegaly present  Cardiovascular: Normal rate and regular rhythm  Pulses are no weak pulses  Murmur heard  Pulses:       Dorsalis pedis pulses are 1+ on the right side and 1+ on the left side  Pulmonary/Chest: Effort normal and breath sounds normal    Abdominal: Soft  He exhibits no mass  There is no abdominal tenderness  Musculoskeletal:         General: No edema  Feet:   Right Foot:   Skin Integrity: Positive for dry skin  Negative for ulcer, warmth or callus  Left Foot:   Skin Integrity: Positive for dry skin  Negative for ulcer, warmth or callus  Lymphadenopathy:     He has no cervical adenopathy  Neurological: He is alert and oriented to person, place, and time  Psychiatric: He has a normal mood and affect  Judgment and thought content normal    Nursing note and vitals reviewed  Patient's shoes and socks removed  Right Foot/Ankle   Right Foot Inspection  Skin Exam: skin normal, skin intact and dry skin no warmth, no callus, no abnormal color, no pre-ulcer, no ulcer and no callus                          Toe Exam: ROM and strength within normal limits  Sensory       Monofilament testing: intact  Vascular  Capillary refills: < 3 seconds  The right DP pulse is 1+       Left Foot/Ankle  Left Foot Inspection  Skin Exam: skin intact and dry skinno warmth, normal color, no pre-ulcer, no ulcer and no callus                         Toe Exam: ROM and strength within normal limits                   Sensory       Monofilament: intact  Vascular  Capillary refills: < 3 seconds  The left DP pulse is 1+  Assign Risk Category:  No deformity present; No loss of protective sensation;  No weak pulses       Risk: 0      Wt Readings from Last 12 Encounters:   06/10/21 102 kg (224 lb)   05/18/21 103 kg (227 lb)   05/11/21 102 kg (224 lb)   03/30/21 102 kg (224 lb)   02/19/21 103 kg (226 lb)   12/10/20 103 kg (226 lb)   10/08/20 103 kg (228 lb)   08/28/20 103 kg (227 lb)   08/20/20 102 kg (224 lb)   08/13/20 102 kg (223 lb 12 8 oz)   06/18/20 101 kg (222 lb 8 oz)   11/15/19 103 kg (227 lb)   ]
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
## 2021-06-10 NOTE — ASSESSMENT & PLAN NOTE
He continues with minimal residual   No recurrence/progression  Will continue with his atorvastatin as well as aspirin  Lipid profile today

## 2021-06-17 ENCOUNTER — OFFICE VISIT (OUTPATIENT)
Dept: NEUROLOGY | Facility: CLINIC | Age: 76
End: 2021-06-17
Payer: MEDICARE

## 2021-06-17 VITALS
HEART RATE: 60 BPM | SYSTOLIC BLOOD PRESSURE: 124 MMHG | WEIGHT: 225.6 LBS | DIASTOLIC BLOOD PRESSURE: 70 MMHG | HEIGHT: 71 IN | BODY MASS INDEX: 31.58 KG/M2

## 2021-06-17 DIAGNOSIS — I65.23 BILATERAL CAROTID ARTERY STENOSIS: ICD-10-CM

## 2021-06-17 DIAGNOSIS — Z86.73 HISTORY OF STROKE: Primary | ICD-10-CM

## 2021-06-17 DIAGNOSIS — G25.81 RLS (RESTLESS LEGS SYNDROME): ICD-10-CM

## 2021-06-17 DIAGNOSIS — I69.354 HEMIPLEGIA AND HEMIPARESIS FOLLOWING CEREBRAL INFARCTION AFFECTING LEFT NON-DOMINANT SIDE (HCC): ICD-10-CM

## 2021-06-17 DIAGNOSIS — E11.49 TYPE 2 DIABETES MELLITUS WITH OTHER NEUROLOGIC COMPLICATION, WITHOUT LONG-TERM CURRENT USE OF INSULIN (HCC): Chronic | ICD-10-CM

## 2021-06-17 DIAGNOSIS — I10 BENIGN ESSENTIAL HYPERTENSION: Chronic | ICD-10-CM

## 2021-06-17 PROBLEM — G45.9 TIA (TRANSIENT ISCHEMIC ATTACK): Status: RESOLVED | Noted: 2017-07-26 | Resolved: 2021-06-17

## 2021-06-17 PROCEDURE — 99215 OFFICE O/P EST HI 40 MIN: CPT | Performed by: PSYCHIATRY & NEUROLOGY

## 2021-06-17 RX ORDER — ROPINIROLE 0.5 MG/1
TABLET, FILM COATED ORAL
Qty: 120 TABLET | Refills: 1 | Status: SHIPPED | OUTPATIENT
Start: 2021-06-17 | End: 2021-12-16 | Stop reason: SDDI

## 2021-06-17 NOTE — PROGRESS NOTES
Patient ID: Melissa Worrell is a 68 y o  male  Assessment/Plan:   Patient Instructions   Stroke:  Jeremy Phan presents for a follow-up evaluation with regard to his prior stroke  He did not report any new stroke-like symptoms  He takes his medications as prescribed  He did not endorse any significant bleeding or bruising issues  He did not endorse any falls  He has started to experience significant discomfort in the left gastrocnemius occurring in the evening when he is either sitting in his recliner or laying in bed concerning for restless leg syndrome  He also reports that he has tiredness and limited exercise capacity in the proximal legs bilaterally  He has reasonably preserved distal circulation which is better in the right leg than the left, and ultimately there is concern that this is related more to Lipitor than to some type of vascular compromise although either is potentially possible  He did have a lower extremity arterial ultrasound 2 years ago which was described as normal     - for ongoing stroke prevention should continue his current combination of aspirin and appropriate blood pressure and glycemic control   - we will defer to his primary care team to monitor his cholesterol panel and blood sugar numbers   - for the time being I would like him to stop Lipitor for the next 3-4 weeks  If he feels that he is clearly significantly better then we will plan to switch to a different statin formulation  If he is uncertain or notices no difference we will plan to restart Lipitor at that time   - once we are reasonably confident with regard to the Lipitor he will begin Requip taken at a dose of 1/2 tablet in the evening for 3 days  If he notices no significant improvement he will take 1 full tablet at night for 1 week and can increase by 1 tablet every week to an initial maximum of 4 tablets at night    He should be wary of any sleep issues, GI side effects, lightheadedness or changes in behavior while on this medicine  - he does have a known carotid artery occlusion on 1 side and stenosis on the other and is due to have a repeat Doppler ultrasound from my standpoint  I will request that study to be performed  I will plan for him to contact us in no more than 1 month to report on his progress and to come back to see us in 6 months time  If he has any symptoms concerning for TIA or stroke including sudden painless loss of vision or double vision, difficulty speaking or swallowing, vertigo/room spinning that does not quickly resolve, or weakness/numbness affecting 1 side of the face or body he should proceed by ambulance to the nearest emergency room immediately  No problem-specific Assessment & Plan notes found for this encounter  Diagnoses and all orders for this visit:    History of stroke    Type 2 diabetes mellitus with other neurologic complication, without long-term current use of insulin (HCC)    Benign essential hypertension    Bilateral carotid artery stenosis  -     VAS carotid complete study; Future    Hemiplegia and hemiparesis following cerebral infarction affecting left non-dominant side (HCC)    RLS (restless legs syndrome)  -     rOPINIRole (REQUIP) 0 5 mg tablet; 1/2 tab HS X 3 days, then 1 tab HS X 1 week, if no better increase by 1 tab weekly to max of 4 tabs hs           Subjective:    HPI      Isaiah Maloney presents with his wife for follow-up with regard to his prior stroke  He did not report any new symptoms concerning for recurrent TIA or stroke  He takes his medications as prescribed  He did not endorse any bleeding or bruising issues  He reports that he experiences an abnormal sensation which he describes as pain but clarifies to meet significant discomfort like a clamp around his gastrocnemius  This affect his left leg and typically is in the evening when he is in his recliner when he is 1st in his bed    He notes that he does get up in the middle the night sometimes and did not report the symptoms in the middle the night, but mostly in the early evening occurring at approximately 2000 hours  He also reports that he experiences ongoing significant bilateral leg fatigue  He notes that this really occurs in the proximal muscles of the legs, the quadriceps and particular  He is able to rise from a seated position with the assistance of his arms but if he has to use his legs in isolation he finds it considerably difficult  He did not particularly endorse bilateral leg pain while walking  I reviewed his prior records and he did have a bilateral lower extremity arterial Doppler with ankle brachial index in 2019 which was described as normal   He does have a unilateral carotid artery occlusion with 50- 69% stenosis on the contralateral side  Objective:    /70 (BP Location: Left arm, Patient Position: Sitting, Cuff Size: Standard)   Pulse 60   Ht 5' 11" (1 803 m)   Wt 102 kg (225 lb 9 6 oz)   BMI 31 46 kg/m²       Physical Exam    Neurological Exam      At the time of our evaluation he was awake, alert, and in no distress  Cranial nerves 2-12 were symmetrically intact bilaterally  Motor testing revealed symmetric strength throughout the bilateral upper lower extremities  He does have evidence of venous stasis in the distal bilateral lower extremities  His capillary refill is approximately 7 seconds distally bilaterally  His feet are cool but pink and well perfused  He has a clear dorsalis pedis pulse on the right but not on the left  He clearly has difficulty when rising from a seated position without the assistance of his arms  ROS:    Review of Systems   Constitutional: Negative  Negative for appetite change and fever  HENT: Negative  Negative for hearing loss, tinnitus, trouble swallowing and voice change  Eyes: Negative  Negative for photophobia and pain  Respiratory: Negative  Negative for shortness of breath  Cardiovascular: Negative  Negative for palpitations  Gastrointestinal: Negative  Negative for nausea and vomiting  Endocrine: Negative  Negative for cold intolerance  Genitourinary: Negative  Negative for dysuria, frequency and urgency  Musculoskeletal: Negative  Negative for myalgias and neck pain  Cramp in left leg   Skin: Negative  Negative for rash  Allergic/Immunologic: Negative  Neurological: Negative  Negative for dizziness, tremors, seizures, syncope, facial asymmetry, speech difficulty, weakness, light-headedness, numbness and headaches  Hematological: Negative  Does not bruise/bleed easily  Psychiatric/Behavioral: Negative  Negative for confusion, hallucinations and sleep disturbance  All other systems reviewed and are negative  Reviewed ROS as entered by medical assistant  Past Medical History:   Diagnosis Date    Asthma     DM [facility] ([facility] )     HLD (hyperlipidemia)     Hypertension     Murmur, cardiac     Stroke ([facility] )        Social History     Socioeconomic History    Marital status: /Civil Union     Spouse name: None    Number of children: None    Years of education: None    Highest education level: None   Occupational History    None   Tobacco Use    Smoking status: Never Smoker    Smokeless tobacco: Never Used   Vaping Use    Vaping Use: Never used   Substance and Sexual Activity    Alcohol use: Not Currently    Drug use: No    Sexual activity: None   Other Topics Concern    None   Social History Narrative    None     Social Determinants of Health     Financial Resource Strain:     Difficulty of Paying Living Expenses:    Food Insecurity:     Worried About Running Out of Food in the Last Year:     Ran Out of Food in the Last Year:    Transportation Needs:     Lack of Transportation (Medical):      Lack of Transportation (Non-Medical):    Physical Activity:     Days of Exercise per Week:     Minutes of Exercise per Session:    Stress:     Feeling of Stress :    Social Connections:     Frequency of Communication with Friends and Family:     Frequency of Social Gatherings with Friends and Family:     Attends Jainism Services:     Active Member of Clubs or Organizations:     Attends Club or Organization Meetings:     Marital Status:    Intimate Partner Violence:     Fear of Current or Ex-Partner:     Emotionally Abused:     Physically Abused:     Sexually Abused:          Current Outpatient Medications:     aspirin 81 mg chewable tablet, Chew 1 tablet (81 mg total) daily, Disp: , Rfl:     atorvastatin (LIPITOR) 40 mg tablet, TAKE 1 TABLET BY MOUTH ONCE DAILY IN THE EVENING, Disp: 90 tablet, Rfl: 0    lisinopril (ZESTRIL) 10 mg tablet, Take 1 tablet by mouth once daily, Disp: 90 tablet, Rfl: 0    metFORMIN (GLUCOPHAGE) 1000 MG tablet, Take 1 tablet by mouth once daily, Disp: 90 tablet, Rfl: 0    Allergies   Allergen Reactions    Penicillins Hives

## 2021-06-17 NOTE — PATIENT INSTRUCTIONS
Stroke:  Drew Benavidez presents for a follow-up evaluation with regard to his prior stroke  He did not report any new stroke-like symptoms  He takes his medications as prescribed  He did not endorse any significant bleeding or bruising issues  He did not endorse any falls  He has started to experience significant discomfort in the left gastrocnemius occurring in the evening when he is either sitting in his recliner or laying in bed concerning for restless leg syndrome  He also reports that he has tiredness and limited exercise capacity in the proximal legs bilaterally  He has reasonably preserved distal circulation which is better in the right leg than the left, and ultimately there is concern that this is related more to Lipitor than to some type of vascular compromise although either is potentially possible  He did have a lower extremity arterial ultrasound 2 years ago which was described as normal     - for ongoing stroke prevention should continue his current combination of aspirin and appropriate blood pressure and glycemic control   - we will defer to his primary care team to monitor his cholesterol panel and blood sugar numbers   - for the time being I would like him to stop Lipitor for the next 3-4 weeks  If he feels that he is clearly significantly better then we will plan to switch to a different statin formulation  If he is uncertain or notices no difference we will plan to restart Lipitor at that time   - once we are reasonably confident with regard to the Lipitor he will begin Requip taken at a dose of 1/2 tablet in the evening for 3 days  If he notices no significant improvement he will take 1 full tablet at night for 1 week and can increase by 1 tablet every week to an initial maximum of 4 tablets at night  He should be wary of any sleep issues, GI side effects, lightheadedness or changes in behavior while on this medicine    - he does have a known carotid artery occlusion on 1 side and stenosis on the other and is due to have a repeat Doppler ultrasound from my standpoint  I will request that study to be performed  I will plan for him to contact us in no more than 1 month to report on his progress and to come back to see us in 6 months time  If he has any symptoms concerning for TIA or stroke including sudden painless loss of vision or double vision, difficulty speaking or swallowing, vertigo/room spinning that does not quickly resolve, or weakness/numbness affecting 1 side of the face or body he should proceed by ambulance to the nearest emergency room immediately

## 2021-06-21 DIAGNOSIS — E11.9 TYPE 2 DIABETES MELLITUS WITHOUT COMPLICATION (HCC): ICD-10-CM

## 2021-07-12 ENCOUNTER — HOSPITAL ENCOUNTER (OUTPATIENT)
Dept: NON INVASIVE DIAGNOSTICS | Facility: CLINIC | Age: 76
Discharge: HOME | End: 2021-07-12
Payer: MEDICARE

## 2021-07-12 DIAGNOSIS — I65.23 BILATERAL CAROTID ARTERY STENOSIS: ICD-10-CM

## 2021-07-12 PROCEDURE — 93880 EXTRACRANIAL BILAT STUDY: CPT

## 2021-07-12 PROCEDURE — 93880 EXTRACRANIAL BILAT STUDY: CPT | Performed by: SURGERY

## 2021-07-14 ENCOUNTER — TELEPHONE (OUTPATIENT)
Dept: NEUROLOGY | Facility: CLINIC | Age: 76
End: 2021-07-14

## 2021-07-14 NOTE — TELEPHONE ENCOUNTER
Patient called in to let Dr Nicole Pelaez know that his legs have indeed improved since discontinuing atorvastatin  He would also like to know if Dr Nicole Pelaez would be calling him about carotid doppler results?  If possible, he would like to talk to Dr Nicole Pelaez but he is aware that a nurse may be calling him regarding this due to the provider seeing patients, etc      Best call back [phone], okay with detailed messages

## 2021-07-14 NOTE — TELEPHONE ENCOUNTER
I did review the doppler results and sent a letter to be sent to him    but basically the doppler is OK  The one side is still blocked as expected and there remains narrowing on the other side but it has not progressed  We would repeat that test in 6 months (order entered)  In regards to his leg symptoms, have they gone away entirely or are they just somewhat better? If they are gone entirely and he does not need requip that is great and we will not start that but consider a different statin formulation that hopefully will not have the same side effects  If they are only somewhat better and if he is having that urge to move his legs at night we can start with the Requip as we planned during his office visit and titrate that up,  but I would not start Requip and a new statin at the same time

## 2021-07-15 DIAGNOSIS — I65.23 BILATERAL CAROTID ARTERY STENOSIS: Primary | ICD-10-CM

## 2021-07-20 NOTE — TELEPHONE ENCOUNTER
Called patient, read Dr Cristian Castellon message  Patient verbalizes understanding of information  States that his legs are only somewhat better  He is willing to start ropinirole  [facility] [place]  Reports they do not have any further questions or concerns at this time  Called [facility], spoke with [facility], confirmed they still have this script  She states she will fill for the patient

## 2021-08-24 DIAGNOSIS — I10 BENIGN ESSENTIAL HYPERTENSION: Chronic | ICD-10-CM

## 2021-08-24 RX ORDER — LISINOPRIL 10 MG/1
10 TABLET ORAL DAILY
Qty: 90 TABLET | Refills: 0 | Status: SHIPPED | OUTPATIENT
Start: 2021-08-24 | End: 2021-11-30

## 2021-09-14 DIAGNOSIS — E11.9 TYPE 2 DIABETES MELLITUS WITHOUT COMPLICATION (HCC): ICD-10-CM

## 2021-11-30 DIAGNOSIS — I10 BENIGN ESSENTIAL HYPERTENSION: Chronic | ICD-10-CM

## 2021-11-30 RX ORDER — LISINOPRIL 10 MG/1
TABLET ORAL
Qty: 90 TABLET | Refills: 0 | Status: SHIPPED | OUTPATIENT
Start: 2021-11-30 | End: 2022-03-07

## 2021-12-14 DIAGNOSIS — E11.9 TYPE 2 DIABETES MELLITUS WITHOUT COMPLICATION (HCC): ICD-10-CM

## 2021-12-16 ENCOUNTER — OFFICE VISIT (OUTPATIENT)
Dept: FAMILY MEDICINE CLINIC | Facility: CLINIC | Age: 76
End: 2021-12-16
Payer: MEDICARE

## 2021-12-16 VITALS
HEART RATE: 52 BPM | OXYGEN SATURATION: 99 % | WEIGHT: 224 LBS | TEMPERATURE: 97.6 F | BODY MASS INDEX: 31.36 KG/M2 | SYSTOLIC BLOOD PRESSURE: 130 MMHG | DIASTOLIC BLOOD PRESSURE: 82 MMHG | HEIGHT: 71 IN

## 2021-12-16 DIAGNOSIS — I10 BENIGN ESSENTIAL HYPERTENSION: Chronic | ICD-10-CM

## 2021-12-16 DIAGNOSIS — I69.354 HEMIPLEGIA AND HEMIPARESIS FOLLOWING CEREBRAL INFARCTION AFFECTING LEFT NON-DOMINANT SIDE (HCC): ICD-10-CM

## 2021-12-16 DIAGNOSIS — E11.49 TYPE 2 DIABETES MELLITUS WITH OTHER NEUROLOGIC COMPLICATION, WITHOUT LONG-TERM CURRENT USE OF INSULIN (HCC): Primary | ICD-10-CM

## 2021-12-16 DIAGNOSIS — E78.5 HYPERLIPIDEMIA, UNSPECIFIED HYPERLIPIDEMIA TYPE: ICD-10-CM

## 2021-12-16 DIAGNOSIS — E11.21 DIABETIC NEPHROPATHY ASSOCIATED WITH TYPE 2 DIABETES MELLITUS (HCC): ICD-10-CM

## 2021-12-16 DIAGNOSIS — I70.0 ATHEROSCLEROSIS OF AORTA (HCC): ICD-10-CM

## 2021-12-16 DIAGNOSIS — I35.8 NON-RHEUMATIC AORTIC SCLEROSIS: ICD-10-CM

## 2021-12-16 DIAGNOSIS — J45.20 MILD INTERMITTENT ASTHMA WITHOUT COMPLICATION: Chronic | ICD-10-CM

## 2021-12-16 PROBLEM — Z12.5 SPECIAL SCREENING FOR MALIGNANT NEOPLASM OF PROSTATE: Status: ACTIVE | Noted: 2021-12-16

## 2021-12-16 LAB
ALBUMIN SERPL BCP-MCNC: 3.8 G/DL (ref 3.5–5)
ALP SERPL-CCNC: 58 U/L (ref 46–116)
ALT SERPL W P-5'-P-CCNC: 22 U/L (ref 12–78)
ANION GAP SERPL CALCULATED.3IONS-SCNC: 5 MMOL/L (ref 4–13)
AST SERPL W P-5'-P-CCNC: 16 U/L (ref 5–45)
BILIRUB SERPL-MCNC: 0.73 MG/DL (ref 0.2–1)
BUN SERPL-MCNC: 21 MG/DL (ref 5–25)
CALCIUM SERPL-MCNC: 9.6 MG/DL (ref 8.3–10.1)
CHLORIDE SERPL-SCNC: 107 MMOL/L (ref 100–108)
CHOLEST SERPL-MCNC: 206 MG/DL
CO2 SERPL-SCNC: 29 MMOL/L (ref 21–32)
CREAT SERPL-MCNC: 1.25 MG/DL (ref 0.6–1.3)
ERYTHROCYTE [DISTWIDTH] IN BLOOD BY AUTOMATED COUNT: 13.4 % (ref 11.6–15.1)
GFR SERPL CREATININE-BSD FRML MDRD: 55 ML/MIN/1.73SQ M
GLUCOSE P FAST SERPL-MCNC: 110 MG/DL (ref 65–99)
HCT VFR BLD AUTO: 46.6 % (ref 36.5–49.3)
HDLC SERPL-MCNC: 51 MG/DL
HGB BLD-MCNC: 15.3 G/DL (ref 12–17)
LDLC SERPL CALC-MCNC: 131 MG/DL (ref 0–100)
MCH RBC QN AUTO: 29 PG (ref 26.8–34.3)
MCHC RBC AUTO-ENTMCNC: 32.8 G/DL (ref 31.4–37.4)
MCV RBC AUTO: 88 FL (ref 82–98)
NONHDLC SERPL-MCNC: 155 MG/DL
PLATELET # BLD AUTO: 176 THOUSANDS/UL (ref 149–390)
PMV BLD AUTO: 10.5 FL (ref 8.9–12.7)
POTASSIUM SERPL-SCNC: 4.2 MMOL/L (ref 3.5–5.3)
PROT SERPL-MCNC: 7.3 G/DL (ref 6.4–8.2)
RBC # BLD AUTO: 5.28 MILLION/UL (ref 3.88–5.62)
SL AMB POCT HEMOGLOBIN AIC: 6.6 (ref ?–6.5)
SODIUM SERPL-SCNC: 141 MMOL/L (ref 136–145)
TRIGL SERPL-MCNC: 119 MG/DL
WBC # BLD AUTO: 6.98 THOUSAND/UL (ref 4.31–10.16)

## 2021-12-16 PROCEDURE — 99214 OFFICE O/P EST MOD 30 MIN: CPT | Performed by: FAMILY MEDICINE

## 2021-12-16 PROCEDURE — 80061 LIPID PANEL: CPT | Performed by: FAMILY MEDICINE

## 2021-12-16 PROCEDURE — 80053 COMPREHEN METABOLIC PANEL: CPT | Performed by: FAMILY MEDICINE

## 2021-12-16 PROCEDURE — 36415 COLL VENOUS BLD VENIPUNCTURE: CPT | Performed by: FAMILY MEDICINE

## 2021-12-16 PROCEDURE — 85027 COMPLETE CBC AUTOMATED: CPT | Performed by: FAMILY MEDICINE

## 2021-12-16 PROCEDURE — 83036 HEMOGLOBIN GLYCOSYLATED A1C: CPT | Performed by: FAMILY MEDICINE

## 2021-12-16 RX ORDER — ROSUVASTATIN CALCIUM 5 MG/1
5 TABLET, COATED ORAL DAILY
Qty: 30 TABLET | Refills: 5 | Status: SHIPPED | OUTPATIENT
Start: 2021-12-16 | End: 2022-06-14

## 2021-12-20 ENCOUNTER — TELEPHONE (OUTPATIENT)
Dept: NEUROLOGY | Facility: CLINIC | Age: 76
End: 2021-12-20

## 2021-12-30 ENCOUNTER — TELEMEDICINE (OUTPATIENT)
Dept: NEUROLOGY | Facility: CLINIC | Age: 76
End: 2021-12-30
Payer: MEDICARE

## 2021-12-30 VITALS — WEIGHT: 222 LBS | HEIGHT: 71 IN | BODY MASS INDEX: 31.08 KG/M2

## 2021-12-30 DIAGNOSIS — E78.5 HYPERLIPIDEMIA, UNSPECIFIED HYPERLIPIDEMIA TYPE: ICD-10-CM

## 2021-12-30 DIAGNOSIS — I65.23 BILATERAL CAROTID ARTERY STENOSIS: ICD-10-CM

## 2021-12-30 DIAGNOSIS — E11.49 TYPE 2 DIABETES MELLITUS WITH OTHER NEUROLOGIC COMPLICATION, WITHOUT LONG-TERM CURRENT USE OF INSULIN (HCC): Chronic | ICD-10-CM

## 2021-12-30 DIAGNOSIS — I69.354 HEMIPLEGIA AND HEMIPARESIS FOLLOWING CEREBRAL INFARCTION AFFECTING LEFT NON-DOMINANT SIDE (HCC): ICD-10-CM

## 2021-12-30 DIAGNOSIS — Z86.73 HISTORY OF STROKE: Primary | ICD-10-CM

## 2021-12-30 DIAGNOSIS — I10 BENIGN ESSENTIAL HYPERTENSION: Chronic | ICD-10-CM

## 2021-12-30 PROCEDURE — 99213 OFFICE O/P EST LOW 20 MIN: CPT | Performed by: PSYCHIATRY & NEUROLOGY
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
## 2022-01-04 ENCOUNTER — TELEPHONE (OUTPATIENT)
Dept: NEUROLOGY | Facility: CLINIC | Age: 77
End: 2022-01-04

## 2022-01-04 NOTE — TELEPHONE ENCOUNTER
Spoke to patient wife Galo Brown to schedule a year follow up and she said she would not schedule at this time but will call office back when it gets closer to year to schedule follow up

## 2022-01-22 NOTE — PATIENT INSTRUCTIONS
Stroke:  Rochelle Sepulveda presents for a follow-up evaluation with regard to his prior stroke  He reports no new stroke-like symptoms  He is taking his medications as prescribed  He did not endorse any significant bleeding or bruising issues and no side effects on his current dose of rosuvastatin  He reports no significant challenges with regard to sleep, mood, or appetite  -for ongoing stroke prevention should continue his current combination of aspirin, rosuvastatin, and appropriate blood pressure and glycemic control  -he should continue to monitor his blood pressure occasionally away from the doctor's office and target numbers less than 130/80  -I would like him to remain physically active in as much as he feels capable of doing so  -we will defer to his primary care team for monitoring of his cholesterol panel and blood sugar numbers  We would recommend targeting an LDL cholesterol of less than 70  In as much as he is due for a cholesterol panel and comprehensive metabolic panel on his Crestor I will provide him with those prescriptions    -he will be due for a carotid Doppler ultrasound in approximately 6 months and I will order that for him today

## 2022-01-24 ENCOUNTER — TELEPHONE (OUTPATIENT)
Dept: NEUROLOGY | Facility: CLINIC | Age: 77
End: 2022-01-24

## 2022-01-24 NOTE — TELEPHONE ENCOUNTER
Called to schedule follow up from virtual appt on 12/30/21 w/ dr Webber Certain   Pt stated they do not want to schedule yet and will call in a fwew months

## 2022-02-03 ENCOUNTER — HOSPITAL ENCOUNTER (EMERGENCY)
Facility: HOSPITAL | Age: 77
Discharge: HOME | End: 2022-02-03
Attending: EMERGENCY MEDICINE | Admitting: EMERGENCY MEDICINE
Payer: MEDICARE

## 2022-02-03 VITALS
RESPIRATION RATE: 17 BRPM | OXYGEN SATURATION: 94 % | SYSTOLIC BLOOD PRESSURE: 136 MMHG | TEMPERATURE: 97.6 F | DIASTOLIC BLOOD PRESSURE: 73 MMHG | HEART RATE: 63 BPM

## 2022-02-03 DIAGNOSIS — M54.31 SCIATICA OF RIGHT SIDE: ICD-10-CM

## 2022-02-03 DIAGNOSIS — M79.18 RIGHT BUTTOCK PAIN: Primary | ICD-10-CM

## 2022-02-03 PROCEDURE — 99283 EMERGENCY DEPT VISIT LOW MDM: CPT

## 2022-02-03 PROCEDURE — 99284 EMERGENCY DEPT VISIT MOD MDM: CPT | Performed by: EMERGENCY MEDICINE

## 2022-02-03 RX ORDER — ACETAMINOPHEN 325 MG/1
650 TABLET ORAL ONCE
Status: COMPLETED | OUTPATIENT
Start: 2022-02-03 | End: 2022-02-03

## 2022-02-03 RX ORDER — OXYCODONE HYDROCHLORIDE 5 MG/1
2.5 TABLET ORAL ONCE
Status: COMPLETED | OUTPATIENT
Start: 2022-02-03 | End: 2022-02-03

## 2022-02-03 RX ORDER — LIDOCAINE 50 MG/G
1 PATCH TOPICAL ONCE
Status: DISCONTINUED | OUTPATIENT
Start: 2022-02-03 | End: 2022-02-03 | Stop reason: HOSPADM

## 2022-02-03 RX ORDER — OXYCODONE HYDROCHLORIDE 5 MG/1
5 CAPSULE ORAL EVERY 6 HOURS PRN
Qty: 10 CAPSULE | Refills: 0 | Status: SHIPPED | OUTPATIENT
Start: 2022-02-03 | End: 2022-03-17 | Stop reason: ALTCHOICE

## 2022-02-03 RX ADMIN — ACETAMINOPHEN 650 MG: 325 TABLET, FILM COATED ORAL at 12:52

## 2022-02-03 RX ADMIN — OXYCODONE HYDROCHLORIDE 2.5 MG: 5 TABLET ORAL at 12:52

## 2022-02-03 RX ADMIN — LIDOCAINE 5% 1 PATCH: 700 PATCH TOPICAL at 13:01

## 2022-02-03 NOTE — DISCHARGE INSTRUCTIONS
Diagnosis; right buttock pain --  likely right sided sciatica - pain from a lumbar herniated disc    -  activity as tolerated     - the  lidocaine  patch can remain on for  up to 12 hrs- can not get warm or w et- can use over the counter lidocaine patches to a sarah     - for pain- over the counter generic tylenol 500 mg every 4 hrs     - only as needed for severe pain- oxycodone 1 tablet on an as needed basis- note this is strong pain medication only take as needed  not every  4-6 hrs     - oxycodone taken regularly can cause constipation - might need an laxative like daily miralax     - please call  [facility] when you  get home to schedule an appointment- I placed a referral in the computer for you    - please return to  the er for any - worsening/ intractable right buttock/ leg pain - any inability to walk-  any new right leg/foot weakness/ loss of sensation/ any  inability to control your urine/stools/ any numbness of your saddle area or any new/ worsening/concerning symptoms to you
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
## 2022-03-07 DIAGNOSIS — I10 BENIGN ESSENTIAL HYPERTENSION: Chronic | ICD-10-CM

## 2022-03-07 RX ORDER — LISINOPRIL 10 MG/1
TABLET ORAL
Qty: 90 TABLET | Refills: 0 | Status: SHIPPED | OUTPATIENT
Start: 2022-03-07 | End: 2022-06-14

## 2022-03-15 DIAGNOSIS — E11.9 TYPE 2 DIABETES MELLITUS WITHOUT COMPLICATION (HCC): ICD-10-CM

## 2022-03-17 ENCOUNTER — OFFICE VISIT (OUTPATIENT)
Dept: FAMILY MEDICINE CLINIC | Facility: CLINIC | Age: 77
End: 2022-03-17
Payer: MEDICARE

## 2022-03-17 VITALS
HEIGHT: 71 IN | WEIGHT: 222 LBS | BODY MASS INDEX: 31.08 KG/M2 | SYSTOLIC BLOOD PRESSURE: 122 MMHG | DIASTOLIC BLOOD PRESSURE: 80 MMHG

## 2022-03-17 DIAGNOSIS — N18.31 STAGE 3A CHRONIC KIDNEY DISEASE (HCC): Primary | ICD-10-CM

## 2022-03-17 DIAGNOSIS — G89.29 CHRONIC BILATERAL LOW BACK PAIN WITHOUT SCIATICA: ICD-10-CM

## 2022-03-17 DIAGNOSIS — I69.354 HEMIPLEGIA AND HEMIPARESIS FOLLOWING CEREBRAL INFARCTION AFFECTING LEFT NON-DOMINANT SIDE (HCC): ICD-10-CM

## 2022-03-17 DIAGNOSIS — M54.50 CHRONIC BILATERAL LOW BACK PAIN WITHOUT SCIATICA: ICD-10-CM

## 2022-03-17 DIAGNOSIS — I70.0 ATHEROSCLEROSIS OF AORTA (HCC): ICD-10-CM

## 2022-03-17 DIAGNOSIS — I10 BENIGN ESSENTIAL HYPERTENSION: Chronic | ICD-10-CM

## 2022-03-17 DIAGNOSIS — E11.21 DIABETIC NEPHROPATHY ASSOCIATED WITH TYPE 2 DIABETES MELLITUS (HCC): ICD-10-CM

## 2022-03-17 PROCEDURE — 81002 URINALYSIS NONAUTO W/O SCOPE: CPT | Performed by: FAMILY MEDICINE

## 2022-03-17 PROCEDURE — 99214 OFFICE O/P EST MOD 30 MIN: CPT | Performed by: FAMILY MEDICINE

## 2022-03-17 RX ORDER — METHOCARBAMOL 750 MG/1
750 TABLET, FILM COATED ORAL EVERY 6 HOURS PRN
Qty: 40 TABLET | Refills: 0 | Status: SHIPPED | OUTPATIENT
Start: 2022-03-17

## 2022-03-17 NOTE — PROGRESS NOTES
Assessment/Plan:  Chronic bilateral low back pain without sciatica  Patient has recurrence of lumbago  It is more right-sided this time with radiation to his right groin  Genitalia are normal by examination  He has no evidence of radiculopathy of his right lower extremity  No tenderness of his trochanteric region  No specific SI tenderness  He does have known lumbar degenerative disease by x-ray  In the past Robaxin seemed to be effective when given to him by Orthopedic surgery  We are going to give him a prescription and asked him to follow-up in 7-10 days with a verbal report by phone as to the effectiveness of this intervention  If this is not helpful we are going to refer him back to physical therapy or potentially Orthopedic surgery  Should he develop any worrisome symptoms we discussed such as weakness, incontinence, constitutional symptoms will call for further evaluation  Again radiographic studies about 1 year ago showed no worrisome finding  History reveals no worrisome symptom  Additionally his urinalysis was negative today  No blood or other is concerning finding  Hemiplegia and hemiparesis following cerebral infarction affecting left non-dominant side ([address] )  He is compliant with his rosuvastatin  Lisinopril on aspirin as well  He continues to see improvement  Benign essential hypertension  Blood pressure well controlled  Continue lisinopril  Diabetic nephropathy associated with type 2 diabetes mellitus (HCC)  Continue metformin    Lab Results   Component Value Date    HGBA1C 6 6 (A) 12/16/2021          Diagnoses and all orders for this visit:    Stage 3a chronic kidney disease ([address] )    Hemiplegia and hemiparesis following cerebral infarction affecting left non-dominant side ([address] )    Diabetic nephropathy associated with type 2 diabetes mellitus ([address] )    Atherosclerosis of aorta (HCC)    Chronic bilateral low back pain without sciatica  -     methocarbamol (Robaxin-750) 750 mg tablet; Take 1 tablet (750 mg total) by mouth every 6 (six) hours as needed for muscle spasms    Benign essential hypertension          Subjective:   Chief Complaint   Patient presents with    Back Pain     Pain in low back and groin for about 1 month   Groin Pain        Patient ID: Kelly Gillette is a 68 y o  male  I had problems with my back in December  I went to the ER  I feel pain at night or when I lay on my R side  Radiates to R groin  No dysuria, hematuria,etc  Nocturia x 2  No slip or fall  Did fall last winter  Went to PT and it improved  Shoveling,  Leg feel weak equally but no incontinence  No constitutional sx  HPI  The patient is a 70-year-old male with history of multiple medical problems including 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, asthma, essential hypertension, vascular disease, history of CVA with hemiplegia, history of degenerative joint disease including history of bilateral low back pain felt to be related to degenerative spine disease  We reviewed that last winter he had bilateral lower back pain  He was sent to orthopedic surgery  X-rays revealed some lumbar degenerative disease  He had no worrisome symptoms such as incontinence or weakness  No constitutional symptoms  He was sent for physical therapy as well as given Robaxin and topicals which seemed to be helpful  Over the winter he developed recurrent low back pain  He went to the ER in February and was given some oxycodone which gave him some temporary relief but he states that he has had continued pain  He notes that it is low down in his back and radiates to his right groin especially at night when he is trying to sleep especially lying on right side  He has had no incontinence of bowel or bladder  No focal weakness of lower extremities although he does feel like they are weak in general   He feels that shoveling snow may have exacerbated it  He has had no slip or fall this year    He did have a fall last winter  Films occurred after this  He has had no constitutional symptoms  The following portions of the patient's history were reviewed and updated as appropriate: allergies, current medications, past family history, past medical history, past social history, past surgical history and problem list     ROS    Per the HPI  Objective:    Physical Exam  Vitals and nursing note reviewed  Constitutional:       Comments: Overweight and in no acute distress   Genitourinary:     Comments: Testicles are somewhat hypo trophic  No nodule, tenderness or other focality  No hernia noted  Musculoskeletal:      Right lower leg: No edema  Left lower leg: No edema  Comments: He indicates the area of the lower lumbar and SI region as area of his discomfort  He has no step-off or deformity  Lateral reach as well as rotation of his spine do not reproduce his symptoms presently  Logroll of his hip is negative  Palpation over his trochanteric bursa is negative as well  Skin:     Findings: No rash  Neurological:      Mental Status: He is alert and oriented to person, place, and time  Coordination: Coordination normal       Gait: Gait normal       Comments: He has somewhat diminished but equal DTRs in his lower extremity  He has no weakness of plantar flexion or dorsiflexion of his feet  He has negative straight leg raise bilaterally  Psychiatric:         Thought Content:  Thought content normal          Judgment: Judgment normal

## 2022-03-17 NOTE — ASSESSMENT & PLAN NOTE
He is compliant with his rosuvastatin  Lisinopril on aspirin as well  He continues to see improvement

## 2022-03-17 NOTE — ASSESSMENT & PLAN NOTE
Patient has recurrence of lumbago  It is more right-sided this time with radiation to his right groin  Genitalia are normal by examination  He has no evidence of radiculopathy of his right lower extremity  No tenderness of his trochanteric region  No specific SI tenderness  He does have known lumbar degenerative disease by x-ray  In the past Robaxin seemed to be effective when given to him by Orthopedic surgery  We are going to give him a prescription and asked him to follow-up in 7-10 days with a verbal report by phone as to the effectiveness of this intervention  If this is not helpful we are going to refer him back to physical therapy or potentially Orthopedic surgery  Should he develop any worrisome symptoms we discussed such as weakness, incontinence, constitutional symptoms will call for further evaluation  Again radiographic studies about 1 year ago showed no worrisome finding  History reveals no worrisome symptom  Additionally his urinalysis was negative today  No blood or other is concerning finding

## 2022-05-26 ENCOUNTER — OFFICE VISIT (OUTPATIENT)
Dept: OBGYN CLINIC | Facility: CLINIC | Age: 77
End: 2022-05-26
Payer: MEDICARE

## 2022-05-26 ENCOUNTER — APPOINTMENT (OUTPATIENT)
Dept: RADIOLOGY | Facility: AMBULARY SURGERY CENTER | Age: 77
End: 2022-05-26
Attending: ORTHOPAEDIC SURGERY
Payer: MEDICARE

## 2022-05-26 VITALS
DIASTOLIC BLOOD PRESSURE: 68 MMHG | SYSTOLIC BLOOD PRESSURE: 135 MMHG | HEIGHT: 71 IN | HEART RATE: 55 BPM | WEIGHT: 224 LBS | BODY MASS INDEX: 31.36 KG/M2

## 2022-05-26 DIAGNOSIS — M17.12 PRIMARY OSTEOARTHRITIS OF LEFT KNEE: Primary | ICD-10-CM

## 2022-05-26 DIAGNOSIS — M17.12 PRIMARY OSTEOARTHRITIS OF LEFT KNEE: ICD-10-CM

## 2022-05-26 PROCEDURE — 20610 DRAIN/INJ JOINT/BURSA W/O US: CPT | Performed by: ORTHOPAEDIC SURGERY

## 2022-05-26 PROCEDURE — 73562 X-RAY EXAM OF KNEE 3: CPT

## 2022-05-26 PROCEDURE — 99212 OFFICE O/P EST SF 10 MIN: CPT | Performed by: ORTHOPAEDIC SURGERY

## 2022-05-26 RX ORDER — BETAMETHASONE SODIUM PHOSPHATE AND BETAMETHASONE ACETATE 3; 3 MG/ML; MG/ML
12 INJECTION, SUSPENSION INTRA-ARTICULAR; INTRALESIONAL; INTRAMUSCULAR; SOFT TISSUE
Status: COMPLETED | OUTPATIENT
Start: 2022-05-26 | End: 2022-05-26

## 2022-05-26 RX ORDER — BUPIVACAINE HYDROCHLORIDE 2.5 MG/ML
1 INJECTION, SOLUTION INFILTRATION; PERINEURAL
Status: COMPLETED | OUTPATIENT
Start: 2022-05-26 | End: 2022-05-26

## 2022-05-26 RX ORDER — LIDOCAINE HYDROCHLORIDE 10 MG/ML
1 INJECTION, SOLUTION INFILTRATION; PERINEURAL
Status: COMPLETED | OUTPATIENT
Start: 2022-05-26 | End: 2022-05-26

## 2022-05-26 RX ADMIN — BETAMETHASONE SODIUM PHOSPHATE AND BETAMETHASONE ACETATE 12 MG: 3; 3 INJECTION, SUSPENSION INTRA-ARTICULAR; INTRALESIONAL; INTRAMUSCULAR; SOFT TISSUE at 11:15

## 2022-05-26 RX ADMIN — LIDOCAINE HYDROCHLORIDE 1 ML: 10 INJECTION, SOLUTION INFILTRATION; PERINEURAL at 11:15

## 2022-05-26 RX ADMIN — BUPIVACAINE HYDROCHLORIDE 1 ML: 2.5 INJECTION, SOLUTION INFILTRATION; PERINEURAL at 11:15
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
## 2022-06-14 DIAGNOSIS — I10 BENIGN ESSENTIAL HYPERTENSION: Chronic | ICD-10-CM

## 2022-06-14 DIAGNOSIS — E11.9 TYPE 2 DIABETES MELLITUS WITHOUT COMPLICATION (HCC): ICD-10-CM

## 2022-06-14 DIAGNOSIS — E11.49 TYPE 2 DIABETES MELLITUS WITH OTHER NEUROLOGIC COMPLICATION, WITHOUT LONG-TERM CURRENT USE OF INSULIN (HCC): ICD-10-CM

## 2022-06-14 RX ORDER — ROSUVASTATIN CALCIUM 5 MG/1
TABLET, COATED ORAL
Qty: 30 TABLET | Refills: 5 | Status: SHIPPED | OUTPATIENT
Start: 2022-06-14

## 2022-06-14 RX ORDER — LISINOPRIL 10 MG/1
TABLET ORAL
Qty: 90 TABLET | Refills: 0 | Status: SHIPPED | OUTPATIENT
Start: 2022-06-14

## 2022-06-16 ENCOUNTER — OFFICE VISIT (OUTPATIENT)
Dept: FAMILY MEDICINE CLINIC | Facility: CLINIC | Age: 77
End: 2022-06-16
Payer: MEDICARE

## 2022-06-16 VITALS
DIASTOLIC BLOOD PRESSURE: 80 MMHG | WEIGHT: 221 LBS | HEIGHT: 71 IN | SYSTOLIC BLOOD PRESSURE: 136 MMHG | BODY MASS INDEX: 30.94 KG/M2

## 2022-06-16 DIAGNOSIS — I65.23 BILATERAL CAROTID ARTERY STENOSIS: ICD-10-CM

## 2022-06-16 DIAGNOSIS — N18.31 STAGE 3A CHRONIC KIDNEY DISEASE (HCC): ICD-10-CM

## 2022-06-16 DIAGNOSIS — E78.5 HYPERLIPIDEMIA, UNSPECIFIED HYPERLIPIDEMIA TYPE: ICD-10-CM

## 2022-06-16 DIAGNOSIS — I10 BENIGN ESSENTIAL HYPERTENSION: ICD-10-CM

## 2022-06-16 DIAGNOSIS — E11.49 TYPE 2 DIABETES MELLITUS WITH OTHER NEUROLOGIC COMPLICATION, WITHOUT LONG-TERM CURRENT USE OF INSULIN (HCC): Primary | ICD-10-CM

## 2022-06-16 DIAGNOSIS — R35.1 BENIGN PROSTATIC HYPERPLASIA WITH NOCTURIA: ICD-10-CM

## 2022-06-16 DIAGNOSIS — Z00.00 MEDICARE ANNUAL WELLNESS VISIT, SUBSEQUENT: ICD-10-CM

## 2022-06-16 DIAGNOSIS — E11.3299 MILD NONPROLIFERATIVE DIABETIC RETINOPATHY ASSOCIATED WITH TYPE 2 DIABETES MELLITUS, MACULAR EDEMA PRESENCE UNSPECIFIED, UNSPECIFIED LATERALITY (HCC): ICD-10-CM

## 2022-06-16 DIAGNOSIS — I69.354 HEMIPLEGIA AND HEMIPARESIS FOLLOWING CEREBRAL INFARCTION AFFECTING LEFT NON-DOMINANT SIDE (HCC): ICD-10-CM

## 2022-06-16 DIAGNOSIS — N40.1 BENIGN PROSTATIC HYPERPLASIA WITH NOCTURIA: ICD-10-CM

## 2022-06-16 DIAGNOSIS — E11.21 DIABETIC NEPHROPATHY ASSOCIATED WITH TYPE 2 DIABETES MELLITUS (HCC): ICD-10-CM

## 2022-06-16 LAB — SL AMB POCT HEMOGLOBIN AIC: 6.9 (ref ?–6.5)

## 2022-06-16 PROCEDURE — 83036 HEMOGLOBIN GLYCOSYLATED A1C: CPT | Performed by: FAMILY MEDICINE

## 2022-06-16 PROCEDURE — G0439 PPPS, SUBSEQ VISIT: HCPCS | Performed by: FAMILY MEDICINE

## 2022-06-16 PROCEDURE — 36415 COLL VENOUS BLD VENIPUNCTURE: CPT | Performed by: FAMILY MEDICINE

## 2022-06-16 PROCEDURE — 99214 OFFICE O/P EST MOD 30 MIN: CPT | Performed by: FAMILY MEDICINE

## 2022-06-16 NOTE — ASSESSMENT & PLAN NOTE
The patient's hemoglobin A1c is at goal today at 6 9  It has risen somewhat and we encouraged him strongly to try to improve his diet and exercise regimen to see if he can reduce this value  He agrees with this plan  We recommended that he follow-up with his next provider in the next 6 months due to our jail    Lab Results   Component Value Date    HGBA1C 6 9 (A) 06/16/2022

## 2022-06-16 NOTE — ASSESSMENT & PLAN NOTE
We are going to refer him to Urology  Tamsulosin did not give him any relief from his symptoms previously    He agrees with this plan

## 2022-06-16 NOTE — ASSESSMENT & PLAN NOTE
Blood pressure remains acceptably controlled continue on lisinopril especially in light of his diagnosis of diabetic nephropathy
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
## 2022-06-16 NOTE — PATIENT INSTRUCTIONS

## 2022-06-16 NOTE — ASSESSMENT & PLAN NOTE
The importance of maintenance of excellent blood sugar control was discussed with the patient today  See note under chronic kidney disease    Lab Results   Component Value Date    HGBA1C 6 9 (A) 06/16/2022

## 2022-06-16 NOTE — ASSESSMENT & PLAN NOTE
Lab Results   Component Value Date    EGFR 55 12/16/2021    EGFR 68 06/10/2021    EGFR 59 10/15/2019    CREATININE 1 25 12/16/2021    CREATININE 1 06 06/10/2021    CREATININE 1 14 12/10/2020   We discussed this diagnosis with him today  His EGFR most recently was 54  Will repeat a CMP today  We discussed this diagnosis today  We discussed the importance of regular exercise, improvement in diet exercise for weight loss as well as compliance with statin and lisinopril  I told him that I did not believe this would interfere with his desire for total knee replacement in the future as long as he did not see significant worsening  He understands the importance of the discussion in this regard today

## 2022-06-16 NOTE — ASSESSMENT & PLAN NOTE
He did have noninvasive studies which did not reveal any evidence of signature vascular disease in lower extremities

## 2022-06-16 NOTE — ASSESSMENT & PLAN NOTE
He had his surveillance examination which was reviewed by Neurology, Dr Herrera Trujillo  He recommended repeat examination in the future as well as continuation of aspirin and statin as well as treatment of blood pressure  The patient will continue to follow up with him as scheduled

## 2022-06-16 NOTE — ASSESSMENT & PLAN NOTE
The patient is 77-year-old male who presents today for Medicare subsequent wellness visit  All components of the visit were addressed with the patient  Gaps in care include need for current diabetic eye exam which we discussed  He is due for his COVID-19 2nd booster which we recommended  Anticipatory guidance was provided  All questions were answered

## 2022-06-17 LAB
ALBUMIN SERPL-MCNC: 4.1 G/DL (ref 3.6–5.1)
ALBUMIN/GLOB SERPL: 1.6 (CALC) (ref 1–2.5)
ALP SERPL-CCNC: 55 U/L (ref 35–144)
ALT SERPL-CCNC: 12 U/L (ref 9–46)
AST SERPL-CCNC: 12 U/L (ref 10–35)
BILIRUB SERPL-MCNC: 0.9 MG/DL (ref 0.2–1.2)
BUN SERPL-MCNC: 22 MG/DL (ref 7–25)
BUN/CREAT SERPL: 17 (CALC) (ref 6–22)
CALCIUM SERPL-MCNC: 10 MG/DL (ref 8.6–10.3)
CHLORIDE SERPL-SCNC: 106 MMOL/L (ref 98–110)
CHOLEST SERPL-MCNC: 135 MG/DL
CHOLEST/HDLC SERPL: 2.8 (CALC)
CO2 SERPL-SCNC: 30 MMOL/L (ref 20–32)
CREAT SERPL-MCNC: 1.29 MG/DL (ref 0.7–1.18)
ERYTHROCYTE [DISTWIDTH] IN BLOOD BY AUTOMATED COUNT: 13 % (ref 11–15)
GLOBULIN SER CALC-MCNC: 2.6 G/DL (CALC) (ref 1.9–3.7)
GLUCOSE SERPL-MCNC: 128 MG/DL (ref 65–99)
HCT VFR BLD AUTO: 44.9 % (ref 38.5–50)
HDLC SERPL-MCNC: 49 MG/DL
HGB BLD-MCNC: 14.9 G/DL (ref 13.2–17.1)
LDLC SERPL CALC-MCNC: 68 MG/DL (CALC)
MCH RBC QN AUTO: 28.9 PG (ref 27–33)
MCHC RBC AUTO-ENTMCNC: 33.2 G/DL (ref 32–36)
MCV RBC AUTO: 87.2 FL (ref 80–100)
NONHDLC SERPL-MCNC: 86 MG/DL (CALC)
PLATELET # BLD AUTO: 162 THOUSAND/UL (ref 140–400)
PMV BLD REES-ECKER: 10.7 FL (ref 7.5–12.5)
POTASSIUM SERPL-SCNC: 4.5 MMOL/L (ref 3.5–5.3)
PROT SERPL-MCNC: 6.7 G/DL (ref 6.1–8.1)
RBC # BLD AUTO: 5.15 MILLION/UL (ref 4.2–5.8)
SL AMB EGFR AFRICAN AMERICAN: 62 ML/MIN/1.73M2
SL AMB EGFR NON AFRICAN AMERICAN: 53 ML/MIN/1.73M2
SODIUM SERPL-SCNC: 141 MMOL/L (ref 135–146)
TRIGL SERPL-MCNC: 94 MG/DL
WBC # BLD AUTO: 7.9 THOUSAND/UL (ref 3.8–10.8)

## 2022-06-22 ENCOUNTER — PROCEDURE VISIT (OUTPATIENT)
Dept: OBGYN CLINIC | Facility: CLINIC | Age: 77
End: 2022-06-22
Payer: MEDICARE

## 2022-06-22 VITALS — WEIGHT: 221 LBS | BODY MASS INDEX: 30.94 KG/M2 | HEIGHT: 71 IN

## 2022-06-22 DIAGNOSIS — M17.12 PRIMARY OSTEOARTHRITIS OF LEFT KNEE: Primary | ICD-10-CM

## 2022-06-22 PROCEDURE — 20610 DRAIN/INJ JOINT/BURSA W/O US: CPT | Performed by: ORTHOPAEDIC SURGERY

## 2022-06-22 RX ORDER — HYALURONATE SODIUM 10 MG/ML
20 SYRINGE (ML) INTRAARTICULAR
Status: COMPLETED | OUTPATIENT
Start: 2022-06-22 | End: 2022-06-22

## 2022-06-22 RX ADMIN — Medication 20 MG: at 13:14

## 2022-06-22 NOTE — PROGRESS NOTES
1  Primary osteoarthritis of left knee       Patient is here for his first injection of Euflexxa into the left knee  Patient reports pain left knee  All organ systems normal  Physical exam of the knee shows no effusion no ecchymosis  Large joint arthrocentesis: L knee  Universal Protocol:  Consent given by: patient  Time out: Immediately prior to procedure a "time out" was called to verify the correct patient, procedure, equipment, support staff and site/side marked as required  Supporting Documentation  Indications: pain   Procedure Details  Location: knee - L knee  Needle size: 22 G  Ultrasound guidance: no  Approach: anterolateral  Medications administered: 20 mg Sodium Hyaluronate 20 MG/2ML    Patient tolerance: patient tolerated the procedure well with no immediate complications          Patient tolerated procedure follow up one week

## 2022-06-29 ENCOUNTER — PROCEDURE VISIT (OUTPATIENT)
Dept: OBGYN CLINIC | Facility: CLINIC | Age: 77
End: 2022-06-29
Payer: MEDICARE

## 2022-06-29 VITALS — HEIGHT: 71 IN | BODY MASS INDEX: 30.94 KG/M2 | WEIGHT: 221 LBS

## 2022-06-29 DIAGNOSIS — M17.12 PRIMARY OSTEOARTHRITIS OF LEFT KNEE: Primary | ICD-10-CM

## 2022-06-29 PROCEDURE — 20610 DRAIN/INJ JOINT/BURSA W/O US: CPT | Performed by: PHYSICIAN ASSISTANT

## 2022-06-29 RX ORDER — HYALURONATE SODIUM 10 MG/ML
20 SYRINGE (ML) INTRAARTICULAR
Status: COMPLETED | OUTPATIENT
Start: 2022-06-29 | End: 2022-06-29

## 2022-06-29 RX ADMIN — Medication 20 MG: at 15:18

## 2022-06-29 NOTE — PROGRESS NOTES
1  Primary osteoarthritis of left knee  Large joint arthrocentesis     Patient is here for his 2nd injection of Euflexxa into the left knee  Patient reports no significant changes in the left knee a  He does report falling out of bed this morning  Has some soreness in his hip as well as some neck pain  He is able to ambulate without pain  Willy Terrell Physical exam of the knee shows no effusion no ecchymosis  All other organ systems normal    Large joint arthrocentesis: L knee  Universal Protocol:  Consent given by: patient  Time out: Immediately prior to procedure a "time out" was called to verify the correct patient, procedure, equipment, support staff and site/side marked as required    Site marked: the operative site was marked  Supporting Documentation  Indications: pain   Procedure Details  Location: knee - L knee  Preparation: Patient was prepped and draped in the usual sterile fashion  Needle size: 22 G  Ultrasound guidance: no  Approach: anterolateral  Medications administered: 20 mg Sodium Hyaluronate 20 MG/2ML    Patient tolerance: patient tolerated the procedure well with no immediate complications  Dressing:  Sterile dressing applied                  Patient tolerated procedure follow up 1 week  Will message his PCP to see if they are okay with us using an NSAID short term for his pain  Advised patient if he has worsening hip or neck pain to proceed to the emergency department

## 2022-07-01 ENCOUNTER — TELEPHONE (OUTPATIENT)
Dept: OBGYN CLINIC | Facility: CLINIC | Age: 77
End: 2022-07-01

## 2022-07-01 DIAGNOSIS — M17.12 PRIMARY OSTEOARTHRITIS OF LEFT KNEE: Primary | ICD-10-CM

## 2022-07-01 RX ORDER — MELOXICAM 7.5 MG/1
7.5 TABLET ORAL DAILY PRN
Qty: 30 TABLET | Refills: 0 | Status: SHIPPED | OUTPATIENT
Start: 2022-07-01 | End: 2022-09-01

## 2022-07-01 NOTE — TELEPHONE ENCOUNTER
----- Message from Myrna Kelly MD sent at 6/30/2022  8:38 AM EDT -----  Regarding: RE: NSAID use  Yes, short-term only please  ----- Message -----  From: Ruslan Blair PA-C  Sent: 6/29/2022   1:23 PM EDT  To: Myrna Kelly MD  Subject: NSAID use                                        Hi Dr Suki Woody,  Would you be ok with us using an NSAID short term for this patient given his CKD 3a? Please get back to me when you cna    Thanks,  Brandy Rodriguez

## 2022-07-01 NOTE — TELEPHONE ENCOUNTER
Please let patient know that we spoke to the family doctor and he was ok with a short course of an NSAID we will do a monthly supply only, he only should take it if needed

## 2022-07-01 NOTE — TELEPHONE ENCOUNTER
Called home number, it was contanstly busy, and called cell number  Lvm with message and instructions

## 2022-07-06 ENCOUNTER — PROCEDURE VISIT (OUTPATIENT)
Dept: OBGYN CLINIC | Facility: CLINIC | Age: 77
End: 2022-07-06
Payer: MEDICARE

## 2022-07-06 VITALS — WEIGHT: 221 LBS | BODY MASS INDEX: 30.94 KG/M2 | HEIGHT: 71 IN

## 2022-07-06 DIAGNOSIS — M17.12 PRIMARY OSTEOARTHRITIS OF LEFT KNEE: Primary | ICD-10-CM

## 2022-07-06 PROCEDURE — 20610 DRAIN/INJ JOINT/BURSA W/O US: CPT | Performed by: ORTHOPAEDIC SURGERY

## 2022-07-06 RX ORDER — HYALURONATE SODIUM 10 MG/ML
20 SYRINGE (ML) INTRAARTICULAR
Status: COMPLETED | OUTPATIENT
Start: 2022-07-06 | End: 2022-07-06

## 2022-07-06 RX ADMIN — Medication 20 MG: at 13:19

## 2022-07-06 NOTE — PROGRESS NOTES
1  Primary osteoarthritis of left knee       Patient is here for his 3rd injection of Euflexxa into the left knee  Patient reports improvement  All organ systems normal  Physical exam of the knee shows no effusion no ecchymosis  Large joint arthrocentesis: L knee  Universal Protocol:  Consent: Verbal consent obtained    Risks and benefits: risks, benefits and alternatives were discussed  Consent given by: patient  Timeout called at: 7/6/2022 1:18 PM   Patient understanding: patient states understanding of the procedure being performed  Patient identity confirmed: verbally with patient    Supporting Documentation  Indications: pain and diagnostic evaluation   Procedure Details  Location: knee - L knee  Preparation: Patient was prepped and draped in the usual sterile fashion  Needle size: 22 G  Ultrasound guidance: no  Medications administered: 20 mg Sodium Hyaluronate 20 MG/2ML    Patient tolerance: patient tolerated the procedure well with no immediate complications  Dressing:  Sterile dressing applied          Patient tolerated procedure follow up PRN
no

## 2022-08-01 LAB
LEFT EYE DIABETIC RETINOPATHY: POSITIVE
RIGHT EYE DIABETIC RETINOPATHY: POSITIVE
SEVERITY (EYE EXAM): NORMAL

## 2022-08-15 ENCOUNTER — APPOINTMENT (EMERGENCY)
Dept: RADIOLOGY | Facility: HOSPITAL | Age: 77
DRG: 243 | End: 2022-08-15
Payer: MEDICARE

## 2022-08-15 ENCOUNTER — HOSPITAL ENCOUNTER (INPATIENT)
Facility: HOSPITAL | Age: 77
LOS: 9 days | DRG: 243 | End: 2022-08-24
Attending: EMERGENCY MEDICINE | Admitting: SURGERY
Payer: MEDICARE

## 2022-08-15 DIAGNOSIS — S22.009A THORACIC SPINE FRACTURE (HCC): ICD-10-CM

## 2022-08-15 DIAGNOSIS — S22.49XA RIB FRACTURES: ICD-10-CM

## 2022-08-15 DIAGNOSIS — S22.089A CLOSED FRACTURE OF TWELFTH THORACIC VERTEBRA, UNSPECIFIED FRACTURE MORPHOLOGY, INITIAL ENCOUNTER (HCC): ICD-10-CM
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
## 2022-08-15 DIAGNOSIS — S32.009A LUMBAR VERTEBRAL FRACTURE (HCC): ICD-10-CM

## 2022-08-15 DIAGNOSIS — R00.1 BRADYCARDIA: ICD-10-CM

## 2022-08-15 LAB
ABO GROUP BLD: NORMAL
BLD GP AB SCN SERPL QL: NEGATIVE
GLUCOSE SERPL-MCNC: 131 MG/DL (ref 65–140)
GLUCOSE SERPL-MCNC: 91 MG/DL (ref 65–140)
PLATELET # BLD AUTO: 146 THOUSANDS/UL (ref 149–390)
PMV BLD AUTO: 10.1 FL (ref 8.9–12.7)
RH BLD: POSITIVE
SPECIMEN EXPIRATION DATE: NORMAL

## 2022-08-15 PROCEDURE — 99285 EMERGENCY DEPT VISIT HI MDM: CPT | Performed by: EMERGENCY MEDICINE

## 2022-08-15 PROCEDURE — 36415 COLL VENOUS BLD VENIPUNCTURE: CPT | Performed by: STUDENT IN AN ORGANIZED HEALTH CARE EDUCATION/TRAINING PROGRAM

## 2022-08-15 PROCEDURE — 74176 CT ABD & PELVIS W/O CONTRAST: CPT

## 2022-08-15 PROCEDURE — 86901 BLOOD TYPING SEROLOGIC RH(D): CPT | Performed by: STUDENT IN AN ORGANIZED HEALTH CARE EDUCATION/TRAINING PROGRAM

## 2022-08-15 PROCEDURE — 99285 EMERGENCY DEPT VISIT HI MDM: CPT

## 2022-08-15 PROCEDURE — 72125 CT NECK SPINE W/O DYE: CPT

## 2022-08-15 PROCEDURE — 96375 TX/PRO/DX INJ NEW DRUG ADDON: CPT

## 2022-08-15 PROCEDURE — 99222 1ST HOSP IP/OBS MODERATE 55: CPT | Performed by: SURGERY

## 2022-08-15 PROCEDURE — 82948 REAGENT STRIP/BLOOD GLUCOSE: CPT

## 2022-08-15 PROCEDURE — 86850 RBC ANTIBODY SCREEN: CPT | Performed by: STUDENT IN AN ORGANIZED HEALTH CARE EDUCATION/TRAINING PROGRAM

## 2022-08-15 PROCEDURE — 73030 X-RAY EXAM OF SHOULDER: CPT

## 2022-08-15 PROCEDURE — 96374 THER/PROPH/DIAG INJ IV PUSH: CPT

## 2022-08-15 PROCEDURE — G1004 CDSM NDSC: HCPCS

## 2022-08-15 PROCEDURE — 71250 CT THORAX DX C-: CPT

## 2022-08-15 PROCEDURE — 85049 AUTOMATED PLATELET COUNT: CPT | Performed by: STUDENT IN AN ORGANIZED HEALTH CARE EDUCATION/TRAINING PROGRAM

## 2022-08-15 PROCEDURE — 86900 BLOOD TYPING SEROLOGIC ABO: CPT | Performed by: STUDENT IN AN ORGANIZED HEALTH CARE EDUCATION/TRAINING PROGRAM

## 2022-08-15 PROCEDURE — 93005 ELECTROCARDIOGRAM TRACING: CPT

## 2022-08-15 RX ORDER — ACETAMINOPHEN 325 MG/1
975 TABLET ORAL ONCE
Status: COMPLETED | OUTPATIENT
Start: 2022-08-15 | End: 2022-08-15

## 2022-08-15 RX ORDER — GABAPENTIN 100 MG/1
100 CAPSULE ORAL
Status: DISCONTINUED | OUTPATIENT
Start: 2022-08-15 | End: 2022-08-22

## 2022-08-15 RX ORDER — ACETAMINOPHEN 325 MG/1
975 TABLET ORAL EVERY 8 HOURS SCHEDULED
Status: DISCONTINUED | OUTPATIENT
Start: 2022-08-15 | End: 2022-08-24 | Stop reason: HOSPADM

## 2022-08-15 RX ORDER — HYDROMORPHONE HCL IN WATER/PF 6 MG/30 ML
0.2 PATIENT CONTROLLED ANALGESIA SYRINGE INTRAVENOUS EVERY 4 HOURS PRN
Status: DISCONTINUED | OUTPATIENT
Start: 2022-08-15 | End: 2022-08-16

## 2022-08-15 RX ORDER — LIDOCAINE 50 MG/G
1 PATCH TOPICAL ONCE
Status: COMPLETED | OUTPATIENT
Start: 2022-08-15 | End: 2022-08-16

## 2022-08-15 RX ORDER — ASPIRIN 81 MG/1
81 TABLET, CHEWABLE ORAL DAILY
Status: DISCONTINUED | OUTPATIENT
Start: 2022-08-16 | End: 2022-08-21

## 2022-08-15 RX ORDER — LIDOCAINE 50 MG/G
1 PATCH TOPICAL DAILY
Status: DISCONTINUED | OUTPATIENT
Start: 2022-08-16 | End: 2022-08-24 | Stop reason: HOSPADM

## 2022-08-15 RX ORDER — ENOXAPARIN SODIUM 100 MG/ML
30 INJECTION SUBCUTANEOUS EVERY 12 HOURS
Status: DISCONTINUED | OUTPATIENT
Start: 2022-08-15 | End: 2022-08-17

## 2022-08-15 RX ORDER — LISINOPRIL 10 MG/1
10 TABLET ORAL DAILY
Status: DISCONTINUED | OUTPATIENT
Start: 2022-08-16 | End: 2022-08-18

## 2022-08-15 RX ORDER — INSULIN LISPRO 100 [IU]/ML
1-6 INJECTION, SOLUTION INTRAVENOUS; SUBCUTANEOUS
Status: DISCONTINUED | OUTPATIENT
Start: 2022-08-15 | End: 2022-08-19

## 2022-08-15 RX ORDER — KETOROLAC TROMETHAMINE 30 MG/ML
15 INJECTION, SOLUTION INTRAMUSCULAR; INTRAVENOUS ONCE
Status: COMPLETED | OUTPATIENT
Start: 2022-08-15 | End: 2022-08-15

## 2022-08-15 RX ORDER — OXYCODONE HYDROCHLORIDE 5 MG/1
5 TABLET ORAL EVERY 4 HOURS PRN
Status: DISCONTINUED | OUTPATIENT
Start: 2022-08-15 | End: 2022-08-16

## 2022-08-15 RX ORDER — HYDROMORPHONE HCL/PF 1 MG/ML
0.4 SYRINGE (ML) INJECTION ONCE
Status: COMPLETED | OUTPATIENT
Start: 2022-08-15 | End: 2022-08-15

## 2022-08-15 RX ORDER — OXYCODONE HYDROCHLORIDE 5 MG/1
2.5 TABLET ORAL EVERY 4 HOURS PRN
Status: DISCONTINUED | OUTPATIENT
Start: 2022-08-15 | End: 2022-08-16

## 2022-08-15 RX ORDER — PRAVASTATIN SODIUM 40 MG
40 TABLET ORAL
Refills: 5 | Status: DISCONTINUED | OUTPATIENT
Start: 2022-08-15 | End: 2022-08-24 | Stop reason: HOSPADM

## 2022-08-15 RX ORDER — METHOCARBAMOL 750 MG/1
750 TABLET, FILM COATED ORAL EVERY 6 HOURS PRN
Status: DISCONTINUED | OUTPATIENT
Start: 2022-08-15 | End: 2022-08-18

## 2022-08-15 RX ADMIN — ACETAMINOPHEN 975 MG: 325 TABLET ORAL at 15:03

## 2022-08-15 RX ADMIN — GABAPENTIN 100 MG: 100 CAPSULE ORAL at 22:01

## 2022-08-15 RX ADMIN — HYDROMORPHONE HYDROCHLORIDE 0.2 MG: 0.2 INJECTION, SOLUTION INTRAMUSCULAR; INTRAVENOUS; SUBCUTANEOUS at 22:07

## 2022-08-15 RX ADMIN — LIDOCAINE 5% 1 PATCH: 700 PATCH TOPICAL at 15:04

## 2022-08-15 RX ADMIN — ENOXAPARIN SODIUM 30 MG: 30 INJECTION SUBCUTANEOUS at 18:21

## 2022-08-15 RX ADMIN — ACETAMINOPHEN 975 MG: 325 TABLET ORAL at 18:20

## 2022-08-15 RX ADMIN — OXYCODONE HYDROCHLORIDE 5 MG: 5 TABLET ORAL at 20:09

## 2022-08-15 RX ADMIN — PRAVASTATIN SODIUM 40 MG: 40 TABLET ORAL at 18:55

## 2022-08-15 RX ADMIN — KETOROLAC TROMETHAMINE 15 MG: 30 INJECTION, SOLUTION INTRAMUSCULAR; INTRAVENOUS at 13:51

## 2022-08-15 RX ADMIN — HYDROMORPHONE HYDROCHLORIDE 0.4 MG: 1 INJECTION, SOLUTION INTRAMUSCULAR; INTRAVENOUS; SUBCUTANEOUS at 17:30

## 2022-08-15 NOTE — Clinical Note
The Alcides Tijerina device was inserted  The leads were placed into the connector and visually verified to be in correct position

## 2022-08-15 NOTE — ASSESSMENT & PLAN NOTE
Lab Results   Component Value Date    HGBA1C 6 9 (A) 06/16/2022       No results for input(s): POCGLU in the last 72 hours      Blood Sugar Average: Last 72 hrs:

## 2022-08-15 NOTE — ASSESSMENT & PLAN NOTE
displaced fracture right posterior 11th rib and nondisplaced fracture right posterior 10th rib    Rib fracture protocol
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
## 2022-08-15 NOTE — Clinical Note
The ECG shows a sinus bradycardia, a bundle branch block and an A-V block   Severe 1st degree and intermittent PERI under anesthesia 98
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
## 2022-08-16 ENCOUNTER — APPOINTMENT (INPATIENT)
Dept: RADIOLOGY | Facility: HOSPITAL | Age: 77
DRG: 243 | End: 2022-08-16
Payer: MEDICARE

## 2022-08-16 ENCOUNTER — APPOINTMENT (OUTPATIENT)
Dept: RADIOLOGY | Facility: HOSPITAL | Age: 77
DRG: 243 | End: 2022-08-16
Payer: MEDICARE

## 2022-08-16 PROBLEM — S32.009A: Status: ACTIVE | Noted: 2022-08-16
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
## 2022-08-16 LAB
APTT PPP: 25 SECONDS (ref 23–37)
ATRIAL RATE: 49 BPM
BASOPHILS # BLD AUTO: 0.03 THOUSANDS/ΜL (ref 0–0.1)
BASOPHILS NFR BLD AUTO: 0 % (ref 0–1)
EOSINOPHIL # BLD AUTO: 0.1 THOUSAND/ΜL (ref 0–0.61)
EOSINOPHIL NFR BLD AUTO: 1 % (ref 0–6)
ERYTHROCYTE [DISTWIDTH] IN BLOOD BY AUTOMATED COUNT: 13.6 % (ref 11.6–15.1)
GLUCOSE SERPL-MCNC: 104 MG/DL (ref 65–140)
GLUCOSE SERPL-MCNC: 150 MG/DL (ref 65–140)
GLUCOSE SERPL-MCNC: 151 MG/DL (ref 65–140)
GLUCOSE SERPL-MCNC: 158 MG/DL (ref 65–140)
HCT VFR BLD AUTO: 41.9 % (ref 36.5–49.3)
HGB BLD-MCNC: 13.5 G/DL (ref 12–17)
IMM GRANULOCYTES # BLD AUTO: 0.03 THOUSAND/UL (ref 0–0.2)
IMM GRANULOCYTES NFR BLD AUTO: 0 % (ref 0–2)
INR PPP: 1.01 (ref 0.84–1.19)
LYMPHOCYTES # BLD AUTO: 2.49 THOUSANDS/ΜL (ref 0.6–4.47)
LYMPHOCYTES NFR BLD AUTO: 31 % (ref 14–44)
MCH RBC QN AUTO: 29.1 PG (ref 26.8–34.3)
MCHC RBC AUTO-ENTMCNC: 32.2 G/DL (ref 31.4–37.4)
MCV RBC AUTO: 90 FL (ref 82–98)
MONOCYTES # BLD AUTO: 0.64 THOUSAND/ΜL (ref 0.17–1.22)
MONOCYTES NFR BLD AUTO: 8 % (ref 4–12)
NEUTROPHILS # BLD AUTO: 4.78 THOUSANDS/ΜL (ref 1.85–7.62)
NEUTS SEG NFR BLD AUTO: 60 % (ref 43–75)
NRBC BLD AUTO-RTO: 0 /100 WBCS
P AXIS: 41 DEGREES
PLATELET # BLD AUTO: 141 THOUSANDS/UL (ref 149–390)
PMV BLD AUTO: 10.7 FL (ref 8.9–12.7)
PR INTERVAL: 464 MS
PROTHROMBIN TIME: 13.5 SECONDS (ref 11.6–14.5)
QRS AXIS: 78 DEGREES
QRSD INTERVAL: 136 MS
QT INTERVAL: 480 MS
QTC INTERVAL: 433 MS
RBC # BLD AUTO: 4.64 MILLION/UL (ref 3.88–5.62)
T WAVE AXIS: 45 DEGREES
VENTRICULAR RATE: 49 BPM
WBC # BLD AUTO: 8.07 THOUSAND/UL (ref 4.31–10.16)

## 2022-08-16 PROCEDURE — 93010 ELECTROCARDIOGRAM REPORT: CPT | Performed by: INTERNAL MEDICINE

## 2022-08-16 PROCEDURE — 97163 PT EVAL HIGH COMPLEX 45 MIN: CPT

## 2022-08-16 PROCEDURE — 85610 PROTHROMBIN TIME: CPT | Performed by: STUDENT IN AN ORGANIZED HEALTH CARE EDUCATION/TRAINING PROGRAM

## 2022-08-16 PROCEDURE — 99223 1ST HOSP IP/OBS HIGH 75: CPT | Performed by: PHYSICIAN ASSISTANT

## 2022-08-16 PROCEDURE — 72100 X-RAY EXAM L-S SPINE 2/3 VWS: CPT

## 2022-08-16 PROCEDURE — 99232 SBSQ HOSP IP/OBS MODERATE 35: CPT | Performed by: STUDENT IN AN ORGANIZED HEALTH CARE EDUCATION/TRAINING PROGRAM

## 2022-08-16 PROCEDURE — 99223 1ST HOSP IP/OBS HIGH 75: CPT | Performed by: INTERNAL MEDICINE

## 2022-08-16 PROCEDURE — 97167 OT EVAL HIGH COMPLEX 60 MIN: CPT

## 2022-08-16 PROCEDURE — 85025 COMPLETE CBC W/AUTO DIFF WBC: CPT | Performed by: STUDENT IN AN ORGANIZED HEALTH CARE EDUCATION/TRAINING PROGRAM

## 2022-08-16 PROCEDURE — 82948 REAGENT STRIP/BLOOD GLUCOSE: CPT

## 2022-08-16 PROCEDURE — 71045 X-RAY EXAM CHEST 1 VIEW: CPT

## 2022-08-16 PROCEDURE — 85730 THROMBOPLASTIN TIME PARTIAL: CPT | Performed by: STUDENT IN AN ORGANIZED HEALTH CARE EDUCATION/TRAINING PROGRAM

## 2022-08-16 RX ORDER — OXYCODONE HYDROCHLORIDE 5 MG/1
5 TABLET ORAL EVERY 4 HOURS PRN
Status: DISCONTINUED | OUTPATIENT
Start: 2022-08-16 | End: 2022-08-18

## 2022-08-16 RX ORDER — OXYCODONE HYDROCHLORIDE 10 MG/1
10 TABLET ORAL EVERY 4 HOURS PRN
Status: DISCONTINUED | OUTPATIENT
Start: 2022-08-16 | End: 2022-08-18

## 2022-08-16 RX ORDER — HYDROMORPHONE HCL/PF 1 MG/ML
0.5 SYRINGE (ML) INJECTION EVERY 4 HOURS PRN
Status: DISCONTINUED | OUTPATIENT
Start: 2022-08-16 | End: 2022-08-18

## 2022-08-16 RX ADMIN — OXYCODONE HYDROCHLORIDE 5 MG: 5 TABLET ORAL at 08:41

## 2022-08-16 RX ADMIN — ACETAMINOPHEN 975 MG: 325 TABLET ORAL at 21:19

## 2022-08-16 RX ADMIN — HYDROMORPHONE HYDROCHLORIDE 0.2 MG: 0.2 INJECTION, SOLUTION INTRAMUSCULAR; INTRAVENOUS; SUBCUTANEOUS at 06:09

## 2022-08-16 RX ADMIN — ENOXAPARIN SODIUM 30 MG: 30 INJECTION SUBCUTANEOUS at 17:18

## 2022-08-16 RX ADMIN — HYDROMORPHONE HYDROCHLORIDE 0.2 MG: 0.2 INJECTION, SOLUTION INTRAMUSCULAR; INTRAVENOUS; SUBCUTANEOUS at 10:20

## 2022-08-16 RX ADMIN — OXYCODONE HYDROCHLORIDE 10 MG: 10 TABLET ORAL at 17:18

## 2022-08-16 RX ADMIN — INSULIN LISPRO 1 UNITS: 100 INJECTION, SOLUTION INTRAVENOUS; SUBCUTANEOUS at 12:25

## 2022-08-16 RX ADMIN — OXYCODONE HYDROCHLORIDE 5 MG: 5 TABLET ORAL at 03:06

## 2022-08-16 RX ADMIN — GABAPENTIN 100 MG: 100 CAPSULE ORAL at 21:20

## 2022-08-16 RX ADMIN — ACETAMINOPHEN 975 MG: 325 TABLET ORAL at 03:07

## 2022-08-16 RX ADMIN — LISINOPRIL 10 MG: 10 TABLET ORAL at 08:41

## 2022-08-16 RX ADMIN — LIDOCAINE 5% 1 PATCH: 700 PATCH TOPICAL at 08:41

## 2022-08-16 RX ADMIN — METHOCARBAMOL TABLETS 750 MG: 750 TABLET, COATED ORAL at 10:21

## 2022-08-16 RX ADMIN — ENOXAPARIN SODIUM 30 MG: 30 INJECTION SUBCUTANEOUS at 06:08

## 2022-08-16 RX ADMIN — ACETAMINOPHEN 975 MG: 325 TABLET ORAL at 13:00

## 2022-08-16 RX ADMIN — PRAVASTATIN SODIUM 40 MG: 40 TABLET ORAL at 17:18

## 2022-08-16 RX ADMIN — ASPIRIN 81 MG CHEWABLE TABLET 81 MG: 81 TABLET CHEWABLE at 08:41

## 2022-08-16 RX ADMIN — OXYCODONE HYDROCHLORIDE 5 MG: 5 TABLET ORAL at 12:58

## 2022-08-16 RX ADMIN — OXYCODONE HYDROCHLORIDE 10 MG: 10 TABLET ORAL at 21:19

## 2022-08-16 NOTE — PLAN OF CARE
Problem: MOBILITY - ADULT  Goal: Maintain or return to baseline ADL function  Description: INTERVENTIONS:  -  Assess patient's ability to carry out ADLs; assess patient's baseline for ADL function and identify physical deficits which impact ability to perform ADLs (bathing, care of mouth/teeth, toileting, grooming, dressing, etc )  - Assess/evaluate cause of self-care deficits   - Assess range of motion  - Assess patient's mobility; develop plan if impaired  - Assess patient's need for assistive devices and provide as appropriate  - Encourage maximum independence but intervene and supervise when necessary  - Involve family in performance of ADLs  - Assess for home care needs following discharge   - Consider OT consult to assist with ADL evaluation and planning for discharge  - Provide patient education as appropriate  Outcome: Progressing  Goal: Maintains/Returns to pre admission functional level  Description: INTERVENTIONS:  - Perform BMAT or MOVE assessment daily    - Set and communicate daily mobility goal to care team and patient/family/caregiver  - Collaborate with rehabilitation services on mobility goals if consulted  - Perform Range of Motion 3 times a day  - Reposition patient every 2 hours    - Dangle patient 3 times a day  - Stand patient 3 times a day  - Ambulate patient 3 times a day  - Out of bed to chair 3 times a day   - Out of bed for meals 3  Problem: Potential for Falls  Goal: Patient will remain free of falls  Description: INTERVENTIONS:  -  Assess patient's ability to carry out ADLs; assess patient's baseline for ADL function and identify physical deficits which impact ability to perform ADLs (bathing, care of mouth/teeth, toileting, grooming, dressing, etc )  - Assess/evaluate cause of self-care deficits   - Assess range of motion  - Assess patient's mobility; develop plan if impaired  - Assess patient's need for assistive devices and provide as appropriate  - Encourage maximum independence but intervene and supervise when necessary  - Involve family in performance of ADLs  - Assess for home care needs following discharge   - Consider OT consult to assist with ADL evaluation and planning for discharge  - Provide patient education as appropriate  Outcome: Progressing     Problem: Prexisting or High Potential for Compromised Skin Integrity  Goal: Skin integrity is maintained or improved  Description: INTERVENTIONS:  - Identify patients at risk for skin breakdown  - Assess and monitor skin integrity  - Assess and monitor nutrition and hydration status  - Monitor labs   - Assess for incontinence   - Turn and reposition patient  - Assist with mobility/ambulation  - Relieve pressure over bony prominences  - Avoid friction and shearing  - Provide appropriate hygiene as needed including keeping skin clean and dry  - Evaluate need for skin moisturizer/barrier cream  - Collaborate with interdisciplinary team   - Patient/family teaching  - Consider wound care consult   Outcome: Progressing     Problem: PAIN - ADULT  Goal: Verbalizes/displays adequate comfort level or baseline comfort level  Description: Interventions:  - Encourage patient to monitor pain and request assistance  - Assess pain using appropriate pain scale  - Administer analgesics based on type and severity of pain and evaluate response  - Implement non-pharmacological measures as appropriate and evaluate response  - Consider cultural and social influences on pain and pain management  - Notify physician/advanced practitioner if interventions unsuccessful or patient reports new pain  Outcome: Progressing     Problem: INFECTION - ADULT  Goal: Absence or prevention of progression during hospitalization  Description: INTERVENTIONS:  - Assess and monitor for signs and symptoms of infection  - Monitor lab/diagnostic results  - Monitor all insertion sites, i e  indwelling lines, tubes, and drains  - Monitor endotracheal if appropriate and nasal secretions for changes in amount and color  - [place] appropriate cooling/warming therapies per order  - Administer medications as ordered  - Instruct and encourage patient and family to use good hand hygiene technique  - Identify and instruct in appropriate isolation precautions for identified infection/condition  Outcome: Progressing  Goal: Absence of fever/infection during neutropenic period  Description: INTERVENTIONS:  - Monitor WBC    Outcome: Progressing     Problem: SAFETY ADULT  Goal: Maintain or return to baseline ADL function  Description: INTERVENTIONS:  -  Assess patient's ability to carry out ADLs; assess patient's baseline for ADL function and identify physical deficits which impact ability to perform ADLs (bathing, care of mouth/teeth, toileting, grooming, dressing, etc )  - Assess/evaluate cause of self-care deficits   - Assess range of motion  - Assess patient's mobility; develop plan if impaired  - Assess patient's need for assistive devices and provide as appropriate  - Encourage maximum independence but intervene and supervise when necessary  - Involve family in performance of ADLs  - Assess for home care needs following discharge   - Consider OT consult to assist with ADL evaluation and planning for discharge  - Provide patient education as appropriate  Outcome: Progressing       Problem: Knowledge Deficit  Goal: Patient/family/caregiver demonstrates understanding of disease process, treatment plan, medications, and discharge instructions  Description: Complete learning assessment and assess knowledge base    Interventions:  - Provide teaching at level of understanding  - Provide teaching via preferred learning methods  Outcome: Progressing
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
## 2022-08-16 NOTE — PROGRESS NOTES
[address] [facility]  Progress Note - Charolette Cockayne 1945, 68 y o  male MRN: [id number]  Unit/Bed#: [facility] 565-37 Encounter: [id number]  Primary Care Provider: Abdias Rod MD   Date and time admitted to hospital: 8/15/2022 12:47 PM    Fall  Assessment & Plan  - Status post fall with the below noted injuries  - Fall precautions   - PT and OT evaluation and treatment as indicated  - Case Management consultation for disposition planning  L3 osteophyte fracture  Assessment & Plan  - L3 fracture, present on admission   - Neurosurgery evaluation and recommendations appreciated  Non-operative management recommended  - Bracing: Maintain LSO brace whenever upright >45 degrees and/or out of bed  - Spine precautions  - Monitor neurovascular exam   - Multimodal analgesic regimen as needed  - Upright spine x-rays pending  - PT and OT evaluation and treatment as indicated  - Outpatient follow up with Neurosurgery for re-evaluation  Lumbar transverse process fracture [facility])  Assessment & Plan  - Transverse spinous fractures of T12, L1, L2  - continue p r n  Pain support  - monitor for any acute changes in exam  - neurovascular checks      Fracture of thoracic transverse process [facility])  Assessment & Plan  - Transverse spinous fractures of T12, L1, L2  - continue p r n  Pain support  - monitor for any acute changes in exam  - neurovascular checks      Type 2 diabetes mellitus [facility])  Assessment & Plan  Lab Results   Component Value Date    HGBA1C 6 9 (A) 06/16/2022       Recent Labs     08/16/22  0838 08/16/22  1139 08/16/22  1222 08/16/22  1704   POCGLU 104 158* 150* 151*       Blood Sugar Average: Last 72 hrs:  (P) 829 [id number]     - Continue sliding scale insulin  - Monitor blood sugars  - Outpatient follow-up with PCP    Benign essential hypertension  Assessment & Plan  - Continue current medication regimen   - Outpatient follow-up with PCP        * Rib fractures  Assessment & Plan  - Multiple right-sided rib fractures (10-11), present on admission   - Continue rib fracture protocol   - Continue to encourage incentive spirometer use and adequate pulmonary hygiene  Currently pulling 4079-3253 mL on I S   - PIC score per nursing  - Continue multimodal analgesic regimen  Appreciate APS evaluation and recommendations   - Supplemental oxygen via nasal cannula as needed to maintain saturations greater than or equal to 94%  - Repeat chest x-ray from 8/16/22 reviewed  - PT and OT evaluation and treatment as indicated  - Outpatient follow-up in the trauma clinic for re-evaluation in approximately 2 weeks  DVT prophylaxis: SCDs and Lovenox  PT and OT: eval and treat    Disposition:  Follow-up repeat chest x-ray dated  Continue p r n  Pain support  Follow-up APS recommendations  TERTIARY TRAUMA SURVEY NOTE    Code status:  Level 1 - Full Code    Consultants:  PT, OT, APS      SUBJECTIVE:     Transfer from:  Not a transfer  Mechanism of Injury:Fall    Chief Complaint:  No new complaints    HPI/Last 24 hour events:  Patient states he continues to pain with his ribs  Otherwise currently resting out of bed to chair  Continues to use his incentive spirometer  Denies any nausea vomiting  Reports the pain is primarily over his ribs  He also has some associated back pain      Active medications:           Current Facility-Administered Medications:     acetaminophen (TYLENOL) tablet 975 mg, 975 mg, Oral, Q8H MOON, 975 mg at 08/16/22 1300    aspirin chewable tablet 81 mg, 81 mg, Oral, Daily, 81 mg at 08/16/22 0841    enoxaparin (LOVENOX) subcutaneous injection 30 mg, 30 mg, Subcutaneous, Q12H, 30 mg at 08/16/22 1718    gabapentin (NEURONTIN) capsule 100 mg, 100 mg, Oral, HS, 100 mg at 08/15/22 2201    HYDROmorphone (DILAUDID) injection 0 5 mg, 0 5 mg, Intravenous, Q4H PRN    insulin lispro (HumaLOG) 100 units/mL subcutaneous injection 1-6 Units, 1-6 Units, Subcutaneous, TID AC, 1 Units at 08/16/22 1225 **AND** Fingerstick Glucose (POCT), , , TID AC    lidocaine (LIDODERM) 5 % patch 1 patch, 1 patch, Topical, Daily, 1 patch at 08/16/22 0841    lisinopril (ZESTRIL) tablet 10 mg, 10 mg, Oral, Daily, 10 mg at 08/16/22 0841    methocarbamol (ROBAXIN) tablet 750 mg, 750 mg, Oral, Q6H PRN, 750 mg at 08/16/22 1021    oxyCODONE (ROXICODONE) immediate release tablet 10 mg, 10 mg, Oral, Q4H PRN, 10 mg at 08/16/22 1718    oxyCODONE (ROXICODONE) IR tablet 5 mg, 5 mg, Oral, Q4H PRN    pravastatin (PRAVACHOL) tablet 40 mg, 40 mg, Oral, Daily With Dinner, 40 mg at 08/16/22 1718      OBJECTIVE:     Vitals:   Vitals:    08/16/22 1531   BP: 159/68   Pulse: (!) 45   Resp: 20   Temp: 97 7 °F (36 5 °C)   SpO2: 95%       Physical Exam:   GENERAL APPEARANCE:  No acute distress  NEURO:  GCS 15  HEENT:  Normocephalic  CV:  Regular rate and rhythm  LUNGS:  CTA bilaterally  GI:  Nontender, nondistended  :  No Vera  MSK:  +2 pulses on extremities  SKIN:  Warm, dry, intact      1  Before the illness or injury that brought you to the Emergency, did you need someone to help you on a regular basis? 0=No   2  Since the illness or injury that brought you to the Emergency, have you needed more help than usual to take care of yourself? 1=Yes   3  Have you been hospitalized for one or more nights during the past 6 months (excluding a stay in the Emergency Department)? 0=No   4  In general, do you see well? 0=Yes   5  In general, do you have serious problems with your memory? 0=No   6  Do you take more than three different medications everyday?  1=Yes   TOTAL   2     Did you order a geriatric consult if the score was 2 or greater?: yes         PIC Score  PIC Pain Score: 1 (8/16/2022  5:18 PM)  [facility] Incentive Spirometry Score: Goal to alert volume (8/16/2022 12:00 PM)  PIC Cough Description: Weak (8/16/2022 12:00 PM)  PIC Total Score: 6 (8/16/2022 12:00 PM)       If the Total PIC Score </=5, did you consult APS and evaluate patient for further intervention?: yes      Pain:    Incentive Spirometry  Cough  3 = Controlled  4 = Above goal volume 3 = Strong  2 = Moderate  3 = Goal to alert volume 2 = Weak  1 = Severe  2 = Below alert volume 1 = Absent     1 = Unable to perform IS           I/O:   I/O       08/14 0701  08/15 0700 08/15 0701 08/16 0700 08/16 0701 08/17 0700    P  O   120     Total Intake(mL/kg)  120 (1 2)     Urine (mL/kg/hr)  225 150 (0 1)    Total Output  225 150    Net  -105 -150                 Invasive Devices: Invasive Devices  Report    Peripheral Intravenous Line  Duration           Peripheral IV 08/15/22 Dorsal (posterior); Left Hand 1 day    Peripheral IV 08/15/22 Right Antecubital 1 day                  Imaging:   CT chest abdomen pelvis wo contrast    Result Date: 8/15/2022  Impression: 1  Acute fractures right T12, L1 and L2 transverse processes  2   Acute mildly displaced fracture right posterior 11th rib and nondisplaced fracture right posterior 10th rib  No pneumothorax  3  No acute intra-abdominal or pelvic pathology within significant limitations of noncontrast imaging  Additional incidental findings as above Limited study without IV or oral contrast  The study was marked in EPIC for immediate notification  Workstation performed: [id number]     XR shoulder 2+ views RIGHT    Result Date: 8/15/2022  Impression: No acute osseous abnormality  Workstation performed: [id number]     CT spine cervical without contrast    Result Date: 8/15/2022  Impression: No cervical spine fracture or traumatic malalignment  Severe multilevel cervical spondylosis  Workstation performed: [id number]     CT recon only thoracolumbar (No Charge)    Result Date: 8/15/2022  Impression: Acute, mildly displaced fractures right T12, L1 and L2 transverse processes  Vertically orientated fracture through base of right L3 superior marginal endplate osteophyte, best seen on coronal imaging    Vertebral bodies demonstrate stable height without compression deformities  Advanced multilevel degenerative disc disease lumbar spine superimposed on background of DISH   Workstation performed: [id number]       Labs:   CBC:   Lab Results   Component Value Date    WBC 8 07 08/16/2022    HGB 13 5 08/16/2022    HCT 41 9 08/16/2022    MCV 90 08/16/2022     (L) 08/16/2022    MCH 29 1 08/16/2022    MCHC 32 2 08/16/2022    RDW 13 6 08/16/2022    MPV 10 7 08/16/2022    NRBC 0 08/16/2022     CMP: No results found for: NA, CL, CO2, ANIONGAP, BUN, CREATININE, GLUCOSE, CALCIUM, AST, ALT, ALKPHOS, PROT, BILITOT, EGFR  Phosphorus: No results found for: PHOS
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
## 2022-08-16 NOTE — ASSESSMENT & PLAN NOTE
- L3 fracture, present on admission   - Neurosurgery evaluation and recommendations appreciated  Non-operative management recommended  - Bracing: Maintain LSO brace whenever upright >45 degrees and/or out of bed  - Spine precautions  - Monitor neurovascular exam   - Multimodal analgesic regimen as needed  - Upright spine x-rays pending  - PT and OT evaluation and treatment as indicated  - Outpatient follow up with Neurosurgery for re-evaluation

## 2022-08-16 NOTE — RESTORATIVE TECHNICIAN NOTE
Restorative Technician Note      Patient Name: Melissa Worrell     Note Type: Bracing, Initial consult  Brace Applied: [place] LSO Flynn Tran)  Additional Brace Ordered: No  Patient Position When Brace Applied: Seated  Bracing Recommendations: None  Education Provided: Yes; Family or social support of family present for education by provider  Nurse Communication: Nurse aware of consult, application of brace          Please call Mobility Coordinator at ext  [phone] or on Tiger text " [email or web address]-PT-Restorative Tech" role in regards to bracing instruction and/or adjustment    Darshana Batres Restorative Technician, BS
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
## 2022-08-16 NOTE — ASSESSMENT & PLAN NOTE
- Transverse spinous fractures of T12, L1, L2  - continue p r n   Pain support  - monitor for any acute changes in exam  - neurovascular checks
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
## 2022-08-16 NOTE — ASSESSMENT & PLAN NOTE
- Multiple right-sided rib fractures (10-11), present on admission   - Continue rib fracture protocol   - Continue to encourage incentive spirometer use and adequate pulmonary hygiene  Currently pulling 0055-6964 mL on I S   - PIC score per nursing  - Continue multimodal analgesic regimen  Appreciate APS evaluation and recommendations   - Supplemental oxygen via nasal cannula as needed to maintain saturations greater than or equal to 94%  - Repeat chest x-ray from 8/16/22 reviewed  - PT and OT evaluation and treatment as indicated  - Outpatient follow-up in the trauma clinic for re-evaluation in approximately 2 weeks

## 2022-08-16 NOTE — PLAN OF CARE
Problem: Potential for Falls  Goal: Patient will remain free of falls  Description: INTERVENTIONS:  - Educate patient/family on patient safety including physical limitations  - Instruct patient to call for assistance with activity   - Consult OT/PT to assist with strengthening/mobility   - Keep Call bell within reach  - Keep bed low and locked with side rails adjusted as appropriate  - Keep care items and personal belongings within reach  - Initiate and maintain comfort rounds  - Make Fall Risk Sign visible to staff  - Offer Toileting every  Hours, in advance of need  - Initiate/Maintain alarm  - Obtain necessary fall risk management equipment:   Problem: PAIN - ADULT  Goal: Verbalizes/displays adequate comfort level or baseline comfort level  Description: Interventions:  - Encourage patient to monitor pain and request assistance  - Assess pain using appropriate pain scale  - Administer analgesics based on type and severity of pain and evaluate response  - Implement non-pharmacological measures as appropriate and evaluate response  - Consider cultural and social influences on pain and pain management  - Notify physician/advanced practitioner if interventions unsuccessful or patient reports new pain  Outcome: Progressing     - Apply yellow socks and bracelet for high fall risk patients  - Consider moving patient to room near nurses station  Outcome: Progressing
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
## 2022-08-16 NOTE — QUICK NOTE
RE-evaluated patient this afternoon and pain is persistently severe, unable to mobilize due to pain  I spoke with the trauma team as well and will increase his oxycodone to 5mg PRN every 4 hours for moderate pain, 10 mg PRN every 4 hours for severe pain and plan to hold lovenox in the morning for possible intervention tomorrow

## 2022-08-17 ENCOUNTER — APPOINTMENT (OUTPATIENT)
Dept: SURGERY | Facility: HOSPITAL | Age: 77
DRG: 243 | End: 2022-08-17
Payer: MEDICARE

## 2022-08-17 ENCOUNTER — ANESTHESIA (INPATIENT)
Dept: ANESTHESIOLOGY | Facility: HOSPITAL | Age: 77
DRG: 243 | End: 2022-08-17
Payer: MEDICARE

## 2022-08-17 ENCOUNTER — ANESTHESIA EVENT (INPATIENT)
Dept: ANESTHESIOLOGY | Facility: HOSPITAL | Age: 77
DRG: 243 | End: 2022-08-17
Payer: MEDICARE

## 2022-08-17 LAB
ANION GAP SERPL CALCULATED.3IONS-SCNC: 5 MMOL/L (ref 4–13)
BASOPHILS # BLD AUTO: 0.03 THOUSANDS/ΜL (ref 0–0.1)
BASOPHILS NFR BLD AUTO: 0 % (ref 0–1)
BUN SERPL-MCNC: 31 MG/DL (ref 5–25)
CALCIUM SERPL-MCNC: 9.2 MG/DL (ref 8.3–10.1)
CHLORIDE SERPL-SCNC: 107 MMOL/L (ref 96–108)
CO2 SERPL-SCNC: 26 MMOL/L (ref 21–32)
CREAT SERPL-MCNC: 1.42 MG/DL (ref 0.6–1.3)
EOSINOPHIL # BLD AUTO: 0.18 THOUSAND/ΜL (ref 0–0.61)
EOSINOPHIL NFR BLD AUTO: 2 % (ref 0–6)
ERYTHROCYTE [DISTWIDTH] IN BLOOD BY AUTOMATED COUNT: 13.6 % (ref 11.6–15.1)
GFR SERPL CREATININE-BSD FRML MDRD: 47 ML/MIN/1.73SQ M
GLUCOSE SERPL-MCNC: 114 MG/DL (ref 65–140)
GLUCOSE SERPL-MCNC: 117 MG/DL (ref 65–140)
GLUCOSE SERPL-MCNC: 134 MG/DL (ref 65–140)
GLUCOSE SERPL-MCNC: 137 MG/DL (ref 65–140)
HCT VFR BLD AUTO: 42.1 % (ref 36.5–49.3)
HGB BLD-MCNC: 13.3 G/DL (ref 12–17)
IMM GRANULOCYTES # BLD AUTO: 0.03 THOUSAND/UL (ref 0–0.2)
IMM GRANULOCYTES NFR BLD AUTO: 0 % (ref 0–2)
LYMPHOCYTES # BLD AUTO: 2.82 THOUSANDS/ΜL (ref 0.6–4.47)
LYMPHOCYTES NFR BLD AUTO: 34 % (ref 14–44)
MAGNESIUM SERPL-MCNC: 2.1 MG/DL (ref 1.6–2.6)
MCH RBC QN AUTO: 28.7 PG (ref 26.8–34.3)
MCHC RBC AUTO-ENTMCNC: 31.6 G/DL (ref 31.4–37.4)
MCV RBC AUTO: 91 FL (ref 82–98)
MONOCYTES # BLD AUTO: 0.77 THOUSAND/ΜL (ref 0.17–1.22)
MONOCYTES NFR BLD AUTO: 9 % (ref 4–12)
NEUTROPHILS # BLD AUTO: 4.59 THOUSANDS/ΜL (ref 1.85–7.62)
NEUTS SEG NFR BLD AUTO: 55 % (ref 43–75)
NRBC BLD AUTO-RTO: 0 /100 WBCS
PHOSPHATE SERPL-MCNC: 4.4 MG/DL (ref 2.3–4.1)
PLATELET # BLD AUTO: 140 THOUSANDS/UL (ref 149–390)
PMV BLD AUTO: 10.9 FL (ref 8.9–12.7)
POTASSIUM SERPL-SCNC: 4.2 MMOL/L (ref 3.5–5.3)
RBC # BLD AUTO: 4.64 MILLION/UL (ref 3.88–5.62)
SODIUM SERPL-SCNC: 138 MMOL/L (ref 135–147)
WBC # BLD AUTO: 8.42 THOUSAND/UL (ref 4.31–10.16)

## 2022-08-17 PROCEDURE — 83735 ASSAY OF MAGNESIUM: CPT | Performed by: PHYSICIAN ASSISTANT

## 2022-08-17 PROCEDURE — 80048 BASIC METABOLIC PNL TOTAL CA: CPT | Performed by: PHYSICIAN ASSISTANT

## 2022-08-17 PROCEDURE — 82948 REAGENT STRIP/BLOOD GLUCOSE: CPT

## 2022-08-17 PROCEDURE — 85025 COMPLETE CBC W/AUTO DIFF WBC: CPT | Performed by: PHYSICIAN ASSISTANT

## 2022-08-17 PROCEDURE — 99232 SBSQ HOSP IP/OBS MODERATE 35: CPT | Performed by: STUDENT IN AN ORGANIZED HEALTH CARE EDUCATION/TRAINING PROGRAM

## 2022-08-17 PROCEDURE — 84100 ASSAY OF PHOSPHORUS: CPT | Performed by: PHYSICIAN ASSISTANT

## 2022-08-17 RX ORDER — FENTANYL CITRATE 50 UG/ML
INJECTION, SOLUTION INTRAMUSCULAR; INTRAVENOUS AS NEEDED
Status: DISCONTINUED | OUTPATIENT
Start: 2022-08-17 | End: 2022-08-17 | Stop reason: HOSPADM

## 2022-08-17 RX ORDER — HEPARIN SODIUM 5000 [USP'U]/ML
5000 INJECTION, SOLUTION INTRAVENOUS; SUBCUTANEOUS EVERY 8 HOURS SCHEDULED
Status: DISCONTINUED | OUTPATIENT
Start: 2022-08-17 | End: 2022-08-22

## 2022-08-17 RX ADMIN — HYDROMORPHONE HYDROCHLORIDE 0.5 MG: 1 INJECTION, SOLUTION INTRAMUSCULAR; INTRAVENOUS; SUBCUTANEOUS at 09:08

## 2022-08-17 RX ADMIN — HYDROMORPHONE HYDROCHLORIDE 0.5 MG: 1 INJECTION, SOLUTION INTRAMUSCULAR; INTRAVENOUS; SUBCUTANEOUS at 13:18

## 2022-08-17 RX ADMIN — FENTANYL CITRATE 50 MCG: 50 INJECTION INTRAMUSCULAR; INTRAVENOUS at 13:58

## 2022-08-17 RX ADMIN — METHOCARBAMOL TABLETS 750 MG: 750 TABLET, COATED ORAL at 09:08

## 2022-08-17 RX ADMIN — ACETAMINOPHEN 975 MG: 325 TABLET ORAL at 13:16

## 2022-08-17 RX ADMIN — FENTANYL CITRATE 50 MCG: 50 INJECTION INTRAMUSCULAR; INTRAVENOUS at 13:56

## 2022-08-17 RX ADMIN — ACETAMINOPHEN 975 MG: 325 TABLET ORAL at 05:48

## 2022-08-17 RX ADMIN — ASPIRIN 81 MG CHEWABLE TABLET 81 MG: 81 TABLET CHEWABLE at 09:08

## 2022-08-17 RX ADMIN — OXYCODONE HYDROCHLORIDE 10 MG: 10 TABLET ORAL at 12:02

## 2022-08-17 RX ADMIN — ACETAMINOPHEN 975 MG: 325 TABLET ORAL at 21:13

## 2022-08-17 RX ADMIN — OXYCODONE HYDROCHLORIDE 10 MG: 10 TABLET ORAL at 16:21

## 2022-08-17 RX ADMIN — ENOXAPARIN SODIUM 30 MG: 30 INJECTION SUBCUTANEOUS at 05:48

## 2022-08-17 RX ADMIN — HEPARIN SODIUM 5000 UNITS: 5000 INJECTION INTRAVENOUS; SUBCUTANEOUS at 21:13

## 2022-08-17 RX ADMIN — OXYCODONE HYDROCHLORIDE 10 MG: 10 TABLET ORAL at 05:48

## 2022-08-17 RX ADMIN — GABAPENTIN 100 MG: 100 CAPSULE ORAL at 21:13

## 2022-08-17 RX ADMIN — LIDOCAINE 5% 1 PATCH: 700 PATCH TOPICAL at 09:08

## 2022-08-17 RX ADMIN — PRAVASTATIN SODIUM 40 MG: 40 TABLET ORAL at 16:21

## 2022-08-17 RX ADMIN — LISINOPRIL 10 MG: 10 TABLET ORAL at 09:08

## 2022-08-17 RX ADMIN — OXYCODONE HYDROCHLORIDE 10 MG: 10 TABLET ORAL at 21:14

## 2022-08-17 NOTE — ASSESSMENT & PLAN NOTE
Lab Results   Component Value Date    HGBA1C 6 9 (A) 06/16/2022       Recent Labs     08/16/22  1222 08/16/22  1704 08/17/22  0707 08/17/22  1147   POCGLU 150* 151* 114 134       Blood Sugar Average: Last 72 hrs:  (P) 129 125     - Continue sliding scale insulin  - Monitor blood sugars  - Outpatient follow-up with PCP

## 2022-08-17 NOTE — ANESTHESIA PREPROCEDURE EVALUATION
Procedure:  REGIONAL NERVE BLOCK    Relevant Problems   CARDIO   (+) Benign essential hypertension   (+) Hyperlipidemia      ENDO   (+) Type 2 diabetes mellitus (HCC)      /RENAL   (+) Diabetic nephropathy associated with type 2 diabetes mellitus (HCC)   (+) Stage 3a chronic kidney disease (HCC)      MUSCULOSKELETAL   (+) Chronic bilateral low back pain without sciatica   (+) Gout   (+) Impingement syndrome of right shoulder   (+) Primary osteoarthritis of left knee      NEURO/PSYCH   (+) Chronic bilateral low back pain without sciatica   (+) Claudication of both lower extremities (HCC)   (+) History of stroke      PULMONARY   (+) Asthma        Physical Exam    Airway       Dental   No notable dental hx     Cardiovascular  Rhythm: regular, Rate: normal,     Pulmonary  Breath sounds clear to auscultation,     Other Findings  Intercisor Distance > 3cm          Anesthesia Plan  ASA Score- 3     Anesthesia Type- regional with ASA Monitors  Additional Monitors:   Airway Plan:     Comment: Discussed right paravertebral catheter for rib fracture analgesia  Risks/benefits discussed  Patient wishes to proceed          Plan Factors-Exercise tolerance (METS): >4 METS  Chart reviewed  EKG reviewed  Existing labs reviewed  Induction-     Postoperative Plan- Plan for postoperative opioid use  Informed Consent- Anesthetic plan and risks discussed with patient  I personally reviewed this patient with the CRNA  Discussed and agreed on the Anesthesia Plan with the CRNA  Robert Arias

## 2022-08-17 NOTE — ASSESSMENT & PLAN NOTE
- Multiple right-sided rib fractures (10-11), present on admission   - Continue rib fracture protocol   - Continue to encourage incentive spirometer use and adequate pulmonary hygiene  Currently pulling 1000 mL on I S   - PIC score per nursing  - Continue multimodal analgesic regimen  Appreciate APS evaluation and recommendations  Tentative plan for paravertebral block today  - Supplemental oxygen via nasal cannula as needed to maintain saturations greater than or equal to 94%  - Repeat chest x-ray from 8/16/22 reviewed  - PT and OT evaluation and treatment as indicated  - Outpatient follow-up in the trauma clinic for re-evaluation in approximately 2 weeks

## 2022-08-17 NOTE — PLAN OF CARE
Problem: MOBILITY - ADULT  Goal: Maintain or return to baseline ADL function  Description: INTERVENTIONS:  -  Assess patient's ability to carry out ADLs; assess patient's baseline for ADL function and identify physical deficits which impact ability to perform ADLs (bathing, care of mouth/teeth, toileting, grooming, dressing, etc )  - Assess/evaluate cause of self-care deficits   - Assess range of motion  - Assess patient's mobility; develop plan if impaired  - Assess patient's need for assistive devices and provide as appropriate  - Encourage maximum independence but intervene and supervise when necessary  - Involve family in performance of ADLs  - Assess for home care needs following discharge   - Consider OT consult to assist with ADL evaluation and planning for discharge  - Provide patient education as appropriate  Outcome: Progressing     Problem: MOBILITY - ADULT  Goal: Maintains/Returns to pre admission functional level  Description: INTERVENTIONS:  - Perform BMAT or MOVE assessment daily    - Set and communicate daily mobility goal to care team and patient/family/caregiver  - Collaborate with rehabilitation services on mobility goals if consulted  - Perform Range of Motion 3 times a day  - Reposition patient every 2 hours    - Dangle patient 3 times a day  - Stand patient 3 times a day  - Ambulate patient 3 times a day  - Out of bed to chair 3 times a day   - Out of bed for meals 3  Problem: Prexisting or High Potential for Compromised Skin Integrity  Goal: Skin integrity is maintained or improved  Description: INTERVENTIONS:  - Identify patients at risk for skin breakdown  - Assess and monitor skin integrity  - Assess and monitor nutrition and hydration status  - Monitor labs   - Assess for incontinence   - Turn and reposition patient  - Assist with mobility/ambulation  - Relieve pressure over bony prominences  - Avoid friction and shearing  - Provide appropriate hygiene as needed including keeping skin clean and dry  - Evaluate need for skin moisturizer/barrier cream  - Collaborate with interdisciplinary team   - Patient/family teaching  - Consider wound care consult   Outcome: Progressing     Problem: INFECTION - ADULT  Goal: Absence or prevention of progression during hospitalization  Description: INTERVENTIONS:  - Assess and monitor for signs and symptoms of infection  - Monitor lab/diagnostic results  - Monitor all insertion sites, i e  indwelling lines, tubes, and drains  - Monitor endotracheal if appropriate and nasal secretions for changes in amount and color  - [place] appropriate cooling/warming therapies per order  - Administer medications as ordered  - Instruct and encourage patient and family to use good hand hygiene technique  - Identify and instruct in appropriate isolation precautions for identified infection/condition  Outcome: Progressing  Goal: Absence of fever/infection during neutropenic period  Description: INTERVENTIONS:  - Monitor WBC    Outcome: Progressing     Problem: DISCHARGE PLANNING  Goal: Discharge to home or other facility with appropriate resources  Description: INTERVENTIONS:  - Identify barriers to discharge w/patient and caregiver  - Arrange for needed discharge resources and transportation as appropriate  - Identify discharge learning needs (meds, wound care, etc )  - Arrange for interpretive services to assist at discharge as needed  - Refer to Case Management Department for coordinating discharge planning if the patient needs post-hospital services based on physician/advanced practitioner order or complex needs related to functional status, cognitive ability, or social support system  Outcome: Progressing     Problem: Knowledge Deficit  Goal: Patient/family/caregiver demonstrates understanding of disease process, treatment plan, medications, and discharge instructions  Description: Complete learning assessment and assess knowledge base    Interventions:  - Provide teaching at level of understanding  - Provide teaching via preferred learning methods  Outcome: Progressing    times a day  - Out of bed for toileting  - Record patient progress and toleration of activity level   Outcome: Progressing

## 2022-08-17 NOTE — TREATMENT PLAN
Imaging:  · X-ray lumbar spine  08/16/2022: Official read pending however per my interpretation transverse process fractures as well as L3 osteophyte fracture not well appreciated  No abnormal alignment        Plan:  · LSO brace as needed for comfort   · Rest of medical care per primary team   · Outpatient follow-up on an as-needed basis or sooner if patient develops worsening symptoms or red flag signs   · Signed off, please call with questions or concerns

## 2022-08-17 NOTE — ANESTHESIA PROCEDURE NOTES
Peripheral Block    Patient location during procedure: holding area  Reason for block: at surgeon's request and post-op pain management  Staffing  Performed: Anesthesiologist   Anesthesiologist: Kennedy Prabhakar MD  Preanesthetic Checklist  Completed: patient identified, IV checked, site marked, risks and benefits discussed, surgical consent, monitors and equipment checked, pre-op evaluation and timeout performed  Peripheral Block  Patient position: sitting  Prep: ChloraPrep  Patient monitoring: continuous pulse ox, frequent blood pressure checks and heart rate  Block type: paravertebral  Injection technique: catheter  Procedures: ultrasound guided, Ultrasound guidance required for the procedure to increase accuracy and safety of medication placement and decrease risk of complications  Needle  Needle type: Tuohy   Needle gauge: 18G  Needle length: 9cm  Needle localization: ultrasound guidance  Catheter type: closed end  Catheter size: 20G    Assessment  Paresthesia pain: none  Additional Notes  ABORTED paravertebral catheter due to poor visualization of relevant anatomy

## 2022-08-17 NOTE — PLAN OF CARE
Problem: MOBILITY - ADULT  Goal: Maintain or return to baseline ADL function  Description: INTERVENTIONS:  -  Assess patient's ability to carry out ADLs; assess patient's baseline for ADL function and identify physical deficits which impact ability to perform ADLs (bathing, care of mouth/teeth, toileting, grooming, dressing, etc )  - Assess/evaluate cause of self-care deficits   - Assess range of motion  - Assess patient's mobility; develop plan if impaired  - Assess patient's need for assistive devices and provide as appropriate  - Encourage maximum independence but intervene and supervise when necessary  - Involve family in performance of ADLs  - Assess for home care needs following discharge   - Consider OT consult to assist with ADL evaluation and planning for discharge  - Provide patient education as appropriate  Outcome: Progressing     Problem: PAIN - ADULT  Goal: Verbalizes/displays adequate comfort level or baseline comfort level  Description: Interventions:  - Encourage patient to monitor pain and request assistance  - Assess pain using appropriate pain scale  - Administer analgesics based on type and severity of pain and evaluate response  - Implement non-pharmacological measures as appropriate and evaluate response  - Consider cultural and social influences on pain and pain management  - Notify physician/advanced practitioner if interventions unsuccessful or patient reports new pain  Outcome: Progressing     Problem: INFECTION - ADULT  Goal: Absence or prevention of progression during hospitalization  Description: INTERVENTIONS:  - Assess and monitor for signs and symptoms of infection  - Monitor lab/diagnostic results  - Monitor all insertion sites, i e  indwelling lines, tubes, and drains  - Monitor endotracheal if appropriate and nasal secretions for changes in amount and color  - [place] appropriate cooling/warming therapies per order  - Administer medications as ordered  - Instruct and encourage patient and family to use good hand hygiene technique  - Identify and instruct in appropriate isolation precautions for identified infection/condition  Outcome: Progressing

## 2022-08-17 NOTE — PROGRESS NOTES
Progress Note - Acute Pain Service    Erich Burton 68 y o  male MRN: [id number]  Unit/Bed#: [facility] 723-84 Encounter: [id number]      Assessment:   Principal Problem:    Rib fractures  Active Problems:    Benign essential hypertension    Type 2 diabetes mellitus ([facility] [address] )    Fracture of thoracic transverse process ([facility] [address] )    Lumbar transverse process fracture (HCC)    L3 osteophyte fracture    Fall    Erich Burton is a 68 y o  male who sustained right-sided posterior 10th and 11th rib fractures, T12, L1 and L2 transverse process fractures  Acute pain service was consulted for evaluation of rib fracture pain, and for epidural/block  Patient continues to achieve 1250 ml on incentive spirometry, and O2 saturations remain stable on room room air while at rest   Pain levels also relatively controlled while at rest, however movement is limited due to exacerbation of pain  Patient has been unable to sit up, roll, or move for exam secondary to severe pain  Unfortunately patient was unable to receive epidural catheter placement for analgesia on 8/16 and 8/17 due to patient receiving subcutaneous Lovenox for DVT prophylaxis  Plan:   Discussed with anesthesiologist, who offered paravertebral catheter placement for analgesia however the procedure was aborted due to poor visualization of relevant anatomy  Will discuss case with backup anesthesiologist who may be able to offer patient epidural catheter this evening, if schedule allows  If not, will plan to hold AM DVT prophylaxis and planned for epidural catheter placement tomorrow morning  Will continue current multimodal regimen:  · Continue Tylenol 975 mg every 8 hours scheduled  · Continue Lidocaine patch, 12 hours on/12 hours off, to rib cage  · Continue Gabapentin 100 mg at bedtime  Estimated Creatinine Clearance: 53 mL/min (A) (by C-G formula based on SCr of 1 42 mg/dL (H))      · Continue Robaxin 750 mg every 6 hours as needed, for muscle spasms  · Continue Oxycodone 5 mg every 4 hours as needed, for moderate pain  · Continue Oxycodone 10 mg every 4 hours as needed, for severe pain  · Continue Dilaudid 0 5 mg every 4 hours as needed, for breakthrough pain    Bowel Regimen:  Recommend addition of stimulant laxative while using opioid pain medication, to prevent opioid induced constipation  Treatment plan was discussed with bedside nursing as well as primary care team     APS will continue to follow  Please contact Acute Pain Service - B via [employer] from [phone] with additional questions or concerns  See TigerText or Angel for additional contacts and after hours information  Pain History  Current pain location(s):  Right posterior ribcage  Pain Scale:   8  Quality:  Sharp, stabbing  24 hour history:  Patient reports he was unable to sleep last night secondary to pain  No acute events  Continues to complain of significant posterior chest wall and back pain, with severe exacerbations with movement  Reports increased pain with deep breaths, unable to cough secondary to pain  Tolerating current regimen, denies nausea/vomiting, constipation/diarrhea      Incentive spirometry: 1250 mL  SpO2:  93% on RA  PIC score: 6    Opioid requirement previous 24 hours:   Oxycodone 40 mg  IV Dilaudid 0 7 mg    Meds/Allergies   all current active meds have been reviewed and current meds:   Current Facility-Administered Medications   Medication Dose Route Frequency    acetaminophen (TYLENOL) tablet 975 mg  975 mg Oral Q8H [address]    aspirin chewable tablet 81 mg  81 mg Oral Daily    gabapentin (NEURONTIN) capsule 100 mg  100 mg Oral HS    heparin (porcine) subcutaneous injection 5,000 Units  5,000 Units Subcutaneous Q8H [address]    HYDROmorphone (DILAUDID) injection 0 5 mg  0 5 mg Intravenous Q4H PRN    insulin lispro (HumaLOG) 100 units/mL subcutaneous injection 1-6 Units  1-6 Units Subcutaneous TID AC    lidocaine (LIDODERM) 5 % patch 1 patch  1 patch Topical Daily    lisinopril (ZESTRIL) tablet 10 mg  10 mg Oral Daily    methocarbamol (ROBAXIN) tablet 750 mg  750 mg Oral Q6H PRN    oxyCODONE (ROXICODONE) immediate release tablet 10 mg  10 mg Oral Q4H PRN    oxyCODONE (ROXICODONE) IR tablet 5 mg  5 mg Oral Q4H PRN    pravastatin (PRAVACHOL) tablet 40 mg  40 mg Oral Daily With Dinner       Allergies   Allergen Reactions    Penicillins Hives       Objective     Temp:  [97 3 °F (36 3 °C)-98 7 °F (37 1 °C)] 98 7 °F (37 1 °C)  HR:  [44-52] 46  Resp:  [16-20] 16  BP: (105-159)/(38-84) 119/84    Physical Exam  Vitals reviewed  Constitutional:       General: He is in acute distress  Appearance: He is not ill-appearing or toxic-appearing  HENT:      Head: Normocephalic and atraumatic  Nose: Nose normal  No congestion or rhinorrhea  Mouth/Throat:      Mouth: Mucous membranes are moist    Eyes:      Extraocular Movements: Extraocular movements intact  Cardiovascular:      Rate and Rhythm: Normal rate  Pulmonary:      Effort: Pulmonary effort is normal  No tachypnea, bradypnea or respiratory distress  Chest:      Chest wall: Tenderness (R rib cage) present  Abdominal:      General: Bowel sounds are normal  There is no distension  Palpations: Abdomen is soft  Tenderness: There is no abdominal tenderness  Musculoskeletal:         General: No swelling  Normal range of motion  Cervical back: Normal range of motion  Right lower leg: No edema  Left lower leg: No edema  Skin:     General: Skin is warm and dry  Coloration: Skin is not pale  Neurological:      Mental Status: He is alert and oriented to person, place, and time  Mental status is at baseline  Sensory: Sensation is intact  No sensory deficit  Motor: Motor function is intact  No weakness or tremor  Psychiatric:         Mood and Affect: Mood and affect normal  Mood is not anxious           Speech: Speech normal          Behavior: Behavior normal  Behavior is cooperative  Lab Results:   Results from last 7 days   Lab Units 08/17/22  0449   WBC Thousand/uL 8 42   HEMOGLOBIN g/dL 13 3   HEMATOCRIT % 42 1   PLATELETS Thousands/uL 140*      Results from last 7 days   Lab Units 08/17/22  0449   POTASSIUM mmol/L 4 2   CHLORIDE mmol/L 107   CO2 mmol/L 26   BUN mg/dL 31*   CREATININE mg/dL 1 42*   CALCIUM mg/dL 9 2       Imaging Studies: I have personally reviewed pertinent reports  Please note that the APS provides consultative services regarding pain management only  With the exception of ketamine and epidural infusions and except when indicated, final decisions regarding starting or changing doses of analgesic medications are at the discretion of the consulting service  Off hours consultation and/or medication management is generally not available      DONTE Arteaga  Acute Pain Service

## 2022-08-18 ENCOUNTER — ANESTHESIA (INPATIENT)
Dept: ANESTHESIOLOGY | Facility: HOSPITAL | Age: 77
DRG: 243 | End: 2022-08-18
Payer: MEDICARE

## 2022-08-18 ENCOUNTER — APPOINTMENT (INPATIENT)
Dept: NON INVASIVE DIAGNOSTICS | Facility: HOSPITAL | Age: 77
DRG: 243 | End: 2022-08-18
Payer: MEDICARE

## 2022-08-18 ENCOUNTER — APPOINTMENT (OUTPATIENT)
Dept: SURGERY | Facility: HOSPITAL | Age: 77
DRG: 243 | End: 2022-08-18
Payer: MEDICARE

## 2022-08-18 ENCOUNTER — ANESTHESIA EVENT (INPATIENT)
Dept: ANESTHESIOLOGY | Facility: HOSPITAL | Age: 77
DRG: 243 | End: 2022-08-18
Payer: MEDICARE

## 2022-08-18 LAB
2HR DELTA HS TROPONIN: 5 NG/L
4HR DELTA HS TROPONIN: 11 NG/L
ANION GAP SERPL CALCULATED.3IONS-SCNC: 3 MMOL/L (ref 4–13)
AORTIC ROOT: 3.2 CM
AORTIC VALVE MEAN VELOCITY: 31.5 M/S
APICAL FOUR CHAMBER EJECTION FRACTION: 66 %
ASCENDING AORTA: 2.7 CM
ATRIAL RATE: 40 BPM
ATRIAL RATE: 40 BPM
ATRIAL RATE: 79 BPM
ATRIAL RATE: 96 BPM
AV AREA BY CONTINUOUS VTI: 1.1 CM2
AV AREA PEAK VELOCITY: 1 CM2
AV LVOT MEAN GRADIENT: 5 MMHG
AV LVOT PEAK GRADIENT: 8 MMHG
AV MEAN GRADIENT: 48 MMHG
AV PEAK GRADIENT: 82 MMHG
AV VALVE AREA: 1.13 CM2
AV VELOCITY RATIO: 0.32
BASE EXCESS BLDA CALC-SCNC: -4 MMOL/L (ref -2–3)
BASOPHILS # BLD AUTO: 0.03 THOUSANDS/ΜL (ref 0–0.1)
BASOPHILS NFR BLD AUTO: 0 % (ref 0–1)
BUN SERPL-MCNC: 30 MG/DL (ref 5–25)
CA-I BLD-SCNC: 1.29 MMOL/L (ref 1.12–1.32)
CALCIUM SERPL-MCNC: 8.9 MG/DL (ref 8.3–10.1)
CARDIAC TROPONIN I PNL SERPL HS: 12 NG/L
CARDIAC TROPONIN I PNL SERPL HS: 17 NG/L
CARDIAC TROPONIN I PNL SERPL HS: 23 NG/L
CHLORIDE SERPL-SCNC: 108 MMOL/L (ref 96–108)
CO2 SERPL-SCNC: 27 MMOL/L (ref 21–32)
CREAT SERPL-MCNC: 1.56 MG/DL (ref 0.6–1.3)
DOP CALC AO PEAK VEL: 4.41 M/S
DOP CALC AO VTI: 80.2 CM
DOP CALC LVOT AREA: 3.14 CM2
DOP CALC LVOT DIAMETER: 2 CM
DOP CALC LVOT PEAK VEL VTI: 28.98 CM
DOP CALC LVOT PEAK VEL: 1.4 M/S
DOP CALC LVOT STROKE INDEX: 41.9 ML/M2
DOP CALC LVOT STROKE VOLUME: 91
E WAVE DECELERATION TIME: 293 MS
EOSINOPHIL # BLD AUTO: 0.14 THOUSAND/ΜL (ref 0–0.61)
EOSINOPHIL NFR BLD AUTO: 2 % (ref 0–6)
ERYTHROCYTE [DISTWIDTH] IN BLOOD BY AUTOMATED COUNT: 13.2 % (ref 11.6–15.1)
FRACTIONAL SHORTENING: 30 (ref 28–44)
GFR SERPL CREATININE-BSD FRML MDRD: 42 ML/MIN/1.73SQ M
GLUCOSE SERPL-MCNC: 106 MG/DL (ref 65–140)
GLUCOSE SERPL-MCNC: 135 MG/DL (ref 65–140)
GLUCOSE SERPL-MCNC: 138 MG/DL (ref 65–140)
GLUCOSE SERPL-MCNC: 141 MG/DL (ref 65–140)
GLUCOSE SERPL-MCNC: 160 MG/DL (ref 65–140)
GLUCOSE SERPL-MCNC: 161 MG/DL (ref 65–140)
HCO3 BLDA-SCNC: 22.1 MMOL/L (ref 24–30)
HCT VFR BLD AUTO: 41.1 % (ref 36.5–49.3)
HCT VFR BLD CALC: 37 % (ref 36.5–49.3)
HGB BLD-MCNC: 13.1 G/DL (ref 12–17)
HGB BLDA-MCNC: 12.6 G/DL (ref 12–17)
IMM GRANULOCYTES # BLD AUTO: 0.04 THOUSAND/UL (ref 0–0.2)
IMM GRANULOCYTES NFR BLD AUTO: 1 % (ref 0–2)
INTERVENTRICULAR SEPTUM IN DIASTOLE (PARASTERNAL SHORT AXIS VIEW): 1.7 CM
INTERVENTRICULAR SEPTUM: 1.7 CM (ref 0.6–1.1)
LAAS-AP2: 22.5 CM2
LAAS-AP4: 25.7 CM2
LACTATE SERPL-SCNC: 1.7 MMOL/L (ref 0.5–2)
LEFT ATRIUM SIZE: 4 CM
LEFT INTERNAL DIMENSION IN SYSTOLE: 3.2 CM (ref 2.1–4)
LEFT VENTRICULAR INTERNAL DIMENSION IN DIASTOLE: 4.6 CM (ref 3.5–6)
LEFT VENTRICULAR POSTERIOR WALL IN END DIASTOLE: 1.4 CM
LEFT VENTRICULAR STROKE VOLUME: 59 ML
LVSV (TEICH): 59 ML
LYMPHOCYTES # BLD AUTO: 2 THOUSANDS/ΜL (ref 0.6–4.47)
LYMPHOCYTES NFR BLD AUTO: 27 % (ref 14–44)
MAGNESIUM SERPL-MCNC: 1.9 MG/DL (ref 1.6–2.6)
MCH RBC QN AUTO: 28.9 PG (ref 26.8–34.3)
MCHC RBC AUTO-ENTMCNC: 31.9 G/DL (ref 31.4–37.4)
MCV RBC AUTO: 91 FL (ref 82–98)
MONOCYTES # BLD AUTO: 0.67 THOUSAND/ΜL (ref 0.17–1.22)
MONOCYTES NFR BLD AUTO: 9 % (ref 4–12)
MV E'TISSUE VEL-SEP: 7 CM/S
MV PEAK A VEL: 0.71 M/S
MV PEAK E VEL: 83 CM/S
MV STENOSIS PRESSURE HALF TIME: 85 MS
MV VALVE AREA P 1/2 METHOD: 2.59
NEUTROPHILS # BLD AUTO: 4.53 THOUSANDS/ΜL (ref 1.85–7.62)
NEUTS SEG NFR BLD AUTO: 61 % (ref 43–75)
NRBC BLD AUTO-RTO: 0 /100 WBCS
P AXIS: 32 DEGREES
P AXIS: 4 DEGREES
P AXIS: 78 DEGREES
PCO2 BLD: 23 MMOL/L (ref 21–32)
PCO2 BLD: 43.1 MM HG (ref 42–50)
PH BLD: 7.32 [PH] (ref 7.3–7.4)
PHOSPHATE SERPL-MCNC: 3.4 MG/DL (ref 2.3–4.1)
PLATELET # BLD AUTO: 144 THOUSANDS/UL (ref 149–390)
PMV BLD AUTO: 10.6 FL (ref 8.9–12.7)
PO2 BLD: 90 MM HG (ref 35–45)
POTASSIUM BLD-SCNC: 4.9 MMOL/L (ref 3.5–5.3)
POTASSIUM SERPL-SCNC: 4.5 MMOL/L (ref 3.5–5.3)
PR INTERVAL: 0 MS
PR INTERVAL: 376 MS
PR INTERVAL: 383 MS
PR INTERVAL: 400 MS
QRS AXIS: 60 DEGREES
QRS AXIS: 64 DEGREES
QRS AXIS: 67 DEGREES
QRS AXIS: 73 DEGREES
QRSD INTERVAL: 133 MS
QRSD INTERVAL: 138 MS
QRSD INTERVAL: 138 MS
QRSD INTERVAL: 140 MS
QT INTERVAL: 346 MS
QT INTERVAL: 454 MS
QT INTERVAL: 482 MS
QT INTERVAL: 488 MS
QTC INTERVAL: 392 MS
QTC INTERVAL: 397 MS
QTC INTERVAL: 438 MS
QTC INTERVAL: 521 MS
RA PRESSURE ESTIMATED: 3 MMHG
RBC # BLD AUTO: 4.53 MILLION/UL (ref 3.88–5.62)
RIGHT ATRIUM AREA SYSTOLE A4C: 22.8 CM2
RIGHT VENTRICLE ID DIMENSION: 4.4 CM
RV PSP: 28 MMHG
SAO2 % BLD FROM PO2: 96 % (ref 60–85)
SL CV LEFT ATRIUM LENGTH A2C: 5.8 CM
SL CV LV EF: 65
SL CV PED ECHO LEFT VENTRICLE DIASTOLIC VOLUME (MOD BIPLANE) 2D: 99 ML
SL CV PED ECHO LEFT VENTRICLE SYSTOLIC VOLUME (MOD BIPLANE) 2D: 40 ML
SODIUM BLD-SCNC: 135 MMOL/L (ref 136–145)
SODIUM SERPL-SCNC: 138 MMOL/L (ref 135–147)
SPECIMEN SOURCE: ABNORMAL
T WAVE AXIS: -18 DEGREES
T WAVE AXIS: -28 DEGREES
T WAVE AXIS: 61 DEGREES
T WAVE AXIS: 63 DEGREES
TR MAX PG: 25 MMHG
TR PEAK VELOCITY: 2.5 M/S
TRICUSPID ANNULAR PLANE SYSTOLIC EXCURSION: 2.7 CM
TRICUSPID VALVE PEAK REGURGITATION VELOCITY: 2.52 M/S
VENTRICULAR RATE: 40 BPM
VENTRICULAR RATE: 40 BPM
VENTRICULAR RATE: 79 BPM
VENTRICULAR RATE: 96 BPM
WBC # BLD AUTO: 7.41 THOUSAND/UL (ref 4.31–10.16)

## 2022-08-18 PROCEDURE — 85025 COMPLETE CBC W/AUTO DIFF WBC: CPT | Performed by: PHYSICIAN ASSISTANT

## 2022-08-18 PROCEDURE — 84132 ASSAY OF SERUM POTASSIUM: CPT

## 2022-08-18 PROCEDURE — 82948 REAGENT STRIP/BLOOD GLUCOSE: CPT

## 2022-08-18 PROCEDURE — 84484 ASSAY OF TROPONIN QUANT: CPT | Performed by: PHYSICIAN ASSISTANT

## 2022-08-18 PROCEDURE — 02HK3JZ INSERTION OF PACEMAKER LEAD INTO RIGHT VENTRICLE, PERCUTANEOUS APPROACH: ICD-10-PCS | Performed by: INTERNAL MEDICINE

## 2022-08-18 PROCEDURE — 02H63JZ INSERTION OF PACEMAKER LEAD INTO RIGHT ATRIUM, PERCUTANEOUS APPROACH: ICD-10-PCS | Performed by: INTERNAL MEDICINE

## 2022-08-18 PROCEDURE — 93010 ELECTROCARDIOGRAM REPORT: CPT | Performed by: INTERNAL MEDICINE

## 2022-08-18 PROCEDURE — 0JH606Z INSERTION OF PACEMAKER, DUAL CHAMBER INTO CHEST SUBCUTANEOUS TISSUE AND FASCIA, OPEN APPROACH: ICD-10-PCS | Performed by: INTERNAL MEDICINE

## 2022-08-18 PROCEDURE — 82330 ASSAY OF CALCIUM: CPT

## 2022-08-18 PROCEDURE — NC001 PR NO CHARGE: Performed by: STUDENT IN AN ORGANIZED HEALTH CARE EDUCATION/TRAINING PROGRAM

## 2022-08-18 PROCEDURE — NC001 PR NO CHARGE: Performed by: INTERNAL MEDICINE

## 2022-08-18 PROCEDURE — 99223 1ST HOSP IP/OBS HIGH 75: CPT | Performed by: INTERNAL MEDICINE

## 2022-08-18 PROCEDURE — 85014 HEMATOCRIT: CPT

## 2022-08-18 PROCEDURE — 83605 ASSAY OF LACTIC ACID: CPT | Performed by: STUDENT IN AN ORGANIZED HEALTH CARE EDUCATION/TRAINING PROGRAM

## 2022-08-18 PROCEDURE — 93306 TTE W/DOPPLER COMPLETE: CPT

## 2022-08-18 PROCEDURE — 82803 BLOOD GASES ANY COMBINATION: CPT

## 2022-08-18 PROCEDURE — 83735 ASSAY OF MAGNESIUM: CPT | Performed by: PHYSICIAN ASSISTANT

## 2022-08-18 PROCEDURE — 93005 ELECTROCARDIOGRAM TRACING: CPT

## 2022-08-18 PROCEDURE — NC001 PR NO CHARGE: Performed by: EMERGENCY MEDICINE

## 2022-08-18 PROCEDURE — 84295 ASSAY OF SERUM SODIUM: CPT

## 2022-08-18 PROCEDURE — 84100 ASSAY OF PHOSPHORUS: CPT | Performed by: PHYSICIAN ASSISTANT

## 2022-08-18 PROCEDURE — 80048 BASIC METABOLIC PNL TOTAL CA: CPT | Performed by: PHYSICIAN ASSISTANT

## 2022-08-18 PROCEDURE — 82947 ASSAY GLUCOSE BLOOD QUANT: CPT

## 2022-08-18 PROCEDURE — 84484 ASSAY OF TROPONIN QUANT: CPT

## 2022-08-18 PROCEDURE — 99291 CRITICAL CARE FIRST HOUR: CPT | Performed by: STUDENT IN AN ORGANIZED HEALTH CARE EDUCATION/TRAINING PROGRAM

## 2022-08-18 PROCEDURE — 93306 TTE W/DOPPLER COMPLETE: CPT | Performed by: INTERNAL MEDICINE

## 2022-08-18 RX ORDER — ONDANSETRON 2 MG/ML
4 INJECTION INTRAMUSCULAR; INTRAVENOUS EVERY 6 HOURS PRN
Status: DISCONTINUED | OUTPATIENT
Start: 2022-08-18 | End: 2022-08-24 | Stop reason: HOSPADM

## 2022-08-18 RX ORDER — DOPAMINE HYDROCHLORIDE 160 MG/100ML
1-20 INJECTION, SOLUTION INTRAVENOUS
Status: DISCONTINUED | OUTPATIENT
Start: 2022-08-18 | End: 2022-08-19

## 2022-08-18 RX ORDER — LANOLIN ALCOHOL/MO/W.PET/CERES
3 CREAM (GRAM) TOPICAL
Status: DISCONTINUED | OUTPATIENT
Start: 2022-08-18 | End: 2022-08-24 | Stop reason: HOSPADM

## 2022-08-18 RX ORDER — VANCOMYCIN HYDROCHLORIDE 1 G/200ML
1000 INJECTION, SOLUTION INTRAVENOUS ONCE
Status: COMPLETED | OUTPATIENT
Start: 2022-08-19 | End: 2022-08-19

## 2022-08-18 RX ORDER — FENTANYL CITRATE 50 UG/ML
INJECTION, SOLUTION INTRAMUSCULAR; INTRAVENOUS AS NEEDED
Status: DISCONTINUED | OUTPATIENT
Start: 2022-08-18 | End: 2022-08-18 | Stop reason: HOSPADM

## 2022-08-18 RX ORDER — MAGNESIUM SULFATE HEPTAHYDRATE 40 MG/ML
2 INJECTION, SOLUTION INTRAVENOUS ONCE
Status: COMPLETED | OUTPATIENT
Start: 2022-08-18 | End: 2022-08-18

## 2022-08-18 RX ORDER — METHOCARBAMOL 500 MG/1
250 TABLET, FILM COATED ORAL EVERY 6 HOURS SCHEDULED
Status: DISCONTINUED | OUTPATIENT
Start: 2022-08-18 | End: 2022-08-24 | Stop reason: HOSPADM

## 2022-08-18 RX ORDER — ROPIVACAINE HYDROCHLORIDE 2 MG/ML
INJECTION, SOLUTION EPIDURAL; INFILTRATION; PERINEURAL AS NEEDED
Status: DISCONTINUED | OUTPATIENT
Start: 2022-08-18 | End: 2022-08-18 | Stop reason: HOSPADM

## 2022-08-18 RX ORDER — OXYCODONE HYDROCHLORIDE 5 MG/1
2.5 TABLET ORAL EVERY 4 HOURS PRN
Status: DISCONTINUED | OUTPATIENT
Start: 2022-08-18 | End: 2022-08-24 | Stop reason: HOSPADM

## 2022-08-18 RX ORDER — FENTANYL CITRATE 50 UG/ML
INJECTION, SOLUTION INTRAMUSCULAR; INTRAVENOUS
Status: COMPLETED
Start: 2022-08-18 | End: 2022-08-18

## 2022-08-18 RX ORDER — OXYCODONE HYDROCHLORIDE 5 MG/1
5 TABLET ORAL EVERY 4 HOURS PRN
Status: DISCONTINUED | OUTPATIENT
Start: 2022-08-18 | End: 2022-08-24 | Stop reason: HOSPADM

## 2022-08-18 RX ORDER — LIDOCAINE HYDROCHLORIDE AND EPINEPHRINE 15; 5 MG/ML; UG/ML
INJECTION, SOLUTION EPIDURAL AS NEEDED
Status: DISCONTINUED | OUTPATIENT
Start: 2022-08-18 | End: 2022-08-18 | Stop reason: HOSPADM

## 2022-08-18 RX ORDER — SENNOSIDES 8.6 MG
1 TABLET ORAL
Status: DISCONTINUED | OUTPATIENT
Start: 2022-08-18 | End: 2022-08-24 | Stop reason: HOSPADM

## 2022-08-18 RX ORDER — HYDROMORPHONE HCL IN WATER/PF 6 MG/30 ML
0.2 PATIENT CONTROLLED ANALGESIA SYRINGE INTRAVENOUS EVERY 4 HOURS PRN
Status: DISCONTINUED | OUTPATIENT
Start: 2022-08-18 | End: 2022-08-23

## 2022-08-18 RX ADMIN — Medication 3 MG: at 20:08

## 2022-08-18 RX ADMIN — DOPAMINE HYDROCHLORIDE IN DEXTROSE 9 MCG/KG/MIN: 1.6 INJECTION, SOLUTION INTRAVENOUS at 15:27

## 2022-08-18 RX ADMIN — GABAPENTIN 100 MG: 100 CAPSULE ORAL at 21:32

## 2022-08-18 RX ADMIN — ASPIRIN 81 MG CHEWABLE TABLET 81 MG: 81 TABLET CHEWABLE at 12:55

## 2022-08-18 RX ADMIN — ACETAMINOPHEN 975 MG: 325 TABLET ORAL at 21:32

## 2022-08-18 RX ADMIN — METHOCARBAMOL 250 MG: 500 TABLET ORAL at 12:56

## 2022-08-18 RX ADMIN — FENTANYL CITRATE 50 MCG: 50 INJECTION INTRAMUSCULAR; INTRAVENOUS at 08:00

## 2022-08-18 RX ADMIN — FENTANYL CITRATE 50 MCG: 50 INJECTION INTRAMUSCULAR; INTRAVENOUS at 08:02

## 2022-08-18 RX ADMIN — ROPIVACAINE HYDROCHLORIDE: 5 INJECTION EPIDURAL; INFILTRATION; PERINEURAL at 22:45

## 2022-08-18 RX ADMIN — DOPAMINE HYDROCHLORIDE IN DEXTROSE 10 MCG/KG/MIN: 1.6 INJECTION, SOLUTION INTRAVENOUS at 11:00

## 2022-08-18 RX ADMIN — METHOCARBAMOL 250 MG: 500 TABLET ORAL at 18:20

## 2022-08-18 RX ADMIN — ROPIVACAINE HYDROCHLORIDE: 5 INJECTION EPIDURAL; INFILTRATION; PERINEURAL at 14:22

## 2022-08-18 RX ADMIN — LIDOCAINE HYDROCHLORIDE AND EPINEPHRINE 3 ML: 15; 5 INJECTION, SOLUTION EPIDURAL at 08:09

## 2022-08-18 RX ADMIN — INSULIN LISPRO 1 UNITS: 100 INJECTION, SOLUTION INTRAVENOUS; SUBCUTANEOUS at 12:58

## 2022-08-18 RX ADMIN — ROPIVACAINE HYDROCHLORIDE 7 ML: 2 INJECTION, SOLUTION EPIDURAL; INFILTRATION at 08:14

## 2022-08-18 RX ADMIN — SENNOSIDES 8.6 MG: 8.6 TABLET, FILM COATED ORAL at 21:32

## 2022-08-18 RX ADMIN — HEPARIN SODIUM 5000 UNITS: 5000 INJECTION INTRAVENOUS; SUBCUTANEOUS at 14:00

## 2022-08-18 RX ADMIN — PRAVASTATIN SODIUM 40 MG: 40 TABLET ORAL at 18:19

## 2022-08-18 RX ADMIN — MAGNESIUM SULFATE HEPTAHYDRATE 2 G: 40 INJECTION, SOLUTION INTRAVENOUS at 13:15

## 2022-08-18 RX ADMIN — ACETAMINOPHEN 975 MG: 325 TABLET ORAL at 13:59

## 2022-08-18 RX ADMIN — HEPARIN SODIUM 5000 UNITS: 5000 INJECTION INTRAVENOUS; SUBCUTANEOUS at 21:31

## 2022-08-18 RX ADMIN — ACETAMINOPHEN 975 MG: 325 TABLET ORAL at 05:15

## 2022-08-18 RX ADMIN — LIDOCAINE 5% 1 PATCH: 700 PATCH TOPICAL at 13:15

## 2022-08-18 NOTE — ANESTHESIA PROCEDURE NOTES
Epidural Block    Start time: 8/18/2022 8:09 AM  Reason for block: procedure for pain and at surgeon's request  Staffing  Performed: Anesthesiologist   Anesthesiologist: Kimo Junior MD  Preanesthetic Checklist  Completed: patient identified, IV checked, site marked, risks and benefits discussed, surgical consent, monitors and equipment checked, pre-op evaluation and timeout performed  Epidural  Patient position: sitting  Prep: ChloraPrep  Patient monitoring: cardiac monitor and frequent blood pressure checks  Approach: right paramedian  Location: thoracic  Injection technique: LYLE air  Needle  Needle type: Tuohy   Needle gauge: 18 G  Catheter type: side hole  Catheter size: 20 G  Catheter at skin depth: 11 cm  Catheter securement method: stabilization device  Test dose: negative  Assessment  Sensory level: T10  Number of attempts: 2negative aspiration for CSF, negative aspiration for heme and no paresthesia on injection  patient tolerated the procedure well with no immediate complications  Additional Notes  Uncomplicated epidural  2 attempts  1st attempt with midline approach resulted in os  Second attempt with R paramedian resulted in success  YLLE with saline @ 6cm   Catheter threaded without issues

## 2022-08-18 NOTE — PROGRESS NOTES
Progress Note - Acute Pain Service    Slim Curiel 68 y o  male MRN: [id number]  Unit/Bed#: ICU 02 Encounter: [id number]      Assessment:   Principal Problem:    Rib fractures  Active Problems:    Benign essential hypertension    Type 2 diabetes mellitus ([facility] )    Fracture of thoracic transverse process (HCC)    Lumbar transverse process fracture (HCC)    L3 osteophyte fracture    Fall    Slim Curiel is a 68 y o  male who sustained right-sided posterior 10th and 11th rib fractures, T12, L1 and L2 transverse process fractures  Acute pain service was consulted for evaluation of rib fracture pain, and for epidural/block  Patient was unable to receive epidural catheter placement for analgesia on 8/16 and 8/17 due to patient receiving subcutaneous Lovenox for DVT prophylaxis  Paravertebral catheter was attempted and aborted due to poor visualization of relevant anatomy  This morning 8/18 a thoracic epidural was placed successfully for analgesia, and a continuous infusion of ropivacaine 0 1% and fentanyl with on demand dosing was initiated  Upon return to floor patient developed symptomatic hypotension and bradycardia resulting in a rapid response and transfer to ICU  Epidural infusion remains on hold and patient was initiated on levophed drip  Cardiology and EP have been consulted  Pain remains controlled while patient is at rest on current regimen, once patient is stabilized will consider restarting epidural with ropivacaine 0 1% only  Plan:   Epidural infusion remains on hold until patient is stabilized    · Continue Tylenol 975 mg every 8 hours scheduled  · Continue lidocaine patch, 12 hours on/12 hours off, to ribcage  · Continue gabapentin 100 mg at bedtime  Estimated Creatinine Clearance: 48 2 mL/min (A) (by C-G formula based on SCr of 1 56 mg/dL (H))      · Continue Robaxin 750 mg every 6 hours as needed, for muscle spasms  · Continue oxycodone 5 mg every 4 hours as needed, for moderate pain  · Continue oxycodone 10 mg every 4 hours as needed, for severe pain  · Continue Dilaudid 0 5 mg every 4 hours as needed, for breakthrough pain      Bowel Regimen:  Recommend addition of stimulant laxative I using opioid pain medication, to prevent opioid induced constipation  Treatment plan was discussed with bedside nursing as well as primary care team     APS will continue to follow  Please contact Acute Pain Service - [facility] via NeXeptionext from [phone] with additional questions or concerns  See TigerText or Angel for additional contacts and after hours information  Pain History  Current pain location(s): right posterior rib cage  Pain Scale:  0  Quality: no pain at rest  24 hour history: patient slept well overnight   PT complaining he is tired an nauseous since placement of epidural this AM  Pain well controlled at rest      Opioid requirement previous 24 hours:   Oxycodone 30 mg  IV Dilaudid 1 mg    Meds/Allergies   all current active meds have been reviewed and current meds:   Current Facility-Administered Medications   Medication Dose Route Frequency    acetaminophen (TYLENOL) tablet 975 mg  975 mg Oral Q8H [facility]    aspirin chewable tablet 81 mg  81 mg Oral Daily    gabapentin (NEURONTIN) capsule 100 mg  100 mg Oral HS    heparin (porcine) subcutaneous injection 5,000 Units  5,000 Units Subcutaneous Q8H [facility]    HYDROmorphone HCl (DILAUDID) injection 0 2 mg  0 2 mg Intravenous Q4H PRN    insulin lispro (HumaLOG) 100 units/mL subcutaneous injection 1-6 Units  1-6 Units Subcutaneous TID AC    lactated ringers bolus 1,000 mL  1,000 mL Intravenous Once    lidocaine (LIDODERM) 5 % patch 1 patch  1 patch Topical Daily    methocarbamol (ROBAXIN) tablet 250 mg  250 mg Oral Q6H [facility]    oxyCODONE (ROXICODONE) IR tablet 2 5 mg  2 5 mg Oral Q4H PRN    oxyCODONE (ROXICODONE) IR tablet 5 mg  5 mg Oral Q4H PRN    pravastatin (PRAVACHOL) tablet 40 mg  40 mg Oral Daily With Dinner    ropivacaine 0 1 % PCEA Epidural Continuous       Allergies   Allergen Reactions    Penicillins Hives       Objective     Temp:  [97 3 °F (36 3 °C)-99 7 °F (37 6 °C)] 97 4 °F (36 3 °C)  HR:  [40-61] 42  Resp:  [16-20] 20  BP: ()/(40-95) 80/58    Physical Exam  Vitals reviewed  Constitutional:       General: He is not in acute distress  Appearance: He is ill-appearing and diaphoretic  He is not toxic-appearing  HENT:      Head: Normocephalic and atraumatic  Nose: Nose normal  No congestion or rhinorrhea  Mouth/Throat:      Mouth: Mucous membranes are dry  Eyes:      Extraocular Movements: Extraocular movements intact  Cardiovascular:      Rate and Rhythm: Bradycardia present  Pulmonary:      Effort: Pulmonary effort is normal  No tachypnea, bradypnea or respiratory distress  Abdominal:      General: There is no distension  Tenderness: There is no abdominal tenderness  Musculoskeletal:         General: Normal range of motion  Cervical back: Normal range of motion  Right lower leg: No edema  Left lower leg: No edema  Skin:     General: Skin is warm  Coloration: Skin is not pale  Findings: No rash  Neurological:      Mental Status: He is alert and oriented to person, place, and time  Mental status is at baseline  Sensory: Sensation is intact  No sensory deficit  Motor: Motor function is intact  No weakness or tremor  Psychiatric:         Mood and Affect: Mood normal          Speech: Speech normal          Behavior: Behavior normal  Behavior is cooperative           Lab Results:   Results from last 7 days   Lab Units 08/18/22  0928   WBC Thousand/uL 7 41   HEMOGLOBIN g/dL 13 1   HEMATOCRIT % 41 1   PLATELETS Thousands/uL 144*      Results from last 7 days   Lab Units 08/18/22  0928   POTASSIUM mmol/L 4 5   CHLORIDE mmol/L 108   CO2 mmol/L 27   BUN mg/dL 30*   CREATININE mg/dL 1 56*   CALCIUM mg/dL 8 9         Please note that the APS provides consultative services regarding pain management only  With the exception of ketamine and epidural infusions and except when indicated, final decisions regarding starting or changing doses of analgesic medications are at the discretion of the consulting service  Off hours consultation and/or medication management is generally not available      DONTE Chang  Acute Pain Service

## 2022-08-18 NOTE — CASE MANAGEMENT
Case Management Assessment & Discharge Planning Note    Patient name Delaney Norton  Location ICU 02/ICU 02 MRN [id number]  : 1945 Date 2022       Current Admission Date: 8/15/2022  Current Admission Diagnosis:Rib fractures   Patient Active Problem List    Diagnosis Date Noted    L3 osteophyte fracture 2022    Fall 2022    Rib fractures 08/15/2022    Fracture of thoracic transverse process ([facility] ) 08/15/2022    Lumbar transverse process fracture ([facility] ) 08/15/2022    Stage 3a chronic kidney disease ([facility] ) 2022    Special screening for malignant neoplasm of prostate 2021    RLS (restless legs syndrome) 2021    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 ([facility] ) 2021    Chronic bilateral low back pain without sciatica 2021    Hemiplegia and hemiparesis following cerebral infarction affecting left non-dominant side ([facility] ) 2020    Benign prostatic hyperplasia with nocturia 2020    Claudication of both lower extremities ([facility] ) 2019    Colon cancer screening 2019    Medicare annual wellness visit, subsequent 2019    Primary osteoarthritis of left knee 01/10/2019    Bilateral carotid artery stenosis 10/08/2018    Dermatosis 10/08/2018    Cramps of left lower extremity 2018    Plantar fasciitis 2018    Tinea cruris 2018    Constipation 2018    Seborrheic dermatitis 11/10/2017    History of stroke 09/15/2017    Asthma 2017    Benign essential hypertension 2017    Type 2 diabetes mellitus ([address] ) 2017    Hyperlipidemia 2017    Obesity 2017    Impingement syndrome of right shoulder 2017    Diabetic nephropathy associated with type 2 diabetes mellitus ([facility] ) 2017    Non-rheumatic aortic sclerosis 2017    Popliteal cyst, left 10/15/2015    Acquired hallux malleus of right foot 10/06/2015    Pre-ulcerative corn or callous 10/06/2015    Gout 12/11/2014    Allergic rhinitis 05/03/2012    Retina disorder 05/03/2012      LOS (days): 3  Geometric Mean LOS (GMLOS) (days): 2 90  Days to GMLOS:0 2     OBJECTIVE:    Risk of Unplanned Readmission Score: 9 11         Current admission status: Inpatient       Preferred Pharmacy:   [address], [address] [facility] [address]  Phone: [phone] Fax: [phone] [facility], [address] [address]  [address]  [facility] [address]  Phone: [phone] Fax: [phone]    Primary Care Provider: Dov Gomez MD    Primary Insurance: MEDICARE  Secondary Insurance: [address],[facility] E SHIELD    ASSESSMENT:  [address], [address] [facility] Representative - Spouse   Primary Phone: [phone] (Home)               Advance Directives  Does patient have a [address]?: No  Was patient offered paperwork?: Yes  Does patient currently have a Health Care decision maker?: Yes, please see Health Care Proxy section  Does patient have Advance Directives?: No  Was patient offered paperwork?: Yes  Primary Contact: Marcial Huang (Spouse) [phone]    Readmission Root Cause  30 Day Readmission: No    Patient Information  Admitted from[de-identified] Home  Mental Status: Alert  During Assessment patient was accompanied by: Not accompanied during assessment  Assessment information provided by[de-identified] Patient  Primary Caregiver: Self  Support Systems: Self, Spouse/significant other  [place] of Residence: [address] [facility] [address] [facility] [address] [facility] do you live in?: [address] entry access options   Select all that apply : No steps to enter home  Type of Current Residence: [place]  In the last 12 months, was there a time when you were not able to pay the mortgage or rent on time?: No  In the last 12 months, how many places have you lived?: 1  In the last 12 months, was there a time when you did not have a steady place to sleep or slept in a shelter (including now)?: No  Homeless/housing insecurity resource given?: N/A  Living Arrangements: Lives w/ Spouse/significant other  Is patient a ?: No    Activities of Daily Living Prior to Admission  Functional Status: Independent  Completes ADLs independently?: Yes  Ambulates independently?: Yes  Does patient use assisted devices?: No  Does patient currently own DME?: Yes  What DME does the patient currently own?: Straight Cane, Bedside Commode  Does patient have a history of Outpatient Therapy (PT/OT)?: Yes  Does the patient have a history of Short-Term Rehab?: No  Does patient have a history of HHC?: No  Does patient currently have [facility]?: No    Patient Information Continued  Income Source: Pension/[employer]CC  Does patient have prescription coverage?: Yes  Within the past 12 months, you worried that your food would run out before you got the money to buy more : Never true  Within the past 12 months, the food you bought just didn't last and you didn't have money to get more : Never true  Food insecurity resource given?: N/A  Does patient receive dialysis treatments?: No  Does patient have a history of substance abuse?: No  Does patient have a history of Mental Health Diagnosis?: No    Means of Transportation  Means of Transport to Appts[de-identified] Drives Self  In the past 12 months, has lack of transportation kept you from medical appointments or from getting medications?: No  In the past 12 months, has lack of transportation kept you from meetings, work, or from getting things needed for daily living?: No  Was application for public transport provided?: N/A    DISCHARGE DETAILS:    Discharge planning discussed with[de-identified] patient and wife  Freedom of Choice: Yes     CM contacted family/caregiver?: Yes  Were Treatment Team discharge recommendations reviewed with patient/caregiver?: Yes  Did patient/caregiver verbalize understanding of patient care needs?: Yes  Were patient/caregiver advised of the risks associated with not following Treatment Team discharge recommendations?: Yes    Contacts  Patient Contacts: Gris Holland (Spouse) [phone]  Relationship to Patient[de-identified] Family  Contact Method: Phone  Phone Number: [phone]  Reason/Outcome: Continuity of Care, Emergency Contact, Discharge [address]         Is the patient interested in [facility] at discharge?: No    DME Referral Provided  Referral made for DME?: No    Treatment Team Recommendation: Home  Discharge Destination Plan[de-identified] Home      Pt was transferred to ICU for higher level of care  Prior to transfer, pt was seen by OT/PT and recommended for a home d/c when medically stable  Pt lives with his wife in a 1 story home which has 0STE  Pt's bathroom is a tub/shower with standard toilet  Pt drives, is retired, and reports being fully independent for all ADL/IADLs  Pt ambulates independently but will use an [facility] for balance  Pt also owns a BS  Pt's had 0 falls in the last 6 months  Pt enjoys yardwork  Pt has a living will  Pt uses [facility] in [facility]  Pt has no documented hx of mental health, substance abuse, IP rehab or VNA  CM will continue to follow for changes in recommendations due to pt's transfer to ICU  Pt has 3 Moderna COVID vaccinations:    Dose 1  02/05/2021 (COVID-19 MODERNA VACC 0 5 ML IM)      Dose 2  03/05/2021 (COVID-19 MODERNA VACC 0 5 ML IM)      Booster dose  10/30/2021 (COVID-19 MODERNA VACC 0 5 ML IM)       CM reviewed d/c planning process including the following: identifying help at home, patient preference for d/c planning needs, Discharge Lounge, [facility] Meds to Bed program, availability of treatment team to discuss questions or concerns patient and/or family may have regarding understanding medications and recognizing signs and symptoms once discharged  CM also encouraged patient to follow up with all recommended appointments after discharge   Patient advised of importance for patient and family to participate in managing patients medical well being
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
## 2022-08-18 NOTE — PROGRESS NOTES
Pt transferred from [address] [place] [address] [address] after rapid response, admitted into ICU 2, trauma team at bedside, wife here also

## 2022-08-18 NOTE — PROGRESS NOTES
Spiritual Care Progress Note    2022  Patient: Loreta Garcia : 1945  Admission Date & Time: 8/15/2022 1247  MRN: [id number] CSN: [id number]         responded to RRT  Patient's wife was waiting outside of patient room  Patient's wife was visibly distressed and expressed concern for patient's wellbeing   provided active listening and silent supportive presence  Spiritual Care will remain available        22 1100   Clinical Encounter Type   Visited With Family   Routine Visit Introduction   Crisis Visit   (RRT)

## 2022-08-18 NOTE — ASSESSMENT & PLAN NOTE
Lab Results   Component Value Date    HGBA1C 6 9 (A) 06/16/2022       Recent Labs     08/17/22  0707 08/17/22  1147 08/17/22  1614 08/18/22  0658   POCGLU 114 134 137 106       Blood Sugar Average: Last 72 hrs:  (P) 127 6     - Continue sliding scale insulin  - Monitor blood sugars  - Outpatient follow-up with PCP
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
## 2022-08-18 NOTE — PLAN OF CARE
Problem: MOBILITY - ADULT  Goal: Maintain or return to baseline ADL function  Description: INTERVENTIONS:  -  Assess patient's ability to carry out ADLs; assess patient's baseline for ADL function and identify physical deficits which impact ability to perform ADLs (bathing, care of mouth/teeth, toileting, grooming, dressing, etc )  - Assess/evaluate cause of self-care deficits   - Assess range of motion  - Assess patient's mobility; develop plan if impaired  - Assess patient's need for assistive devices and provide as appropriate  - Encourage maximum independence but intervene and supervise when necessary  - Involve family in performance of ADLs  - Assess for home care needs following discharge   - Consider OT consult to assist with ADL evaluation and planning for discharge  - Provide patient education as appropriate  Outcome: Progressing  Goal: Maintains/Returns to pre admission functional level  Description: INTERVENTIONS:  - Perform BMAT or MOVE assessment daily    - Set and communicate daily mobility goal to care team and patient/family/caregiver  - Collaborate with rehabilitation services on mobility goals if consulted  - Perform Range of Motion 3 times a day  - Reposition patient every 2 hours    - Dangle patient 3 times a day  - Stand patient 3 times a day  - Ambulate patient 3 times a day  - Out of bed to chair 3 times a day   - Out of bed for meals 3 times a day  - Out of bed for toileting  - Record patient progres  Problem: INFECTION - ADULT  Goal: Absence or prevention of progression during hospitalization  Description: INTERVENTIONS:  - Assess and monitor for signs and symptoms of infection  - Monitor lab/diagnostic results  - Monitor all insertion sites, i e  indwelling lines, tubes, and drains  - Monitor endotracheal if appropriate and nasal secretions for changes in amount and color  - [place] appropriate cooling/warming therapies per order  - Administer medications as ordered  - Instruct and encourage patient and family to use good hand hygiene technique  - Identify and instruct in appropriate isolation precautions for identified infection/condition  Outcome: Progressing  Goal: Absence of fever/infection during neutropenic period  Description: INTERVENTIONS:  - Monitor WBC    Outcome: Progressing     Problem: SAFETY ADULT  Goal: Maintain or return to baseline ADL function  Description: INTERVENTIONS:  -  Assess patient's ability to carry out ADLs; assess patient's baseline for ADL function and identify physical deficits which impact ability to perform ADLs (bathing, care of mouth/teeth, toileting, grooming, dressing, etc )  - Assess/evaluate cause of self-care deficits   - Assess range of motion  - Assess patient's mobility; develop plan if impaired  - Assess patient's need for assistive devices and provide as appropriate  - Encourage maximum independence but intervene and supervise when necessary  - Involve family in performance of ADLs  - Assess for home care needs following discharge   - Consider OT consult to assist with ADL evaluation and planning for discharge  - Provide patient education as appropriate  Outcome: Progressing  Goal: Maintains/Returns to pre admission functional level  Description: INTERVENTIONS:  - Perform BMAT or MOVE assessment daily    - Set and communicate daily mobility goal to care team and patient/family/caregiver  - Collaborate with rehabilitation services on mobility goals if consulted  - Perform Range of Motion 3 times a day  - Reposition patient every 2 hours    - Dangle patient 3 times a day  - Stand patient 3 times a day  - Ambulate patient 3 times a day  - Out of bed to chair 3 times a day   - Out of bed for meals 3 times a day  - Out of bed for toileting  - Record patient progress and toleration of activity level   Outcome: Progressing  Goal: Patient will remain free of falls  Description: INTERVENTIONS:  - Educate patient/family on patient safety including physical limitations  - Instruct patient to call for assistance with activity   - Consult OT/PT to assist with strengthening/mobility   - Keep Call bell within reach  - Keep bed low and locked with side rails adjusted as appropriate  - Keep care items and personal belongings within reach  - Initiate and maintain comfort rounds  - Make Fall Risk Sign visible to staff  - Offer Toileting ever 2 Hours, in advance of need  - Initiate/Maintain on alarm  - Obtain necessary fall risk management equipment:   Problem: DISCHARGE PLANNING  Goal: Discharge to home or other facility with appropriate resources  Description: INTERVENTIONS:  - Identify barriers to discharge w/patient and caregiver  - Arrange for needed discharge resources and transportation as appropriate  - Identify discharge learning needs (meds, wound care, etc )  - Arrange for interpretive services to assist at discharge as needed  - Refer to Case Management Department for coordinating discharge planning if the patient needs post-hospital services based on physician/advanced practitioner order or complex needs related to functional status, cognitive ability, or social support system  Outcome: Progressing     - Apply yellow socks and bracelet for high fall risk patients  - Consider moving patient to room near nurses station  Outcome: Progressing   s and toleration of activity level   Outcome: Progressing
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
## 2022-08-18 NOTE — RAPID RESPONSE
Rapid Response Note  Melissa Worrell 68 y o  male MRN: [id number]  [facility] Encounter: [id number]    Rapid Response Notification(s):   Response called date/time:  8/18/2022 9:05 AM  Response team arrival date/time:  8/18/2022 9:06 AM  Response end date/time:  8/18/2022 9:40 AM  Level of care:  [facility]  Rapid response location:  [facility]  Primary reason for rapid response call:  Acute change in BP and acute change in heart rate    Rapid Response Intervention(s):   Airway:  None  Breathing:  Oxygen  Circulation:  Electrocardiogram  Medications administered:  Atropine and dopamine       Assessment:   · Bradycardia, hypotension    Plan:   · EKG revealing NSR with 1st degree AVB  · Stop epidural  · Given 0 5 mg atropine without significant change in bradycardia  · Initiated on dopamine infusion at 10mcg/kg/min-- HR improved with this  If becomes unresponsive to this, consideration for isuprel vs  Need for TVP  · Obtain cardiology/EP consult  · Discontinue anti-HTN medications  · Obtain trop, BMP, VBG, lactic, CBC, Mg  · Obtain TTE  · Transfer to ICU     Rapid Response Outcome:   Transfer:  Transfer to ICU  Primary service notified of transfer: Yes (Dr Augeda Sutherland present)      Family notified of transfer: yes (spouse present at bedside)     Background/Situation:   Melissa Worrell is a 68 y o  male with PMH HTN, HLD, CKD, DM, and AS who is currently being treated under the trauma service for multiple right-sided rib fractures and transverse spinous fractures of T12, L1, and L2  Today, APS placed an epidural for continued pain management  Patient then became hypotensive to 85F systolic and bradycardic to high 30s (38)  RRT called at that time  Patient remained oriented and arousable during this episode, but was lethargic  See plan above for further details  Review of Systems   Constitutional: Positive for fatigue  Negative for chills, diaphoresis and fever  Eyes: Negative for visual disturbance  Respiratory: Negative for cough and shortness of breath  Cardiovascular: Negative for chest pain and palpitations  Gastrointestinal: Negative for abdominal pain  Neurological: Negative for dizziness, weakness, light-headedness, numbness and headaches  Objective:   Vitals:    08/18/22 0908 08/18/22 0911 08/18/22 0915 08/18/22 0918   BP: (!) 80/58      Pulse: (!) 40 (!) 41  (!) 42   Resp: 20      Temp:       TempSrc:       SpO2: (!) 87% 90% 91% 91%   Weight:       Height:         Physical Exam  Vitals reviewed  Constitutional:       General: He is not in acute distress  Appearance: He is diaphoretic  HENT:      Head: Normocephalic  Mouth/Throat:      Mouth: Mucous membranes are moist    Eyes:      Conjunctiva/sclera: Conjunctivae normal    Cardiovascular:      Rate and Rhythm: Regular rhythm  Bradycardia present  Heart sounds: Normal heart sounds  No murmur heard  Pulmonary:      Effort: Pulmonary effort is normal    Abdominal:      General: There is no distension  Palpations: Abdomen is soft  Tenderness: There is no abdominal tenderness  Musculoskeletal:      Cervical back: Neck supple  Right lower leg: No edema  Left lower leg: No edema  Skin:     General: Skin is warm  Capillary Refill: Capillary refill takes less than 2 seconds  Neurological:      General: No focal deficit present  Mental Status: He is oriented to person, place, and time  He is lethargic  Portions of the record may have been created with voice recognition software  Occasional wrong word or "sound a like" substitutions may have occurred due to the inherent limitations of voice recognition software  Read the chart carefully and recognize, using context, where substitutions have occurred      Caitlyn Luna PA-C

## 2022-08-18 NOTE — PROGRESS NOTES
[address] [facility]  Progress Note - Odalys Manzanares 1945, 68 y o  male MRN: [id number]  Unit/Bed#: [facility] 401-06 Encounter: [id number]  Primary Care Provider: Colin Garcia MD   Date and time admitted to hospital: 8/15/2022 12:47 PM    Fall  Assessment & Plan  - Status post fall with the below noted injuries  - Fall precautions   - PT and OT evaluation and treatment as indicated  - Case Management consultation for disposition planning  L3 osteophyte fracture  Assessment & Plan  - L3 fracture, present on admission   - Neurosurgery evaluation and recommendations appreciated  Non-operative management recommended  - Bracing: Maintain LSO brace whenever upright >45 degrees and/or out of bed  - Spine precautions  - Monitor neurovascular exam   - Multimodal analgesic regimen as needed  - Upright spine x-rays pending  - PT and OT evaluation and treatment as indicated  - Outpatient follow up with Neurosurgery for re-evaluation  Lumbar transverse process fracture [facility])  Assessment & Plan  - see plan below    Fracture of thoracic transverse process [facility])  Assessment & Plan  - see plan below    Type 2 diabetes mellitus [facility])  Assessment & Plan  Lab Results   Component Value Date    HGBA1C 6 9 (A) 06/16/2022       Recent Labs     08/17/22  0707 08/17/22  1147 08/17/22  1614 08/18/22  0658   POCGLU 114 134 137 106       Blood Sugar Average: Last 72 hrs:  (P) 127 6     - Continue sliding scale insulin  - Monitor blood sugars  - Outpatient follow-up with PCP    Benign essential hypertension  Assessment & Plan  - Continue current medication regimen   - Outpatient follow-up with PCP  * Rib fractures  Assessment & Plan  - Multiple right-sided rib fractures (10-11), present on admission   - Continue rib fracture protocol   - Continue to encourage incentive spirometer use and adequate pulmonary hygiene    Currently pulling 1000 mL on I S   - PIC score per nursing  - Continue multimodal analgesic regimen  Appreciate APS evaluation and recommendations    - Supplemental oxygen via nasal cannula as needed to maintain saturations greater than or equal to 94%  - Repeat chest x-ray from 8/16/22 reviewed  - PT and OT evaluation and treatment as indicated  - Outpatient follow-up in the trauma clinic for re-evaluation in approximately 2 weeks  Disposition: med/surg    SUBJECTIVE:  Chief Complaint: nauseous, tired    Pt is bradycardic and hypotensive most likely 2/2 epidural medication  Pt is mentating and interactive  Epidural infusion held  Given a dose of atropine and started on dopamine and transferred to the ICU  BP begins to improve before transfer    OBJECTIVE:   Vitals:   Temp:  [97 3 °F (36 3 °C)-99 7 °F (37 6 °C)] 99 7 °F (37 6 °C)  HR:  [46-61] 57  Resp:  [16-20] 19  BP: (101-137)/(60-95) 101/60    Intake/Output:  I/O       08/16 0701  08/17 0700 08/17 0701  08/18 0700 08/18 0701  08/19 0700    P  O  230 200     Total Intake(mL/kg) 230 (2 3) 200 (2)     Urine (mL/kg/hr) 650 (0 3) 325 (0 1)     Stool  0     Total Output 650 325     Net -420 -125            Unmeasured Urine Occurrence 1 x      Unmeasured Stool Occurrence  200 x          Nutrition: Diet Regular; Regular House  GI Proph/Bowel Reg: see mar  VTE Prophylaxis:Heparin     Physical Exam:   GENERAL APPEARANCE: Patient in no acute distress  HEENT: NCAT; PERRL, EOMs intact; Mucous membranes moist  CV: Regular rate and rhythm; no murmur/gallops/rubs appreciated  CHEST / LUNGS: Clear to auscultation  ABD: NABS; soft; non-distended; non-tender  EXT: +2 pulses bilaterally upper & lower extremities; no edema  NEURO: GCS 15; no focal neurologic deficits; neurovascularly intact  SKIN: Warm, dry and well perfused; no rash; no jaundice  Invasive Devices  Report    Peripheral Intravenous Line  Duration           Peripheral IV 08/15/22 Dorsal (posterior); Left Hand 3 days    Peripheral IV 08/15/22 Right Antecubital 2 days          Epidural Line  Duration           Nerve Block Catheter 08/17/22 <1 day                 [facility] Score  PIC Pain Score: 3 (8/18/2022  5:15 AM)  PIC Incentive Spirometry Score: Above goal volume (8/18/2022  4:00 AM)  PIC Cough Description: Weak (8/18/2022  4:00 AM)  PIC Total Score: 9 (8/18/2022  4:00 AM)       If the Total PIC Score </=5, did you consult APS and evaluate patient for further intervention?: no      Pain:    Incentive Spirometry  Cough  3 = Controlled  4 = Above goal volume 3 = Strong  2 = Moderate  3 = Goal to alert volume 2 = Weak  1 = Severe  2 = Below alert volume 1 = Absent     1 = Unable to perform IS         Lab Results: BMP/CMP: No results found for: SODIUM, K, CL, CO2, ANIONGAP, BUN, CREATININE, GLUCOSE, CALCIUM, AST, ALT, ALKPHOS, PROT, BILITOT, EGFR  Imaging/EKG Studies: I have personally reviewed pertinent reports       Other Studies:

## 2022-08-18 NOTE — ASSESSMENT & PLAN NOTE
- Multiple right-sided rib fractures (10-11), present on admission   - Continue rib fracture protocol   - Continue to encourage incentive spirometer use and adequate pulmonary hygiene  Currently pulling 1000 mL on I S   - PIC score per nursing  - Continue multimodal analgesic regimen  Appreciate APS evaluation and recommendations    - Supplemental oxygen via nasal cannula as needed to maintain saturations greater than or equal to 94%  - Repeat chest x-ray from 8/16/22 reviewed  - PT and OT evaluation and treatment as indicated  - Outpatient follow-up in the trauma clinic for re-evaluation in approximately 2 weeks

## 2022-08-18 NOTE — PROGRESS NOTES
On return from pacu from having epidural placed bp 72/40 hr 40, pt stated that he felt nauseated, pt clammy to touch, bp rechecked manually in left arm 80/58 and hr remained 40-41, blood sugar on finger stick 138, pt alert and oriented c/o fatigue,  epidural stopped and rapid response called

## 2022-08-19 ENCOUNTER — ANESTHESIA EVENT (INPATIENT)
Dept: NON INVASIVE DIAGNOSTICS | Facility: HOSPITAL | Age: 77
DRG: 243 | End: 2022-08-19
Payer: MEDICARE

## 2022-08-19 ENCOUNTER — APPOINTMENT (OUTPATIENT)
Dept: RADIOLOGY | Facility: HOSPITAL | Age: 77
DRG: 243 | End: 2022-08-19
Payer: MEDICARE

## 2022-08-19 PROBLEM — R33.9 URINARY RETENTION: Status: ACTIVE | Noted: 2022-08-19

## 2022-08-19 PROBLEM — I35.0 SEVERE AORTIC STENOSIS: Status: ACTIVE | Noted: 2022-08-19

## 2022-08-19 PROBLEM — R00.1 BRADYCARDIA: Status: ACTIVE | Noted: 2022-08-19

## 2022-08-19 LAB
ANION GAP SERPL CALCULATED.3IONS-SCNC: 4 MMOL/L (ref 4–13)
ANION GAP SERPL CALCULATED.3IONS-SCNC: 7 MMOL/L (ref 4–13)
ATRIAL RATE: 60 BPM
ATRIAL RATE: 60 BPM
ATRIAL RATE: 61 BPM
BASE EX.OXY STD BLDV CALC-SCNC: 45.6 % (ref 60–80)
BASE EXCESS BLDV CALC-SCNC: -0.1 MMOL/L
BUN SERPL-MCNC: 36 MG/DL (ref 5–25)
BUN SERPL-MCNC: 40 MG/DL (ref 5–25)
CA-I BLD-SCNC: 1.14 MMOL/L (ref 1.12–1.32)
CALCIUM SERPL-MCNC: 9.1 MG/DL (ref 8.3–10.1)
CALCIUM SERPL-MCNC: 9.2 MG/DL (ref 8.3–10.1)
CHLORIDE SERPL-SCNC: 103 MMOL/L (ref 96–108)
CHLORIDE SERPL-SCNC: 108 MMOL/L (ref 96–108)
CO2 SERPL-SCNC: 24 MMOL/L (ref 21–32)
CO2 SERPL-SCNC: 26 MMOL/L (ref 21–32)
CREAT SERPL-MCNC: 1.79 MG/DL (ref 0.6–1.3)
CREAT SERPL-MCNC: 1.8 MG/DL (ref 0.6–1.3)
ERYTHROCYTE [DISTWIDTH] IN BLOOD BY AUTOMATED COUNT: 12.8 % (ref 11.6–15.1)
GFR SERPL CREATININE-BSD FRML MDRD: 35 ML/MIN/1.73SQ M
GFR SERPL CREATININE-BSD FRML MDRD: 35 ML/MIN/1.73SQ M
GLUCOSE SERPL-MCNC: 137 MG/DL (ref 65–140)
GLUCOSE SERPL-MCNC: 158 MG/DL (ref 65–140)
GLUCOSE SERPL-MCNC: 171 MG/DL (ref 65–140)
GLUCOSE SERPL-MCNC: 194 MG/DL (ref 65–140)
GLUCOSE SERPL-MCNC: 289 MG/DL (ref 65–140)
HCO3 BLDV-SCNC: 27 MMOL/L (ref 24–30)
HCT VFR BLD AUTO: 38.4 % (ref 36.5–49.3)
HCT VFR BLD AUTO: 40.9 % (ref 36.5–49.3)
HGB BLD-MCNC: 12.6 G/DL (ref 12–17)
HGB BLD-MCNC: 13.6 G/DL (ref 12–17)
LACTATE SERPL-SCNC: 1.9 MMOL/L (ref 0.5–2)
MAGNESIUM SERPL-MCNC: 2.5 MG/DL (ref 1.6–2.6)
MAGNESIUM SERPL-MCNC: 2.6 MG/DL (ref 1.6–2.6)
MCH RBC QN AUTO: 28.8 PG (ref 26.8–34.3)
MCHC RBC AUTO-ENTMCNC: 32.8 G/DL (ref 31.4–37.4)
MCV RBC AUTO: 88 FL (ref 82–98)
O2 CT BLDV-SCNC: 9.1 ML/DL
PCO2 BLDV: 53.9 MM HG (ref 42–50)
PH BLDV: 7.32 [PH] (ref 7.3–7.4)
PHOSPHATE SERPL-MCNC: 3.8 MG/DL (ref 2.3–4.1)
PLATELET # BLD AUTO: 141 THOUSANDS/UL (ref 149–390)
PMV BLD AUTO: 10.5 FL (ref 8.9–12.7)
PO2 BLDV: 25 MM HG (ref 35–45)
POTASSIUM SERPL-SCNC: 4.3 MMOL/L (ref 3.5–5.3)
POTASSIUM SERPL-SCNC: 4.9 MMOL/L (ref 3.5–5.3)
PR INTERVAL: 0 MS
QRS AXIS: 84 DEGREES
QRS AXIS: 84 DEGREES
QRS AXIS: 85 DEGREES
QRSD INTERVAL: 142 MS
QRSD INTERVAL: 142 MS
QRSD INTERVAL: 146 MS
QT INTERVAL: 421 MS
QT INTERVAL: 433 MS
QT INTERVAL: 438 MS
QTC INTERVAL: 424 MS
QTC INTERVAL: 433 MS
QTC INTERVAL: 438 MS
RBC # BLD AUTO: 4.38 MILLION/UL (ref 3.88–5.62)
SODIUM SERPL-SCNC: 134 MMOL/L (ref 135–147)
SODIUM SERPL-SCNC: 138 MMOL/L (ref 135–147)
T WAVE AXIS: 65 DEGREES
T WAVE AXIS: 76 DEGREES
T WAVE AXIS: 81 DEGREES
VENTRICULAR RATE: 60 BPM
VENTRICULAR RATE: 60 BPM
VENTRICULAR RATE: 61 BPM
WBC # BLD AUTO: 10.11 THOUSAND/UL (ref 4.31–10.16)

## 2022-08-19 PROCEDURE — 82948 REAGENT STRIP/BLOOD GLUCOSE: CPT

## 2022-08-19 PROCEDURE — 33208 INSRT HEART PM ATRIAL & VENT: CPT | Performed by: INTERNAL MEDICINE

## 2022-08-19 PROCEDURE — C1892 INTRO/SHEATH,FIXED,PEEL-AWAY: HCPCS | Performed by: INTERNAL MEDICINE

## 2022-08-19 PROCEDURE — 93010 ELECTROCARDIOGRAM REPORT: CPT | Performed by: INTERNAL MEDICINE

## 2022-08-19 PROCEDURE — 85027 COMPLETE CBC AUTOMATED: CPT | Performed by: PHYSICIAN ASSISTANT

## 2022-08-19 PROCEDURE — 76604 US EXAM CHEST: CPT | Performed by: PHYSICIAN ASSISTANT

## 2022-08-19 PROCEDURE — 80048 BASIC METABOLIC PNL TOTAL CA: CPT | Performed by: PHYSICIAN ASSISTANT

## 2022-08-19 PROCEDURE — 71045 X-RAY EXAM CHEST 1 VIEW: CPT

## 2022-08-19 PROCEDURE — 83735 ASSAY OF MAGNESIUM: CPT

## 2022-08-19 PROCEDURE — 82330 ASSAY OF CALCIUM: CPT | Performed by: PHYSICIAN ASSISTANT

## 2022-08-19 PROCEDURE — 85014 HEMATOCRIT: CPT | Performed by: PHYSICIAN ASSISTANT

## 2022-08-19 PROCEDURE — 93005 ELECTROCARDIOGRAM TRACING: CPT

## 2022-08-19 PROCEDURE — C1769 GUIDE WIRE: HCPCS | Performed by: INTERNAL MEDICINE

## 2022-08-19 PROCEDURE — 83605 ASSAY OF LACTIC ACID: CPT | Performed by: PHYSICIAN ASSISTANT

## 2022-08-19 PROCEDURE — 85018 HEMOGLOBIN: CPT | Performed by: PHYSICIAN ASSISTANT

## 2022-08-19 PROCEDURE — 99291 CRITICAL CARE FIRST HOUR: CPT | Performed by: EMERGENCY MEDICINE

## 2022-08-19 PROCEDURE — 83735 ASSAY OF MAGNESIUM: CPT | Performed by: PHYSICIAN ASSISTANT

## 2022-08-19 PROCEDURE — 99232 SBSQ HOSP IP/OBS MODERATE 35: CPT | Performed by: ANESTHESIOLOGY

## 2022-08-19 PROCEDURE — 84100 ASSAY OF PHOSPHORUS: CPT | Performed by: PHYSICIAN ASSISTANT

## 2022-08-19 PROCEDURE — 93308 TTE F-UP OR LMTD: CPT | Performed by: PHYSICIAN ASSISTANT

## 2022-08-19 PROCEDURE — C1898 LEAD, PMKR, OTHER THAN TRANS: HCPCS | Performed by: INTERNAL MEDICINE

## 2022-08-19 PROCEDURE — C1785 PMKR, DUAL, RATE-RESP: HCPCS | Performed by: INTERNAL MEDICINE

## 2022-08-19 PROCEDURE — 82805 BLOOD GASES W/O2 SATURATION: CPT | Performed by: PHYSICIAN ASSISTANT

## 2022-08-19 DEVICE — ENVELOPE CMRM6122 ABSORB MED MR
Type: IMPLANTABLE DEVICE | Site: CHEST | Status: FUNCTIONAL
Brand: TYRX™

## 2022-08-19 DEVICE — LEAD 383069 MRI US
Type: IMPLANTABLE DEVICE | Site: HEART | Status: FUNCTIONAL
Brand: SELECTSECURE™ MRI SURESCAN™

## 2022-08-19 DEVICE — IPG W1DR01 AZURE XT DR MRI USA
Type: IMPLANTABLE DEVICE | Site: CHEST | Status: FUNCTIONAL
Brand: AZURE™ XT DR MRI SURESCAN™

## 2022-08-19 DEVICE — LEAD 457453 MRI US BI RCMCRD MVC
Type: IMPLANTABLE DEVICE | Site: HEART | Status: FUNCTIONAL
Brand: CAPSURE SENSE MRI™ SURESCAN™

## 2022-08-19 RX ORDER — GENTAMICIN SULFATE 40 MG/ML
INJECTION, SOLUTION INTRAMUSCULAR; INTRAVENOUS AS NEEDED
Status: DISCONTINUED | OUTPATIENT
Start: 2022-08-19 | End: 2022-08-19 | Stop reason: HOSPADM

## 2022-08-19 RX ORDER — ALBUTEROL SULFATE 2.5 MG/3ML
2.5 SOLUTION RESPIRATORY (INHALATION) ONCE AS NEEDED
Status: DISCONTINUED | OUTPATIENT
Start: 2022-08-19 | End: 2022-08-19 | Stop reason: HOSPADM

## 2022-08-19 RX ORDER — PROPOFOL 10 MG/ML
INJECTION, EMULSION INTRAVENOUS AS NEEDED
Status: DISCONTINUED | OUTPATIENT
Start: 2022-08-19 | End: 2022-08-19

## 2022-08-19 RX ORDER — HYDROMORPHONE HCL/PF 1 MG/ML
0.5 SYRINGE (ML) INJECTION
Status: DISCONTINUED | OUTPATIENT
Start: 2022-08-19 | End: 2022-08-19 | Stop reason: HOSPADM

## 2022-08-19 RX ORDER — SODIUM CHLORIDE, SODIUM GLUCONATE, SODIUM ACETATE, POTASSIUM CHLORIDE, MAGNESIUM CHLORIDE, SODIUM PHOSPHATE, DIBASIC, AND POTASSIUM PHOSPHATE .53; .5; .37; .037; .03; .012; .00082 G/100ML; G/100ML; G/100ML; G/100ML; G/100ML; G/100ML; G/100ML
500 INJECTION, SOLUTION INTRAVENOUS ONCE
Status: COMPLETED | OUTPATIENT
Start: 2022-08-19 | End: 2022-08-19

## 2022-08-19 RX ORDER — LIDOCAINE HYDROCHLORIDE 10 MG/ML
INJECTION, SOLUTION EPIDURAL; INFILTRATION; INTRACAUDAL; PERINEURAL AS NEEDED
Status: DISCONTINUED | OUTPATIENT
Start: 2022-08-19 | End: 2022-08-19

## 2022-08-19 RX ORDER — ONDANSETRON 2 MG/ML
INJECTION INTRAMUSCULAR; INTRAVENOUS AS NEEDED
Status: DISCONTINUED | OUTPATIENT
Start: 2022-08-19 | End: 2022-08-19

## 2022-08-19 RX ORDER — FENTANYL CITRATE/PF 50 MCG/ML
25 SYRINGE (ML) INJECTION
Status: DISCONTINUED | OUTPATIENT
Start: 2022-08-19 | End: 2022-08-19 | Stop reason: HOSPADM

## 2022-08-19 RX ORDER — DEXAMETHASONE SODIUM PHOSPHATE 10 MG/ML
INJECTION, SOLUTION INTRAMUSCULAR; INTRAVENOUS AS NEEDED
Status: DISCONTINUED | OUTPATIENT
Start: 2022-08-19 | End: 2022-08-19

## 2022-08-19 RX ORDER — EPHEDRINE SULFATE 50 MG/ML
INJECTION INTRAVENOUS AS NEEDED
Status: DISCONTINUED | OUTPATIENT
Start: 2022-08-19 | End: 2022-08-19

## 2022-08-19 RX ORDER — ONDANSETRON 2 MG/ML
4 INJECTION INTRAMUSCULAR; INTRAVENOUS ONCE AS NEEDED
Status: DISCONTINUED | OUTPATIENT
Start: 2022-08-19 | End: 2022-08-19 | Stop reason: HOSPADM

## 2022-08-19 RX ORDER — INSULIN LISPRO 100 [IU]/ML
1-6 INJECTION, SOLUTION INTRAVENOUS; SUBCUTANEOUS
Status: DISCONTINUED | OUTPATIENT
Start: 2022-08-19 | End: 2022-08-24 | Stop reason: HOSPADM

## 2022-08-19 RX ORDER — LIDOCAINE HYDROCHLORIDE 10 MG/ML
INJECTION, SOLUTION EPIDURAL; INFILTRATION; INTRACAUDAL; PERINEURAL AS NEEDED
Status: DISCONTINUED | OUTPATIENT
Start: 2022-08-19 | End: 2022-08-19 | Stop reason: HOSPADM

## 2022-08-19 RX ORDER — SODIUM CHLORIDE 9 MG/ML
INJECTION, SOLUTION INTRAVENOUS CONTINUOUS PRN
Status: DISCONTINUED | OUTPATIENT
Start: 2022-08-19 | End: 2022-08-19

## 2022-08-19 RX ADMIN — EPHEDRINE SULFATE 5 MG: 50 INJECTION INTRAVENOUS at 09:24

## 2022-08-19 RX ADMIN — ACETAMINOPHEN 975 MG: 325 TABLET ORAL at 21:57

## 2022-08-19 RX ADMIN — DEXAMETHASONE SODIUM PHOSPHATE 10 MG: 10 INJECTION, SOLUTION INTRAMUSCULAR; INTRAVENOUS at 08:51

## 2022-08-19 RX ADMIN — HEPARIN SODIUM 5000 UNITS: 5000 INJECTION INTRAVENOUS; SUBCUTANEOUS at 13:45

## 2022-08-19 RX ADMIN — ASPIRIN 81 MG CHEWABLE TABLET 81 MG: 81 TABLET CHEWABLE at 12:00

## 2022-08-19 RX ADMIN — INSULIN LISPRO 1 UNITS: 100 INJECTION, SOLUTION INTRAVENOUS; SUBCUTANEOUS at 12:00

## 2022-08-19 RX ADMIN — ACETAMINOPHEN 975 MG: 325 TABLET ORAL at 14:09

## 2022-08-19 RX ADMIN — SODIUM CHLORIDE, SODIUM GLUCONATE, SODIUM ACETATE, POTASSIUM CHLORIDE, MAGNESIUM CHLORIDE, SODIUM PHOSPHATE, DIBASIC, AND POTASSIUM PHOSPHATE 500 ML: .53; .5; .37; .037; .03; .012; .00082 INJECTION, SOLUTION INTRAVENOUS at 11:33

## 2022-08-19 RX ADMIN — METHOCARBAMOL 250 MG: 500 TABLET ORAL at 18:30

## 2022-08-19 RX ADMIN — INSULIN LISPRO 2 UNITS: 100 INJECTION, SOLUTION INTRAVENOUS; SUBCUTANEOUS at 06:28

## 2022-08-19 RX ADMIN — SODIUM CHLORIDE: 0.9 INJECTION, SOLUTION INTRAVENOUS at 08:29

## 2022-08-19 RX ADMIN — ROPIVACAINE HYDROCHLORIDE: 5 INJECTION EPIDURAL; INFILTRATION; PERINEURAL at 21:20

## 2022-08-19 RX ADMIN — GABAPENTIN 100 MG: 100 CAPSULE ORAL at 21:57

## 2022-08-19 RX ADMIN — SENNOSIDES 8.6 MG: 8.6 TABLET, FILM COATED ORAL at 21:58

## 2022-08-19 RX ADMIN — HEPARIN SODIUM 5000 UNITS: 5000 INJECTION INTRAVENOUS; SUBCUTANEOUS at 06:14

## 2022-08-19 RX ADMIN — EPHEDRINE SULFATE 10 MG: 50 INJECTION INTRAVENOUS at 08:54

## 2022-08-19 RX ADMIN — ACETAMINOPHEN 975 MG: 325 TABLET ORAL at 06:14

## 2022-08-19 RX ADMIN — METHOCARBAMOL 250 MG: 500 TABLET ORAL at 12:00

## 2022-08-19 RX ADMIN — INSULIN LISPRO 1 UNITS: 100 INJECTION, SOLUTION INTRAVENOUS; SUBCUTANEOUS at 17:00

## 2022-08-19 RX ADMIN — NOREPINEPHRINE BITARTRATE 2 MCG/MIN: 1 INJECTION, SOLUTION, CONCENTRATE INTRAVENOUS at 14:08

## 2022-08-19 RX ADMIN — PROPOFOL 50 MG: 10 INJECTION, EMULSION INTRAVENOUS at 08:47

## 2022-08-19 RX ADMIN — PRAVASTATIN SODIUM 40 MG: 40 TABLET ORAL at 17:00

## 2022-08-19 RX ADMIN — Medication 3 MG: at 21:57

## 2022-08-19 RX ADMIN — DOPAMINE HYDROCHLORIDE IN DEXTROSE 5 MCG/KG/MIN: 1.6 INJECTION, SOLUTION INTRAVENOUS at 11:01

## 2022-08-19 RX ADMIN — METHOCARBAMOL 250 MG: 500 TABLET ORAL at 06:14

## 2022-08-19 RX ADMIN — DOPAMINE HYDROCHLORIDE IN DEXTROSE 7 MCG/KG/MIN: 1.6 INJECTION, SOLUTION INTRAVENOUS at 00:33

## 2022-08-19 RX ADMIN — ONDANSETRON 4 MG: 2 INJECTION INTRAMUSCULAR; INTRAVENOUS at 08:51

## 2022-08-19 RX ADMIN — HEPARIN SODIUM 5000 UNITS: 5000 INJECTION INTRAVENOUS; SUBCUTANEOUS at 21:57

## 2022-08-19 RX ADMIN — ROPIVACAINE HYDROCHLORIDE: 5 INJECTION EPIDURAL; INFILTRATION; PERINEURAL at 09:15

## 2022-08-19 RX ADMIN — LIDOCAINE HYDROCHLORIDE 50 MG: 10 INJECTION, SOLUTION EPIDURAL; INFILTRATION; INTRACAUDAL; PERINEURAL at 08:46

## 2022-08-19 RX ADMIN — VANCOMYCIN HYDROCHLORIDE 1000 MG: 1 INJECTION, SOLUTION INTRAVENOUS at 08:10

## 2022-08-19 RX ADMIN — SODIUM CHLORIDE, SODIUM GLUCONATE, SODIUM ACETATE, POTASSIUM CHLORIDE, MAGNESIUM CHLORIDE, SODIUM PHOSPHATE, DIBASIC, AND POTASSIUM PHOSPHATE 500 ML: .53; .5; .37; .037; .03; .012; .00082 INJECTION, SOLUTION INTRAVENOUS at 13:44

## 2022-08-19 RX ADMIN — METHOCARBAMOL 250 MG: 500 TABLET ORAL at 00:36

## 2022-08-19 NOTE — DISCHARGE INSTRUCTIONS
PACEMAKER INSTRUCTIONS    Please refer to post pacemaker implantation discharge instructions and restrictions and your pacemaker booklet/temporary card  Keep incision dry for one week  Do not use lotions/powders/creams on incision  Leave outer bandage in place for 1 week - it is water proof, and as long as it is fully adhered to your skin you may shower with it  If it appears as though the bandage is coming off and/or there is any communication to the area of device incision, please then keep the whole area dry for the remaining week  After 1 week, please remove by pulling all edges away from the center of the bandage  No overhead reaching/pushing/pulling/lifting greater than 5-10lbs with left arm for six weeks  Please call the office if you notice redness, swelling, bleeding, or drainage from incision or if you develop fevers  AFTER PACEMAKER CARE:    If you have any questions, please call [phone] to speak with a nurse (8:30am-4pm, or [phone] after hours)  For appointments, please call [phone]  WHAT YOU SHOULD KNOW:   A pacemaker is a small, battery-powered device that is placed under your skin in your upper chest area with wires placed through a vein that lead directly into the heart  It helps regulate your heart rate and prevent your heart from beating too slowly  AFTER YOU LEAVE:     Medicines:     Pain medicine: You may need medicine to take away or decrease pain  Learn how to take your medicine  Ask what medicine and how much you should take  Be sure you know how, when, and how often to take it  Usually Over the counter pain medicine is sufficient to control pain (Acetominophen or Ibuprofen) Ask your doctor if you may take these  If this does not control your pain, narcotic pain killers may be prescribed, please call if you need prescription  Do not wait until the pain is severe before you take your medicine   Tell caregivers if your pain does not decrease  Pain medicine can make you dizzy or sleepy  Prevent falls by calling someone when you get out of bed or if you need help  Take your medicine as directed  Call your healthcare provider if you think your medicine is not helping or if you have side effects  Tell him if you are allergic to any medicine  Follow up with your cardiologist after your procedure: You will need a follow-up visit approximately 2 weeks after you leave the hospital  Your cardiologist will check your wound and make sure that your pacemaker is working correctly  Follow the instructions to check your pacemaker: Your cardiologist or primary healthcare provider will check your pacemaker and the battery regularly  He will use a computer to check your pacemaker over the telephone or wireless device which will be given to you  Pacemaker batteries usually last 8 to 10 years  The pacemaker unit will be replaced when the battery gets low  This is a simpler procedure than the original one to implant your pacemaker  Wound care:  Keep your incision dry for one week  Do not use lotions/powders/creams on incision  Leave outer bandage in place for 1 week - it is water proof, and as long as it is fully adhered to your skin you may shower with it  If it appears as though the bandage is coming off and/or there is any communication to the area of device incision, please then keep the whole area dry for the remaining week  After 1 week, please remove by pulling all edges away from the center of the bandage  Please call the office if you notice redness, swelling, bleeding, or drainage from incision or if you develop fevers  Activity:   Arm movement and lifting:  Be careful using the arm on the side of your pacemaker  Do not move your arm for the first 24 hours after your procedure  Do not  lift your arm above your shoulder or lift more than 10 pounds for one month after your procedure   Avoid pushing, pulling, or repetitive arm movements for one month  This helps the leads stay in place and helps your wound heal  Ask your caregiver when you can drive after your procedure  You may move your arm side to side without lifting above your shoulder, and do not need to wear a sling at home  Driving: you are ok to drive 48 hours after pacemaker is implanted   Sports:  Ask your caregiver when it is okay to play tennis, golf, basketball, or any sport that requires you to lift your arms  Do not play full contact sports, such as football, that could damage your pacemaker  Ask your cardiologist or primary healthcare provider how much and what kinds of physical activity are safe for you  Living with a pacemaker:   Tell all caregivers you have a pacemaker: This includes surgeons, radiologists, and medical technicians  You may want to wear a medical alert ID bracelet or necklace that states that you have a pacemaker  Carry your pacemaker ID card: Make sure you receive a pacemaker ID card  Carry it with you at all times  It lists important information about your pacemaker  Show it to airport security if you travel  Avoid electrical interference:  Avoid welding equipment and other equipment with large magnets or electric fields  These things could interfere with how your pacemaker works  Use your cell phone on the ear opposite from your pacemaker  Do not carry your cell phone in your shirt pocket over your chest      Some Pacemakers are MRI safe  Ask you doctor if it is safe to proceed with MRI and let the radiologist and staff know you have a pacemaker  Do not touch the skin around your pacemaker: This can cause damage to the lead wires or move the pacemaker unit from where it should be  Contact your cardiologist or primary healthcare provider if:   The area around your pacemaker has increasing amount of pain after surgery  The pain should improve over first few days after implantation       The skin around your stitches has increasing redness, swelling, or has drainage  This may mean that you have an infection  You have a fever  You have chills, a cough, and feel weak or achy  These are also signs of infection  Your feet or ankles are more swollen than your baseline  Your Heart rate is less than 50 beats per minute     Seek care immediately if:   Your bandage becomes soaked with blood  Your pacemaker is swelling rapidly    Your stitches open up  You feel your heart suddenly beating very slowly or quickly  You become too weak or dizzy to stand, or you pass out  Your arm or leg feels warm, tender, and painful  It may look swollen and red  You have chest pain that does not go away with rest or medicine  You feel lightheaded, short of breath, and have chest pain  You cough up blood  © 2014 [address] is for End User's use only and may not be sold, redistributed or otherwise used for commercial purposes  All illustrations and images included in CareNotes® are the copyrighted property of [employer]  or Faraz Lewis  The above information is an  only  It is not intended as medical advice for individual conditions or treatments  Talk to your doctor, nurse or pharmacist before following any medical regimen to see if it is safe and effective for you

## 2022-08-19 NOTE — PLAN OF CARE
Problem: MOBILITY - ADULT  Goal: Maintain or return to baseline ADL function  Description: INTERVENTIONS:  -  Assess patient's ability to carry out ADLs; assess patient's baseline for ADL function and identify physical deficits which impact ability to perform ADLs (bathing, care of mouth/teeth, toileting, grooming, dressing, etc )  - Assess/evaluate cause of self-care deficits   - Assess range of motion  - Assess patient's mobility; develop plan if impaired  - Assess patient's need for assistive devices and provide as appropriate  - Encourage maximum independence but intervene and supervise when necessary  - Involve family in performance of ADLs  - Assess for home care needs following discharge   - Consider OT consult to assist with ADL evaluation and planning for discharge  - Provide patient education as appropriate  Outcome: Progressing  Goal: Maintains/Returns to pre admission functional level  Description: INTERVENTIONS:  - Perform BMAT or MOVE assessment daily    - Set and communicate daily mobility goal to care team and patient/family/caregiver  - Collaborate with rehabilitation services on mobility goals if consulted  - Perform Range of Motion  times a day  - Reposition patient every  hours    - Dangle patient  times a day  - Stand patient  times a day  - Ambulate patient  times a day  - Out of bed to chair times a day   - Out of bed for meals  times a day  - Out of bed for toileting  - Record patient progress and toleration of activity level   Outcome: Progressing     Problem: Potential for Falls  Goal: Patient will remain free of falls  Description: INTERVENTIONS:  - Educate patient/family on patient safety including physical limitations  - Instruct patient to call for assistance with activity   - Consult OT/PT to assist with strengthening/mobility   - Keep Call bell within reach  - Keep bed low and locked with side rails adjusted as appropriate  - Keep care items and personal belongings within reach  - Initiate and maintain comfort rounds  - Make Fall Risk Sign visible to staff  - Offer Toileting every  Hours, in advance of need  - Initiate/Maintain alarm  - Obtain necessary fall risk management equipment:   - Apply yellow socks and bracelet for high fall risk patients  - Consider moving patient to room near nurses station  Outcome: Progressing     Problem: Prexisting or High Potential for Compromised Skin Integrity  Goal: Skin integrity is maintained or improved  Description: INTERVENTIONS:  - Identify patients at risk for skin breakdown  - Assess and monitor skin integrity  - Assess and monitor nutrition and hydration status  - Monitor labs   - Assess for incontinence   - Turn and reposition patient  - Assist with mobility/ambulation  - Relieve pressure over bony prominences  - Avoid friction and shearing  - Provide appropriate hygiene as needed including keeping skin clean and dry  - Evaluate need for skin moisturizer/barrier cream  - Collaborate with interdisciplinary team   - Patient/family teaching  - Consider wound care consult   Outcome: Progressing     Problem: PAIN - ADULT  Goal: Verbalizes/displays adequate comfort level or baseline comfort level  Description: Interventions:  - Encourage patient to monitor pain and request assistance  - Assess pain using appropriate pain scale  - Administer analgesics based on type and severity of pain and evaluate response  - Implement non-pharmacological measures as appropriate and evaluate response  - Consider cultural and social influences on pain and pain management  - Notify physician/advanced practitioner if interventions unsuccessful or patient reports new pain  Outcome: Progressing     Problem: INFECTION - ADULT  Goal: Absence or prevention of progression during hospitalization  Description: INTERVENTIONS:  - Assess and monitor for signs and symptoms of infection  - Monitor lab/diagnostic results  - Monitor all insertion sites, i e  indwelling lines, tubes, and drains  - Monitor endotracheal if appropriate and nasal secretions for changes in amount and color  - [place] appropriate cooling/warming therapies per order  - Administer medications as ordered  - Instruct and encourage patient and family to use good hand hygiene technique  - Identify and instruct in appropriate isolation precautions for identified infection/condition  Outcome: Progressing  Goal: Absence of fever/infection during neutropenic period  Description: INTERVENTIONS:  - Monitor WBC    Outcome: Progressing     Problem: SAFETY ADULT  Goal: Maintain or return to baseline ADL function  Description: INTERVENTIONS:  -  Assess patient's ability to carry out ADLs; assess patient's baseline for ADL function and identify physical deficits which impact ability to perform ADLs (bathing, care of mouth/teeth, toileting, grooming, dressing, etc )  - Assess/evaluate cause of self-care deficits   - Assess range of motion  - Assess patient's mobility; develop plan if impaired  - Assess patient's need for assistive devices and provide as appropriate  - Encourage maximum independence but intervene and supervise when necessary  - Involve family in performance of ADLs  - Assess for home care needs following discharge   - Consider OT consult to assist with ADL evaluation and planning for discharge  - Provide patient education as appropriate  Outcome: Progressing  Goal: Maintains/Returns to pre admission functional level  Description: INTERVENTIONS:  - Perform BMAT or MOVE assessment daily    - Set and communicate daily mobility goal to care team and patient/family/caregiver  - Collaborate with rehabilitation services on mobility goals if consulted  - Perform Range of Motion  times a day  - Reposition patient every  hours    - Dangle patient  times a day  - Stand patient times a day  - Ambulate patient  times a day  - Out of bed to chair  times a day   - Out of bed for meals times a day  - Out of bed for toileting  - Record patient progress and toleration of activity level   Outcome: Progressing  Goal: Patient will remain free of falls  Description: INTERVENTIONS:  - Educate patient/family on patient safety including physical limitations  - Instruct patient to call for assistance with activity   - Consult OT/PT to assist with strengthening/mobility   - Keep Call bell within reach  - Keep bed low and locked with side rails adjusted as appropriate  - Keep care items and personal belongings within reach  - Initiate and maintain comfort rounds  - Make Fall Risk Sign visible to staff  - Offer Toileting every  Hours, in advance of need  - Initiate/Maintain alarm  - Obtain necessary fall risk management equipment:   - Apply yellow socks and bracelet for high fall risk patients  - Consider moving patient to room near nurses station  Outcome: Progressing     Problem: DISCHARGE PLANNING  Goal: Discharge to home or other facility with appropriate resources  Description: INTERVENTIONS:  - Identify barriers to discharge w/patient and caregiver  - Arrange for needed discharge resources and transportation as appropriate  - Identify discharge learning needs (meds, wound care, etc )  - Arrange for interpretive services to assist at discharge as needed  - Refer to Case Management Department for coordinating discharge planning if the patient needs post-hospital services based on physician/advanced practitioner order or complex needs related to functional status, cognitive ability, or social support system  Outcome: Progressing     Problem: Knowledge Deficit  Goal: Patient/family/caregiver demonstrates understanding of disease process, treatment plan, medications, and discharge instructions  Description: Complete learning assessment and assess knowledge base    Interventions:  - Provide teaching at level of understanding  - Provide teaching via preferred learning methods  Outcome: Progressing

## 2022-08-19 NOTE — PROGRESS NOTES
Epidural Follow-up Note - Acute Pain Service    Angel Luis Talavera 68 y o  male MRN: [id number]  Unit/Bed#: ICU 02 Encounter: [id number]      Assessment:   Principal Problem:    Rib fractures  Active Problems:    Benign essential hypertension    Type 2 diabetes mellitus ([facility] [address] )    Fracture of thoracic transverse process (HCC)    Lumbar transverse process fracture (HCC)    L3 osteophyte fracture    Fall    Severe aortic stenosis    Bradycardia    Urinary retention      Angel Luis Talavera is a 68y o  year old male who presented with right posterior 10-11th rib fractures and T12-L2 TP fractures  Pain remained poorly controlled on standard multimodal regimen  Paravertebral catheter attempted on 8/17 but aborted due to insufficient ultrasonographic windows  Thoracic epidural successfully placed yesterday morning with resultant symptomatic bradycardia and hypotension  After RRT and ICU admission patient determined to have high grade AV nkechi block and underwent pacemaker placement this morning  After hemodynamic stabilization patient's epidural was resumed with plain local anesthetic for minimization of hemodynamic effects  Upon my evaluation this afternoon he appears quite comfortable at rest albeit in the setting of being extremely drowsy  Per wife he has been unable to sleep and likely 2/2 exhaustion  Endorses exacerbation with deep breathing and coughing though definitely improved since epidural placement  No indication for epidural titration at this point in time      Plan:  Analgesia:  - Continue Thoracic epidural infusion of Ropivacaine 0 1% 8 mL/hr continuous with 5 mL DD q10 mins max 3 doses/hr  - Continue oxycodone 2 5mg/5mg PO q4hrs PRN for moderate/severe pain, with Dilaudid 0 2 mg IV q4hrs PRN for breakthrough pain    Multimodal analgesia:  - Acetaminophen 975 mg PO q8hrs scheduled  - Gabapentin 100 mg PO qhs  - Methocarbamol 250 mg PO q6hrs scheduled  - Anticipate epidural removal and transition to primarily PO regimen tentatively Monday or at the request of the trauma team    Bowel Regimen:  - Bisacodyl 5 mg PO once daily PRN for constipation  - Senna 8 6 mg PO once daily    [facility] will continue to follow  Please contact Acute Pain Service - [facility] via [employer] from [phone] with additional questions or concerns  See Kavitha or Angel for additional contacts and after hours information  Pain History  Current pain location(s): None at rest, right back with coughing or deep inspiraiton  Pain Scale: 0-5/10, currently 0/10  Quality: Stabbing when it occurs  24 hour history: See assessment    PCEA use: None  Opioid requirement previous 24 hours: None    Meds/Allergies   all current active meds have been reviewed    Allergies   Allergen Reactions    Penicillins Hives       Objective     Temp:  [97 3 °F (36 3 °C)-98 5 °F (36 9 °C)] 97 3 °F (36 3 °C)  HR:  [54-70] 58  Resp:  [16-42] 20  BP: (101-178)/() 125/91    Physical Exam  Vitals and nursing note reviewed  Constitutional:       General: He is not in acute distress  Appearance: He is obese  He is ill-appearing  Comments: Sleeping soundly upon entry into room, wife at bedside   HENT:      Head: Normocephalic  Nose: Nose normal       Mouth/Throat:      Mouth: Mucous membranes are dry  Eyes:      Extraocular Movements: Extraocular movements intact  Pulmonary:      Effort: No respiratory distress  Comments: Shallow excursion  Chest:      Chest wall: Tenderness present  Abdominal:      General: Abdomen is flat  There is no distension  Palpations: Abdomen is soft  Musculoskeletal:         General: Normal range of motion  Comments: Bilateral LE motor grossly intact   Skin:     General: Skin is warm  Neurological:      Mental Status: He is alert and oriented to person, place, and time  Mental status is at baseline     Psychiatric:         Mood and Affect: Mood normal          Behavior: Behavior normal      Epidural: Site clean/dry/intact, no surrounding erythema/edema/induration, infusion functioning appropriately    Lab Results:   Results from last 7 days   Lab Units 08/19/22  1227 08/19/22  0751   WBC Thousand/uL  --  10 11   HEMOGLOBIN g/dL 13 6 12 6   HEMATOCRIT % 40 9 38 4   PLATELETS Thousands/uL  --  141*      Results from last 7 days   Lab Units 08/19/22  0514 08/18/22  1104   POTASSIUM mmol/L 4 3  --    CHLORIDE mmol/L 103  --    CO2 mmol/L 24  --    CO2, I-STAT mmol/L  --  23   BUN mg/dL 36*  --    CREATININE mg/dL 1 80*  --    CALCIUM mg/dL 9 1  --    GLUCOSE, ISTAT mg/dl  --  161*      Results from last 7 days   Lab Units 08/16/22  0504   PTT seconds 25   INR  1 01       Imaging Studies: I have personally reviewed pertinent reports  EKG, Pathology, and Other Studies: I have personally reviewed pertinent reports  Please note that the [facility]PS provides consultative services regarding pain management only  With the exception of ketamine, peripheral nerve catheters, and epidural infusions (and except when indicated), final decisions regarding starting or changing doses of analgesic medications are at the discretion of the consulting service  Off hours consultation and/or medication management is generally not available      Becky Casas MD  Acute Pain Service

## 2022-08-19 NOTE — ANESTHESIA PREPROCEDURE EVALUATION
Procedure:  Cardiac pacer implant (N/A Chest)    Relevant Problems   CARDIO   (+) Benign essential hypertension   (+) Hyperlipidemia      ENDO   (+) Type 2 diabetes mellitus (HCC)      /RENAL   (+) Diabetic nephropathy associated with type 2 diabetes mellitus (HCC)   (+) Stage 3a chronic kidney disease (HCC)      MUSCULOSKELETAL   (+) Chronic bilateral low back pain without sciatica   (+) Gout   (+) Impingement syndrome of right shoulder   (+) Primary osteoarthritis of left knee      NEURO/PSYCH   (+) Chronic bilateral low back pain without sciatica   (+) Claudication of both lower extremities (HCC)   (+) History of stroke      PULMONARY   (+) Asthma      HX OF CVA  PT STATES  LEFT ARM WEAKNESS AS ONLY LONG TERM SX'S  Physical Exam    Airway    Mallampati score: III  TM Distance: >3 FB  Neck ROM: full     Dental       Cardiovascular      Pulmonary      Other Findings        Anesthesia Plan  ASA Score- 4     Anesthesia Type-         Additional Monitors:   Airway Plan: LMA  Comment: lma vs ett, all complications discussed w/pt  Possible lanette, ett remaining and brought to icu          Plan Factors-    Chart reviewed  EKG reviewed  Imaging results reviewed  Existing labs reviewed  Patient summary reviewed  Patient is not a current smoker  Patient not instructed to abstain from smoking on day of procedure  Patient did not smoke on day of surgery  Obstructive sleep apnea risk education given perioperatively  There is medical exclusion for perioperative obstructive sleep apnea risk education  Induction- intravenous  Postoperative Plan- Plan for postoperative opioid use  Planned trial extubation    Informed Consent- Anesthetic plan and risks discussed with patient  I personally reviewed this patient with the CRNA  Discussed and agreed on the Anesthesia Plan with the CRNA           Lab Results   Component Value Date    WBC 10 11 08/19/2022    HGB 12 6 08/19/2022     (L) 08/19/2022     Lab Results Component Value Date     11/10/2017    K 4 3 08/19/2022    BUN 36 (H) 08/19/2022    CREATININE 1 80 (H) 08/19/2022    GLUCOSE 161 (H) 08/18/2022     Lab Results   Component Value Date    PTT 25 08/16/2022      Lab Results   Component Value Date    INR 1 01 08/16/2022       Blood type A    Lab Results   Component Value Date    HGBA1C 6 9 (A) 06/16/2022     ECHO  Left Ventricle: Left ventricular cavity size is normal  Wall thickness is moderately increased  There is moderate concentric hypertrophy  The left ventricular ejection fraction is 65%  Systolic function is hyperdynamic  Wall motion is normal  Diastolic function is moderately abnormal, consistent with grade II (pseudonormal) relaxation  Left atrial filling pressure is normal     Right Ventricle: Right ventricular cavity size is mildly dilated  Systolic function is normal     Left Atrium: The atrium is mildly dilated (35-41 mL/m2)    Right Atrium: The atrium is mildly dilated    Aortic Valve: The leaflets are moderately calcified  There is severely reduced mobility  There is severe stenosis  The aortic valve peak gradient is 82 0 mmHg  The aortic valve mean gradient is 48 0 mmHg    Mitral Valve: There is mild annular calcification    Prior study date: 10/14/2019  Changes noted when compared to prior study   Aortic stenosis has progressed      EKG  Narrative & Impression    Normal sinus rhythm with 1st degree A-V block  Right bundle branch block  Possible Inferior infarct , age undetermined  Abnormal ECG

## 2022-08-19 NOTE — PROCEDURES
POC Cardiac US    Date/Time: 8/19/2022 12:14 PM  Performed by: Bebeto Sánchez PA-C  Authorized by: Bebeto Sánchez PA-C     Patient location:  ICU  Procedure details:     Exam Type:  Diagnostic    Indications: hypotension      Assessment / Evaluation for: pericardial effusion      Exam Type: follow-up exam      Image quality: limited diagnostic      Image availability:  Images available in PACS  Patient Details:     Cardiac Rhythm:  Regular    Mechanical ventilation: No    Cardiac findings:     Echo technique: limited 2D      Views obtained: apical      Pericardial effusion: absent      Wall motion: normal

## 2022-08-19 NOTE — PHYSICAL THERAPY NOTE
PT orders received  Chart reviewed  Pt pending PPM placement this AM will hold  Will continue to follow  Anuradha Porras, PT, DPT     08/19/22 0716   PT Last Visit   PT Visit Date 08/19/22   Note Type   Note type Evaluation; Cancelled Session   Cancel Reasons Patient to operating room
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
## 2022-08-19 NOTE — OCCUPATIONAL THERAPY NOTE
OT CANCEL NOTE    Pt chart reviewed  Pt is pending PPM placement this AM  Will continue to follow pt on caseload and see pt when medically stable and as clinically appropriate         08/19/22 0756   OT Last Visit   OT Visit Date 08/19/22   Note Type   Note Type Cancelled Session   Cancel Reasons Medical status       Gloria Gibson MS, OTR/L

## 2022-08-19 NOTE — CASE MANAGEMENT
Case Management Discharge Planning Note    Patient name Jacob Chappell  Location ICU 02/ICU 02 MRN [id number]  : 1945 Date 2022       Current Admission Date: 8/15/2022  Current Admission Diagnosis:Rib fractures   Patient Active Problem List    Diagnosis Date Noted    Severe aortic stenosis 2022    Bradycardia 2022    Urinary retention 2022    L3 osteophyte fracture 2022    Fall 2022    Rib fractures 08/15/2022    Fracture of thoracic transverse process ([facility] [address] ) 08/15/2022    Lumbar transverse process fracture ([facility] [address] ) 08/15/2022    Stage 3a chronic kidney disease ([facility] [address] ) 2022    Special screening for malignant neoplasm of prostate 2021    RLS (restless legs syndrome) 2021    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 ([address] ) 2021    Chronic bilateral low back pain without sciatica 2021    Hemiplegia and hemiparesis following cerebral infarction affecting left non-dominant side ([address] ) 2020    Benign prostatic hyperplasia with nocturia 2020    Claudication of both lower extremities ([facility] [address] ) 2019    Colon cancer screening 2019    Medicare annual wellness visit, subsequent 2019    Primary osteoarthritis of left knee 01/10/2019    Bilateral carotid artery stenosis 10/08/2018    Dermatosis 10/08/2018    Cramps of left lower extremity 2018    Plantar fasciitis 2018    Tinea cruris 2018    Constipation 2018    Seborrheic dermatitis 11/10/2017    History of stroke 09/15/2017    Asthma 2017    Benign essential hypertension 2017    Type 2 diabetes mellitus ([address] ) 2017    Hyperlipidemia 2017    Obesity 2017    Impingement syndrome of right shoulder 2017    Diabetic nephropathy associated with type 2 diabetes mellitus ([address] ) 2017    Non-rheumatic aortic sclerosis 2017    Popliteal cyst, left 10/15/2015    Acquired hallux malleus of right foot 10/06/2015    Pre-ulcerative corn or callous 10/06/2015    Gout 12/11/2014    Allergic rhinitis 05/03/2012    Retina disorder 05/03/2012      LOS (days): 4  Geometric Mean LOS (GMLOS) (days): 2 90  Days to GMLOS:-1 1     OBJECTIVE:  Risk of Unplanned Readmission Score: 9 74         Current admission status: Inpatient   Preferred Pharmacy:   [facility], [address] [facility] [address]  Phone: [phone] Fax: [address]  [address]  [facility] [address]  Phone: [phone] Fax: [phone]    Primary Care Provider: Kentrell Newberry MD    Primary Insurance: MEDICARE  Secondary Insurance: [address] [address] [employer]    DISCHARGE DETAILS:    Pt went for PPM today  Plan to have therapy re-assess once appropriate for their services

## 2022-08-19 NOTE — ANESTHESIA POSTPROCEDURE EVALUATION
Post-Op Assessment Note    CV Status:  Stable  Pain Score: 0    Pain management: adequate     Mental Status:  Awake   Hydration Status:  Stable   PONV Controlled:  None   Airway Patency:  Patent      Post Op Vitals Reviewed: Yes      Staff: Anesthesiologist, CRNA         There were no known complications for this encounter      BP  135/59   Temp   97 3   Pulse  65   Resp   14   SpO2   97

## 2022-08-20 ENCOUNTER — APPOINTMENT (OUTPATIENT)
Dept: RADIOLOGY | Facility: HOSPITAL | Age: 77
DRG: 243 | End: 2022-08-20
Payer: MEDICARE

## 2022-08-20 LAB
ANION GAP SERPL CALCULATED.3IONS-SCNC: 3 MMOL/L (ref 4–13)
BASOPHILS # BLD AUTO: 0.04 THOUSANDS/ΜL (ref 0–0.1)
BASOPHILS NFR BLD AUTO: 0 % (ref 0–1)
BUN SERPL-MCNC: 31 MG/DL (ref 5–25)
CALCIUM SERPL-MCNC: 8.9 MG/DL (ref 8.3–10.1)
CHLORIDE SERPL-SCNC: 107 MMOL/L (ref 96–108)
CO2 SERPL-SCNC: 29 MMOL/L (ref 21–32)
CREAT SERPL-MCNC: 1.31 MG/DL (ref 0.6–1.3)
EOSINOPHIL # BLD AUTO: 0.23 THOUSAND/ΜL (ref 0–0.61)
EOSINOPHIL NFR BLD AUTO: 2 % (ref 0–6)
ERYTHROCYTE [DISTWIDTH] IN BLOOD BY AUTOMATED COUNT: 13.4 % (ref 11.6–15.1)
GFR SERPL CREATININE-BSD FRML MDRD: 52 ML/MIN/1.73SQ M
GLUCOSE SERPL-MCNC: 108 MG/DL (ref 65–140)
GLUCOSE SERPL-MCNC: 129 MG/DL (ref 65–140)
GLUCOSE SERPL-MCNC: 149 MG/DL (ref 65–140)
GLUCOSE SERPL-MCNC: 171 MG/DL (ref 65–140)
HCT VFR BLD AUTO: 40.2 % (ref 36.5–49.3)
HGB BLD-MCNC: 12.9 G/DL (ref 12–17)
IMM GRANULOCYTES # BLD AUTO: 0.04 THOUSAND/UL (ref 0–0.2)
IMM GRANULOCYTES NFR BLD AUTO: 0 % (ref 0–2)
LYMPHOCYTES # BLD AUTO: 2.75 THOUSANDS/ΜL (ref 0.6–4.47)
LYMPHOCYTES NFR BLD AUTO: 27 % (ref 14–44)
MAGNESIUM SERPL-MCNC: 2.3 MG/DL (ref 1.6–2.6)
MCH RBC QN AUTO: 29 PG (ref 26.8–34.3)
MCHC RBC AUTO-ENTMCNC: 32.1 G/DL (ref 31.4–37.4)
MCV RBC AUTO: 90 FL (ref 82–98)
MONOCYTES # BLD AUTO: 0.78 THOUSAND/ΜL (ref 0.17–1.22)
MONOCYTES NFR BLD AUTO: 8 % (ref 4–12)
NEUTROPHILS # BLD AUTO: 6.28 THOUSANDS/ΜL (ref 1.85–7.62)
NEUTS SEG NFR BLD AUTO: 63 % (ref 43–75)
NRBC BLD AUTO-RTO: 0 /100 WBCS
PLATELET # BLD AUTO: 153 THOUSANDS/UL (ref 149–390)
PMV BLD AUTO: 10.8 FL (ref 8.9–12.7)
POTASSIUM SERPL-SCNC: 4.2 MMOL/L (ref 3.5–5.3)
RBC # BLD AUTO: 4.45 MILLION/UL (ref 3.88–5.62)
SODIUM SERPL-SCNC: 139 MMOL/L (ref 135–147)
WBC # BLD AUTO: 10.12 THOUSAND/UL (ref 4.31–10.16)

## 2022-08-20 PROCEDURE — NC001 PR NO CHARGE: Performed by: EMERGENCY MEDICINE

## 2022-08-20 PROCEDURE — 99233 SBSQ HOSP IP/OBS HIGH 50: CPT | Performed by: EMERGENCY MEDICINE

## 2022-08-20 PROCEDURE — 99024 POSTOP FOLLOW-UP VISIT: CPT | Performed by: PHYSICIAN ASSISTANT

## 2022-08-20 PROCEDURE — 82948 REAGENT STRIP/BLOOD GLUCOSE: CPT

## 2022-08-20 PROCEDURE — 83735 ASSAY OF MAGNESIUM: CPT

## 2022-08-20 PROCEDURE — 85025 COMPLETE CBC W/AUTO DIFF WBC: CPT

## 2022-08-20 PROCEDURE — 80048 BASIC METABOLIC PNL TOTAL CA: CPT

## 2022-08-20 PROCEDURE — 71046 X-RAY EXAM CHEST 2 VIEWS: CPT

## 2022-08-20 RX ORDER — BISACODYL 10 MG
10 SUPPOSITORY, RECTAL RECTAL DAILY PRN
Status: DISCONTINUED | OUTPATIENT
Start: 2022-08-20 | End: 2022-08-24 | Stop reason: HOSPADM

## 2022-08-20 RX ADMIN — ASPIRIN 81 MG CHEWABLE TABLET 81 MG: 81 TABLET CHEWABLE at 09:45

## 2022-08-20 RX ADMIN — METHOCARBAMOL 250 MG: 500 TABLET ORAL at 12:44

## 2022-08-20 RX ADMIN — LIDOCAINE 5% 1 PATCH: 700 PATCH TOPICAL at 10:32

## 2022-08-20 RX ADMIN — ACETAMINOPHEN 975 MG: 325 TABLET ORAL at 14:24

## 2022-08-20 RX ADMIN — HEPARIN SODIUM 5000 UNITS: 5000 INJECTION INTRAVENOUS; SUBCUTANEOUS at 14:24

## 2022-08-20 RX ADMIN — Medication 3 MG: at 23:46

## 2022-08-20 RX ADMIN — ROPIVACAINE HYDROCHLORIDE: 5 INJECTION EPIDURAL; INFILTRATION; PERINEURAL at 15:04

## 2022-08-20 RX ADMIN — ROPIVACAINE HYDROCHLORIDE: 5 INJECTION EPIDURAL; INFILTRATION; PERINEURAL at 23:46

## 2022-08-20 RX ADMIN — HEPARIN SODIUM 5000 UNITS: 5000 INJECTION INTRAVENOUS; SUBCUTANEOUS at 05:04

## 2022-08-20 RX ADMIN — METHOCARBAMOL 250 MG: 500 TABLET ORAL at 23:45

## 2022-08-20 RX ADMIN — METHOCARBAMOL 250 MG: 500 TABLET ORAL at 00:30

## 2022-08-20 RX ADMIN — ACETAMINOPHEN 975 MG: 325 TABLET ORAL at 05:04

## 2022-08-20 RX ADMIN — METHOCARBAMOL 250 MG: 500 TABLET ORAL at 17:30

## 2022-08-20 RX ADMIN — PRAVASTATIN SODIUM 40 MG: 40 TABLET ORAL at 17:29

## 2022-08-20 RX ADMIN — METHOCARBAMOL 250 MG: 500 TABLET ORAL at 05:04

## 2022-08-20 RX ADMIN — ROPIVACAINE HYDROCHLORIDE: 5 INJECTION EPIDURAL; INFILTRATION; PERINEURAL at 05:44

## 2022-08-20 RX ADMIN — NOREPINEPHRINE BITARTRATE 4 MCG/MIN: 1 INJECTION, SOLUTION, CONCENTRATE INTRAVENOUS at 05:42

## 2022-08-20 NOTE — ASSESSMENT & PLAN NOTE
Lab Results   Component Value Date    HGBA1C 6 9 (A) 06/16/2022       Recent Labs     08/19/22  0625 08/19/22  1739 08/19/22  2214 08/20/22  1243   POCGLU 194* 171* 137 149*       Blood Sugar Average: Last 72 hrs:  (P) 143 [id number]     - Continue sliding scale insulin  - Monitor blood sugars  - Outpatient follow-up with PCP
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
## 2022-08-20 NOTE — ASSESSMENT & PLAN NOTE
- Bradycardia, HR as low as 30s  - EP following  - s/p Medtronic dual chamber pacemaker implant 8/19/22  - Recommend 2 week device and site check
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
## 2022-08-20 NOTE — ASSESSMENT & PLAN NOTE
- Multiple right-sided rib fractures (10-11), present on admission   - Continue rib fracture protocol   - Continue to encourage incentive spirometer use and adequate pulmonary hygiene  Currently pulling 1000 mL on I S   - PIC score per nursing  - Continue multimodal analgesic regimen  Appreciate APS evaluation and recommendations  - Continue Thoracic epidural infusion of Ropivacaine 0 1% with fentanyl 2 mcg/mL at 8/5/10/3  - Continue IV dilaudid 0 2mg q4hr PRN for breakthrough pain  - Continue PO oxycodone 2 5/5mg q4hr PRN for moderate-severe pain  - Anticipate epidural removal and transition to primarily PO regimen in next 2-3 days or surgery team request  - Continue other MMA: PO tylenol 975mg q8, Gabapentin 100mg qhs, PO robaxin 250mg q8hr scheduled     - Supplemental oxygen via nasal cannula as needed to maintain saturations greater than or equal to 94%  - PT and OT evaluation and treatment as indicated  - Outpatient follow-up in the trauma clinic for re-evaluation in approximately 2 weeks

## 2022-08-20 NOTE — ASSESSMENT & PLAN NOTE
- Echo 8/18 EF 65%, severe aortic stenosis, aortic valve peak gradient 82 0 mmHg, aortic valve mean gradient 48 0 mmHg  - EKG paced rhythm w/RBBB  - EP following

## 2022-08-20 NOTE — ASSESSMENT & PLAN NOTE
- Outpatient medications on hold in setting of recent bradycardia  - Weaned off pressors  S/p pacer placement   - HR sustained in 60s today  - Outpatient follow-up with PCP

## 2022-08-21 LAB
ANION GAP SERPL CALCULATED.3IONS-SCNC: 3 MMOL/L (ref 4–13)
BUN SERPL-MCNC: 27 MG/DL (ref 5–25)
CALCIUM SERPL-MCNC: 9.2 MG/DL (ref 8.3–10.1)
CHLORIDE SERPL-SCNC: 108 MMOL/L (ref 96–108)
CO2 SERPL-SCNC: 28 MMOL/L (ref 21–32)
CREAT SERPL-MCNC: 1.06 MG/DL (ref 0.6–1.3)
ERYTHROCYTE [DISTWIDTH] IN BLOOD BY AUTOMATED COUNT: 13.5 % (ref 11.6–15.1)
GFR SERPL CREATININE-BSD FRML MDRD: 67 ML/MIN/1.73SQ M
GLUCOSE SERPL-MCNC: 109 MG/DL (ref 65–140)
GLUCOSE SERPL-MCNC: 128 MG/DL (ref 65–140)
GLUCOSE SERPL-MCNC: 135 MG/DL (ref 65–140)
GLUCOSE SERPL-MCNC: 146 MG/DL (ref 65–140)
GLUCOSE SERPL-MCNC: 158 MG/DL (ref 65–140)
HCT VFR BLD AUTO: 38.2 % (ref 36.5–49.3)
HGB BLD-MCNC: 12.5 G/DL (ref 12–17)
MCH RBC QN AUTO: 29.1 PG (ref 26.8–34.3)
MCHC RBC AUTO-ENTMCNC: 32.7 G/DL (ref 31.4–37.4)
MCV RBC AUTO: 89 FL (ref 82–98)
PLATELET # BLD AUTO: 136 THOUSANDS/UL (ref 149–390)
PMV BLD AUTO: 10.9 FL (ref 8.9–12.7)
POTASSIUM SERPL-SCNC: 4.2 MMOL/L (ref 3.5–5.3)
RBC # BLD AUTO: 4.3 MILLION/UL (ref 3.88–5.62)
SODIUM SERPL-SCNC: 139 MMOL/L (ref 135–147)
WBC # BLD AUTO: 7.99 THOUSAND/UL (ref 4.31–10.16)

## 2022-08-21 PROCEDURE — 80048 BASIC METABOLIC PNL TOTAL CA: CPT | Performed by: PHYSICIAN ASSISTANT

## 2022-08-21 PROCEDURE — 85027 COMPLETE CBC AUTOMATED: CPT | Performed by: PHYSICIAN ASSISTANT

## 2022-08-21 PROCEDURE — 82948 REAGENT STRIP/BLOOD GLUCOSE: CPT

## 2022-08-21 PROCEDURE — 99232 SBSQ HOSP IP/OBS MODERATE 35: CPT | Performed by: ANESTHESIOLOGY

## 2022-08-21 PROCEDURE — 99232 SBSQ HOSP IP/OBS MODERATE 35: CPT | Performed by: SURGERY

## 2022-08-21 RX ORDER — ASPIRIN 81 MG/1
81 TABLET, CHEWABLE ORAL DAILY
Status: DISCONTINUED | OUTPATIENT
Start: 2022-08-21 | End: 2022-08-24 | Stop reason: HOSPADM

## 2022-08-21 RX ADMIN — SENNOSIDES 8.6 MG: 8.6 TABLET, FILM COATED ORAL at 21:13

## 2022-08-21 RX ADMIN — ROPIVACAINE HYDROCHLORIDE: 5 INJECTION EPIDURAL; INFILTRATION; PERINEURAL at 11:24

## 2022-08-21 RX ADMIN — METHOCARBAMOL 250 MG: 500 TABLET ORAL at 11:28

## 2022-08-21 RX ADMIN — Medication 3 MG: at 21:13

## 2022-08-21 RX ADMIN — INSULIN LISPRO 1 UNITS: 100 INJECTION, SOLUTION INTRAVENOUS; SUBCUTANEOUS at 01:31

## 2022-08-21 RX ADMIN — HEPARIN SODIUM 5000 UNITS: 5000 INJECTION INTRAVENOUS; SUBCUTANEOUS at 08:30

## 2022-08-21 RX ADMIN — HEPARIN SODIUM 5000 UNITS: 5000 INJECTION INTRAVENOUS; SUBCUTANEOUS at 01:32

## 2022-08-21 RX ADMIN — HEPARIN SODIUM 5000 UNITS: 5000 INJECTION INTRAVENOUS; SUBCUTANEOUS at 21:13

## 2022-08-21 RX ADMIN — ACETAMINOPHEN 975 MG: 325 TABLET ORAL at 08:30

## 2022-08-21 RX ADMIN — ACETAMINOPHEN 975 MG: 325 TABLET ORAL at 21:13

## 2022-08-21 RX ADMIN — ACETAMINOPHEN 975 MG: 325 TABLET ORAL at 01:30

## 2022-08-21 RX ADMIN — ACETAMINOPHEN 975 MG: 325 TABLET ORAL at 15:44

## 2022-08-21 RX ADMIN — LIDOCAINE 5% 1 PATCH: 700 PATCH TOPICAL at 08:31

## 2022-08-21 RX ADMIN — PRAVASTATIN SODIUM 40 MG: 40 TABLET ORAL at 15:44

## 2022-08-21 RX ADMIN — ASPIRIN 81 MG CHEWABLE TABLET 81 MG: 81 TABLET CHEWABLE at 10:15

## 2022-08-21 RX ADMIN — METHOCARBAMOL 250 MG: 500 TABLET ORAL at 23:47

## 2022-08-21 RX ADMIN — GABAPENTIN 100 MG: 100 CAPSULE ORAL at 21:13

## 2022-08-21 RX ADMIN — METHOCARBAMOL 250 MG: 500 TABLET ORAL at 05:01

## 2022-08-21 RX ADMIN — METHOCARBAMOL 250 MG: 500 TABLET ORAL at 17:23

## 2022-08-21 RX ADMIN — GABAPENTIN 100 MG: 100 CAPSULE ORAL at 01:30

## 2022-08-21 RX ADMIN — INSULIN LISPRO 1 UNITS: 100 INJECTION, SOLUTION INTRAVENOUS; SUBCUTANEOUS at 17:24

## 2022-08-21 RX ADMIN — SENNOSIDES 8.6 MG: 8.6 TABLET, FILM COATED ORAL at 01:30

## 2022-08-21 RX ADMIN — HEPARIN SODIUM 5000 UNITS: 5000 INJECTION INTRAVENOUS; SUBCUTANEOUS at 15:45

## 2022-08-21 NOTE — ADDENDUM NOTE
Addendum  created 08/21/22 1400 by Dylon Self MD    Intraprocedure Event edited, LDA properties accepted

## 2022-08-21 NOTE — ASSESSMENT & PLAN NOTE
- Multiple right-sided rib fractures (10-11), present on admission   - Continue rib fracture protocol   - Continue to encourage incentive spirometer use and adequate pulmonary hygiene  Currently pulling 1000 mL on I S   - PIC score per nursing  - Continue multimodal analgesic regimen  Appreciate APS evaluation and recommendations  - Continue Thoracic epidural infusion of Ropivacaine 0 1% with fentanyl 2 mcg/mL at 8/5/10/3  - Continue IV dilaudid 0 2mg q4hr PRN for breakthrough pain  - Continue PO oxycodone 2 5/5mg q4hr PRN for moderate-severe pain  - Anticipate epidural removal and transition to primarily PO regimen possibly 8/21  - Continue other MMA: PO tylenol 975mg q8, Gabapentin 100mg qhs, PO robaxin 250mg q8hr scheduled   - Supplemental oxygen via nasal cannula as needed to maintain saturations greater than or equal to 94%  - PT and OT evaluation and treatment as indicated  - Outpatient follow-up in the trauma clinic for re-evaluation in approximately 2 weeks

## 2022-08-21 NOTE — PROGRESS NOTES
Progress Note - Acute Pain Service    Mikie Winters 68 y o  male MRN: [id number]  Unit/Bed#: [facility] 610-01 Encounter: [id number]      Assessment:   Principal Problem:    Rib fractures  Active Problems:    Benign essential hypertension    Type 2 diabetes mellitus ([address] )    Fracture of thoracic transverse process (HCC)    Lumbar transverse process fracture (HCC)    L3 osteophyte fracture    Fall    Severe aortic stenosis    Bradycardia    Urinary retention    Mikie Winters is a 68y o  year old male who presented with right posterior 10-11th rib fractures and T12-L2 TP fractures   Pain remained poorly controlled on standard multimodal regimen  Paravertebral catheter attempted on 8/17 but aborted due to insufficient ultrasonographic windows  Thoracic epidural successfully placed morning of 8/18 with resultant symptomatic bradycardia and hypotension  After RRT and ICU admission patient determined to have high grade AV nkechi block and underwent pacemaker placement  He has done well overnight  He is anticipating epidural removal, but his AM heparin dose precludes that until this afternoon  Plan:   1  Epidural removal today  2  Continue multimodal therapies  3  Continue oxycodone 2 5/5, but this may be adjusted to 5/10 mg q4h PRN mod/severe pain if his pain acutely worsens  APS will continue to follow; please contact APS ( [phone]) with any further questions    Pain History  Current pain location(s): chest wall  Pain Scale:   0/10  Quality: dull  24 hour history: no acute events    Opioid requirement previous 24 hours: none apart from epidural    Meds/Allergies   all current active meds have been reviewed    Allergies   Allergen Reactions    Penicillins Hives       Objective     Temp:  [97 5 °F (36 4 °C)-98 9 °F (37 2 °C)] 97 5 °F (36 4 °C)  HR:  [58-60] 60  Resp:  [16-20] 18  BP: (121-154)/(57-75) 154/68    Physical Exam  Vitals reviewed     Constitutional:       Appearance: Normal appearance  HENT:      Head: Normocephalic and atraumatic  Nose: Nose normal    Pulmonary:      Effort: Pulmonary effort is normal    Musculoskeletal:         General: Normal range of motion  Skin:     General: Skin is warm  Neurological:      General: No focal deficit present  Mental Status: He is alert and oriented to person, place, and time  Mental status is at baseline  Psychiatric:         Mood and Affect: Mood normal          Behavior: Behavior normal          Thought Content: Thought content normal          Judgment: Judgment normal          Lab Results:   Results from last 7 days   Lab Units 08/21/22  0450   WBC Thousand/uL 7 99   HEMOGLOBIN g/dL 12 5   HEMATOCRIT % 38 2   PLATELETS Thousands/uL 136*      Results from last 7 days   Lab Units 08/21/22  0450 08/19/22  0514 08/18/22  1104   POTASSIUM mmol/L 4 2   < >  --    CHLORIDE mmol/L 108   < >  --    CO2 mmol/L 28   < >  --    CO2, I-STAT mmol/L  --   --  23   BUN mg/dL 27*   < >  --    CREATININE mg/dL 1 06   < >  --    CALCIUM mg/dL 9 2   < >  --    GLUCOSE, ISTAT mg/dl  --   --  161*    < > = values in this interval not displayed  Imaging Studies: I have personally reviewed pertinent reports  EKG, Pathology, and Other Studies: I have personally reviewed pertinent reports          Clevleand Rosa MD

## 2022-08-21 NOTE — ASSESSMENT & PLAN NOTE
- p r n  pain management  - continue neurovascular checks  - LSO brace  - outpatient follow-up with Trauma Team

## 2022-08-21 NOTE — PLAN OF CARE
Problem: MOBILITY - ADULT  Goal: Maintain or return to baseline ADL function  Description: INTERVENTIONS:  -  Assess patient's ability to carry out ADLs; assess patient's baseline for ADL function and identify physical deficits which impact ability to perform ADLs (bathing, care of mouth/teeth, toileting, grooming, dressing, etc )  - Assess/evaluate cause of self-care deficits   - Assess range of motion  - Assess patient's mobility; develop plan if impaired  - Assess patient's need for assistive devices and provide as appropriate  - Encourage maximum independence but intervene and supervise when necessary  - Involve family in performance of ADLs  - Assess for home care needs following discharge   - Consider OT consult to assist with ADL evaluation and planning for discharge  - Provide patient education as appropriate  Outcome: Progressing  Goal: Maintains/Returns to pre admission functional level  Description: INTERVENTIONS:  - Perform BMAT or MOVE assessment daily    - Set and communicate daily mobility goal to care team and patient/family/caregiver  - Collaborate with rehabilitation services on mobility goals if consulted  - Perform Range of Motion    times a day  - Reposition patient every    hours    - Dangle patient    times a day  - Stand patient    times a day  - Ambulate patient    times a day  - Out of bed to chair    times a day   - Out of bed for meals    times a day  - Out of bed for toileting  - Record patient progress and toleration of activity level   Outcome: Progressing     Problem: Potential for Falls  Goal: Patient will remain free of falls  Description: INTERVENTIONS:  - Educate patient/family on patient safety including physical limitations  - Instruct patient to call for assistance with activity   - Consult OT/PT to assist with strengthening/mobility   - Keep Call bell within reach  - Keep bed low and locked with side rails adjusted as appropriate  - Keep care items and personal belongings within reach  - Initiate and maintain comfort rounds  - Make Fall Risk Sign visible to staff  - Offer Toileting every    Hours, in advance of need  - Initiate/Maintain   alarm  - Obtain necessary fall risk management equipment:     - Apply yellow socks and bracelet for high fall risk patients  - Consider moving patient to room near nurses station  Outcome: Progressing     Problem: Prexisting or High Potential for Compromised Skin Integrity  Goal: Skin integrity is maintained or improved  Description: INTERVENTIONS:  - Identify patients at risk for skin breakdown  - Assess and monitor skin integrity  - Assess and monitor nutrition and hydration status  - Monitor labs   - Assess for incontinence   - Turn and reposition patient  - Assist with mobility/ambulation  - Relieve pressure over bony prominences  - Avoid friction and shearing  - Provide appropriate hygiene as needed including keeping skin clean and dry  - Evaluate need for skin moisturizer/barrier cream  - Collaborate with interdisciplinary team   - Patient/family teaching  - Consider wound care consult   Outcome: Progressing     Problem: PAIN - ADULT  Goal: Verbalizes/displays adequate comfort level or baseline comfort level  Description: Interventions:  - Encourage patient to monitor pain and request assistance  - Assess pain using appropriate pain scale  - Administer analgesics based on type and severity of pain and evaluate response  - Implement non-pharmacological measures as appropriate and evaluate response  - Consider cultural and social influences on pain and pain management  - Notify physician/advanced practitioner if interventions unsuccessful or patient reports new pain  Outcome: Progressing     Problem: INFECTION - ADULT  Goal: Absence or prevention of progression during hospitalization  Description: INTERVENTIONS:  - Assess and monitor for signs and symptoms of infection  - Monitor lab/diagnostic results  - Monitor all insertion sites, i e  indwelling lines, tubes, and drains  - Monitor endotracheal if appropriate and nasal secretions for changes in amount and color  - [place] appropriate cooling/warming therapies per order  - Administer medications as ordered  - Instruct and encourage patient and family to use good hand hygiene technique  - Identify and instruct in appropriate isolation precautions for identified infection/condition  Outcome: Progressing  Goal: Absence of fever/infection during neutropenic period  Description: INTERVENTIONS:  - Monitor WBC    Outcome: Progressing     Problem: SAFETY ADULT  Goal: Maintain or return to baseline ADL function  Description: INTERVENTIONS:  -  Assess patient's ability to carry out ADLs; assess patient's baseline for ADL function and identify physical deficits which impact ability to perform ADLs (bathing, care of mouth/teeth, toileting, grooming, dressing, etc )  - Assess/evaluate cause of self-care deficits   - Assess range of motion  - Assess patient's mobility; develop plan if impaired  - Assess patient's need for assistive devices and provide as appropriate  - Encourage maximum independence but intervene and supervise when necessary  - Involve family in performance of ADLs  - Assess for home care needs following discharge   - Consider OT consult to assist with ADL evaluation and planning for discharge  - Provide patient education as appropriate  Outcome: Progressing  Goal: Maintains/Returns to pre admission functional level  Description: INTERVENTIONS:  - Perform BMAT or MOVE assessment daily    - Set and communicate daily mobility goal to care team and patient/family/caregiver  - Collaborate with rehabilitation services on mobility goals if consulted  - Perform Range of Motion    times a day  - Reposition patient every    hours    - Dangle patient    times a day  - Stand patient    times a day  - Ambulate patient      times a day  - Out of bed to chair    times a day   - Out of bed for meals times a day  - Out of bed for toileting  - Record patient progress and toleration of activity level   Outcome: Progressing  Goal: Patient will remain free of falls  Description: INTERVENTIONS:  - Educate patient/family on patient safety including physical limitations  - Instruct patient to call for assistance with activity   - Consult OT/PT to assist with strengthening/mobility   - Keep Call bell within reach  - Keep bed low and locked with side rails adjusted as appropriate  - Keep care items and personal belongings within reach  - Initiate and maintain comfort rounds  - Make Fall Risk Sign visible to staff  - Offer Toileting every    Hours, in advance of need  - Initiate/Maintain   alarm  - Obtain necessary fall risk management equipment:   - Apply yellow socks and bracelet for high fall risk patients  - Consider moving patient to room near nurses station  Outcome: Progressing     Problem: DISCHARGE PLANNING  Goal: Discharge to home or other facility with appropriate resources  Description: INTERVENTIONS:  - Identify barriers to discharge w/patient and caregiver  - Arrange for needed discharge resources and transportation as appropriate  - Identify discharge learning needs (meds, wound care, etc )  - Arrange for interpretive services to assist at discharge as needed  - Refer to Case Management Department for coordinating discharge planning if the patient needs post-hospital services based on physician/advanced practitioner order or complex needs related to functional status, cognitive ability, or social support system  Outcome: Progressing     Problem: Knowledge Deficit  Goal: Patient/family/caregiver demonstrates understanding of disease process, treatment plan, medications, and discharge instructions  Description: Complete learning assessment and assess knowledge base    Interventions:  - Provide teaching at level of understanding  - Provide teaching via preferred learning methods  Outcome: Progressing

## 2022-08-21 NOTE — ASSESSMENT & PLAN NOTE
Lab Results   Component Value Date    HGBA1C 6 9 (A) 06/16/2022       Recent Labs     08/19/22  2214 08/20/22  1243 08/20/22  1732 08/20/22  2116   POCGLU 137 149* 108 171*       Blood Sugar Average: Last 72 hrs:  (P) 146 9     - Continue sliding scale insulin  - Monitor blood sugars  - Outpatient follow-up with PCP

## 2022-08-21 NOTE — PROGRESS NOTES
[address] [facility]  Progress Note - Kelly Peers 1945, 68 y o  male MRN: [id number]  Unit/Bed#: [facility] 610-01 Encounter: [id number]  Primary Care Provider: Basilio Holloway MD   Date and time admitted to hospital: 8/15/2022 12:47 PM    Urinary retention  Assessment & Plan  - Vera in place  - Failed urinary retention protocol  - Consider void trial once epidural out    Bradycardia  Assessment & Plan  - Bradycardia, HR as low as 30s  - EP following  - s/p Medtronic dual chamber pacemaker implant 8/19/22  - Recommend 2 week device and site check     Severe aortic stenosis  Assessment & Plan  - Echo 8/18 EF 65%, severe aortic stenosis, aortic valve peak gradient 82 0 mmHg, aortic valve mean gradient 48 0 mmHg  - EKG paced rhythm w/RBBB  - EP following    Fall  Assessment & Plan  - Status post fall with the below noted injuries  - Fall precautions   - PT and OT evaluation and treatment as indicated  - Case Management consultation for disposition planning  L3 osteophyte fracture  Assessment & Plan  - L3 fracture, present on admission   - Neurosurgery evaluation and recommendations appreciated  Non-operative management recommended  - Bracing: Maintain LSO brace whenever upright >45 degrees and/or out of bed  - Spine precautions  - Monitor neurovascular exam   - Multimodal analgesic regimen as needed  - Upright spine x-rays pending  - PT and OT evaluation and treatment as indicated  - Outpatient follow up with Neurosurgery for re-evaluation        Lumbar transverse process fracture (HCC)  Assessment & Plan  - p r n  pain management  - continue neurovascular checks  - LSO brace  - outpatient follow-up with Trauma Team    Fracture of thoracic transverse process ([address] )  Assessment & Plan  - p r n  pain management  - continue neurovascular checks  - LSO brace  - outpatient follow-up with Trauma Team    Type 2 diabetes mellitus [facility])  Assessment & Plan  Lab Results   Component Value Date    HGBA1C 6 9 (A) 06/16/2022       Recent Labs     08/19/22  2214 08/20/22  1243 08/20/22  1732 08/20/22  2116   POCGLU 137 149* 108 171*       Blood Sugar Average: Last 72 hrs:  (P) 146 9     - Continue sliding scale insulin  - Monitor blood sugars  - Outpatient follow-up with PCP    Benign essential hypertension  Assessment & Plan  - Outpatient medications on hold in setting of recent bradycardia  - Weaned off pressors  S/p pacer placement   - HR sustained in 60s today  - Outpatient follow-up with PCP  * Rib fractures  Assessment & Plan  - Multiple right-sided rib fractures (10-11), present on admission   - Continue rib fracture protocol   - Continue to encourage incentive spirometer use and adequate pulmonary hygiene  Currently pulling 1000 mL on I S   - PIC score per nursing  - Continue multimodal analgesic regimen  Appreciate APS evaluation and recommendations  - Continue Thoracic epidural infusion of Ropivacaine 0 1% with fentanyl 2 mcg/mL at 8/5/10/3  - Continue IV dilaudid 0 2mg q4hr PRN for breakthrough pain  - Continue PO oxycodone 2 5/5mg q4hr PRN for moderate-severe pain  - Anticipate epidural removal and transition to primarily PO regimen possibly 8/21  - Continue other MMA: PO tylenol 975mg q8, Gabapentin 100mg qhs, PO robaxin 250mg q8hr scheduled   - Supplemental oxygen via nasal cannula as needed to maintain saturations greater than or equal to 94%  - PT and OT evaluation and treatment as indicated  - Outpatient follow-up in the trauma clinic for re-evaluation in approximately 2 weeks  DVT prophylaxis: SCDs and SQH  PT and OT: eval and treat    Disposition:  Status post pacemaker placement with Cardiology  Patient continues with epidural for p r n  Pain management  Doing well at this time  We discuss with APS regarding possible epidural removal today on 08/21/2022  Out of bed to chair today    Will trial voiding with Vera out once epidural is removed  SUBJECTIVE:  Chief Complaint:  No new complaints    Subjective:  Patient states he is feeling better  Accompanied with his wife today  States his pain is well controlled  OBJECTIVE:   Vitals:   Temp:  [98 °F (36 7 °C)-98 9 °F (37 2 °C)] 98 5 °F (36 9 °C)  HR:  [58-62] 60  Resp:  [16-25] 20  BP: (103-153)/(51-81) 124/57    Intake/Output:  I/O       08/19 0701  08/20 0700 08/20 0701  08/21 0700 08/21 0701 08/22 0700    P  O   400     I V  (mL/kg) 1679 5 (16 5) 234 8 (2 3)     IV Piggyback       Total Intake(mL/kg) 1679 5 (16 5) 634 8 (6 2)     Urine (mL/kg/hr) 3150 (1 3) 2175 (0 9)     Total Output 3150 2175     Net -1470 5 -1540 3                 Nutrition: Diet Cardiac    Physical Exam:   GENERAL APPEARANCE:  No acute distress  NEURO:  GCS 15  HEENT:  Normocephalic  CV:  Regular rate rhythm  LUNGS:  CTA bilaterally  GI:  Nontender, nondistended  :  Vera in place  MSK:  +2 pulses on extremities  SKIN:  Warm, dry intact    Invasive Devices  Report    Peripheral Intravenous Line  Duration           Peripheral IV 08/18/22 Right;Ventral (anterior) 2 days    Peripheral IV 08/19/22 Right;Ventral (anterior) Forearm 2 days    Peripheral IV 08/19/22 Left Hand 1 day          Epidural Line  Duration           Epidural Catheter 08/18/22 2 days          Drain  Duration           Urethral Catheter 16 Fr  2 days                 PIC Score  PIC Pain Score: 3 (8/21/2022  4:00 AM)  PIC Incentive Spirometry Score: Above goal volume (8/21/2022  4:00 AM)  PIC Cough Description: Strong (8/21/2022  4:00 AM)  PIC Total Score: 10 (8/21/2022  4:00 AM)       If the Total PIC Score </=5, did you consult APS and evaluate patient for further intervention?: yes      Pain:    Incentive Spirometry  Cough  3 = Controlled  4 = Above goal volume 3 = Strong  2 = Moderate  3 = Goal to alert volume 2 = Weak  1 = Severe  2 = Below alert volume 1 = Absent     1 = Unable to perform IS         Lab Results:   Results: I have personally reviewed all pertinent laboratory/tests results, BMP/CMP:   Lab Results   Component Value Date    SODIUM 139 08/21/2022    K 4 2 08/21/2022     08/21/2022    CO2 28 08/21/2022    BUN 27 (H) 08/21/2022    CREATININE 1 06 08/21/2022    CALCIUM 9 2 08/21/2022    EGFR 67 08/21/2022    and CBC:   Lab Results   Component Value Date    WBC 7 99 08/21/2022    HGB 12 5 08/21/2022    HCT 38 2 08/21/2022    MCV 89 08/21/2022     (L) 08/21/2022    MCH 29 1 08/21/2022    MCHC 32 7 08/21/2022    RDW 13 5 08/21/2022    MPV 10 9 08/21/2022     Imaging/EKG Studies: I have personally reviewed pertinent reports       Other Studies: no other studies

## 2022-08-22 LAB
ANION GAP SERPL CALCULATED.3IONS-SCNC: 3 MMOL/L (ref 4–13)
BASOPHILS # BLD AUTO: 0.03 THOUSANDS/ΜL (ref 0–0.1)
BASOPHILS NFR BLD AUTO: 0 % (ref 0–1)
BUN SERPL-MCNC: 26 MG/DL (ref 5–25)
CALCIUM SERPL-MCNC: 9.7 MG/DL (ref 8.3–10.1)
CHLORIDE SERPL-SCNC: 107 MMOL/L (ref 96–108)
CO2 SERPL-SCNC: 29 MMOL/L (ref 21–32)
CREAT SERPL-MCNC: 1.05 MG/DL (ref 0.6–1.3)
EOSINOPHIL # BLD AUTO: 0.18 THOUSAND/ΜL (ref 0–0.61)
EOSINOPHIL NFR BLD AUTO: 3 % (ref 0–6)
ERYTHROCYTE [DISTWIDTH] IN BLOOD BY AUTOMATED COUNT: 13.4 % (ref 11.6–15.1)
GFR SERPL CREATININE-BSD FRML MDRD: 68 ML/MIN/1.73SQ M
GLUCOSE SERPL-MCNC: 124 MG/DL (ref 65–140)
GLUCOSE SERPL-MCNC: 137 MG/DL (ref 65–140)
GLUCOSE SERPL-MCNC: 149 MG/DL (ref 65–140)
GLUCOSE SERPL-MCNC: 178 MG/DL (ref 65–140)
HCT VFR BLD AUTO: 40.1 % (ref 36.5–49.3)
HGB BLD-MCNC: 13.2 G/DL (ref 12–17)
IMM GRANULOCYTES # BLD AUTO: 0.04 THOUSAND/UL (ref 0–0.2)
IMM GRANULOCYTES NFR BLD AUTO: 1 % (ref 0–2)
LYMPHOCYTES # BLD AUTO: 1.68 THOUSANDS/ΜL (ref 0.6–4.47)
LYMPHOCYTES NFR BLD AUTO: 24 % (ref 14–44)
MCH RBC QN AUTO: 29.2 PG (ref 26.8–34.3)
MCHC RBC AUTO-ENTMCNC: 32.9 G/DL (ref 31.4–37.4)
MCV RBC AUTO: 89 FL (ref 82–98)
MONOCYTES # BLD AUTO: 0.59 THOUSAND/ΜL (ref 0.17–1.22)
MONOCYTES NFR BLD AUTO: 8 % (ref 4–12)
NEUTROPHILS # BLD AUTO: 4.56 THOUSANDS/ΜL (ref 1.85–7.62)
NEUTS SEG NFR BLD AUTO: 64 % (ref 43–75)
NRBC BLD AUTO-RTO: 0 /100 WBCS
PLATELET # BLD AUTO: 157 THOUSANDS/UL (ref 149–390)
PMV BLD AUTO: 10.5 FL (ref 8.9–12.7)
POTASSIUM SERPL-SCNC: 4 MMOL/L (ref 3.5–5.3)
RBC # BLD AUTO: 4.52 MILLION/UL (ref 3.88–5.62)
SODIUM SERPL-SCNC: 139 MMOL/L (ref 135–147)
WBC # BLD AUTO: 7.08 THOUSAND/UL (ref 4.31–10.16)

## 2022-08-22 PROCEDURE — 80048 BASIC METABOLIC PNL TOTAL CA: CPT | Performed by: PHYSICIAN ASSISTANT

## 2022-08-22 PROCEDURE — 99232 SBSQ HOSP IP/OBS MODERATE 35: CPT | Performed by: PHYSICIAN ASSISTANT

## 2022-08-22 PROCEDURE — 97116 GAIT TRAINING THERAPY: CPT

## 2022-08-22 PROCEDURE — 82948 REAGENT STRIP/BLOOD GLUCOSE: CPT

## 2022-08-22 PROCEDURE — 85025 COMPLETE CBC W/AUTO DIFF WBC: CPT | Performed by: PHYSICIAN ASSISTANT

## 2022-08-22 PROCEDURE — 97530 THERAPEUTIC ACTIVITIES: CPT

## 2022-08-22 PROCEDURE — 97535 SELF CARE MNGMENT TRAINING: CPT

## 2022-08-22 RX ORDER — ENOXAPARIN SODIUM 100 MG/ML
30 INJECTION SUBCUTANEOUS EVERY 12 HOURS SCHEDULED
Status: DISCONTINUED | OUTPATIENT
Start: 2022-08-23 | End: 2022-08-24 | Stop reason: HOSPADM

## 2022-08-22 RX ORDER — HEPARIN SODIUM 5000 [USP'U]/ML
5000 INJECTION, SOLUTION INTRAVENOUS; SUBCUTANEOUS EVERY 8 HOURS SCHEDULED
Status: COMPLETED | OUTPATIENT
Start: 2022-08-22 | End: 2022-08-22

## 2022-08-22 RX ORDER — GABAPENTIN 300 MG/1
300 CAPSULE ORAL
Status: DISCONTINUED | OUTPATIENT
Start: 2022-08-22 | End: 2022-08-24 | Stop reason: HOSPADM

## 2022-08-22 RX ADMIN — ACETAMINOPHEN 975 MG: 325 TABLET ORAL at 22:08

## 2022-08-22 RX ADMIN — METHOCARBAMOL 250 MG: 500 TABLET ORAL at 05:19

## 2022-08-22 RX ADMIN — HEPARIN SODIUM 5000 UNITS: 5000 INJECTION INTRAVENOUS; SUBCUTANEOUS at 05:20

## 2022-08-22 RX ADMIN — METHOCARBAMOL 250 MG: 500 TABLET ORAL at 13:14

## 2022-08-22 RX ADMIN — GABAPENTIN 300 MG: 300 CAPSULE ORAL at 22:08

## 2022-08-22 RX ADMIN — INSULIN LISPRO 1 UNITS: 100 INJECTION, SOLUTION INTRAVENOUS; SUBCUTANEOUS at 17:51

## 2022-08-22 RX ADMIN — ASPIRIN 81 MG CHEWABLE TABLET 81 MG: 81 TABLET CHEWABLE at 08:14

## 2022-08-22 RX ADMIN — OXYCODONE HYDROCHLORIDE 5 MG: 5 TABLET ORAL at 05:24

## 2022-08-22 RX ADMIN — SENNOSIDES 8.6 MG: 8.6 TABLET, FILM COATED ORAL at 22:08

## 2022-08-22 RX ADMIN — ACETAMINOPHEN 975 MG: 325 TABLET ORAL at 13:13

## 2022-08-22 RX ADMIN — PRAVASTATIN SODIUM 40 MG: 40 TABLET ORAL at 17:07

## 2022-08-22 RX ADMIN — LIDOCAINE 5% 1 PATCH: 700 PATCH TOPICAL at 08:14

## 2022-08-22 RX ADMIN — METHOCARBAMOL 250 MG: 500 TABLET ORAL at 17:07

## 2022-08-22 RX ADMIN — ACETAMINOPHEN 975 MG: 325 TABLET ORAL at 05:20

## 2022-08-22 RX ADMIN — OXYCODONE HYDROCHLORIDE 5 MG: 5 TABLET ORAL at 13:13

## 2022-08-22 RX ADMIN — HEPARIN SODIUM 5000 UNITS: 5000 INJECTION INTRAVENOUS; SUBCUTANEOUS at 22:08

## 2022-08-22 RX ADMIN — HEPARIN SODIUM 5000 UNITS: 5000 INJECTION INTRAVENOUS; SUBCUTANEOUS at 13:14

## 2022-08-22 RX ADMIN — Medication 3 MG: at 22:08

## 2022-08-22 NOTE — ASSESSMENT & PLAN NOTE
Lab Results   Component Value Date    HGBA1C 6 9 (A) 06/16/2022       Recent Labs     08/21/22  0842 08/21/22  1157 08/21/22  1646 08/21/22 2044   POCGLU 146* 128 158* 135       Blood Sugar Average: Last 72 hrs:  (P) 149 7     - Continue sliding scale insulin  - Monitor blood sugars  - Outpatient follow-up with PCP

## 2022-08-22 NOTE — PLAN OF CARE
Problem: PHYSICAL THERAPY ADULT  Goal: Performs mobility at highest level of function for planned discharge setting  See evaluation for individualized goals  Description: Treatment/Interventions: Functional transfer training, LE strengthening/ROM, Therapeutic exercise, Elevations, Endurance training, Patient/family training, Equipment eval/education, Bed mobility, Gait training, Spoke to nursing, OT  Equipment Recommended: Lee Hernandez       See flowsheet documentation for full assessment, interventions and recommendations  Outcome: Progressing  Note: Prognosis: Good  Problem List: Decreased strength, Decreased endurance, Impaired balance, Decreased mobility, Pain  Assessment: Pt demonstrated improved functional endurance & was able to perform repeated sit to stand transfers at RW without LOB, but remains well below baseline mobiity due to pain & resulting LE weakness that significantly impacts pt's overall mobiity  Pt performed transfers with repeated attempts prior to each successful trial & anticipate he will have significant trouble from low or soft seats at this time  pt does not own recliner on first floor to use during the day or possibly sleep in, which will likely require additional assist to maintain mobility in his home  Pt did not demonstrate LOB while ambulatory, but reports he was able to perform yardwork without limitations at home, suggesting he remains well below baseline independence  Discussed pt's progress & d/c planning with him & his spouse at conclusion of session  All parties in agreement to d/c to rehab to safely progress to higher level of functional independence prior to transitioning home to reduce burden of care on spouse as pt continues to regain his independence, especially given changes in medical status & new PPM placement since eval that also affect pt's mobility at this time  CM informed of change in discharge recommendations at conclusion of session    Discussed the same with supervising PT Darshan Foster DPT, who is in agreement at this time  Barriers to Discharge: Inaccessible home environment ([address])     PT Discharge Recommendation: Post acute rehabilitation services    See flowsheet documentation for full assessment

## 2022-08-22 NOTE — ASSESSMENT & PLAN NOTE
- Multiple right-sided rib fractures (10-11), present on admission   - Continue rib fracture protocol   - Continue to encourage incentive spirometer use and adequate pulmonary hygiene  Currently pulling 1250 mL on I S   - PIC score per nursing  - Continue multimodal analgesic regimen  Appreciate APS evaluation and recommendations  - Epidural removed on 8/21/22  - Supplemental oxygen via nasal cannula as needed to maintain saturations greater than or equal to 94%  - PT and OT evaluation and treatment as indicated  - Outpatient follow-up in the trauma clinic for re-evaluation in approximately 2 weeks

## 2022-08-22 NOTE — PLAN OF CARE
Problem: OCCUPATIONAL THERAPY ADULT  Goal: Performs self-care activities at highest level of function for planned discharge setting  See evaluation for individualized goals  Description: Treatment Interventions: ADL retraining, Functional transfer training, Endurance training, Patient/family training, Equipment evaluation/education, Compensatory technique education, Energy conservation, Activityengagement          See flowsheet documentation for full assessment, interventions and recommendations  Outcome: Progressing  Note: Limitation: Decreased ADL status, Decreased Safe judgement during ADL, Decreased endurance, Decreased self-care trans, Decreased high-level ADLs  Prognosis: Good  Assessment: Pt was seen this date for OT tx session focusing on self care tasks,  sit to stand progressions, standing tolerance, tranfers, functional mobility, safety awareness, compensatory techniques, energy conservation, and overall activity tolerance  Pt presents OOB in chair and is agreeable to OT tx session  Pt completes previously mentioned tasks at documented assist levels please see above in flow sheet  Pt continues to require overall S-min A for transfers and mobility and mod a for self care tasks  Pt is overall limited by pain, spinal precautions, decreased balance, increased fatigue, decreased strength, endurance, and activity tolerance and continues to function below baseline level  Pt resting OOB in chair at end of session with all needs in reach and alarm on  Pt would benefit from continued OT Tx to improve overall functional abilities and increase independence  Will continue to follow with current POC       OT Discharge Recommendation: Post acute rehabilitation services

## 2022-08-22 NOTE — ASSESSMENT & PLAN NOTE
- L3 fracture, present on admission   - Neurosurgery evaluation and recommendations appreciated  Non-operative management recommended  - Bracing: Maintain LSO brace whenever upright >45 degrees and/or out of bed  - Spine precautions  - Monitor neurovascular exam   - Multimodal analgesic regimen as needed  - Upright spine x-rays completed  - PT and OT evaluation and treatment as indicated  - Outpatient follow up with Neurosurgery for re-evaluation

## 2022-08-22 NOTE — OCCUPATIONAL THERAPY NOTE
Occupational Therapy Progress Note     Patient Name: Surinder Mcmillan  JCANE'U Date: 8/22/2022  Problem List  Principal Problem:    Rib fractures  Active Problems:    Benign essential hypertension    Type 2 diabetes mellitus ([facility] )    Fracture of thoracic transverse process ([facility] )    Lumbar transverse process fracture (HCC)    L3 osteophyte fracture    Fall    Severe aortic stenosis    Bradycardia    Urinary retention              08/22/22 0935   OT Last Visit   OT Visit Date 08/22/22   Note Type   Note Type Treatment   Restrictions/Precautions   Weight Bearing Precautions Per Order Yes   LUE Weight Bearing Per Order   (Pacer precautions)   Braces or Orthoses (S)  LSO   Other Precautions Chair Alarm; Fall Risk;Telemetry;Pain;Spinal precautions   Pain Assessment   Pain Score 9   Pain Location/Orientation Orientation: Lower; Location: Back   ADL   Where Assessed Chair   UB Dressing Assistance 3  Moderate Assistance   UB Dressing Comments to don LSO   LB Dressing Assistance 3  Moderate Assistance   LB Dressing Deficit Don/doff R sock; Don/doff L sock; Thread RLE into pants; Thread LLE into pants;Pull up over hips   LB Dressing Comments increased assist for balance in stance when pulling up over hips   Bed Mobility   Additional Comments OOB upon presentation and at en do fsession   Transfers   Sit to Stand 4  Minimal assistance   Additional items Assist x 1   Stand to Sit 4  Minimal assistance   Additional items Assist x 1   Stand pivot 4  Minimal assistance   Additional items Assist x 1   Additional Comments increased TC and VC   Functional Mobility   Functional Mobility 4  Minimal assistance   Additional Comments slow paced, fatigues quickly   Additional items Rolling walker   Cognition   Overall Cognitive Status [facility]   Arousal/Participation Alert; Cooperative   Attention Within functional limits   Orientation Level Oriented X4   Memory Decreased recall of precautions   Following Commands Follows one step commands without difficulty   Comments Overall pleasant and cooperative  Decreased reacall of spinal and pacer precautions in creased education provided on same   Activity Tolerance   Activity Tolerance Patient limited by fatigue;Patient limited by pain   Medical Staff Made Aware NSG aware   Assessment   Assessment Pt was seen this date for OT tx session focusing on self care tasks,  sit to stand progressions, standing tolerance, tranfers, functional mobility, safety awareness, compensatory techniques, energy conservation, and overall activity tolerance  Pt presents OOB in chair and is agreeable to OT tx session  Pt completes previously mentioned tasks at documented assist levels please see above in flow sheet  Pt continues to require overall S-min A for transfers and mobility and mod a for self care tasks  Pt is overall limited by pain, spinal precautions, decreased balance, increased fatigue, decreased strength, endurance, and activity tolerance and continues to function below baseline level  Pt resting OOB in chair at end of session with all needs in reach and alarm on  Pt would benefit from continued OT Tx to improve overall functional abilities and increase independence  Will continue to follow with current POC  Plan   Treatment Interventions ADL retraining;Functional transfer training; Endurance training;Patient/family training;Equipment evaluation/education; Compensatory technique education; Energy conservation; Activityengagement   Goal Expiration Date 08/30/22   OT Treatment Day 1   OT Frequency 3-5x/wk   Recommendation   OT Discharge Recommendation Post acute rehabilitation services   AM-PAC Daily Activity Inpatient   Lower Body Dressing 2   Bathing 2   Toileting 2   Upper Body Dressing 3   Grooming 4   Eating 4   Daily Activity Raw Score 17   Daily Activity Standardized Score (Calc for Raw Score >=11) 37 26   AM-PAC Applied Cognition Inpatient   Following a Speech/Presentation 4   Understanding Ordinary Conversation 4 Taking Medications 4   Remembering Where Things Are Placed or Put Away 4   Remembering List of 4-5 Errands 4   Taking Care of Complicated Tasks 4   Applied Cognition Raw Score 24   Applied Cognition Standardized Score 62 21

## 2022-08-22 NOTE — CASE MANAGEMENT
Case Management Discharge Planning Note    Patient name Sade Lovelace  Location [facility] 618-85 MRN [id number]  : 1945 Date 2022       Current Admission Date: 8/15/2022  Current Admission Diagnosis:Rib fractures   Patient Active Problem List    Diagnosis Date Noted    Severe aortic stenosis 2022    Bradycardia 2022    Urinary retention 2022    L3 osteophyte fracture 2022    Fall 2022    Rib fractures 08/15/2022    Fracture of thoracic transverse process ([facility] ) 08/15/2022    Lumbar transverse process fracture ([facility] [facility] ) 08/15/2022    Stage 3a chronic kidney disease ([facility] [facility] ) 2022    Special screening for malignant neoplasm of prostate 2021    RLS (restless legs syndrome) 2021    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 ([facility] [facility] ) 2021    Chronic bilateral low back pain without sciatica 2021    Hemiplegia and hemiparesis following cerebral infarction affecting left non-dominant side ([address] ) 2020    Benign prostatic hyperplasia with nocturia 2020    Claudication of both lower extremities ([facility] [facility] ) 2019    Colon cancer screening 2019    Medicare annual wellness visit, subsequent 2019    Primary osteoarthritis of left knee 01/10/2019    Bilateral carotid artery stenosis 10/08/2018    Dermatosis 10/08/2018    Cramps of left lower extremity 2018    Plantar fasciitis 2018    Tinea cruris 2018    Constipation 2018    Seborrheic dermatitis 11/10/2017    History of stroke 09/15/2017    Asthma 2017    Benign essential hypertension 2017    Type 2 diabetes mellitus ([address] ) 2017    Hyperlipidemia 2017    Obesity 2017    Impingement syndrome of right shoulder 2017    Diabetic nephropathy associated with type 2 diabetes mellitus ([address] ) 2017    Non-rheumatic aortic sclerosis 2017    Popliteal cyst, left 10/15/2015    Acquired hallux malleus of right foot 10/06/2015    Pre-ulcerative corn or callous 10/06/2015    Gout 12/11/2014    Allergic rhinitis 05/03/2012    Retina disorder 05/03/2012      LOS (days): 7  Geometric Mean LOS (GMLOS) (days): 2 90  Days to GMLOS:-4     OBJECTIVE:  Risk of Unplanned Readmission Score: 9 38         Current admission status: Inpatient   Preferred Pharmacy:   [address], [address] [facility] [address]  Phone: [phone] Fax: [facility] [address] [facility] [address]  [address]  [place] [address]  Phone: [phone] Fax: [phone]    Primary Care Provider: Jaycee Camacho MD    Primary Insurance: MEDICARE  Secondary Insurance: Rolena Gage SHIELD    DISCHARGE DETAILS:    CM met with pt and his spouse to discuss d/c plan  Pt is now recommended for IP rehab  Pt and wife would like [facility], but if unable to remain in [address] [place] [address] ([facility]) then pt is amenable to [facility]  CM placed referrals to [facility]

## 2022-08-22 NOTE — PROGRESS NOTES
Progress Note - Acute Pain Service    Charolette Cockayne 68 y o  male MRN: [id number]  Unit/Bed#: [facility] 610-01 Encounter: [id number]      Assessment:   Principal Problem:    Rib fractures  Active Problems:    Benign essential hypertension    Type 2 diabetes mellitus ([facility] [address] )    Fracture of thoracic transverse process (HCC)    Lumbar transverse process fracture (HCC)    L3 osteophyte fracture    Fall    Severe aortic stenosis    Bradycardia    Urinary retention    Charolette Cockayne is a 68 y o  male  who presented with right posterior 10-11th rib fractures and T12-L2 transverse process fractures  Initially pain was poorly controlled on standard multimodal regimen, paravertebral catheter attempted on 08/17 but aborted due to insufficient ultrasonographic windows  Thoracic epidural successfully placed on 08/18 with resultant symptomatic bradycardia and hypotension  After rapid response and ICU admission patient determined to have high-grade AV nkechi block and underwent PPM placement on 8/20  After stabilization epidural was resumed with local anesthetic, with good effect  Ultimately thoracic epidural was discontinued on 08/21  This morning pain remains well controlled on current multimodal regimen  Patient has not required any IV opioid medications for breakthrough pain  He denies shortness of breath, maintaining O2 saturation on room air  Achieving 1250 mL on incentive spirometer  He has been ambulating with physical therapy  Patient is cleared for discharge from pain management standpoint  Plan:   Epidural insertion site is clean and dry  There is no hematoma, redness, or blood to the area       · Discontinue IV Dilaudid 0 2 mg every 4 hours as needed, for breakthrough pain    · Tylenol 975 mg every 8 hours scheduled  · Robaxin 250 mg every 6 hours scheduled  · Lidocaine patch, 12 hours on/12 hours off, to ribcage  · Gabapentin 100 mg at bedtime while hospitalized, recommend discontinuation at discharge  Estimated Creatinine Clearance: 71 7 mL/min (by C-G formula based on SCr of 1 05 mg/dL)  · Oxycodone 2 5 mg every 4 hours as needed, for moderate pain  · Oxycodone 5 mg every 4 hours as needed, for severe pain   - Patient may be discharged with short course of oxycodone    Bowel Regimen:  Senna 1 tablet at bedtime    Treatment plan was discussed with bedside nursing as well as primary care team     APS will sign off at this time  Thank you for the consult  All opioids and other analgesics to be written at discretion of primary team  Please contact Acute Pain Service - [facility] via [employer] from [phone] with additional questions or concerns  See TigerText or Angel for additional contacts and after hours information  Pain History  Current pain location(s):  Right chest wall  Pain Scale:   6  Quality:  Aching  24 hour history:  Patient states he slept well, no acute events  Resting comfortably chair s/p physical therapy session  Reports increase in pain s/p epididymal removal, however remains tolerable on current multimodal regimen  Tolerating current regimen no complaints of nausea/vomiting, constipation/diarrhea      Opioid requirement previous 24 hours:   Oxycodone 5 mg    Meds/Allergies   all current active meds have been reviewed and current meds:   Current Facility-Administered Medications   Medication Dose Route Frequency    acetaminophen (TYLENOL) tablet 975 mg  975 mg Oral Q8H [address]    aspirin chewable tablet 81 mg  81 mg Oral Daily    bisacodyl (DULCOLAX) rectal suppository 10 mg  10 mg Rectal Daily PRN    [START ON 8/23/2022] enoxaparin (LOVENOX) subcutaneous injection 30 mg  30 mg Subcutaneous Q12H [address]    gabapentin (NEURONTIN) capsule 300 mg  300 mg Oral HS    heparin (porcine) subcutaneous injection 5,000 Units  5,000 Units Subcutaneous Q8H [address]    HYDROmorphone HCl (DILAUDID) injection 0 2 mg  0 2 mg Intravenous Q4H PRN    insulin lispro (HumaLOG) 100 units/mL subcutaneous injection 1-6 Units  1-6 Units Subcutaneous 4x Daily (AC & HS)    lidocaine (LIDODERM) 5 % patch 1 patch  1 patch Topical Daily    melatonin tablet 3 mg  3 mg Oral HS    methocarbamol (ROBAXIN) tablet 250 mg  250 mg Oral Q6H [facility]    ondansetron (ZOFRAN) injection 4 mg  4 mg Intravenous Q6H PRN    oxyCODONE (ROXICODONE) IR tablet 2 5 mg  2 5 mg Oral Q4H PRN    oxyCODONE (ROXICODONE) IR tablet 5 mg  5 mg Oral Q4H PRN    pravastatin (PRAVACHOL) tablet 40 mg  40 mg Oral Daily With Dinner    senna (SENOKOT) tablet 8 6 mg  1 tablet Oral HS       Allergies   Allergen Reactions    Penicillins Hives       Objective     Temp:  [97 5 °F (36 4 °C)-98 4 °F (36 9 °C)] 98 1 °F (36 7 °C)  HR:  [57-61] 57  Resp:  [16-18] 18  BP: (126-154)/(58-83) 150/79    Physical Exam  Constitutional:       General: He is not in acute distress  Appearance: He is not ill-appearing or toxic-appearing  HENT:      Head: Normocephalic and atraumatic  Mouth/Throat:      Mouth: Mucous membranes are moist    Eyes:      Extraocular Movements: Extraocular movements intact  Cardiovascular:      Rate and Rhythm: Normal rate  Pulmonary:      Effort: Pulmonary effort is normal  No tachypnea, bradypnea or respiratory distress  Breath sounds: Normal breath sounds  No wheezing, rhonchi or rales  Abdominal:      General: Abdomen is flat  There is no distension  Palpations: Abdomen is soft  Tenderness: There is no abdominal tenderness  Musculoskeletal:         General: Normal range of motion  Cervical back: Normal range of motion  Right lower leg: No edema  Left lower leg: No edema  Comments: LSO Brace in place   Skin:     General: Skin is warm and dry  Coloration: Skin is not pale  Findings: No rash  Neurological:      Mental Status: He is alert and oriented to person, place, and time  Mental status is at baseline  Sensory: Sensation is intact  No sensory deficit        Motor: Motor function is intact  No weakness or tremor  Psychiatric:         Attention and Perception: Attention normal          Mood and Affect: Mood normal  Mood is not anxious  Speech: Speech normal          Behavior: Behavior normal  Behavior is cooperative  Lab Results:   Results from last 7 days   Lab Units 08/22/22  0531   WBC Thousand/uL 7 08   HEMOGLOBIN g/dL 13 2   HEMATOCRIT % 40 1   PLATELETS Thousands/uL 157      Results from last 7 days   Lab Units 08/22/22  0531 08/19/22  0514 08/18/22  1104   POTASSIUM mmol/L 4 0   < >  --    CHLORIDE mmol/L 107   < >  --    CO2 mmol/L 29   < >  --    CO2, I-STAT mmol/L  --   --  23   BUN mg/dL 26*   < >  --    CREATININE mg/dL 1 05   < >  --    CALCIUM mg/dL 9 7   < >  --    GLUCOSE, ISTAT mg/dl  --   --  161*    < > = values in this interval not displayed  Imaging Studies: I have personally reviewed pertinent reports  Please note that the APS provides consultative services regarding pain management only  With the exception of ketamine and epidural infusions and except when indicated, final decisions regarding starting or changing doses of analgesic medications are at the discretion of the consulting service  Off hours consultation and/or medication management is generally not available      DONTE Gaitan  Acute Pain Service Other/Cooperative

## 2022-08-22 NOTE — PROGRESS NOTES
[address] [facility]  Progress Note - Pradip Giraldo 1945, 68 y o  male MRN: [id number]  Unit/Bed#: [facility] 610-01 Encounter: [id number]  Primary Care Provider: Hardik Solorzano MD   Date and time admitted to hospital: 8/15/2022 12:47 PM    Urinary retention  Assessment & Plan  - Abbasi in place  - Failed urinary retention protocol  - Trial voiding today without abbasi    Bradycardia  Assessment & Plan  - Bradycardia, HR as low as 30s  - EP following  - s/p Medtronic dual chamber pacemaker implant 8/19/22  - Recommend 2 week device and site check     Severe aortic stenosis  Assessment & Plan  - Echo 8/18 EF 65%, severe aortic stenosis, aortic valve peak gradient 82 0 mmHg, aortic valve mean gradient 48 0 mmHg  - EKG paced rhythm w/RBBB  - EP following    Fall  Assessment & Plan  - Status post fall with the below noted injuries  - Fall precautions   - PT and OT evaluation and treatment as indicated  - Case Management consultation for disposition planning  L3 osteophyte fracture  Assessment & Plan  - L3 fracture, present on admission   - Neurosurgery evaluation and recommendations appreciated  Non-operative management recommended  - Bracing: Maintain LSO brace whenever upright >45 degrees and/or out of bed  - Spine precautions  - Monitor neurovascular exam   - Multimodal analgesic regimen as needed  - Upright spine x-rays completed  - PT and OT evaluation and treatment as indicated  - Outpatient follow up with Neurosurgery for re-evaluation        Lumbar transverse process fracture (HCC)  Assessment & Plan  - p r n  pain management  - continue neurovascular checks  - LSO brace  - outpatient follow-up with Trauma Team    Fracture of thoracic transverse process ([address] )  Assessment & Plan  - p r n  pain management  - continue neurovascular checks  - LSO brace  - outpatient follow-up with Trauma Team    Type 2 diabetes mellitus [facility])  Assessment & Plan  Lab Results   Component Value Date    HGBA1C 6 9 (A) 06/16/2022       Recent Labs     08/21/22  0842 08/21/22  1157 08/21/22  1646 08/21/22  2044   POCGLU 146* 128 158* 135       Blood Sugar Average: Last 72 hrs:  (P) 149 7     - Continue sliding scale insulin  - Monitor blood sugars  - Outpatient follow-up with PCP    Benign essential hypertension  Assessment & Plan  - Outpatient medications on hold in setting of recent bradycardia  - Weaned off pressors  S/p pacer placement   - HR sustained in 60s today  - Outpatient follow-up with PCP  * Rib fractures  Assessment & Plan  - Multiple right-sided rib fractures (10-11), present on admission   - Continue rib fracture protocol   - Continue to encourage incentive spirometer use and adequate pulmonary hygiene  Currently pulling 1250 mL on I S   - PIC score per nursing  - Continue multimodal analgesic regimen  Appreciate APS evaluation and recommendations  - Epidural removed on 8/21/22  - Supplemental oxygen via nasal cannula as needed to maintain saturations greater than or equal to 94%  - PT and OT evaluation and treatment as indicated  - Outpatient follow-up in the trauma clinic for re-evaluation in approximately 2 weeks  DVT prophylaxis: SCDs and SQH; will look to transition to Lovenox today on 8/22/22  PT and OT: eval and treat    Disposition:  Transition from subcutaneous heparin to Lovenox starting on 08/23/2022  Otherwise patient doing clinically well at this time  Vera has been discontinued  Epidural is now out  Will re-evaluate with PT and OT and look to discharge in the next 24 hours  SUBJECTIVE:  Chief Complaint:  No acute complaints    Subjective:  Patient denies any acute complaints today on presentation  Denying any new complaints  Patient reports his pain is well controlled      OBJECTIVE:   Vitals:   Temp:  [97 5 °F (36 4 °C)-98 4 °F (36 9 °C)] 98 1 °F (36 7 °C)  HR:  [57-61] 57  Resp:  [16-18] 18  BP: (126-154)/(58-83) 150/79    Intake/Output:  I/O 08/20 0701  08/21 0700 08/21 0701  08/22 0700 08/22 0701  08/23 0700    P  O  400 480     I V  (mL/kg) 234 8 (2 3) 104 6 (1)     Total Intake(mL/kg) 634 8 (6 2) 584 6 (5 7)     Urine (mL/kg/hr) 2175 (0 9) 2100 (0 9)     Stool  1     Total Output 2175 2101     Net -1540 3 -1516 5                 Nutrition: Diet Cardiac    Physical Exam:   GENERAL APPEARANCE:  No acute distress  NEURO:  GCS 15  HEENT:  Normocephalic  CV:  Regular rate and rhythm  LUNGS:  CTA bilaterally  GI:  Nontender nondistended  :  No Vera  MSK:  +2 pulses on extremities  SKIN:  Warm, dry intact    Invasive Devices  Report    Peripheral Intravenous Line  Duration           Peripheral IV 08/18/22 Right;Ventral (anterior) 3 days    Peripheral IV 08/19/22 Right;Ventral (anterior) Forearm 3 days    Peripheral IV 08/19/22 Left Hand 2 days          Drain  Duration           Urethral Catheter 16 Fr  3 days                 PIC Score  PIC Pain Score: 1 (8/22/2022  5:24 AM)  PIC Incentive Spirometry Score: Above goal volume (8/22/2022 12:00 AM)  PIC Cough Description: Strong (8/22/2022 12:00 AM)  PIC Total Score: 10 (8/22/2022 12:00 AM)       If the Total PIC Score </=5, did you consult APS and evaluate patient for further intervention?: yes      Pain:    Incentive Spirometry  Cough  3 = Controlled  4 = Above goal volume 3 = Strong  2 = Moderate  3 = Goal to alert volume 2 = Weak  1 = Severe  2 = Below alert volume 1 = Absent     1 = Unable to perform IS         Lab Results:   Results: I have personally reviewed all pertinent laboratory/tests results, BMP/CMP:   Lab Results   Component Value Date    SODIUM 139 08/22/2022    K 4 0 08/22/2022     08/22/2022    CO2 29 08/22/2022    BUN 26 (H) 08/22/2022    CREATININE 1 05 08/22/2022    CALCIUM 9 7 08/22/2022    EGFR 68 08/22/2022    and CBC:   Lab Results   Component Value Date    WBC 7 08 08/22/2022    HGB 13 2 08/22/2022    HCT 40 1 08/22/2022    MCV 89 08/22/2022     08/22/2022    MCH 29 2 08/22/2022    MCHC 32 9 08/22/2022    RDW 13 4 08/22/2022    MPV 10 5 08/22/2022    NRBC 0 08/22/2022     Imaging/EKG Studies: I have personally reviewed pertinent reports       Other Studies: no other studies

## 2022-08-22 NOTE — PHYSICAL THERAPY NOTE
Physical Therapy Progress Note     08/22/22 0934   PT Last Visit   PT Visit Date 08/22/22   Note Type   Note Type Treatment   Pain Assessment   Pain Assessment Tool 0-10   Pain Score 9   Pain Location/Orientation Location: Back;Orientation: Lower;Orientation: Mid   Hospital Pain Intervention(s) Repositioned; Ambulation/increased activity; Rest   Restrictions/Precautions   Braces or Orthoses LSO  (for comfort per order)   Other Precautions Pain; Fall Risk;Spinal precautions  (LUE PPM precautions)   Subjective   Subjective Pt encountered supine in bed, pleasant and agreeable to treatment  Reports some increased pain since epidural removed yesterday, but does not appear to be in distress at any point during session  Pt reports 9/10 pain when prompted, but then reports "it's not that bad" when spouse arrived & inquired about the same  Bed Mobility   Supine to Sit 3  Moderate assistance   Additional items Assist x 1   Transfers   Sit to Stand 5  Supervision   Additional items Assist x 1  (multiple attempts each trial)   Stand to Sit 5  Supervision   Additional items Assist x 1; Armrests; Increased time required  (instrucitons for hand placement)   Ambulation/Elevation   Gait pattern Short stride; Foward flexed; Inconsistent yara;Decreased foot clearance; Antalgic; Improper Weight shift   Gait Assistance 5  Supervision   Additional items Assist x 1   Assistive Device Rolling walker   Distance 30' x 2, seated rest, then 40' x 2   Balance   Static Sitting Fair   Static Standing Fair -   Ambulatory Poor +   Activity Tolerance   Activity Tolerance Patient tolerated treatment well;Patient limited by fatigue;Patient limited by pain   Nurse Made Aware yes   Assessment   Prognosis Good   Problem List Decreased strength;Decreased endurance; Impaired balance;Decreased mobility;Pain   Assessment Pt demonstrated improved functional endurance & was able to perform repeated sit to stand transfers at RW without LOB, but remains well below baseline mobiity due to pain & resulting LE weakness that significantly impacts pt's overall mobiity  Pt performed transfers with repeated attempts prior to each successful trial & anticipate he will have significant trouble from low or soft seats at this time  pt does not own recliner on first floor to use during the day or possibly sleep in, which will likely require additional assist to maintain mobility in his home  Pt did not demonstrate LOB while ambulatory, but reports he was able to perform yardwork without limitations at home, suggesting he remains well below baseline independence  Discussed pt's progress & d/c planning with him & his spouse at conclusion of session  All parties in agreement to d/c to rehab to safely progress to higher level of functional independence prior to transitioning home to reduce burden of care on spouse as pt continues to regain his independence, especially given changes in medical status & new PPM placement since eval that also affect pt's mobility at this time  CM informed of change in discharge recommendations at conclusion of session  Discussed the same with supervising PT Charlene Christensen DPT, who is in agreement at this time  Barriers to Discharge Inaccessible home environment  (1 MARYA)   Goals   Patient Goals to get better before going home   STG Expiration Date 08/26/22   PT Treatment Day 1   Plan   Treatment/Interventions Functional transfer training;LE strengthening/ROM; Elevations; Therapeutic exercise; Endurance training;Patient/family training;Equipment eval/education;Gait training;Bed mobility   Progress Progressing toward goals   PT Frequency Other (Comment)  (3-6x/week)   Recommendation   PT Discharge Recommendation Post acute rehabilitation services   Equipment Recommended [facility] Recommended Wheeled walker   AM-PAC Basic Mobility Inpatient   Turning in Bed Without Bedrails 2   Lying on Back to Sitting on Edge of Flat Bed 2   Moving Bed to Chair 3   Standing Up From Chair 3   Walk in Room 4   Climb 3-5 Stairs 2   Basic Mobility Inpatient Raw Score 16   Basic Mobility Standardized Score 38 32   Highest Level Of Mobility   -HLM Goal 5: Stand one or more mins   -HLM Achieved 7: Walk 25 feet or more     Jessica Blake PTA    An [facility] Basic Mobility Standardized Score of 40 78 suggests pt would benefit from post acute rehab

## 2022-08-23 LAB
ANION GAP SERPL CALCULATED.3IONS-SCNC: 2 MMOL/L (ref 4–13)
BASOPHILS # BLD AUTO: 0.02 THOUSANDS/ΜL (ref 0–0.1)
BASOPHILS NFR BLD AUTO: 0 % (ref 0–1)
BUN SERPL-MCNC: 22 MG/DL (ref 5–25)
CALCIUM SERPL-MCNC: 9.6 MG/DL (ref 8.3–10.1)
CHLORIDE SERPL-SCNC: 107 MMOL/L (ref 96–108)
CO2 SERPL-SCNC: 28 MMOL/L (ref 21–32)
CREAT SERPL-MCNC: 1.11 MG/DL (ref 0.6–1.3)
EOSINOPHIL # BLD AUTO: 0.18 THOUSAND/ΜL (ref 0–0.61)
EOSINOPHIL NFR BLD AUTO: 2 % (ref 0–6)
ERYTHROCYTE [DISTWIDTH] IN BLOOD BY AUTOMATED COUNT: 13.5 % (ref 11.6–15.1)
FLUAV RNA RESP QL NAA+PROBE: NEGATIVE
FLUBV RNA RESP QL NAA+PROBE: NEGATIVE
GFR SERPL CREATININE-BSD FRML MDRD: 63 ML/MIN/1.73SQ M
GLUCOSE SERPL-MCNC: 119 MG/DL (ref 65–140)
GLUCOSE SERPL-MCNC: 136 MG/DL (ref 65–140)
GLUCOSE SERPL-MCNC: 166 MG/DL (ref 65–140)
GLUCOSE SERPL-MCNC: 171 MG/DL (ref 65–140)
GLUCOSE SERPL-MCNC: 201 MG/DL (ref 65–140)
HCT VFR BLD AUTO: 43.9 % (ref 36.5–49.3)
HGB BLD-MCNC: 14.6 G/DL (ref 12–17)
IMM GRANULOCYTES # BLD AUTO: 0.03 THOUSAND/UL (ref 0–0.2)
IMM GRANULOCYTES NFR BLD AUTO: 0 % (ref 0–2)
LYMPHOCYTES # BLD AUTO: 1.56 THOUSANDS/ΜL (ref 0.6–4.47)
LYMPHOCYTES NFR BLD AUTO: 18 % (ref 14–44)
MCH RBC QN AUTO: 29.6 PG (ref 26.8–34.3)
MCHC RBC AUTO-ENTMCNC: 33.3 G/DL (ref 31.4–37.4)
MCV RBC AUTO: 89 FL (ref 82–98)
MONOCYTES # BLD AUTO: 0.65 THOUSAND/ΜL (ref 0.17–1.22)
MONOCYTES NFR BLD AUTO: 7 % (ref 4–12)
NEUTROPHILS # BLD AUTO: 6.32 THOUSANDS/ΜL (ref 1.85–7.62)
NEUTS SEG NFR BLD AUTO: 73 % (ref 43–75)
NRBC BLD AUTO-RTO: 0 /100 WBCS
PLATELET # BLD AUTO: 198 THOUSANDS/UL (ref 149–390)
PMV BLD AUTO: 12 FL (ref 8.9–12.7)
POTASSIUM SERPL-SCNC: 4 MMOL/L (ref 3.5–5.3)
RBC # BLD AUTO: 4.93 MILLION/UL (ref 3.88–5.62)
RSV RNA RESP QL NAA+PROBE: NEGATIVE
SARS-COV-2 RNA RESP QL NAA+PROBE: NEGATIVE
SODIUM SERPL-SCNC: 137 MMOL/L (ref 135–147)
WBC # BLD AUTO: 8.76 THOUSAND/UL (ref 4.31–10.16)

## 2022-08-23 PROCEDURE — 80048 BASIC METABOLIC PNL TOTAL CA: CPT | Performed by: PHYSICIAN ASSISTANT

## 2022-08-23 PROCEDURE — 82948 REAGENT STRIP/BLOOD GLUCOSE: CPT

## 2022-08-23 PROCEDURE — 0241U HB NFCT DS VIR RESP RNA 4 TRGT: CPT | Performed by: PHYSICIAN ASSISTANT

## 2022-08-23 PROCEDURE — NC001 PR NO CHARGE

## 2022-08-23 PROCEDURE — 99232 SBSQ HOSP IP/OBS MODERATE 35: CPT | Performed by: PHYSICIAN ASSISTANT

## 2022-08-23 PROCEDURE — 85025 COMPLETE CBC W/AUTO DIFF WBC: CPT | Performed by: PHYSICIAN ASSISTANT

## 2022-08-23 RX ADMIN — METHOCARBAMOL 250 MG: 500 TABLET ORAL at 06:12

## 2022-08-23 RX ADMIN — ENOXAPARIN SODIUM 30 MG: 30 INJECTION SUBCUTANEOUS at 08:04

## 2022-08-23 RX ADMIN — METHOCARBAMOL 250 MG: 500 TABLET ORAL at 13:05

## 2022-08-23 RX ADMIN — ACETAMINOPHEN 975 MG: 325 TABLET ORAL at 06:12

## 2022-08-23 RX ADMIN — GABAPENTIN 300 MG: 300 CAPSULE ORAL at 21:15

## 2022-08-23 RX ADMIN — METHOCARBAMOL 250 MG: 500 TABLET ORAL at 00:18

## 2022-08-23 RX ADMIN — INSULIN LISPRO 1 UNITS: 100 INJECTION, SOLUTION INTRAVENOUS; SUBCUTANEOUS at 21:16

## 2022-08-23 RX ADMIN — ASPIRIN 81 MG CHEWABLE TABLET 81 MG: 81 TABLET CHEWABLE at 08:03

## 2022-08-23 RX ADMIN — LIDOCAINE 5% 1 PATCH: 700 PATCH TOPICAL at 08:04

## 2022-08-23 RX ADMIN — ACETAMINOPHEN 975 MG: 325 TABLET ORAL at 21:15

## 2022-08-23 RX ADMIN — METHOCARBAMOL 250 MG: 500 TABLET ORAL at 17:37

## 2022-08-23 RX ADMIN — ENOXAPARIN SODIUM 30 MG: 30 INJECTION SUBCUTANEOUS at 21:15

## 2022-08-23 RX ADMIN — METHOCARBAMOL 250 MG: 500 TABLET ORAL at 23:53

## 2022-08-23 RX ADMIN — Medication 3 MG: at 21:15

## 2022-08-23 RX ADMIN — SENNOSIDES 8.6 MG: 8.6 TABLET, FILM COATED ORAL at 21:15

## 2022-08-23 RX ADMIN — ACETAMINOPHEN 975 MG: 325 TABLET ORAL at 13:05

## 2022-08-23 RX ADMIN — PRAVASTATIN SODIUM 40 MG: 40 TABLET ORAL at 17:37

## 2022-08-23 RX ADMIN — INSULIN LISPRO 1 UNITS: 100 INJECTION, SOLUTION INTRAVENOUS; SUBCUTANEOUS at 17:38

## 2022-08-23 RX ADMIN — OXYCODONE HYDROCHLORIDE 5 MG: 5 TABLET ORAL at 17:50

## 2022-08-23 NOTE — CASE MANAGEMENT
Case Management Discharge Planning Note    Patient name Charolette Cockayne  Location [facility] 610/[facility] 562-61 MRN [id number]  : 1945 Date 2022       Current Admission Date: 8/15/2022  Current Admission Diagnosis:Rib fractures   Patient Active Problem List    Diagnosis Date Noted    Severe aortic stenosis 2022    Bradycardia 2022    Urinary retention 2022    L3 osteophyte fracture 2022    Fall 2022    Rib fractures 08/15/2022    Fracture of thoracic transverse process ([facility] ) 08/15/2022    Lumbar transverse process fracture ([facility] ) 08/15/2022    Stage 3a chronic kidney disease ([facility] ) 2022    Special screening for malignant neoplasm of prostate 2021    RLS (restless legs syndrome) 2021    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 ([facility] ) 2021    Chronic bilateral low back pain without sciatica 2021    Hemiplegia and hemiparesis following cerebral infarction affecting left non-dominant side ([address] ) 2020    Benign prostatic hyperplasia with nocturia 2020    Claudication of both lower extremities ([facility] ) 2019    Colon cancer screening 2019    Medicare annual wellness visit, subsequent 2019    Primary osteoarthritis of left knee 01/10/2019    Bilateral carotid artery stenosis 10/08/2018    Dermatosis 10/08/2018    Cramps of left lower extremity 2018    Plantar fasciitis 2018    Tinea cruris 2018    Constipation 2018    Seborrheic dermatitis 11/10/2017    History of stroke 09/15/2017    Asthma 2017    Benign essential hypertension 2017    Type 2 diabetes mellitus ([address] ) 2017    Hyperlipidemia 2017    Obesity 2017    Impingement syndrome of right shoulder 2017    Diabetic nephropathy associated with type 2 diabetes mellitus ([address] ) 2017    Non-rheumatic aortic sclerosis 2017    Popliteal cyst, left 10/15/2015    Acquired hallux malleus of right foot 10/06/2015    Pre-ulcerative corn or callous 10/06/2015    Gout 12/11/2014    Allergic rhinitis 05/03/2012    Retina disorder 05/03/2012      LOS (days): 8  Geometric Mean LOS (GMLOS) (days): 2 90  Days to GMLOS:-4 9     OBJECTIVE:  Risk of Unplanned Readmission Score: 8 52         Current admission status: Inpatient   Preferred Pharmacy:   [address] [place] [address], [address] [facility] [address]  Phone: [phone] Fax: [address]  [address]  [facility] [address] [address]  Phone: [phone] Fax: [phone]    Primary Care Provider: Myrna Kelly MD    Primary Insurance: MEDICARE  Secondary Insurance: Chauncey MIN    DISCHARGE DETAILS:    CM informed [facility] liaisons that pt is medically stable for d/c today  CM will await their decision and bed availability

## 2022-08-23 NOTE — PROGRESS NOTES
[facility]  Progress Note - Sade Lovelace 1945, 68 y o  male MRN: [id number]  Unit/Bed#: [facility] 610-01 Encounter: [id number]  Primary Care Provider: Nikolay Maldonado MD   Date and time admitted to hospital: 8/15/2022 12:47 PM    Urinary retention  Assessment & Plan  - Vera removed on 08/23/2022  - tolerated void trial  - monitor urinary output    Bradycardia  Assessment & Plan  - Bradycardia, HR as low as 30s  - EP following  - s/p [employer] dual chamber pacemaker implant 8/19/22  - Recommend 2 week device and site check     Severe aortic stenosis  Assessment & Plan  - Echo 8/18 EF 65%, severe aortic stenosis, aortic valve peak gradient 82 0 mmHg, aortic valve mean gradient 48 0 mmHg  - EKG paced rhythm w/RBBB  - EP following    Fall  Assessment & Plan  - Status post fall with the below noted injuries  - Fall precautions   - PT and OT evaluation and treatment as indicated  - Case Management consultation for disposition planning  L3 osteophyte fracture  Assessment & Plan  - L3 fracture, present on admission   - Neurosurgery evaluation and recommendations appreciated  Non-operative management recommended  - Bracing: Maintain LSO brace whenever upright >45 degrees and/or out of bed  - Spine precautions  - Monitor neurovascular exam   - Multimodal analgesic regimen as needed  - Upright spine x-rays completed  - PT and OT evaluation and treatment as indicated  - Outpatient follow up with Neurosurgery for re-evaluation        Lumbar transverse process fracture (HCC)  Assessment & Plan  - p r n  pain management  - continue neurovascular checks  - LSO brace  - outpatient follow-up with Trauma Team    Fracture of thoracic transverse process ([address] )  Assessment & Plan  - p r n  pain management  - continue neurovascular checks  - LSO brace  - outpatient follow-up with Trauma Team    Type 2 diabetes mellitus [facility])  Assessment & Plan  Lab Results   Component Value Date    HGBA1C 6 9 (A) 06/16/2022       Recent Labs     08/21/22  2044 08/22/22  1141 08/22/22  1716 08/22/22 2025   POCGLU 135 149* 178* 137       Blood Sugar Average: Last 72 hrs:  (P) 145 9     - Continue sliding scale insulin  - Monitor blood sugars  - Outpatient follow-up with PCP    Benign essential hypertension  Assessment & Plan  - Outpatient medications on hold in setting of recent bradycardia  - Weaned off pressors  S/p pacer placement   - HR sustained in 60s today  - Outpatient follow-up with PCP  * Rib fractures  Assessment & Plan  - Multiple right-sided rib fractures (10-11), present on admission   - Continue rib fracture protocol   - Continue to encourage incentive spirometer use and adequate pulmonary hygiene  Currently pulling 1250 mL on I S   - PIC score per nursing  - Continue multimodal analgesic regimen  Appreciate APS evaluation and recommendations  - Epidural removed on 8/21/22  - Supplemental oxygen via nasal cannula as needed to maintain saturations greater than or equal to 94%  - PT and OT evaluation and treatment as indicated  - Outpatient follow-up in the trauma clinic for re-evaluation in approximately 2 weeks  DVT prophylaxis: SCDs and Lovenox  PT and OT: eval and treat    Disposition:  DC planning  Patient medically stable for discharge at this time  Awaiting placement to acute care rehab  SUBJECTIVE:  Chief Complaint:  No new complaints    Subjective:  States that his pain is well controlled and he is offering no new complaints today on presentation  Currently resting in bed  OBJECTIVE:   Vitals:   Temp:  [98 °F (36 7 °C)-98 5 °F (36 9 °C)] 98 1 °F (36 7 °C)  HR:  [60-65] 60  Resp:  [16-21] 19  BP: (126-165)/(68-88) 129/71    Intake/Output:  I/O       08/21 0701  08/22 0700 08/22 0701  08/23 0700 08/23 0701  08/24 0700    P  O  480      I V  (mL/kg) 104 6 (1)      Total Intake(mL/kg) 584 6 (5 7)      Urine (mL/kg/hr) 2100 (0 9) 1900 (0 8)     Stool 1      Total Output 2101 1900     Net -1516 5 -1900                 Nutrition: Diet Cardiac    Physical Exam:   GENERAL APPEARANCE:  No acute distress  NEURO:  GCS 15  HEENT:  Normocephalic  CV:  Regular rate and rhythm  LUNGS:  CTA bilaterally  GI:  Nontender, nondistended  :  No Vera  MSK:  +2 pulses on extremities  SKIN:  Warm, dry, intact    Invasive Devices  Report    Peripheral Intravenous Line  Duration           Peripheral IV 08/23/22 Right Antecubital <1 day                 PIC Score  PIC Pain Score: 3 (8/23/2022  5:45 AM)  PIC Incentive Spirometry Score: Goal to alert volume (8/23/2022  5:45 AM)  PIC Cough Description: Strong (8/23/2022  5:45 AM)  PIC Total Score: 9 (8/23/2022  5:45 AM)       If the Total PIC Score </=5, did you consult APS and evaluate patient for further intervention?: yes      Pain:    Incentive Spirometry  Cough  3 = Controlled  4 = Above goal volume 3 = Strong  2 = Moderate  3 = Goal to alert volume 2 = Weak  1 = Severe  2 = Below alert volume 1 = Absent     1 = Unable to perform IS         Lab Results:   Results: I have personally reviewed all pertinent laboratory/tests results, BMP/CMP: No results found for: SODIUM, K, CL, CO2, ANIONGAP, BUN, CREATININE, GLUCOSE, CALCIUM, AST, ALT, ALKPHOS, PROT, BILITOT, EGFR and CBC:   Lab Results   Component Value Date    WBC 8 76 08/23/2022    HGB 14 6 08/23/2022    HCT 43 9 08/23/2022    MCV 89 08/23/2022     08/23/2022    MCH 29 6 08/23/2022    MCHC 33 3 08/23/2022    RDW 13 5 08/23/2022    MPV 12 0 08/23/2022    NRBC 0 08/23/2022     Imaging/EKG Studies: I have personally reviewed pertinent reports       Other Studies: no other studies

## 2022-08-23 NOTE — ARC ADMISSION
Referral received for Shiraz Oneill  Pending review with [facility] physician

## 2022-08-23 NOTE — ASSESSMENT & PLAN NOTE
Lab Results   Component Value Date    HGBA1C 6 9 (A) 06/16/2022       Recent Labs     08/21/22 2044 08/22/22  1141 08/22/22  1716 08/22/22 2025   POCGLU 135 149* 178* 137       Blood Sugar Average: Last 72 hrs:  (P) 145 9     - Continue sliding scale insulin  - Monitor blood sugars  - Outpatient follow-up with PCP

## 2022-08-23 NOTE — ARC ADMISSION
After review, patient is accepted at [facility] when medically cleared  COVID test is needed before admitting
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
## 2022-08-23 NOTE — CASE MANAGEMENT
Case Management Discharge Planning Note    Patient name Devendra Anand  Location [facility]-69 MRN [id number]  : 1945 Date 2022       Current Admission Date: 8/15/2022  Current Admission Diagnosis:Rib fractures   Patient Active Problem List    Diagnosis Date Noted    Severe aortic stenosis 2022    Bradycardia 2022    Urinary retention 2022    L3 osteophyte fracture 2022    Fall 2022    Rib fractures 08/15/2022    Fracture of thoracic transverse process ([facility] ) 08/15/2022    Lumbar transverse process fracture ([facility] ) 08/15/2022    Stage 3a chronic kidney disease ([facility] ) 2022    Special screening for malignant neoplasm of prostate 2021    RLS (restless legs syndrome) 2021    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 ([facility] ) 2021    Chronic bilateral low back pain without sciatica 2021    Hemiplegia and hemiparesis following cerebral infarction affecting left non-dominant side ([address] ) 2020    Benign prostatic hyperplasia with nocturia 2020    Claudication of both lower extremities ([facility] ) 2019    Colon cancer screening 2019    Medicare annual wellness visit, subsequent 2019    Primary osteoarthritis of left knee 01/10/2019    Bilateral carotid artery stenosis 10/08/2018    Dermatosis 10/08/2018    Cramps of left lower extremity 2018    Plantar fasciitis 2018    Tinea cruris 2018    Constipation 2018    Seborrheic dermatitis 11/10/2017    History of stroke 09/15/2017    Asthma 2017    Benign essential hypertension 2017    Type 2 diabetes mellitus ([address] ) 2017    Hyperlipidemia 2017    Obesity 2017    Impingement syndrome of right shoulder 2017    Diabetic nephropathy associated with type 2 diabetes mellitus ([address] ) 2017    Non-rheumatic aortic sclerosis 2017    Popliteal cyst, left 10/15/2015    Acquired hallux malleus of right foot 10/06/2015    Pre-ulcerative corn or callous 10/06/2015    Gout 12/11/2014    Allergic rhinitis 05/03/2012    Retina disorder 05/03/2012      LOS (days): 8  Geometric Mean LOS (GMLOS) (days): 2 90  Days to GMLOS:-5     OBJECTIVE:  Risk of Unplanned Readmission Score: 7 49         Current admission status: Inpatient   Preferred Pharmacy:   [id number], [address] [facility] [address]  Phone: [phone] Fax: 980 [address]  [address]  [facility] [address]  Phone: [phone] Fax: [phone]    Primary Care Provider: Kentrell Newberry MD    Primary Insurance: MEDICARE  Secondary Insurance: [address] SHIELD    DISCHARGE DETAILS:    Pt clinically accepted to [facility]  Awaiting when bed would be available and at which location  Pt's preference is [facility] but would do [facility] is no bed for a period of time

## 2022-08-24 ENCOUNTER — HOSPITAL ENCOUNTER (INPATIENT)
Facility: HOSPITAL | Age: 77
LOS: 8 days | Discharge: HOME | DRG: 560 | End: 2022-09-01
Attending: STUDENT IN AN ORGANIZED HEALTH CARE EDUCATION/TRAINING PROGRAM | Admitting: STUDENT IN AN ORGANIZED HEALTH CARE EDUCATION/TRAINING PROGRAM
Payer: MEDICARE

## 2022-08-24 VITALS
BODY MASS INDEX: 30.49 KG/M2 | SYSTOLIC BLOOD PRESSURE: 142 MMHG | TEMPERATURE: 97.4 F | HEIGHT: 71 IN | OXYGEN SATURATION: 94 % | HEART RATE: 60 BPM | RESPIRATION RATE: 18 BRPM | DIASTOLIC BLOOD PRESSURE: 70 MMHG | WEIGHT: 217.81 LBS

## 2022-08-24 DIAGNOSIS — R26.2 AMBULATORY DYSFUNCTION: ICD-10-CM

## 2022-08-24 DIAGNOSIS — I35.0 SEVERE AORTIC STENOSIS: Primary | ICD-10-CM

## 2022-08-24 DIAGNOSIS — E78.5 HYPERLIPIDEMIA, UNSPECIFIED HYPERLIPIDEMIA TYPE: ICD-10-CM

## 2022-08-24 DIAGNOSIS — M79.601 RIGHT ARM PAIN: ICD-10-CM

## 2022-08-24 DIAGNOSIS — S32.009A CLOSED FRACTURE OF TRANSVERSE PROCESS OF LUMBAR VERTEBRA, INITIAL ENCOUNTER (HCC): ICD-10-CM

## 2022-08-24 DIAGNOSIS — T07.XXXA MULTIPLE FRACTURES: ICD-10-CM

## 2022-08-24 DIAGNOSIS — E11.49 TYPE 2 DIABETES MELLITUS WITH OTHER NEUROLOGIC COMPLICATION, WITHOUT LONG-TERM CURRENT USE OF INSULIN (HCC): ICD-10-CM

## 2022-08-24 DIAGNOSIS — R33.9 URINARY RETENTION: ICD-10-CM

## 2022-08-24 DIAGNOSIS — J45.20 MILD INTERMITTENT ASTHMA WITHOUT COMPLICATION: ICD-10-CM

## 2022-08-24 DIAGNOSIS — I10 BENIGN ESSENTIAL HYPERTENSION: ICD-10-CM

## 2022-08-24 DIAGNOSIS — Z86.73 HISTORY OF STROKE: ICD-10-CM

## 2022-08-24 DIAGNOSIS — S22.41XA CLOSED FRACTURE OF MULTIPLE RIBS OF RIGHT SIDE, INITIAL ENCOUNTER: ICD-10-CM

## 2022-08-24 DIAGNOSIS — S32.009A CLOSED FRACTURE OF LUMBAR VERTEBRA, UNSPECIFIED FRACTURE MORPHOLOGY, INITIAL ENCOUNTER (HCC): ICD-10-CM

## 2022-08-24 LAB
GLUCOSE SERPL-MCNC: 133 MG/DL (ref 65–140)
GLUCOSE SERPL-MCNC: 178 MG/DL (ref 65–140)
GLUCOSE SERPL-MCNC: 180 MG/DL (ref 65–140)
GLUCOSE SERPL-MCNC: 253 MG/DL (ref 65–140)

## 2022-08-24 PROCEDURE — 82948 REAGENT STRIP/BLOOD GLUCOSE: CPT

## 2022-08-24 PROCEDURE — 99223 1ST HOSP IP/OBS HIGH 75: CPT | Performed by: INTERNAL MEDICINE

## 2022-08-24 PROCEDURE — 99238 HOSP IP/OBS DSCHRG MGMT 30/<: CPT | Performed by: EMERGENCY MEDICINE

## 2022-08-24 PROCEDURE — 99223 1ST HOSP IP/OBS HIGH 75: CPT | Performed by: STUDENT IN AN ORGANIZED HEALTH CARE EDUCATION/TRAINING PROGRAM

## 2022-08-24 RX ORDER — POLYETHYLENE GLYCOL 3350 17 G/17G
17 POWDER, FOR SOLUTION ORAL DAILY PRN
Status: DISCONTINUED | OUTPATIENT
Start: 2022-08-24 | End: 2022-09-01 | Stop reason: HOSPADM

## 2022-08-24 RX ORDER — LANOLIN ALCOHOL/MO/W.PET/CERES
3 CREAM (GRAM) TOPICAL
Status: CANCELLED | OUTPATIENT
Start: 2022-08-24

## 2022-08-24 RX ORDER — INSULIN LISPRO 100 [IU]/ML
1-6 INJECTION, SOLUTION INTRAVENOUS; SUBCUTANEOUS
Status: CANCELLED | OUTPATIENT
Start: 2022-08-24

## 2022-08-24 RX ORDER — OXYCODONE HYDROCHLORIDE 5 MG/1
2.5 TABLET ORAL EVERY 4 HOURS PRN
Status: CANCELLED | OUTPATIENT
Start: 2022-08-24

## 2022-08-24 RX ORDER — OXYCODONE HYDROCHLORIDE 5 MG/1
5 TABLET ORAL EVERY 4 HOURS PRN
Status: DISCONTINUED | OUTPATIENT
Start: 2022-08-24 | End: 2022-08-29

## 2022-08-24 RX ORDER — LANOLIN ALCOHOL/MO/W.PET/CERES
3 CREAM (GRAM) TOPICAL
Status: DISCONTINUED | OUTPATIENT
Start: 2022-08-24 | End: 2022-09-01 | Stop reason: HOSPADM

## 2022-08-24 RX ORDER — INSULIN LISPRO 100 [IU]/ML
1-6 INJECTION, SOLUTION INTRAVENOUS; SUBCUTANEOUS
Status: DISCONTINUED | OUTPATIENT
Start: 2022-08-24 | End: 2022-09-01 | Stop reason: HOSPADM

## 2022-08-24 RX ORDER — OXYCODONE HYDROCHLORIDE 5 MG/1
5 TABLET ORAL EVERY 4 HOURS PRN
Status: CANCELLED | OUTPATIENT
Start: 2022-08-24

## 2022-08-24 RX ORDER — ENOXAPARIN SODIUM 100 MG/ML
30 INJECTION SUBCUTANEOUS EVERY 12 HOURS SCHEDULED
Status: DISCONTINUED | OUTPATIENT
Start: 2022-08-24 | End: 2022-09-01 | Stop reason: HOSPADM

## 2022-08-24 RX ORDER — SENNOSIDES 8.6 MG
1 TABLET ORAL
Status: CANCELLED | OUTPATIENT
Start: 2022-08-24

## 2022-08-24 RX ORDER — ASPIRIN 81 MG/1
81 TABLET, CHEWABLE ORAL DAILY
Status: DISCONTINUED | OUTPATIENT
Start: 2022-08-25 | End: 2022-09-01 | Stop reason: HOSPADM

## 2022-08-24 RX ORDER — OXYCODONE HYDROCHLORIDE 5 MG/1
2.5 TABLET ORAL EVERY 4 HOURS PRN
Status: DISCONTINUED | OUTPATIENT
Start: 2022-08-24 | End: 2022-08-29

## 2022-08-24 RX ORDER — BISACODYL 10 MG
10 SUPPOSITORY, RECTAL RECTAL DAILY PRN
Status: DISCONTINUED | OUTPATIENT
Start: 2022-08-24 | End: 2022-08-25

## 2022-08-24 RX ORDER — ASPIRIN 81 MG/1
81 TABLET, CHEWABLE ORAL DAILY
Status: CANCELLED | OUTPATIENT
Start: 2022-08-25

## 2022-08-24 RX ORDER — ENOXAPARIN SODIUM 100 MG/ML
30 INJECTION SUBCUTANEOUS EVERY 12 HOURS SCHEDULED
Status: CANCELLED | OUTPATIENT
Start: 2022-08-24

## 2022-08-24 RX ORDER — PRAVASTATIN SODIUM 40 MG
40 TABLET ORAL
Status: CANCELLED | OUTPATIENT
Start: 2022-08-24

## 2022-08-24 RX ORDER — GABAPENTIN 300 MG/1
300 CAPSULE ORAL
Status: CANCELLED | OUTPATIENT
Start: 2022-08-24

## 2022-08-24 RX ORDER — SENNOSIDES 8.6 MG
1 TABLET ORAL
Status: DISCONTINUED | OUTPATIENT
Start: 2022-08-24 | End: 2022-08-26

## 2022-08-24 RX ORDER — ACETAMINOPHEN 325 MG/1
975 TABLET ORAL EVERY 8 HOURS SCHEDULED
Status: CANCELLED | OUTPATIENT
Start: 2022-08-24

## 2022-08-24 RX ORDER — ACETAMINOPHEN 325 MG/1
975 TABLET ORAL EVERY 8 HOURS SCHEDULED
Status: DISCONTINUED | OUTPATIENT
Start: 2022-08-24 | End: 2022-09-01 | Stop reason: HOSPADM

## 2022-08-24 RX ORDER — PRAVASTATIN SODIUM 40 MG
40 TABLET ORAL
Status: DISCONTINUED | OUTPATIENT
Start: 2022-08-24 | End: 2022-09-01 | Stop reason: HOSPADM

## 2022-08-24 RX ORDER — LIDOCAINE 50 MG/G
1 PATCH TOPICAL DAILY
Status: CANCELLED | OUTPATIENT
Start: 2022-08-25

## 2022-08-24 RX ORDER — METHOCARBAMOL 500 MG/1
250 TABLET, FILM COATED ORAL EVERY 6 HOURS SCHEDULED
Status: CANCELLED | OUTPATIENT
Start: 2022-08-24

## 2022-08-24 RX ORDER — LIDOCAINE 50 MG/G
3 PATCH TOPICAL DAILY
Status: DISCONTINUED | OUTPATIENT
Start: 2022-08-25 | End: 2022-09-01 | Stop reason: HOSPADM

## 2022-08-24 RX ORDER — BISACODYL 10 MG
10 SUPPOSITORY, RECTAL RECTAL DAILY PRN
Status: CANCELLED | OUTPATIENT
Start: 2022-08-24

## 2022-08-24 RX ORDER — METHOCARBAMOL 500 MG/1
250 TABLET, FILM COATED ORAL EVERY 6 HOURS SCHEDULED
Status: DISCONTINUED | OUTPATIENT
Start: 2022-08-24 | End: 2022-09-01 | Stop reason: HOSPADM

## 2022-08-24 RX ORDER — DOCUSATE SODIUM 100 MG/1
100 CAPSULE, LIQUID FILLED ORAL 2 TIMES DAILY
Status: DISCONTINUED | OUTPATIENT
Start: 2022-08-24 | End: 2022-09-01 | Stop reason: HOSPADM

## 2022-08-24 RX ORDER — LIDOCAINE 50 MG/G
1 PATCH TOPICAL DAILY
Status: DISCONTINUED | OUTPATIENT
Start: 2022-08-25 | End: 2022-08-24

## 2022-08-24 RX ORDER — GABAPENTIN 300 MG/1
300 CAPSULE ORAL
Status: DISCONTINUED | OUTPATIENT
Start: 2022-08-24 | End: 2022-09-01 | Stop reason: HOSPADM

## 2022-08-24 RX ADMIN — ENOXAPARIN SODIUM 30 MG: 30 INJECTION SUBCUTANEOUS at 21:27

## 2022-08-24 RX ADMIN — LIDOCAINE 5% 1 PATCH: 700 PATCH TOPICAL at 08:22

## 2022-08-24 RX ADMIN — OXYCODONE HYDROCHLORIDE 5 MG: 5 TABLET ORAL at 13:05

## 2022-08-24 RX ADMIN — ASPIRIN 81 MG CHEWABLE TABLET 81 MG: 81 TABLET CHEWABLE at 08:22

## 2022-08-24 RX ADMIN — ENOXAPARIN SODIUM 30 MG: 30 INJECTION SUBCUTANEOUS at 08:22

## 2022-08-24 RX ADMIN — INSULIN LISPRO 3 UNITS: 100 INJECTION, SOLUTION INTRAVENOUS; SUBCUTANEOUS at 12:56

## 2022-08-24 RX ADMIN — POLYETHYLENE GLYCOL 3350 17 G: 17 POWDER, FOR SOLUTION ORAL at 17:39

## 2022-08-24 RX ADMIN — METHOCARBAMOL 250 MG: 500 TABLET ORAL at 17:38

## 2022-08-24 RX ADMIN — ACETAMINOPHEN 975 MG: 325 TABLET ORAL at 05:04

## 2022-08-24 RX ADMIN — OXYCODONE HYDROCHLORIDE 5 MG: 5 TABLET ORAL at 05:04

## 2022-08-24 RX ADMIN — INSULIN LISPRO 1 UNITS: 100 INJECTION, SOLUTION INTRAVENOUS; SUBCUTANEOUS at 17:39

## 2022-08-24 RX ADMIN — Medication 3 MG: at 21:27

## 2022-08-24 RX ADMIN — GABAPENTIN 300 MG: 300 CAPSULE ORAL at 21:27

## 2022-08-24 RX ADMIN — METHOCARBAMOL 250 MG: 500 TABLET ORAL at 12:56

## 2022-08-24 RX ADMIN — METHOCARBAMOL 250 MG: 500 TABLET ORAL at 23:39

## 2022-08-24 RX ADMIN — SENNOSIDES 8.6 MG: 8.6 TABLET, FILM COATED ORAL at 21:27

## 2022-08-24 RX ADMIN — DOCUSATE SODIUM 100 MG: 100 CAPSULE, LIQUID FILLED ORAL at 17:38

## 2022-08-24 RX ADMIN — OXYCODONE HYDROCHLORIDE 5 MG: 5 TABLET ORAL at 21:30

## 2022-08-24 RX ADMIN — INSULIN LISPRO 1 UNITS: 100 INJECTION, SOLUTION INTRAVENOUS; SUBCUTANEOUS at 21:30

## 2022-08-24 RX ADMIN — ACETAMINOPHEN 975 MG: 325 TABLET ORAL at 21:27

## 2022-08-24 RX ADMIN — METHOCARBAMOL 250 MG: 500 TABLET ORAL at 05:04

## 2022-08-24 RX ADMIN — ACETAMINOPHEN 975 MG: 325 TABLET ORAL at 13:07

## 2022-08-24 RX ADMIN — PRAVASTATIN SODIUM 40 MG: 40 TABLET ORAL at 17:38
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
## 2022-08-24 NOTE — PLAN OF CARE
Problem: MOBILITY - ADULT  Goal: Maintain or return to baseline ADL function  Description: INTERVENTIONS:  -  Assess patient's ability to carry out ADLs; assess patient's baseline for ADL function and identify physical deficits which impact ability to perform ADLs (bathing, care of mouth/teeth, toileting, grooming, dressing, etc )  - Assess/evaluate cause of self-care deficits   - Assess range of motion  - Assess patient's mobility; develop plan if impaired  - Assess patient's need for assistive devices and provide as appropriate  - Encourage maximum independence but intervene and supervise when necessary  - Involve family in performance of ADLs  - Assess for home care needs following discharge   - Consider OT consult to assist with ADL evaluation and planning for discharge  - Provide patient education as appropriate  Outcome: Progressing  Goal: Maintains/Returns to pre admission functional level  Description: INTERVENTIONS:  - Perform BMAT or MOVE assessment daily    - Set and communicate daily mobility goal to care team and patient/family/caregiver     - Collaborate with rehabilitation services on mobility goals if consulted  - Out of bed for toileting  - Record patient progress and toleration of activity level   Outcome: Progressing     Problem: Potential for Falls  Goal: Patient will remain free of falls  Description: INTERVENTIONS:  - Educate patient/family on patient safety including physical limitations  - Instruct patient to call for assistance with activity   - Consult OT/PT to assist with strengthening/mobility   - Keep Call bell within reach  - Keep bed low and locked with side rails adjusted as appropriate  - Keep care items and personal belongings within reach  - Initiate and maintain comfort rounds  - Make Fall Risk Sign visible to staff  - Offer Toileting   - Obtain necessary fall risk management equipment  - Apply yellow socks and bracelet for high fall risk patients  - Consider moving patient to room near nurses station  Outcome: Progressing     Problem: Prexisting or High Potential for Compromised Skin Integrity  Goal: Skin integrity is maintained or improved  Description: INTERVENTIONS:  - Identify patients at risk for skin breakdown  - Assess and monitor skin integrity  - Assess and monitor nutrition and hydration status  - Monitor labs   - Assess for incontinence   - Turn and reposition patient  - Assist with mobility/ambulation  - Relieve pressure over bony prominences  - Avoid friction and shearing  - Provide appropriate hygiene as needed including keeping skin clean and dry  - Evaluate need for skin moisturizer/barrier cream  - Collaborate with interdisciplinary team   - Patient/family teaching  - Consider wound care consult   Outcome: Progressing     Problem: PAIN - ADULT  Goal: Verbalizes/displays adequate comfort level or baseline comfort level  Description: Interventions:  - Encourage patient to monitor pain and request assistance  - Assess pain using appropriate pain scale  - Administer analgesics based on type and severity of pain and evaluate response  - Implement non-pharmacological measures as appropriate and evaluate response  - Consider cultural and social influences on pain and pain management  - Notify physician/advanced practitioner if interventions unsuccessful or patient reports new pain  Outcome: Progressing     Problem: INFECTION - ADULT  Goal: Absence or prevention of progression during hospitalization  Description: INTERVENTIONS:  - Assess and monitor for signs and symptoms of infection  - Monitor lab/diagnostic results  - Monitor all insertion sites, i e  indwelling lines, tubes, and drains  - Monitor endotracheal if appropriate and nasal secretions for changes in amount and color  - [place] appropriate cooling/warming therapies per order  - Administer medications as ordered  - Instruct and encourage patient and family to use good hand hygiene technique  - Identify and instruct in appropriate isolation precautions for identified infection/condition  Outcome: Progressing  Goal: Absence of fever/infection during neutropenic period  Description: INTERVENTIONS:  - Monitor WBC    Outcome: Progressing     Problem: SAFETY ADULT  Goal: Maintain or return to baseline ADL function  Description: INTERVENTIONS:  -  Assess patient's ability to carry out ADLs; assess patient's baseline for ADL function and identify physical deficits which impact ability to perform ADLs (bathing, care of mouth/teeth, toileting, grooming, dressing, etc )  - Assess/evaluate cause of self-care deficits   - Assess range of motion  - Assess patient's mobility; develop plan if impaired  - Assess patient's need for assistive devices and provide as appropriate  - Encourage maximum independence but intervene and supervise when necessary  - Involve family in performance of ADLs  - Assess for home care needs following discharge   - Consider OT consult to assist with ADL evaluation and planning for discharge  - Provide patient education as appropriate  Outcome: Progressing  Goal: Maintains/Returns to pre admission functional level  Description: INTERVENTIONS:  - Perform BMAT or MOVE assessment daily    - Set and communicate daily mobility goal to care team and patient/family/caregiver     - Collaborate with rehabilitation services on mobility goals if consulted  - Out of bed for toileting  - Record patient progress and toleration of activity level   Outcome: Progressing  Goal: Patient will remain free of falls  Description: INTERVENTIONS:  - Educate patient/family on patient safety including physical limitations  - Instruct patient to call for assistance with activity   - Consult OT/PT to assist with strengthening/mobility   - Keep Call bell within reach  - Keep bed low and locked with side rails adjusted as appropriate  - Keep care items and personal belongings within reach  - Initiate and maintain comfort rounds  - Make Fall Risk Sign visible to staff  - Offer [address] necessary fall risk management equipment  - Apply yellow socks and bracelet for high fall risk patients  - Consider moving patient to room near nurses station  Outcome: Progressing     Problem: DISCHARGE PLANNING  Goal: Discharge to home or other facility with appropriate resources  Description: INTERVENTIONS:  - Identify barriers to discharge w/patient and caregiver  - Arrange for needed discharge resources and transportation as appropriate  - Identify discharge learning needs (meds, wound care, etc )  - Arrange for interpretive services to assist at discharge as needed  - Refer to Case Management Department for coordinating discharge planning if the patient needs post-hospital services based on physician/advanced practitioner order or complex needs related to functional status, cognitive ability, or social support system  Outcome: Progressing     Problem: Knowledge Deficit  Goal: Patient/family/caregiver demonstrates understanding of disease process, treatment plan, medications, and discharge instructions  Description: Complete learning assessment and assess knowledge base    Interventions:  - Provide teaching at level of understanding  - Provide teaching via preferred learning methods  Outcome: Progressing

## 2022-08-24 NOTE — CASE MANAGEMENT
Case Management Discharge Planning Note    Patient name Charolette Cockayne  Location [facility] 925-54 MRN [id number]  : 1945 Date 2022       Current Admission Date: 8/15/2022  Current Admission Diagnosis:Rib fractures   Patient Active Problem List    Diagnosis Date Noted    Severe aortic stenosis 2022    Bradycardia 2022    Urinary retention 2022    L3 osteophyte fracture 2022    Fall 2022    Rib fractures 08/15/2022    Fracture of thoracic transverse process ([facility] ) 08/15/2022    Lumbar transverse process fracture ([facility] ) 08/15/2022    Stage 3a chronic kidney disease ([facility] ) 2022    Special screening for malignant neoplasm of prostate 2021    RLS (restless legs syndrome) 2021    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 ([facility] ) 2021    Chronic bilateral low back pain without sciatica 2021    Hemiplegia and hemiparesis following cerebral infarction affecting left non-dominant side ([address] ) 2020    Benign prostatic hyperplasia with nocturia 2020    Claudication of both lower extremities ([facility] ) 2019    Colon cancer screening 2019    Medicare annual wellness visit, subsequent 2019    Primary osteoarthritis of left knee 01/10/2019    Bilateral carotid artery stenosis 10/08/2018    Dermatosis 10/08/2018    Cramps of left lower extremity 2018    Plantar fasciitis 2018    Tinea cruris 2018    Constipation 2018    Seborrheic dermatitis 11/10/2017    History of stroke 09/15/2017    Asthma 2017    Benign essential hypertension 2017    Type 2 diabetes mellitus ([address] ) 2017    Hyperlipidemia 2017    Obesity 2017    Impingement syndrome of right shoulder 2017    Diabetic nephropathy associated with type 2 diabetes mellitus ([address] ) 2017    Non-rheumatic aortic sclerosis 2017    Popliteal cyst, left 10/15/2015    Acquired hallux malleus of right foot 10/06/2015    Pre-ulcerative corn or callous 10/06/2015    Gout 12/11/2014    Allergic rhinitis 05/03/2012    Retina disorder 05/03/2012      LOS (days): 9  Geometric Mean LOS (GMLOS) (days): 2 90  Days to GMLOS:-5 9     OBJECTIVE:  Risk of Unplanned Readmission Score: 7 52         Current admission status: Inpatient   Preferred Pharmacy:   [id number] [facility], [address] [facility] [address]  Phone: [phone] Fax: 168 [address]  [address]  [facility] [address]  Phone: [phone] Fax: [phone]    Primary Care Provider: Clara Hernandez MD    Primary Insurance: MEDICARE  Secondary Insurance: [address] [place] [address],[facility] E SHIELD    DISCHARGE DETAILS:    Pt accepted to [facility]  Pt will dc into  room 459 with a 2pm transport time    Report can be called to [phone] and fax is [phone]   Pt's wife notified via phone call

## 2022-08-24 NOTE — RESTORATIVE TECHNICIAN NOTE
Restorative Technician Note      Patient Name: Surinder Mcmillan     Restorative Tech Visit Date: 8/24/2022  Note Type: Mobility  Patient Position Upon Consult: Bedside chair  Activity Performed: Ambulated  Assistive Device: Roller walker  Patient Position at End of Consult: Bedside chair;  All needs within reach    Ofelia Goodwin Restorative Technician
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
## 2022-08-24 NOTE — ASSESSMENT & PLAN NOTE
- Vera removed on 08/23/2022  - tolerated void trial  - monitor urinary output - 750 in last 24 hours

## 2022-08-24 NOTE — RESTORATIVE TECHNICIAN NOTE
Restorative Technician Note      Patient Name: Devendra Anand     [facility] Tech Visit Date: 8/24/2022  Note Type: Mobility  Patient Position Upon Consult: Supine  Activity Performed: Ambulated  Assistive Device: Roller walker  Patient Position at End of Consult: Bedside chair;  All needs within reach    Esvin Razo, Restorative Technician

## 2022-08-24 NOTE — ASSESSMENT & PLAN NOTE
Lab Results   Component Value Date    HGBA1C 6 9 (A) 06/16/2022       Recent Labs     08/23/22  1638 08/23/22  2116 08/24/22  0806 08/24/22  1103   POCGLU 171* 166* 133 253*       Blood Sugar Average: Last 72 hrs:  (P) 209 [id number]     - Continue sliding scale insulin  - Monitor blood sugars  - Outpatient follow-up with PCP

## 2022-08-24 NOTE — ASSESSMENT & PLAN NOTE
- p r n  pain management  - continue neurovascular checks  - LSO brace  - outpatient follow-up with Trauma Team in 2 weeks

## 2022-08-24 NOTE — PLAN OF CARE
Problem: MOBILITY - ADULT  Goal: Maintain or return to baseline ADL function  Description: INTERVENTIONS:  -  Assess patient's ability to carry out ADLs; assess patient's baseline for ADL function and identify physical deficits which impact ability to perform ADLs (bathing, care of mouth/teeth, toileting, grooming, dressing, etc )  - Assess/evaluate cause of self-care deficits   - Assess range of motion  - Assess patient's mobility; develop plan if impaired  - Assess patient's need for assistive devices and provide as appropriate  - Encourage maximum independence but intervene and supervise when necessary  - Involve family in performance of ADLs  - Assess for home care needs following discharge   - Consider OT consult to assist with ADL evaluation and planning for discharge  - Provide patient education as appropriate  Outcome: Progressing     Problem: PAIN - ADULT  Goal: Verbalizes/displays adequate comfort level or baseline comfort level  Description: Interventions:  - Encourage patient to monitor pain and request assistance  - Assess pain using appropriate pain scale  - Administer analgesics based on type and severity of pain and evaluate response  - Implement non-pharmacological measures as appropriate and evaluate response  - Consider cultural and social influences on pain and pain management  - Notify physician/advanced practitioner if interventions unsuccessful or patient reports new pain  Outcome: Progressing     Problem: INFECTION - ADULT  Goal: Absence or prevention of progression during hospitalization  Description: INTERVENTIONS:  - Assess and monitor for signs and symptoms of infection  - Monitor lab/diagnostic results  - Monitor all insertion sites, i e  indwelling lines, tubes, and drains  - Monitor endotracheal if appropriate and nasal secretions for changes in amount and color  - [place] appropriate cooling/warming therapies per order  - Administer medications as ordered  - Instruct and encourage patient and family to use good hand hygiene technique  - Identify and instruct in appropriate isolation precautions for identified infection/condition  Outcome: Progressing

## 2022-08-25 LAB
ANION GAP SERPL CALCULATED.3IONS-SCNC: 3 MMOL/L (ref 4–13)
BASOPHILS # BLD AUTO: 0.03 THOUSANDS/ΜL (ref 0–0.1)
BASOPHILS NFR BLD AUTO: 0 % (ref 0–1)
BUN SERPL-MCNC: 25 MG/DL (ref 5–25)
CALCIUM SERPL-MCNC: 9.8 MG/DL (ref 8.3–10.1)
CHLORIDE SERPL-SCNC: 103 MMOL/L (ref 96–108)
CO2 SERPL-SCNC: 31 MMOL/L (ref 21–32)
CREAT SERPL-MCNC: 1.19 MG/DL (ref 0.6–1.3)
EOSINOPHIL # BLD AUTO: 0.21 THOUSAND/ΜL (ref 0–0.61)
EOSINOPHIL NFR BLD AUTO: 2 % (ref 0–6)
ERYTHROCYTE [DISTWIDTH] IN BLOOD BY AUTOMATED COUNT: 13.5 % (ref 11.6–15.1)
GFR SERPL CREATININE-BSD FRML MDRD: 58 ML/MIN/1.73SQ M
GLUCOSE P FAST SERPL-MCNC: 122 MG/DL (ref 65–99)
GLUCOSE SERPL-MCNC: 122 MG/DL (ref 65–140)
GLUCOSE SERPL-MCNC: 126 MG/DL (ref 65–140)
GLUCOSE SERPL-MCNC: 141 MG/DL (ref 65–140)
GLUCOSE SERPL-MCNC: 143 MG/DL (ref 65–140)
GLUCOSE SERPL-MCNC: 233 MG/DL (ref 65–140)
HCT VFR BLD AUTO: 42.5 % (ref 36.5–49.3)
HGB BLD-MCNC: 13.6 G/DL (ref 12–17)
IMM GRANULOCYTES # BLD AUTO: 0.07 THOUSAND/UL (ref 0–0.2)
IMM GRANULOCYTES NFR BLD AUTO: 1 % (ref 0–2)
LYMPHOCYTES # BLD AUTO: 1.97 THOUSANDS/ΜL (ref 0.6–4.47)
LYMPHOCYTES NFR BLD AUTO: 18 % (ref 14–44)
MCH RBC QN AUTO: 29.3 PG (ref 26.8–34.3)
MCHC RBC AUTO-ENTMCNC: 32 G/DL (ref 31.4–37.4)
MCV RBC AUTO: 92 FL (ref 82–98)
MONOCYTES # BLD AUTO: 0.82 THOUSAND/ΜL (ref 0.17–1.22)
MONOCYTES NFR BLD AUTO: 7 % (ref 4–12)
NEUTROPHILS # BLD AUTO: 7.95 THOUSANDS/ΜL (ref 1.85–7.62)
NEUTS SEG NFR BLD AUTO: 72 % (ref 43–75)
NRBC BLD AUTO-RTO: 0 /100 WBCS
PLATELET # BLD AUTO: 190 THOUSANDS/UL (ref 149–390)
PMV BLD AUTO: 10.4 FL (ref 8.9–12.7)
POTASSIUM SERPL-SCNC: 4.3 MMOL/L (ref 3.5–5.3)
RBC # BLD AUTO: 4.64 MILLION/UL (ref 3.88–5.62)
SODIUM SERPL-SCNC: 137 MMOL/L (ref 135–147)
WBC # BLD AUTO: 11.05 THOUSAND/UL (ref 4.31–10.16)

## 2022-08-25 PROCEDURE — 97535 SELF CARE MNGMENT TRAINING: CPT

## 2022-08-25 PROCEDURE — 85025 COMPLETE CBC W/AUTO DIFF WBC: CPT | Performed by: NURSE PRACTITIONER

## 2022-08-25 PROCEDURE — 99232 SBSQ HOSP IP/OBS MODERATE 35: CPT | Performed by: INTERNAL MEDICINE

## 2022-08-25 PROCEDURE — 80048 BASIC METABOLIC PNL TOTAL CA: CPT | Performed by: NURSE PRACTITIONER

## 2022-08-25 PROCEDURE — 97530 THERAPEUTIC ACTIVITIES: CPT

## 2022-08-25 PROCEDURE — 99233 SBSQ HOSP IP/OBS HIGH 50: CPT | Performed by: STUDENT IN AN ORGANIZED HEALTH CARE EDUCATION/TRAINING PROGRAM

## 2022-08-25 PROCEDURE — 97165 OT EVAL LOW COMPLEX 30 MIN: CPT

## 2022-08-25 PROCEDURE — 97112 NEUROMUSCULAR REEDUCATION: CPT

## 2022-08-25 PROCEDURE — 97161 PT EVAL LOW COMPLEX 20 MIN: CPT

## 2022-08-25 PROCEDURE — 82948 REAGENT STRIP/BLOOD GLUCOSE: CPT

## 2022-08-25 RX ORDER — BISACODYL 10 MG
10 SUPPOSITORY, RECTAL RECTAL DAILY PRN
Status: DISCONTINUED | OUTPATIENT
Start: 2022-08-25 | End: 2022-09-01 | Stop reason: HOSPADM

## 2022-08-25 RX ADMIN — INSULIN LISPRO 3 UNITS: 100 INJECTION, SOLUTION INTRAVENOUS; SUBCUTANEOUS at 12:18

## 2022-08-25 RX ADMIN — DOCUSATE SODIUM 100 MG: 100 CAPSULE, LIQUID FILLED ORAL at 17:26

## 2022-08-25 RX ADMIN — METHOCARBAMOL 250 MG: 500 TABLET ORAL at 12:17

## 2022-08-25 RX ADMIN — ACETAMINOPHEN 975 MG: 325 TABLET ORAL at 13:47

## 2022-08-25 RX ADMIN — ACETAMINOPHEN 975 MG: 325 TABLET ORAL at 21:47

## 2022-08-25 RX ADMIN — Medication 3 MG: at 21:47

## 2022-08-25 RX ADMIN — POLYETHYLENE GLYCOL 3350 17 G: 17 POWDER, FOR SOLUTION ORAL at 09:47

## 2022-08-25 RX ADMIN — GABAPENTIN 300 MG: 300 CAPSULE ORAL at 21:47

## 2022-08-25 RX ADMIN — ENOXAPARIN SODIUM 30 MG: 30 INJECTION SUBCUTANEOUS at 09:48

## 2022-08-25 RX ADMIN — ASPIRIN 81 MG CHEWABLE TABLET 81 MG: 81 TABLET CHEWABLE at 09:50

## 2022-08-25 RX ADMIN — DOCUSATE SODIUM 100 MG: 100 CAPSULE, LIQUID FILLED ORAL at 09:48

## 2022-08-25 RX ADMIN — PRAVASTATIN SODIUM 40 MG: 40 TABLET ORAL at 17:27

## 2022-08-25 RX ADMIN — METFORMIN HYDROCHLORIDE 500 MG: 500 TABLET ORAL at 09:48

## 2022-08-25 RX ADMIN — METHOCARBAMOL 250 MG: 500 TABLET ORAL at 17:27

## 2022-08-25 RX ADMIN — SENNOSIDES 8.6 MG: 8.6 TABLET, FILM COATED ORAL at 21:47

## 2022-08-25 RX ADMIN — METHOCARBAMOL 250 MG: 500 TABLET ORAL at 23:52

## 2022-08-25 RX ADMIN — METHOCARBAMOL 250 MG: 500 TABLET ORAL at 05:59

## 2022-08-25 RX ADMIN — ENOXAPARIN SODIUM 30 MG: 30 INJECTION SUBCUTANEOUS at 21:47

## 2022-08-25 RX ADMIN — ACETAMINOPHEN 975 MG: 325 TABLET ORAL at 05:58

## 2022-08-25 RX ADMIN — BISACODYL 10 MG: 10 SUPPOSITORY RECTAL at 18:01

## 2022-08-25 RX ADMIN — LIDOCAINE 5% 3 PATCH: 700 PATCH TOPICAL at 09:49

## 2022-08-25 NOTE — ASSESSMENT & PLAN NOTE
· S/p indwelling abbasi that was d/c'd 8/22  · PVR done x 1 day of abbasi removal for 310 ml but none thereafter  · His PCPs office note recently mentions nocturia as an issue  Pt also notes that he feels he needs to void freq during day  · Per records and discussion with IM team, Flomax was tried but did not help    His PCP referred him to Urology  · Check PVRs x2 and further assess patient high risk for retention and overflow incontinence  · Improved over this past weekend with low residuals
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
## 2022-08-25 NOTE — PROGRESS NOTES
OT LTGs     08/25/22 1230   Rehab Team Goals   ADL Team Goal Patient will be independent with ADLs with least restrictive device upon completion of rehab program   Rehab Team Interventions   OT Interventions Self Care; Therapeutic Exercise;Home Management; Energy Conservation;Patient/Family Education;Cognitive Retraining   Eating Goal   Eating Goal 06  Independent - Patient completes the activity by him/herself with no assistance from a helper  Meal Complete All meals   Status Target goal - one week; Target goal - two weeks  (7-10 days)   Interventions Optimal Position   Grooming Goal   Oral Hygiene Goal 06  Independent - Patient completes the activity by him/herself with no assistance from a helper  Task Wash/Dry Face;Wash/Dry Hands;Comb Hair;Brush Teeth; Shave; Initiate Task;Complete Groom   Environment Stand at [employer] sit; Seated at Sink;Seated in Chair   Status Target goal - one week; Target goal - two weeks  (7-10 days)   Intervention Balance Work;Neuromuscular Education; Tolerance Work; Therapeutic Exercise;Assistive Device   Tub/Shower Transfer Goal   Method Tub Shower  (Goal: CS)   Assist Device Hand Held Shower;Grab Bar;Tub Bench;Seat with Back   Status Target goal - one week; Target goal - two weeks  (7-10 days)   Interventions ADL Training;Assistive Device; Neuromuscular Education   Bathing Goal   Shower/bathe self Goal 04  Supervision or touching assistance- [place] provides VERBAL CUES or supervision throughout activity  Environment Standing;Sponge Bath;Seated; Tub   Adaptive Equipment Reacher;Long Handle Sponge;Seat with back;Transfer bench;Grab Bar;Hand Held Shower   Status Target goal - one week; Target goal - two weeks  (7-10 days)   Intervention ADL Training;Assistive Device; Neuromuscular Education; Therapeutic Exercise   Upper Body Dressing Goal   Upper body dressing Goal 05  Setup or clean-up assistance - [place] SETS UP or CLEANS UP, patient completes activity   [place] assists only prior to or following the activity  Task Upper Body;Arms in/out; Over Head   Environment Seated   Status Target goal - one week; Target goal - two weeks  (7-10 days)   Intervention Assistive Device;Balance Work;Neuromuscular Education; Therapeutic Exercise; Tolerance Work   Lower Body Dressing Goal   Lower body dressing Goal 05  Setup or clean-up assistance - [place] SETS UP or CLEANS UP, patient completes activity  [place] assists only prior to or following the activity  Putting on/taking off footwear Goal 05  Setup or clean-up assistance - [place] SETS UP or CLEANS UP, patient completes activity  [place] assists only prior to or following the activity  Task Lower Body;Shoe/Slipper;Socks; Undergarment;Pants   Adaptive Equipment Reacher;Sock Aide; Shoe Horn;Dressing Stick; Elastic Laces   Environment Seated;Standing   Status Target goal - one week; Target goal - two weeks  (7-10 days)   Toileting Transfer Goal   Toilet transfer Goal 06  Independent - Patient completes the activity by him/herself with no assistance from a helper  Assistive Device Grab Bar;Roller Walker;Single Point Cane;Bedside Commode;Raised Toilet Seat   Status Target goal - one week; Target goal - two weeks  (7-10 days)   Intervention ADL Training;Balance Work;Assistive Device   Toileting Goal   Toileting hygiene Goal 06  Independent - Patient completes the activity by him/herself with no assistance from a helper  Task Pants Up;Pants Down;Hygiene   Safety Balance;Use a Bedside Commode at Night;Use a Bedside Commode during day   Status Target goal - one week; Target goal - two weeks  (7-10 days)   Intervention ADL Training;Balance Work;Assistive Device   Meal Prep and Kitchen Mobility   Assist Level Supervision  (light meal prep)   Status Target goal - one week; Target goal - two weeks  (7-10 days)   Medication Management   Assist Level Independent   Status Target goal - one week; Target goal - two weeks     Gwen Mcelroy MS, OTR/L

## 2022-08-25 NOTE — ASSESSMENT & PLAN NOTE
· Placed Medtronic dual chamber PPM on 8/19/22 for bradycardia  · Remove dressing 8/27  · Pacer precautions

## 2022-08-25 NOTE — PLAN OF CARE
Problem: PAIN - ADULT  Goal: Verbalizes/displays adequate comfort level or baseline comfort level  Description: Interventions:  - Encourage patient to monitor pain and request assistance  - Assess pain using appropriate pain scale  - Administer analgesics based on type and severity of pain and evaluate response  - Implement non-pharmacological measures as appropriate and evaluate response  - Consider cultural and social influences on pain and pain management  - Notify physician/advanced practitioner if interventions unsuccessful or patient reports new pain  8/24/2022 2230 by David Arias RN  Outcome: Progressing  8/24/2022 1613 by David Arias RN  Outcome: Progressing

## 2022-08-25 NOTE — ASSESSMENT & PLAN NOTE
· Related to trauma  · XR negative for any acute or any significant chronic pathology  · Lidoderm patch x1 to arm, diclofenac gel added, [facility] ultrasound  · Continue to monitor in therapy, [address] completed and results below:    Incidentally noted is thrombus within the median cubital vein compatible with superficial vein thrombosis      There is bicipital tendinosis and mild tenosynovitis      There is moderate supraspinatus tendinosis with a partial-thickness articular surface tear measuring 1 1 x 0 4 cm  There is mild subscapularis tendinosis  No convincing full-thickness rotator cuff tear is seen      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

## 2022-08-25 NOTE — ASSESSMENT & PLAN NOTE
Lab Results   Component Value Date    EGFR 59 09/01/2022    EGFR 58 08/25/2022    EGFR 63 08/23/2022    CREATININE 1 18 09/01/2022    CREATININE 1 19 08/25/2022    CREATININE 1 11 08/23/2022     · Avoid nephrotoxins and relative hypotension, due to AS, would prefer BP on higher side, previous recommendations of -160 acceptable  · Baseline Creatinine 1-1 1 with GFR 55-68  · Monitor BMP, fluid I/Os

## 2022-08-25 NOTE — ASSESSMENT & PLAN NOTE
· Was previously on acute rehab at [facility] several years ago  · Strength is full at this time, however fatigues  · Monitor in therapies and will need consideration with more endurance heavy activities

## 2022-08-25 NOTE — TREATMENT PLAN
Individualized Plan of [address] 68 y o  male MRN: [id number]  Unit/Bed#: -01 Encounter: [id number]     PATIENT INFORMATION  ADMISSION DATE: 8/24/2022  3:52 PM [facility] CATEGORY:Orthopedic Disorders:  08 9  Other Orthopedic L3 osteophyte fracture, lumbar transverse process fracture, thoracic transverse process fracture, right posterior 10th and 11th rib fractures   ADMISSION DIAGNOSIS: No admission diagnoses are documented for this encounter  EXPECTED LOS: 10 to 14 days     MEDICAL/FUNCTIONAL PROGNOSIS  Based on my assessment of the patient's medical conditions and current functional status, the prognosis for attaining medical and functional goals or the IRF stay is:  Good    Medical Goals: Patient will be medically stable for discharge to [facility] upon completion of rehab program and Patient will be able to manage medical conditions and comorbid conditions with medications and follow up upon completion of rehab program    Cardiopulmonary function: Ensure cardiopulmonary stability and optimize cardiopulmonary function not only at rest but with activity as patient's activity level significantly increases in acute rehab compared with prior to transfer in preparation for safe discharge from [facility]  Must closely and frequently monitor blood pressure and HR to ensure adequate cardiac output during ADLs and ambulation as patient is at increased risk for orthostatic hypotension/syncope and potential injury if not monitored for and managed adequately  Blood pressure management:    Frequent monitoring of blood pressure with appropriate adjustments in blood pressure medication management to optimize blood pressure control and prevent/limit renal complications  Monitoring impact of blood pressure and side-effects of blood pressure medications at rest and with activity    Hypoxia prevention: Ensure appropriate level of oxygenation at rest and with activity to avoid symptomatic hypoxia, maximize functional performance, and decrease risk of atelectasis/pneumonia through close and frequent monitoring, providing appropriate respiratory treatments (such as incentive spirometry), and when necessary provide/adjust respiratory medications  DM: Patient will improve/maintain blood sugar control to ensure optimal healing and decrease risks of complications associated with DM through medication monitoring, adjustments as indicated, and optimal dietary intake and education  Pain management:  Pain will improve with frequent evaluation of pain, careful adjustments in medications, frequent re-evaluation of patient's pain and medical/neurologic status to ensure optimal pain control, avoidance of potential serious and even life-threatening side-effects and drug interactions, as well as weaning pain medications as soon as possible to decrease risk of short and long-term use  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, ADLs, and gait:  intensive skilled therapies with physical therapy and occupational therapy with close oversight and management by rehab specialized physician in acute rehabilitation setting to most expeditiously and effectively improve functional mobility, transfers, upper and lower body strengthening, conditioning, balance, and gait training with appropriate assistive device  Patient will have optimal supervision and management of patient's underlying neurologic disorder with specialized rehabilitation physician during this period of recovery to ensure most expeditious and optimal recovery with decreased risks of fall/injury and other complications including acute worsening of neuro disorder, decrease risk of VTE, PNA, and skin ulceration    Orthopedic Disorder: L3 osteophyte fracture, lumbar transverse process fracture, thoracic transverse process fracture, right posterior 10th and 11th rib fractures causing impaired mobility, ADLs, and gait:  intensive skilled therapies with physical therapy and occupational therapy with close oversight and management by rehab specialized physician in acute rehabilitation setting to most expeditiously and effectively improve functional mobility, transfers, upper and lower body strengthening, conditioning, balance, and gait training with appropriate assistive device  Patient will have optimal supervision and management of patient's underlying orthopedic disorder with specialized rehabilitation physician during this period of recovery to ensure most expeditious and optimal recovery with decreased risks of fall/injury and other complications including acute worsening of ortho disorder, decrease risk of VTE, PNA, and skin ulceration  Inpatient rehabilitation education/teaching: To be provided to patient and typically family/caregiver (if able to be identified) by all skilled therapists, rehab nursing, case management, and rehab specialized physician to ensure optimal recovery and decrease risks of complications in both acute rehabilitation setting as well as after discharge  Chronic kidney failure management and blood pressure management: Frequent measurements and evaluation of urinary intake, urinary output, and labs which include BUN/Cr and electrolytes with appropriate adjustments in medication and when necessary order additional testing  Frequent monitoring of blood pressure with appropriate adjustments in blood pressure medication management to optimize blood pressure control and prevent/limit renal complications  Bladder dysfunction:  Appropriate bladder management with appropriate toileting program from rehab nursing and staff with oversight management by rehabilitation physicians which include appropriate monitoring and possible adjustments in medications to with goals to optimize bladder function and decrease risk of bladder retention, incontinence, and urinary tract infection     Bowel dysfunction: Appropriate bowel management with appropriate toileting program from rehab nursing and staff with oversight management by rehabilitation physicians which include adjustments in medications to optimize appropriate bowel function and prevent/decrease risk of constipation and bowel obstruction  Obesity:  Close monitoring of nutrition status, nutrition specialist with adjustments in diet   on appropriate short and long term nutrition and activity  Obesity increases complexity of patient's overall condition and causes unique challenges during this part of patient's recovery process  Supervise and if necessary make adjustments in rehab nursing care and skilled therapy care to ensure appropriate toileting, bed mobility, other ADLs, and ambulation to decrease risk of falls/injuries, VTE, skin breakdown/ulceration and optimize functional recovery  Skin wounds/Incisions: Appropriate skin checks for wound/skin evaluation including evaluation of healing, worsening of wounds, or signs of infection  Wound care management from rehab nursing, wound care nursing, physicians  Ensure frequent appropriate turning, positioning in bed, in chair, when mobilizing, and when appropriate with use of appropriate devices to optimize healing and decrease risk of worsening or new skin breakdown  ANTICIPATED DISCHARGE DISPOSITION AND SERVICES  COMMUNITY SETTING: Home - independent/modified independent    ANTICIPATED FOLLOW-UP SERVICE:   Outpatient Therapy Services: PT and OT         DISCIPLINE SPECIFIC PLANS:  Required Disciplines & Services: Rehabillitation Nursing, Case Management and Diabetes Education    REQUIRED THERAPY:  Therapy Hours per Day Days per Week Total Days   Physical Therapy 1 5 5 14   Occupational Therapy 1 5 5 14   NOTE: Additional therapy time(s) or changes to allocation of therapies as appropriate to meet patient needs and to achieve functional goals      Patient will participate in above therapy regimen consisting of PT and OT due to the following medical procedure/condition:Orthopedic Disorders:  08 9  Other Orthopedic L3 osteophyte fracture, lumbar transverse process fracture, thoracic transverse process fracture, right posterior 10th and 11th rib fractures    ANTICIPATED FUNCTIONAL OUTCOMES:  ADL:  Min A-supervision   Bladder/Bowel: Patient will require assist with bladder/bowel management with least restrictive device upon completion of rehab program   Transfers:  Independent-Mod I   Locomotion:  Modified Independent   Cognitive:    Independent     DISCHARGE PLANNING NEEDS  Equipment needs: Discharge needs to be reviewed with team      REHAB ANTICIPATED PARTICIPATION RESTRICTIONS:  Assist with Bathing Shower, Assist with Bathing in Tub, Assist with Mobility, Assist with Tub Shower Transfer, Rquires Assist with ADLS, Requires Assist with Heavy Homemaking, Requires Assit with Homemaking and Requires Assit with Steps      Liliam Garrett,   Physical Medicine and Rossana Carrasco

## 2022-08-25 NOTE — PLAN OF CARE
Problem: PAIN - ADULT  Goal: Verbalizes/displays adequate comfort level or baseline comfort level  Description: Interventions:  - Encourage patient to monitor pain and request assistance  - Assess pain using appropriate pain scale  - Administer analgesics based on type and severity of pain and evaluate response  - Implement non-pharmacological measures as appropriate and evaluate response  - Consider cultural and social influences on pain and pain management  - Notify physician/advanced practitioner if interventions unsuccessful or patient reports new pain  Outcome: Progressing     Problem: INFECTION - ADULT  Goal: Absence or prevention of progression during hospitalization  Description: INTERVENTIONS:  - Assess and monitor for signs and symptoms of infection  - Monitor lab/diagnostic results  - Monitor all insertion sites, i e  indwelling lines, tubes, and drains  - Monitor endotracheal if appropriate and nasal secretions for changes in amount and color  - [place] appropriate cooling/warming therapies per order  - Administer medications as ordered  - Instruct and encourage patient and family to use good hand hygiene technique  - Identify and instruct in appropriate isolation precautions for identified infection/condition  Outcome: Progressing     Problem: SAFETY ADULT  Goal: Patient will remain free of falls  Description: INTERVENTIONS:  - Educate patient/family on patient safety including physical limitations  - Instruct patient to call for assistance with activity   - Consult OT/PT to assist with strengthening/mobility   - Keep Call bell within reach  - Keep bed low and locked with side rails adjusted as appropriate  - Keep care items and personal belongings within reach  - Initiate and maintain comfort rounds  - Make Fall Risk Sign visible to staff  - Offer Toileting every 2 Hours, in advance of need      - Apply yellow socks and bracelet for high fall risk patients  - Consider moving patient to room near nurses station  Outcome: Progressing  Goal: Maintain or return to baseline ADL function  Description: INTERVENTIONS:  -  Assess patient's ability to carry out ADLs; assess patient's baseline for ADL function and identify physical deficits which impact ability to perform ADLs (bathing, care of mouth/teeth, toileting, grooming, dressing, etc )  - Assess/evaluate cause of self-care deficits   - Assess range of motion  - Assess patient's mobility; develop plan if impaired  - Assess patient's need for assistive devices and provide as appropriate  - Encourage maximum independence but intervene and supervise when necessary  - Involve family in performance of ADLs  - Assess for home care needs following discharge   - Consider OT consult to assist with ADL evaluation and planning for discharge  - Provide patient education as appropriate  Outcome: Progressing  Goal: Maintains/Returns to pre admission functional level  Description: INTERVENTIONS:  - Perform BMAT or MOVE assessment daily    - Set and communicate daily mobility goal to care team and patient/family/caregiver  - Collaborate with rehabilitation services on mobility goals if consulted    - Reposition patient every 2 hours    - Dangle patient 3 times a day  - Stand patient 3 times a day  - Ambulate patient 3 times a day  - Out of bed to chair 3 times a day   - Out of bed for meals 3 times a day  - Out of bed for toileting  - Record patient progress and toleration of activity level   Outcome: Progressing     Problem: DISCHARGE PLANNING  Goal: Discharge to home or other facility with appropriate resources  Description: INTERVENTIONS:  - Identify barriers to discharge w/patient and caregiver  - Arrange for needed discharge resources and transportation as appropriate  - Identify discharge learning needs (meds, wound care, etc )  - Arrange for interpretive services to assist at discharge as needed  - Refer to Case Management Department for coordinating discharge planning if the patient needs post-hospital services based on physician/advanced practitioner order or complex needs related to functional status, cognitive ability, or social support system  Outcome: Progressing     Problem: Potential for Falls  Goal: Patient will remain free of falls  Description: INTERVENTIONS:  - Educate patient/family on patient safety including physical limitations  - Instruct patient to call for assistance with activity   - Consult OT/PT to assist with strengthening/mobility   - Keep Call bell within reach  - Keep bed low and locked with side rails adjusted as appropriate  - Keep care items and personal belongings within reach  - Initiate and maintain comfort rounds  - Make Fall Risk Sign visible to staff  - Offer Toileting every 2 Hours, in advance of need      - Apply yellow socks and bracelet for high fall risk patients  - Consider moving patient to room near nurses station  Outcome: Progressing
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
## 2022-08-25 NOTE — PROGRESS NOTES
Internal Medicine Progress Note  Patient: Ronda Chawla  Age/sex: 68 y o  male  Medical Record #: [id number]      ASSESSMENT/PLAN: (Interval History)  Ronda Chawla is seen and examined and management for following issues:    L3 osteophyte fracture  · Neurosurgery saw in consult and felt the L3 fracture did not require surgery  · LSO brace for comfort     Transverse spinous process fractures  · Right T12/L1/L2  · Pain management per PMR     Rib fractures  · Right 10th/11th ribs  · Continue IS     Urine retention/BPH  · S/p abbasi that was d/c'd 8/22  · PVRs acceptable  · His PCPs office note recently mentions nocturia as an issue  Pt also notes that he feels he needs to void freq during day  · In past, Flomax was tried but did not help  His PCP referred him to Urology  · Tx per PMR     PPM  · Placed 8/19/22 for bradycardia  · Is Medtronic dual chamber  · Remove dressing 8/27     Aortic stenosis  · Previously moderate now severe by ECHO   · Needs to follow with Cardiology as OP  · LVEF is 65% with grade 2 DD as well  · Denies CP, palps, SOB, dizziness but says he gets very tired when outside working in the yard     Hx right MCA CVA  · Continue ASA 81mg qd/statin     Hx carotid stenosis  · Last doppler = high grade vs TO of right ICA and the left is 50-69% stenosis  · Continue ASA and statin     HLD  · Home:  Crestor 5mg qd  · Here:  Pravachol     HTN  · Home:  Lisinopril 10mg qd  · Here:  no meds  · Will watch and try to add back some Lisinopril considering DM  · Careful 2/2 severe AS     DM2  · Home:  Metformin 1000 mg qd  · Here: Metformin 500mg daily  · start DM diet and continue QID Accuchecks/SSI     CKD IIIa  · Baseline 1-1 1 with GFR 55-68  · Will watch          Discharge date:  Team    The above assessment and plan was reviewed and updated as determined by my evaluation of the patient on 8/25/2022      Labs:   Results from last 7 days   Lab Units 08/25/22  0611 08/23/22  0612   WBC Thousand/uL 11 05* 8 76   HEMOGLOBIN g/dL 13 6 14 6   HEMATOCRIT % 42 5 43 9   PLATELETS Thousands/uL 190 198     Results from last 7 days   Lab Units 08/25/22  0611 08/23/22  0921   SODIUM mmol/L 137 137   POTASSIUM mmol/L 4 3 4 0   CHLORIDE mmol/L 103 107   CO2 mmol/L 31 28   BUN mg/dL 25 22   CREATININE mg/dL 1 19 1 11   CALCIUM mg/dL 9 8 9 6             Results from last 7 days   Lab Units 08/25/22  1047 08/25/22  0652 08/24/22  2117   POC GLUCOSE mg/dl 233* 126 180*       Review of Scheduled Meds:  Current Facility-Administered Medications   Medication Dose Route Frequency Provider Last Rate    acetaminophen  975 mg Oral Q8H [address] [facility], DO      aspirin  81 mg Oral Daily [facility], DO      bisacodyl  10 mg Rectal Daily PRN [facility], DO      docusate sodium  100 mg Oral BID [facility], DO      enoxaparin  30 mg Subcutaneous Q12H [address] [facility], DO      gabapentin  300 mg Oral HS [facility], DO      insulin lispro  1-6 Units Subcutaneous 4x Daily (AC & HS) [facility], DO      lidocaine  3 patch Topical Daily [facility], DO      melatonin  3 mg Oral HS [facility], DO      metFORMIN  500 mg Oral Daily With Breakfast [facility], CRNP      methocarbamol  250 mg Oral Q6H [address] [facility], DO      oxyCODONE  2 5 mg Oral Q4H PRN [facility], DO      oxyCODONE  5 mg Oral Q4H PRN [facility], DO      polyethylene glycol  17 g Oral Daily PRN [facility], DO      pravastatin  40 mg Oral Daily With Dinner [facility], DO      senna  1 tablet Oral HS [facility], DO         Subjective/ HPI: Patient seen and examined  Patients overnight issues or events were reviewed with nursing or staff during rounds or morning huddle session  New or overnight issues include the following:     Pt seen in his room  His wife was at bedside  He denies any current complaints  ROS:   A 10 point ROS was performed; negative except as noted above         Imaging:     No orders to display *Labs /Radiology studies Reviewed  *Medications  reviewed and reconciled as needed  *Please refer to order section for additional ordered labs studies  *Case discussed with primary attending during morning huddle case rounds    Physical Examination:  Vitals:   Vitals:    08/24/22 1706 08/24/22 2101 08/25/22 0543   BP: 122/64 148/67 133/64   BP Location: Left arm Left arm Left arm   Pulse: 60 57 62   Resp: 18 20 18   Temp: 98 2 °F (36 8 °C) 98 °F (36 7 °C) 98 2 °F (36 8 °C)   TempSrc: Oral Oral Oral   SpO2: 96% 97% 95%   Weight:  99 kg (218 lb 4 1 oz)    Height:  5' 11" (1 803 m)      General Appearance: no distress, conversive  HEENT: PERRLA, conjuctiva normal; oropharynx clear; mucous membranes moist;   Neck:  Supple, no lymphadenopathy or thyromegaly  Lungs: CTA, normal respiratory effort, no retractions, expiratory effort normal  CV: regular rate and rhythm, + murmur  PMI normal   ABD: soft non tender, no masses , no hepatic or splenomegaly  EXT: DP pulses intact, no lymphadenopathy, no edema  Skin: normal turgor, normal texture, no rash  Psych: affect normal, mood normal  Neuro: AAOx3      The above physical exam was reviewed and updated as determined by my evaluation of the patient on 8/25/2022  Invasive Devices  Report    Peripheral Intravenous Line  Duration           Peripheral IV 08/23/22 Right Antecubital 2 days                   VTE Pharmacologic Prophylaxis: Enoxaparin  Code Status: Level 1 - Full Code  Current Length of Stay: 1 day(s)      Total time spent:  30 minutes with more than 50% spent counseling/coordinating care  Counseling includes discussion with patient re: progress  and discussion with patient of his/her current medical state/information  Coordination of patient's care was performed in conjunction with primary service  Time invested included review of patient's labs, vitals, and management of their comorbidities with continued monitoring   In addition, this patient was discussed with medical team including physician and advanced extenders  The care of the patient was extensively discussed and appropriate treatment plan was formulated unique for this patient  ** Please Note:  voice to text software may have been used in the creation of this document   Although proof errors in transcription or interpretation are a potential of such software**
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
## 2022-08-25 NOTE — ASSESSMENT & PLAN NOTE
· Sees Dr Ying Tracy as an outpatient  · Last seen in May 2022 for arthrocentesis/injection  · Monitor in therapies, consider ace wrap or basic knee brace
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
## 2022-08-25 NOTE — ASSESSMENT & PLAN NOTE
· Right 10th and 11th rib fractures  · Continue to encourage incentive spirometry  · Pain is primarily posterior  · Will adjust topical pain regimen to include 2 lidoderm patches to the ribs and an additional patch to the right arm  · Non-surgical, non-flail  · Pain control  · PT/OT

## 2022-08-25 NOTE — ASSESSMENT & PLAN NOTE
· Sees Dr Glenford Brunner as an outpatient  · Monitor blood pressures  · Last doppler = high grade vs TO of right ICA and the left is 50-69% stenosis  · Continue ASA and statin  · Monitor pressures to maintain perfusion

## 2022-08-25 NOTE — ASSESSMENT & PLAN NOTE
· Was on Lisinopril 10mg daily as an outpatient per last PCP note/wellness exam  · On hold currently, monitor Bps, do not want to drop too low with severe aortic stenosis  · Add back when appropriate as pt with diabetes and diabetic nephropathy

## 2022-08-25 NOTE — ASSESSMENT & PLAN NOTE
· Had been on flomax in the past without success  · Was referred to see urology as an outpt  · Monitor urine outpt, PVRs

## 2022-08-25 NOTE — ASSESSMENT & PLAN NOTE
· Assessed by Neurosurgery  · Non-operative management  · Pain control  · LSO brace for comfort  · PT/OT

## 2022-08-25 NOTE — ASSESSMENT & PLAN NOTE
Lab Results   Component Value Date    HGBA1C 6 9 (A) 06/16/2022       Recent Labs     08/31/22  1040 08/31/22  1617 08/31/22 2018 09/01/22  0628   POCGLU 150* 119 126 122     · At home is on Metformin 1g daily  · Currently on sliding scale and accuchecks  · Continue current regimen and add back metformin per recommendations by IM, starting at 500mg daily- would keep based on current BGs  · Consistent carb diet

## 2022-08-25 NOTE — ASSESSMENT & PLAN NOTE
· Previously moderate now severe by ECHO   · Needs to follow with Cardiology as OP, no current outpt cardiologist  · LVEF is 65% with grade 2 DD as well  · No shortness of breath with activity, but fatigued easily doing yard work or with any significant exertion   May be confounding element of prior stroke, but could be related to both

## 2022-08-25 NOTE — PROGRESS NOTES
[facility] PT EVAL- GOALS      08/25/22 0830   Rehab Team Goals   Transfer Team Goal Patient will be independent with transfers with least restrictive device upon completion of rehab program   Locomotion Team Goal Patient will be independent with locomotion with least restrictive device upon completion of rehab program   Rehab Team Interventions   PT Interventions Gait Training; Therapeutic Exercise;Transfer Training;Community Reintegration;Bed Mobility; Neuromuscualr Reeducation;Modalities; Patient/Family Education   Toileting Transfer Goal   Toilet transfer Goal 06  Independent - Patient completes the activity by him/herself with no assistance from a helper  Assistive Device   (LRAD)   Status Ongoing; Target goal - one week; Target goal - two weeks  (7-10 DAYS)   Intervention Balance Work;Assistive Device   PT Transfer Goal   Roll left and right Goal 06  Independent - Patient completes the activity by him/herself with no assistance from a helper  Sit to lying Goal 06  Independent - Patient completes the activity by him/herself with no assistance from a helper  Lying to sitting on side of bed Goal 06  Independent - Patient completes the activity by him/herself with no assistance from a helper  Sit to stand Goal 06  Independent - Patient completes the activity by him/herself with no assistance from a helper  Chair/bed-to-chair transfer Goal 06  Independent - Patient completes the activity by him/herself with no assistance from a helper  Car Transfer Goal 05  Setup or clean-up assistance - [place] SETS UP or CLEANS UP, patient completes activity  [place] assists only prior to or following the activity  Assistive Device   (LRAD)   Environment Level Surface; Well Lit; Tile Floor   Status Ongoing; Target goal - one week; Target goal - two weeks  (7-10 DAYS)   Locomotion Goal   Primary discharge mode of locomotion Walking   Target Walk Distance 300 ft  (NOT LIMITED)   Assist Device   (LRAD)   Gait Pattern Improvement Inconsistant Margarita;L foot drag   Environment Level Surface; Well Lit; Tile Floor   Walk 10 feet Goal 06  Independent - Patient completes the activity by him/herself with no assistance from a helper  Walk 50 feet with 2 turns Goal 06  Independent - Patient completes the activity by him/herself with no assistance from a helper  Walk 150 feet Goal 06  Independent - Patient completes the activity by him/herself with no assistance from a helper  Walking 10 feet on uneven surface 06  Independent - Patient completes the activity by him/herself with no assistance from a helper  Walking Goal Status Ongoing; Target goal - one week; Target goal - two weeks  (7-10 DAYS)   Wheel 50 feet with 2 turns Goal 09  Not applicable   Wheel 206 feet Goal 09  Not applicable   Stairs Goal   1 step or curb goal 04  Supervision or touching assistance- [place] provides VERBAL CUES or supervision throughout activity  4 steps Goal 04  Supervision or touching assistance- [place] provides VERBAL CUES or supervision throughout activity  12 steps Goal 04  Supervision or touching assistance- [place] provides VERBAL CUES or supervision throughout activity  Number of Stairs 12   Hand Rail Bilateral   Status Ongoing; Target goal - one week; Target goal - two weeks  (7-10 DAYS)   Object Retrieval Goal   Picking up object Goal 06  Independent - Patient completes the activity by him/herself with no assistance from a helper     Assistive Device  Reacher   Small Object Picked Up MARKER
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
## 2022-08-26 LAB
GLUCOSE SERPL-MCNC: 118 MG/DL (ref 65–140)
GLUCOSE SERPL-MCNC: 130 MG/DL (ref 65–140)
GLUCOSE SERPL-MCNC: 134 MG/DL (ref 65–140)
GLUCOSE SERPL-MCNC: 237 MG/DL (ref 65–140)

## 2022-08-26 PROCEDURE — 97535 SELF CARE MNGMENT TRAINING: CPT

## 2022-08-26 PROCEDURE — 97530 THERAPEUTIC ACTIVITIES: CPT

## 2022-08-26 PROCEDURE — 97112 NEUROMUSCULAR REEDUCATION: CPT

## 2022-08-26 PROCEDURE — 99233 SBSQ HOSP IP/OBS HIGH 50: CPT | Performed by: STUDENT IN AN ORGANIZED HEALTH CARE EDUCATION/TRAINING PROGRAM

## 2022-08-26 PROCEDURE — 99232 SBSQ HOSP IP/OBS MODERATE 35: CPT | Performed by: INTERNAL MEDICINE

## 2022-08-26 PROCEDURE — 82948 REAGENT STRIP/BLOOD GLUCOSE: CPT

## 2022-08-26 PROCEDURE — 97110 THERAPEUTIC EXERCISES: CPT

## 2022-08-26 RX ORDER — SENNOSIDES 8.6 MG
2 TABLET ORAL
Status: DISCONTINUED | OUTPATIENT
Start: 2022-08-26 | End: 2022-09-01 | Stop reason: HOSPADM

## 2022-08-26 RX ORDER — LACTULOSE 20 G/30ML
30 SOLUTION ORAL ONCE
Status: COMPLETED | OUTPATIENT
Start: 2022-08-26 | End: 2022-08-26

## 2022-08-26 RX ADMIN — METHOCARBAMOL 250 MG: 500 TABLET ORAL at 23:44

## 2022-08-26 RX ADMIN — METHOCARBAMOL 250 MG: 500 TABLET ORAL at 17:09

## 2022-08-26 RX ADMIN — PRAVASTATIN SODIUM 40 MG: 40 TABLET ORAL at 17:09

## 2022-08-26 RX ADMIN — ENOXAPARIN SODIUM 30 MG: 30 INJECTION SUBCUTANEOUS at 08:53

## 2022-08-26 RX ADMIN — ENOXAPARIN SODIUM 30 MG: 30 INJECTION SUBCUTANEOUS at 22:25

## 2022-08-26 RX ADMIN — LACTULOSE 30 G: 20 SOLUTION ORAL at 15:09

## 2022-08-26 RX ADMIN — DOCUSATE SODIUM 100 MG: 100 CAPSULE, LIQUID FILLED ORAL at 08:53

## 2022-08-26 RX ADMIN — METHOCARBAMOL 250 MG: 500 TABLET ORAL at 05:11

## 2022-08-26 RX ADMIN — ACETAMINOPHEN 975 MG: 325 TABLET ORAL at 22:26

## 2022-08-26 RX ADMIN — POLYETHYLENE GLYCOL 3350 17 G: 17 POWDER, FOR SOLUTION ORAL at 08:55

## 2022-08-26 RX ADMIN — LIDOCAINE 5% 3 PATCH: 700 PATCH TOPICAL at 08:54

## 2022-08-26 RX ADMIN — METFORMIN HYDROCHLORIDE 500 MG: 500 TABLET ORAL at 08:52

## 2022-08-26 RX ADMIN — ASPIRIN 81 MG CHEWABLE TABLET 81 MG: 81 TABLET CHEWABLE at 08:53

## 2022-08-26 RX ADMIN — INSULIN LISPRO 3 UNITS: 100 INJECTION, SOLUTION INTRAVENOUS; SUBCUTANEOUS at 12:10

## 2022-08-26 RX ADMIN — ACETAMINOPHEN 975 MG: 325 TABLET ORAL at 15:08

## 2022-08-26 RX ADMIN — METHOCARBAMOL 250 MG: 500 TABLET ORAL at 12:11

## 2022-08-26 RX ADMIN — ACETAMINOPHEN 975 MG: 325 TABLET ORAL at 05:11

## 2022-08-26 RX ADMIN — OXYCODONE HYDROCHLORIDE 5 MG: 5 TABLET ORAL at 05:15

## 2022-08-26 RX ADMIN — GABAPENTIN 300 MG: 300 CAPSULE ORAL at 22:26

## 2022-08-26 RX ADMIN — Medication 3 MG: at 22:25

## 2022-08-26 NOTE — CASE MANAGEMENT
Met w/pt and wife and reviewed rehab routine and cm role  Pt and spouse reside in a ranch home with no anita  Pt familiar with rehab as he was a patient here s/p stroke 5 years ago he states  Pt has had outpt therapy services at [address] [place] [address] and [facility] for [facility]  Pt drives but not much due to his vision  Pt owns a spc and roller walker but not a commode as previously documented  [facility] [place]  Cm reviewed team mtg process and potential los

## 2022-08-26 NOTE — PROGRESS NOTES
08/26/22 0900   Pain Assessment   Pain Assessment Tool 0-10   Pain Score 4   Pain Location/Orientation Orientation: Lower; Location: Back   Restrictions/Precautions   Precautions Fall Risk;Pain;Spinal precautions;Supervision on toilet/commode;Visual deficit; Pacemaker   Weight Bearing Restrictions No   ROM Restrictions Yes   LUE ROM Restriction Other  (unable to ABD > 75-90* for pacemaker precautions)   Braces or Orthoses LSO  (for comfort)   Lifestyle   Autonomy "I want to walk better "   Shower/Bathe Self   Comment PCA A w/ UB bathing  Min A while in stance w/ pt able to bathe harry and partially bathe buttocks  A req for thoroughness  Upper Body Dressing   Type of Assistance Needed Physical assistance   Physical Assistance Level 25% or less   Comment while seated, cues for technique to adhere to pacemaker precautions, A to fully pull OH  Upper Body Dressing CARE Score 3   Lower Body Dressing   Type of Assistance Needed Physical assistance   Physical Assistance Level 25% or less   Comment able to thread BLE without use of LHAE while adhering to spinal precautions, Min A to steady while in stance for clothing management  Lower Body Dressing CARE Score 3   Sit to Stand   Type of Assistance Needed Physical assistance   Physical Assistance Level 25% or less   Sit to Stand CARE Score 3   Bed-Chair Transfer   Type of Assistance Needed Physical assistance; Adaptive equipment   Physical Assistance Level 25% or less   Chair/Bed-to-Chair Transfer CARE Score 3   Toileting Hygiene   Type of Assistance Needed Physical assistance   Physical Assistance Level 25% or less   Comment Min A to steady while in stance for clothing management  Toileting Hygiene CARE Score 3   Toilet Transfer   Comment utilized urinal x2   ROM- Right Upper Extremities   R Shoulder Flexion; Horizontal ABduction; Extension;ABduction   R Elbow Elbow flexion;Elbow extension   R Weight/Reps/Sets 3x10   RUE ROM Comment AROM to maintain ROM and dec overall R shoulder pain  Able to achieve 85-90* w/ mild pain, cues req to def rigidity and guarding  Tolerated well w/ rest break between sets  Cognition   Overall Cognitive Status Impaired   Other Comments   Assessment Pt weaved in between 3 cones placed on floor x3 trials to focus on RW management and navigating environmental barriers  Pt competed at [address] w/ Min vc's especially w/ turn-taking to R to inc safety awareness and dec fall risk  Seated rest breaks taken between trials to manage fatigue  Assessment   Treatment Assessment Pt seen for skilled OT session focusing on LB ADLs, toileting, RW management training, and gentle RUE AROM  Wife present near end of session  Req frequent seated rest breaks to manage fatigue and lower back pain  Cont OT POC: endurance work, fxnl standing tolerance, donning/doffing footwear, LHAE training, RW management, and repetitive safety training  Prognosis Good   Problem List Decreased strength;Decreased endurance; Impaired balance;Decreased mobility;Pain   Plan   Treatment/Interventions ADL retraining;Functional transfer training; Therapeutic exercise; Endurance training;Patient/family training;Equipment eval/education   Progress Progressing toward goals   Recommendation   OT Discharge Recommendation   (pending progress)   OT Therapy Minutes   OT Time In 0900   OT Time Out 1000   OT Total Time (minutes) 60   OT Mode of treatment - Individual (minutes) 60   OT Mode of treatment - Concurrent (minutes) 0   OT Mode of treatment - Group (minutes) 0   OT Mode of treatment - Co-treat (minutes) 0   OT Mode of Treatment - Total time(minutes) 60 minutes   OT Cumulative Minutes 150   Therapy Time missed   Time missed?  No

## 2022-08-26 NOTE — PROGRESS NOTES
08/26/22 1230   Pain Assessment   Pain Assessment Tool 0-10   Pain Score 4   Pain Location/Orientation Orientation: Lower; Location: Back   Restrictions/Precautions   Precautions Fall Risk;Cognitive;Spinal precautions;Supervision on toilet/commode;Pain;Pacemaker   LUE ROM Restriction   (unable to ABD > 75-90* for PPM)   Braces or Orthoses LSO  (for comfort)   Lifestyle   Autonomy "I love to work outside "   Sit to Stand   Type of Assistance Needed Physical assistance   Physical Assistance Level 25% or less   Sit to Stand CARE Score 3   Bed-Chair Transfer   Type of Assistance Needed Physical assistance; Adaptive equipment   Physical Assistance Level 25% or less   Comment w/ RW   Chair/Bed-to-Chair Transfer CARE Score 3   Toileting Hygiene   Type of Assistance Needed Physical assistance   Physical Assistance Level 25% or less   Comment Min A to steady while in stance for clothing management  Toileting Hygiene CARE Score 3   Toilet Transfer   Comment utilized urinal, PCA bladder scanned following   Functional Standing Tolerance   Time 2'16", 3'31", 2'18"   Activity Pt engaged in puzzle ther act x3 trials while in stance at table w/ focus on fxnl reach crossing midline, alternating unilateral release, standing endurance, and static standing balance  Tolerated well w/ CGA-Min A and extended seated rest breaks provided between stand trials to manage fatigue and lower back pain  Cognition   Overall Cognitive Status Impaired   Assessment   Treatment Assessment Pt seen for 30 minute skilled OT session focusing on fxnl standing tolerance, short distance fxnl mobility, and toileting  Req frequent rest breaks to manage back pain and fatigue  Cont OT POC: fxnl standing tolerance, LHAE training, endurance work, RW management, and repetitive safety training  Prognosis Good   Problem List Decreased strength;Decreased endurance; Impaired balance;Decreased mobility; Decreased coordination; Impaired sensation;Orthopedic restrictions;Pain   Plan   Treatment/Interventions ADL retraining;Functional transfer training; Therapeutic exercise; Endurance training;Patient/family training;Equipment eval/education   Progress Progressing toward goals   Recommendation   OT Discharge Recommendation   (pending progress)   OT Therapy Minutes   OT Time In 1230   OT Time Out 1300   OT Total Time (minutes) 30   OT Mode of treatment - Individual (minutes) 30   OT Mode of treatment - Concurrent (minutes) 0   OT Mode of treatment - Group (minutes) 0   OT Mode of treatment - Co-treat (minutes) 0   OT Mode of Treatment - Total time(minutes) 30 minutes   OT Cumulative Minutes 180   Therapy Time missed   Time missed?  No

## 2022-08-26 NOTE — PROGRESS NOTES
Internal Medicine Progress Note  Patient: Melissa Worrell  Age/sex: 68 y o  male  Medical Record #: [id number]      ASSESSMENT/PLAN: (Interval History)  Melissa Worrell is seen and examined and management for following issues:    L3 osteophyte fracture  · Neurosurgery saw in consult and felt the L3 fracture did not require surgery  · LSO brace for comfort     Transverse spinous process fractures  · Right T12/L1/L2  · Pain management per PMR     Rib fractures  · Right 10th/11th ribs  · Continue IS     Urine retention/BPH  · S/p abbasi that was d/c'd 8/22  · Required straight cath again overnight  · His PCPs office note recently mentions nocturia as an issue  Pt also notes that he feels he needs to void freq during day  · In past, Flomax was tried but did not help  His PCP referred him to Urology  · Tx per PMR     PPM  · Placed 8/19/22 for bradycardia  · Is Medtronic dual chamber  · Remove dressing 8/27     Aortic stenosis  · Previously moderate now severe by ECHO   · Needs to follow with Cardiology as OP  · LVEF is 65% with grade 2 DD as well  · Denies CP, palps, SOB, dizziness but says he gets very tired when outside working in the yard     Hx right MCA CVA  · Continue ASA 81mg qd/statin     Hx carotid stenosis  · Last doppler = high grade vs TO of right ICA and the left is 50-69% stenosis  · Continue ASA and statin     HLD  · Home:  Crestor 5mg qd  · Here:  Pravachol     HTN  · Home:  Lisinopril 10mg qd  · Here:  no meds  · Will watch and try to add back some Lisinopril considering DM  · Careful 2/2 severe AS     DM2  · Home:  Metformin 1000 mg qd  · Here: Metformin 500mg daily - started 8/25/22  May need to increase over the weekend  · start DM diet and continue QID Accuchecks/SSI     CKD IIIa  · Baseline 1-1 1 with GFR 55-68  · Will watch          Discharge date:  Team    The above assessment and plan was reviewed and updated as determined by my evaluation of the patient on 8/26/2022      Labs:   Results from last 7 days   Lab Units 08/25/22  0611 08/23/22  0612   WBC Thousand/uL 11 05* 8 76   HEMOGLOBIN g/dL 13 6 14 6   HEMATOCRIT % 42 5 43 9   PLATELETS Thousands/uL 190 198     Results from last 7 days   Lab Units 08/25/22  0611 08/23/22  0921   SODIUM mmol/L 137 137   POTASSIUM mmol/L 4 3 4 0   CHLORIDE mmol/L 103 107   CO2 mmol/L 31 28   BUN mg/dL 25 22   CREATININE mg/dL 1 19 1 11   CALCIUM mg/dL 9 8 9 6             Results from last 7 days   Lab Units 08/26/22  0646 08/25/22  2128 08/25/22  1620   POC GLUCOSE mg/dl 130 141* 143*       Review of Scheduled Meds:  Current Facility-Administered Medications   Medication Dose Route Frequency Provider Last Rate    acetaminophen  975 mg Oral Q8H [facility] Prudy Good, DO      aspirin  81 mg Oral Daily Prudy Good, DO      bisacodyl  10 mg Rectal Daily PRN Prudy Good, DO      docusate sodium  100 mg Oral BID Prudy Good, DO      enoxaparin  30 mg Subcutaneous Q12H [facility] Prudy Good, DO      gabapentin  300 mg Oral HS Prudy Good, DO      insulin lispro  1-6 Units Subcutaneous 4x Daily (AC & HS) Prudy Good, DO      lidocaine  3 patch Topical Daily Prudy Good, DO      melatonin  3 mg Oral HS Prudy Good, DO      metFORMIN  500 mg Oral Daily With Breakfast Amedeo Isidro Soto, CRNP      methocarbamol  250 mg Oral Q6H [facility] Prudy Good, DO      oxyCODONE  2 5 mg Oral Q4H PRN Prudy Good, DO      oxyCODONE  5 mg Oral Q4H PRN Prudy Good, DO      polyethylene glycol  17 g Oral Daily PRN Prudy Good, DO      pravastatin  40 mg Oral Daily With Dinner Prudy Good, DO      senna  1 tablet Oral HS Prudy Good, DO         Subjective/ HPI: Patient seen and examined  Patients overnight issues or events were reviewed with nursing or staff during rounds or morning huddle session  New or overnight issues include the following:     Pt seen in OT  He is frustrated re the urinary retention  He denies any other complaints      ROS:   A 10 point ROS was performed; negative except as noted above  Imaging:     No orders to display       *Labs /Radiology studies Reviewed  *Medications  reviewed and reconciled as needed  *Please refer to order section for additional ordered labs studies  *Case discussed with primary attending during morning huddle case rounds    Physical Examination:  Vitals:   Vitals:    08/25/22 0543 08/25/22 1300 08/25/22 2100 08/26/22 0510   BP: 133/64 131/62 124/62 140/63   BP Location: Left arm Left arm Right arm Left arm   Pulse: 62 60 60 61   Resp: 18 20 18 18   Temp: 98 2 °F (36 8 °C) 98 3 °F (36 8 °C) 98 6 °F (37 °C) 98 3 °F (36 8 °C)   TempSrc: Oral Oral Oral Oral   SpO2: 95% 98% 96% 97%   Weight:       Height:         General Appearance: no distress, conversive  HEENT: PERRLA, conjuctiva normal; oropharynx clear; mucous membranes moist;   Neck:  Supple, no lymphadenopathy or thyromegaly  Lungs: CTA, normal respiratory effort, no retractions, expiratory effort normal  CV: regular rate and rhythm, + murmur  PMI normal   ABD: soft non tender, no masses , no hepatic or splenomegaly  EXT: DP pulses intact, no lymphadenopathy, no edema  Skin: normal turgor, normal texture, no rash  Psych: affect normal, mood normal  Neuro: AAOx3      The above physical exam was reviewed and updated as determined by my evaluation of the patient on 8/26/2022  Invasive Devices  Report    Peripheral Intravenous Line  Duration           Peripheral IV 08/23/22 Right Antecubital 3 days                   VTE Pharmacologic Prophylaxis: Enoxaparin  Code Status: Level 1 - Full Code  Current Length of Stay: 2 day(s)      Total time spent:  30 minutes with more than 50% spent counseling/coordinating care  Counseling includes discussion with patient re: progress  and discussion with patient of his/her current medical state/information  Coordination of patient's care was performed in conjunction with primary service   Time invested included review of patient's labs, vitals, and management of their comorbidities with continued monitoring  In addition, this patient was discussed with medical team including physician and advanced extenders  The care of the patient was extensively discussed and appropriate treatment plan was formulated unique for this patient  ** Please Note:  voice to text software may have been used in the creation of this document   Although proof errors in transcription or interpretation are a potential of such software**

## 2022-08-26 NOTE — PROGRESS NOTES
08/26/22 1330   Pain Assessment   Pain Assessment Tool 0-10   Pain Score 5   Pain Location/Orientation Orientation: Lower; Location: Back   Pain Onset/Description Onset: Ongoing   Restrictions/Precautions   Precautions Fall Risk;Spinal precautions; Pacemaker;Pain   Weight Bearing Restrictions Yes   RUE Weight Bearing Per Order WBAT   LUE Weight Bearing Per Order WBAT   RLE Weight Bearing Per Order WBAT   LLE Weight Bearing Per Order WBAT   ROM Restrictions Yes   LUE ROM Restriction Other  (unable to ABD > 75-90* for PPM)   Braces or Orthoses LSO  (PRN)   Cognition   Overall Cognitive Status Impaired   Arousal/Participation Alert; Cooperative   Attention Attends with cues to redirect   Subjective   Subjective Patient ready to participate in PT session   Sit to Stand   Type of Assistance Needed Physical assistance   Physical Assistance Level 25% or less   Comment CGA/Juan M with RW   Sit to Stand CARE Score 3   Bed-Chair Transfer   Type of Assistance Needed Physical assistance   Physical Assistance Level 25% or less   Comment CGA/Juan M with RW   Chair/Bed-to-Chair Transfer CARE Score 3   Transfer Bed/Chair/Wheelchair   Limitations Noted In Balance; Coordination; Endurance; Sequencing;LE Strength;UE Strength   Adaptive Equipment Roller Walker   Car Transfer   Reason if not Attempted Environmental limitations   Car Transfer CARE Score 10   Walk 10 Feet   Type of Assistance Needed Physical assistance   Physical Assistance Level 25% or less   Comment CGA/Juan M with RW   Walk 10 Feet CARE Score 3   Walk 50 Feet with Two Turns   Type of Assistance Needed Physical assistance   Physical Assistance Level 25% or less   Comment CGA/Juan M with RW   Walk 50 Feet with Two Turns CARE Score 3   Walk 150 Feet   Type of Assistance Needed Physical assistance   Physical Assistance Level 25% or less   Comment CGA/Juan M with RW   Walk 150 Feet CARE Score 3   Walking 10 Feet on Uneven Surfaces   Type of Assistance Needed Physical assistance Physical Assistance Level 26%-50%   Comment with RW over mat   Walking 10 Feet on Uneven Surfaces CARE Score 3   Ambulation   Primary Mode of Locomotion Prior to Admission Walk   Distance Walked (feet) 150 ft  (x2 + 50'x2 with RW)   Assist Device Roller Walker   Gait Pattern L foot drag;Decreased foot clearance; Improper weight shift   Limitations Noted In Balance; Endurance; Heel Strike;Device Management; Sequencing;Speed;Strength;Swing   Wheel 50 Feet with Two Turns   Reason if not Attempted Activity not applicable   Wheel 50 Feet with Two Turns CARE Score 9   Wheel 150 Feet   Reason if not Attempted Activity not applicable   Wheel 360 Feet CARE Score 9   Curb or Single Stair   Style negotiated Curb   Type of Assistance Needed Physical assistance   Physical Assistance Level 25% or less   Comment Juan M with RW on 6" curb step   1 Step (Curb) CARE Score 3   4 Steps   Type of Assistance Needed Physical assistance   Physical Assistance Level 25% or less   Comment Juan M with BL railing on  steps   4 Steps CARE Score 3   12 Steps   Type of Assistance Needed Physical assistance   Physical Assistance Level 25% or less   Comment Juan M with BL railing on  steps   12 Steps CARE Score 3   Stairs   Findings Can trial FF in upcoming sessions   Therapeutic Interventions   Strengthening BLE 2x20 hip flexion   Flexibility BLE heel cord and HS gentle stretch TERT 2 minutes   Balance using unilateral support of rail (L one way/R way back): 60' + 60'x2 stepping over canes + 60' stepping over canes with 2# weight on LLE   Equipment Use   NuStep L1x3 minutes using BLE only (seat felt uncomfortable)   Other Comments   Comments PT and patient speaking about fitness after discharge  PT informing patient that he would likely be recommended to follow up with OPPT services  he has used [facility] in the past ([address] [place] when he had his stroke) and was pleased with his care   We discussed that facilities have Fitness Programs that allow you to use their equipment, etc  Call placed to [address] is 25$ per month   Assessment   Treatment Assessment Patient engaged in PT treatment session focused on balance training , increasing LLE strength and ROM, and improving overall activity tolerance  He remains most steady with use of RW but will benefit from continued neuro re-ed and gait training with use of SPC  Recommend trial of FF in upcoming session in addition to the above activities  He will continue to benefit from skilled PT intervention to maximize overall (I) and safety  Problem List Decreased strength;Decreased endurance; Impaired balance;Decreased mobility; Decreased coordination; Impaired sensation;Orthopedic restrictions;Pain   PT Barriers   Functional Limitation Car transfers; Ramp negotiation;Stair negotiation;Standing;Walking;Transfers   Plan   Progress Progressing toward goals   PT Therapy Minutes   PT Time In 1330   PT Time Out 1430   PT Total Time (minutes) 60   PT Mode of treatment - Individual (minutes) 60   PT Mode of treatment - Concurrent (minutes) 0   PT Mode of treatment - Group (minutes) 0   PT Mode of treatment - Co-treat (minutes) 0   PT Mode of Treatment - Total time(minutes) 60 minutes   PT Cumulative Minutes 180

## 2022-08-26 NOTE — PLAN OF CARE
Problem: PAIN - ADULT  Goal: Verbalizes/displays adequate comfort level or baseline comfort level  Description: Interventions:  - Encourage patient to monitor pain and request assistance  - Assess pain using appropriate pain scale  - Administer analgesics based on type and severity of pain and evaluate response  - Implement non-pharmacological measures as appropriate and evaluate response  - Consider cultural and social influences on pain and pain management  - Notify physician/advanced practitioner if interventions unsuccessful or patient reports new pain  Outcome: Progressing     Problem: INFECTION - ADULT  Goal: Absence or prevention of progression during hospitalization  Description: INTERVENTIONS:  - Assess and monitor for signs and symptoms of infection  - Monitor lab/diagnostic results  - Monitor all insertion sites, i e  indwelling lines, tubes, and drains  - Monitor endotracheal if appropriate and nasal secretions for changes in amount and color  - [place] appropriate cooling/warming therapies per order  - Administer medications as ordered  - Instruct and encourage patient and family to use good hand hygiene technique  - Identify and instruct in appropriate isolation precautions for identified infection/condition  Outcome: Progressing     Problem: SAFETY ADULT  Goal: Patient will remain free of falls  Description: INTERVENTIONS:  - Educate patient/family on patient safety including physical limitations  - Instruct patient to call for assistance with activity   - Consult OT/PT to assist with strengthening/mobility   - Keep Call bell within reach  - Keep bed low and locked with side rails adjusted as appropriate  - Keep care items and personal belongings within reach  - Initiate and maintain comfort rounds  - Make Fall Risk Sign visible to staff  - Apply yellow socks and bracelet for high fall risk patients  - Consider moving patient to room near nurses station  Outcome: Progressing  Goal: Maintain or return to baseline ADL function  Description: INTERVENTIONS:  -  Assess patient's ability to carry out ADLs; assess patient's baseline for ADL function and identify physical deficits which impact ability to perform ADLs (bathing, care of mouth/teeth, toileting, grooming, dressing, etc )  - Assess/evaluate cause of self-care deficits   - Assess range of motion  - Assess patient's mobility; develop plan if impaired  - Assess patient's need for assistive devices and provide as appropriate  - Encourage maximum independence but intervene and supervise when necessary  - Involve family in performance of ADLs  - Assess for home care needs following discharge   - Consider OT consult to assist with ADL evaluation and planning for discharge  - Provide patient education as appropriate  Outcome: Progressing  Goal: Maintains/Returns to pre admission functional level  Description: INTERVENTIONS:  - Perform BMAT or MOVE assessment daily    - Set and communicate daily mobility goal to care team and patient/family/caregiver     - Collaborate with rehabilitation services on mobility goals if consulted  - Out of bed for toileting  - Record patient progress and toleration of activity level   Outcome: Progressing     Problem: DISCHARGE PLANNING  Goal: Discharge to home or other facility with appropriate resources  Description: INTERVENTIONS:  - Identify barriers to discharge w/patient and caregiver  - Arrange for needed discharge resources and transportation as appropriate  - Identify discharge learning needs (meds, wound care, etc )  - Arrange for interpretive services to assist at discharge as needed  - Refer to Case Management Department for coordinating discharge planning if the patient needs post-hospital services based on physician/advanced practitioner order or complex needs related to functional status, cognitive ability, or social support system  Outcome: Progressing     Problem: Potential for Falls  Goal: Patient will remain free of falls  Description: INTERVENTIONS:  - Educate patient/family on patient safety including physical limitations  - Instruct patient to call for assistance with activity   - Consult OT/PT to assist with strengthening/mobility   - Keep Call bell within reach  - Keep bed low and locked with side rails adjusted as appropriate  - Keep care items and personal belongings within reach  - Initiate and maintain comfort rounds  - Make Fall Risk Sign visible to staff  - Apply yellow socks and bracelet for high fall risk patients  - Consider moving patient to room near nurses station  Outcome: Progressing

## 2022-08-26 NOTE — PROGRESS NOTES
08/26/22 1000   Pain Assessment   Pain Assessment Tool 0-10   Pain Score 8   Pain Location/Orientation Orientation: Lower; Location: Back   Pain Onset/Description Onset: Ongoing   Effect of Pain on Daily Activities localized to low back; inc discomfort with use of SPC   Patient's Stated Pain Goal No pain   Restrictions/Precautions   Precautions Fall Risk;Spinal precautions; Pacemaker;Pain   Weight Bearing Restrictions Yes   RUE Weight Bearing Per Order WBAT   LUE Weight Bearing Per Order WBAT   RLE Weight Bearing Per Order WBAT   LLE Weight Bearing Per Order WBAT   ROM Restrictions Yes   LUE ROM Restriction Other  (unable to ABD > 75-90* for PPM)   Braces or Orthoses LSO  (PRN)   Cognition   Overall Cognitive Status Impaired   Arousal/Participation Alert; Cooperative   Attention Attends with cues to redirect   Subjective   Subjective Patient ready to participate in PT session  Just finished working with OT   Sit to Stand   Type of Assistance Needed Physical assistance   Physical Assistance Level 26%-50%   Comment with use of SPC   Sit to Stand CARE Score 3   Bed-Chair Transfer   Type of Assistance Needed Physical assistance   Physical Assistance Level 26%-50%   Comment SPC   Chair/Bed-to-Chair Transfer CARE Score 3   Transfer Bed/Chair/Wheelchair   Limitations Noted In Balance; Coordination; Endurance;LE Strength;Pain Management; Sequencing   Adaptive Equipment Cane   Car Transfer   Reason if not Attempted Environmental limitations   Car Transfer CARE Score 10   Walk 10 Feet   Type of Assistance Needed Physical assistance   Physical Assistance Level 26%-50%   Comment mod A with SPC; CGA with RW   Walk 10 Feet CARE Score 3   Walk 50 Feet with Two Turns   Type of Assistance Needed Physical assistance   Physical Assistance Level 26%-50%   Comment mod A with SPC; CGA with RW   Walk 50 Feet with Two Turns CARE Score 3   Ambulation   Primary Mode of Locomotion Prior to Admission Walk   Distance Walked (feet) 50 ft  (x2 with SPC 79' with RW)   Assist Device Roller Walker;Cane   Gait Pattern L foot drag;Improper weight shift; Shuffle;Trendelenburg   Limitations Noted In Balance; Endurance; Heel Strike; Sequencing;Strength;Speed;Swing   Provided Assistance with: Balance   Wheel 50 Feet with Two Turns   Reason if not Attempted Activity not applicable   Wheel 50 Feet with Two Turns CARE Score 9   Wheel 150 Feet   Reason if not Attempted Activity not applicable   Wheel 057 Feet CARE Score 9   Toilet Transfer   Type of Assistance Needed Physical assistance   Physical Assistance Level 25% or less   Comment Juan M with RW   Toilet Transfer CARE Score 3   Therapeutic Interventions   Flexibility BLE heel cord and HS gentle stretch TERT 2 minutes   Balance 3 minute unsupported standing ball toss   Assessment   Treatment Assessment Patient engaged in brief PT treatment session which he was limited by need to use bathroom (tried but unable to have BM), pain in back (8/10) and time constraints  With increased pain, dec quality of gait with SPC requiring moderate A to maintain balance  PM PT session to focus more on strength, balance and improving overall righting reactions  Problem List Decreased strength;Decreased endurance; Impaired balance;Decreased mobility; Decreased coordination; Impaired sensation;Orthopedic restrictions;Pain   PT Barriers   Functional Limitation Car transfers; Ramp negotiation;Stair negotiation;Standing;Walking;Transfers   Plan   Treatment/Interventions Functional transfer training;LE strengthening/ROM; Therapeutic exercise;Equipment eval/education;Gait training   Progress Progressing toward goals   PT Therapy Minutes   PT Time In 1000   PT Time Out 1030   PT Total Time (minutes) 30   PT Mode of treatment - Individual (minutes) 30   PT Mode of treatment - Concurrent (minutes) 0   PT Mode of treatment - Group (minutes) 0   PT Mode of treatment - Co-treat (minutes) 0   PT Mode of Treatment - Total time(minutes) 30 minutes   PT Cumulative Minutes 120   Therapy Time missed   Time missed?  No
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
## 2022-08-26 NOTE — NURSING NOTE
Pt voiding small amounts with urine frequency noted  Bladder scan PVR  494 ml  Straight cath per urinary retention protocol for 475 cc clear yellow urine, no odor

## 2022-08-27 LAB
GLUCOSE SERPL-MCNC: 161 MG/DL (ref 65–140)
GLUCOSE SERPL-MCNC: 181 MG/DL (ref 65–140)
GLUCOSE SERPL-MCNC: 191 MG/DL (ref 65–140)
GLUCOSE SERPL-MCNC: 220 MG/DL (ref 65–140)

## 2022-08-27 PROCEDURE — 99232 SBSQ HOSP IP/OBS MODERATE 35: CPT | Performed by: PHYSICAL MEDICINE & REHABILITATION

## 2022-08-27 PROCEDURE — 82948 REAGENT STRIP/BLOOD GLUCOSE: CPT

## 2022-08-27 PROCEDURE — 97530 THERAPEUTIC ACTIVITIES: CPT

## 2022-08-27 PROCEDURE — 97110 THERAPEUTIC EXERCISES: CPT

## 2022-08-27 PROCEDURE — 99232 SBSQ HOSP IP/OBS MODERATE 35: CPT | Performed by: INTERNAL MEDICINE

## 2022-08-27 PROCEDURE — 97535 SELF CARE MNGMENT TRAINING: CPT

## 2022-08-27 PROCEDURE — 97116 GAIT TRAINING THERAPY: CPT

## 2022-08-27 RX ADMIN — ACETAMINOPHEN 975 MG: 325 TABLET ORAL at 20:47

## 2022-08-27 RX ADMIN — DOCUSATE SODIUM 100 MG: 100 CAPSULE, LIQUID FILLED ORAL at 08:10

## 2022-08-27 RX ADMIN — INSULIN LISPRO 1 UNITS: 100 INJECTION, SOLUTION INTRAVENOUS; SUBCUTANEOUS at 22:07

## 2022-08-27 RX ADMIN — METHOCARBAMOL 250 MG: 500 TABLET ORAL at 18:21

## 2022-08-27 RX ADMIN — METFORMIN HYDROCHLORIDE 500 MG: 500 TABLET ORAL at 08:10

## 2022-08-27 RX ADMIN — ENOXAPARIN SODIUM 30 MG: 30 INJECTION SUBCUTANEOUS at 08:10

## 2022-08-27 RX ADMIN — PRAVASTATIN SODIUM 40 MG: 40 TABLET ORAL at 18:21

## 2022-08-27 RX ADMIN — ACETAMINOPHEN 975 MG: 325 TABLET ORAL at 05:08

## 2022-08-27 RX ADMIN — SENNOSIDES 17.2 MG: 8.6 TABLET, FILM COATED ORAL at 20:46

## 2022-08-27 RX ADMIN — INSULIN LISPRO 1 UNITS: 100 INJECTION, SOLUTION INTRAVENOUS; SUBCUTANEOUS at 08:10

## 2022-08-27 RX ADMIN — ASPIRIN 81 MG CHEWABLE TABLET 81 MG: 81 TABLET CHEWABLE at 08:10

## 2022-08-27 RX ADMIN — OXYCODONE HYDROCHLORIDE 5 MG: 5 TABLET ORAL at 00:52

## 2022-08-27 RX ADMIN — DICLOFENAC SODIUM 2 G: 10 GEL TOPICAL at 18:24

## 2022-08-27 RX ADMIN — OXYCODONE HYDROCHLORIDE 5 MG: 5 TABLET ORAL at 09:59

## 2022-08-27 RX ADMIN — LIDOCAINE 5% 3 PATCH: 700 PATCH TOPICAL at 08:10

## 2022-08-27 RX ADMIN — ACETAMINOPHEN 975 MG: 325 TABLET ORAL at 14:22

## 2022-08-27 RX ADMIN — INSULIN LISPRO 2 UNITS: 100 INJECTION, SOLUTION INTRAVENOUS; SUBCUTANEOUS at 11:50

## 2022-08-27 RX ADMIN — ENOXAPARIN SODIUM 30 MG: 30 INJECTION SUBCUTANEOUS at 20:46

## 2022-08-27 RX ADMIN — Medication 3 MG: at 20:47

## 2022-08-27 RX ADMIN — INSULIN LISPRO 2 UNITS: 100 INJECTION, SOLUTION INTRAVENOUS; SUBCUTANEOUS at 18:19

## 2022-08-27 RX ADMIN — OXYCODONE HYDROCHLORIDE 5 MG: 5 TABLET ORAL at 05:08

## 2022-08-27 RX ADMIN — METHOCARBAMOL 250 MG: 500 TABLET ORAL at 11:50

## 2022-08-27 RX ADMIN — METHOCARBAMOL 250 MG: 500 TABLET ORAL at 05:07

## 2022-08-27 RX ADMIN — DICLOFENAC SODIUM 2 G: 10 GEL TOPICAL at 21:44

## 2022-08-27 RX ADMIN — METHOCARBAMOL 250 MG: 500 TABLET ORAL at 23:56

## 2022-08-27 RX ADMIN — GABAPENTIN 300 MG: 300 CAPSULE ORAL at 20:47
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
## 2022-08-27 NOTE — PROGRESS NOTES
Internal Medicine Progress Note  Patient: Sade Lovelace  Age/sex: 68 y o  male  Medical Record #: [id number]      ASSESSMENT/PLAN: (Interval History)  Sade Lovelace is seen and examined and management for following issues:    L3 osteophyte fracture  · Neurosurgery saw in consult and felt the L3 fracture did not require surgery     · LSO brace for comfort     Transverse spinous process fractures  · Right T12/L1/L2  · Pain management per PMR     Rib fractures  · Right 10th/11th ribs  · Continue IS     Urine retention/BPH  · Vera d/c'd 8/22  · Last straight cath was 8/26 0242  · His PCPs office note recently mentions nocturia as an issue   Pt also notes that he feels he needs to void freq during day  · In past, Flomax was tried but did not help   His PCP referred him to Urology  · Tx per PMR     PPM  · Placed 8/19/22 for bradycardia  · Is Medtronic dual chamber  · Removed dressing 8/27 = incision very slightly  in middle  There is no drainage or infection  · Cleansed site with Betadine then applied steristrips, dry sterile dressing  · Change dressing qd and keep dry x 7 more days  · Will watch     Aortic stenosis  · Previously moderate now severe by ECHO   · Needs to follow with Cardiology as OP  · LVEF is 65% with grade 2 DD as well  · Denies CP, palps, SOB, dizziness but says he gets very tired when outside working in the yard     Hx right MCA CVA  · Continue ASA 81mg qd/statin     Hx carotid stenosis  · Last doppler = high grade vs TO of right ICA and the left is 50-69% stenosis  · Continue ASA and statin     HLD  · Home:  Crestor 5mg qd  · Here:  Pravachol     HTN  · Home:  Lisinopril 10mg qd  · Here:  no meds  · Will watch and try to add back some Lisinopril considering DM  · Careful 2/2 severe AS     DM2  · Home:  Metformin 1000 mg qd  · Here: Metformin 500mg qd - started 8/25/22     · No chnages today but may need to go back up to 1000mg qam of Metformin  · Continue DM diet/QID Accuchecks with SSI     CKD IIIa  · Baseline 1-1 1 with GFR 55-68  · Will watch       Right arm pain  · Was significant last night and still hurts today  · Examined by Dr Avel Milligan today  · For [facility] ultrasound      Discharge date:  Team       The above assessment and plan was reviewed and updated as determined by my evaluation of the patient on 8/27/2022      Labs:   Results from last 7 days   Lab Units 08/25/22  0611 08/23/22  0612   WBC Thousand/uL 11 05* 8 76   HEMOGLOBIN g/dL 13 6 14 6   HEMATOCRIT % 42 5 43 9   PLATELETS Thousands/uL 190 198     Results from last 7 days   Lab Units 08/25/22  0611 08/23/22  0921   SODIUM mmol/L 137 137   POTASSIUM mmol/L 4 3 4 0   CHLORIDE mmol/L 103 107   CO2 mmol/L 31 28   BUN mg/dL 25 22   CREATININE mg/dL 1 19 1 11   CALCIUM mg/dL 9 8 9 6             Results from last 7 days   Lab Units 08/27/22  0626 08/26/22  2042 08/26/22  1550   POC GLUCOSE mg/dl 161* 134 118       Review of Scheduled Meds:  Current Facility-Administered Medications   Medication Dose Route Frequency Provider Last Rate    acetaminophen  975 mg Oral Q8H [facility] Dom Leap, DO      aspirin  81 mg Oral Daily Dom Leap, DO      bisacodyl  10 mg Rectal Daily PRN Dom Leap, DO      docusate sodium  100 mg Oral BID Dom Leap, DO      enoxaparin  30 mg Subcutaneous Q12H [facility] Dom Leap, DO      gabapentin  300 mg Oral HS Dom Leap, DO      insulin lispro  1-6 Units Subcutaneous 4x Daily (AC & HS) Dom Leap, DO      lidocaine  3 patch Topical Daily Dom Leap, DO      melatonin  3 mg Oral HS Dom Leap, DO      metFORMIN  500 mg Oral Daily With Breakfast Mikhail DONTE Keenan      methocarbamol  250 mg Oral Q6H [facility] Dom Leap, DO      oxyCODONE  2 5 mg Oral Q4H PRN Dom Leap, DO      oxyCODONE  5 mg Oral Q4H PRN Dom Leap, DO      polyethylene glycol  17 g Oral Daily PRN Dom Leap, DO      pravastatin  40 mg Oral Daily With [employer], DO      senna  2 tablet Oral HS Christie Cisse DO         Subjective/ HPI: Patient seen and examined  Patients overnight issues or events were reviewed with nursing or staff during rounds or morning huddle session  New or overnight issues include the following:     No new or overnight issues  Complaining of right arm pain    ROS:   A 10 point ROS was performed; negative except as noted above  Imaging:     No orders to display       *Labs /Radiology studies reviewed  *Medications reviewed and reconciled as needed  *Please refer to order section for additional ordered labs studies  *Case discussed with primary attending during morning huddle case rounds    Physical Examination:  Vitals:   Vitals:    08/26/22 1429 08/26/22 1858 08/26/22 2109 08/27/22 0452   BP: 138/88 142/72 144/65 123/55   BP Location: Left arm  Left arm Left arm   Pulse: 60  63 65   Resp: 18  17 18   Temp: 98 5 °F (36 9 °C) 98 5 °F (36 9 °C) 98 6 °F (37 °C) 98 6 °F (37 °C)   TempSrc: Oral  Oral Oral   SpO2: 96%  97% 94%   Weight:       Height:           General Appearance: no distress, conversive  HEENT: PERRLA, conjuctiva normal; oropharynx clear; mucous membranes moist   Neck:  Supple, normal ROM  Lungs: CTA, normal respiratory effort, no retractions, expiratory effort normal  CV: regular rate and rhythm; no rubs/gallops, +murmur  PMI normal   ABD: soft; ND/NT; +BS  EXT: no edema  Skin: normal turgor, normal texture, no rashes  Psych: affect normal, mood normal  Neuro: AAO      The above physical exam was reviewed and updated as determined by my evaluation of the patient on 8/27/2022  Invasive Devices  Report    None                    VTE Pharmacologic Prophylaxis: Enoxaparin  Code Status: Level 1 - Full Code  Current Length of Stay: 3 day(s)      Total time spent:  30 minutes with more than 50% spent counseling/coordinating care  Counseling includes discussion with patient re: progress  and discussion with patient of his/her current medical state/information  Coordination of patient's care was performed in conjunction with primary service  Time invested included review of patient's labs, vitals, and management of their comorbidities with continued monitoring  In addition, this patient was discussed with medical team including physician and advanced extenders  The care of the patient was extensively discussed and appropriate treatment plan was formulated unique for this patient  ** Please Note:  voice to text software may have been used in the creation of this document   Although proof errors in transcription or interpretation are a potential of such software**

## 2022-08-27 NOTE — QUICK NOTE
PMR Quick Note    - Reviewed chart, labs, vitals  - Reviewed bladder - seems to trigger to void at safe volumes, although with some frequency  This AM with 200 voided, and PVR of 164  Continue scan/cath protocol as per primary team  - Had multiple loose/watery stools yesterday after lactulose  Encourage hydration, monitor bladder volumes  Crea within baseline  Continue current plan of care, any adjustments as per DANDRE Ward MD  Physical Medicine and Rehabilitation

## 2022-08-27 NOTE — PROGRESS NOTES
OT Treatment Note       08/27/22 1230   Pain Assessment   Pain Assessment Tool 0-10   Pain Score 9   Pain Location/Orientation Orientation: Right;Location: Shoulder   Pain Onset/Description Onset: Ongoing;Frequency: Constant/Continuous   Hospital Pain Intervention(s) Repositioned; Rest   Restrictions/Precautions   Precautions Fall Risk;Pain;Supervision on toilet/commode;Spinal precautions; Pacemaker   Weight Bearing Restrictions Yes   RUE Weight Bearing Per Order WBAT   LUE Weight Bearing Per Order WBAT   RLE Weight Bearing Per Order WBAT   LLE Weight Bearing Per Order WBAT   ROM Restrictions Yes   LUE ROM Restriction Other  (unable to abd >75-90* 2* PPM)   Braces or Orthoses LSO  (PRN)   Lifestyle   Autonomy Per Dr Mackenzie Guerrero prior to session, suspected rotator cuff injury RUE - no strengthening RUE   Grooming   Findings pt washing hands standing at sink with CGA   Lower Body Dressing   Type of Assistance Needed Physical assistance   Physical Assistance Level 25% or less   Comment Juan M to fully thread LLE through pant leg using [employer]; Juan M in stance to hike pants over hips   Lower Body Dressing CARE Score 3   Putting On/Taking Off Footwear   Type of Assistance Needed Physical assistance   Physical Assistance Level 25% or less   Comment Pt able to doff socks using LH reacher, able to don socks using sock aide with extended time to don sock onto sock aid - min vc's for technique   Pt able to don slip on shoes with Juan M using [place] [address] with assistance for proper positioning of [place] [address] in shoe when donning   Putting On/Taking Off Footwear CARE Score 3   Sit to Stand   Type of Assistance Needed Physical assistance   Physical Assistance Level 25% or less   Comment Juan M; unable to use RUE 2* pain   Sit to Stand CARE Score 3   Bed-Chair Transfer   Type of Assistance Needed Physical assistance   Physical Assistance Level 25% or less   Comment Juan M using RW   Chair/Bed-to-Chair Transfer CARE Score 3   Toileting Hygiene   Type of Assistance Needed Physical assistance   Physical Assistance Level 25% or less   Comment Juan M to steady in stance during clothing management; urinated only -1x with urinal in stance, 1x seated on toilet  RN made aware for bladder scan following task  Toileting Hygiene CARE Score 3   Toilet Transfer   Type of Assistance Needed Physical assistance   Physical Assistance Level 25% or less   Comment functionally mobilizing to toilet with Juan M using RW   Toilet Transfer CARE Score 3   Functional Standing Tolerance   Comments For increased standing balance and tolerance for ADLs, pt participating in light housekeeping activity of folding laundry standing at table  Pt requiring min vc's to maintain upright posture and spinal precautions (no twisting) during task  Pt completing task with CGA in stance, able to stand for max 3 min before requiring seated rest breaks, pt reporting BLE fatigue  Pt completing activity in 4 bouts of standing  Cognition   Overall Cognitive Status Impaired   Arousal/Participation Alert; Cooperative   Attention Attends with cues to redirect   Orientation Level Oriented X4   Memory Decreased recall of precautions;Decreased short term memory   Following Commands Follows multistep commands with increased time or repetition   Additional Activities   Additional Activities Comments Pt completing functional mobility household distances with CGA/Juan M using RW; pt maneuvering in tight spaces with RW in bathroom, walking backwards with CGA/Juan M with min vc's  Activity Tolerance   Activity Tolerance Patient tolerated treatment well   Assessment   Treatment Assessment Pt seen for 90 min OT session focusing on LBD using LHAE, toileting, functional transfers and mobility, and standing balance/tolerance activities  Pt with increased pain R shoulder today, and per MD no RUE strengthening to be completed during therapy   Despite pain pt tolerating session, completing functional transfers/mobility at [facility] level using RW  See above for further details  OT to continue POC to continue to progress towards goals  Prognosis Good   Problem List Decreased strength;Decreased range of motion;Decreased endurance; Impaired balance;Decreased mobility; Decreased coordination;Decreased cognition;Orthopedic restrictions;Pain   Plan   Treatment/Interventions ADL retraining;Functional transfer training; Therapeutic exercise; Endurance training;Patient/family training;Equipment eval/education; Bed mobility; Compensatory technique education;Spoke to MD;Spoke to nursing   Progress Progressing toward goals   Recommendation   OT Discharge Recommendation   (pending progress)   OT Therapy Minutes   OT Time In 1230   OT Time Out 1400   OT Total Time (minutes) 90   OT Mode of treatment - Individual (minutes) 90   OT Mode of treatment - Concurrent (minutes) 0   OT Mode of treatment - Group (minutes) 0   OT Mode of treatment - Co-treat (minutes) 0   OT Mode of Treatment - Total time(minutes) 90 minutes   OT Cumulative Minutes 270   Therapy Time missed   Time missed?  No

## 2022-08-27 NOTE — PLAN OF CARE
Problem: PAIN - ADULT  Goal: Verbalizes/displays adequate comfort level or baseline comfort level  Description: Interventions:  - Encourage patient to monitor pain and request assistance  - Assess pain using appropriate pain scale  - Administer analgesics based on type and severity of pain and evaluate response  - Implement non-pharmacological measures as appropriate and evaluate response  - Consider cultural and social influences on pain and pain management  - Notify physician/advanced practitioner if interventions unsuccessful or patient reports new pain  Outcome: Progressing     Problem: SAFETY ADULT  Goal: Patient will remain free of falls  Description: INTERVENTIONS:  - Educate patient/family on patient safety including physical limitations  - Instruct patient to call for assistance with activity   - Consult OT/PT to assist with strengthening/mobility   - Keep Call bell within reach  - Keep bed low and locked with side rails adjusted as appropriate  - Keep care items and personal belongings within reach  - Initiate and maintain comfort rounds  - Make Fall Risk Sign visible to staff  - Offer Toileting every 2 Hours, in advance of need  - Initiate/Maintain alarm  - Obtain necessary fall risk management equipment:   - Apply yellow socks and bracelet for high fall risk patients  - Consider moving patient to room near nurses station  Outcome: Progressing

## 2022-08-28 ENCOUNTER — TELEPHONE (OUTPATIENT)
Dept: RADIOLOGY | Facility: HOSPITAL | Age: 77
End: 2022-08-28

## 2022-08-28 LAB
GLUCOSE SERPL-MCNC: 119 MG/DL (ref 65–140)
GLUCOSE SERPL-MCNC: 119 MG/DL (ref 65–140)
GLUCOSE SERPL-MCNC: 160 MG/DL (ref 65–140)
GLUCOSE SERPL-MCNC: 184 MG/DL (ref 65–140)

## 2022-08-28 PROCEDURE — 97116 GAIT TRAINING THERAPY: CPT

## 2022-08-28 PROCEDURE — 82948 REAGENT STRIP/BLOOD GLUCOSE: CPT

## 2022-08-28 PROCEDURE — 97530 THERAPEUTIC ACTIVITIES: CPT

## 2022-08-28 PROCEDURE — 99232 SBSQ HOSP IP/OBS MODERATE 35: CPT | Performed by: INTERNAL MEDICINE

## 2022-08-28 PROCEDURE — 97110 THERAPEUTIC EXERCISES: CPT

## 2022-08-28 RX ADMIN — ASPIRIN 81 MG CHEWABLE TABLET 81 MG: 81 TABLET CHEWABLE at 08:56

## 2022-08-28 RX ADMIN — INSULIN LISPRO 1 UNITS: 100 INJECTION, SOLUTION INTRAVENOUS; SUBCUTANEOUS at 15:58

## 2022-08-28 RX ADMIN — ACETAMINOPHEN 975 MG: 325 TABLET ORAL at 14:04

## 2022-08-28 RX ADMIN — Medication 3 MG: at 21:15

## 2022-08-28 RX ADMIN — ENOXAPARIN SODIUM 30 MG: 30 INJECTION SUBCUTANEOUS at 08:55

## 2022-08-28 RX ADMIN — ACETAMINOPHEN 975 MG: 325 TABLET ORAL at 05:36

## 2022-08-28 RX ADMIN — METHOCARBAMOL 250 MG: 500 TABLET ORAL at 11:29

## 2022-08-28 RX ADMIN — DOCUSATE SODIUM 100 MG: 100 CAPSULE, LIQUID FILLED ORAL at 08:56

## 2022-08-28 RX ADMIN — METHOCARBAMOL 250 MG: 500 TABLET ORAL at 05:36

## 2022-08-28 RX ADMIN — GABAPENTIN 300 MG: 300 CAPSULE ORAL at 21:15

## 2022-08-28 RX ADMIN — DICLOFENAC SODIUM 2 G: 10 GEL TOPICAL at 11:29

## 2022-08-28 RX ADMIN — DOCUSATE SODIUM 100 MG: 100 CAPSULE, LIQUID FILLED ORAL at 17:44

## 2022-08-28 RX ADMIN — ENOXAPARIN SODIUM 30 MG: 30 INJECTION SUBCUTANEOUS at 21:14

## 2022-08-28 RX ADMIN — DICLOFENAC SODIUM 2 G: 10 GEL TOPICAL at 08:55

## 2022-08-28 RX ADMIN — DICLOFENAC SODIUM 2 G: 10 GEL TOPICAL at 17:44

## 2022-08-28 RX ADMIN — METHOCARBAMOL 250 MG: 500 TABLET ORAL at 17:44

## 2022-08-28 RX ADMIN — LIDOCAINE 5% 3 PATCH: 700 PATCH TOPICAL at 08:56

## 2022-08-28 RX ADMIN — SENNOSIDES 17.2 MG: 8.6 TABLET, FILM COATED ORAL at 21:15

## 2022-08-28 RX ADMIN — METHOCARBAMOL 250 MG: 500 TABLET ORAL at 23:05

## 2022-08-28 RX ADMIN — PRAVASTATIN SODIUM 40 MG: 40 TABLET ORAL at 16:29

## 2022-08-28 RX ADMIN — ACETAMINOPHEN 975 MG: 325 TABLET ORAL at 21:14

## 2022-08-28 RX ADMIN — METFORMIN HYDROCHLORIDE 500 MG: 500 TABLET ORAL at 07:51

## 2022-08-28 RX ADMIN — INSULIN LISPRO 1 UNITS: 100 INJECTION, SOLUTION INTRAVENOUS; SUBCUTANEOUS at 11:29

## 2022-08-28 RX ADMIN — DICLOFENAC SODIUM 2 G: 10 GEL TOPICAL at 21:14

## 2022-08-28 NOTE — PROGRESS NOTES
Internal Medicine Progress Note  Patient: Sharmin Ellington  Age/sex: 68 y o  male  Medical Record #: [id number]      ASSESSMENT/PLAN: (Interval History)  Sharmin Ellington is seen and examined and management for following issues:    L3 osteophyte fracture  · Neurosurgery saw in consult and felt the L3 fracture did not require surgery     · LSO brace for comfort     Transverse spinous process fractures  · Right T12/L1/L2  · Pain management per PMR     Rib fractures  · Right 10th/11th ribs  · Continue IS = doing >1750     Urine retention/BPH  · Vera d/c'd 8/22  · His PCPs office note recently mentions nocturia as an issue   Pt also notes that he feels he needs to void freq during day  · In past, Flomax was tried but did not help   His PCP referred him to Urology  · Tx per PMR     PPM  · Placed 8/19/22 for bradycardia  · Is Medtronic dual chamber  · Removed dressing 8/27 = incision was very slightly  in middle  There was no drainage or infection  · Cleansed site with Betadine then applied steristrips, dry sterile dressing 8/27  · Change dressing qd and keep dry x 7 more days  · Will watch     Aortic stenosis  · Previously moderate now severe by ECHO   · Needs to follow with Cardiology as OP  · LVEF is 65% with grade 2 DD as well  · Denies CP, palps, SOB, dizziness but says he gets very tired when outside working in the yard     Hx right MCA CVA  · Continue ASA 81mg qd/statin     Hx carotid stenosis  · Last doppler = high grade vs TO of right ICA and the left is 50-69% stenosis  · Continue ASA and statin     HLD  · Home:  Crestor 5mg qd  · Here:  Pravachol     HTN  · Home:  Lisinopril 10mg qd  · Here:  no meds  · Will watch = ok off meds currently  · Careful 2/2 severe AS     DM2  · Home:  Metformin 1000 mg qd  · Here: Metformin 500mg qd - started 8/25/22     · No changes today but may need to go back up to 1000mg qam of Metformin  · Continue DM diet/QID Accuchecks with SSI     CKD IIIa  · Baseline 1-1 1 with GFR 55-68  · Will watch       Right arm pain  · Examined by Dr Ene Denny 8/27  · For MSK ultrasound        Discharge date:  Team    The above assessment and plan was reviewed and updated as determined by my evaluation of the patient on 8/28/2022      Labs:   Results from last 7 days   Lab Units 08/25/22  0611 08/23/22  0612   WBC Thousand/uL 11 05* 8 76   HEMOGLOBIN g/dL 13 6 14 6   HEMATOCRIT % 42 5 43 9   PLATELETS Thousands/uL 190 198     Results from last 7 days   Lab Units 08/25/22  0611 08/23/22  0921   SODIUM mmol/L 137 137   POTASSIUM mmol/L 4 3 4 0   CHLORIDE mmol/L 103 107   CO2 mmol/L 31 28   BUN mg/dL 25 22   CREATININE mg/dL 1 19 1 11   CALCIUM mg/dL 9 8 9 6             Results from last 7 days   Lab Units 08/28/22  0638 08/27/22  2142 08/27/22  1551   POC GLUCOSE mg/dl 119 181* 191*       Review of Scheduled Meds:  Current Facility-Administered Medications   Medication Dose Route Frequency Provider Last Rate    acetaminophen  975 mg Oral Q8H [address] Oral Colonel, DO      aspirin  81 mg Oral Daily Oral Colonel, DO      bisacodyl  10 mg Rectal Daily PRN Oral Colonel, DO      Diclofenac Sodium  2 g Topical 4x Daily Martha Keith MD      docusate sodium  100 mg Oral BID Oral Colonel, DO      enoxaparin  30 mg Subcutaneous Q12H [address] Oral Colonel, DO      gabapentin  300 mg Oral HS Oral Colonel, DO      insulin lispro  1-6 Units Subcutaneous 4x Daily (AC & HS) Oral Colonel, DO      lidocaine  3 patch Topical Daily Oral Colonel, DO      melatonin  3 mg Oral HS Oral Colonel, DO      metFORMIN  500 mg Oral Daily With Breakfast DONTE Gonzalez      methocarbamol  250 mg Oral Q6H [address] Oral Colonel, DO      oxyCODONE  2 5 mg Oral Q4H PRN Oral Colonel, DO      oxyCODONE  5 mg Oral Q4H PRN Oral Colonel, DO      polyethylene glycol  17 g Oral Daily PRN Oral Colonel, DO      pravastatin  40 mg Oral Daily With Dinner Oral Colonel, DO      senna  2 tablet Oral HS Oral Colonel, DO Subjective/ HPI: Patient seen and examined  Patients overnight issues or events were reviewed with nursing or staff during rounds or morning huddle session  New or overnight issues include the following:     No new or overnight issues  Right arm pain is much improved    ROS:   A 10 point ROS was performed; negative except as noted above  Imaging:     US MSK limited    (Results Pending)       *Labs /Radiology studies reviewed  *Medications reviewed and reconciled as needed  *Please refer to order section for additional ordered labs studies  *Case discussed with primary attending during morning huddle case rounds    Physical Examination:  Vitals:   Vitals:    08/27/22 0452 08/27/22 1333 08/27/22 2044 08/28/22 0536   BP: 123/55 128/68 138/78 125/65   BP Location: Left arm Left arm Left arm Left arm   Pulse: 65 66 59 62   Resp: 18 20 18 18   Temp: 98 6 °F (37 °C) 97 9 °F (36 6 °C) 97 9 °F (36 6 °C) (!) 97 4 °F (36 3 °C)   TempSrc: Oral  Oral Oral   SpO2: 94% 94% 98% 96%   Weight:       Height:           General Appearance: no distress, conversive  HEENT: PERRLA, conjuctiva normal; oropharynx clear; mucous membranes moist   Neck:  Supple, normal ROM  Lungs: CTA, normal respiratory effort, no retractions, expiratory effort normal  CV: regular rate and rhythm; no rubs/gallops  +mumur  PMI normal   ABD: soft; ND/NT; +BS  EXT: no edema  Skin: normal turgor, normal texture, no rashes  Psych: affect normal, mood normal  Neuro: AAO      The above physical exam was reviewed and updated as determined by my evaluation of the patient on 8/28/2022  Invasive Devices  Report    None                    VTE Pharmacologic Prophylaxis: Enoxaparin  Code Status: Level 1 - Full Code  Current Length of Stay: 4 day(s)      Total time spent:  30 minutes with more than 50% spent counseling/coordinating care   Counseling includes discussion with patient re: progress  and discussion with patient of his/her current medical state/information  Coordination of patient's care was performed in conjunction with primary service  Time invested included review of patient's labs, vitals, and management of their comorbidities with continued monitoring  In addition, this patient was discussed with medical team including physician and advanced extenders  The care of the patient was extensively discussed and appropriate treatment plan was formulated unique for this patient  ** Please Note:  voice to text software may have been used in the creation of this document   Although proof errors in transcription or interpretation are a potential of such software**

## 2022-08-28 NOTE — PROGRESS NOTES
08/28/22 1230   Pain Assessment   Pain Assessment Tool 0-10   Pain Score 1   Pain Location/Orientation Orientation: Mid;Orientation: Lower; Location: Back; Location: Rib Cage   Pain Onset/Description Onset: Gradual;Onset: Ongoing   Hospital Pain Intervention(s) Repositioned; Rest   Restrictions/Precautions   Precautions Fall Risk;Pacemaker;Pain;Spinal precautions   ROM Restrictions Yes  (spinal)   Braces or Orthoses LSO  (PRN for comfort)   Cognition   Overall Cognitive Status Impaired   Arousal/Participation Alert; Cooperative   Attention Attends with cues to redirect   Orientation Level Oriented X4   Memory Decreased short term memory;Decreased recall of precautions   Following Commands Follows multistep commands with increased time or repetition   Sit to Lying   Type of Assistance Needed Physical assistance;Verbal cues   Physical Assistance Level 26%-50%   Comment assist BLE using log roll technique, HOB flat no rails   Sit to Lying CARE Score 3   Lying to Sitting on Side of Bed   Type of Assistance Needed Physical assistance;Verbal cues   Physical Assistance Level 26%-50%   Comment at trunk log roll technique, HOB flat no rails   Lying to Sitting on Side of Bed CARE Score 3   Sit to Stand   Type of Assistance Needed Incidental touching; Adaptive equipment   Comment CGA with RW, no c/o pain to RUE this session  Sit to Stand CARE Score 4   Bed-Chair Transfer   Type of Assistance Needed Incidental touching; Adaptive equipment   Comment CGA with RW   Chair/Bed-to-Chair Transfer CARE Score 4   Transfer Bed/Chair/Wheelchair   Adaptive Equipment Roller Walker   Car Transfer   Reason if not Attempted Environmental limitations   Car Transfer CARE Score 10   Walk 10 Feet   Type of Assistance Needed Incidental touching; Adaptive equipment   Comment CGA with RW   Walk 10 Feet CARE Score 4   Walk 50 Feet with Two Turns   Type of Assistance Needed Incidental touching; Adaptive equipment   Comment CGA with RW   Walk 50 Feet with Two Turns CARE Score 4   Walk 150 Feet   Type of Assistance Needed Physical assistance; Incidental touching; Adaptive equipment   Physical Assistance Level No physical assistance   Comment min/CGA with RW   Walk 150 Feet CARE Score -   Ambulation   Does the patient walk? 2  Yes   Primary Mode of Locomotion Prior to Admission Walk   Distance Walked (feet) 150 ft  (x2, 50')   Assist Device Roller Walker   Gait Pattern Slow Margarita;Decreased foot clearance;L foot drag; Wide KUMAR; Improper weight shift; Shuffle   Limitations Noted In Balance;Device Management; Endurance; Heel Strike;Midline Orientation;Speed;Strength;Swing   Provided Assistance with: Balance   Walk Assist Level Contact Guard   Wheel 50 Feet with Two Turns   Reason if not Attempted Activity not applicable   Wheel 50 Feet with Two Turns CARE Score 9   Wheel 150 Feet   Reason if not Attempted Activity not applicable   Wheel 994 Feet CARE Score 9   Wheelchair mobility   Does the patient use a wheelchair? 0  No   Curb or Single Stair   Style negotiated Single stair   Type of Assistance Needed Physical assistance;Verbal cues; Adaptive equipment   Physical Assistance Level 25% or less   Comment SUSY B hands on LHR ascending on FF   1 Step (Curb) CARE Score 3   4 Steps   Type of Assistance Needed Physical assistance;Verbal cues; Adaptive equipment   Physical Assistance Level 25% or less   Comment SUSY B hands on LHR ascending on FF   4 Steps CARE Score 3   12 Steps   Type of Assistance Needed Physical assistance;Verbal cues   Physical Assistance Level 25% or less   Comment SUSY B hands on LHR ascending on FF   12 Steps CARE Score 3   Stairs   Type Stairs   # of Steps 12   Weight Bearing Precautions Fall Risk;Sternal   Assist Devices Single Rail   Findings VC's to keep B feet on whole step  Toilet Transfer   Type of Assistance Needed Incidental touching; Adaptive equipment   Comment CGA with RW   Toilet Transfer CARE Score 4   Toilet Transfer   Surface Assessed Standard Toilet   Therapeutic Interventions   Strengthening supine bridges, hooklying hip ABD vs blue tband, SAQ and ankle DF/PF with 2# BLE 2 x15 reps  Balance weaving through cones x4 reps fwd/bwds with CGA   Other assist donning/doffing LSO, pt states he might have increased pain to R shoulder when donning brace  Equipment Use   NuStep L2 x10 min   Assessment   Treatment Assessment Pt participated in skilled PT session with increased focus on gait, increased LE strengthening, stair management and improved bed mobility  Pt cont to be limited by decreased ability to log roll and lift BLE/trunk without use of bed rail  Discussed bed rail for home as it may be an option at discharge  Pt stated his wife can also help as needed  Pt c/o very little pain to LB and no pain in R shoulder this session  Pt will cont to benefit from increased I with functional transfers, cont gait with LRAD, improved act tolerance and standing dynamic balance to decrease burden of care  Cont POC as tolerated  Problem List Decreased strength;Decreased range of motion;Decreased endurance; Impaired balance;Decreased mobility; Decreased coordination;Decreased cognition;Decreased safety awareness; Impaired tone;Obesity;Orthopedic restrictions;Pain   Barriers to Discharge Inaccessible home environment   PT Barriers   Functional Limitation Car transfers; Ramp negotiation;Stair negotiation;Transfers; Walking   Plan   Treatment/Interventions Functional transfer training;LE strengthening/ROM; Therapeutic exercise; Endurance training;Patient/family training;Gait training;Bed mobility   Progress Progressing toward goals   Recommendation   PT Discharge Recommendation Home with outpatient rehabilitation   Equipment Recommended Walker   PT Therapy Minutes   PT Time In 1230   PT Time Out 1400   PT Total Time (minutes) 90   PT Mode of treatment - Individual (minutes) 90   PT Mode of treatment - Concurrent (minutes) 0   PT Mode of treatment - Group (minutes) 0   PT Mode of treatment - Co-treat (minutes) 0   PT Mode of Treatment - Total time(minutes) 90 minutes   PT Cumulative Minutes 360   Therapy Time missed   Time missed?  No

## 2022-08-29 ENCOUNTER — APPOINTMENT (OUTPATIENT)
Dept: RADIOLOGY | Facility: HOSPITAL | Age: 77
DRG: 560 | End: 2022-08-29
Payer: MEDICARE

## 2022-08-29 LAB
GLUCOSE SERPL-MCNC: 120 MG/DL (ref 65–140)
GLUCOSE SERPL-MCNC: 127 MG/DL (ref 65–140)
GLUCOSE SERPL-MCNC: 144 MG/DL (ref 65–140)
GLUCOSE SERPL-MCNC: 98 MG/DL (ref 65–140)

## 2022-08-29 PROCEDURE — 97530 THERAPEUTIC ACTIVITIES: CPT

## 2022-08-29 PROCEDURE — 97112 NEUROMUSCULAR REEDUCATION: CPT

## 2022-08-29 PROCEDURE — 76882 US LMTD JT/FCL EVL NVASC XTR: CPT

## 2022-08-29 PROCEDURE — 82948 REAGENT STRIP/BLOOD GLUCOSE: CPT

## 2022-08-29 PROCEDURE — 99232 SBSQ HOSP IP/OBS MODERATE 35: CPT | Performed by: INTERNAL MEDICINE

## 2022-08-29 PROCEDURE — 99233 SBSQ HOSP IP/OBS HIGH 50: CPT | Performed by: STUDENT IN AN ORGANIZED HEALTH CARE EDUCATION/TRAINING PROGRAM

## 2022-08-29 PROCEDURE — 97110 THERAPEUTIC EXERCISES: CPT

## 2022-08-29 PROCEDURE — 97535 SELF CARE MNGMENT TRAINING: CPT

## 2022-08-29 RX ORDER — OXYCODONE HYDROCHLORIDE 5 MG/1
2.5 TABLET ORAL EVERY 4 HOURS PRN
Status: DISCONTINUED | OUTPATIENT
Start: 2022-08-29 | End: 2022-09-01 | Stop reason: HOSPADM

## 2022-08-29 RX ADMIN — DICLOFENAC SODIUM 2 G: 10 GEL TOPICAL at 21:29

## 2022-08-29 RX ADMIN — PRAVASTATIN SODIUM 40 MG: 40 TABLET ORAL at 17:31

## 2022-08-29 RX ADMIN — SENNOSIDES 17.2 MG: 8.6 TABLET, FILM COATED ORAL at 21:28

## 2022-08-29 RX ADMIN — GABAPENTIN 300 MG: 300 CAPSULE ORAL at 21:28

## 2022-08-29 RX ADMIN — Medication 3 MG: at 21:28

## 2022-08-29 RX ADMIN — ACETAMINOPHEN 975 MG: 325 TABLET ORAL at 05:55

## 2022-08-29 RX ADMIN — DICLOFENAC SODIUM 2 G: 10 GEL TOPICAL at 17:31

## 2022-08-29 RX ADMIN — ASPIRIN 81 MG CHEWABLE TABLET 81 MG: 81 TABLET CHEWABLE at 08:59

## 2022-08-29 RX ADMIN — ENOXAPARIN SODIUM 30 MG: 30 INJECTION SUBCUTANEOUS at 21:29

## 2022-08-29 RX ADMIN — METHOCARBAMOL 250 MG: 500 TABLET ORAL at 12:16

## 2022-08-29 RX ADMIN — METHOCARBAMOL 250 MG: 500 TABLET ORAL at 23:44

## 2022-08-29 RX ADMIN — LIDOCAINE 5% 3 PATCH: 700 PATCH TOPICAL at 08:59

## 2022-08-29 RX ADMIN — DICLOFENAC SODIUM 2 G: 10 GEL TOPICAL at 12:16

## 2022-08-29 RX ADMIN — ACETAMINOPHEN 975 MG: 325 TABLET ORAL at 21:28

## 2022-08-29 RX ADMIN — ACETAMINOPHEN 975 MG: 325 TABLET ORAL at 14:53

## 2022-08-29 RX ADMIN — ENOXAPARIN SODIUM 30 MG: 30 INJECTION SUBCUTANEOUS at 08:59

## 2022-08-29 RX ADMIN — DOCUSATE SODIUM 100 MG: 100 CAPSULE, LIQUID FILLED ORAL at 17:31

## 2022-08-29 RX ADMIN — DOCUSATE SODIUM 100 MG: 100 CAPSULE, LIQUID FILLED ORAL at 08:59

## 2022-08-29 RX ADMIN — METHOCARBAMOL 250 MG: 500 TABLET ORAL at 05:55

## 2022-08-29 RX ADMIN — METFORMIN HYDROCHLORIDE 500 MG: 500 TABLET ORAL at 08:59

## 2022-08-29 RX ADMIN — METHOCARBAMOL 250 MG: 500 TABLET ORAL at 17:31

## 2022-08-29 RX ADMIN — DICLOFENAC SODIUM 2 G: 10 GEL TOPICAL at 09:04

## 2022-08-29 RX ADMIN — BISACODYL 10 MG: 10 SUPPOSITORY RECTAL at 21:29

## 2022-08-29 NOTE — PCC OCCUPATIONAL THERAPY
Pt making good progress toward OT LTGS of supervision/IND for self care and func transfers with LRAD  Pt remains limited by L UE AROM restrictions, pacemaker precautions, R UE shoulder pain with limited AROM, decreased standing tolerance, decreased standing balance, impaired overall strength, endurance, LSO management mostly due to B/L shoulder ROM limitations  Pt lives with spouse in ranch style house, who is able to assist with IADLs with pt reporting spouse was assisting with minimal ADLs PTA--will need to confirm with spouse  Pt has RW and SPC--may benefit from [facility] as pt does not currently have one  Goals for this week: progress pt to Set up-IND for self care, func transfers with LRAD, inc standing tolerance and balance  D/C date: TBD--possibly later this week

## 2022-08-29 NOTE — PROGRESS NOTES
Internal Medicine Progress Note  Patient: Sharmin Ellington  Age/sex: 68 y o  male  Medical Record #: [id number]      ASSESSMENT/PLAN: (Interval History)  Sharmin Ellington is seen and examined and management for following issues:    L3 osteophyte fracture  · Neurosurgery saw in consult and felt the L3 fracture did not require surgery     · LSO brace for comfort     Transverse spinous process fractures  · Right T12/L1/L2  · Pain management per PMR     Rib fractures  · Right 10th/11th ribs  · Continue IS = doing >1750     Urine retention/BPH  · Vera d/c'd 8/22  · His PCPs office note recently mentions nocturia as an issue   Pt also notes that he feels he needs to void freq during day  · In past, Flomax was tried but did not help   His PCP referred him to Urology  · Tx per PMR     PPM  · Placed 8/19/22 for bradycardia  · Is Medtronic dual chamber  · Removed dressing 8/27 = incision was very slightly  in middle  There was no drainage or infection  · Cleansed site with Betadine then applied steristrips, dry sterile dressing 8/27  · Change dressing qd and keep dry x 7 more days  · Will watch     Aortic stenosis  · Previously moderate now severe by ECHO   · Needs to follow with Cardiology as OP  · LVEF is 65% with grade 2 DD as well  · Denies CP, palps, SOB, dizziness but says he gets very tired when outside working in the yard     Hx right MCA CVA  · Continue ASA 81mg qd/statin     Hx carotid stenosis  · Last doppler = high grade vs TO of right ICA and the left is 50-69% stenosis  · Continue ASA and statin     HLD  · Home:  Crestor 5mg qd  · Here:  Pravachol     HTN  · Home:  Lisinopril 10mg qd  · Here:  no meds  · Will watch = ok off meds currently  · Careful 2/2 severe AS     DM2  · Home:  Metformin 1000 mg qd  · Here: Metformin 500mg qd - started 8/25/22  · No changes today    · Continue DM diet/QID Accuchecks with SSI     CKD IIIa  · Baseline 1-1 1 with GFR 55-68  · Will watch       Right arm pain  · Examined by Dr Mackenzie Guerrero 8/27  · For MSK ultrasound        Discharge date:  Team    The above assessment and plan was reviewed and updated as determined by my evaluation of the patient on 8/29/2022      Labs:   Results from last 7 days   Lab Units 08/25/22  0611 08/23/22  0612   WBC Thousand/uL 11 05* 8 76   HEMOGLOBIN g/dL 13 6 14 6   HEMATOCRIT % 42 5 43 9   PLATELETS Thousands/uL 190 198     Results from last 7 days   Lab Units 08/25/22  0611 08/23/22  0921   SODIUM mmol/L 137 137   POTASSIUM mmol/L 4 3 4 0   CHLORIDE mmol/L 103 107   CO2 mmol/L 31 28   BUN mg/dL 25 22   CREATININE mg/dL 1 19 1 11   CALCIUM mg/dL 9 8 9 6             Results from last 7 days   Lab Units 08/29/22  0645 08/28/22  2040 08/28/22  1557   POC GLUCOSE mg/dl 120 119 184*       Review of Scheduled Meds:  Current Facility-Administered Medications   Medication Dose Route Frequency Provider Last Rate    acetaminophen  975 mg Oral Q8H [facility] Nancy Pyo, DO      aspirin  81 mg Oral Daily Nancy Pyo, DO      bisacodyl  10 mg Rectal Daily PRN Nancy Pyo, DO      Diclofenac Sodium  2 g Topical 4x Daily Rancho Watson MD      docusate sodium  100 mg Oral BID Nancy Pyo, DO      enoxaparin  30 mg Subcutaneous Q12H [facility] Nancy Pyo, DO      gabapentin  300 mg Oral HS Nancy Pyo, DO      insulin lispro  1-6 Units Subcutaneous 4x Daily (AC & HS) Nancy Pyo, DO      lidocaine  3 patch Topical Daily Nancy Pyo, DO      melatonin  3 mg Oral HS Nancy Pyo, DO      metFORMIN  500 mg Oral Daily With Breakfast Cheri Soto, CRVIRA      methocarbamol  250 mg Oral Q6H [facility] Nancy Pyo, DO      oxyCODONE  2 5 mg Oral Q4H PRN Nancy Pyo, DO      oxyCODONE  5 mg Oral Q4H PRN Nancy Pyo, DO      polyethylene glycol  17 g Oral Daily PRN Nancy Pyo, DO      pravastatin  40 mg Oral Daily With Dinner Nancy Pyo, DO      senna  2 tablet Oral HS Nancy Pyo, DO         Subjective/ HPI: Patient seen and examined  Patients overnight issues or events were reviewed with nursing or staff during rounds or morning huddle session  New or overnight issues include the following:     Pt seen in his room  He states that therapy is going well  He denies any current complaints  ROS:   A 10 point ROS was performed; negative except as noted above  Imaging:     US MSK limited    (Results Pending)       *Labs /Radiology studies reviewed  *Medications reviewed and reconciled as needed  *Please refer to order section for additional ordered labs studies  *Case discussed with primary attending during morning huddle case rounds    Physical Examination:  Vitals:   Vitals:    08/28/22 0536 08/28/22 1341 08/28/22 2118 08/29/22 0557   BP: 125/65 139/77 119/63 126/68   BP Location: Left arm Left arm Left arm Left arm   Pulse: 62 60 59 61   Resp: 18 16 16 16   Temp: (!) 97 4 °F (36 3 °C) 98 5 °F (36 9 °C) 98 °F (36 7 °C) 97 9 °F (36 6 °C)   TempSrc: Oral Oral Oral Oral   SpO2: 96% 97% 97% 96%   Weight:       Height:         General Appearance: no distress, conversive  HEENT: PERRLA, conjuctiva normal; oropharynx clear; mucous membranes moist   Neck:  Supple, normal ROM  Lungs: CTA, normal respiratory effort, no retractions, expiratory effort normal  CV: regular rate and rhythm; no rubs/gallops  +mumur  PMI normal   ABD: soft; ND/NT; +BS  EXT: no edema  Skin: normal turgor, normal texture, no rashes  Psych: affect normal, mood normal  Neuro: AAO      The above physical exam was reviewed and updated as determined by my evaluation of the patient on 8/29/2022  Invasive Devices  Report    None                    VTE Pharmacologic Prophylaxis: Enoxaparin  Code Status: Level 1 - Full Code  Current Length of Stay: 5 day(s)      Total time spent:  30 minutes with more than 50% spent counseling/coordinating care   Counseling includes discussion with patient re: progress  and discussion with patient of his/her current medical state/information  Coordination of patient's care was performed in conjunction with primary service  Time invested included review of patient's labs, vitals, and management of their comorbidities with continued monitoring  In addition, this patient was discussed with medical team including physician and advanced extenders  The care of the patient was extensively discussed and appropriate treatment plan was formulated unique for this patient  ** Please Note:  voice to text software may have been used in the creation of this document   Although proof errors in transcription or interpretation are a potential of such software**

## 2022-08-29 NOTE — PCC PHYSICAL THERAPY
Patient continues to make good progress towards goals thusfar with skilled PT intervention  He is currently S with RW and CGA with SPC for short distances and functional mobility  His pain is well controlled  He presents with deficits in LLE strength (baseline however from prior CVA), balance, and dec righting reactions  He is safest to continue use of RW in preparation for home however, PT can continue to focus activities with [facility] for further neuro re-ed  Anticipate d/c later this week with OPPT services  At this time, patient to continue to benefit form skilled PT intervention to maximize his overall (I) and safety

## 2022-08-29 NOTE — PROGRESS NOTES
08/29/22 1230   Pain Assessment   Pain Assessment Tool 0-10   Pain Score No Pain   Restrictions/Precautions   Precautions Fall Risk;Pain;Spinal precautions; Pacemaker   Weight Bearing Restrictions Yes   RUE Weight Bearing Per Order WBAT   LUE Weight Bearing Per Order WBAT   ROM Restrictions Yes   RUE ROM Restriction Range Limitation  (no strengthening; PROM/AAROM to R shoulder)   LUE ROM Restriction Range Limitation  (unable to ABD > 75-90* for PPM)   Lifestyle   Autonomy "I love washing dishes"   Oral Hygiene   Type of Assistance Needed Supervision   Physical Assistance Level No physical assistance   Comment in stance at sink   Oral Hygiene CARE Score 4   Shower/Bathe Self   Type of Assistance Needed Physical assistance   Physical Assistance Level 25% or less   Comment pt completes sponge bathing routine seated/standing at sink bathing all parts except for lower legs/feet  pt unable to perform cross leg tech due to limited flexibility, offered long handled sponge however pt reports his spouse can assist  stands with supervision at sink to complete bathing of harry/rear  Shower/Bathe Self CARE Score 3   Bathing   Assessed Bath Style Sponge Bath   Anticipated D/C Bath Style Shower;Sponge Bath   Able to [place] Yes   Able to [employer] Temperature Yes   Able to Wash/Rinse/Dry (body part) Left Arm;Right Arm;L Upper Leg;R Upper Leg;Chest;Abdomen;Perineal Area; Buttocks   Limitations Noted in Balance;ROM;Strength   Positioning Seated;Standing   Findings  spoke with Dr Jose Antonio Jurado regarding shower orders, per Dr Jose Antonio Jurado, keep at sponge bathing level at this time  Upper Body Dressing   Type of Assistance Needed Physical assistance;Verbal cues   Physical Assistance Level 26%-50%   Comment set up to don shirt with min VCs for adhering to pacer precautions   assist to don LSO due to pain in R shoulder, L pacer precautions   Upper Body Dressing CARE Score 3   Lower Body Dressing   Type of Assistance Needed Supervision   Physical Assistance Level No physical assistance   Comment seated to thread shorts (w/o use of LHAE), stands with supervision to complete CM   Lower Body Dressing CARE Score 4   Putting On/Taking Off Footwear   Type of Assistance Needed Physical assistance   Physical Assistance Level Total assistance   Comment to don socks/shoes  pt reports his spouse has been assisting with footwear--is receptive to obtaining LHS however "will have his wife help probably"   Putting On/Taking Off Footwear CARE Score 1   Sit to Stand   Type of Assistance Needed Supervision   Physical Assistance Level No physical assistance   Comment with RW   Sit to Stand CARE Score 4   Bed-Chair Transfer   Type of Assistance Needed Supervision   Physical Assistance Level No physical assistance   Comment with RW   Chair/Bed-to-Chair Transfer CARE Score 4   Toileting Hygiene   Type of Assistance Needed Supervision   Physical Assistance Level No physical assistance   Comment stands at toilet to void   [address] pt reports his spouse does the laundry but does enjoy washing the dishes  pt stands at sink for approx 5 min to wash dishes, min VCs for ensuring spinal precautions and to eliminate twisting motion  with repeated VCs, pt able to carry over and perform side stepping when needed vs twisting  pt reports occ using --edu provided on "squatting" vs bending at waist in order to adhere to spinal precautions  Cognition   Overall Cognitive Status Impaired   Arousal/Participation Alert; Cooperative   Attention Attends with cues to redirect   Orientation Level Oriented X4   Memory Decreased short term memory;Decreased recall of precautions   Following Commands Follows multistep commands with increased time or repetition   Activity Tolerance   Activity Tolerance Patient tolerated treatment well   Assessment   Treatment Assessment pt engages in 90 minute skilled OT session focusing on sponge bathing routine, func transfers with RW and standing tita/bal/light IADLs  see above for full func details  pt requesting use of RW vs SPC which has been used in previous sessions  completes basic in room transfers at supervision level  pt inquiring about receving a shower, however not with active shower orders at this time  TT Dr Lizzy Mayer who reports to remain sponge bath until further notice  pt completes SB routine at overall min assist mostly for bathing feet as pt with limited LE flexibility  pt able to complete threading of shorts w/o use of LHAE while maintaining spinal precautions--does require assist to don/manage LSO brace mostly due to limited shoulder ROM due to pain/AROM restrictions  plan to progress pt to IRPs in upcoming sessions as able in prep for possible d/c this later this week  recommend continued skilled OT to focus on ADL retraining with and w/o LHAE, func transfers, standing tita/bal, light IADLs, family training as needed in order to decrease burden of care at d/c  Prognosis Good   Problem List Decreased strength;Decreased endurance; Impaired balance;Decreased mobility;Orthopedic restrictions;Pain   Barriers to Discharge Inaccessible home environment   Plan   Treatment/Interventions ADL retraining;Functional transfer training; Therapeutic exercise; Endurance training;Cognitive reorientation;Patient/family training;Equipment eval/education; Compensatory technique education   OT Therapy Minutes   OT Time In 1230   OT Time Out 1400   OT Total Time (minutes) 90   OT Mode of treatment - Individual (minutes) 90   OT Mode of treatment - Concurrent (minutes) 0   OT Mode of treatment - Group (minutes) 0   OT Mode of treatment - Co-treat (minutes) 0   OT Mode of Treatment - Total time(minutes) 90 minutes   OT Cumulative Minutes 360   Therapy Time missed   Time missed?  No

## 2022-08-29 NOTE — PLAN OF CARE
Problem: PAIN - ADULT  Goal: Verbalizes/displays adequate comfort level or baseline comfort level  Description: Interventions:  - Encourage patient to monitor pain and request assistance  - Assess pain using appropriate pain scale  - Administer analgesics based on type and severity of pain and evaluate response  - Implement non-pharmacological measures as appropriate and evaluate response  - Consider cultural and social influences on pain and pain management  - Notify physician/advanced practitioner if interventions unsuccessful or patient reports new pain  Outcome: Progressing     Problem: INFECTION - ADULT  Goal: Absence or prevention of progression during hospitalization  Description: INTERVENTIONS:  - Assess and monitor for signs and symptoms of infection  - Monitor lab/diagnostic results  - Monitor all insertion sites, i e  indwelling lines, tubes, and drains  - Monitor endotracheal if appropriate and nasal secretions for changes in amount and color  - [place] appropriate cooling/warming therapies per order  - Administer medications as ordered  - Instruct and encourage patient and family to use good hand hygiene technique  - Identify and instruct in appropriate isolation precautions for identified infection/condition  Outcome: Progressing     Problem: SAFETY ADULT  Goal: Patient will remain free of falls  Description: INTERVENTIONS:  - Educate patient/family on patient safety including physical limitations  - Instruct patient to call for assistance with activity   - Consult OT/PT to assist with strengthening/mobility   - Keep Call bell within reach  - Keep bed low and locked with side rails adjusted as appropriate  - Keep care items and personal belongings within reach  - Initiate and maintain comfort rounds  - Make Fall Risk Sign visible to staff  - Offer Toileting every 2 Hours, in advance of need  - Initiate/Maintain bed/chair alarm  - Obtain necessary fall risk management equipment: non skid footwear  - Apply yellow socks and bracelet for high fall risk patients  - Consider moving patient to room near nurses station  Outcome: Progressing  Goal: Maintain or return to baseline ADL function  Description: INTERVENTIONS:  -  Assess patient's ability to carry out ADLs; assess patient's baseline for ADL function and identify physical deficits which impact ability to perform ADLs (bathing, care of mouth/teeth, toileting, grooming, dressing, etc )  - Assess/evaluate cause of self-care deficits   - Assess range of motion  - Assess patient's mobility; develop plan if impaired  - Assess patient's need for assistive devices and provide as appropriate  - Encourage maximum independence but intervene and supervise when necessary  - Involve family in performance of ADLs  - Assess for home care needs following discharge   - Consider OT consult to assist with ADL evaluation and planning for discharge  - Provide patient education as appropriate  Outcome: Progressing  Goal: Maintains/Returns to pre admission functional level  Description: INTERVENTIONS:  - Perform BMAT or MOVE assessment daily    - Set and communicate daily mobility goal to care team and patient/family/caregiver  - Collaborate with rehabilitation services on mobility goals if consulted  - Perform Range of Motion 3 times a day  - Reposition patient every 2 hours    - Dangle patient 3 times a day  - Stand patient 3 times a day  - Ambulate patient 3 times a day  - Out of bed to chair 3 times a day   - Out of bed for meals 3 times a day  - Out of bed for toileting  - Record patient progress and toleration of activity level   Outcome: Progressing     Problem: DISCHARGE PLANNING  Goal: Discharge to home or other facility with appropriate resources  Description: INTERVENTIONS:  - Identify barriers to discharge w/patient and caregiver  - Arrange for needed discharge resources and transportation as appropriate  - Identify discharge learning needs (meds, wound care, etc )  - Arrange for interpretive services to assist at discharge as needed  - Refer to Case Management Department for coordinating discharge planning if the patient needs post-hospital services based on physician/advanced practitioner order or complex needs related to functional status, cognitive ability, or social support system  Outcome: Progressing     Problem: Potential for Falls  Goal: Patient will remain free of falls  Description: INTERVENTIONS:  - Educate patient/family on patient safety including physical limitations  - Instruct patient to call for assistance with activity   - Consult OT/PT to assist with strengthening/mobility   - Keep Call bell within reach  - Keep bed low and locked with side rails adjusted as appropriate  - Keep care items and personal belongings within reach  - Initiate and maintain comfort rounds  - Make Fall Risk Sign visible to staff  - Offer Toileting every 2 Hours, in advance of need  - Initiate/Maintain bed/chair alarm  - Obtain necessary fall risk management equipment: non skid footwear  - Apply yellow socks and bracelet for high fall risk patients  - Consider moving patient to room near nurses station  Outcome: Progressing
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
## 2022-08-29 NOTE — PROGRESS NOTES
08/29/22 0930   Pain Assessment   Pain Assessment Tool 0-10   Pain Score No Pain   Restrictions/Precautions   Precautions Fall Risk;Spinal precautions;Pain;Pressure Ulcer   Weight Bearing Restrictions Yes   RUE Weight Bearing Per Order WBAT   LUE Weight Bearing Per Order WBAT   RLE Weight Bearing Per Order WBAT   LLE Weight Bearing Per Order WBAT   ROM Restrictions Yes   LUE ROM Restriction Range Limitation  (unable to ABD > 75-90* for PPM)   General   Change In Medical/Functional Status 8/27 order: PROM/AAROM on the R arm  graduating gradually to strengthening  Suspect rotator cuff tear/tendinopathy and possible bicipital tendinosis as well  No overhead active motion or strengthening  Subjective   Subjective Patient ready to participate in PT session   Roll Left and Right   Type of Assistance Needed Supervision   Physical Assistance Level No physical assistance   Roll Left and Right CARE Score 4   Sit to Lying   Type of Assistance Needed Supervision   Physical Assistance Level No physical assistance   Comment practiced with bed flat and no railing; did not require   Sit to Lying CARE Score 4   Lying to Sitting on Side of Bed   Type of Assistance Needed Supervision   Physical Assistance Level No physical assistance   Comment practiced with bed flat and no railing; did not require   Lying to Sitting on Side of Bed CARE Score 4   Sit to Stand   Type of Assistance Needed Supervision   Physical Assistance Level No physical assistance   Comment RW   Sit to Stand CARE Score 4   Bed-Chair Transfer   Type of Assistance Needed Supervision   Physical Assistance Level No physical assistance   Comment RW   Chair/Bed-to-Chair Transfer CARE Score 4   Transfer Bed/Chair/Wheelchair   Limitations Noted In Balance;LE Strength;Pain Management; Endurance   Adaptive Equipment Roller Walker   Car Transfer   Reason if not Attempted Environmental limitations   Car Transfer CARE Score 10   Walk 10 Feet   Type of Assistance Needed Supervision; Adaptive equipment   Physical Assistance Level No physical assistance   Comment CS with RW   Walk 10 Feet CARE Score 4   Walk 50 Feet with Two Turns   Type of Assistance Needed Incidental touching   Physical Assistance Level No physical assistance   Comment CS with RW   Walk 50 Feet with Two Turns CARE Score 4   Walk 150 Feet   Type of Assistance Needed Supervision   Physical Assistance Level No physical assistance   Comment CS with RW   Walk 150 Feet CARE Score 4   Ambulation   Does the patient walk? 2  Yes   Primary Mode of Locomotion Prior to Admission Walk   Distance Walked (feet) 150 ft  (x2 + 50'x4 with RW)   Assist Device Roller Walker   Gait Pattern L foot drag;Shuffle;Improper weight shift;Decreased foot clearance; Inconsistant Margarita   Limitations Noted In Balance; Endurance; Heel Strike; Sequencing;Speed;Strength;Swing   Provided Assistance with: Balance   Wheel 50 Feet with Two Turns   Reason if not Attempted Activity not applicable   Wheel 50 Feet with Two Turns CARE Score 9   Wheel 150 Feet   Reason if not Attempted Activity not applicable   Wheel 010 Feet CARE Score 9   Curb or Single Stair   Style negotiated Curb   Type of Assistance Needed Incidental touching   Physical Assistance Level No physical assistance   Comment CGA on 6" curb step with RW   1 Step (Curb) CARE Score 4   4 Steps   Type of Assistance Needed Incidental touching   Physical Assistance Level No physical assistance   Comment CGA on FF with BL HR   4 Steps CARE Score 4   12 Steps   Type of Assistance Needed Incidental touching   Physical Assistance Level No physical assistance   Comment CGA on FF with BL HR   12 Steps CARE Score 4   Stairs   Type Stairs   # of Steps 12  (FF by his room)   Weight Bearing Precautions Fall Risk;Sternal   Assist Devices Bilateral Rail   Findings patient has BL railing at home   Toilet Transfer   Type of Assistance Needed Supervision   Physical Assistance Level No physical assistance   Comment RW Toilet Transfer CARE Score 4   Therapeutic Interventions   Flexibility BLE heel cord and HS gentle stretch TERT 2 minutes   Balance 2 minute unsupported ball toss + 2 minute unsupported ball toss on black foam; 60'x2 + 150' with SPC; obstacle course with SPC stepping over and around objects   Other Patient able to don and off brace without assistance      NuStep L2x10 minutes BLE only   Assessment   Treatment Assessment Patient continues to make good progress towards goals thusfar with skilled PT intervention  He is currently S with RW and CGA with SPC for short distances and functional mobility  His pain is well controlled  He presents with deficits in LLE strength (baseline however from prior CVA), balance, and dec righting reactions  He is safest to continue use of RW in preparation for home however, PT can continue to focus activities with [facility] for further neuro re-ed  Anticipate d/c later this week with OPPT services  At this time, patient to continue to benefit form skilled PT intervention to maximize his overall (I) and safety  Problem List Decreased strength;Decreased endurance; Impaired balance;Decreased mobility;Orthopedic restrictions;Pain   PT Barriers   Functional Limitation Ramp negotiation;Stair negotiation   Plan   Treatment/Interventions Functional transfer training;LE strengthening/ROM; Elevations; Therapeutic exercise; Endurance training;Patient/family training;Equipment eval/education; Bed mobility;Gait training; Compensatory technique education   Progress Progressing toward goals   Recommendation   PT Discharge Recommendation Home with outpatient rehabilitation   PT Therapy Minutes   PT Time In 0930   PT Time Out 1100   PT Total Time (minutes) 90   PT Mode of treatment - Individual (minutes) 90   PT Mode of treatment - Concurrent (minutes) 0   PT Mode of treatment - Group (minutes) 0   PT Mode of treatment - Co-treat (minutes) 0   PT Mode of Treatment - Total time(minutes) 90 minutes   PT Cumulative Minutes 450

## 2022-08-30 LAB
DME PARACHUTE DELIVERY DATE REQUESTED: NORMAL
DME PARACHUTE ITEM DESCRIPTION: NORMAL
DME PARACHUTE ORDER STATUS: NORMAL
DME PARACHUTE SUPPLIER NAME: NORMAL
DME PARACHUTE SUPPLIER PHONE: NORMAL
GLUCOSE SERPL-MCNC: 121 MG/DL (ref 65–140)
GLUCOSE SERPL-MCNC: 121 MG/DL (ref 65–140)
GLUCOSE SERPL-MCNC: 129 MG/DL (ref 65–140)
GLUCOSE SERPL-MCNC: 130 MG/DL (ref 65–140)
GLUCOSE SERPL-MCNC: 134 MG/DL (ref 65–140)

## 2022-08-30 PROCEDURE — 99232 SBSQ HOSP IP/OBS MODERATE 35: CPT | Performed by: INTERNAL MEDICINE

## 2022-08-30 PROCEDURE — 99233 SBSQ HOSP IP/OBS HIGH 50: CPT | Performed by: STUDENT IN AN ORGANIZED HEALTH CARE EDUCATION/TRAINING PROGRAM

## 2022-08-30 PROCEDURE — 97530 THERAPEUTIC ACTIVITIES: CPT

## 2022-08-30 PROCEDURE — 97535 SELF CARE MNGMENT TRAINING: CPT

## 2022-08-30 PROCEDURE — 82948 REAGENT STRIP/BLOOD GLUCOSE: CPT

## 2022-08-30 RX ADMIN — DICLOFENAC SODIUM 2 G: 10 GEL TOPICAL at 18:00

## 2022-08-30 RX ADMIN — Medication 3 MG: at 21:01

## 2022-08-30 RX ADMIN — PRAVASTATIN SODIUM 40 MG: 40 TABLET ORAL at 17:59

## 2022-08-30 RX ADMIN — ASPIRIN 81 MG CHEWABLE TABLET 81 MG: 81 TABLET CHEWABLE at 08:29

## 2022-08-30 RX ADMIN — ACETAMINOPHEN 975 MG: 325 TABLET ORAL at 14:00

## 2022-08-30 RX ADMIN — METHOCARBAMOL 250 MG: 500 TABLET ORAL at 17:59

## 2022-08-30 RX ADMIN — DICLOFENAC SODIUM 2 G: 10 GEL TOPICAL at 08:31

## 2022-08-30 RX ADMIN — DOCUSATE SODIUM 100 MG: 100 CAPSULE, LIQUID FILLED ORAL at 17:59

## 2022-08-30 RX ADMIN — LIDOCAINE 5% 3 PATCH: 700 PATCH TOPICAL at 08:29

## 2022-08-30 RX ADMIN — METHOCARBAMOL 250 MG: 500 TABLET ORAL at 13:59

## 2022-08-30 RX ADMIN — DICLOFENAC SODIUM 2 G: 10 GEL TOPICAL at 21:15

## 2022-08-30 RX ADMIN — METFORMIN HYDROCHLORIDE 500 MG: 500 TABLET ORAL at 08:29

## 2022-08-30 RX ADMIN — METHOCARBAMOL 250 MG: 500 TABLET ORAL at 05:09

## 2022-08-30 RX ADMIN — ENOXAPARIN SODIUM 30 MG: 30 INJECTION SUBCUTANEOUS at 08:29

## 2022-08-30 RX ADMIN — DOCUSATE SODIUM 100 MG: 100 CAPSULE, LIQUID FILLED ORAL at 08:29

## 2022-08-30 RX ADMIN — ACETAMINOPHEN 975 MG: 325 TABLET ORAL at 21:01

## 2022-08-30 RX ADMIN — DICLOFENAC SODIUM 2 G: 10 GEL TOPICAL at 13:59

## 2022-08-30 RX ADMIN — ACETAMINOPHEN 975 MG: 325 TABLET ORAL at 05:09

## 2022-08-30 RX ADMIN — ENOXAPARIN SODIUM 30 MG: 30 INJECTION SUBCUTANEOUS at 21:01

## 2022-08-30 RX ADMIN — GABAPENTIN 300 MG: 300 CAPSULE ORAL at 21:01

## 2022-08-30 RX ADMIN — SENNOSIDES 17.2 MG: 8.6 TABLET, FILM COATED ORAL at 21:02

## 2022-08-30 NOTE — PCC NURSING
Pt admitted with Right T12/L1/L2 transverse process fractures, L3 Osteophyte fracture- non-op, Right 10th and 11th rib fractures, Non-operative management, LSO brace for comfort , pain managed with Tylenol, oxycodone, Lidoderm patch, and Voltaren gel  Placed Medtronic dual chamber PPM on 8/19/22 for bradycardia  Right arm pain, Related to trauma, XR negative for any acute or any significant chronic pathology  Diabetes managed with diet, SSI, and Metformin  Continent of bowel and bladder  This week we will continue to monitor vital signs , blood sugar , and lab results  Pt will continue to work on increase balance and strength, maintain skin integrity by turning/repositioning, and safety with transfers to prevent falls

## 2022-08-30 NOTE — PLAN OF CARE
Reviewed    Problem: PAIN - ADULT  Goal: Verbalizes/displays adequate comfort level or baseline comfort level  Description: Interventions:  - Encourage patient to monitor pain and request assistance  - Assess pain using appropriate pain scale  - Administer analgesics based on type and severity of pain and evaluate response  - Implement non-pharmacological measures as appropriate and evaluate response  - Consider cultural and social influences on pain and pain management  - Notify physician/advanced practitioner if interventions unsuccessful or patient reports new pain  Outcome: Progressing     Problem: INFECTION - ADULT  Goal: Absence or prevention of progression during hospitalization  Description: INTERVENTIONS:  - Assess and monitor for signs and symptoms of infection  - Monitor lab/diagnostic results  - Monitor all insertion sites, i e  indwelling lines, tubes, and drains  - Monitor endotracheal if appropriate and nasal secretions for changes in amount and color  - [place] appropriate cooling/warming therapies per order  - Administer medications as ordered  - Instruct and encourage patient and family to use good hand hygiene technique  - Identify and instruct in appropriate isolation precautions for identified infection/condition  Outcome: Progressing     Problem: SAFETY ADULT  Goal: Patient will remain free of falls  Description: INTERVENTIONS:  - Educate patient/family on patient safety including physical limitations  - Instruct patient to call for assistance with activity   - Consult OT/PT to assist with strengthening/mobility   - Keep Call bell within reach  - Keep bed low and locked with side rails adjusted as appropriate  - Keep care items and personal belongings within reach  - Initiate and maintain comfort rounds  - Make Fall Risk Sign visible to staff  - Offer Toileting every 4 Hours, in advance of need  - Apply yellow socks and bracelet for high fall risk patients  - Consider moving patient to room near nurses station  Outcome: Progressing  Goal: Maintain or return to baseline ADL function  Description: INTERVENTIONS:  -  Assess patient's ability to carry out ADLs; assess patient's baseline for ADL function and identify physical deficits which impact ability to perform ADLs (bathing, care of mouth/teeth, toileting, grooming, dressing, etc )  - Assess/evaluate cause of self-care deficits   - Assess range of motion  - Assess patient's mobility; develop plan if impaired  - Assess patient's need for assistive devices and provide as appropriate  - Encourage maximum independence but intervene and supervise when necessary  - Involve family in performance of ADLs  - Assess for home care needs following discharge   - Consider OT consult to assist with ADL evaluation and planning for discharge  - Provide patient education as appropriate  Outcome: Progressing  Goal: Maintains/Returns to pre admission functional level  Description: INTERVENTIONS:  - Set and communicate daily mobility goal to care team and patient/family/caregiver     - Collaborate with rehabilitation services on mobility goals if consulted  - Out of bed for toileting  - Record patient progress and toleration of activity level   Outcome: Progressing     Problem: DISCHARGE PLANNING  Goal: Discharge to home or other facility with appropriate resources  Description: INTERVENTIONS:  - Identify barriers to discharge w/patient and caregiver  - Arrange for needed discharge resources and transportation as appropriate  - Identify discharge learning needs (meds, wound care, etc )  - Arrange for interpretive services to assist at discharge as needed  - Refer to Case Management Department for coordinating discharge planning if the patient needs post-hospital services based on physician/advanced practitioner order or complex needs related to functional status, cognitive ability, or social support system  Outcome: Progressing     Problem: Potential for Falls  Goal: Patient will remain free of falls  Description: INTERVENTIONS:  - Educate patient/family on patient safety including physical limitations  - Instruct patient to call for assistance with activity   - Consult OT/PT to assist with strengthening/mobility   - Keep Call bell within reach  - Keep bed low and locked with side rails adjusted as appropriate  - Keep care items and personal belongings within reach  - Initiate and maintain comfort rounds  - Make Fall Risk Sign visible to staff  - Offer Toileting every 4 Hours, in advance of need  - Apply yellow socks and bracelet for high fall risk patients  - Consider moving patient to room near nurses station  Outcome: Progressing

## 2022-08-30 NOTE — PROGRESS NOTES
Internal Medicine Progress Note  Patient: Sade Lovelace  Age/sex: 68 y o  male  Medical Record #: [id number]      ASSESSMENT/PLAN: (Interval History)  Sade Lovelace is seen and examined and management for following issues:    L3 osteophyte fracture  · Neurosurgery saw in consult and felt the L3 fracture did not require surgery     · LSO brace for comfort  · Pain mgmt per PMR     Transverse spinous process fractures  · Right T12/L1/L2  · Pain management per PMR     Rib fractures  · Right 10th/11th ribs  · Continue IS      Urine retention/BPH  · Resolved  · Chronic issue referred to urology by PCP  · Flomax not effective in past  · Tx per PMR     PPM  · Placed 8/19/22 for bradycardia  · Is Medtronic dual chamber  · Slight separation of incision noted 8/27  · Cont local care and monitor incision/steri strips  · stable     Aortic stenosis  · Previously moderate now severe by ECHO   · Needs to follow with Cardiology as OP  · LVEF is 65% with grade 2 DD as well     Hx right MCA CVA  · Continue ASA 81mg qd/statin     Hx carotid stenosis  · Last doppler = high grade vs TO of right ICA and the left is 50-69% stenosis  · Continue ASA and statin     HLD  · Home:  Crestor 5mg qd  · Here:  Pravachol     HTN  · Home:  Lisinopril 10mg qd  · Here:  no meds  · Stable off meds  · Careful 2/2 severe AS     DM2  · Home:  Metformin 1000 mg qd  · Here: Metformin 500mg qd - started 8/25/22     · Continue DM diet/QID Accuchecks with SSI  · stable     CKD IIIa  · Baseline 1-1 1 with GFR 55-68  · stable       Right arm pain  · Examined by Dr Daphne Bruno 8/27  · For MSK ultrasound  · US=moderate supraspinatus and mild subscapularis tendinosis with partial thickness tear of supraspinatus; bicipital tendinosis/mild tenosynovitis as well as superficial vein tendinosis of median cubital vein  · stable        Discharge date:  Team    The above assessment and plan was reviewed and updated as determined by my evaluation of the patient on 8/30/2022  Labs:   Results from last 7 days   Lab Units 08/25/22  0611   WBC Thousand/uL 11 05*   HEMOGLOBIN g/dL 13 6   HEMATOCRIT % 42 5   PLATELETS Thousands/uL 190     Results from last 7 days   Lab Units 08/25/22  0611   SODIUM mmol/L 137   POTASSIUM mmol/L 4 3   CHLORIDE mmol/L 103   CO2 mmol/L 31   BUN mg/dL 25   CREATININE mg/dL 1 19   CALCIUM mg/dL 9 8             Results from last 7 days   Lab Units 08/30/22  0646 08/29/22 2050 08/29/22  1625   POC GLUCOSE mg/dl 129 144* 98       Review of Scheduled Meds:  Current Facility-Administered Medications   Medication Dose Route Frequency Provider Last Rate    acetaminophen  975 mg Oral Q8H [address] Margie Longest, DO      aspirin  81 mg Oral Daily Margie Longest, DO      bisacodyl  10 mg Rectal Daily PRN Margie Longest, DO      Diclofenac Sodium  2 g Topical 4x Daily Reid Monge MD      docusate sodium  100 mg Oral BID Margie Longest, DO      enoxaparin  30 mg Subcutaneous Q12H [address] Margie Longest, DO      gabapentin  300 mg Oral HS Margie Longest, DO      insulin lispro  1-6 Units Subcutaneous 4x Daily (AC & HS) Margie Longest, DO      lidocaine  3 patch Topical Daily Margie Longest, DO      melatonin  3 mg Oral HS Margie Longest, DO      metFORMIN  500 mg Oral Daily With Breakfast DONTE Mcdonough      methocarbamol  250 mg Oral Q6H [address] Margie Longest, DO      oxyCODONE  2 5 mg Oral Q4H PRN Margie Longest, DO      polyethylene glycol  17 g Oral Daily PRN Margie Longest, DO      pravastatin  40 mg Oral Daily With Dinner Margie Longest, DO      senna  2 tablet Oral HS Margie Longest, DO         Subjective/ HPI: Patient seen and examined  Patients overnight issues or events were reviewed with nursing or staff during rounds or morning huddle session  New or overnight issues include the following:     Pt seen in therapy  Pain is controlled  He is hoping to go home soon   Answered his questions regarding his valvular issues and f/u with cardiology      ROS:   A 10 point ROS was performed; negative except as noted above  Imaging:     US MSK limited   Final Result by Irais Gupta MD (08/29 7689)      Moderate supraspinatus and mild subscapularis tendinosis  Partial-thickness articular surface tear of the distal supraspinatus tendon  Bicipital tendinosis and mild tenosynovitis  Incidental note is made of thrombus within the median cubital vein compatible with superficial vein tendinosis  The study was marked in [facility] for immediate notification  Workstation performed: [id number]             *Labs /Radiology studies reviewed  *Medications reviewed and reconciled as needed  *Please refer to order section for additional ordered labs studies  *Case discussed with primary attending during morning huddle case rounds    Physical Examination:  Vitals:   Vitals:    08/29/22 0557 08/29/22 1441 08/29/22 2100 08/30/22 0550   BP: 126/68 155/63 118/69 116/64   BP Location: Left arm Left arm Left arm Left arm   Pulse: 61 60 63 61   Resp: 16 17 16 17   Temp: 97 9 °F (36 6 °C) 97 9 °F (36 6 °C) 98 6 °F (37 °C) 98 °F (36 7 °C)   TempSrc: Oral Oral Oral Oral   SpO2: 96% 98% 97% 99%   Weight:       Height:         GEN: No apparent distress, interactive  NEURO: Alert and oriented x3  HEENT: Pupils are equal and reactive, EOMI, mucous membranes are moist, face symmetrical  CV: S1 S2 regular, 2/6 systolic murmur, no peripheral edema noted  RESP: Lungs are clear bilaterally, no wheezes, rales or rhonchi noted, on room air, respirations easy and non labored  GI: Flat, soft non tender, non distended; +BS x4  : Voiding without difficulty  MUSC: Moves all extremities; using walker to ambulation, TLSO brace in place  SKIN: pink, warm and dry, normal turgor, L SC PPM site with dressing intact      The above physical exam was reviewed and updated as determined by my evaluation of the patient on 8/30/2022      Invasive Devices  Report    None VTE Pharmacologic Prophylaxis: Enoxaparin  Code Status: Level 1 - Full Code  Current Length of Stay: 6 day(s)      Total time spent:  30 minutes with more than 50% spent counseling/coordinating care  Counseling includes discussion with patient re: progress  and discussion with patient of his/her current medical state/information  Coordination of patient's care was performed in conjunction with primary service  Time invested included review of patient's labs, vitals, and management of their comorbidities with continued monitoring  In addition, this patient was discussed with medical team including physician and advanced extenders  The care of the patient was extensively discussed and appropriate treatment plan was formulated unique for this patient  ** Please Note:  voice to text software may have been used in the creation of this document   Although proof errors in transcription or interpretation are a potential of such software**

## 2022-08-30 NOTE — TEAM CONFERENCE
Acute RehabilitationTeam Conference Note  Date: 8/30/2022   Time: 11:06 AM       Patient Name:  Angel Luis Talavera       Medical Record Number: [id number]   YOB: 1945  Sex:  Male          Room/Bed:  [facility]-01  Payor Info:  Payor: Terrence Sorensen / Plan: MEDICARE A AND B / Product Type: Medicare A & B Fee for Service /      Admitting Diagnosis: L3 vertebral fracture ([facility] [address] [facility] ) Tracie Mckeon   Admit Date/Time:  8/24/2022  3:52 PM  Admission Comments: No comment available     Primary Diagnosis:  Multiple fractures  Principal Problem: Multiple fractures    Patient Active Problem List    Diagnosis Date Noted    Multiple fractures 08/24/2022    Right arm pain 08/24/2022    Severe aortic stenosis 08/19/2022    Bradycardia 08/19/2022    Urinary retention 08/19/2022    L3 osteophyte fracture 08/16/2022    Fall 08/16/2022    Rib fractures 08/15/2022    Fracture of thoracic transverse process ([facility] [address] [facility] ) 08/15/2022    Lumbar transverse process fracture ([facility] [address] [facility] ) 08/15/2022    Stage 3a chronic kidney disease ([facility] [address] [facility] ) 03/17/2022    Special screening for malignant neoplasm of prostate 12/16/2021    RLS (restless legs syndrome) 06/17/2021    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 ([facility] [address] [facility] ) 05/07/2021    Chronic bilateral low back pain without sciatica 03/30/2021    Hemiplegia and hemiparesis following cerebral infarction affecting left non-dominant side ([facility] [address] ) 06/18/2020    Benign prostatic hyperplasia with nocturia 06/18/2020    Claudication of both lower extremities ([facility] [address] ) 04/19/2019    Colon cancer screening 04/19/2019    Medicare annual wellness visit, subsequent 04/19/2019    Osteoarthritis of left knee 01/10/2019    Bilateral carotid artery stenosis 10/08/2018    Dermatosis 10/08/2018    Cramps of left lower extremity 06/04/2018    Plantar fasciitis 06/04/2018    Tinea cruris 06/04/2018    Constipation 01/16/2018    Seborrheic dermatitis 11/10/2017    History of stroke 09/15/2017    Asthma 07/26/2017    Benign essential hypertension 07/26/2017    Type 2 diabetes mellitus ([facility] ) 07/26/2017    Hyperlipidemia 07/26/2017    Obesity 07/26/2017    Impingement syndrome of right shoulder 07/24/2017    Diabetic nephropathy associated with type 2 diabetes mellitus ([facility] ) 06/22/2017    Non-rheumatic aortic sclerosis 06/22/2017    Popliteal cyst, left 10/15/2015    Acquired hallux malleus of right foot 10/06/2015    Pre-ulcerative corn or callous 10/06/2015    Gout 12/11/2014    Allergic rhinitis 05/03/2012    Retina disorder 05/03/2012       Physical Therapy:    Weight Bearing Status: Full Weight Bearing  Transfers: Supervision  Bed Mobility: Supervision  Amulation Distance (ft): 150 feet  Ambulation: Supervision  Assistive Device for Ambulation: Roller Walker  Number of Stairs: 12  Assistive Device for Stairs: Bilateral [employer]  Stair Assistance: Supervision  Assistive Device for Ramp: Roller Walker  Discharge Recommendations: Home with:  [address] [facility] Lore Zendejas with[de-identified] Outpatient Physical Therapy    Patient continues to make good progress towards goals thusfar with skilled PT intervention  He is currently S with RW and CGA with SPC for short distances and functional mobility  His pain is well controlled  He presents with deficits in LLE strength (baseline however from prior CVA), balance, and dec righting reactions  He is safest to continue use of RW in preparation for home however, PT can continue to focus activities with [facility] for further neuro re-ed  Anticipate d/c later this week with OPPT services  At this time, patient to continue to benefit form skilled PT intervention to maximize his overall (I) and safety         Occupational Therapy:  Grooming: Supervision  Bathing: Minimal Assistance  Bathing: Minimal Assistance  Upper Body Dressing: Minimal Assistance  Lower Body Dressing: Minimal Assistance  Toileting: Supervision  Toilet Transfer: Supervision  Cognition: Exceptions to WNL  Cognition: Decreased Memory  Orientation: Person, Place, Time, Situation  Discharge Recommendations: Home with:  [address] with[de-identified] Family Support       Pt making good progress toward OT LTGS of supervision/IND for self care and func transfers with LRAD  Pt remains limited by L UE AROM restrictions, pacemaker precautions, R UE shoulder pain with limited AROM, decreased standing tolerance, decreased standing balance, impaired overall strength, endurance, LSO management mostly due to B/L shoulder ROM limitations  Pt lives with spouse in ranch style house, who is able to assist with IADLs with pt reporting spouse was assisting with minimal ADLs PTA--will need to confirm with spouse  Pt has RW and SPC--may benefit from [facility] as pt does not currently have one  Goals for this week: progress pt to Set up-IND for self care, func transfers with LRAD, inc standing tolerance and balance  D/C date: TBD--possibly later this week  Speech Therapy:           No notes on file    Nursing Notes:  Appetite: Good  Diet Type: Diabetic                      Diet Patient/Family Education Complete: Yes                            Bladder: Continent        Bowel: Continent     Bowel Patient/Family Education: Yes  Pain Location/Orientation: Orientation: Mid, Orientation: Lower, Location: Back, Location: Rib Cage  Pain Score: 0                       Hospital Pain Intervention(s): Repositioned, Rest  Pain Patient/Family Education: Yes  Medication Management/Safety  Injectable: Insulin  Safe Administration: Yes  Medication Patient/Family Education Complete: Yes    Pt admitted with Right T12/L1/L2 transverse process fractures, L3 Osteophyte fracture- non-op, Right 10th and 11th rib fractures, Non-operative management, LSO brace for comfort , pain managed with Tylenol, oxycodone, Lidoderm patch, and Voltaren gel  Placed Medtronic dual chamber PPM on 8/19/22 for bradycardia   Right arm pain, Related to trauma, XR negative for any acute or any significant chronic pathology  Diabetes managed with diet, SSI, and Metformin  Continent of bowel and bladder  This week we will continue to monitor vital signs , blood sugar , and lab results  Pt will continue to work on increase balance and strength, maintain skin integrity by turning/repositioning, and safety with transfers to prevent falls  Case Management:     Discharge Planning  Living Arrangements: Lives w/ Spouse/significant other  Support Systems: Self, Spouse/significant other  Assistance Needed: TBD  Type of Current Residence: Private residence  Current Home Care Services: No  8/30/2021  Met w/pt and wife and reviewed rehab routine and cm role  Pt and spouse reside in a ranch home with no anita  Pt familiar with rehab as he was a patient here s/p stroke 5 years ago he states  Pt has had outpt therapy services at [address] [place] [address] and [facility] for [employer]  Pt drives but not much due to his vision  Pt owns a spc and roller walker but not a commode as previously documented  [facility] [place]  Cm reviewed team mtg process and potential los  Is the patient actively participating in therapies? yes  List any modifications to the treatment plan:     Barriers Interventions   All functional barriers resolved                      Is the patient making expected progress toward goals?  yes  List any update or changes to goals:     Medical Goals: Patient will be medically stable for discharge to [facility] upon completion of rehab program and Patient will be able to manage medical conditions and comorbid conditions with medications and follow up upon completion of rehab program    Weekly Team Goals:   Rehab Team Goals  ADL Team Goal: Patient will be independent with ADLs with least restrictive device upon completion of rehab program  Bowel/Bladder Team Goal: Patient will require assist with bladder/bowel management with least restrictive device upon completion of rehab program  Transfer Team Goal: Patient will be independent with transfers with least restrictive device upon completion of rehab program  Locomotion Team Goal: Patient will be independent with locomotion with least restrictive device upon completion of rehab program    Discussion: pt has made good progress and is currently at supervision with rest breaks at times due to pain  Pt is progressing with inroom privledges today with goals of independence on dc  Recommendations are for contd outpt physical and occupational therapy services  OT for shoulder rom  Anticipated Discharge Date:  9/1/2022  [facility] Team Members Present: The following team members are supervising care for this patient and were present during this Weekly Team Conference      Physician: Dr Arvin Mitchell DO  : Sammie Lozano MSW  Registered Nurse: Monika Wallace RN, BSN, [address] [place] [address]  Physical Therapist: Suhail Puckett DPT  Occupational Therapist: Tyler Fenton MS, OTR/L  Speech Therapist:

## 2022-08-30 NOTE — CASE MANAGEMENT
In preparation for dc cm received order from therapy for a commode  [facility] [employer] [facility]

## 2022-08-30 NOTE — PROGRESS NOTES
08/30/22 1200   Pain Assessment   Pain Assessment Tool 0-10   Pain Score No Pain   Restrictions/Precautions   Precautions Pacemaker;Spinal precautions;Pain   Braces or Orthoses LSO  (for comfort)   Putting On/Taking Off Footwear   Type of Assistance Needed Independent; Adaptive equipment   Physical Assistance Level No physical assistance   Comment In today's session pt does demo ability to complete cross leg technique, however, reports at times this is difficult for him  Educated on Owen Bares with pt demonstrating G carryover of completing  Educated on importance of having footwear donned at all times when mobilizing independently in room  Pt verbalizes understanding  Not interested in [address] [place] [address] at this time, states wife can A at time of d/c  Putting On/Taking Off Footwear CARE Score 6   Sit to Stand   Type of Assistance Needed Independent   Physical Assistance Level No physical assistance   Sit to Stand CARE Score 6   Bed-Chair Transfer   Type of Assistance Needed Independent; Adaptive equipment   Physical Assistance Level No physical assistance   Comment RW   Chair/Bed-to-Chair Transfer CARE Score 6   Toileting Hygiene   Type of Assistance Needed Independent; Adaptive equipment   Physical Assistance Level No physical assistance   Toileting Hygiene CARE Score 6   Toilet Transfer   Type of Assistance Needed Independent; Adaptive equipment   Physical Assistance Level No physical assistance   Comment Ordered BSC for pt per discussion  Pt states having sink he can use for UE support, however, thinks height adjustable BSC and BUE support would increase safety and independence  Toilet Transfer CARE Score 6   Health Management   Health Management Level of Assistance Modified independent   Health Management Prior to admission pt reports utilizing 1X weekly pill organizer but had 2 of them, one for AM and one for PM  Pt is currently on multiple medications all of which are at different times of day   Demonstrated and trialed use of 4X/day organizer  into AM, Noon, Evening, Bed with pt preferring this medication  Pt was able to complete 6/6 with 100% accuracy  Of note, pt states using a lit magnifier at home to read labels, states not having readers  Without proper equipment pt does require A to read label  Will review with pt's wife to supervise to assure safety and accuracy with his equipment at time of d/c  Otherwise was independent with task  Cognition   Overall Cognitive Status Impaired   Arousal/Participation Alert; Cooperative   Attention Attends with cues to redirect   Orientation Level Oriented X4   Memory Decreased short term memory;Decreased recall of precautions   Following Commands Follows multistep commands with increased time or repetition   Activity Tolerance   Activity Tolerance Patient tolerated treatment well   Assessment   Treatment Assessment Pt participated in skilled OT services with focus on medication mgmt, functional mobility, toileting, and ADL retraining  Pt was progressed to IRPs this morning per PT Manuela  Assessed toileting and room mobility this session with G carryover of safety and ability to manage equipment in room  Pt was provided with Rajat Jones temporarily to manage footwear, but is uninterested in purchasing as he states his wife can easily A if needed  Pt will continue to benefit from skilled OT services with focus on light meal prep, ADL retraining, and d/c planning  Please check shower orders prior to ADL for updated shower order, per verbal discussion with Dr Patricia Chen may be unable to shower until POD 7  Awaiting clarification  Prognosis Good   Problem List Decreased strength; Impaired balance;Orthopedic restrictions;Pain;Decreased endurance   Plan   Treatment/Interventions ADL retraining;Functional transfer training; Therapeutic exercise; Endurance training;Patient/family training; Compensatory technique education; Bed mobility   Progress Progressing toward goals   Recommendation Equipment Recommended Bedside commode   Commode Type Standard   OT Therapy Minutes   OT Time In 1200   OT Time Out 1330   OT Total Time (minutes) 90   OT Mode of treatment - Individual (minutes) 90   OT Mode of treatment - Concurrent (minutes) 0   OT Mode of treatment - Group (minutes) 0   OT Mode of treatment - Co-treat (minutes) 0   OT Mode of Treatment - Total time(minutes) 90 minutes   OT Cumulative Minutes 450   Therapy Time missed   Time missed?  No
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
## 2022-08-30 NOTE — PCC CARE MANAGEMENT
8/30/2021  Met w/pt and wife and reviewed rehab routine and cm role  Pt and spouse reside in a ranch home with no anita  Pt familiar with rehab as he was a patient here s/p stroke 5 years ago he states  Pt has had outpt therapy services at [address] [place] [address] and [facility]  Pt drives but not much due to his vision  Pt owns a spc and roller walker but not a commode as previously documented  [facility] [place]  Cm reviewed team mtg process and potential los

## 2022-08-30 NOTE — QUICK NOTE
Asked to do two week pacer site check as his office site check was tomorrow  Site examined, wound well healed  Incision without issues  No hematoma, edema or ecchymosis  Will have Suja Juice interrogate tonight and inform device clinic

## 2022-08-31 LAB
GLUCOSE SERPL-MCNC: 119 MG/DL (ref 65–140)
GLUCOSE SERPL-MCNC: 126 MG/DL (ref 65–140)
GLUCOSE SERPL-MCNC: 131 MG/DL (ref 65–140)
GLUCOSE SERPL-MCNC: 150 MG/DL (ref 65–140)

## 2022-08-31 PROCEDURE — 82948 REAGENT STRIP/BLOOD GLUCOSE: CPT

## 2022-08-31 PROCEDURE — 99232 SBSQ HOSP IP/OBS MODERATE 35: CPT | Performed by: INTERNAL MEDICINE

## 2022-08-31 PROCEDURE — 97110 THERAPEUTIC EXERCISES: CPT

## 2022-08-31 PROCEDURE — 97535 SELF CARE MNGMENT TRAINING: CPT

## 2022-08-31 PROCEDURE — 97116 GAIT TRAINING THERAPY: CPT

## 2022-08-31 PROCEDURE — 97530 THERAPEUTIC ACTIVITIES: CPT

## 2022-08-31 PROCEDURE — 99233 SBSQ HOSP IP/OBS HIGH 50: CPT | Performed by: STUDENT IN AN ORGANIZED HEALTH CARE EDUCATION/TRAINING PROGRAM

## 2022-08-31 RX ORDER — ACETAMINOPHEN 325 MG/1
650 TABLET ORAL EVERY 6 HOURS PRN
Refills: 0
Start: 2022-08-31

## 2022-08-31 RX ORDER — METHOCARBAMOL 500 MG/1
250 TABLET, FILM COATED ORAL 3 TIMES DAILY PRN
Qty: 60 TABLET | Refills: 0 | Status: SHIPPED | OUTPATIENT
Start: 2022-08-31

## 2022-08-31 RX ORDER — GABAPENTIN 100 MG/1
300 CAPSULE ORAL
Qty: 90 CAPSULE | Refills: 0 | Status: SHIPPED | OUTPATIENT
Start: 2022-08-31 | End: 2022-09-23

## 2022-08-31 RX ADMIN — METHOCARBAMOL 250 MG: 500 TABLET ORAL at 18:10

## 2022-08-31 RX ADMIN — DOCUSATE SODIUM 100 MG: 100 CAPSULE, LIQUID FILLED ORAL at 08:21

## 2022-08-31 RX ADMIN — ASPIRIN 81 MG CHEWABLE TABLET 81 MG: 81 TABLET CHEWABLE at 08:21

## 2022-08-31 RX ADMIN — DOCUSATE SODIUM 100 MG: 100 CAPSULE, LIQUID FILLED ORAL at 18:10

## 2022-08-31 RX ADMIN — METFORMIN HYDROCHLORIDE 500 MG: 500 TABLET ORAL at 08:21

## 2022-08-31 RX ADMIN — INSULIN LISPRO 1 UNITS: 100 INJECTION, SOLUTION INTRAVENOUS; SUBCUTANEOUS at 11:55

## 2022-08-31 RX ADMIN — ACETAMINOPHEN 975 MG: 325 TABLET ORAL at 21:44

## 2022-08-31 RX ADMIN — ACETAMINOPHEN 975 MG: 325 TABLET ORAL at 05:27

## 2022-08-31 RX ADMIN — LIDOCAINE 5% 3 PATCH: 700 PATCH TOPICAL at 08:21

## 2022-08-31 RX ADMIN — METHOCARBAMOL 250 MG: 500 TABLET ORAL at 05:27

## 2022-08-31 RX ADMIN — DICLOFENAC SODIUM 2 G: 10 GEL TOPICAL at 21:45

## 2022-08-31 RX ADMIN — ENOXAPARIN SODIUM 30 MG: 30 INJECTION SUBCUTANEOUS at 21:44

## 2022-08-31 RX ADMIN — PRAVASTATIN SODIUM 40 MG: 40 TABLET ORAL at 16:49

## 2022-08-31 RX ADMIN — ENOXAPARIN SODIUM 30 MG: 30 INJECTION SUBCUTANEOUS at 08:21

## 2022-08-31 RX ADMIN — METHOCARBAMOL 250 MG: 500 TABLET ORAL at 11:55

## 2022-08-31 RX ADMIN — ACETAMINOPHEN 975 MG: 325 TABLET ORAL at 14:18

## 2022-08-31 RX ADMIN — GABAPENTIN 300 MG: 300 CAPSULE ORAL at 21:44

## 2022-08-31 RX ADMIN — SENNOSIDES 17.2 MG: 8.6 TABLET, FILM COATED ORAL at 21:44

## 2022-08-31 RX ADMIN — Medication 3 MG: at 21:44

## 2022-08-31 NOTE — PROGRESS NOTES
08/31/22 1000   Pain Assessment   Pain Assessment Tool 0-10   Pain Score No Pain   Restrictions/Precautions   Precautions Pacemaker;Spinal precautions; Visual deficit   Weight Bearing Restrictions Yes   RUE Weight Bearing Per Order WBAT   LUE Weight Bearing Per Order WBAT   RLE Weight Bearing Per Order WBAT   LLE Weight Bearing Per Order WBAT   ROM Restrictions Yes   RUE ROM Restriction Range Limitation   LUE ROM Restriction Range Limitation  (unable to ABD > 75-90* for PPM)   Braces or Orthoses LSO  (for comfort)   Subjective   Subjective Patient ready to participate In PT session   Roll Left and Right   Type of Assistance Needed Independent   Physical Assistance Level No physical assistance   Roll Left and Right CARE Score 6   Sit to Lying   Type of Assistance Needed Independent   Physical Assistance Level No physical assistance   Sit to Lying CARE Score 6   Lying to Sitting on Side of Bed   Type of Assistance Needed Independent   Physical Assistance Level No physical assistance   Lying to Sitting on Side of Bed CARE Score 6   Sit to Stand   Type of Assistance Needed Independent   Physical Assistance Level No physical assistance   Comment RW   Sit to Stand CARE Score 6   Bed-Chair Transfer   Type of Assistance Needed Independent   Physical Assistance Level No physical assistance   Comment RW   Chair/Bed-to-Chair Transfer CARE Score 6   Walk 10 Feet   Type of Assistance Needed Independent; Adaptive equipment   Physical Assistance Level No physical assistance   Comment RW   Walk 10 Feet CARE Score 6   Walk 50 Feet with Two Turns   Type of Assistance Needed Independent; Adaptive equipment   Physical Assistance Level No physical assistance   Comment RW   Walk 50 Feet with Two Turns CARE Score 6   Walk 150 Feet   Type of Assistance Needed Independent; Adaptive equipment   Physical Assistance Level No physical assistance   Comment RW   Walk 150 Feet CARE Score 6   Walking 10 Feet on Uneven Surfaces   Type of Assistance Needed Independent; Adaptive equipment   Physical Assistance Level No physical assistance   Comment RW   Walking 10 Feet on Uneven Surfaces CARE Score 6   Ambulation   Does the patient walk? 2  Yes   Primary Mode of Locomotion Prior to Admission Walk   Distance Walked (feet) 300 ft  (+ 200' + 50'x2  with RW; 10' with [facility])   Assist Device Roller Walker;Cane   Gait Pattern L foot drag;Shuffle;Improper weight shift   Limitations Noted In Endurance; Heel Strike;Speed;Strength;Swing   Wheel 50 Feet with Two Turns   Reason if not Attempted Activity not applicable   Wheel 50 Feet with Two Turns CARE Score 9   Wheel 150 Feet   Reason if not Attempted Activity not applicable   Wheel 625 Feet CARE Score 9   Curb or Single Stair   Style negotiated Curb   Type of Assistance Needed Independent   Physical Assistance Level No physical assistance   Comment RW- has 4-6" curb step within home   1 Step (Curb) CARE Score 6   4 Steps   Type of Assistance Needed Supervision   Physical Assistance Level No physical assistance   4 Steps CARE Score 4   12 Steps   Type of Assistance Needed Supervision   Physical Assistance Level No physical assistance   12 Steps CARE Score 4   Stairs   Type Stairs   # of Steps 12  (+ 6" curb step performed 4x)   Picking Up Object   Type of Assistance Needed Independent; Adaptive equipment   Physical Assistance Level No physical assistance   Comment reacher   Picking Up Object CARE Score 6   Toilet Transfer   Type of Assistance Needed Independent; Adaptive equipment   Physical Assistance Level No physical assistance   Comment RW   Toilet Transfer CARE Score 6   Therapeutic Interventions   Strengthening Reviewed 10x BLE each exercise for HEP  Seated: LAQ, hip flexion, hip ADD with ball/pillow, hip ABD against black TB, HS and piriformis stretch; standing with counter support: heel raises, mini squats, hip ABD, hip extension, STS with no UE support, sidestepping along counter   Provided black TB and HEP in handout form this session   Flexibility BLE heel cord and HS gentle stretch   Assessment   Treatment Assessment Patient made great progress during his stay at the acute rehab center  He completed his near final PT session which focused on obtaining care scores, review of hallway privlidges, and review of HEP  He will discharge tomorrow with recommendation to attend OPPT and continue use of RW until progressed by therapist  There, he will also benefit from neuro re-ed, core stabilization exercises, and LLE strengthening to maximize his overall function and quality/safety of mobility  Problem List Decreased strength;Decreased endurance; Impaired balance;Decreased mobility;Orthopedic restrictions   Recommendation   PT Discharge Recommendation Home with outpatient rehabilitation   PT Therapy Minutes   PT Time In 1000   PT Time Out 1130   PT Total Time (minutes) 90   PT Mode of treatment - Individual (minutes) 90   PT Mode of treatment - Concurrent (minutes) 0   PT Mode of treatment - Group (minutes) 0   PT Mode of treatment - Co-treat (minutes) 0   PT Mode of Treatment - Total time(minutes) 90 minutes   PT Cumulative Minutes 630

## 2022-08-31 NOTE — PROGRESS NOTES
08/31/22 0700   Pain Assessment   Pain Assessment Tool 0-10   Pain Score No Pain   Restrictions/Precautions   Precautions Pacemaker;Spinal precautions; Visual deficit   LUE ROM Restriction Range Limitation  (unable to ABD > 75-90* for PPM)   Braces or Orthoses LSO  (for comfort)   Lifestyle   Autonomy "That felt so good, a sponge bath doesn't always do the trick "   Eating   Type of Assistance Needed Independent   Physical Assistance Level No physical assistance   Eating CARE Score 6   Oral Hygiene   Type of Assistance Needed Independent   Physical Assistance Level No physical assistance   Comment in stance at sink   Oral Hygiene CARE Score 6   Shower/Bathe Self   Type of Assistance Needed Set-up / clean-up;Supervision   Physical Assistance Level No physical assistance   Comment able to bathe BUE while seated  DS in stance to bathe harry and buttocks  Able to bathe lower legs w/ LHS to adhere to spinal precautions  Pt's wife able to A w/ set-up at d/c  Shower/Bathe Self CARE Score 4   Tub/Shower Transfer   Adaptive Equipment Grab Bars;Seat with Back   Assessed Shower   Findings Pt now w/ active shower orders, pacemaker incision site covered w/ gauze and tegaderm and maintained dry throughout shower  CS w/ RW, inc time   Upper Body Dressing   Type of Assistance Needed Set-up / clean-up   Physical Assistance Level No physical assistance   Comment able to don shirt, set-up req for LSO  Upper Body Dressing CARE Score 5   Lower Body Dressing   Type of Assistance Needed Set-up / clean-up   Physical Assistance Level No physical assistance   Comment able to thread BLE while seated, independent in stance for clothing management  Lower Body Dressing CARE Score 5   Putting On/Taking Off Footwear   Type of Assistance Needed Independent   Physical Assistance Level No physical assistance   Comment crossed leg technique to doff socks, sock aide to don   Although LHAE inc indep, pt does not like to utilize [facility] and wife able to A PRN at time of d/c  Putting On/Taking Off Footwear CARE Score 6   Sit to Stand   Type of Assistance Needed Independent   Physical Assistance Level No physical assistance   Sit to Stand CARE Score 6   Bed-Chair Transfer   Type of Assistance Needed Independent; Adaptive equipment   Physical Assistance Level No physical assistance   Comment w/ RW   Chair/Bed-to-Chair Transfer CARE Score 6   Toileting Hygiene   Type of Assistance Needed Independent   Physical Assistance Level No physical assistance   Toileting Hygiene CARE Score 6   Toilet Transfer   Type of Assistance Needed Independent   Physical Assistance Level No physical assistance   Toilet Transfer CARE Score 6   Kitchen Mobility   Kitchen-Mobility Level Walker   Kitchen Activity Retrieve items;Transport items   Kitchen Mobility Comments Pt participated in kitchen mobility ther act to retrieve items from drawers and OH cabinets w/ focus on fxnl reach and standing balance  Item transportation w/ edu provided on kitchen safety strategies to dec fall risk  S w/ RW, kelechi PINEDA adherence to spinal precautions, recommending S w/ task at d/c    ROM- Right Upper Extremities   R Shoulder Flexion; Horizontal ABduction; Extension;ABduction   R Elbow Elbow flexion;Elbow extension   R Weight/Reps/Sets 3x10   RUE ROM Comment Improvement w/ overall pain and stiffness  AROM * flexion, 90-95* shoulder ABD  Encouraged to cont gentle streching at d/c, pt plans to follow up w/ OPOT  Cognition   Overall Cognitive Status Impaired   Assessment   Treatment Assessment Pt seen for skilled OT session focusing on self-care management, gentle RUE AROM, and kitchen mobility  Details on ADL noted above, overall improvements w/ pain, fxnl standing tolerance, safety awareness, and timeliness of ADL compared to eval  Pt kelechi PINEDA adherence to spinal and pacemaker precautions  Ordered standard BSC   Pt's wife present for several therapy sessions and kelechi PINEDA understanding of pt's current LOF, will provide A for IADL management and S PRN  Pt does well w/ LHAE to don footwear, however pt's wife is able to A PRN  From OT standpoint, pt on track for d/c home w/ family support tomorrow, recommending OPOT for RUE/shoulder  Pt without questions/concerns regarding d/c, reports inc confidence  Prognosis Good   Problem List Decreased strength; Impaired balance;Orthopedic restrictions;Pain;Decreased endurance   Recommendation   OT Discharge Recommendation Home with outpatient rehabilitation   OT Equipment ordered [facility]   OT Therapy Minutes   OT Time In 0700   OT Time Out 0830   OT Total Time (minutes) 90   OT Mode of treatment - Individual (minutes) 90   OT Mode of treatment - Concurrent (minutes) 0   OT Mode of treatment - Group (minutes) 0   OT Mode of treatment - Co-treat (minutes) 0   OT Mode of Treatment - Total time(minutes) 90 minutes   OT Cumulative Minutes 540   Therapy Time missed   Time missed?  No

## 2022-08-31 NOTE — PLAN OF CARE
Problem: PAIN - ADULT  Goal: Verbalizes/displays adequate comfort level or baseline comfort level  Description: Interventions:  - Encourage patient to monitor pain and request assistance  - Assess pain using appropriate pain scale  - Administer analgesics based on type and severity of pain and evaluate response  - Implement non-pharmacological measures as appropriate and evaluate response  - Consider cultural and social influences on pain and pain management  - Notify physician/advanced practitioner if interventions unsuccessful or patient reports new pain  Outcome: Progressing     Problem: INFECTION - ADULT  Goal: Absence or prevention of progression during hospitalization  Description: INTERVENTIONS:  - Assess and monitor for signs and symptoms of infection  - Monitor lab/diagnostic results  - Monitor all insertion sites, i e  indwelling lines, tubes, and drains  - Monitor endotracheal if appropriate and nasal secretions for changes in amount and color  - [place] appropriate cooling/warming therapies per order  - Administer medications as ordered  - Instruct and encourage patient and family to use good hand hygiene technique  - Identify and instruct in appropriate isolation precautions for identified infection/condition  Outcome: Progressing     Problem: SAFETY ADULT  Goal: Patient will remain free of falls  Description: INTERVENTIONS:  - Educate patient/family on patient safety including physical limitations  - Instruct patient to call for assistance with activity   - Consult OT/PT to assist with strengthening/mobility   - Keep Call bell within reach  - Keep bed low and locked with side rails adjusted as appropriate  - Keep care items and personal belongings within reach  - Initiate and maintain comfort rounds  - Make Fall Risk Sign visible to staff  - Offer Toileting every  Hours, in advance of need  - Initiate/Maintain alarm  - Obtain necessary fall risk management equipment:   - Apply yellow socks and bracelet for high fall risk patients  - Consider moving patient to room near nurses station  Outcome: Progressing  Goal: Maintain or return to baseline ADL function  Description: INTERVENTIONS:  -  Assess patient's ability to carry out ADLs; assess patient's baseline for ADL function and identify physical deficits which impact ability to perform ADLs (bathing, care of mouth/teeth, toileting, grooming, dressing, etc )  - Assess/evaluate cause of self-care deficits   - Assess range of motion  - Assess patient's mobility; develop plan if impaired  - Assess patient's need for assistive devices and provide as appropriate  - Encourage maximum independence but intervene and supervise when necessary  - Involve family in performance of ADLs  - Assess for home care needs following discharge   - Consider OT consult to assist with ADL evaluation and planning for discharge  - Provide patient education as appropriate  Outcome: Progressing  Goal: Maintains/Returns to pre admission functional level  Description: INTERVENTIONS:  - Perform BMAT or MOVE assessment daily    - Set and communicate daily mobility goal to care team and patient/family/caregiver  - Collaborate with rehabilitation services on mobility goals if consulted  - Perform Range of Motion  times a day  - Reposition patient every  hours    - Dangle patient  times a day  - Stand patient  times a day  - Ambulate patient  times a day  - Out of bed to chair times a day   - Out of bed for meals  times a day  - Out of bed for toileting  - Record patient progress and toleration of activity level   Outcome: Progressing     Problem: DISCHARGE PLANNING  Goal: Discharge to home or other facility with appropriate resources  Description: INTERVENTIONS:  - Identify barriers to discharge w/patient and caregiver  - Arrange for needed discharge resources and transportation as appropriate  - Identify discharge learning needs (meds, wound care, etc )  - Arrange for interpretive services to assist at discharge as needed  - Refer to Case Management Department for coordinating discharge planning if the patient needs post-hospital services based on physician/advanced practitioner order or complex needs related to functional status, cognitive ability, or social support system  Outcome: Progressing     Problem: Potential for Falls  Goal: Patient will remain free of falls  Description: INTERVENTIONS:  - Educate patient/family on patient safety including physical limitations  - Instruct patient to call for assistance with activity   - Consult OT/PT to assist with strengthening/mobility   - Keep Call bell within reach  - Keep bed low and locked with side rails adjusted as appropriate  - Keep care items and personal belongings within reach  - Initiate and maintain comfort rounds  - Make Fall Risk Sign visible to staff  - Offer Toileting every Hours, in advance of need  - Initiate/Maintain alarm  - Obtain necessary fall risk management equipment:   - Apply yellow socks and bracelet for high fall risk patients  - Consider moving patient to room near nurses station  Outcome: Progressing
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
## 2022-08-31 NOTE — PROGRESS NOTES
08/31/22 1500   Pain Assessment   Pain Assessment Tool 0-10   Pain Score No Pain   Restrictions/Precautions   Precautions Pacemaker;Suicidal;Visual deficit   Cognition   Overall Cognitive Status Impaired   Arousal/Participation Alert; Cooperative   Subjective   Subjective pt had no c/o and ready for PT   Car Transfer   Type of Assistance Needed Set-up / clean-up   Comment RW - given info regarding car cane that pt can purchase online after he expressed interest of buying one   Car Transfer CARE Score 5   Walk 10 Feet   Type of Assistance Needed Independent; Adaptive equipment   Comment RW   Walk 10 Feet CARE Score 6   Walk 50 Feet with Two Turns   Type of Assistance Needed Independent; Adaptive equipment   Walk 50 Feet with Two Turns CARE Score 6   Walk 150 Feet   Type of Assistance Needed Independent; Adaptive equipment   Walk 150 Feet CARE Score 6   Therapeutic Interventions   Neuromuscular Re-Education gait/balance training with SPC x 200' x 3 reps then while holding lunch bag on L hand and using SPC on R x 200' - VC to clear floor with L foot and with noted fatigue on last walk demo'd inc L foot dragging but no LOB   Equipment Use   NuStep L2 x 10 mins LE only, SPM >55 - no reported aggravation of pain   Assessment   Treatment Assessment skilled PT worked on simulating car transfer after wife refused to do it in their personal car for fear of losing parking  Pt also engaged in gait/balance training using [facility] for one of pt's goal is to eventually use SPC only  Pt only required CG-CS when mobilizing with SPC but demo'd inc L foot dragging as he fatigue but did not trip or had LOB  encourage pt to verbalized his personal goal of using [facility] with outpatient PT so training will focus on improving his activity tolerance and indep andrew with outdoor mobilities  pt verbalized understanding without reported concerns regarding home d/c tomorrow     Family/Caregiver Present wife stayed in pt's room   Barriers to Discharge None Recommendation   PT Discharge Recommendation Home with outpatient rehabilitation   PT Therapy Minutes   PT Time In 1500   PT Time Out 1530   PT Total Time (minutes) 30   PT Mode of treatment - Individual (minutes) 30   PT Mode of treatment - Concurrent (minutes) 0   PT Mode of treatment - Group (minutes) 0   PT Mode of treatment - Co-treat (minutes) 0   PT Mode of Treatment - Total time(minutes) 30 minutes   PT Cumulative Minutes 660

## 2022-08-31 NOTE — CASE MANAGEMENT
Met w/pt and spouse and confirmed dc for tomorrow  Cm confirmed order for a commode and discussed outpt pt and ot  Pt wishes to go to [facility] and is aware OT is not offered there  Cm phoned [place] and confirmed PT will provide ROM for pts shoulder as long as identified on the script  Dr Natalia Sharma made aware  pts wife wishes to make their own appmts  Both made aware of dc process

## 2022-08-31 NOTE — PROGRESS NOTES
Internal Medicine Progress Note  Patient: Lenin Morales  Age/sex: 68 y o  male  Medical Record #: [id number]      ASSESSMENT/PLAN: (Interval History)  Lenin Morales is seen and examined and management for following issues:    L3 osteophyte fracture  · Neurosurgery saw in consult and felt the L3 fracture did not require surgery     · LSO brace for comfort  · Pain mgmt per PMR     Transverse spinous process fractures  · Right T12/L1/L2  · Pain management per PMR     Rib fractures  · Right 10th/11th ribs  · Continue IS      Urine retention/BPH  · Resolved  · Chronic issue referred to urology by PCP  · Flomax not effective in past  · Tx per PMR     PPM  · Placed 8/19/22 for bradycardia  · Is Medtronic dual chamber  · Slight separation of incision noted 8/27  · Cardiology evaluated site 8/30 and interrogation completed  · Cont local care and monitor incision/steri strips  · stable     Aortic stenosis  · Previously moderate now severe by ECHO   · Needs to follow with Cardiology as OP  · LVEF is 65% with grade 2 DD as well     Hx right MCA CVA  · Continue ASA 81mg qd/statin     Hx carotid stenosis  · Last doppler = high grade vs TO of right ICA and the left is 50-69% stenosis  · Continue ASA and statin     HLD  · Home:  Crestor 5mg qd  · Here:  Pravachol     HTN  · Home:  Lisinopril 10mg qd  · Here:  no meds  · Stable off meds  · Careful 2/2 severe AS     DM2  · Home:  Metformin 1000 mg qd  · Here: Metformin 500mg qd - started 8/25/22     · Continue DM diet/QID Accuchecks with SSI  · stable     CKD IIIa  · Baseline 1-1 1 with GFR 55-68  · stable       Right arm pain  · Examined by Dr Everrett Cooks 8/27  · For [facility] ultrasound  · US=moderate supraspinatus and mild subscapularis tendinosis with partial thickness tear of supraspinatus; bicipital tendinosis/mild tenosynovitis as well as superficial vein tendinosis of median cubital vein  · stable        Discharge date: 9/1    The above assessment and plan was reviewed and updated as determined by my evaluation of the patient on 8/31/2022  Labs:   Results from last 7 days   Lab Units 08/25/22  0611   WBC Thousand/uL 11 05*   HEMOGLOBIN g/dL 13 6   HEMATOCRIT % 42 5   PLATELETS Thousands/uL 190     Results from last 7 days   Lab Units 08/25/22  0611   SODIUM mmol/L 137   POTASSIUM mmol/L 4 3   CHLORIDE mmol/L 103   CO2 mmol/L 31   BUN mg/dL 25   CREATININE mg/dL 1 19   CALCIUM mg/dL 9 8             Results from last 7 days   Lab Units 08/31/22  0641 08/30/22  2019 08/30/22  1613   POC GLUCOSE mg/dl 131 134 130       Review of Scheduled Meds:  Current Facility-Administered Medications   Medication Dose Route Frequency Provider Last Rate    acetaminophen  975 mg Oral Q8H [facility] Izabel Seal, DO      aspirin  81 mg Oral Daily Izabel Seal, DO      bisacodyl  10 mg Rectal Daily PRN Izabel Seal, DO      Diclofenac Sodium  2 g Topical 4x Daily Amanda Hernandez MD      docusate sodium  100 mg Oral BID Izabel Seal, DO      enoxaparin  30 mg Subcutaneous Q12H [facility] Izabel Seal, DO      gabapentin  300 mg Oral HS Izabel Seal, DO      insulin lispro  1-6 Units Subcutaneous 4x Daily (AC & HS) Izabel Seal, DO      lidocaine  3 patch Topical Daily Izabel Seal, DO      melatonin  3 mg Oral HS Izabel Seal, DO      metFORMIN  500 mg Oral Daily With Breakfast DONTE Harvey      methocarbamol  250 mg Oral Q6H [facility] Izabel Seal, DO      oxyCODONE  2 5 mg Oral Q4H PRN Izabel Seal, DO      polyethylene glycol  17 g Oral Daily PRN Izabel Seal, DO      pravastatin  40 mg Oral Daily With Dinner Izabel Seal, DO      senna  2 tablet Oral HS Izabel Seal, DO         Subjective/ HPI: Patient seen and examined  Patients overnight issues or events were reviewed with nursing or staff during rounds or morning huddle session  New or overnight issues include the following:     Pt seen in his room today  No complaints overnight  Pain is controlled   Still struggling with BM      ROS:   A 10 point ROS was performed; negative except as noted above  Imaging:     US MSK limited   Final Result by Dexter Cerda MD (08/29 3462)      Moderate supraspinatus and mild subscapularis tendinosis  Partial-thickness articular surface tear of the distal supraspinatus tendon  Bicipital tendinosis and mild tenosynovitis  Incidental note is made of thrombus within the median cubital vein compatible with superficial vein tendinosis  The study was marked in [facility] for immediate notification  Workstation performed: [id number]             *Labs /Radiology studies reviewed  *Medications reviewed and reconciled as needed  *Please refer to order section for additional ordered labs studies  *Case discussed with primary attending during morning huddle case rounds    Physical Examination:  Vitals:   Vitals:    08/30/22 0550 08/30/22 1320 08/30/22 2020 08/31/22 0520   BP: 116/64 126/66 160/68 149/67   BP Location: Left arm Right arm Left arm Left arm   Pulse: 61 60 61 62   Resp: 17 20 18 18   Temp: 98 °F (36 7 °C) 97 8 °F (36 6 °C) 97 7 °F (36 5 °C) 97 9 °F (36 6 °C)   TempSrc: Oral Oral Oral Oral   SpO2: 99% 97% 98% 96%   Weight:    99 6 kg (219 lb 9 3 oz)   Height:         GEN: No apparent distress, interactive  NEURO: Alert and oriented x3  HEENT: Pupils are equal and reactive, EOMI, mucous membranes are moist, face symmetrical  CV: S1 S2 regular, no MRG, no peripheral edema noted  RESP: Lungs are clear bilaterally, no wheezes, rales or rhonchi noted, on room air, respirations easy and non labored  GI: Flat, soft non tender, non distended; +BS x4  : Voiding without difficulty  MUSC: Moves all extremities; TLSO brace in place  SKIN: pink, warm and dry, normal turgor, no rashes, PPM site intact      The above physical exam was reviewed and updated as determined by my evaluation of the patient on 8/31/2022      Invasive Devices  Report    None                    VTE Pharmacologic Prophylaxis: Enoxaparin  Code Status: Level 1 - Full Code  Current Length of Stay: 7 day(s)      Total time spent:  30 minutes with more than 50% spent counseling/coordinating care  Counseling includes discussion with patient re: progress  and discussion with patient of his/her current medical state/information  Coordination of patient's care was performed in conjunction with primary service  Time invested included review of patient's labs, vitals, and management of their comorbidities with continued monitoring  In addition, this patient was discussed with medical team including physician and advanced extenders  The care of the patient was extensively discussed and appropriate treatment plan was formulated unique for this patient  ** Please Note:  voice to text software may have been used in the creation of this document   Although proof errors in transcription or interpretation are a potential of such software**
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
## 2022-09-01 VITALS
DIASTOLIC BLOOD PRESSURE: 66 MMHG | HEART RATE: 62 BPM | TEMPERATURE: 98.6 F | WEIGHT: 219.58 LBS | SYSTOLIC BLOOD PRESSURE: 147 MMHG | HEIGHT: 71 IN | RESPIRATION RATE: 18 BRPM | BODY MASS INDEX: 30.74 KG/M2 | OXYGEN SATURATION: 95 %

## 2022-09-01 LAB
ANION GAP SERPL CALCULATED.3IONS-SCNC: 2 MMOL/L (ref 4–13)
BASOPHILS # BLD AUTO: 0.04 THOUSANDS/ΜL (ref 0–0.1)
BASOPHILS NFR BLD AUTO: 0 % (ref 0–1)
BUN SERPL-MCNC: 19 MG/DL (ref 5–25)
CALCIUM SERPL-MCNC: 10.2 MG/DL (ref 8.3–10.1)
CHLORIDE SERPL-SCNC: 106 MMOL/L (ref 96–108)
CO2 SERPL-SCNC: 31 MMOL/L (ref 21–32)
CREAT SERPL-MCNC: 1.18 MG/DL (ref 0.6–1.3)
EOSINOPHIL # BLD AUTO: 0.15 THOUSAND/ΜL (ref 0–0.61)
EOSINOPHIL NFR BLD AUTO: 1 % (ref 0–6)
ERYTHROCYTE [DISTWIDTH] IN BLOOD BY AUTOMATED COUNT: 13.6 % (ref 11.6–15.1)
GFR SERPL CREATININE-BSD FRML MDRD: 59 ML/MIN/1.73SQ M
GLUCOSE P FAST SERPL-MCNC: 109 MG/DL (ref 65–99)
GLUCOSE SERPL-MCNC: 109 MG/DL (ref 65–140)
GLUCOSE SERPL-MCNC: 119 MG/DL (ref 65–140)
GLUCOSE SERPL-MCNC: 122 MG/DL (ref 65–140)
HCT VFR BLD AUTO: 42.5 % (ref 36.5–49.3)
HGB BLD-MCNC: 13.8 G/DL (ref 12–17)
IMM GRANULOCYTES # BLD AUTO: 0.06 THOUSAND/UL (ref 0–0.2)
IMM GRANULOCYTES NFR BLD AUTO: 1 % (ref 0–2)
LYMPHOCYTES # BLD AUTO: 2.3 THOUSANDS/ΜL (ref 0.6–4.47)
LYMPHOCYTES NFR BLD AUTO: 19 % (ref 14–44)
MCH RBC QN AUTO: 29.4 PG (ref 26.8–34.3)
MCHC RBC AUTO-ENTMCNC: 32.5 G/DL (ref 31.4–37.4)
MCV RBC AUTO: 90 FL (ref 82–98)
MONOCYTES # BLD AUTO: 0.8 THOUSAND/ΜL (ref 0.17–1.22)
MONOCYTES NFR BLD AUTO: 7 % (ref 4–12)
NEUTROPHILS # BLD AUTO: 9 THOUSANDS/ΜL (ref 1.85–7.62)
NEUTS SEG NFR BLD AUTO: 72 % (ref 43–75)
NRBC BLD AUTO-RTO: 0 /100 WBCS
PLATELET # BLD AUTO: 308 THOUSANDS/UL (ref 149–390)
PMV BLD AUTO: 10.1 FL (ref 8.9–12.7)
POTASSIUM SERPL-SCNC: 4.2 MMOL/L (ref 3.5–5.3)
RBC # BLD AUTO: 4.7 MILLION/UL (ref 3.88–5.62)
SODIUM SERPL-SCNC: 139 MMOL/L (ref 135–147)
WBC # BLD AUTO: 12.35 THOUSAND/UL (ref 4.31–10.16)

## 2022-09-01 PROCEDURE — 97116 GAIT TRAINING THERAPY: CPT

## 2022-09-01 PROCEDURE — 80048 BASIC METABOLIC PNL TOTAL CA: CPT | Performed by: PHYSICIAN ASSISTANT

## 2022-09-01 PROCEDURE — 99239 HOSP IP/OBS DSCHRG MGMT >30: CPT | Performed by: STUDENT IN AN ORGANIZED HEALTH CARE EDUCATION/TRAINING PROGRAM

## 2022-09-01 PROCEDURE — 99232 SBSQ HOSP IP/OBS MODERATE 35: CPT | Performed by: INTERNAL MEDICINE

## 2022-09-01 PROCEDURE — 85025 COMPLETE CBC W/AUTO DIFF WBC: CPT | Performed by: PHYSICIAN ASSISTANT

## 2022-09-01 PROCEDURE — 97530 THERAPEUTIC ACTIVITIES: CPT

## 2022-09-01 PROCEDURE — 82948 REAGENT STRIP/BLOOD GLUCOSE: CPT

## 2022-09-01 RX ORDER — AMMONIUM LACTATE 12 G/100G
CREAM TOPICAL
COMMUNITY
Start: 2022-07-21

## 2022-09-01 RX ADMIN — METFORMIN HYDROCHLORIDE 500 MG: 500 TABLET ORAL at 08:59

## 2022-09-01 RX ADMIN — LIDOCAINE 5% 3 PATCH: 700 PATCH TOPICAL at 08:59

## 2022-09-01 RX ADMIN — METHOCARBAMOL 250 MG: 500 TABLET ORAL at 05:34

## 2022-09-01 RX ADMIN — ASPIRIN 81 MG CHEWABLE TABLET 81 MG: 81 TABLET CHEWABLE at 08:59

## 2022-09-01 RX ADMIN — METHOCARBAMOL 250 MG: 500 TABLET ORAL at 11:39

## 2022-09-01 RX ADMIN — ACETAMINOPHEN 975 MG: 325 TABLET ORAL at 05:33

## 2022-09-01 RX ADMIN — DOCUSATE SODIUM 100 MG: 100 CAPSULE, LIQUID FILLED ORAL at 08:59

## 2022-09-01 RX ADMIN — ENOXAPARIN SODIUM 30 MG: 30 INJECTION SUBCUTANEOUS at 08:58

## 2022-09-01 RX ADMIN — DICLOFENAC SODIUM 2 G: 10 GEL TOPICAL at 09:02

## 2022-09-01 NOTE — NURSING NOTE
Patient discharged home today at MOD I level using RW for transfers  Patient is continent of B&B, using tylenol and robaxin prn for pain control  Discharge instructions reviewed with patient and his wife, all questions/concerns answered at this time  Medications sent to patient's community pharmacy for  upon discharge

## 2022-09-01 NOTE — PROGRESS NOTES
Internal Medicine Progress Note  Patient: Ronda Chawla  Age/sex: 68 y o  male  Medical Record #: [id number]      ASSESSMENT/PLAN: (Interval History)  Ronda Chawla is seen and examined and management for following issues:    L3 osteophyte fracture  · Neurosurgery saw in consult and felt the L3 fracture did not require surgery     · LSO brace for comfort  · Pain mgmt per PMR     Transverse spinous process fractures  · Right T12/L1/L2  · Pain management per PMR     Rib fractures  · Right 10th/11th ribs  · Continue IS      Urine retention/BPH  · Resolved  · Chronic issue referred to urology by PCP  · Flomax not effective in past  · Tx per PMR     PPM  · Placed 8/19/22 for bradycardia  · Is Medtronic dual chamber  · Slight separation of incision noted 8/27  · Cardiology evaluated site 8/30 and interrogation completed  · Cont local care and monitor incision/steri strips  · stable     Aortic stenosis  · Previously moderate now severe by ECHO   · Needs to follow with Cardiology as OP  · LVEF is 65% with grade 2 DD as well     Hx right MCA CVA  · Continue ASA 81mg qd/statin     Hx carotid stenosis  · Last doppler = high grade vs TO of right ICA and the left is 50-69% stenosis  · Continue ASA and statin     HLD  · Home:  Crestor 5mg qd  · Here:  Pravachol     HTN  · Home:  Lisinopril 10mg qd  · Here:  no meds  · Stable off meds  · Careful 2/2 severe AS     DM2  · Home:  Metformin 1000 mg qd  · Here: Metformin 500mg qd - started 8/25/22     · Continue DM diet/QID Accuchecks with SSI  · stable     CKD IIIa  · Baseline 1-1 1 with GFR 55-68  · stable       Right arm pain  · Examined by Dr Ene Denny 8/27  · US=moderate supraspinatus and mild subscapularis tendinosis with partial thickness tear of supraspinatus; bicipital tendinosis/mild tenosynovitis as well as superficial vein tendinosis of median cubital vein  · stable        Discharge date: 9/1    The above assessment and plan was reviewed and updated as determined by my evaluation of the patient on 9/1/2022  Labs:   Results from last 7 days   Lab Units 09/01/22  0543   WBC Thousand/uL 12 35*   HEMOGLOBIN g/dL 13 8   HEMATOCRIT % 42 5   PLATELETS Thousands/uL 308     Results from last 7 days   Lab Units 09/01/22  0543   SODIUM mmol/L 139   POTASSIUM mmol/L 4 2   CHLORIDE mmol/L 106   CO2 mmol/L 31   BUN mg/dL 19   CREATININE mg/dL 1 18   CALCIUM mg/dL 10 2*             Results from last 7 days   Lab Units 09/01/22  0628 08/31/22 2018 08/31/22  1617   POC GLUCOSE mg/dl 122 126 119       Review of Scheduled Meds:  Current Facility-Administered Medications   Medication Dose Route Frequency Provider Last Rate    acetaminophen  975 mg Oral Q8H [facility] Thompson Lou, DO      aspirin  81 mg Oral Daily Thompson Lou, DO      bisacodyl  10 mg Rectal Daily PRN Thompson Lou, DO      Diclofenac Sodium  2 g Topical 4x Daily Shawanda Carlos MD      docusate sodium  100 mg Oral BID Thompson Lou, DO      enoxaparin  30 mg Subcutaneous Q12H [facility] Thompson Lou, DO      gabapentin  300 mg Oral HS Thompson Lou, DO      insulin lispro  1-6 Units Subcutaneous 4x Daily (AC & HS) Thompson Lou, DO      lidocaine  3 patch Topical Daily Thompson Lou, DO      melatonin  3 mg Oral HS Thompson Lou, DO      metFORMIN  500 mg Oral Daily With Breakfast DONTE Olivares      methocarbamol  250 mg Oral Q6H [facility] Thompson Lou, DO      oxyCODONE  2 5 mg Oral Q4H PRN Thompson Lou, DO      polyethylene glycol  17 g Oral Daily PRN Thompson Lou, DO      pravastatin  40 mg Oral Daily With Dinner Thompson oLu, DO      senna  2 tablet Oral HS Thompson Lou, DO         Subjective/ HPI: Patient seen and examined  Patients overnight issues or events were reviewed with nursing or staff during rounds or morning huddle session  New or overnight issues include the following:     Pt seen in his room  He states he is doing well  He is looking forward to DC  He denies any other complaints        ROS: A 10 point ROS was performed; negative except as noted above  Imaging:     US MSK limited   Final Result by Dexter Cerda MD (08/29 1136)      Moderate supraspinatus and mild subscapularis tendinosis  Partial-thickness articular surface tear of the distal supraspinatus tendon  Bicipital tendinosis and mild tenosynovitis  Incidental note is made of thrombus within the median cubital vein compatible with superficial vein tendinosis  The study was marked in [facility] for immediate notification  Workstation performed: [id number]             *Labs /Radiology studies reviewed  *Medications reviewed and reconciled as needed  *Please refer to order section for additional ordered labs studies  *Case discussed with primary attending during morning huddle case rounds    Physical Examination:  Vitals:   Vitals:    08/31/22 0520 08/31/22 1330 08/31/22 2017 09/01/22 0532   BP: 149/67 139/80 129/76 147/66   BP Location: Left arm Right arm Left arm Right arm   Pulse: 62 60 63 62   Resp: 18 18 18 18   Temp: 97 9 °F (36 6 °C) 97 9 °F (36 6 °C) 98 7 °F (37 1 °C) 98 6 °F (37 °C)   TempSrc: Oral Oral Oral Oral   SpO2: 96% 97% 96% 95%   Weight: 99 6 kg (219 lb 9 3 oz)      Height:         GEN: No apparent distress, interactive  NEURO: Alert and oriented x3  HEENT: Pupils are equal and reactive, EOMI, mucous membranes are moist, face symmetrical  CV: S1 S2 regular, no MRG, no peripheral edema noted  RESP: Lungs are clear bilaterally, no wheezes, rales or rhonchi noted, on room air, respirations easy and non labored  GI: Flat, soft non tender, non distended; +BS x4  : Voiding without difficulty  MUSC: Moves all extremities; TLSO brace in place  SKIN: pink, warm and dry, normal turgor, no rashes, PPM site intact    The above physical exam was reviewed and updated as determined by my evaluation of the patient on 9/1/2022      Invasive Devices  Report    None                    VTE Pharmacologic Prophylaxis: Enoxaparin  Code Status: Level 1 - Full Code  Current Length of Stay: 8 day(s)      Total time spent:  30 minutes with more than 50% spent counseling/coordinating care  Counseling includes discussion with patient re: progress  and discussion with patient of his/her current medical state/information  Coordination of patient's care was performed in conjunction with primary service  Time invested included review of patient's labs, vitals, and management of their comorbidities with continued monitoring  In addition, this patient was discussed with medical team including physician and advanced extenders  The care of the patient was extensively discussed and appropriate treatment plan was formulated unique for this patient  ** Please Note:  voice to text software may have been used in the creation of this document   Although proof errors in transcription or interpretation are a potential of such software**

## 2022-09-01 NOTE — PROGRESS NOTES
09/01/22 0830   Pain Assessment   Pain Assessment Tool 0-10   Pain Score No Pain   Restrictions/Precautions   Precautions Pacemaker;Spinal precautions   ROM Restrictions Yes   LUE ROM Restriction Range Limitation   Braces or Orthoses LSO  (PRN for comfort)   Cognition   Overall Cognitive Status Impaired   Arousal/Participation Alert; Cooperative   Attention Attends with cues to redirect   Orientation Level Oriented X4   Memory Decreased short term memory;Decreased recall of precautions   Following Commands Follows one step commands without difficulty   Sit to Stand   Type of Assistance Needed Adaptive equipment; Independent   Comment with RW   Sit to Stand CARE Score 6   Bed-Chair Transfer   Type of Assistance Needed Independent; Adaptive equipment   Comment with RW   Chair/Bed-to-Chair Transfer CARE Score 6   Transfer Bed/Chair/Wheelchair   Adaptive Equipment Roller Walker   Car Transfer   Type of Assistance Needed Set-up / clean-up   Comment simulated car transfer with NuStep seat and lifting LE over sidelying garbage can  Car Transfer CARE Score 5   Walk 10 Feet   Type of Assistance Needed Independent; Adaptive equipment   Comment with RW   Walk 10 Feet CARE Score 6   Walk 50 Feet with Two Turns   Type of Assistance Needed Independent; Adaptive equipment   Comment with RW   Walk 50 Feet with Two Turns CARE Score 6   Walk 150 Feet   Type of Assistance Needed Independent; Adaptive equipment   Comment with RW   Walk 150 Feet CARE Score 6   Ambulation   Does the patient walk? 2  Yes   Primary Mode of Locomotion Prior to Admission Walk   Distance Walked (feet) 300 ft  (150' x2 with SPC, 48' x2,)   Assist Device Roller Walker;Cane   Gait Pattern L foot drag; Wide KUMAR;Shuffle;Improper weight shift   Limitations Noted In Endurance; Heel Strike; Safety;Speed;Strength;Swing   Provided Assistance with: Direction   Walk Assist Level Modified Independent   Wheel 50 Feet with Two Turns   Reason if not Attempted Activity not applicable   Wheel 50 Feet with Two Turns CARE Score 9   Wheel 150 Feet   Reason if not Attempted Activity not applicable   Wheel 644 Feet CARE Score 9   Toilet Transfer   Type of Assistance Needed Independent; Adaptive equipment   Comment with RW   Toilet Transfer CARE Score 6   Toilet Transfer   Surface Assessed Standard Toilet   Therapeutic Interventions   Neuromuscular Re-Education SPC x150' x2 with CS   Equipment Use   NuStep L2 x10 min   Assessment   Treatment Assessment Pt participated in skilled PT session with increased focus on gait with SPC and cont endurance training  Pt anticipates discharge today with OPPT services and use of RW  Pt will work towards I with [facility] post discharge with OPPT  Problem List Decreased strength;Decreased endurance   PT Barriers   Functional Limitation Stair negotiation   Recommendation   PT Discharge Recommendation Home with outpatient rehabilitation   Equipment Recommended Walker   PT Therapy Minutes   PT Time In 0830   PT Time Out 0930   PT Total Time (minutes) 60   PT Mode of treatment - Individual (minutes) 60   PT Mode of treatment - Concurrent (minutes) 0   PT Mode of treatment - Group (minutes) 0   PT Mode of treatment - Co-treat (minutes) 0   PT Mode of Treatment - Total time(minutes) 60 minutes   PT Cumulative Minutes 720   Therapy Time missed   Time missed?  No
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
## 2022-09-01 NOTE — PLAN OF CARE
Problem: PAIN - ADULT  Goal: Verbalizes/displays adequate comfort level or baseline comfort level  Description: Interventions:  - Encourage patient to monitor pain and request assistance  - Assess pain using appropriate pain scale  - Administer analgesics based on type and severity of pain and evaluate response  - Implement non-pharmacological measures as appropriate and evaluate response  - Consider cultural and social influences on pain and pain management  - Notify physician/advanced practitioner if interventions unsuccessful or patient reports new pain  Outcome: Progressing     Problem: INFECTION - ADULT  Goal: Absence or prevention of progression during hospitalization  Description: INTERVENTIONS:  - Assess and monitor for signs and symptoms of infection  - Monitor lab/diagnostic results  - Monitor all insertion sites, i e  indwelling lines, tubes, and drains  - Monitor endotracheal if appropriate and nasal secretions for changes in amount and color  - [place] appropriate cooling/warming therapies per order  - Administer medications as ordered  - Instruct and encourage patient and family to use good hand hygiene technique  - Identify and instruct in appropriate isolation precautions for identified infection/condition  Outcome: Progressing     Problem: SAFETY ADULT  Goal: Patient will remain free of falls  Description: INTERVENTIONS:  - Educate patient/family on patient safety including physical limitations  - Instruct patient to call for assistance with activity   - Consult OT/PT to assist with strengthening/mobility   - Keep Call bell within reach  - Keep bed low and locked with side rails adjusted as appropriate  - Keep care items and personal belongings within reach  - Initiate and maintain comfort rounds  - Make Fall Risk Sign visible to staff  - Offer Toileting every 2 Hours, in advance of need  - Initiate/Maintain bed  chair alarm  - Obtain necessary fall risk management equipment: nonskid socks  - Apply yellow socks and bracelet for high fall risk patients  - Consider moving patient to room near nurses station  Outcome: Progressing  Goal: Maintain or return to baseline ADL function  Description: INTERVENTIONS:  -  Assess patient's ability to carry out ADLs; assess patient's baseline for ADL function and identify physical deficits which impact ability to perform ADLs (bathing, care of mouth/teeth, toileting, grooming, dressing, etc )  - Assess/evaluate cause of self-care deficits   - Assess range of motion  - Assess patient's mobility; develop plan if impaired  - Assess patient's need for assistive devices and provide as appropriate  - Encourage maximum independence but intervene and supervise when necessary  - Involve family in performance of ADLs  - Assess for home care needs following discharge   - Consider OT consult to assist with ADL evaluation and planning for discharge  - Provide patient education as appropriate  Outcome: Progressing  Goal: Maintains/Returns to pre admission functional level  Description: INTERVENTIONS:  - Perform BMAT or MOVE assessment daily    - Set and communicate daily mobility goal to care team and patient/family/caregiver  - Collaborate with rehabilitation services on mobility goals if consulted  - Perform Range of Motion 3 times a day  - Reposition patient every 3 hours    - Dangle patient 2 times a day  - Stand patient 3 times a day  - Ambulate patient 3 times a day  - Out of bed to chair 3 times a day   - Out of bed for meals 3 times a day  - Out of bed for toileting  - Record patient progress and toleration of activity level   Outcome: Progressing     Problem: DISCHARGE PLANNING  Goal: Discharge to home or other facility with appropriate resources  Description: INTERVENTIONS:  - Identify barriers to discharge w/patient and caregiver  - Arrange for needed discharge resources and transportation as appropriate  - Identify discharge learning needs (meds, wound care, etc )  - Arrange for interpretive services to assist at discharge as needed  - Refer to Case Management Department for coordinating discharge planning if the patient needs post-hospital services based on physician/advanced practitioner order or complex needs related to functional status, cognitive ability, or social support system  Outcome: Progressing     Problem: Potential for Falls  Goal: Patient will remain free of falls  Description: INTERVENTIONS:  - Educate patient/family on patient safety including physical limitations  - Instruct patient to call for assistance with activity   - Consult OT/PT to assist with strengthening/mobility   - Keep Call bell within reach  - Keep bed low and locked with side rails adjusted as appropriate  - Keep care items and personal belongings within reach  - Initiate and maintain comfort rounds  - Make Fall Risk Sign visible to staff  - Offer Toileting every 2 Hours, in advance of need  - Initiate/Maintain bed  chair alarm  - Obtain necessary fall risk management equipment: nonskid socks  - Apply yellow socks and bracelet for high fall risk patients  - Consider moving patient to room near nurses station  Outcome: Progressing

## 2022-09-02 NOTE — CASE MANAGEMENT
Team dc summary - pt made good progress and returned home w/spouse and contd outpt pt at [facility]  Pt will receive rom exercises for his shoulder from PT at this location as well  Pt received a commode through [employer] prior to dc  pts wife present daily and for dc instructions

## 2022-09-02 NOTE — PROGRESS NOTES
09/02/22 1144   Hello, [Guardians Name / Acacia Haynes, this is [Caller Brandy Mccallum from [facility], and our clinical care team wanted to check on you / your child after your recent visit to the hospital  It will only take 3-5 minutes  Is this a good time? Discharge Call Type/ Specific Diagnosis: General Call   General Discharge Phone Call   Were your/your child's discharge instructions clear and understandable? Please tell me in your own words how to care for yourself/your child now that you're home Yes;Patient understood instructions   Have you filled your/your child's new prescriptions yet? Yes   What questions do you have about those medications? No questions   Are you/your child having any unusual symptoms or problems? (Specific to problem- i e , dressing, pain, bruising or swelling, procedure, etc ) No reported symptoms/problems   Do you have follow up appointment with your/your child's physician? Yes   Is there anything preventing you from keeping that appointment? No;Patient able to keep appointment   Are there any physicians, nurses, or hospital staff you would like us to recognize for doing a very good job? Nurse;PCA/Tech;PT/OT/RT/SLP  (Everyone was fantastic)   Thank you for taking the time to share with me about your care and recovery  Do you have any suggestions for us? No   This call resulted in: No interventions needed   Call Complete   Attempted Number of Calls 1   Discharge phone call complete? Complete   Hi, This is ________ from [facility]  This is just a courtesy call, and there is no need to call us back  Have a great day     (Called by [facility])

## 2022-09-06 ENCOUNTER — OFFICE VISIT (OUTPATIENT)
Dept: FAMILY MEDICINE CLINIC | Facility: CLINIC | Age: 77
End: 2022-09-06
Payer: MEDICARE

## 2022-09-06 VITALS
DIASTOLIC BLOOD PRESSURE: 62 MMHG | SYSTOLIC BLOOD PRESSURE: 134 MMHG | WEIGHT: 218 LBS | BODY MASS INDEX: 30.52 KG/M2 | HEIGHT: 71 IN

## 2022-09-06 DIAGNOSIS — I35.8 NON-RHEUMATIC AORTIC SCLEROSIS: ICD-10-CM

## 2022-09-06 DIAGNOSIS — R35.1 BENIGN PROSTATIC HYPERPLASIA WITH NOCTURIA: Primary | ICD-10-CM

## 2022-09-06 DIAGNOSIS — I69.354 HEMIPLEGIA AND HEMIPARESIS FOLLOWING CEREBRAL INFARCTION AFFECTING LEFT NON-DOMINANT SIDE (HCC): ICD-10-CM

## 2022-09-06 DIAGNOSIS — R30.0 DYSURIA: ICD-10-CM

## 2022-09-06 DIAGNOSIS — I10 BENIGN ESSENTIAL HYPERTENSION: Chronic | ICD-10-CM

## 2022-09-06 DIAGNOSIS — R33.9 URINARY RETENTION: ICD-10-CM

## 2022-09-06 DIAGNOSIS — E11.49 TYPE 2 DIABETES MELLITUS WITH OTHER NEUROLOGIC COMPLICATION, WITHOUT LONG-TERM CURRENT USE OF INSULIN (HCC): Chronic | ICD-10-CM

## 2022-09-06 DIAGNOSIS — N40.1 BENIGN PROSTATIC HYPERPLASIA WITH NOCTURIA: Primary | ICD-10-CM

## 2022-09-06 DIAGNOSIS — N18.31 STAGE 3A CHRONIC KIDNEY DISEASE (HCC): ICD-10-CM

## 2022-09-06 DIAGNOSIS — I35.0 SEVERE AORTIC STENOSIS: ICD-10-CM

## 2022-09-06 DIAGNOSIS — I65.23 BILATERAL CAROTID ARTERY STENOSIS: ICD-10-CM

## 2022-09-06 DIAGNOSIS — S22.009D CLOSED FRACTURE OF TRANSVERSE PROCESS OF THORACIC VERTEBRA WITH ROUTINE HEALING, SUBSEQUENT ENCOUNTER: ICD-10-CM

## 2022-09-06 PROCEDURE — 99214 OFFICE O/P EST MOD 30 MIN: CPT | Performed by: FAMILY MEDICINE

## 2022-09-06 RX ORDER — TAMSULOSIN HYDROCHLORIDE 0.4 MG/1
0.8 CAPSULE ORAL
Qty: 60 CAPSULE | Refills: 1 | Status: SHIPPED | OUTPATIENT
Start: 2022-09-06

## 2022-09-06 NOTE — ASSESSMENT & PLAN NOTE
His aortic stenosis has progressed since last echocardiogram in 2019  Presently he has no chest pain, shortness a breath, Stark-Fletcher events X c[employer]a  He does have an appointment scheduled with Cardiology later this month  I recommended that he discuss this further with them  He is concerned about surgery for this condition    I told him that possibly he would be a candidate for a TAVR and he would like to discuss this with Cardiology

## 2022-09-06 NOTE — ASSESSMENT & PLAN NOTE
Noted to have urinary retention during his hospitalization  He has had improved pattern at rehab  He continues to have frequency of urination with diminished force  In the past he is not sure that tamsulosin was helpful but he never escalated dose  We are going to have him begin with tamsulosin 0 4 at dinner  If he does not see significant improvement within 1 week he is going to increase his dose to 2 tabs/0 8 mg at dinner  We also placed another referral to Urology and we asked him to be on the lookout for this call to schedule his appointment and if he does not hear in 1 week he should call them directly  He agrees with this plan

## 2022-09-06 NOTE — ASSESSMENT & PLAN NOTE
Lab Results   Component Value Date    EGFR 59 09/01/2022    EGFR 58 08/25/2022    EGFR 63 08/23/2022    CREATININE 1 18 09/01/2022    CREATININE 1 19 08/25/2022    CREATININE 1 11 08/23/2022   Continue to maintain control of diabetes, blood pressure as well as improvement in diet and exercise for weight loss
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
## 2022-09-06 NOTE — ASSESSMENT & PLAN NOTE
Blood pressure remains under acceptable control  Currently maintained off lisinopril/ antihypertensive agent

## 2022-09-06 NOTE — ASSESSMENT & PLAN NOTE
Continue with current therapy    Hemoglobin A1c at goal   Lab Results   Component Value Date    HGBA1C 6 9 (A) 06/16/2022

## 2022-09-06 NOTE — ASSESSMENT & PLAN NOTE
He continues with the use of gabapentin and Robaxin which is adequately alleviating his pain fracture pain

## 2022-09-06 NOTE — ASSESSMENT & PLAN NOTE
Patient has noted some mild dysuria which he believes was related to catheter insertion  He feels that it is improving but has not resolved  Unfortunately urinated in the office prior to recommendation for urinalysis  We are going to ask him to go to [address] [place] [address] labs for urinalysis and culture as soon as possible  We placed disorder  He agrees with this plan  Presently has no flank pain fever X cetera

## 2022-09-09 DIAGNOSIS — N39.0 URINARY TRACT INFECTION WITHOUT HEMATURIA, SITE UNSPECIFIED: Primary | ICD-10-CM

## 2022-09-09 LAB
DME PARACHUTE DELIVERY DATE ACTUAL: NORMAL
DME PARACHUTE DELIVERY DATE REQUESTED: NORMAL
DME PARACHUTE ITEM DESCRIPTION: NORMAL
DME PARACHUTE ORDER STATUS: NORMAL
DME PARACHUTE SUPPLIER NAME: NORMAL
DME PARACHUTE SUPPLIER PHONE: NORMAL

## 2022-09-09 RX ORDER — LEVOFLOXACIN 500 MG/1
500 TABLET, FILM COATED ORAL EVERY 24 HOURS
Qty: 7 TABLET | Refills: 0 | Status: SHIPPED | OUTPATIENT
Start: 2022-09-09 | End: 2022-09-16

## 2022-09-11 LAB
APPEARANCE UR: CLEAR
BACTERIA UR QL AUTO: ABNORMAL /HPF
BILIRUB UR QL STRIP: NEGATIVE
COLOR UR: ABNORMAL
GLUCOSE UR QL STRIP: NEGATIVE
HGB UR QL STRIP: ABNORMAL
HYALINE CASTS #/AREA URNS LPF: ABNORMAL /LPF
KETONES UR QL STRIP: NEGATIVE
LEUKOCYTE ESTERASE UR QL STRIP: ABNORMAL
NITRITE UR QL STRIP: POSITIVE
PH UR STRIP: ABNORMAL [PH] (ref 5–8)
PROT UR QL STRIP: NEGATIVE
RBC #/AREA URNS HPF: ABNORMAL /HPF
SP GR UR STRIP: 1.02 (ref 1–1.03)
SQUAMOUS #/AREA URNS HPF: ABNORMAL /HPF
WBC #/AREA URNS HPF: ABNORMAL /HPF

## 2022-09-13 ENCOUNTER — EVALUATION (OUTPATIENT)
Dept: PHYSICAL THERAPY | Facility: REHABILITATION | Age: 77
End: 2022-09-13
Payer: MEDICARE

## 2022-09-13 DIAGNOSIS — M79.601 RIGHT ARM PAIN: Primary | ICD-10-CM

## 2022-09-13 DIAGNOSIS — Z86.73 HISTORY OF STROKE: ICD-10-CM

## 2022-09-13 DIAGNOSIS — S32.009A CLOSED FRACTURE OF TRANSVERSE PROCESS OF LUMBAR VERTEBRA, INITIAL ENCOUNTER (HCC): ICD-10-CM

## 2022-09-13 DIAGNOSIS — T07.XXXA MULTIPLE FRACTURES: ICD-10-CM

## 2022-09-13 DIAGNOSIS — S32.009A CLOSED FRACTURE OF LUMBAR VERTEBRA, UNSPECIFIED FRACTURE MORPHOLOGY, INITIAL ENCOUNTER (HCC): ICD-10-CM

## 2022-09-13 DIAGNOSIS — R26.2 AMBULATORY DYSFUNCTION: ICD-10-CM

## 2022-09-13 PROCEDURE — 97162 PT EVAL MOD COMPLEX 30 MIN: CPT

## 2022-09-13 PROCEDURE — 97110 THERAPEUTIC EXERCISES: CPT
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
## 2022-09-15 ENCOUNTER — OFFICE VISIT (OUTPATIENT)
Dept: PHYSICAL THERAPY | Facility: REHABILITATION | Age: 77
End: 2022-09-15
Payer: MEDICARE

## 2022-09-15 DIAGNOSIS — S32.009A CLOSED FRACTURE OF TRANSVERSE PROCESS OF LUMBAR VERTEBRA, INITIAL ENCOUNTER (HCC): ICD-10-CM

## 2022-09-15 DIAGNOSIS — M79.601 RIGHT ARM PAIN: Primary | ICD-10-CM

## 2022-09-15 DIAGNOSIS — Z86.73 HISTORY OF STROKE: ICD-10-CM

## 2022-09-15 DIAGNOSIS — S32.009A CLOSED FRACTURE OF LUMBAR VERTEBRA, UNSPECIFIED FRACTURE MORPHOLOGY, INITIAL ENCOUNTER (HCC): ICD-10-CM

## 2022-09-15 DIAGNOSIS — T07.XXXA MULTIPLE FRACTURES: ICD-10-CM

## 2022-09-15 DIAGNOSIS — R26.2 AMBULATORY DYSFUNCTION: ICD-10-CM

## 2022-09-15 PROCEDURE — 97110 THERAPEUTIC EXERCISES: CPT

## 2022-09-15 PROCEDURE — 97140 MANUAL THERAPY 1/> REGIONS: CPT

## 2022-09-15 NOTE — PROGRESS NOTES
Daily Note     Today's date: 9/15/2022  Patient name: Mikie Winters  : 1945  MRN: [id number]  Referring provider: Gordon Bradley DO  Dx:   Encounter Diagnosis     ICD-10-CM    1  Right arm pain  M79 601    2  Multiple fractures  T07  XXXA    3  History of stroke  Z86 73    4  L3 osteophyte fracture  S32 009A    5  Ambulatory dysfunction  R26 2    6  Closed fracture of transverse process of lumbar vertebra, initial encounter ([facility] )  S32 009A        Start Time: 1530  Stop Time: 1610  Total time in clinic (min): 40 minutes    Subjective: Patient reports he is doing well  Objective: See treatment diary below      Assessment: Significant improvements in right shoulder PROM since last visit  Patient's biggest PROM restrictions are with flexion  Minimal/no pain with PROM today  Tolerated AAROM without issue  Patient would benefit from continued PT      Plan: Continue per plan of care        Precautions: standard, spinal fracture precautions, LSO brace      Manuals 9/13 9/15           PROM nv [place] flex/ER           LASER nv                                      Neuro Re-Ed                                                                                                        Ther Ex             Supine Cane Flexion nv            Table Slides nv            [place]  3x10           Sidelying ER  2x10           Sidelying Flex  2x10 w/ PT assist                                     [place] HEP 7'           Ther Activity                                       Gait Training                                       Modalities

## 2022-09-20 ENCOUNTER — OFFICE VISIT (OUTPATIENT)
Dept: PHYSICAL THERAPY | Facility: REHABILITATION | Age: 77
End: 2022-09-20
Payer: MEDICARE

## 2022-09-20 ENCOUNTER — TELEPHONE (OUTPATIENT)
Dept: CARDIOLOGY CLINIC | Facility: CLINIC | Age: 77
End: 2022-09-20

## 2022-09-20 DIAGNOSIS — R26.2 AMBULATORY DYSFUNCTION: ICD-10-CM

## 2022-09-20 DIAGNOSIS — S32.009A CLOSED FRACTURE OF TRANSVERSE PROCESS OF LUMBAR VERTEBRA, INITIAL ENCOUNTER (HCC): ICD-10-CM

## 2022-09-20 DIAGNOSIS — T07.XXXA MULTIPLE FRACTURES: ICD-10-CM

## 2022-09-20 DIAGNOSIS — Z86.73 HISTORY OF STROKE: ICD-10-CM

## 2022-09-20 DIAGNOSIS — M79.601 RIGHT ARM PAIN: Primary | ICD-10-CM

## 2022-09-20 DIAGNOSIS — S32.009A CLOSED FRACTURE OF LUMBAR VERTEBRA, UNSPECIFIED FRACTURE MORPHOLOGY, INITIAL ENCOUNTER (HCC): ICD-10-CM

## 2022-09-20 PROCEDURE — 97110 THERAPEUTIC EXERCISES: CPT

## 2022-09-20 PROCEDURE — 97140 MANUAL THERAPY 1/> REGIONS: CPT

## 2022-09-20 NOTE — PROGRESS NOTES
Daily Note     Today's date: 2022  Patient name: Arleth Bailon  : 1945  MRN: [id number]  Referring provider: Rajeev Lal DO  Dx:   Encounter Diagnosis     ICD-10-CM    1  Right arm pain  M79 601    2  History of stroke  Z86 73    3  L3 osteophyte fracture  S32 009A    4  Multiple fractures  T07  XXXA    5  Closed fracture of transverse process of lumbar vertebra, initial encounter ([facility] )  S32 009A    6  Ambulatory dysfunction  R26 2        Start Time: 1215  Stop Time: 1300  Total time in clinic (min): 45 minutes    Subjective: Patient reports her shoulder is doing well  Patient reports some bilateral feet swelling that started  night and has been present since  Patient reports the feet are painful as well  Objective: See treatment diary below  Mild swelling and warmth in bilateral feet  Assessment: Patient PROM is improving nicely with minimal/no ERP  Patient tolerated light AROM and AAROM well  Continue to focus on restoring ROM  Patient would benefit from continued PT      Plan: Continue per plan of care        Precautions: standard, spinal fracture precautions, LSO brace      Manuals 9/13 9/15 9/20          PROM nv MD flex/ER MD flex/ER          LASER nv                                      Neuro Re-Ed                                                                                                        Ther Ex             Supine Cane Flexion nv            Table Slides nv            [place]  3x10 3x10 flex          Sidelying ER  2x10 2x15          Sidelying Flex  2x10 w/ PT assist 2x15                       UBE   3' fwd          [place] HEP 7' 7'          Ther Activity                                       Gait Training                                       Modalities

## 2022-09-20 NOTE — TELEPHONE ENCOUNTER
P/c'd c/o of pain and edema in the feet and ankles  They are swelling upon waking in the am   Has been happening for the last several days  Today Bp 137/84  Hr 66 does have hospital F/u on Friday with Dr Emery Short      Taking : Crestor 5 mg                ASA 81 mg    Please advise

## 2022-09-21 NOTE — TELEPHONE ENCOUNTER
Spoke with Wife again today  Edema is better, but he has pain  Advised to take tylenol  She will give it to him

## 2022-09-22 ENCOUNTER — APPOINTMENT (OUTPATIENT)
Dept: PHYSICAL THERAPY | Facility: REHABILITATION | Age: 77
End: 2022-09-22
Payer: MEDICARE
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
## 2022-09-23 ENCOUNTER — OFFICE VISIT (OUTPATIENT)
Dept: CARDIOLOGY CLINIC | Facility: CLINIC | Age: 77
End: 2022-09-23

## 2022-09-23 VITALS
WEIGHT: 219.4 LBS | HEART RATE: 65 BPM | DIASTOLIC BLOOD PRESSURE: 80 MMHG | OXYGEN SATURATION: 99 % | HEIGHT: 71 IN | BODY MASS INDEX: 30.72 KG/M2 | SYSTOLIC BLOOD PRESSURE: 160 MMHG

## 2022-09-23 DIAGNOSIS — E11.49 TYPE 2 DIABETES MELLITUS WITH OTHER NEUROLOGIC COMPLICATION, WITHOUT LONG-TERM CURRENT USE OF INSULIN (HCC): Primary | Chronic | ICD-10-CM

## 2022-09-23 DIAGNOSIS — R00.1 SYMPTOMATIC BRADYCARDIA: ICD-10-CM

## 2022-09-23 DIAGNOSIS — T07.XXXA MULTIPLE FRACTURES: ICD-10-CM

## 2022-09-23 DIAGNOSIS — I65.23 BILATERAL CAROTID ARTERY STENOSIS: ICD-10-CM

## 2022-09-23 DIAGNOSIS — I35.0 SEVERE AORTIC STENOSIS: ICD-10-CM

## 2022-09-23 DIAGNOSIS — I10 BENIGN ESSENTIAL HYPERTENSION: Chronic | ICD-10-CM

## 2022-09-23 PROCEDURE — 99024 POSTOP FOLLOW-UP VISIT: CPT | Performed by: INTERNAL MEDICINE

## 2022-09-23 RX ORDER — CHLORTHALIDONE 25 MG/1
25 TABLET ORAL DAILY
Qty: 30 TABLET | Refills: 11 | Status: SHIPPED | OUTPATIENT
Start: 2022-09-23

## 2022-09-23 RX ORDER — GABAPENTIN 100 MG/1
CAPSULE ORAL
Qty: 90 CAPSULE | Refills: 0 | Status: SHIPPED | OUTPATIENT
Start: 2022-09-23

## 2022-09-23 NOTE — PATIENT INSTRUCTIONS
You were seen today in the Cardiology office for hospital follow up  Please continue your current cardiac medications as prescribed  Continue taking Rosuvastatin  Start taking Chlorthalidone, check your blood pressure twice daily  Have your blood test drawn in 1 week  Start taking Gabapentin as prescribed, and follow up with your primary care physician to discuss the need for dose adjustment based upon your symptoms  1   Please check your blood pressure using an automatic upper arm cuff  If you do not have one, I recommend purchasing an [employer] blood pressure unit with standard size upper arm cuff  2   Take blood pressures after resting for at least 5 minutes while seated in a chair or at a table with arm supported on arm rest or table  Do 2 readings, one after the other 1-2 minutes apart, both in the morning and evening for 1 consecutive week(s)  3   Write down each and every reading with date and time, and bring this list to your next cardiology appointment as scheduled  You may also send a scan of your readings as an attachment to a message to Dr Violette Aguilar through [facility] My [facility]  Alternatively, you may drop-off the paper at our office  Call us if you have not heard back from us about one week after getting us the blood pressure readings  Thank you for choosing [facility]  Please call our office or use [facility] with any questions

## 2022-09-27 ENCOUNTER — OFFICE VISIT (OUTPATIENT)
Dept: PHYSICAL THERAPY | Facility: REHABILITATION | Age: 77
End: 2022-09-27
Payer: MEDICARE

## 2022-09-27 DIAGNOSIS — T07.XXXA MULTIPLE FRACTURES: ICD-10-CM

## 2022-09-27 DIAGNOSIS — R26.2 AMBULATORY DYSFUNCTION: ICD-10-CM

## 2022-09-27 DIAGNOSIS — S32.009A CLOSED FRACTURE OF TRANSVERSE PROCESS OF LUMBAR VERTEBRA, INITIAL ENCOUNTER (HCC): ICD-10-CM

## 2022-09-27 DIAGNOSIS — Z86.73 HISTORY OF STROKE: ICD-10-CM

## 2022-09-27 DIAGNOSIS — M79.601 RIGHT ARM PAIN: Primary | ICD-10-CM

## 2022-09-27 DIAGNOSIS — S32.009A CLOSED FRACTURE OF LUMBAR VERTEBRA, UNSPECIFIED FRACTURE MORPHOLOGY, INITIAL ENCOUNTER (HCC): ICD-10-CM

## 2022-09-27 PROCEDURE — 97140 MANUAL THERAPY 1/> REGIONS: CPT

## 2022-09-27 PROCEDURE — 97110 THERAPEUTIC EXERCISES: CPT

## 2022-09-27 NOTE — PROGRESS NOTES
Daily Note     Today's date: 2022  Patient name: Itzel Waters  : 1945  MRN: [id number]  Referring provider: Hong Hernández DO  Dx:   Encounter Diagnosis     ICD-10-CM    1  Right arm pain  M79 601    2  History of stroke  Z86 73    3  L3 osteophyte fracture  S32 009A    4  Multiple fractures  T07  XXXA    5  Closed fracture of transverse process of lumbar vertebra, initial encounter ([facility] [address] )  S32 009A    6  Ambulatory dysfunction  R26 2        Start Time: 1215  Stop Time: 1300  Total time in clinic (min): 45 minutes    Subjective: Patient reports some anterior shoulder soreness after doing some housework over the weekend  Patient reports his fete pain and swelling has improved since f/u with cardiology      Objective: See treatment diary below  Blood Pressure  Start of Session: 165/85  End of Session: 146/78    Assessment: No increased soreness throughout session or with progressions  Advised patient to f/u with cardiologist if BP readings continue to be elevated  Patient would benefit from continued PT      Plan: Continue per plan of care        Precautions: standard, spinal fracture precautions, LSO brace      Manuals 9/13 9/15 9/20 9/27         PROM nv GA flex/ER GA flex/ER GA flex/ER         LASER nv                                      Neuro Re-Ed                                                                                                        Ther Ex             Supine Cane Flexion nv   3x10 5" hold         Table Slides nv            [place]  3x10 3x10 flex 3x10 flex/scap         Sidelying ER  2x10 2x15 3x10 1#         Sidelying Flex  2x10 w/ PT assist 2x15 3x10 1#                      UBE   3' fwd np resume         [place] HEP 7' 7' 7'         Ther Activity                                       Gait Training                                       Modalities

## 2022-09-29 ENCOUNTER — OFFICE VISIT (OUTPATIENT)
Dept: SURGERY | Facility: CLINIC | Age: 77
End: 2022-09-29
Payer: MEDICARE

## 2022-09-29 VITALS
BODY MASS INDEX: 29.96 KG/M2 | SYSTOLIC BLOOD PRESSURE: 151 MMHG | HEART RATE: 59 BPM | HEIGHT: 71 IN | TEMPERATURE: 97.6 F | WEIGHT: 214 LBS | DIASTOLIC BLOOD PRESSURE: 78 MMHG

## 2022-09-29 DIAGNOSIS — S32.009D CLOSED FRACTURE OF TRANSVERSE PROCESS OF LUMBAR VERTEBRA WITH ROUTINE HEALING, SUBSEQUENT ENCOUNTER: Primary | ICD-10-CM

## 2022-09-29 DIAGNOSIS — S32.009A CLOSED FRACTURE OF LUMBAR VERTEBRA, UNSPECIFIED FRACTURE MORPHOLOGY, INITIAL ENCOUNTER (HCC): ICD-10-CM

## 2022-09-29 DIAGNOSIS — S22.41XD CLOSED FRACTURE OF MULTIPLE RIBS OF RIGHT SIDE WITH ROUTINE HEALING, SUBSEQUENT ENCOUNTER: ICD-10-CM

## 2022-09-29 DIAGNOSIS — S22.009D CLOSED FRACTURE OF TRANSVERSE PROCESS OF THORACIC VERTEBRA WITH ROUTINE HEALING, SUBSEQUENT ENCOUNTER: ICD-10-CM
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
## 2022-09-29 PROCEDURE — 99211 OFF/OP EST MAY X REQ PHY/QHP: CPT | Performed by: NURSE PRACTITIONER
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
## 2022-09-29 NOTE — PROGRESS NOTES
Assessment/Plan:    No problem-specific Assessment & Plan notes found for this encounter  {Assess/PlanSmartLinks:17084}      Subjective:      Patient ID: Pradip Giraldo is a 68 y o  male  HPI    {Common ambulatory SmartLinks:56263}    Review of Systems      Objective: There were no vitals taken for this visit           Physical Exam

## 2022-09-29 NOTE — ASSESSMENT & PLAN NOTE
Patient seen by Nereida Asa inpatient  - Was prescribed LSO brace for comfort does not wear  - No follow up needed per NSGY if doing well  - Patient denies pain  - F/U PRN

## 2022-09-29 NOTE — ASSESSMENT & PLAN NOTE
Patient is s/p fall with the following injuries:  - Right Rib fractuers 10/11  - Right T12, L1, L2 TP fractures  - L3 osteophytes fracture  - Doing well  - F/U PRN

## 2022-09-29 NOTE — ASSESSMENT & PLAN NOTE
Patient with Right 10/11 Rib fractures  - Doing well  - Does not complain of any pain  - Healing  - F/U PRN

## 2022-09-30 ENCOUNTER — TELEPHONE (OUTPATIENT)
Dept: CARDIOLOGY CLINIC | Facility: CLINIC | Age: 77
End: 2022-09-30

## 2022-09-30 ENCOUNTER — OFFICE VISIT (OUTPATIENT)
Dept: PHYSICAL THERAPY | Facility: REHABILITATION | Age: 77
End: 2022-09-30
Payer: MEDICARE

## 2022-09-30 DIAGNOSIS — S32.009A CLOSED FRACTURE OF LUMBAR VERTEBRA, UNSPECIFIED FRACTURE MORPHOLOGY, INITIAL ENCOUNTER (HCC): ICD-10-CM

## 2022-09-30 DIAGNOSIS — M79.601 RIGHT ARM PAIN: Primary | ICD-10-CM

## 2022-09-30 DIAGNOSIS — Z86.73 HISTORY OF STROKE: ICD-10-CM

## 2022-09-30 DIAGNOSIS — T07.XXXA MULTIPLE FRACTURES: ICD-10-CM

## 2022-09-30 DIAGNOSIS — S32.009A CLOSED FRACTURE OF TRANSVERSE PROCESS OF LUMBAR VERTEBRA, INITIAL ENCOUNTER (HCC): ICD-10-CM

## 2022-09-30 DIAGNOSIS — R26.2 AMBULATORY DYSFUNCTION: ICD-10-CM

## 2022-09-30 PROCEDURE — 97110 THERAPEUTIC EXERCISES: CPT

## 2022-09-30 PROCEDURE — 97140 MANUAL THERAPY 1/> REGIONS: CPT

## 2022-09-30 NOTE — PROGRESS NOTES
Daily Note     Today's date: 2022  Patient name: Odalys Manzanares  : 1945  MRN: [id number]  Referring provider: Ayaka Garcia DO  Dx:   Encounter Diagnosis     ICD-10-CM    1  Right arm pain  M79 601    2  History of stroke  Z86 73    3  L3 osteophyte fracture  S32 009A    4  Multiple fractures  T07  XXXA    5  Closed fracture of transverse process of lumbar vertebra, initial encounter ([facility] [address] )  S32 009A    6  Ambulatory dysfunction  R26 2        Start Time: 1400  Stop Time: 4731  Total time in clinic (min): 45 minutes    Subjective: Patient states his shoulder is sore  Thinks it was from sweeping the carpets the other day  States his feet have been doing better  Objective: See treatment diary below       Assessment: Tolerated treatment well  Mild anterior shoulder pain throughout session especially with flexion/scaption ROM  Early scapular elevation observed that improved with scapular reposition test  Patient would benefit from continued PT      Plan: Continue per plan of care        Precautions: standard, spinal fracture precautions, LSO brace      Manuals 9/13 9/15 9/20 9/27 9/30        PROM nv IN flex/ER IN flex/ER IN flex/ER ECR flex & scaption         LASER nv                                      Neuro Re-Ed                                                                                                        Ther Ex             Supine Cane Flexion nv   3x10 5" hold 3x10 5" hold        Table Slides nv            [place]  3x10 3x10 flex 3x10 flex/scap 3x10 flex/scap        Sidelying ER  2x10 2x15 3x10 1# 3x10 1#        Sidelying Flex  2x10 w/ PT assist 2x15 3x10 1# 3x10 [place] PT assist                     UBE   3' fwd np resume 3'/3'        [place] HEP 7' 7' 7' 7'        Ther Activity                                       Gait Training                                       Modalities

## 2022-09-30 NOTE — TELEPHONE ENCOUNTER
Wife called Isauro's BP ranging  -133 DBP 72-90   Pt denies chest pain, palpations, LL edema    Those are prior to medications    Advised would take about 1 to 2 hrs after medication  Can call next week with those readings     Chlorthalidone 25 mg qd       Please advise

## 2022-10-04 ENCOUNTER — OFFICE VISIT (OUTPATIENT)
Dept: PHYSICAL THERAPY | Facility: REHABILITATION | Age: 77
End: 2022-10-04
Payer: MEDICARE

## 2022-10-04 DIAGNOSIS — T07.XXXA MULTIPLE FRACTURES: ICD-10-CM

## 2022-10-04 DIAGNOSIS — M79.601 RIGHT ARM PAIN: Primary | ICD-10-CM

## 2022-10-04 DIAGNOSIS — Z86.73 HISTORY OF STROKE: ICD-10-CM

## 2022-10-04 DIAGNOSIS — S32.009A CLOSED FRACTURE OF TRANSVERSE PROCESS OF LUMBAR VERTEBRA, INITIAL ENCOUNTER (HCC): ICD-10-CM

## 2022-10-04 DIAGNOSIS — R26.2 AMBULATORY DYSFUNCTION: ICD-10-CM

## 2022-10-04 DIAGNOSIS — S32.009A CLOSED FRACTURE OF LUMBAR VERTEBRA, UNSPECIFIED FRACTURE MORPHOLOGY, INITIAL ENCOUNTER (HCC): ICD-10-CM

## 2022-10-04 PROCEDURE — 97112 NEUROMUSCULAR REEDUCATION: CPT

## 2022-10-04 PROCEDURE — 97110 THERAPEUTIC EXERCISES: CPT

## 2022-10-04 NOTE — PROGRESS NOTES
Daily Note     Today's date: 10/4/2022  Patient name: Charmaine Wilhelm  : 1945  MRN: [id number]  Referring provider: Carla Ledesma DO  Dx:   Encounter Diagnosis     ICD-10-CM    1  Right arm pain  M79 601    2  Ambulatory dysfunction  R26 2    3  Closed fracture of transverse process of lumbar vertebra, initial encounter ([facility] [address] )  S32 009A    4  History of stroke  Z86 73    5  L3 osteophyte fracture  S32 009A    6  Multiple fractures  T07  XXXA        Start Time: 7042  Stop Time: 1300  Total time in clinic (min): 45 minutes    Subjective: Patient reports shoulder is getting better  Patient reports he still gets a 7-8/10 pain with OHAs which subsides with rest        Objective: See treatment diary below      Assessment: Pain levels improved during activities with cueing to reduce scapular elevation  Improved pain levels end of session with TB activities  Patient would benefit from continued PT      Plan: Continue per plan of care        Precautions: standard, spinal fracture precautions, LSO brace      Manuals 9/13 9/15 9/20 9/27 9/30 10       PROM nv KS flex/ER KS flex/ER KS flex/ER ECR flex & scaption         LASER nv                                      Neuro Re-Ed             TB Rows      YTB 2x10       TB Extensions      YTB 2x10                                                                        Ther Ex             Supine Cane Flexion nv   3x10 5" hold 3x10 5" hold 3x10 5" hold       Table Slides nv            [place]  3x10 3x10 flex 3x10 flex/scap 3x10 flex/scap 3x10 flex       Sidelying ER  2x10 2x15 3x10 1# 3x10 1# 3x10 1#       Sidelying Flex  2x10 w/ PT assist 2x15 3x10 1# 3x10 [place] PT assist 3x10 ranger assist                    UBE   3' fwd np resume 3'/3' 3'/3'       [place] HEP 7' 7' 7' 7' 7'       Ther Activity                                       Gait Training                                       Modalities

## 2022-10-07 ENCOUNTER — OFFICE VISIT (OUTPATIENT)
Dept: PHYSICAL THERAPY | Facility: REHABILITATION | Age: 77
End: 2022-10-07
Payer: MEDICARE

## 2022-10-07 DIAGNOSIS — M79.601 RIGHT ARM PAIN: ICD-10-CM

## 2022-10-07 DIAGNOSIS — R26.2 AMBULATORY DYSFUNCTION: ICD-10-CM

## 2022-10-07 DIAGNOSIS — S32.009A CLOSED FRACTURE OF LUMBAR VERTEBRA, UNSPECIFIED FRACTURE MORPHOLOGY, INITIAL ENCOUNTER (HCC): Primary | ICD-10-CM

## 2022-10-07 DIAGNOSIS — T07.XXXA MULTIPLE FRACTURES: ICD-10-CM

## 2022-10-07 DIAGNOSIS — Z86.73 HISTORY OF STROKE: ICD-10-CM

## 2022-10-07 DIAGNOSIS — S32.009A CLOSED FRACTURE OF TRANSVERSE PROCESS OF LUMBAR VERTEBRA, INITIAL ENCOUNTER (HCC): ICD-10-CM

## 2022-10-07 PROCEDURE — 97110 THERAPEUTIC EXERCISES: CPT

## 2022-10-07 PROCEDURE — 97112 NEUROMUSCULAR REEDUCATION: CPT

## 2022-10-07 NOTE — PROGRESS NOTES
Daily Note     Today's date: 10/7/2022  Patient name: Silvana Brambila  : 1945  MRN: [id number]  Referring provider: Rivas Nails DO  Dx:   Encounter Diagnosis     ICD-10-CM    1  L3 osteophyte fracture  S32 009A    2  Ambulatory dysfunction  R26 2    3  Multiple fractures  T07  XXXA    4  Closed fracture of transverse process of lumbar vertebra, initial encounter ([facility] [address] )  S32 009A    5  Right arm pain  M79 601    6  History of stroke  Z86 73        Start Time: 1330  Stop Time: 1415  Total time in clinic (min): 45 minutes    Subjective: Patient reports improvements in soreness since last visit  Patient reports his shoulder is sore today from blowing leaves earlier  Objective: See treatment diary below      Assessment: Patient has good improvements in shoulder soreness and function end of session with stretching and TE  Continue to progress as toelratd  Plan to add theraband exercises in next visit  Also plan to add some lower extremity strengthening in as well  Patient would benefit from continued PT      Plan: Continue per plan of care        Precautions: standard, spinal fracture precautions, LSO brace      Manuals 9/13 9/15 9/20 9/27 9/30 10/4 10/7      PROM nv [place] flex/ER [place] flex/ER [place] flex/ER ECR flex & scaption         LASER nv                                      Neuro Re-Ed             TB Rows      YTB 2x10 ytb 3x10      TB Extensions      YTB 2x10 ytb 3x10      TB ER/IR       ytb 2x10      STS       nv      Leg Press       nv                                Ther Ex             Supine Cane Flexion nv   3x10 5" hold 3x10 5" hold 3x10 5" hold 3x10 5" hold      Table Slides nv            [place]  3x10 3x10 flex 3x10 flex/scap 3x10 flex/scap 3x10 flex 3x10 flex      Sidelying ER  2x10 2x15 3x10 1# 3x10 1# 3x10 1# 2x10 2#      Sidelying Flex  2x10 w/ PT assist 2x15 3x10 1# 3x10 [place] PT assist 3x10 ranger assist 3x10 ranger assist                   UBE   3' fwd np resume 3'/3' 3'/3' 3'/3'      [place] HEP 7' 7' 7' 7' 7' 7'      Ther Activity                                       Gait Training                                       Modalities

## 2022-10-11 ENCOUNTER — OFFICE VISIT (OUTPATIENT)
Dept: PHYSICAL THERAPY | Facility: REHABILITATION | Age: 77
End: 2022-10-11
Payer: MEDICARE

## 2022-10-11 DIAGNOSIS — S32.009A CLOSED FRACTURE OF LUMBAR VERTEBRA, UNSPECIFIED FRACTURE MORPHOLOGY, INITIAL ENCOUNTER (HCC): Primary | ICD-10-CM

## 2022-10-11 DIAGNOSIS — S32.009A CLOSED FRACTURE OF TRANSVERSE PROCESS OF LUMBAR VERTEBRA, INITIAL ENCOUNTER (HCC): ICD-10-CM

## 2022-10-11 DIAGNOSIS — R26.2 AMBULATORY DYSFUNCTION: ICD-10-CM

## 2022-10-11 DIAGNOSIS — Z86.73 HISTORY OF STROKE: ICD-10-CM

## 2022-10-11 DIAGNOSIS — T07.XXXA MULTIPLE FRACTURES: ICD-10-CM

## 2022-10-11 DIAGNOSIS — M79.601 RIGHT ARM PAIN: ICD-10-CM

## 2022-10-11 PROCEDURE — 97110 THERAPEUTIC EXERCISES: CPT

## 2022-10-11 PROCEDURE — 97112 NEUROMUSCULAR REEDUCATION: CPT

## 2022-10-11 NOTE — PROGRESS NOTES
Daily Note     Today's date: 10/11/2022  Patient name: Delaney Norton  : 1945  MRN: [id number]  Referring provider: Amada Sidhu DO  Dx:   Encounter Diagnosis     ICD-10-CM    1  L3 osteophyte fracture  S32 009A    2  Right arm pain  M79 601    3  Ambulatory dysfunction  R26 2    4  History of stroke  Z86 73    5  Multiple fractures  T07  XXXA    6  Closed fracture of transverse process of lumbar vertebra, [facility]  S32 009A        Start Time: 1205  Stop Time: 1250  Total time in clinic (min): 45 minutes    Subjective: Patient reports his shoulder is sore today after moving furniture around in his house for the carpet   Objective: See treatment diary below      Assessment: Patient continues to have improvements in symptoms with scapular neuro re-ed exercises with emphasis on SA activation  Updated HEP, see below  Patient would benefit from continued PT  Plan: Continue per plan of care        Precautions: standard, spinal fracture precautions, LSO brace      Manuals 9/13 9/15 9/20 9/27 9/30 10/4 10/7 10/11     PROM nv CO flex/ER CO flex/ER CO flex/ER ECR flex & scaption         LASER nv                                      Neuro Re-Ed             TB Rows      YTB 2x10 ytb 3x10 ytb [id number]     TB Extensions      YTB 2x10 ytb 3x10 ytb [id number]     TB ER/IR       ytb 2x10 ytb [id number]     STS       nv      Leg Press       nv      Supine SA KB Press        3# kb 2x10                  Ther Ex             Supine Cane Flexion nv   3x10 5" hold 3x10 5" hold 3x10 5" hold 3x10 5" hold 3x10 5" hold     Table Slides nv            [place]  3x10 3x10 flex 3x10 flex/scap 3x10 flex/scap 3x10 flex 3x10 flex      Sidelying ER  2x10 2x15 3x10 1# 3x10 1# 3x10 1# 2x10 2# 2x10 2#     Sidelying Flex  2x10 w/ PT assist 2x15 3x10 1# 3x10 [place] PT assist 3x10 ranger assist 3x10 ranger assist 3x10 ranger assist                  UBE   3' fwd np resume 3'/3' 3'/3' 3'/3' 3'/3'     [place] HEP 7' 7' 7' 7' 7' 7' 7     Ther Activity                                       Gait Training                                       Modalities

## 2022-10-12 PROBLEM — Z00.00 MEDICARE ANNUAL WELLNESS VISIT, SUBSEQUENT: Status: RESOLVED | Noted: 2019-04-19 | Resolved: 2022-10-12

## 2022-10-12 PROBLEM — Z12.5 SPECIAL SCREENING FOR MALIGNANT NEOPLASM OF PROSTATE: Status: RESOLVED | Noted: 2021-12-16 | Resolved: 2022-10-12

## 2022-10-14 ENCOUNTER — OFFICE VISIT (OUTPATIENT)
Dept: PHYSICAL THERAPY | Facility: REHABILITATION | Age: 77
End: 2022-10-14
Payer: MEDICARE

## 2022-10-14 DIAGNOSIS — S32.009A CLOSED FRACTURE OF LUMBAR VERTEBRA, UNSPECIFIED FRACTURE MORPHOLOGY, INITIAL ENCOUNTER (HCC): ICD-10-CM

## 2022-10-14 DIAGNOSIS — Z86.73 HISTORY OF STROKE: ICD-10-CM

## 2022-10-14 DIAGNOSIS — M79.601 RIGHT ARM PAIN: ICD-10-CM

## 2022-10-14 DIAGNOSIS — R26.2 AMBULATORY DYSFUNCTION: ICD-10-CM

## 2022-10-14 DIAGNOSIS — S32.009A CLOSED FRACTURE OF TRANSVERSE PROCESS OF LUMBAR VERTEBRA, INITIAL ENCOUNTER (HCC): ICD-10-CM

## 2022-10-14 DIAGNOSIS — T07.XXXA MULTIPLE FRACTURES: Primary | ICD-10-CM

## 2022-10-14 PROCEDURE — 97110 THERAPEUTIC EXERCISES: CPT

## 2022-10-14 PROCEDURE — 97112 NEUROMUSCULAR REEDUCATION: CPT

## 2022-10-14 NOTE — PROGRESS NOTES
Daily Note     Today's date: 10/14/2022  Patient name: Mikie Winters  : 1945  MRN: [id number]  Referring provider: Gordon Bradley DO  Dx:   Encounter Diagnosis     ICD-10-CM    1  Multiple fractures  T07  XXXA    2  L3 osteophyte fracture  S32 009A    3  Closed fracture of transverse process of lumbar vertebra, initial encounter ([facility] [address] )  S32 009A    4  Right arm pain  M79 601    5  Ambulatory dysfunction  R26 2    6  History of stroke  Z86 73        Start Time: 1230  Stop Time: 1315  Total time in clinic (min): 45 minutes   Patient 1 on 1 with PT from 3502-1004  Subjective: Patient reports he felt good with the updated HEP  Objective: See treatment diary below      Assessment: Patient had good tolerance for all activities  Slight soreness in anterior shoulder today that improves with TE  Continue to progress patient as tolerated  Patient would benefit from continued PT      Plan: Continue per plan of care        Precautions: standard, spinal fracture precautions, LSO brace      Manuals 9/13 9/15 9/20 9/27 9/30 10/4 10/7 10/11 10/14    PROM nv [place] flex/ER [place] flex/ER [place] flex/ER ECR flex & scaption         LASER nv                                      Neuro Re-Ed             TB Rows      YTB 2x10 ytb 3x10 ytb [id number] rtb 3x10    TB Extensions      YTB 2x10 ytb 3x10 ytb [id number] rtb 3x10    TB ER/IR       ytb 2x10 ytb [id number] rtb 3x10    STS       nv      Leg Press       nv      Supine SA KB Press        3# kb 2x10 3# kb 2x10                 Ther Ex             Supine Cane Flexion nv   3x10 5" hold 3x10 5" hold 3x10 5" hold 3x10 5" hold 3x10 5" hold 3x10 5" hold    Table Slides nv            [place]  3x10 3x10 flex 3x10 flex/scap 3x10 flex/scap 3x10 flex 3x10 flex      Sidelying ER  2x10 2x15 3x10 1# 3x10 1# 3x10 1# 2x10 2# 2x10 2# 3x10 2#    Sidelying Flex  2x10 w/ PT assist 2x15 3x10 1# 3x10 [place] PT assist 3x10 ranger assist 3x10 ranger assist 3x10 ranger assist 3x10 ranger assist                 UBE 3' fwd np resume 3'/3' 3'/3' 3'/3' 3'/3' 3'/3'    [place] HEP 7' 7' 7' 7' 7' 7' 7 7'    Ther Activity                                       Gait Training                                       Modalities

## 2022-10-18 ENCOUNTER — OFFICE VISIT (OUTPATIENT)
Dept: PHYSICAL THERAPY | Facility: REHABILITATION | Age: 77
End: 2022-10-18
Payer: MEDICARE

## 2022-10-18 DIAGNOSIS — Z86.73 HISTORY OF STROKE: ICD-10-CM

## 2022-10-18 DIAGNOSIS — M79.601 RIGHT ARM PAIN: ICD-10-CM

## 2022-10-18 DIAGNOSIS — R26.2 AMBULATORY DYSFUNCTION: Primary | ICD-10-CM

## 2022-10-18 DIAGNOSIS — T07.XXXA MULTIPLE FRACTURES: ICD-10-CM

## 2022-10-18 DIAGNOSIS — S32.009A CLOSED FRACTURE OF TRANSVERSE PROCESS OF LUMBAR VERTEBRA, INITIAL ENCOUNTER (HCC): ICD-10-CM

## 2022-10-18 DIAGNOSIS — S32.009A CLOSED FRACTURE OF LUMBAR VERTEBRA, UNSPECIFIED FRACTURE MORPHOLOGY, INITIAL ENCOUNTER (HCC): ICD-10-CM

## 2022-10-18 PROCEDURE — 97112 NEUROMUSCULAR REEDUCATION: CPT

## 2022-10-18 PROCEDURE — 97110 THERAPEUTIC EXERCISES: CPT
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
## 2022-10-18 NOTE — PROGRESS NOTES
Daily Note     Today's date: 10/18/2022  Patient name: Silvana Brambila  : 1945  MRN: [id number]  Referring provider: Rivas Nails DO  Dx:   Encounter Diagnosis     ICD-10-CM    1  Ambulatory dysfunction  R26 2    2  Multiple fractures  T07  XXXA    3  L3 osteophyte fracture  S32 009A    4  Closed fracture of transverse process of lumbar vertebra, initial encounter ([facility] [address] )  S32 009A    5  Right arm pain  M79 601    6  History of stroke  Z86 73                   Subjective: patient reports his shoulder is doing well  Objective: See treatment diary below      Assessment: Tolerated treatment {Tolerated treatment :[id number]}  Patient would benefit from continued PT      Plan: Continue per plan of care        Precautions: standard, spinal fracture precautions, LSO brace      Manuals 9/13 9/15 9/20 9/27 9/30 10/4 10/7 10/11 10/14 10/18   PROM nv OK flex/ER OK flex/ER OK flex/ER ECR flex & scaption         LASER nv                                      Neuro Re-Ed             TB Rows      YTB 2x10 ytb 3x10 ytb [id number] rtb 3x10 rtb 3x10   TB Extensions      YTB 2x10 ytb 3x10 ytb [id number] rtb 3x10 rtb 3x10   TB ER/IR       ytb 2x10 ytb [id number] rtb 3x10 rtb 3x10   STS       nv      Leg Press       nv      Supine SA KB Press        3# kb 2x10 3# kb 2x10 3# kb 3x10                Ther Ex             Supine Cane Flexion nv   3x10 5" hold 3x10 5" hold 3x10 5" hold 3x10 5" hold 3x10 5" hold 3x10 5" hold 3x10 5" hold   Table Slides nv            [place]  3x10 3x10 flex 3x10 flex/scap 3x10 flex/scap 3x10 flex 3x10 flex      Sidelying ER  2x10 2x15 3x10 1# 3x10 1# 3x10 1# 2x10 2# 2x10 2# 3x10 2# 3x10 2#   Sidelying Flex  2x10 w/ PT assist 2x15 3x10 1# 3x10 [place] PT assist 3x10 ranger assist 3x10 ranger assist 3x10 ranger assist 3x10 ranger assist 3x10 ranger assist                UBE   3' fwd np resume 3'/3' 3'/3' 3'/3' 3'/3' 3'/3'    [place] HEP 7' 7' 7' 7' 7' 7' 7 7' 7'   Ther Activity Gait Training                                       Modalities

## 2022-10-20 ENCOUNTER — HOSPITAL ENCOUNTER (EMERGENCY)
Facility: HOSPITAL | Age: 77
Discharge: HOME | End: 2022-10-20
Attending: EMERGENCY MEDICINE
Payer: MEDICARE

## 2022-10-20 ENCOUNTER — APPOINTMENT (EMERGENCY)
Dept: RADIOLOGY | Facility: HOSPITAL | Age: 77
End: 2022-10-20
Payer: MEDICARE

## 2022-10-20 VITALS
HEART RATE: 63 BPM | DIASTOLIC BLOOD PRESSURE: 69 MMHG | SYSTOLIC BLOOD PRESSURE: 160 MMHG | OXYGEN SATURATION: 98 % | RESPIRATION RATE: 17 BRPM | TEMPERATURE: 97.7 F

## 2022-10-20 DIAGNOSIS — M10.9 GOUT OF RIGHT ANKLE: Primary | ICD-10-CM

## 2022-10-20 LAB
ALBUMIN SERPL BCP-MCNC: 4 G/DL (ref 3.5–5)
ALP SERPL-CCNC: 63 U/L (ref 34–104)
ALT SERPL W P-5'-P-CCNC: 10 U/L (ref 7–52)
ANION GAP SERPL CALCULATED.3IONS-SCNC: 7 MMOL/L (ref 4–13)
APTT PPP: 24 SECONDS (ref 23–37)
AST SERPL W P-5'-P-CCNC: 12 U/L (ref 13–39)
B BURGDOR IGG+IGM SER-ACNC: <0.2 AI
BASOPHILS # BLD AUTO: 0.03 THOUSANDS/ÂΜL (ref 0–0.1)
BASOPHILS NFR BLD AUTO: 0 % (ref 0–1)
BILIRUB SERPL-MCNC: 0.96 MG/DL (ref 0.2–1)
BILIRUB UR QL STRIP: NEGATIVE
BUN SERPL-MCNC: 24 MG/DL (ref 5–25)
CALCIUM SERPL-MCNC: 9.8 MG/DL (ref 8.4–10.2)
CHLORIDE SERPL-SCNC: 100 MMOL/L (ref 96–108)
CLARITY UR: CLEAR
CO2 SERPL-SCNC: 32 MMOL/L (ref 21–32)
COLOR UR: NORMAL
CREAT SERPL-MCNC: 1.25 MG/DL (ref 0.6–1.3)
CRP SERPL QL: 30.1 MG/L
EOSINOPHIL # BLD AUTO: 0.08 THOUSAND/ÂΜL (ref 0–0.61)
EOSINOPHIL NFR BLD AUTO: 1 % (ref 0–6)
ERYTHROCYTE [DISTWIDTH] IN BLOOD BY AUTOMATED COUNT: 12.9 % (ref 11.6–15.1)
ERYTHROCYTE [SEDIMENTATION RATE] IN BLOOD: 19 MM/HOUR (ref 0–19)
GFR SERPL CREATININE-BSD FRML MDRD: 55 ML/MIN/1.73SQ M
GLUCOSE SERPL-MCNC: 166 MG/DL (ref 65–140)
GLUCOSE UR STRIP-MCNC: NEGATIVE MG/DL
HCT VFR BLD AUTO: 46.9 % (ref 36.5–49.3)
HGB BLD-MCNC: 15.2 G/DL (ref 12–17)
HGB UR QL STRIP.AUTO: NEGATIVE
IMM GRANULOCYTES # BLD AUTO: 0.05 THOUSAND/UL (ref 0–0.2)
IMM GRANULOCYTES NFR BLD AUTO: 1 % (ref 0–2)
INR PPP: 0.98 (ref 0.84–1.19)
KETONES UR STRIP-MCNC: NEGATIVE MG/DL
LACTATE SERPL-SCNC: 1.2 MMOL/L (ref 0.5–2)
LEUKOCYTE ESTERASE UR QL STRIP: NEGATIVE
LYMPHOCYTES # BLD AUTO: 2.17 THOUSANDS/ÂΜL (ref 0.6–4.47)
LYMPHOCYTES NFR BLD AUTO: 23 % (ref 14–44)
MCH RBC QN AUTO: 28.3 PG (ref 26.8–34.3)
MCHC RBC AUTO-ENTMCNC: 32.4 G/DL (ref 31.4–37.4)
MCV RBC AUTO: 87 FL (ref 82–98)
MONOCYTES # BLD AUTO: 0.86 THOUSAND/ÂΜL (ref 0.17–1.22)
MONOCYTES NFR BLD AUTO: 9 % (ref 4–12)
NEUTROPHILS # BLD AUTO: 6.38 THOUSANDS/ÂΜL (ref 1.85–7.62)
NEUTS SEG NFR BLD AUTO: 66 % (ref 43–75)
NITRITE UR QL STRIP: NEGATIVE
NRBC BLD AUTO-RTO: 0 /100 WBCS
PH UR STRIP.AUTO: 5.5 [PH]
PLATELET # BLD AUTO: 165 THOUSANDS/UL (ref 149–390)
PMV BLD AUTO: 10.6 FL (ref 8.9–12.7)
POTASSIUM SERPL-SCNC: 4 MMOL/L (ref 3.5–5.3)
PROT SERPL-MCNC: 7.1 G/DL (ref 6.4–8.4)
PROT UR STRIP-MCNC: NEGATIVE MG/DL
PROTHROMBIN TIME: 13.2 SECONDS (ref 11.6–14.5)
RBC # BLD AUTO: 5.38 MILLION/UL (ref 3.88–5.62)
SODIUM SERPL-SCNC: 139 MMOL/L (ref 135–147)
SP GR UR STRIP.AUTO: 1.02 (ref 1–1.03)
URATE SERPL-MCNC: 8.5 MG/DL (ref 3.5–8.5)
UROBILINOGEN UR STRIP-ACNC: <2 MG/DL
WBC # BLD AUTO: 9.57 THOUSAND/UL (ref 4.31–10.16)

## 2022-10-20 PROCEDURE — 85652 RBC SED RATE AUTOMATED: CPT | Performed by: PHYSICIAN ASSISTANT

## 2022-10-20 PROCEDURE — 73610 X-RAY EXAM OF ANKLE: CPT

## 2022-10-20 PROCEDURE — 80053 COMPREHEN METABOLIC PANEL: CPT | Performed by: PHYSICIAN ASSISTANT

## 2022-10-20 PROCEDURE — 81003 URINALYSIS AUTO W/O SCOPE: CPT | Performed by: PHYSICIAN ASSISTANT

## 2022-10-20 PROCEDURE — 86618 LYME DISEASE ANTIBODY: CPT | Performed by: PHYSICIAN ASSISTANT

## 2022-10-20 PROCEDURE — 86140 C-REACTIVE PROTEIN: CPT | Performed by: PHYSICIAN ASSISTANT

## 2022-10-20 PROCEDURE — 87040 BLOOD CULTURE FOR BACTERIA: CPT | Performed by: PHYSICIAN ASSISTANT

## 2022-10-20 PROCEDURE — 36415 COLL VENOUS BLD VENIPUNCTURE: CPT | Performed by: PHYSICIAN ASSISTANT

## 2022-10-20 PROCEDURE — 96374 THER/PROPH/DIAG INJ IV PUSH: CPT

## 2022-10-20 PROCEDURE — 96361 HYDRATE IV INFUSION ADD-ON: CPT

## 2022-10-20 PROCEDURE — 96375 TX/PRO/DX INJ NEW DRUG ADDON: CPT

## 2022-10-20 PROCEDURE — 99284 EMERGENCY DEPT VISIT MOD MDM: CPT

## 2022-10-20 PROCEDURE — 85025 COMPLETE CBC W/AUTO DIFF WBC: CPT | Performed by: PHYSICIAN ASSISTANT

## 2022-10-20 PROCEDURE — 85730 THROMBOPLASTIN TIME PARTIAL: CPT | Performed by: PHYSICIAN ASSISTANT

## 2022-10-20 PROCEDURE — 84550 ASSAY OF BLOOD/URIC ACID: CPT | Performed by: PHYSICIAN ASSISTANT

## 2022-10-20 PROCEDURE — 99285 EMERGENCY DEPT VISIT HI MDM: CPT | Performed by: PHYSICIAN ASSISTANT

## 2022-10-20 PROCEDURE — 83605 ASSAY OF LACTIC ACID: CPT | Performed by: PHYSICIAN ASSISTANT

## 2022-10-20 PROCEDURE — 85610 PROTHROMBIN TIME: CPT | Performed by: PHYSICIAN ASSISTANT

## 2022-10-20 RX ORDER — PREDNISONE 50 MG/1
50 TABLET ORAL DAILY
Qty: 4 TABLET | Refills: 0 | Status: SHIPPED | OUTPATIENT
Start: 2022-10-20 | End: 2022-10-24

## 2022-10-20 RX ORDER — ONDANSETRON 2 MG/ML
4 INJECTION INTRAMUSCULAR; INTRAVENOUS ONCE
Status: COMPLETED | OUTPATIENT
Start: 2022-10-20 | End: 2022-10-20

## 2022-10-20 RX ORDER — IBUPROFEN 400 MG/1
400 TABLET ORAL EVERY 8 HOURS SCHEDULED
Qty: 15 TABLET | Refills: 0 | Status: SHIPPED | OUTPATIENT
Start: 2022-10-20 | End: 2022-10-25

## 2022-10-20 RX ORDER — OXYCODONE HYDROCHLORIDE AND ACETAMINOPHEN 5; 325 MG/1; MG/1
1 TABLET ORAL EVERY 6 HOURS PRN
Qty: 10 TABLET | Refills: 0 | Status: SHIPPED | OUTPATIENT
Start: 2022-10-20 | End: 2022-10-30

## 2022-10-20 RX ADMIN — ONDANSETRON 4 MG: 2 INJECTION INTRAMUSCULAR; INTRAVENOUS at 09:40

## 2022-10-20 RX ADMIN — MORPHINE SULFATE 2 MG: 2 INJECTION, SOLUTION INTRAMUSCULAR; INTRAVENOUS at 09:44

## 2022-10-20 RX ADMIN — SODIUM CHLORIDE 500 ML: 0.9 INJECTION, SOLUTION INTRAVENOUS at 09:43

## 2022-10-20 RX ADMIN — PREDNISONE 50 MG: 20 TABLET ORAL at 10:59
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
## 2022-10-21 ENCOUNTER — OFFICE VISIT (OUTPATIENT)
Dept: PHYSICAL THERAPY | Facility: REHABILITATION | Age: 77
End: 2022-10-21
Payer: MEDICARE

## 2022-10-21 DIAGNOSIS — T07.XXXA MULTIPLE FRACTURES: ICD-10-CM

## 2022-10-21 DIAGNOSIS — S32.009A CLOSED FRACTURE OF TRANSVERSE PROCESS OF LUMBAR VERTEBRA, INITIAL ENCOUNTER (HCC): ICD-10-CM

## 2022-10-21 DIAGNOSIS — R26.2 AMBULATORY DYSFUNCTION: ICD-10-CM

## 2022-10-21 DIAGNOSIS — S32.009A CLOSED FRACTURE OF LUMBAR VERTEBRA, UNSPECIFIED FRACTURE MORPHOLOGY, INITIAL ENCOUNTER (HCC): ICD-10-CM

## 2022-10-21 DIAGNOSIS — Z86.73 HISTORY OF STROKE: ICD-10-CM

## 2022-10-21 DIAGNOSIS — M79.601 RIGHT ARM PAIN: Primary | ICD-10-CM

## 2022-10-21 PROCEDURE — 97110 THERAPEUTIC EXERCISES: CPT

## 2022-10-21 PROCEDURE — 97112 NEUROMUSCULAR REEDUCATION: CPT

## 2022-10-21 NOTE — PROGRESS NOTES
Daily Note     Today's date: 10/21/2022  Patient name: Reinaldo Deleon  : 1945  MRN: [id number]  Referring provider: Linda Shipman DO  Dx:   Encounter Diagnosis     ICD-10-CM    1  Right arm pain  M79 601    2  Ambulatory dysfunction  R26 2    3  Multiple fractures  T07  XXXA    4  History of stroke  Z86 73    5  L3 osteophyte fracture  S32 009A    6  [facility] process [facility] lumbar [facility]  S32 009A        Start Time: 1230  Stop Time: 1315  Total time in clinic (min): 45 minutes    Subjective: Patient reports he had a gout flare up last 3 days, reports he went to the ER yesterday because of the pain  Reports improvements in pain with new medications  Patient reports shoulder is doing well  Objective: See treatment diary below      Assessment: Patient progressing nicely  Anticipate d/c in 2 weeks  Continue to progress patient as tolerated  Patient would benefit from continued PT      Plan: Continue per plan of care        Precautions: standard, spinal fracture precautions, LSO brace    Manuals 10/21    9/30 10/4 10/7 10/11 10/14 10/18   PROM     ECR flex & scaption         LASER                          Re-Evaluation          15'   Neuro Re-Ed             TB Rows rtb 3x10     YTB 2x10 ytb 3x10 ytb [id number] rtb 3x10 rtb 3x10   TB Extensions rtb 3x10     YTB 2x10 ytb 3x10 ytb [id number] rtb 3x10 rtb 3x10   TB ER/IR rtb 3x10      ytb 2x10 ytb [id number] rtb 3x10 rtb 3x10   STS       nv      Leg Press       nv      Supine SA KB Press 3# kb 3x10       3# kb 2x10 3# kb 2x10 3# kb 3x10                Ther Ex             Supine Cane Flexion 3x10 5" hold    3x10 5" hold 3x10 5" hold 3x10 5" hold 3x10 5" hold 3x10 5" hold 3x10 5" hold   Table Slides             [place]     3x10 flex/scap 3x10 flex 3x10 flex      Sidelying ER 2x10 3#    3x10 1# 3x10 1# 2x10 2# 2x10 2# 3x10 2# 3x10 2#   Sidelying Flex 3x10 ranger    3x10 [place] PT assist 3x10 ranger assist 3x10 ranger assist 3x10 ranger assist 3x10 ranger assist 3x10 ranger assist                UBE 3'/3'    3'/3' 3'/3' 3'/3' 3'/3' 3'/3' 3'/3'   [place] 7'    7' 7' 7' 7 7' 7'   Ther Activity                                       Gait Training                                       Modalities

## 2022-10-23 LAB
BACTERIA BLD CULT: NORMAL
BACTERIA BLD CULT: NORMAL

## 2022-10-24 ENCOUNTER — OFFICE VISIT (OUTPATIENT)
Dept: FAMILY MEDICINE CLINIC | Facility: CLINIC | Age: 77
End: 2022-10-24
Payer: MEDICARE

## 2022-10-24 VITALS
RESPIRATION RATE: 18 BRPM | SYSTOLIC BLOOD PRESSURE: 140 MMHG | WEIGHT: 221.13 LBS | HEIGHT: 71 IN | OXYGEN SATURATION: 98 % | DIASTOLIC BLOOD PRESSURE: 80 MMHG | TEMPERATURE: 98 F | BODY MASS INDEX: 30.96 KG/M2 | HEART RATE: 61 BPM

## 2022-10-24 DIAGNOSIS — I10 BENIGN ESSENTIAL HYPERTENSION: Chronic | ICD-10-CM

## 2022-10-24 DIAGNOSIS — I35.0 SEVERE AORTIC STENOSIS: ICD-10-CM

## 2022-10-24 DIAGNOSIS — R33.9 URINARY RETENTION: ICD-10-CM

## 2022-10-24 DIAGNOSIS — F11.20 CONTINUOUS OPIOID DEPENDENCE (HCC): ICD-10-CM

## 2022-10-24 DIAGNOSIS — E11.49 TYPE 2 DIABETES MELLITUS WITH OTHER NEUROLOGIC COMPLICATION, WITHOUT LONG-TERM CURRENT USE OF INSULIN (HCC): Primary | ICD-10-CM

## 2022-10-24 LAB — SL AMB POCT HEMOGLOBIN AIC: 7.8 (ref ?–6.5)

## 2022-10-24 PROCEDURE — 99214 OFFICE O/P EST MOD 30 MIN: CPT | Performed by: FAMILY MEDICINE

## 2022-10-24 PROCEDURE — 83036 HEMOGLOBIN GLYCOSYLATED A1C: CPT | Performed by: FAMILY MEDICINE
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
## 2022-10-24 NOTE — ASSESSMENT & PLAN NOTE
Diabetes  A1c has risen to 7 8  He was given prescription to refill metformin at 1000 mg  Patient prefers take medication only once a day    His eye and foot exams are up-to-date  Lab Results   Component Value Date    HGBA1C 7 8 (A) 10/24/2022

## 2022-10-24 NOTE — ASSESSMENT & PLAN NOTE
Urinary retention  Patient feels that he has adequate urine flow with the use of tamsulosin 0 4 mg once daily    Patient did not wish to see Urology at this time

## 2022-10-24 NOTE — ASSESSMENT & PLAN NOTE
Aortic stenosis  Patient continues to follow-up with his cardiologist for monitoring of aortic stenosis    Patient states he is scheduled to have repeat echocardiogram in the near future for monitoring of condition

## 2022-10-24 NOTE — ASSESSMENT & PLAN NOTE
HPI: Abdomen


Time Seen by Provider: 04/24/18 08:40


Chief Complaint (Nursing): Abdominal Pain


History Per: Patient


Onset/Duration Of Symptoms: Other (1 week)


Current Symptoms Are (Timing): Still Present


Severity: Mild


Quality Of Discomfort: Gas


Associated Symptoms: Constipation


Exacerbating Factors: None


Alleviating Factors: None


Additional Complaint(s): 





Constipation x 1 week. Assoc with bloating and nausea but no vomiting or fever.


 Last BM 1 week ago. Passing gas





Past Medical History


Vital Signs: 


 Last Vital Signs











Temp  98.2 F   04/24/18 08:40


 


Pulse  78   04/24/18 08:40


 


Resp  18   04/24/18 08:40


 


BP  138/86   04/24/18 08:40


 


Pulse Ox  99   04/24/18 08:59














- Medical History


PMH: Bipolar Disorder, Depression





- Family History


Family History: States: Unknown Family Hx





- Home Medications


Home Medications: 


 Ambulatory Orders











 Medication  Instructions  Recorded


 


Nitrofurantoin Macrocrystals 100 mg PO BID #14 cap 09/10/16





[Macrobid]  


 


Polyethylene Glycol 3350 [Miralax] 17 gm PO DAILY #100 ml 04/24/18














- Allergies


Allergies/Adverse Reactions: 


 Allergies











Allergy/AdvReac Type Severity Reaction Status Date / Time


 


PORK Allergy  RASH Verified 01/26/17 17:30


 


beer Allergy  RASH Uncoded 01/26/17 17:30


 


caramel Allergy  RASH Uncoded 01/26/17 17:30


 


caramel color Allergy  RASH Uncoded 01/26/17 17:30














Review of Systems


Constitutional: Negative for: Fever


Gastrointestinal: Positive for: Nausea, Abdominal Pain, Constipation.  Negative 

for: Vomiting





Physical Exam





- Physical Exam


Appears: Positive for: Non-toxic, No Acute Distress


Skin: Positive for: Normal Color, Warm, DRY


Gastrointestinal/Abdominal: Positive for: Bowel Sounds, Soft.  Negative for: 

Tenderness, Distended


Extremity: Positive for: Normal ROM


Neurologic/Psych: Positive for: Alert, Oriented





- ECG


O2 Sat by Pulse Oximetry: 99





Disposition





- Clinical Impression


Clinical Impression: 


 Constipation








- Patient ED Disposition


Is Patient to be Admitted: No


Counseled Patient/Family Regarding: Studies Performed, Diagnosis, Need For 

Followup, Rx Given





- Disposition


Referrals: 


Gt Alex MD [Medical Doctor] - 


Disposition: Routine/Home


Disposition Time: 09:56


Condition: FAIR


Prescriptions: 


Polyethylene Glycol 3350 [Miralax] 17 gm PO DAILY #100 ml


Instructions:  Constipation in Adults


Forms:  [facility] (English) Hypertension  Patient blood pressure is stable at this time he will continue current regimen of medications

## 2022-10-25 ENCOUNTER — APPOINTMENT (OUTPATIENT)
Dept: PHYSICAL THERAPY | Facility: REHABILITATION | Age: 77
End: 2022-10-25

## 2022-10-25 LAB
BACTERIA BLD CULT: NORMAL
BACTERIA BLD CULT: NORMAL

## 2022-10-26 ENCOUNTER — OFFICE VISIT (OUTPATIENT)
Dept: PHYSICAL THERAPY | Facility: REHABILITATION | Age: 77
End: 2022-10-26
Payer: MEDICARE

## 2022-10-26 DIAGNOSIS — R26.2 AMBULATORY DYSFUNCTION: ICD-10-CM

## 2022-10-26 DIAGNOSIS — M79.601 RIGHT ARM PAIN: Primary | ICD-10-CM

## 2022-10-26 DIAGNOSIS — Z86.73 HISTORY OF STROKE: ICD-10-CM

## 2022-10-26 DIAGNOSIS — S32.009A CLOSED FRACTURE OF LUMBAR VERTEBRA, UNSPECIFIED FRACTURE MORPHOLOGY, INITIAL ENCOUNTER (HCC): ICD-10-CM

## 2022-10-26 DIAGNOSIS — S32.009A CLOSED FRACTURE OF TRANSVERSE PROCESS OF LUMBAR VERTEBRA, INITIAL ENCOUNTER (HCC): ICD-10-CM

## 2022-10-26 DIAGNOSIS — T07.XXXA MULTIPLE FRACTURES: ICD-10-CM

## 2022-10-26 PROCEDURE — 97112 NEUROMUSCULAR REEDUCATION: CPT

## 2022-10-26 PROCEDURE — 97110 THERAPEUTIC EXERCISES: CPT

## 2022-10-26 NOTE — PROGRESS NOTES
Daily Note     Today's date: 10/26/2022  Patient name: Silke Harris  : 1945  MRN: [id number]  Referring provider: Faiza Lawrence DO  Dx:   Encounter Diagnosis     ICD-10-CM    1  Right arm pain  M79 601    2  L3 osteophyte fracture  S32 009A    3  Closed fracture of transverse process of lumbar vertebra, initial encounter ([facility] [address] )  S32 009A    4  Ambulatory dysfunction  R26 2    5  Multiple fractures  T07  XXXA    6  History of stroke  Z86 73        Start Time: 1115  Stop Time: 1200  Total time in clinic (min): 45 minutes    Subjective: Patient reports shoulder is doing well  Patient reports it gets sore with heavy activities at home      Objective: See treatment diary below      Assessment: Good tolerance for all activities  Soreness improved end of session  Patient would benefit from continued PT      Plan: Continue per plan of care        Precautions: standard, spinal fracture precautions, LSO brace    Manuals 10/21 10/26   9/30 10/4 10/7 10/11 10/14 10/18   PROM     ECR flex & scaption         LASER                          Re-Evaluation          15'   Neuro Re-Ed             TB Rows rtb 3x10 gtb 3x10    YTB 2x10 ytb 3x10 ytb [id number] rtb 3x10 rtb 3x10   TB Extensions rtb 3x10 gtb 3x10    YTB 2x10 ytb 3x10 ytb [id number] rtb 3x10 rtb 3x10   TB ER/IR rtb 3x10 gtb 3x10     ytb 2x10 ytb [id number] rtb 3x10 rtb 3x10   STS       nv      Leg Press       nv      Supine SA KB Press 3# kb 3x10 3# kb 3x10      3# kb 2x10 3# kb 2x10 3# kb 3x10                Ther Ex             Supine Cane Flexion 3x10 5" hold 3x10 5" hold   3x10 5" hold 3x10 5" hold 3x10 5" hold 3x10 5" hold 3x10 5" hold 3x10 5" hold   Table Slides             Kirk     3x10 flex/scap 3x10 flex 3x10 flex      Sidelying ER 2x10 3# 3x10 3#   3x10 1# 3x10 1# 2x10 2# 2x10 2# 3x10 2# 3x10 2#   Sidelying Flex 3x10 ranger 4x10 ranger   3x10 Kirk PT assist 3x10 ranger assist 3x10 ranger assist 3x10 ranger assist 3x10 ranger assist 3x10 ranger [facility] Carries  10# kb 4 laps 50 ft           UBE 3'/3' 3'/3'   3'/3' 3'/3' 3'/3' 3'/3' 3'/3' 3'/3'   Pullies 7' 7'   7' 7' 7' 7 7' 7'   Ther Activity                                       Gait Training                                       Modalities

## 2022-10-27 ENCOUNTER — TELEPHONE (OUTPATIENT)
Dept: ADMINISTRATIVE | Facility: OTHER | Age: 77
End: 2022-10-27

## 2022-10-27 NOTE — TELEPHONE ENCOUNTER
Upon review of the In Basket request and the patient's chart, initial outreach has been made via fax to facility  , please see Contacts section for details       Thank you  Melody Eis

## 2022-10-27 NOTE — TELEPHONE ENCOUNTER
----- Message from Thania Ortega, [address] sent at 10/26/2022  1:31 PM EDT -----  Regarding: care gap request-DM Eye Exam  10/26/22 1:31 PM    Hello, our patient attached above has had Diabetic Eye Exam completed/performed  Please assist in updating the patient chart by making an External outreach to [facility], facility located throughout the , Tel #: [phone]  The date of service is within this past year (01/2022-10/2022), by Dr Gus Xiao      Thank you,  Shelia Moseley  [facility]

## 2022-10-27 NOTE — LETTER
2nd request  Diabetic Eye Exam Form    Date Requested: 22  Patient: Maddy Logan  Patient : 1945   Referring Provider: Kenya Brady MD      DIABETIC Eye Exam Date _______________________________      Type of Exam MUST be documented for Diabetic Eye Exams  Please CHECK ONE  Retinal Exam       Dilated Retinal Exam       OCT       Optomap-Iris Exam      Fundus Photography       Left Eye - Please check Retinopathy or No Retinopathy        Exam did show retinopathy    Exam did not show retinopathy       Right Eye - Please check Retinopathy or No Retinopathy       Exam did show retinopathy    Exam did not show retinopathy       Comments __________________________________________________________    Practice Providing Exam ______________________________________________    Exam Performed By (print name) _______________________________________      Provider Signature ___________________________________________________      These reports are needed for  compliance  Please fax this completed form and a copy of the Diabetic Eye Exam report to our office located at [place] [address] as soon as possible via [phone] attention Gretta: Phone [phone]  We thank you for your assistance in treating our mutual patient

## 2022-10-27 NOTE — LETTER
Diabetic Eye Exam Form    Date Requested: 10/27/22  Patient: Donald Lindsay  Patient : 1945   Referring Provider: Mk Manuel MD    DIABETIC Eye Exam Date _______________________________    Type of Exam MUST be documented for Diabetic Eye Exams  Please CHECK ONE  Retinal Exam       Dilated Retinal Exam       OCT       Optomap-Iris Exam      Fundus Photography     Left Eye - Please check Retinopathy AND Type or No Retinopathy      Exam did show retinopathy    Exam did not show retinopathy         Mild     Proliferative           Moderate    Severe            None         Right Eye - Please check Retinopathy AND Type or No Retinopathy     Exam did show retinopathy    Exam did not show retinopathy         Mild     Proliferative        Moderate    Severe        None       Comments __________________________________________________________    Practice Providing Exam ______________________________________________    Exam Performed By (print name) _______________________________________      Provider Signature ___________________________________________________    These reports are needed for  compliance  Please fax this completed form and a copy of the Diabetic Eye Exam report to our office located at [place] [address] as soon as possible via [phone] attention Gretta: Phone [phone]  We thank you for your assistance in treating our mutual patient

## 2022-10-28 ENCOUNTER — OFFICE VISIT (OUTPATIENT)
Dept: PHYSICAL THERAPY | Facility: REHABILITATION | Age: 77
End: 2022-10-28
Payer: MEDICARE

## 2022-10-28 DIAGNOSIS — M79.601 RIGHT ARM PAIN: Primary | ICD-10-CM

## 2022-10-28 DIAGNOSIS — S32.009A CLOSED FRACTURE OF TRANSVERSE PROCESS OF LUMBAR VERTEBRA, INITIAL ENCOUNTER (HCC): ICD-10-CM

## 2022-10-28 DIAGNOSIS — T07.XXXA MULTIPLE FRACTURES: ICD-10-CM

## 2022-10-28 DIAGNOSIS — Z86.73 HISTORY OF STROKE: ICD-10-CM

## 2022-10-28 DIAGNOSIS — S32.009A CLOSED FRACTURE OF LUMBAR VERTEBRA, UNSPECIFIED FRACTURE MORPHOLOGY, INITIAL ENCOUNTER (HCC): ICD-10-CM

## 2022-10-28 DIAGNOSIS — R26.2 AMBULATORY DYSFUNCTION: ICD-10-CM

## 2022-10-28 PROCEDURE — 97110 THERAPEUTIC EXERCISES: CPT

## 2022-10-28 PROCEDURE — 97112 NEUROMUSCULAR REEDUCATION: CPT

## 2022-10-28 NOTE — PROGRESS NOTES
Daily Note     Today's date: 10/28/2022  Patient name: Josue Du  : 1945  MRN: [id number]  Referring provider: Maurice Rodas DO  Dx:   Encounter Diagnosis     ICD-10-CM    1  Right arm pain  M79 601    2  History of stroke  Z86 73    3  L3 osteophyte fracture  S32 009A    4  Multiple fractures  T07  XXXA    5  Ambulatory dysfunction  R26 2    6  Closed fracture of transverse process of lumbar vertebra, [facility] )  S32 009A        Start Time: 1225  Stop Time: 1305  Total time in clinic (min): 40 minutes    Subjective: Patient reports soreness in shoulder with leaf-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  Objective: See treatment diary below      Assessment: Patient continues to better with each visit  Anticipate d/c next week unless there is a change in symptoms  Patient would benefit from continued PT      Plan: Continue per plan of care        Precautions: standard, spinal fracture precautions, LSO brace    Manuals 10/21 10/26 10/28  9/30 10/4 10/7 10/11 10/14 10/18   PROM     ECR flex & scaption         LASER                          Re-Evaluation          15'   Neuro Re-Ed             TB Rows rtb 3x10 gtb 3x10 btb 3x10   YTB 2x10 ytb 3x10 ytb [id number] rtb 3x10 rtb 3x10   TB Extensions rtb 3x10 gtb 3x10 btb 3x10   YTB 2x10 ytb 3x10 ytb [id number] rtb 3x10 rtb 3x10   TB ER/IR rtb 3x10 gtb 3x10 btb 2x10    ytb 2x10 ytb [id number] rtb 3x10 rtb 3x10   STS       nv      Leg Press       nv      Supine SA KB Press 3# kb 3x10 3# kb 3x10 3# kb 4x10     3# kb 2x10 3# kb 2x10 3# kb 3x10                Ther Ex             Supine Cane Flexion 3x10 5" hold 3x10 5" hold 3x10 5" hold  3x10 5" hold 3x10 5" hold 3x10 5" hold 3x10 5" hold 3x10 5" hold 3x10 5" hold   Table Slides             [place]     3x10 flex/scap 3x10 flex 3x10 flex      Sidelying ER 2x10 3# 3x10 3# 4x10 3#  3x10 1# 3x10 1# 2x10 2# 2x10 2# 3x10 2# 3x10 2#   Sidelying Flex 3x10 ranger 4x10 ranger 4x10 ranger  3x10 [place] PT assist 3x10 ranger assist 3x10 ranger assist 3x10 ranger assist 3x10 ranger assist 3x10 ranger assist   [employer]  10# kb 4 laps 50 ft 10# kb 4 laps 50 ft          UBE 3'/3' 3'/3' 3'/3'  3'/3' 3'/3' 3'/3' 3'/3' 3'/3' 3'/3'   Pullies 7' 7' 7'  7' 7' 7' 7 7' 7'   Ther Activity                                       Gait Training                                       Modalities

## 2022-11-01 ENCOUNTER — OFFICE VISIT (OUTPATIENT)
Dept: PHYSICAL THERAPY | Facility: REHABILITATION | Age: 77
End: 2022-11-01

## 2022-11-01 DIAGNOSIS — R26.2 AMBULATORY DYSFUNCTION: ICD-10-CM

## 2022-11-01 DIAGNOSIS — Z86.73 HISTORY OF STROKE: ICD-10-CM

## 2022-11-01 DIAGNOSIS — M79.601 RIGHT ARM PAIN: Primary | ICD-10-CM

## 2022-11-01 DIAGNOSIS — S32.009A CLOSED FRACTURE OF TRANSVERSE PROCESS OF LUMBAR VERTEBRA, INITIAL ENCOUNTER (HCC): ICD-10-CM

## 2022-11-01 DIAGNOSIS — S32.009A CLOSED FRACTURE OF LUMBAR VERTEBRA, UNSPECIFIED FRACTURE MORPHOLOGY, INITIAL ENCOUNTER (HCC): ICD-10-CM

## 2022-11-01 DIAGNOSIS — T07.XXXA MULTIPLE FRACTURES: ICD-10-CM

## 2022-11-01 NOTE — TELEPHONE ENCOUNTER
As a follow-up, a second attempt has been made for outreach via fax to facility  , please see Contacts section for details      Thank you  Michell Tadeo

## 2022-11-01 NOTE — PROGRESS NOTES
Daily Note     Today's date: 2022  Patient name: Jony Jackson  : 1945  MRN: [id number]  Referring provider: Rocio Simms DO  Dx:   Encounter Diagnosis     ICD-10-CM    1  Right arm pain  M79 601    2  Ambulatory dysfunction  R26 2    3  Closed fracture of transverse process of lumbar vertebra, initial encounter ([facility] )  S32 009A    4  L3 osteophyte fracture  S32 009A    5  History of stroke  Z86 73    6  Multiple fractures  T07  XXXA        Start Time: 3634  Stop Time: 1300  Total time in clinic (min): 45 minutes    Subjective: Patient reports he feels ready for d/c next visit  Objective: See treatment diary below      Assessment: Patient continues to improve with each visit  Patient will be discharged next visit unless there is a change in status  Patient would benefit from continued PT      Plan: Continue per plan of care        Precautions: standard, spinal fracture precautions, LSO brace    Manuals 10/21 10/26 10/28 11/1 9/30 10/4 10/7 10/11 10/14 10/18   PROM     ECR flex & scaption         LASER                          Re-Evaluation          15'   Neuro Re-Ed             TB Rows rtb 3x10 gtb 3x10 btb 3x10 btb 2x10  YTB 2x10 ytb 3x10 ytb [id number] rtb 3x10 rtb 3x10   TB Extensions rtb 3x10 gtb 3x10 btb 3x10 btb 2x10  YTB 2x10 ytb 3x10 ytb [id number] rtb 3x10 rtb 3x10   TB ER/IR rtb 3x10 gtb 3x10 btb 2x10 btb 2x10   ytb 2x10 ytb [id number] rtb 3x10 rtb 3x10   STS       nv      Leg Press       nv      Supine SA KB Press 3# kb 3x10 3# kb 3x10 3# kb 4x10 3# kb 4x10    3# kb 2x10 3# kb 2x10 3# kb 3x10                Ther Ex             Supine Cane Flexion 3x10 5" hold 3x10 5" hold 3x10 5" hold 3x10 5" hold 3x10 5" hold 3x10 5" hold 3x10 5" hold 3x10 5" hold 3x10 5" hold 3x10 5" hold   Table Slides             [place]     3x10 flex/scap 3x10 flex 3x10 flex      Sidelying ER 2x10 3# 3x10 3# 4x10 3# 4x10 3# 3x10 1# 3x10 1# 2x10 2# 2x10 2# 3x10 2# 3x10 2#   Sidelying Flex 3x10 ranger 4x10 ranger 4x10 ranger 4x10 ranger 3x10 [place] PT assist 3x10 ranger assist 3x10 ranger assist 3x10 ranger assist 3x10 ranger assist 3x10 ranger assist   [address]  10# kb 4 laps 50 ft 10# kb 4 laps 50 ft 10# kb 4 laps 50 ft         UBE 3'/3' 3'/3' 3'/3' 3'/3' 3'/3' 3'/3' 3'/3' 3'/3' 3'/3' 3'/3'   Pullies 7' 7' 7' 7' 7' 7' 7' 7 7' 7'   Ther Activity                                       Gait Training                                       Modalities

## 2022-11-04 ENCOUNTER — OFFICE VISIT (OUTPATIENT)
Dept: PHYSICAL THERAPY | Facility: REHABILITATION | Age: 77
End: 2022-11-04

## 2022-11-04 DIAGNOSIS — S32.009A CLOSED FRACTURE OF LUMBAR VERTEBRA, UNSPECIFIED FRACTURE MORPHOLOGY, INITIAL ENCOUNTER (HCC): ICD-10-CM

## 2022-11-04 DIAGNOSIS — T07.XXXA MULTIPLE FRACTURES: ICD-10-CM

## 2022-11-04 DIAGNOSIS — M79.601 RIGHT ARM PAIN: Primary | ICD-10-CM

## 2022-11-04 DIAGNOSIS — S32.009A CLOSED FRACTURE OF TRANSVERSE PROCESS OF LUMBAR VERTEBRA, INITIAL ENCOUNTER (HCC): ICD-10-CM

## 2022-11-04 DIAGNOSIS — Z86.73 HISTORY OF STROKE: ICD-10-CM

## 2022-11-04 DIAGNOSIS — R26.2 AMBULATORY DYSFUNCTION: ICD-10-CM
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
## 2022-11-08 NOTE — TELEPHONE ENCOUNTER
Upon review of the In Basket request we were able to locate, review, and update the patient chart as requested for Diabetic Eye Exam     Any additional questions or concerns should be emailed to the Practice Liaisons via the appropriate education email address, please do not reply via In Basket      Thank you  Charmayne Bathe

## 2022-11-09 ENCOUNTER — OFFICE VISIT (OUTPATIENT)
Dept: URGENT CARE | Facility: CLINIC | Age: 77
End: 2022-11-09

## 2022-11-09 VITALS
SYSTOLIC BLOOD PRESSURE: 147 MMHG | HEART RATE: 62 BPM | TEMPERATURE: 98.3 F | OXYGEN SATURATION: 99 % | RESPIRATION RATE: 16 BRPM | DIASTOLIC BLOOD PRESSURE: 69 MMHG

## 2022-11-09 DIAGNOSIS — M10.9 ACUTE GOUT OF RIGHT ANKLE, UNSPECIFIED CAUSE: Primary | ICD-10-CM

## 2022-11-09 DIAGNOSIS — L03.115 CELLULITIS OF RIGHT ANKLE: ICD-10-CM

## 2022-11-09 RX ORDER — METHYLPREDNISOLONE 4 MG/1
TABLET ORAL
Qty: 1 EACH | Refills: 0 | Status: SHIPPED | OUTPATIENT
Start: 2022-11-09

## 2022-11-09 RX ORDER — CLINDAMYCIN HYDROCHLORIDE 150 MG/1
150 CAPSULE ORAL EVERY 8 HOURS SCHEDULED
Qty: 21 CAPSULE | Refills: 0 | Status: SHIPPED | OUTPATIENT
Start: 2022-11-09 | End: 2022-11-16

## 2022-11-09 NOTE — PATIENT INSTRUCTIONS
Patient was educated on GOUT and infection  Patient was prescribed antibiotics and steroids  Patient was educated on side effects  Patient was told to eat on antibiotics  Patient was told sugars can increase on steroids  Patient was told if symptoms  get worst go to ED  Patient was told any increased redness or increase pain go to ED  Patient was told to not take OTC anti-inflammatory while on steroids  Gout   WHAT YOU NEED TO KNOW:   Gout is a form of arthritis that causes severe joint pain, redness, swelling, and stiffness  Acute gout pain starts suddenly, gets worse quickly, and stops on its own  Acute gout can become chronic and cause permanent damage to the joints  DISCHARGE INSTRUCTIONS:   Return to the emergency department if:   You have severe pain in one or more of your joints that you cannot tolerate  You have a fever or redness that spreads beyond the joint area  Call your doctor if:   You have new symptoms, such as a rash, after you start gout treatment  Your joint pain and swelling do not go away, even after treatment  You are not urinating as much or as often as you usually do  You have trouble taking your gout medicines  You have questions or concerns about your condition or care  Medicines: You may need any of the following:  Prescription pain medicine  may be given  Ask your healthcare provider how to take this medicine safely  Some prescription pain medicines contain acetaminophen  Do not take other medicines that contain acetaminophen without talking to your healthcare provider  Too much acetaminophen may cause liver damage  Prescription pain medicine may cause constipation  Ask your healthcare provider how to prevent or treat constipation  NSAIDs , such as ibuprofen, help decrease swelling, pain, and fever  This medicine is available with or without a doctor's order  NSAIDs can cause stomach bleeding or kidney problems in certain people   If you take blood thinner medicine, always ask your healthcare provider if NSAIDs are safe for you  Always read the medicine label and follow directions  Gout medicine  decreases joint pain and swelling  It may also be given to prevent new gout attacks  Steroids  reduce inflammation and can help your joint stiffness and pain during gout attacks  Uric acid medicine  may be given to reduce the amount of uric acid your body makes  Some medicines may help you pass more uric acid when you urinate  Take your medicine as directed  Contact your healthcare provider if you think your medicine is not helping or if you have side effects  Tell him or her if you are allergic to any medicine  Keep a list of the medicines, vitamins, and herbs you take  Include the amounts, and when and why you take them  Bring the list or the pill bottles to follow-up visits  Carry your medicine list with you in case of an emergency  Manage your symptoms:       Rest your painful joint so it can heal   Your healthcare provider may recommend crutches or a walker if the affected joint is in a leg  Apply ice to your joint  Ice decreases pain and swelling  Use an ice pack, or put crushed ice in a plastic bag  Cover the ice pack or bag with a towel before you apply it to your painful joint  Apply ice for 15 to 20 minutes every hour, or as directed  Elevate your joint  Elevation helps reduce swelling and pain  Raise your joint above the level of your heart as often as you can  Prop your painful joint on pillows to keep it above your heart comfortably  Go to physical therapy if directed  A physical therapist can teach you exercises to improve flexibility and range of motion  Help prevent gout attacks:   Do not eat high-purine foods  These foods include meats, seafood, asparagus, spinach, cauliflower, and some types of beans  Healthcare providers may tell you to eat more low-fat milk products, such as yogurt   Milk products may decrease your risk for gout attacks  Vitamin C and coffee may also help  Your healthcare provider or dietitian can help you create a meal plan  Drink liquids as directed  Liquids such as water help remove uric acid from your body  Ask how much liquid to drink each day and which liquids are best for you  Maintain a healthy weight  Weight loss may decrease the amount of uric acid in your body  Ask your healthcare provider what a healthy weight is for you  Ask him or her to help you create a weight loss plan if you are overweight  Control your blood sugar level if you have diabetes  Keep your blood sugar level in a normal range  This can help prevent gout attacks  Limit or do not drink alcohol as directed  Alcohol can trigger a gout attack  Alcohol also increases your risk for dehydration  Ask your healthcare provider if alcohol is safe for you  Follow up with your doctor as directed: You may be referred to a rheumatologist or podiatrist  Write down your questions so you remember to ask them during your visits  © [employer] 2022 Information is for End User's use only and may not be sold, redistributed or otherwise used for commercial purposes  All illustrations and images included in CareNotes® are the copyrighted property of [employer]  or [facility] [place]   The above information is an  only  It is not intended as medical advice for individual conditions or treatments  Talk to your doctor, nurse or pharmacist before following any medical regimen to see if it is safe and effective for you
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
## 2022-12-27 DIAGNOSIS — E11.49 TYPE 2 DIABETES MELLITUS WITH OTHER NEUROLOGIC COMPLICATION, WITHOUT LONG-TERM CURRENT USE OF INSULIN (HCC): ICD-10-CM

## 2022-12-28 ENCOUNTER — TELEPHONE (OUTPATIENT)
Dept: FAMILY MEDICINE CLINIC | Facility: CLINIC | Age: 77
End: 2022-12-28

## 2022-12-28 RX ORDER — ROSUVASTATIN CALCIUM 5 MG/1
5 TABLET, COATED ORAL DAILY
Qty: 30 TABLET | Refills: 5 | Status: SHIPPED | OUTPATIENT
Start: 2022-12-28 | End: 2022-12-29 | Stop reason: SDUPTHER

## 2022-12-28 NOTE — TELEPHONE ENCOUNTER
I had sent a request to [employer] regarding the rosuvastatin be sent over for 90 day supply and it was sent for 30 days, would it be okay if a 90 day script be sent over per patient request       Also, patient called for clarification on his metformin directions  He thought he was supposed to take a full tablet by mouth once daily not a half tablet  Please advise

## 2022-12-28 NOTE — TELEPHONE ENCOUNTER
I had sent a request to [employer] regarding the rosuvastatin be sent over for 90 day supply and it was sent for 30 days, would it be okay if a 90 day script be sent over per patient request      Also, patient called for clarification on his metformin directions  He thought he was supposed to take a full tablet by mouth once daily not a half tablet  Please advise

## 2022-12-29 ENCOUNTER — IN-CLINIC DEVICE VISIT (OUTPATIENT)
Dept: CARDIOLOGY CLINIC | Facility: CLINIC | Age: 77
End: 2022-12-29

## 2022-12-29 DIAGNOSIS — E11.49 TYPE 2 DIABETES MELLITUS WITH OTHER NEUROLOGIC COMPLICATION, WITHOUT LONG-TERM CURRENT USE OF INSULIN (HCC): ICD-10-CM

## 2022-12-29 DIAGNOSIS — Z95.0 PRESENCE OF PERMANENT CARDIAC PACEMAKER: Primary | ICD-10-CM

## 2022-12-29 RX ORDER — ROSUVASTATIN CALCIUM 5 MG/1
5 TABLET, COATED ORAL DAILY
Qty: 90 TABLET | Refills: 1 | Status: SHIPPED | OUTPATIENT
Start: 2022-12-29 | End: 2023-07-03

## 2022-12-29 NOTE — TELEPHONE ENCOUNTER
Spoke to patient, he is requesting a new script of metformin be sent to his pharmacy to change the instructions to state he is supposed to take 1 pill daily

## 2022-12-30 NOTE — PROGRESS NOTES
Results for orders placed or performed in visit on 12/29/22   Cardiac EP device report    Narrative    DEVICE INTERROGATED IN THE [place] OFFICE  BATTERY VOLTAGE ADEQUATE  (13 4 YRS) AP 61%  25%  ALL LEAD PARAMETERS WITHIN NORMAL LIMITS  NO SIGNIFICANT HIGH RATE EPISODES  NO PROGRAMMING CHANGES MADE TO DEVICE PARAMETERS  NORMAL DEVICE FUNCTION  ---APONTE

## 2023-01-19 ENCOUNTER — TELEPHONE (OUTPATIENT)
Dept: NEUROLOGY | Facility: CLINIC | Age: 78
End: 2023-01-19

## 2023-01-19 NOTE — TELEPHONE ENCOUNTER
Called Pt to speak to him about his wife's HFU  He asked about his f/u with Dr Alverto Frank  I let him know he was due for that appt and he asked about is ultrasounds  I made him aware that was due 6 months ago  Referral is expiring in 2 days  Can a new referral be placed please? Pt will need his f/u after that is done

## 2023-01-20 ENCOUNTER — HOSPITAL ENCOUNTER (OUTPATIENT)
Dept: NON INVASIVE DIAGNOSTICS | Facility: CLINIC | Age: 78
Discharge: HOME | End: 2023-01-20

## 2023-01-20 DIAGNOSIS — I65.23 BILATERAL CAROTID ARTERY STENOSIS: ICD-10-CM

## 2023-01-20 DIAGNOSIS — Z86.73 HISTORY OF STROKE: ICD-10-CM

## 2023-01-20 NOTE — TELEPHONE ENCOUNTER
Patient called and asked to schedule his follow up after completeling his ultrasound today  He asked to be scheduled in [place] only  I advised him that I will reach out to Dr Estephania Arroyo to confirm if he can be scheduled with him before April or if he can be scheduled with Yuni Fitch and call him back  Appt notes:  F/u valencia LOV: 12-30-21  Naoma Mood Naoma Mood Ultrasound completed 1-20-23

## 2023-01-23 ENCOUNTER — NURSE TRIAGE (OUTPATIENT)
Dept: OTHER | Facility: OTHER | Age: 78
End: 2023-01-23

## 2023-01-23 ENCOUNTER — NURSE TRIAGE (OUTPATIENT)
Dept: PHYSICAL THERAPY | Facility: OTHER | Age: 78
End: 2023-01-23

## 2023-01-23 ENCOUNTER — HOSPITAL ENCOUNTER (EMERGENCY)
Facility: HOSPITAL | Age: 78
Discharge: HOME | End: 2023-01-23
Attending: EMERGENCY MEDICINE

## 2023-01-23 VITALS
HEART RATE: 60 BPM | TEMPERATURE: 97.5 F | RESPIRATION RATE: 18 BRPM | DIASTOLIC BLOOD PRESSURE: 66 MMHG | SYSTOLIC BLOOD PRESSURE: 160 MMHG | OXYGEN SATURATION: 94 %

## 2023-01-23 DIAGNOSIS — M54.50 ACUTE LEFT-SIDED LOW BACK PAIN, UNSPECIFIED WHETHER SCIATICA PRESENT: Primary | ICD-10-CM

## 2023-01-23 DIAGNOSIS — M54.50 ACUTE LEFT-SIDED LOW BACK PAIN WITHOUT SCIATICA: Primary | ICD-10-CM

## 2023-01-23 DIAGNOSIS — G89.29 ACUTE EXACERBATION OF CHRONIC LOW BACK PAIN: ICD-10-CM

## 2023-01-23 DIAGNOSIS — M54.50 ACUTE EXACERBATION OF CHRONIC LOW BACK PAIN: ICD-10-CM

## 2023-01-23 RX ORDER — ACETAMINOPHEN 325 MG/1
975 TABLET ORAL ONCE
Status: COMPLETED | OUTPATIENT
Start: 2023-01-23 | End: 2023-01-23

## 2023-01-23 RX ORDER — LIDOCAINE 50 MG/G
1 PATCH TOPICAL ONCE
Status: DISCONTINUED | OUTPATIENT
Start: 2023-01-23 | End: 2023-01-23 | Stop reason: HOSPADM

## 2023-01-23 RX ORDER — DIAZEPAM 5 MG/1
5 TABLET ORAL ONCE
Status: COMPLETED | OUTPATIENT
Start: 2023-01-23 | End: 2023-01-23

## 2023-01-23 RX ADMIN — LIDOCAINE 1 PATCH: 50 PATCH CUTANEOUS at 06:59

## 2023-01-23 RX ADMIN — ACETAMINOPHEN 975 MG: 325 TABLET ORAL at 06:59

## 2023-01-23 RX ADMIN — DIAZEPAM 5 MG: 5 TABLET ORAL at 06:59

## 2023-01-23 NOTE — TELEPHONE ENCOUNTER
Reason for Disposition  • [1] SEVERE back pain (e g , excruciating) AND [2] sudden onset AND [3] age > 60 years    Answer Assessment - Initial Assessment Questions  1  ONSET: "When did the pain begin?"       Around     2  LOCATION: "Where does it hurt?" (upper, mid or lower back)      Lower left side     3  SEVERITY: "How bad is the pain?"  (e g , Scale 1-10; mild, moderate, or severe)    - MILD (1-3): doesn't interfere with normal activities     - MODERATE (4-7): interferes with normal activities or awakens from sleep     - SEVERE (8-10): excruciating pain, unable to do any normal activities       10/10    4  PATTERN: "Is the pain constant?" (e g , yes, no; constant, intermittent)       Constant     5  RADIATION: "Does the pain shoot into your legs or elsewhere?"      Denies    6  CAUSE:  "What do you think is causing the back pain?"       Unknown     7  BACK OVERUSE:  "Any recent lifting of heavy objects, strenuous work or exercise?"     Denies    8  MEDICATIONS: "What have you taken so far for the pain?" (e g , nothing, acetaminophen, NSAIDS)     Lidocaine patch, valium and tylenol 975 mg at the ED, tylenol before calling     9  NEUROLOGIC SYMPTOMS: "Do you have any weakness, numbness, or problems with bowel/bladder control?"     Leg numbness earlier today, non now     10   OTHER SYMPTOMS: "Do you have any other symptoms?" (e g , fever, abdominal pain, burning with urination, blood in urine)        denies    Protocols used: BACK PAIN-ADULT-

## 2023-01-23 NOTE — TELEPHONE ENCOUNTER
Regarding: hip pain  ----- Message from Sadie Orlando sent at 1/23/2023  5:42 PM EST -----  " I went to the ED earlier today for hip pain   Now I am back home and the pain worse (pain level 10)"
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
## 2023-01-23 NOTE — DISCHARGE INSTRUCTIONS
You were evaluated in the Emergency Department today for your back pain  Your evaluation did not show signs of medical conditions requiring emergent intervention at this time  You may take tylenol every 6 hours as needed for pain  Please schedule an appointment for follow-up with your primary care physician this week for further evaluation of your symptoms  If you do not have a primary care doctor, please call "infolink" to schedule an appointment with one  It is recommended that you follow up with the [facility]  A referral was sent  Please call to schedule an appointment (Call [phone]), option [phone])  You may read more about this program at the following link:     [email or web address]      Return to the Emergency Department if you experience worsening back pain, difficulty walking, fevers, numbness, tingling, incontinence, or any other concerning symptoms

## 2023-01-23 NOTE — TELEPHONE ENCOUNTER
Additional Information  • Negative: Has the patient had unexplained weight loss? • Negative: Does the patient have a fever? • Negative: Is the patient experiencing urine retention? • Negative: Is the patient experiencing acute drop foot or paralysis? • Negative: Has the patient experienced major trauma? (fall from height, high speed collision, direct blow to spine) and is also experiencing nausea, light-headedness, or loss of consciousness? • Negative: Is the patient experiencing blood in sputum? • Negative: Is this a chronic condition? Protocols used: SL AMB COMPREHENSIVE SPINE PROGRAM PROTOCOL    This RN did review in detail the Comprehensive Spine Program and what we can provide for their back pain  Patient is agreeable to being triaged by this RN and would like to proceed with Physical Therapy  Referral was placed for Physical Therapy at the [facility] site  Patients information was sent to the  to make evaluation appointment  Patient made aware that the PT office  will be calling to schedule the appointment  Patient was provided with the phone number to the PT office  No further questions and/or concerns were voiced by the patient at this time  Patient states understanding of the referral that was placed  Referral Closed
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
## 2023-01-24 ENCOUNTER — OFFICE VISIT (OUTPATIENT)
Dept: FAMILY MEDICINE CLINIC | Facility: CLINIC | Age: 78
End: 2023-01-24

## 2023-01-24 VITALS
WEIGHT: 222.13 LBS | DIASTOLIC BLOOD PRESSURE: 60 MMHG | HEART RATE: 80 BPM | TEMPERATURE: 98 F | RESPIRATION RATE: 18 BRPM | HEIGHT: 71 IN | OXYGEN SATURATION: 96 % | BODY MASS INDEX: 31.1 KG/M2 | SYSTOLIC BLOOD PRESSURE: 118 MMHG

## 2023-01-24 DIAGNOSIS — S39.012A BACK STRAIN, INITIAL ENCOUNTER: ICD-10-CM

## 2023-01-24 DIAGNOSIS — I65.23 BILATERAL CAROTID ARTERY STENOSIS: Primary | ICD-10-CM

## 2023-01-24 DIAGNOSIS — E11.49 TYPE 2 DIABETES MELLITUS WITH OTHER NEUROLOGIC COMPLICATION, WITHOUT LONG-TERM CURRENT USE OF INSULIN (HCC): Primary | Chronic | ICD-10-CM

## 2023-01-24 DIAGNOSIS — I70.0 ATHEROSCLEROSIS OF AORTA (HCC): ICD-10-CM

## 2023-01-24 DIAGNOSIS — F11.20 CONTINUOUS OPIOID DEPENDENCE (HCC): ICD-10-CM

## 2023-01-24 DIAGNOSIS — I69.354 HEMIPLEGIA AND HEMIPARESIS FOLLOWING CEREBRAL INFARCTION AFFECTING LEFT NON-DOMINANT SIDE (HCC): ICD-10-CM

## 2023-01-24 DIAGNOSIS — N18.31 STAGE 3A CHRONIC KIDNEY DISEASE (HCC): ICD-10-CM

## 2023-01-24 DIAGNOSIS — I49.5 SICK SINUS SYNDROME (HCC): ICD-10-CM

## 2023-01-24 LAB
CREAT UR-MCNC: 171 MG/DL
MICROALBUMIN UR-MCNC: 8.8 MG/L (ref 0–20)
MICROALBUMIN/CREAT 24H UR: 5 MG/G CREATININE (ref 0–30)
SL AMB POCT HEMOGLOBIN AIC: 7.1 (ref ?–6.5)
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
## 2023-01-24 NOTE — ASSESSMENT & PLAN NOTE
Back strain  Patient will continue with ice to the affected area over the next 48 hours then switch to heat treatment 3 times daily  He may also take 2 Tylenol over-the-counter and 1 methocarbamol 500 mg 3 times daily  Patient will call if symptoms persist without improvement into next week

## 2023-01-24 NOTE — ASSESSMENT & PLAN NOTE
Diabetes  A1c is stable at 7 1  He will continue current regimen of medications  We will obtain urinalysis for microalbumin    Lab Results   Component Value Date    HGBA1C 7 8 (A) 10/24/2022

## 2023-02-10 ENCOUNTER — TELEPHONE (OUTPATIENT)
Dept: NEUROLOGY | Facility: CLINIC | Age: 78
End: 2023-02-10

## 2023-02-10 NOTE — TELEPHONE ENCOUNTER
----- Message from Wes Boogie MD sent at 2/9/2023  5:30 PM EST -----  Will you please let Rocio Simi know that his carotid doppler is stable/looks the same  We will need to repeat it in 6 months and I ordered the appropriate study for him  This can be done my letter or by phone but he does not have mychart access  Thanks!

## 2023-02-16 ENCOUNTER — OFFICE VISIT (OUTPATIENT)
Dept: NEUROLOGY | Facility: CLINIC | Age: 78
End: 2023-02-16

## 2023-02-16 VITALS
HEIGHT: 71 IN | HEART RATE: 60 BPM | BODY MASS INDEX: 30.94 KG/M2 | WEIGHT: 221 LBS | SYSTOLIC BLOOD PRESSURE: 121 MMHG | DIASTOLIC BLOOD PRESSURE: 60 MMHG

## 2023-02-16 DIAGNOSIS — Z86.73 HISTORY OF STROKE: Primary | ICD-10-CM

## 2023-02-16 NOTE — PROGRESS NOTES
Patient ID: Chaparro Nicolas is a 68 y o  male     Assessment/Plan:    History of stroke  -From a stroke standpoint he reports to be overall doing fairly well    -He continues on Aspirin and rosuvastatin which he was been toleration okay  -Known right ICA and stable 50-69% stenosis in the left ICA  Plan for repeat carotid ultrasound in approximately 1 year which he has ordered  -Advised him to continue to follow with cardiology for severe aortic stenosis for which he has a scheduled echo, another ongoing cardiac comorbidities  Plan discussed with patient today  Patient Instructions   For ongoing stroke prevention   - Continue: Aspirin, Rosuvastatin   - Recommend to check blood pressure occasionally away from the doctor's office to make sure that those numbers are typically less than 130/80  If they are frequently higher than that, we recommend checking a little more often and to follow up with primary care team   - Will defer to primary care team for monitoring of cholesterol panel and blood sugar numbers with target LDL cholesterol of less than 70 and hemoglobin A1c less than 7%  - Recommend following a low salt, mediterranean diet   - Recommend routine physical exercise as tolerated   - Please complete carotid ultrasound in 6 months by Dr Ifeoma Aldridge     We will plan for him to return to the office in 1 year but would be happy to see him sooner if the need should arise  If he has any symptoms concerning for TIA or stroke including sudden painless loss of vision or double vision, difficulty speaking or swallowing, vertigo/room spinning that does not quickly resolve, or weakness/numbness/loss of coordination affecting 1 side of the face or body he should proceed by ambulance to the nearest emergency room immediately  Subjective:    HPI    Since his last visit, he has not had any new stroke-like symptoms  He denies any major bruising or bleeding  He takes his medications as prescribed   Has not had any falls  He reports his blood pressure at home is usually in the 140s   Pacemaker in place due to bradycardia following with cardiology  Reports left hand weakness from stroke  Mood, sleep, and appetite are all good  Objective:    Blood pressure 121/60, pulse 60, height 5' 11" (1 803 m), weight 100 kg (221 lb)  Neurological Exam  Awake, alert, oriented, and in no apparent distress  Mood is appropriate to situation  Speech is fluent with no dysarthria or aphasia  Cranial nerves 2-12 were symmetrically intact bilaterally  Motor testing reveals 5/5 strength in the bilateral upper and lower extremities  Sensation is intact to light touch in the bilateral upper and lower extremities  Coordination intact, no drift present  Finger-to-nose absent for tremor, dysmetria, or ataxia  DTRs are symmetric and intact bilaterally  Able to rise without assistance, gait is stable  Uses a cane for ambulation  Review of Systems   Constitutional: Negative  Negative for appetite change and fever  HENT: Negative  Negative for hearing loss, tinnitus, trouble swallowing and voice change  Eyes: Negative  Negative for photophobia, pain and visual disturbance  Respiratory: Negative  Negative for shortness of breath  Cardiovascular: Negative  Negative for palpitations  Gastrointestinal: Negative  Negative for nausea and vomiting  Endocrine: Negative  Negative for cold intolerance  Genitourinary: Negative  Negative for dysuria, frequency and urgency  Musculoskeletal: Negative  Negative for gait problem, myalgias and neck pain  Skin: Negative  Negative for rash  Allergic/Immunologic: Negative  Neurological: Negative  Negative for dizziness, tremors, seizures, syncope, facial asymmetry, speech difficulty, weakness, light-headedness, numbness and headaches  Hematological: Negative  Does not bruise/bleed easily  Psychiatric/Behavioral: Negative    Negative for confusion, hallucinations and sleep disturbance  All other systems reviewed and are negative  I personally reviewed the ROS that was entered by the medical assistant  I have spent 35 minutes with Patient  today in which greater than 50% of this time was spent in counseling/coordination of care regarding Diagnostic results, Prognosis, Risks and benefits of tx options, Intructions for management, Patient and family education, Importance of tx compliance, Risk factor reductions, Impressions and Plan of care as above

## 2023-02-16 NOTE — PATIENT INSTRUCTIONS
For ongoing stroke prevention   - Continue: Aspirin, Rosuvastatin   - Recommend to check blood pressure occasionally away from the doctor's office to make sure that those numbers are typically less than 130/80  If they are frequently higher than that, we recommend checking a little more often and to follow up with primary care team   - Will defer to primary care team for monitoring of cholesterol panel and blood sugar numbers with target LDL cholesterol of less than 70 and hemoglobin A1c less than 7%  - Recommend following a low salt, mediterranean diet   - Recommend routine physical exercise as tolerated   - Please complete carotid ultrasound in 6 months by Dr Korey Rehman     We will plan for him to return to the office in 1 year but would be happy to see him sooner if the need should arise  If he has any symptoms concerning for TIA or stroke including sudden painless loss of vision or double vision, difficulty speaking or swallowing, vertigo/room spinning that does not quickly resolve, or weakness/numbness/loss of coordination affecting 1 side of the face or body he should proceed by ambulance to the nearest emergency room immediately

## 2023-02-20 ENCOUNTER — OFFICE VISIT (OUTPATIENT)
Dept: CARDIOLOGY CLINIC | Facility: CLINIC | Age: 78
End: 2023-02-20

## 2023-02-20 VITALS
HEIGHT: 71 IN | BODY MASS INDEX: 30.66 KG/M2 | HEART RATE: 61 BPM | WEIGHT: 219 LBS | OXYGEN SATURATION: 98 % | DIASTOLIC BLOOD PRESSURE: 66 MMHG | SYSTOLIC BLOOD PRESSURE: 120 MMHG

## 2023-02-20 DIAGNOSIS — I47.29 NSVT (NONSUSTAINED VENTRICULAR TACHYCARDIA): Primary | ICD-10-CM

## 2023-02-20 DIAGNOSIS — I65.23 BILATERAL CAROTID ARTERY STENOSIS: ICD-10-CM

## 2023-02-20 DIAGNOSIS — I35.0 SEVERE AORTIC STENOSIS: ICD-10-CM

## 2023-02-20 RX ORDER — METOPROLOL SUCCINATE 25 MG/1
25 TABLET, EXTENDED RELEASE ORAL DAILY
Qty: 30 TABLET | Refills: 3 | Status: SHIPPED | OUTPATIENT
Start: 2023-02-20

## 2023-02-20 NOTE — ASSESSMENT & PLAN NOTE
-From a stroke standpoint he reports to be overall doing fairly well    -He continues on Aspirin and rosuvastatin which he was been toleration okay  -Known right ICA and stable 50-69% stenosis in the left ICA  Plan for repeat carotid ultrasound in approximately 1 year which he has ordered  -Advised him to continue to follow with cardiology for severe aortic stenosis for which he has a scheduled echo, another ongoing cardiac comorbidities

## 2023-02-20 NOTE — PROGRESS NOTES
Assessment/Plan:  1  NSVT-  MDT dual chamber PPM, 2/20/23 device interrogation showed AP 74 6%, BP 67 1%, all lead parameters within normal limits  1 VT episode NSVT at 182 bpm duration 14 seconds  Start metoprolol succinate 25 mg daily at bedtime, follow device interrogation for continued NSVT  2  Severe AS, asymptomatic,  follow up TTE in near future  3  Oscar Carotid artery stenosis, 1/20/23 left ICA 50-69% stenosis in the ICA, known occlusion of the right ICA  Continue on aspirin 81 mg daily, Crestor milligrams daily, DASH diet         Diagnoses and all orders for this visit:    NSVT (nonsustained ventricular tachycardia)  -     POCT ECG  -     metoprolol succinate (TOPROL-XL) 25 mg 24 hr tablet; Take 1 tablet (25 mg total) by mouth daily Take daily at bedtime    Severe aortic stenosis    Bilateral carotid artery stenosis          Subjective:    Patient ID: Slim Curiel is a 68 y o  male  n who presents to our office for follow up after device interrogation showed NSVT  [place] denies chest pain palpitations lightheadedness, dizziness, dyspnea with minimal or moderate exertion    HPI  The following portions of the patient's history were reviewed and updated as appropriate: He  has a past medical history of Asthma, DM (diabetes mellitus), type 2 ([address] ), Gout, Gout attack, HLD (hyperlipidemia), Hypertension, Murmur, cardiac, and Stroke ([facility] )  , CKD III, carotid stenosis, symptomatic bradycardia sp PPM 8/19/22  Primary cardiologist Dr Greyson Youssef  8/18/22 TTE: LV cavity size normal wall thickness mildly increased moderate concentric hypertrophy LVEF 65%  Grade 2 pseudoabnormal relaxation  Right ventricle mildly dilated systolic function normal   Left atrium mildly dilated  Aortic valve stenosis  Peak gradient 82 mmHg  Aortic valve mean gradient 48 mmHg  Mild annular mitral valve calcification  Review of Systems   Musculoskeletal: Positive for arthralgias and myalgias     All other systems reviewed and are negative  Objective:  /66 (BP Location: Left arm, Patient Position: Sitting, Cuff Size: Large)   Pulse 61   Ht 5' 11" (1 803 m)   Wt 99 3 kg (219 lb)   SpO2 98%   BMI 30 54 kg/m²      Physical Exam  Vitals reviewed  Constitutional:       Appearance: He is obese  Neck:      Vascular: Carotid bruit present  Cardiovascular:      Rate and Rhythm: Normal rate and regular rhythm  Heart sounds: Murmur heard  Comments: 4/6 ERIS  Abdominal:      General: Bowel sounds are normal       Palpations: Abdomen is soft  Musculoskeletal:         General: Normal range of motion  Cervical back: Normal range of motion and neck supple  Right lower leg: No edema  Left lower leg: No edema  Skin:     General: Skin is warm and dry  Capillary Refill: Capillary refill takes less than 2 seconds  Neurological:      General: No focal deficit present  Mental Status: He is alert and oriented to person, place, and time     Psychiatric:         Mood and Affect: Mood normal          Behavior: Behavior normal

## 2023-02-22 ENCOUNTER — TELEPHONE (OUTPATIENT)
Dept: PHYSICAL THERAPY | Facility: OTHER | Age: 78
End: 2023-02-22

## 2023-02-22 NOTE — TELEPHONE ENCOUNTER
Courtesy phone call to follow up after being triaged by [facility] nurses  V/M left for the patient  [place] office number/address provided  Patient has a PT Spine referral since January in the system but, no eval scheduled yet

## 2023-03-01 ENCOUNTER — OFFICE VISIT (OUTPATIENT)
Dept: OBGYN CLINIC | Facility: CLINIC | Age: 78
End: 2023-03-01

## 2023-03-01 VITALS — HEIGHT: 71 IN | BODY MASS INDEX: 30.66 KG/M2 | WEIGHT: 219 LBS

## 2023-03-01 DIAGNOSIS — M17.12 PRIMARY OSTEOARTHRITIS OF LEFT KNEE: Primary | ICD-10-CM

## 2023-03-01 RX ORDER — BUPIVACAINE HYDROCHLORIDE 2.5 MG/ML
1 INJECTION, SOLUTION INFILTRATION; PERINEURAL
Status: COMPLETED | OUTPATIENT
Start: 2023-03-01 | End: 2023-03-01

## 2023-03-01 RX ORDER — LIDOCAINE HYDROCHLORIDE 10 MG/ML
1 INJECTION, SOLUTION INFILTRATION; PERINEURAL
Status: COMPLETED | OUTPATIENT
Start: 2023-03-01 | End: 2023-03-01

## 2023-03-01 RX ORDER — BETAMETHASONE SODIUM PHOSPHATE AND BETAMETHASONE ACETATE 3; 3 MG/ML; MG/ML
12 INJECTION, SUSPENSION INTRA-ARTICULAR; INTRALESIONAL; INTRAMUSCULAR; SOFT TISSUE
Status: COMPLETED | OUTPATIENT
Start: 2023-03-01 | End: 2023-03-01

## 2023-03-01 RX ADMIN — LIDOCAINE HYDROCHLORIDE 1 ML: 10 INJECTION, SOLUTION INFILTRATION; PERINEURAL at 15:22

## 2023-03-01 RX ADMIN — BUPIVACAINE HYDROCHLORIDE 1 ML: 2.5 INJECTION, SOLUTION INFILTRATION; PERINEURAL at 15:22

## 2023-03-01 RX ADMIN — BETAMETHASONE SODIUM PHOSPHATE AND BETAMETHASONE ACETATE 12 MG: 3; 3 INJECTION, SUSPENSION INTRA-ARTICULAR; INTRALESIONAL; INTRAMUSCULAR; SOFT TISSUE at 15:22
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
## 2023-03-01 NOTE — PATIENT INSTRUCTIONS
Intra-articular Hyaluronic Acid Injection  Intra-articular Hyaluronic Acid Injection Brands  There are several types of Intra-articular Hyaluronic Acid Injections which vary in brand, dosage, and frequency  There are single dose injections as well as a series of injections  Your provider will choose the injection brand and series based on clinical factors as well as what your insurance company prefers or covers  Buy and Bill vs  Specialty Pharmacy  Injections may be obtained in two ways:  Buy and Bill: The insurance is requiring the office to buy the injection medication and bill the patients insurance for both the injection medication and the office visit  Specialty Pharmacy: The insurance is requiring the office to order the medication from the insurances Specialty Pharmacy and the specialty pharmacy will bill the patients insurance directly for the injection medication  When a specialty pharmacy is used, the office cannot bill for the injection medication, the office can only bill for the office visit  (Examples of a specialty pharmacy include, but not limited to, [facility] , [id number] [facility], [address] [place] [address])  Time Line Expectations  Your care team will work to ensure the injection(s) being ordered for you are authorized by your insurance and is obtained by the insurance plans preferred pharmacy  Your insurance plan may dictate the brand and dosage of the series  Due to the nature of obtaining an insurance authorization as well as working with the pharmacy, the authorization process may take up to 14 business days, sometimes longer if your insurance plan denies the injection authorization or if the pharmacy has delays  If your insurance plan denies the authorization our team will submit an appeal which may lengthen the wait time for the approval of your injection      Our injection authorization team will be in contact with you throughout the injection authorization process, however, please note there may be times of silence while we wait for insurance and pharmacy updates  Your injection appointment(s) will be scheduled once our injection authorization team has received approval from your insurance plan and/or from the pharmacy  Please note: Specialty pharmacies are often delayed when obtaining authorizations and verifying benefits for injection medications  You may receive a phone call from the specialty pharmacy to authorize the medication; so it is important to accept calls from the specialty pharmacy to ensure your injections are not delayed

## 2023-03-02 ENCOUNTER — HOSPITAL ENCOUNTER (OUTPATIENT)
Dept: NON INVASIVE DIAGNOSTICS | Facility: CLINIC | Age: 78
Discharge: HOME | End: 2023-03-02

## 2023-03-02 VITALS
HEIGHT: 71 IN | DIASTOLIC BLOOD PRESSURE: 66 MMHG | HEART RATE: 65 BPM | WEIGHT: 219 LBS | BODY MASS INDEX: 30.66 KG/M2 | SYSTOLIC BLOOD PRESSURE: 120 MMHG

## 2023-03-02 DIAGNOSIS — I65.23 BILATERAL CAROTID ARTERY STENOSIS: ICD-10-CM

## 2023-03-02 DIAGNOSIS — I35.0 SEVERE AORTIC STENOSIS: ICD-10-CM

## 2023-03-02 LAB
AORTIC VALVE MEAN VELOCITY: 30.1 M/S
AV AREA BY CONTINUOUS VTI: 0.8 CM2
AV AREA PEAK VELOCITY: 0.8 CM2
AV LVOT MEAN GRADIENT: 2 MMHG
AV LVOT PEAK GRADIENT: 4 MMHG
AV MEAN GRADIENT: 42 MMHG
AV PEAK GRADIENT: 68 MMHG
AV VALVE AREA: 0.82 CM2
AV VELOCITY RATIO: 0.26
DOP CALC AO PEAK VEL: 4.11 M/S
DOP CALC AO VTI: 96.27 CM
DOP CALC LVOT AREA: 3.14 CM2
DOP CALC LVOT DIAMETER: 2 CM
DOP CALC LVOT PEAK VEL VTI: 25.16 CM
DOP CALC LVOT PEAK VEL: 1.05 M/S
DOP CALC LVOT STROKE INDEX: 37 ML/M2
DOP CALC LVOT STROKE VOLUME: 79 CM3
SL CV LV EF: 65

## 2023-03-06 ENCOUNTER — TELEPHONE (OUTPATIENT)
Dept: OBGYN CLINIC | Facility: HOSPITAL | Age: 78
End: 2023-03-06

## 2023-03-06 ENCOUNTER — PROCEDURE VISIT (OUTPATIENT)
Dept: OBGYN CLINIC | Facility: CLINIC | Age: 78
End: 2023-03-06

## 2023-03-06 VITALS
HEIGHT: 71 IN | DIASTOLIC BLOOD PRESSURE: 83 MMHG | BODY MASS INDEX: 30.66 KG/M2 | WEIGHT: 219 LBS | HEART RATE: 59 BPM | SYSTOLIC BLOOD PRESSURE: 146 MMHG

## 2023-03-06 DIAGNOSIS — M17.12 PRIMARY OSTEOARTHRITIS OF LEFT KNEE: Primary | ICD-10-CM

## 2023-03-06 RX ORDER — HYALURONATE SODIUM 10 MG/ML
20 SYRINGE (ML) INTRAARTICULAR
Status: COMPLETED | OUTPATIENT
Start: 2023-03-06 | End: 2023-03-06

## 2023-03-06 RX ADMIN — Medication 20 MG: at 13:14

## 2023-03-06 NOTE — PROGRESS NOTES
1  Primary osteoarthritis of left knee          Patient is here for his first injection of Euflexxa into the left knee  Patient has reported improvements from previous visco injections  Pain is rated at 2/10  All organ systems normal  Physical exam of the knee shows no effusion no ecchymosis  Large joint arthrocentesis: L knee  Universal Protocol:  Consent given by: patient  Time out: Immediately prior to procedure a "time out" was called to verify the correct patient, procedure, equipment, support staff and site/side marked as required    Supporting Documentation  Indications: pain   Procedure Details  Location: knee - L knee  Needle size: 22 G  Ultrasound guidance: no  Approach: anterolateral  Medications administered: 20 mg Sodium Hyaluronate 20 MG/2ML    Patient tolerance: patient tolerated the procedure well with no immediate complications          Patient tolerated procedure follow up one week

## 2023-03-06 NOTE — TELEPHONE ENCOUNTER
Caller: Isauro    Doctor: Violetta Scott    Reason for call: Patient reports his knee is feeling better from his cortisone injection he had done last week, asking if he should keep his appt for today for his visco injection?   Please advise    Call back#: [phone]

## 2023-03-13 ENCOUNTER — PROCEDURE VISIT (OUTPATIENT)
Dept: OBGYN CLINIC | Facility: CLINIC | Age: 78
End: 2023-03-13

## 2023-03-13 VITALS
DIASTOLIC BLOOD PRESSURE: 82 MMHG | HEART RATE: 60 BPM | HEIGHT: 71 IN | BODY MASS INDEX: 30.66 KG/M2 | SYSTOLIC BLOOD PRESSURE: 131 MMHG | WEIGHT: 219 LBS

## 2023-03-13 DIAGNOSIS — M17.12 PRIMARY OSTEOARTHRITIS OF LEFT KNEE: Primary | ICD-10-CM

## 2023-03-13 RX ORDER — HYALURONATE SODIUM 10 MG/ML
20 SYRINGE (ML) INTRAARTICULAR
Status: COMPLETED | OUTPATIENT
Start: 2023-03-13 | End: 2023-03-13

## 2023-03-13 RX ADMIN — Medication 20 MG: at 13:32

## 2023-03-13 NOTE — PROGRESS NOTES
1  Primary osteoarthritis of left knee          Patient is here for his 2nd injection of euflexxa into the left knee  Patient has reported improvements from previous visco injections  Pain is rated at 1/10  All organ systems normal  Physical exam of the knee shows no effusion no ecchymosis  Large joint arthrocentesis: L knee  Universal Protocol:  Consent given by: patient  Time out: Immediately prior to procedure a "time out" was called to verify the correct patient, procedure, equipment, support staff and site/side marked as required    Supporting Documentation  Indications: pain   Procedure Details  Location: knee - L knee  Needle size: 22 G  Ultrasound guidance: no  Approach: anterolateral  Medications administered: 20 mg Sodium Hyaluronate 20 MG/2ML    Patient tolerance: patient tolerated the procedure well with no immediate complications          Patient tolerated procedure follow up prn

## 2023-03-16 ENCOUNTER — TELEPHONE (OUTPATIENT)
Dept: CARDIOLOGY CLINIC | Facility: CLINIC | Age: 78
End: 2023-03-16

## 2023-03-17 ENCOUNTER — TELEPHONE (OUTPATIENT)
Dept: CARDIOLOGY CLINIC | Facility: CLINIC | Age: 78
End: 2023-03-17

## 2023-03-20 ENCOUNTER — PROCEDURE VISIT (OUTPATIENT)
Dept: OBGYN CLINIC | Facility: CLINIC | Age: 78
End: 2023-03-20

## 2023-03-20 DIAGNOSIS — M17.12 PRIMARY OSTEOARTHRITIS OF LEFT KNEE: Primary | ICD-10-CM

## 2023-03-20 RX ORDER — HYALURONATE SODIUM 10 MG/ML
20 SYRINGE (ML) INTRAARTICULAR
Status: COMPLETED | OUTPATIENT
Start: 2023-03-20 | End: 2023-03-20

## 2023-03-20 RX ADMIN — Medication 20 MG: at 12:14

## 2023-03-20 NOTE — TELEPHONE ENCOUNTER
Mr Akilah Adjutant stopped in to the [place] [id number] office today  I printed the echo result for him, and I read and discussed Dr Nicole Ram response regarding the result with the patient

## 2023-03-20 NOTE — PROGRESS NOTES
1  Primary osteoarthritis of left knee          Patient is here for his third injection of Euflexxa into the left knee  Patient has reported improvements from previous visco injections  Pain is rated at 0/10  Patient does report that he was doing some yard work and it was aggravated with the brace on and also helped to the point that today he is only 0 10    All organ systems normal  Physical exam of the knee shows no effusion no ecchymosis  Large joint arthrocentesis: L knee  Universal Protocol:  Consent given by: patient  Time out: Immediately prior to procedure a "time out" was called to verify the correct patient, procedure, equipment, support staff and site/side marked as required    Supporting Documentation  Indications: pain   Procedure Details  Location: knee - L knee  Needle size: 22 G  Ultrasound guidance: no  Approach: anterolateral  Medications administered: 20 mg Sodium Hyaluronate 20 MG/2ML    Patient tolerance: patient tolerated the procedure well with no immediate complications          Patient tolerated procedure follow up as needed

## 2023-03-30 ENCOUNTER — REMOTE DEVICE CLINIC VISIT (OUTPATIENT)
Dept: CARDIOLOGY CLINIC | Facility: CLINIC | Age: 78
End: 2023-03-30

## 2023-03-30 DIAGNOSIS — Z95.0 CARDIAC PACEMAKER IN SITU: Primary | ICD-10-CM

## 2023-03-30 NOTE — PROGRESS NOTES
"Results for orders placed or performed in visit on 03/30/23   Cardiac EP device report    Narrative    MDT DUAL CHAMBER PPM (MVP ON) - ACTIVE SYSTEM IS MRI CONDITIONAL  CARELINK TRANSMISSION: BATTERY STATUS \"12 YRS  \" AP 81%  69%  ALL AVAILABLE LEAD PARAMETERS WITHIN NORMAL LIMITS  NO SIGNIFICANT HIGH RATE EPISODES  NORMAL DEVICE FUNCTION   NC         " Chart reviewed. Pt having stridor yesterday. Consults placed to Pulmonary and cardiology. Pt continues having watery diarrhea and GI following. Pt not medially ready at this time. CM to continue to follow and monitor for any further needs.

## 2023-04-24 ENCOUNTER — OFFICE VISIT (OUTPATIENT)
Dept: FAMILY MEDICINE CLINIC | Facility: CLINIC | Age: 78
End: 2023-04-24

## 2023-04-24 VITALS
WEIGHT: 216.8 LBS | TEMPERATURE: 97.6 F | HEIGHT: 71 IN | SYSTOLIC BLOOD PRESSURE: 132 MMHG | RESPIRATION RATE: 16 BRPM | BODY MASS INDEX: 30.35 KG/M2 | DIASTOLIC BLOOD PRESSURE: 90 MMHG | OXYGEN SATURATION: 100 % | HEART RATE: 72 BPM

## 2023-04-24 DIAGNOSIS — M17.12 PRIMARY OSTEOARTHRITIS OF LEFT KNEE: ICD-10-CM

## 2023-04-24 DIAGNOSIS — E11.21 DIABETIC NEPHROPATHY ASSOCIATED WITH TYPE 2 DIABETES MELLITUS (HCC): ICD-10-CM

## 2023-04-24 DIAGNOSIS — E78.5 HYPERLIPIDEMIA, UNSPECIFIED HYPERLIPIDEMIA TYPE: ICD-10-CM

## 2023-04-24 DIAGNOSIS — E11.49 TYPE 2 DIABETES MELLITUS WITH OTHER NEUROLOGIC COMPLICATION, WITHOUT LONG-TERM CURRENT USE OF INSULIN (HCC): Primary | ICD-10-CM

## 2023-04-24 DIAGNOSIS — I65.23 BILATERAL CAROTID ARTERY STENOSIS: ICD-10-CM

## 2023-04-24 DIAGNOSIS — I10 BENIGN ESSENTIAL HYPERTENSION: ICD-10-CM

## 2023-04-24 RX ORDER — BLOOD-GLUCOSE METER
KIT MISCELLANEOUS DAILY
COMMUNITY
Start: 2023-04-01

## 2023-04-24 NOTE — ASSESSMENT & PLAN NOTE
Hypertension    The patient's blood pressure is stable at this time and he will continue current regimen of medications
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
## 2023-04-24 NOTE — ASSESSMENT & PLAN NOTE
Hyperlipidemia  Patient will check lipid panel blood work and continue her current dose of statin therapy

## 2023-04-24 NOTE — ASSESSMENT & PLAN NOTE
Carotid stenosis    Patient continues to have surveillance ultrasound at least once yearly by his neurologist for monitoring of carotid artery stenosis

## 2023-04-24 NOTE — ASSESSMENT & PLAN NOTE
Diabetes  Patient will check A1c blood work for further evaluation  He will continue with current regimen of medications    His eye and foot exams are up-to-date  Lab Results   Component Value Date    HGBA1C 7 1 (A) 01/24/2023

## 2023-04-24 NOTE — ASSESSMENT & PLAN NOTE
Osteoarthritis of left knee    Patient will continue follow-up with orthopedist for ongoing evaluation of left knee pain

## 2023-05-01 ENCOUNTER — APPOINTMENT (OUTPATIENT)
Dept: LAB | Facility: CLINIC | Age: 78
End: 2023-05-01

## 2023-05-01 DIAGNOSIS — I10 BENIGN ESSENTIAL HYPERTENSION: Chronic | ICD-10-CM

## 2023-05-01 DIAGNOSIS — E78.5 HYPERLIPIDEMIA, UNSPECIFIED HYPERLIPIDEMIA TYPE: ICD-10-CM

## 2023-05-01 DIAGNOSIS — I35.0 SEVERE AORTIC STENOSIS: ICD-10-CM

## 2023-05-01 DIAGNOSIS — E11.49 TYPE 2 DIABETES MELLITUS WITH OTHER NEUROLOGIC COMPLICATION, WITHOUT LONG-TERM CURRENT USE OF INSULIN (HCC): Chronic | ICD-10-CM

## 2023-05-01 LAB
ALBUMIN SERPL BCP-MCNC: 3.7 G/DL (ref 3.5–5)
ALP SERPL-CCNC: 56 U/L (ref 46–116)
ALT SERPL W P-5'-P-CCNC: 21 U/L (ref 12–78)
ANION GAP SERPL CALCULATED.3IONS-SCNC: 1 MMOL/L (ref 4–13)
AST SERPL W P-5'-P-CCNC: 18 U/L (ref 5–45)
BILIRUB SERPL-MCNC: 0.88 MG/DL (ref 0.2–1)
BUN SERPL-MCNC: 20 MG/DL (ref 5–25)
CALCIUM SERPL-MCNC: 9.8 MG/DL (ref 8.3–10.1)
CHLORIDE SERPL-SCNC: 106 MMOL/L (ref 96–108)
CHOLEST SERPL-MCNC: 135 MG/DL
CO2 SERPL-SCNC: 32 MMOL/L (ref 21–32)
CREAT SERPL-MCNC: 1.15 MG/DL (ref 0.6–1.3)
ERYTHROCYTE [DISTWIDTH] IN BLOOD BY AUTOMATED COUNT: 13.5 % (ref 11.6–15.1)
GFR SERPL CREATININE-BSD FRML MDRD: 60 ML/MIN/1.73SQ M
GLUCOSE P FAST SERPL-MCNC: 121 MG/DL (ref 65–99)
HCT VFR BLD AUTO: 46.9 % (ref 36.5–49.3)
HDLC SERPL-MCNC: 52 MG/DL
HGB BLD-MCNC: 15.3 G/DL (ref 12–17)
LDLC SERPL CALC-MCNC: 65 MG/DL (ref 0–100)
MCH RBC QN AUTO: 28.9 PG (ref 26.8–34.3)
MCHC RBC AUTO-ENTMCNC: 32.6 G/DL (ref 31.4–37.4)
MCV RBC AUTO: 89 FL (ref 82–98)
NONHDLC SERPL-MCNC: 83 MG/DL
PLATELET # BLD AUTO: 179 THOUSANDS/UL (ref 149–390)
PMV BLD AUTO: 10.5 FL (ref 8.9–12.7)
POTASSIUM SERPL-SCNC: 4.2 MMOL/L (ref 3.5–5.3)
PROT SERPL-MCNC: 7.1 G/DL (ref 6.4–8.4)
RBC # BLD AUTO: 5.3 MILLION/UL (ref 3.88–5.62)
SODIUM SERPL-SCNC: 139 MMOL/L (ref 135–147)
TRIGL SERPL-MCNC: 90 MG/DL
TSH SERPL DL<=0.05 MIU/L-ACNC: 4.7 UIU/ML (ref 0.45–4.5)
WBC # BLD AUTO: 7.51 THOUSAND/UL (ref 4.31–10.16)

## 2023-05-02 LAB
EST. AVERAGE GLUCOSE BLD GHB EST-MCNC: 140 MG/DL
HBA1C MFR BLD: 6.5 %

## 2023-06-29 ENCOUNTER — REMOTE DEVICE CLINIC VISIT (OUTPATIENT)
Dept: CARDIOLOGY CLINIC | Facility: CLINIC | Age: 78
End: 2023-06-29
Payer: MEDICARE

## 2023-06-29 DIAGNOSIS — Z95.0 PRESENCE OF CARDIAC PACEMAKER: Primary | ICD-10-CM

## 2023-06-29 PROCEDURE — 93294 REM INTERROG EVL PM/LDLS PM: CPT | Performed by: INTERNAL MEDICINE

## 2023-06-29 PROCEDURE — 93296 REM INTERROG EVL PM/IDS: CPT | Performed by: INTERNAL MEDICINE

## 2023-06-29 NOTE — PROGRESS NOTES
Results for orders placed or performed in visit on 06/29/23   Cardiac EP device report    Narrative    MDT DUAL CHAMBER PPM (MVP ON) - ACTIVE SYSTEM IS MRI CONDITIONAL  CARELINK TRANSMISSION: BATTERY VOLTAGE ADEQUATE (12 YRS)  AP: 83 4%  : 89 9% (>40%~MVP-ON/60)  ALL AVAILABLE LEAD PARAMETERS WITHIN NORMAL LIMITS  2 AT/AF EPISODES W/EGMS SHOWING PAT & AFL 2:1 DURATION 18 MINS  AF BURDEN: <0 1%  PT DOES NOT TAKE AC  PT TAKES ASA 81MG, METOPROLOL SUCC  EF: 65% (ECHO 3/2/23)  TT DR Ger Pederson  NORMAL DEVICE FUNCTION    [address] [place] [address]

## 2023-07-03 DIAGNOSIS — I47.29 NSVT (NONSUSTAINED VENTRICULAR TACHYCARDIA) (HCC): ICD-10-CM

## 2023-07-03 DIAGNOSIS — E11.49 TYPE 2 DIABETES MELLITUS WITH OTHER NEUROLOGIC COMPLICATION, WITHOUT LONG-TERM CURRENT USE OF INSULIN (HCC): ICD-10-CM

## 2023-07-03 RX ORDER — ROSUVASTATIN CALCIUM 5 MG/1
TABLET, COATED ORAL
Qty: 90 TABLET | Refills: 1 | Status: SHIPPED | OUTPATIENT
Start: 2023-07-03

## 2023-07-03 RX ORDER — METOPROLOL SUCCINATE 25 MG/1
TABLET, EXTENDED RELEASE ORAL
Qty: 30 TABLET | Refills: 3 | Status: SHIPPED | OUTPATIENT
Start: 2023-07-03

## 2023-07-10 NOTE — TELEPHONE ENCOUNTER
----- Message from Jean Min MD sent at 1/30/2018  2:17 PM EST -----  Please call the patient regarding his abnormal result  Let him know this is unchanged from his hospitalization   How do I route to clinical pool? tx
Called and advised pt wife of the results
none acute

## 2023-07-27 ENCOUNTER — OFFICE VISIT (OUTPATIENT)
Dept: CARDIOLOGY CLINIC | Facility: CLINIC | Age: 78
End: 2023-07-27
Payer: MEDICARE

## 2023-07-27 VITALS
HEART RATE: 60 BPM | OXYGEN SATURATION: 96 % | SYSTOLIC BLOOD PRESSURE: 122 MMHG | WEIGHT: 217.8 LBS | BODY MASS INDEX: 30.38 KG/M2 | DIASTOLIC BLOOD PRESSURE: 70 MMHG

## 2023-07-27 DIAGNOSIS — I35.0 SEVERE AORTIC STENOSIS: Primary | ICD-10-CM

## 2023-07-27 DIAGNOSIS — I47.29 NSVT (NONSUSTAINED VENTRICULAR TACHYCARDIA) (HCC): ICD-10-CM

## 2023-07-27 DIAGNOSIS — R00.1 SYMPTOMATIC BRADYCARDIA: ICD-10-CM

## 2023-07-27 DIAGNOSIS — I48.92 ATRIAL FLUTTER, UNSPECIFIED TYPE (HCC): ICD-10-CM

## 2023-07-27 DIAGNOSIS — Z95.0 PRESENCE OF PERMANENT CARDIAC PACEMAKER: ICD-10-CM

## 2023-07-27 PROCEDURE — 99214 OFFICE O/P EST MOD 30 MIN: CPT | Performed by: INTERNAL MEDICINE
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
## 2023-07-27 NOTE — PATIENT INSTRUCTIONS
You were seen today in the Cardiology office for follow up evaluation. Please continue your current cardiac medications as prescribed. Please stop taking Aspirin. Start taking Eliquis 5mg twice daily. Thank you for choosing [facility]. Please call our office or use [facility] with any questions.

## 2023-07-28 ENCOUNTER — TELEPHONE (OUTPATIENT)
Dept: CARDIOLOGY CLINIC | Facility: CLINIC | Age: 78
End: 2023-07-28

## 2023-07-28 NOTE — TELEPHONE ENCOUNTER
ID [id number]  Pt has a $540 deductible he refuses to pay-asking for a change in medication    SHANTALARMANIALESSIO    [employer]        After pt pays off deductible pt co-pay will be $44.00

## 2023-07-31 ENCOUNTER — ANTICOAG VISIT (OUTPATIENT)
Dept: CARDIOLOGY CLINIC | Facility: CLINIC | Age: 78
End: 2023-07-31

## 2023-07-31 ENCOUNTER — TELEPHONE (OUTPATIENT)
Dept: CARDIOLOGY CLINIC | Facility: CLINIC | Age: 78
End: 2023-07-31

## 2023-07-31 DIAGNOSIS — I48.92 ATRIAL FLUTTER, UNSPECIFIED TYPE (HCC): Primary | ICD-10-CM

## 2023-07-31 RX ORDER — WARFARIN SODIUM 5 MG/1
TABLET ORAL
Qty: 30 TABLET | Refills: 11 | Status: SHIPPED | OUTPATIENT
Start: 2023-07-31

## 2023-07-31 NOTE — TELEPHONE ENCOUNTER
Patient requesting his blood thinner be changed from Eliquis 5 mg BID to Warfarin, due to expense. Please advise.

## 2023-08-02 DIAGNOSIS — E11.49 TYPE 2 DIABETES MELLITUS WITH OTHER NEUROLOGIC COMPLICATION, WITHOUT LONG-TERM CURRENT USE OF INSULIN (HCC): ICD-10-CM

## 2023-08-02 RX ORDER — ROSUVASTATIN CALCIUM 5 MG/1
TABLET, COATED ORAL
Qty: 90 TABLET | Refills: 1 | Status: SHIPPED | OUTPATIENT
Start: 2023-08-02

## 2023-08-03 ENCOUNTER — APPOINTMENT (OUTPATIENT)
Dept: LAB | Facility: CLINIC | Age: 78
End: 2023-08-03
Payer: MEDICARE

## 2023-08-03 ENCOUNTER — ANTICOAG VISIT (OUTPATIENT)
Dept: CARDIOLOGY CLINIC | Facility: CLINIC | Age: 78
End: 2023-08-03

## 2023-08-03 LAB
INR PPP: 1 (ref 0.84–1.19)
PROTHROMBIN TIME: 13.4 SECONDS (ref 11.6–14.5)

## 2023-08-03 PROCEDURE — 36415 COLL VENOUS BLD VENIPUNCTURE: CPT

## 2023-08-03 PROCEDURE — 85610 PROTHROMBIN TIME: CPT

## 2023-08-07 ENCOUNTER — ANTICOAG VISIT (OUTPATIENT)
Dept: CARDIOLOGY CLINIC | Facility: CLINIC | Age: 78
End: 2023-08-07

## 2023-08-07 ENCOUNTER — APPOINTMENT (OUTPATIENT)
Dept: LAB | Facility: CLINIC | Age: 78
End: 2023-08-07
Payer: MEDICARE

## 2023-08-10 ENCOUNTER — APPOINTMENT (OUTPATIENT)
Dept: LAB | Facility: CLINIC | Age: 78
End: 2023-08-10
Payer: MEDICARE

## 2023-08-10 ENCOUNTER — ANTICOAG VISIT (OUTPATIENT)
Dept: CARDIOLOGY CLINIC | Facility: CLINIC | Age: 78
End: 2023-08-10

## 2023-08-10 DIAGNOSIS — I48.92 ATRIAL FLUTTER, UNSPECIFIED TYPE (HCC): ICD-10-CM

## 2023-08-10 LAB
INR PPP: 1.32 (ref 0.84–1.19)
PROTHROMBIN TIME: 16.6 SECONDS (ref 11.6–14.5)

## 2023-08-10 PROCEDURE — 85610 PROTHROMBIN TIME: CPT

## 2023-08-10 PROCEDURE — 36415 COLL VENOUS BLD VENIPUNCTURE: CPT

## 2023-08-14 ENCOUNTER — APPOINTMENT (OUTPATIENT)
Dept: LAB | Facility: CLINIC | Age: 78
End: 2023-08-14
Payer: MEDICARE

## 2023-08-14 DIAGNOSIS — I48.92 ATRIAL FLUTTER, UNSPECIFIED TYPE (HCC): ICD-10-CM

## 2023-08-14 LAB
INR PPP: 1.95 (ref 0.84–1.19)
PROTHROMBIN TIME: 22.5 SECONDS (ref 11.6–14.5)

## 2023-08-14 PROCEDURE — 85610 PROTHROMBIN TIME: CPT

## 2023-08-14 PROCEDURE — 36415 COLL VENOUS BLD VENIPUNCTURE: CPT

## 2023-08-15 ENCOUNTER — ANTICOAG VISIT (OUTPATIENT)
Dept: CARDIOLOGY CLINIC | Facility: CLINIC | Age: 78
End: 2023-08-15

## 2023-08-16 ENCOUNTER — TELEPHONE (OUTPATIENT)
Dept: CARDIOLOGY CLINIC | Facility: CLINIC | Age: 78
End: 2023-08-16

## 2023-08-16 ENCOUNTER — OFFICE VISIT (OUTPATIENT)
Dept: CARDIAC SURGERY | Facility: CLINIC | Age: 78
End: 2023-08-16
Payer: MEDICARE

## 2023-08-16 ENCOUNTER — TELEPHONE (OUTPATIENT)
Dept: FAMILY MEDICINE CLINIC | Facility: CLINIC | Age: 78
End: 2023-08-16

## 2023-08-16 VITALS
HEART RATE: 61 BPM | HEIGHT: 71 IN | TEMPERATURE: 97.2 F | OXYGEN SATURATION: 97 % | DIASTOLIC BLOOD PRESSURE: 60 MMHG | BODY MASS INDEX: 30.14 KG/M2 | SYSTOLIC BLOOD PRESSURE: 111 MMHG | WEIGHT: 215.3 LBS

## 2023-08-16 DIAGNOSIS — I35.0 SEVERE AORTIC STENOSIS: Primary | ICD-10-CM

## 2023-08-16 PROCEDURE — 99205 OFFICE O/P NEW HI 60 MIN: CPT | Performed by: THORACIC SURGERY (CARDIOTHORACIC VASCULAR SURGERY)
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
## 2023-08-16 NOTE — TELEPHONE ENCOUNTER
----- Message from Mak Cindy sent at 8/16/2023 11:45 AM EDT -----  Regarding: Cath  Please schedule patient for:    Pre TAVR Cardiac Cath: to be scheduled in the next few weeks. Patient has Medicare as primary and is getting bloodwork done this week. Please schedule with either Dr. Abigail Angeles, Dr. Oneil Reed, Dr. Devyn Simons or Dr. Severino Hicks    To be done at: [address] [place] [address]    To be done by:    Please address any questions regarding this request to Oxana Garcia or [address],    Thank you.
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
## 2023-08-16 NOTE — TELEPHONE ENCOUNTER
Pt's son Richard Stepan calling with pt present, wondering if clinical staff member could reach out to patient to go over his med list as they believe he may not be taking them correctly.

## 2023-08-16 NOTE — TELEPHONE ENCOUNTER
Called patient to schedule procedure. Per wife, they were busy and asked to call back tomorrow (8/17/23) to schedule.

## 2023-08-17 ENCOUNTER — ANTICOAG VISIT (OUTPATIENT)
Dept: CARDIOLOGY CLINIC | Facility: CLINIC | Age: 78
End: 2023-08-17

## 2023-08-17 ENCOUNTER — PREP FOR PROCEDURE (OUTPATIENT)
Dept: CARDIOLOGY CLINIC | Facility: CLINIC | Age: 78
End: 2023-08-17

## 2023-08-17 ENCOUNTER — APPOINTMENT (OUTPATIENT)
Dept: LAB | Facility: CLINIC | Age: 78
End: 2023-08-17
Payer: MEDICARE

## 2023-08-17 DIAGNOSIS — I35.0 NONRHEUMATIC AORTIC VALVE STENOSIS: Primary | ICD-10-CM

## 2023-08-17 DIAGNOSIS — I35.0 NONRHEUMATIC AORTIC VALVE STENOSIS: ICD-10-CM

## 2023-08-17 DIAGNOSIS — I35.0 SEVERE AORTIC STENOSIS: ICD-10-CM

## 2023-08-17 DIAGNOSIS — I48.92 ATRIAL FLUTTER, UNSPECIFIED TYPE (HCC): ICD-10-CM

## 2023-08-17 LAB
ALBUMIN SERPL BCP-MCNC: 3.8 G/DL (ref 3.5–5)
ALP SERPL-CCNC: 62 U/L (ref 46–116)
ALT SERPL W P-5'-P-CCNC: 19 U/L (ref 12–78)
ANION GAP SERPL CALCULATED.3IONS-SCNC: 3 MMOL/L
AST SERPL W P-5'-P-CCNC: 16 U/L (ref 5–45)
BASOPHILS # BLD AUTO: 0.04 THOUSANDS/ÂΜL (ref 0–0.1)
BASOPHILS NFR BLD AUTO: 1 % (ref 0–1)
BILIRUB SERPL-MCNC: 0.72 MG/DL (ref 0.2–1)
BUN SERPL-MCNC: 27 MG/DL (ref 5–25)
CALCIUM SERPL-MCNC: 9.8 MG/DL (ref 8.3–10.1)
CHLORIDE SERPL-SCNC: 108 MMOL/L (ref 96–108)
CO2 SERPL-SCNC: 30 MMOL/L (ref 21–32)
CREAT SERPL-MCNC: 1.34 MG/DL (ref 0.6–1.3)
EOSINOPHIL # BLD AUTO: 0.07 THOUSAND/ÂΜL (ref 0–0.61)
EOSINOPHIL NFR BLD AUTO: 1 % (ref 0–6)
ERYTHROCYTE [DISTWIDTH] IN BLOOD BY AUTOMATED COUNT: 12.9 % (ref 11.6–15.1)
GFR SERPL CREATININE-BSD FRML MDRD: 50 ML/MIN/1.73SQ M
GLUCOSE P FAST SERPL-MCNC: 110 MG/DL (ref 65–99)
HCT VFR BLD AUTO: 48.4 % (ref 36.5–49.3)
HGB BLD-MCNC: 15.9 G/DL (ref 12–17)
IMM GRANULOCYTES # BLD AUTO: 0.02 THOUSAND/UL (ref 0–0.2)
IMM GRANULOCYTES NFR BLD AUTO: 0 % (ref 0–2)
INR PPP: 1.92 (ref 0.84–1.19)
LYMPHOCYTES # BLD AUTO: 2.18 THOUSANDS/ÂΜL (ref 0.6–4.47)
LYMPHOCYTES NFR BLD AUTO: 28 % (ref 14–44)
MCH RBC QN AUTO: 29.3 PG (ref 26.8–34.3)
MCHC RBC AUTO-ENTMCNC: 32.9 G/DL (ref 31.4–37.4)
MCV RBC AUTO: 89 FL (ref 82–98)
MONOCYTES # BLD AUTO: 0.63 THOUSAND/ÂΜL (ref 0.17–1.22)
MONOCYTES NFR BLD AUTO: 8 % (ref 4–12)
NEUTROPHILS # BLD AUTO: 4.73 THOUSANDS/ÂΜL (ref 1.85–7.62)
NEUTS SEG NFR BLD AUTO: 62 % (ref 43–75)
NRBC BLD AUTO-RTO: 0 /100 WBCS
PLATELET # BLD AUTO: 162 THOUSANDS/UL (ref 149–390)
PMV BLD AUTO: 10.5 FL (ref 8.9–12.7)
POTASSIUM SERPL-SCNC: 4.3 MMOL/L (ref 3.5–5.3)
PROT SERPL-MCNC: 7.5 G/DL (ref 6.4–8.4)
PROTHROMBIN TIME: 22.2 SECONDS (ref 11.6–14.5)
RBC # BLD AUTO: 5.43 MILLION/UL (ref 3.88–5.62)
SODIUM SERPL-SCNC: 141 MMOL/L (ref 135–147)
WBC # BLD AUTO: 7.67 THOUSAND/UL (ref 4.31–10.16)

## 2023-08-17 PROCEDURE — 85025 COMPLETE CBC W/AUTO DIFF WBC: CPT

## 2023-08-17 PROCEDURE — 80053 COMPREHEN METABOLIC PANEL: CPT

## 2023-08-17 PROCEDURE — 85610 PROTHROMBIN TIME: CPT

## 2023-08-17 PROCEDURE — 36415 COLL VENOUS BLD VENIPUNCTURE: CPT

## 2023-08-17 NOTE — TELEPHONE ENCOUNTER
Delcia Doom is not required. Verified that patient has Medicare primary with a supplement secondary.

## 2023-08-17 NOTE — TELEPHONE ENCOUNTER
Good Afternoon Dr. Deana Mckeon and spoke with patient in regards of his medications. Patient states his current medications are:  -Metformin 1000 mg tab -once daily  -Rosuvastatin 5 mg - "patient states he does not know if he had to take it or not"  -Warfarin 5 mg tab -One tablet once a day  -Tamsulosin 0.4 mg tab -One tablet once a day  -Metoprolol Tartrate 25 mg tab- one table by mouth at bedtime  -Acetaminophen 325 mg tab- PRN  -Methocarbamol 500 mg tab- 1/2 tablet by mouth PRN pain- "Patient states I do not need that medication no more Muscle spasms, I want it to take it from my list"    Please review and advise.  Thanks

## 2023-08-17 NOTE — TELEPHONE ENCOUNTER
Patient scheduled for R+LHC on 8/28/23 at [facility] with Dr. Charissa Garcia. Mailing patient instructions. Medication hold Coumadin 3 days and Metformin PM/AM.     Per patient, Medicare as primary insurance.

## 2023-08-21 ENCOUNTER — APPOINTMENT (OUTPATIENT)
Dept: LAB | Facility: CLINIC | Age: 78
End: 2023-08-21
Payer: MEDICARE

## 2023-08-21 ENCOUNTER — ANTICOAG VISIT (OUTPATIENT)
Dept: CARDIOLOGY CLINIC | Facility: CLINIC | Age: 78
End: 2023-08-21

## 2023-08-21 DIAGNOSIS — I48.92 ATRIAL FLUTTER, UNSPECIFIED TYPE (HCC): ICD-10-CM

## 2023-08-21 LAB
INR PPP: 2.59 (ref 0.84–1.19)
PROTHROMBIN TIME: 28 SECONDS (ref 11.6–14.5)

## 2023-08-21 PROCEDURE — 36415 COLL VENOUS BLD VENIPUNCTURE: CPT

## 2023-08-21 PROCEDURE — 85610 PROTHROMBIN TIME: CPT

## 2023-08-22 ENCOUNTER — HOSPITAL ENCOUNTER (OUTPATIENT)
Dept: RADIOLOGY | Facility: HOSPITAL | Age: 78
Discharge: HOME | End: 2023-08-22
Payer: MEDICARE

## 2023-08-22 DIAGNOSIS — I35.0 SEVERE AORTIC STENOSIS: ICD-10-CM

## 2023-08-22 PROCEDURE — 74174 CTA ABD&PLVS W/CONTRAST: CPT

## 2023-08-22 PROCEDURE — 75572 CT HRT W/3D IMAGE: CPT

## 2023-08-22 PROCEDURE — G1004 CDSM NDSC: HCPCS

## 2023-08-22 RX ORDER — IODIXANOL 320 MG/ML
120 INJECTION, SOLUTION INTRAVASCULAR
Status: COMPLETED | OUTPATIENT
Start: 2023-08-22 | End: 2023-08-22

## 2023-08-22 RX ADMIN — IODIXANOL 120 ML: 320 INJECTION, SOLUTION INTRAVASCULAR at 11:40

## 2023-08-23 DIAGNOSIS — I48.92 ATRIAL FLUTTER, UNSPECIFIED TYPE (HCC): Primary | ICD-10-CM

## 2023-08-23 RX ORDER — WARFARIN SODIUM 5 MG/1
TABLET ORAL
Qty: 45 TABLET | Refills: 11 | Status: SHIPPED | OUTPATIENT
Start: 2023-08-23

## 2023-08-25 ENCOUNTER — ANTICOAG VISIT (OUTPATIENT)
Dept: CARDIOLOGY CLINIC | Facility: CLINIC | Age: 78
End: 2023-08-25

## 2023-08-25 ENCOUNTER — APPOINTMENT (OUTPATIENT)
Dept: LAB | Facility: CLINIC | Age: 78
End: 2023-08-25
Payer: MEDICARE

## 2023-08-25 DIAGNOSIS — I35.0 SEVERE AORTIC STENOSIS: ICD-10-CM

## 2023-08-25 LAB
INR PPP: 2.11 (ref 0.84–1.19)
PROTHROMBIN TIME: 23.9 SECONDS (ref 11.6–14.5)

## 2023-08-25 PROCEDURE — 36415 COLL VENOUS BLD VENIPUNCTURE: CPT

## 2023-08-25 PROCEDURE — 85610 PROTHROMBIN TIME: CPT

## 2023-08-25 NOTE — TELEPHONE ENCOUNTER
Geraldo Gerardo off today. Returned patient's call and answered all questions. Patient verbalized understanding. Thanks,  Hanna Francois        From: PATIENT  <[phone]>  Sent: Friday, August 25, 2023 2:37:18 PM  To: Jenae Smith <[email or web address]  Subject: [EXTERNAL] Voice Mail (15 seconds)      WARNING! Based upon the critical issue with ransomware targeting Healthcare systems ALL attachments and links from this email should be heavily scrutinized. Yes, this is Isauro. Basilio Nettles, could you call me at [phone]? Thank you.

## 2023-08-25 NOTE — TELEPHONE ENCOUNTER
Kimi York off today. Received message from Elza Mckeon that patient's wife is requesting a call back. Called and left voicemail for patient's wife Nataliia Bellamy to call me back.      Thanks,  Hanna Farley" [employer]

## 2023-08-25 NOTE — TELEPHONE ENCOUNTER
Severo Sprung off today. Returned call to Carlos Terry and answered all questions. Thanks,  Hanna "Yvette" Ben Woodruff             From: Evelyn Adams <[phone]>  Sent: Friday, August 25, 2023 10:31:17 AM  To: Alessandro Caicedos <[email or web address]  Subject: [EXTERNAL] Voice Mail (25 seconds)      WARNING! Based upon the critical issue with [employer] targeting Healthcare systems ALL attachments and links from this email should be heavily scrutinized. Hi Severo Sprung, this is Claudeen Balboa. My father-in-law, Vicki Sosa, is scheduled for a procedure on Monday. I was hoping you would have some information to share with us. He is present to give me permission to speak on his behalf. If you can call me back at [phone], I would really appreciate it. Thank you.  Justice.

## 2023-08-25 NOTE — TELEPHONE ENCOUNTER
Stella Eddy off today. Per Stella Eddy to set up LYFT for patient. I called patient and informed LYFT  will be at 6:30AM. Patient verbalized understanding.        Thanks,  Hanna Rmaos (Elsie Epps)'[facility]

## 2023-08-28 ENCOUNTER — HOSPITAL ENCOUNTER (OUTPATIENT)
Facility: HOSPITAL | Age: 78
Setting detail: OUTPATIENT SURGERY
Discharge: HOME | End: 2023-08-29
Attending: INTERNAL MEDICINE | Admitting: INTERNAL MEDICINE
Payer: MEDICARE

## 2023-08-28 DIAGNOSIS — M79.601 RIGHT ARM PAIN: ICD-10-CM

## 2023-08-28 DIAGNOSIS — I48.92 ATRIAL FLUTTER, UNSPECIFIED TYPE (HCC): ICD-10-CM

## 2023-08-28 DIAGNOSIS — T07.XXXA MULTIPLE FRACTURES: ICD-10-CM

## 2023-08-28 DIAGNOSIS — Z95.5 S/P DRUG ELUTING CORONARY STENT PLACEMENT: Primary | ICD-10-CM

## 2023-08-28 DIAGNOSIS — I35.0 NONRHEUMATIC AORTIC VALVE STENOSIS: ICD-10-CM

## 2023-08-28 PROBLEM — I25.10 CORONARY ARTERY DISEASE INVOLVING NATIVE CORONARY ARTERY: Status: ACTIVE | Noted: 2023-08-28

## 2023-08-28 LAB
ANION GAP SERPL CALCULATED.3IONS-SCNC: 5 MMOL/L
ATRIAL RATE: 60 BPM
ATRIAL RATE: 65 BPM
BUN SERPL-MCNC: 16 MG/DL (ref 5–25)
CALCIUM SERPL-MCNC: 9.2 MG/DL (ref 8.4–10.2)
CHLORIDE SERPL-SCNC: 107 MMOL/L (ref 96–108)
CO2 SERPL-SCNC: 29 MMOL/L (ref 21–32)
CREAT SERPL-MCNC: 1.13 MG/DL (ref 0.6–1.3)
GFR SERPL CREATININE-BSD FRML MDRD: 61 ML/MIN/1.73SQ M
GLUCOSE P FAST SERPL-MCNC: 122 MG/DL (ref 65–99)
GLUCOSE SERPL-MCNC: 104 MG/DL (ref 65–140)
GLUCOSE SERPL-MCNC: 122 MG/DL (ref 65–140)
GLUCOSE SERPL-MCNC: 126 MG/DL (ref 65–140)
GLUCOSE SERPL-MCNC: 163 MG/DL (ref 65–140)
INR PPP: 1.07 (ref 0.84–1.19)
KCT BLD-ACNC: 306 SEC (ref 89–137)
P AXIS: 0 DEGREES
POTASSIUM SERPL-SCNC: 4.2 MMOL/L (ref 3.5–5.3)
PR INTERVAL: 186 MS
PROTHROMBIN TIME: 14.1 SECONDS (ref 11.6–14.5)
QRS AXIS: 134 DEGREES
QRS AXIS: 135 DEGREES
QRSD INTERVAL: 158 MS
QRSD INTERVAL: 160 MS
QT INTERVAL: 470 MS
QT INTERVAL: 478 MS
QTC INTERVAL: 470 MS
QTC INTERVAL: 489 MS
SODIUM SERPL-SCNC: 141 MMOL/L (ref 135–147)
SPECIMEN SOURCE: ABNORMAL
T WAVE AXIS: 104 DEGREES
T WAVE AXIS: 107 DEGREES
VENTRICULAR RATE: 60 BPM
VENTRICULAR RATE: 63 BPM

## 2023-08-28 PROCEDURE — 93454 CORONARY ARTERY ANGIO S&I: CPT | Performed by: INTERNAL MEDICINE

## 2023-08-28 PROCEDURE — 85610 PROTHROMBIN TIME: CPT

## 2023-08-28 PROCEDURE — 82948 REAGENT STRIP/BLOOD GLUCOSE: CPT

## 2023-08-28 PROCEDURE — 99152 MOD SED SAME PHYS/QHP 5/>YRS: CPT | Performed by: INTERNAL MEDICINE

## 2023-08-28 PROCEDURE — C1769 GUIDE WIRE: HCPCS | Performed by: INTERNAL MEDICINE

## 2023-08-28 PROCEDURE — 85347 COAGULATION TIME ACTIVATED: CPT

## 2023-08-28 PROCEDURE — 80048 BASIC METABOLIC PNL TOTAL CA: CPT

## 2023-08-28 PROCEDURE — 92928 PRQ TCAT PLMT NTRAC ST 1 LES: CPT | Performed by: INTERNAL MEDICINE

## 2023-08-28 PROCEDURE — 93010 ELECTROCARDIOGRAM REPORT: CPT | Performed by: INTERNAL MEDICINE

## 2023-08-28 PROCEDURE — 99153 MOD SED SAME PHYS/QHP EA: CPT | Performed by: INTERNAL MEDICINE

## 2023-08-28 PROCEDURE — 93005 ELECTROCARDIOGRAM TRACING: CPT

## 2023-08-28 PROCEDURE — C1887 CATHETER, GUIDING: HCPCS | Performed by: INTERNAL MEDICINE

## 2023-08-28 PROCEDURE — C1874 STENT, COATED/COV W/DEL SYS: HCPCS | Performed by: INTERNAL MEDICINE

## 2023-08-28 PROCEDURE — C1894 INTRO/SHEATH, NON-LASER: HCPCS | Performed by: INTERNAL MEDICINE

## 2023-08-28 PROCEDURE — C1725 CATH, TRANSLUMIN NON-LASER: HCPCS | Performed by: INTERNAL MEDICINE

## 2023-08-28 PROCEDURE — C9600 PERC DRUG-EL COR STENT SING: HCPCS | Performed by: INTERNAL MEDICINE

## 2023-08-28 DEVICE — STENT ONYXNG35018UX ONYX 3.50X18RX
Type: IMPLANTABLE DEVICE | Site: CORONARY | Status: FUNCTIONAL
Brand: ONYX FRONTIER™

## 2023-08-28 RX ORDER — FENTANYL CITRATE 50 UG/ML
INJECTION, SOLUTION INTRAMUSCULAR; INTRAVENOUS CODE/TRAUMA/SEDATION MEDICATION
Status: DISCONTINUED | OUTPATIENT
Start: 2023-08-28 | End: 2023-08-28 | Stop reason: HOSPADM

## 2023-08-28 RX ORDER — ASPIRIN 81 MG/1
324 TABLET, CHEWABLE ORAL ONCE
Status: COMPLETED | OUTPATIENT
Start: 2023-08-28 | End: 2023-08-28

## 2023-08-28 RX ORDER — ONDANSETRON 2 MG/ML
4 INJECTION INTRAMUSCULAR; INTRAVENOUS EVERY 6 HOURS PRN
Status: DISCONTINUED | OUTPATIENT
Start: 2023-08-28 | End: 2023-08-29 | Stop reason: HOSPADM

## 2023-08-28 RX ORDER — WARFARIN SODIUM 7.5 MG/1
7.5 TABLET ORAL
Status: COMPLETED | OUTPATIENT
Start: 2023-08-28 | End: 2023-08-28

## 2023-08-28 RX ORDER — NITROGLYCERIN 0.4 MG/1
0.4 TABLET SUBLINGUAL
Status: DISCONTINUED | OUTPATIENT
Start: 2023-08-28 | End: 2023-08-29 | Stop reason: HOSPADM

## 2023-08-28 RX ORDER — METOPROLOL SUCCINATE 25 MG/1
25 TABLET, EXTENDED RELEASE ORAL
Status: DISCONTINUED | OUTPATIENT
Start: 2023-08-28 | End: 2023-08-29 | Stop reason: HOSPADM

## 2023-08-28 RX ORDER — SODIUM CHLORIDE 9 MG/ML
75 INJECTION, SOLUTION INTRAVENOUS CONTINUOUS
Status: DISPENSED | OUTPATIENT
Start: 2023-08-28 | End: 2023-08-28

## 2023-08-28 RX ORDER — VERAPAMIL HYDROCHLORIDE 2.5 MG/ML
INJECTION, SOLUTION INTRAVENOUS CODE/TRAUMA/SEDATION MEDICATION
Status: DISCONTINUED | OUTPATIENT
Start: 2023-08-28 | End: 2023-08-28 | Stop reason: HOSPADM

## 2023-08-28 RX ORDER — LATANOPROST 50 UG/ML
1 SOLUTION/ DROPS OPHTHALMIC
Status: DISCONTINUED | OUTPATIENT
Start: 2023-08-28 | End: 2023-08-28

## 2023-08-28 RX ORDER — NITROGLYCERIN 20 MG/100ML
INJECTION INTRAVENOUS CODE/TRAUMA/SEDATION MEDICATION
Status: DISCONTINUED | OUTPATIENT
Start: 2023-08-28 | End: 2023-08-28 | Stop reason: HOSPADM

## 2023-08-28 RX ORDER — INSULIN LISPRO 100 [IU]/ML
1-6 INJECTION, SOLUTION INTRAVENOUS; SUBCUTANEOUS
Status: DISCONTINUED | OUTPATIENT
Start: 2023-08-28 | End: 2023-08-29 | Stop reason: HOSPADM

## 2023-08-28 RX ORDER — HEPARIN SODIUM 1000 [USP'U]/ML
INJECTION, SOLUTION INTRAVENOUS; SUBCUTANEOUS CODE/TRAUMA/SEDATION MEDICATION
Status: DISCONTINUED | OUTPATIENT
Start: 2023-08-28 | End: 2023-08-28 | Stop reason: HOSPADM

## 2023-08-28 RX ORDER — ATORVASTATIN CALCIUM 80 MG/1
80 TABLET, FILM COATED ORAL
Status: DISCONTINUED | OUTPATIENT
Start: 2023-08-28 | End: 2023-08-29 | Stop reason: HOSPADM

## 2023-08-28 RX ORDER — LIDOCAINE HYDROCHLORIDE 10 MG/ML
INJECTION, SOLUTION EPIDURAL; INFILTRATION; INTRACAUDAL; PERINEURAL CODE/TRAUMA/SEDATION MEDICATION
Status: DISCONTINUED | OUTPATIENT
Start: 2023-08-28 | End: 2023-08-28 | Stop reason: HOSPADM

## 2023-08-28 RX ORDER — CLOPIDOGREL BISULFATE 75 MG/1
600 TABLET ORAL ONCE
Status: COMPLETED | OUTPATIENT
Start: 2023-08-29 | End: 2023-08-29

## 2023-08-28 RX ORDER — MIDAZOLAM HYDROCHLORIDE 2 MG/2ML
INJECTION, SOLUTION INTRAMUSCULAR; INTRAVENOUS CODE/TRAUMA/SEDATION MEDICATION
Status: DISCONTINUED | OUTPATIENT
Start: 2023-08-28 | End: 2023-08-28 | Stop reason: HOSPADM

## 2023-08-28 RX ORDER — ACETAMINOPHEN 325 MG/1
650 TABLET ORAL EVERY 4 HOURS PRN
Status: DISCONTINUED | OUTPATIENT
Start: 2023-08-28 | End: 2023-08-29 | Stop reason: HOSPADM

## 2023-08-28 RX ADMIN — TICAGRELOR 90 MG: 90 TABLET ORAL at 21:15

## 2023-08-28 RX ADMIN — WARFARIN SODIUM 7.5 MG: 7.5 TABLET ORAL at 21:15

## 2023-08-28 RX ADMIN — METOPROLOL SUCCINATE 25 MG: 25 TABLET, FILM COATED, EXTENDED RELEASE ORAL at 21:15

## 2023-08-28 RX ADMIN — ATORVASTATIN CALCIUM 80 MG: 80 TABLET, FILM COATED ORAL at 17:53

## 2023-08-28 RX ADMIN — INSULIN LISPRO 1 UNITS: 100 INJECTION, SOLUTION INTRAVENOUS; SUBCUTANEOUS at 17:54

## 2023-08-28 RX ADMIN — ASPIRIN 81 MG CHEWABLE TABLET 324 MG: 81 TABLET CHEWABLE at 07:52

## 2023-08-28 RX ADMIN — SODIUM CHLORIDE 293.1 ML: 0.9 INJECTION, SOLUTION INTRAVENOUS at 07:54

## 2023-08-28 NOTE — DISCHARGE SUMMARY
Discharge Summary - Elan La 66 y.o. male MRN: [id number]    Unit/Bed#: CW2 210-01 Encounter: [id number]    Admission Date: 8/28/2023   Discharge Date:  8/29/2023  Disposition: Home    Condition at Discharge: good     Sally Suárez MD    OP Cardiologist: Dr. Cheo Levine    Interventional cardiologist: Dr. Banda [facility]     Admitting Diagnosis:  Severe symptomatic aortic stenosis   -EF 65%, mean gradient 42 mmHG, valve area  0.82 cm2    Secondary Diagnoses:   Hypertension  Hyperlipidemia  DM type 2  CKD stage 3a   -Baseline 1.1-1.2  Discharge  CR 1.15 and GFR 60    Aflutter  -Anticoagulated with Coumadin - INR 8/29/23  1.08  SSS/symptomatic bradycardia   S/p pacer (8/2022)   CVA ( right MCA thrombosis, 2017)    Carotid stenosis (R ICA occlusion and Left 50-69%)  Asthma     Discharge Diagnosis: Coronary artery disease, status post PCI/stent         /77 (BP Location: Right arm)   Pulse 62   Temp 98.1 °F (36.7 °C) (Temporal)   Resp 16   Ht 5' 11" (1.803 m)   Wt 99.3 kg (219 lb)   SpO2 100%   BMI 30.54 kg/m²       Review of Systems    Physical Exam  Vitals reviewed. Constitutional:       Appearance: Normal appearance. HENT:      Head: Normocephalic and atraumatic. Mouth/Throat:      Mouth: Mucous membranes are moist.   Cardiovascular:      Rate and Rhythm: Normal rate and regular rhythm. Pulses: Normal pulses. Heart sounds: Murmur heard. Pulmonary:      Effort: Pulmonary effort is normal.      Breath sounds: Normal breath sounds. Abdominal:      General: Abdomen is flat. Bowel sounds are normal.      Palpations: Abdomen is soft. Musculoskeletal:      Right lower leg: No edema. Left lower leg: No edema. Skin:     Capillary Refill: Capillary refill takes 2 to 3 seconds. Neurological:      Mental Status: He is alert and oriented to person, place, and time. Right Radial artery has  + 2 pulse with brisk capillary refill. Neurovascular intact to  Right hand.       HPI and Hospital Course:  Mr. Brenda Morrison. Freddie Aguirre, a 66year old male, presented to [address] [place] [address] for outpatient elective cardiac catheterization as part of pre-TAVR evaluation. He has followed by his outpatient cardiologist, since 2019 for AS. Most recent Echocardiogram, performed in March 2023 showed severe AS with mean gradient 42 mmHg and CHEO  0.82 cm2. He has experienced diminished activity tolerance associated with worsening fatigue/lower extremity fatigue and intermittent NORTH. He denies chest pain, lightheadedness, palpitations or lower extremity edema. Past medical history is significant for AS, hypertension, HLD, DM (type 2), CKD stage 3a, sss/symptomatic bradycardia (s/p pacemaker insertion 8/2022), aflutter (on device check, anticoagulated with Coumadin, CVA (right MCA thrombosis (7/2017), carotid artery stenosis (right ICA occlusion/l 50-69%), asthma, gout, OA (left knee), RLS, macular degeneration and obesity (BMI 30). Social history is unremarkable, no histoyr of cigarette smoking, alcohol abuse or illicit drug abuse. Cardiac catheterization was performed via right radial artery. He was loaded with Brilinta 180 mg and transitioned to Plavix during admission. INR on admission 1.07. He was given Coumadin 7.5 mg on 8/28/2023 INR on discharge 1.08 . Will hold his coumadin tonight. Patient has dental extraction scheduled for tomorrow      Discharge Plan:  1. Plavix 75 mg daily and aspirin 81 mg for 7 days and then discontinue and Coumadin resumed after dental procedure  2. Recheck INR on recheck on 9/5/2023  3. Cardiac rehab referral deferred until after TAVR  4. Proceed with TAVR planning    5.   Follow-up office visit is scheduled for  9/13/2023 in the [facility] [place] [place] office       Current Facility-Administered Medications   Medication Dose Route Frequency   • acetaminophen (TYLENOL) tablet 650 mg  650 mg Oral Q4H PRN   • atorvastatin (LIPITOR) tablet 80 mg  80 mg Oral After Dinner   • [START ON 8/29/2023] clopidogrel (PLAVIX) tablet 600 mg  600 mg Oral Once   • insulin lispro (HumaLOG) 100 units/mL subcutaneous injection 1-6 Units  1-6 Units Subcutaneous TID AC   • metoprolol succinate (TOPROL-XL) 24 hr tablet 25 mg  25 mg Oral HS   • nitroglycerin (NITROSTAT) SL tablet 0.4 mg  0.4 mg Sublingual Q5 Min PRN   • ondansetron (ZOFRAN) injection 4 mg  4 mg Intravenous Q6H PRN   • sodium chloride 0.9 % infusion  75 mL/hr Intravenous Continuous   • ticagrelor (BRILINTA) tablet 90 mg  90 mg Oral Once   • warfarin (COUMADIN) tablet 7.5 mg  7.5 mg Oral Once (warfarin)       Pertinent Labs/diagnostics:        Lab Ressults:  Recent Results (from the past 24 hour(s))   Basic metabolic panel    Collection Time: 08/28/23  7:52 AM   Result Value Ref Range    Sodium 141 135 - 147 mmol/L    Potassium 4.2 3.5 - 5.3 mmol/L    Chloride 107 96 - 108 mmol/L    CO2 29 21 - 32 mmol/L    ANION GAP 5 mmol/L    BUN 16 5 - 25 mg/dL    Creatinine 1.13 0.60 - 1.30 mg/dL    Glucose 122 65 - 140 mg/dL    Glucose, Fasting 122 (H) 65 - 99 mg/dL    Calcium 9.2 8.4 - 10.2 mg/dL    eGFR 61 ml/min/1.73sq m   Protime-INR    Collection Time: 08/28/23  7:52 AM   Result Value Ref Range    Protime 14.1 11.6 - 14.5 seconds    INR 1.07 0.84 - 1.19   ECG 12 lead    Collection Time: 08/28/23 12:04 PM   Result Value Ref Range    Ventricular Rate 63 BPM    Atrial Rate 65 BPM    PA Interval 186 ms    QRSD Interval 158 ms    QT Interval 478 ms    QTC Interval 489 ms    P Axis 0 degrees    QRS Axis 134 degrees    T Wave Axis 104 degrees           Lipid Profile:   Lab Results   Component Value Date    CHOL 116 11/10/2017    CHOL 211 (H) 06/22/2017     Lab Results   Component Value Date    HDL 52 05/01/2023    HDL 49 06/16/2022    HDL 51 12/16/2021     Lab Results   Component Value Date    LDLCALC 65 05/01/2023    LDLCALC 68 06/16/2022    LDLCALC 131 (H) 12/16/2021     Lab Results   Component Value Date    TRIG 90 05/01/2023 TRIG 94 06/16/2022    TRIG 119 12/16/2021         Tele: NSR      Discharge instructions/Information to patient and family:   See after visit summary for information provided to patient and family. Provisions for Follow-Up Care:  See after visit summary for information related to follow-up care and any pertinent home health orders. Planned Readmission: Yes TAVR    Discharge Statement:  I spent 45 minutes minutes discharging the patient. This time was spent on the day of discharge. I had direct contact with the patient on the day of discharge. Additional documentation is required if more than 30 minutes were spent on discharge. ** Please Note: Fluency Direct Dictation voice to text software may have been used in the creation of this document.  **

## 2023-08-28 NOTE — INTERVAL H&P NOTE
H&P reviewed. After examining the patient, I find no changed to the H&P since it had been written. 78M w/ severe AS, HTN, HLD, DM, CKD, SSS s/s PPM, AFL, CVA (2017), carotid stenosis is referred for preop [facility] for TAVR. There were no vitals taken for this visit. Patient re-evaluated.  Accept as history and physical.    Julito Reynolds, DO/August 28, 2023/7:50 AM

## 2023-08-28 NOTE — DISCHARGE INSTR - AVS FIRST PAGE
1. Please see the post cardiac catheterization dishcarge instructions. No heavy lifting, greater than 10 lbs. or strenuous  activity for 48 hrs. 2.Remove band aid tomorrow. Shower and wash area- wrist gently with soap and water- beginning tomorrow. Rinse and pat dry. Apply new water seal band aid. Repeat this process for 5 days. No powders, creams lotions or antibiotic ointments  for 5 days. No tub baths, hot tubs or swimming for 5 days. 3. Please call our office ([phone]) if you have any fever, redness, swelling, discharge from your wrist access site.     4.No driving for 1 day

## 2023-08-29 VITALS
OXYGEN SATURATION: 91 % | WEIGHT: 219 LBS | RESPIRATION RATE: 19 BRPM | HEART RATE: 65 BPM | SYSTOLIC BLOOD PRESSURE: 129 MMHG | DIASTOLIC BLOOD PRESSURE: 74 MMHG | TEMPERATURE: 98.4 F | BODY MASS INDEX: 30.66 KG/M2 | HEIGHT: 71 IN

## 2023-08-29 LAB
ANION GAP SERPL CALCULATED.3IONS-SCNC: 13 MMOL/L
BUN SERPL-MCNC: 18 MG/DL (ref 5–25)
CALCIUM SERPL-MCNC: 9.2 MG/DL (ref 8.4–10.2)
CHLORIDE SERPL-SCNC: 105 MMOL/L (ref 96–108)
CO2 SERPL-SCNC: 23 MMOL/L (ref 21–32)
CREAT SERPL-MCNC: 1.15 MG/DL (ref 0.6–1.3)
ERYTHROCYTE [DISTWIDTH] IN BLOOD BY AUTOMATED COUNT: 12.7 % (ref 11.6–15.1)
GFR SERPL CREATININE-BSD FRML MDRD: 60 ML/MIN/1.73SQ M
GLUCOSE P FAST SERPL-MCNC: 113 MG/DL (ref 65–99)
GLUCOSE SERPL-MCNC: 113 MG/DL (ref 65–140)
GLUCOSE SERPL-MCNC: 117 MG/DL (ref 65–140)
GLUCOSE SERPL-MCNC: 120 MG/DL (ref 65–140)
HCT VFR BLD AUTO: 46.5 % (ref 36.5–49.3)
HGB BLD-MCNC: 14.6 G/DL (ref 12–17)
INR PPP: 1.08 (ref 0.84–1.19)
MCH RBC QN AUTO: 28.5 PG (ref 26.8–34.3)
MCHC RBC AUTO-ENTMCNC: 31.4 G/DL (ref 31.4–37.4)
MCV RBC AUTO: 91 FL (ref 82–98)
PLATELET # BLD AUTO: 145 THOUSANDS/UL (ref 149–390)
PMV BLD AUTO: 11 FL (ref 8.9–12.7)
POTASSIUM SERPL-SCNC: 4.3 MMOL/L (ref 3.5–5.3)
PROTHROMBIN TIME: 14.2 SECONDS (ref 11.6–14.5)
RBC # BLD AUTO: 5.12 MILLION/UL (ref 3.88–5.62)
SODIUM SERPL-SCNC: 141 MMOL/L (ref 135–147)
WBC # BLD AUTO: 7.6 THOUSAND/UL (ref 4.31–10.16)

## 2023-08-29 PROCEDURE — 85610 PROTHROMBIN TIME: CPT | Performed by: INTERNAL MEDICINE

## 2023-08-29 PROCEDURE — NC001 PR NO CHARGE: Performed by: INTERNAL MEDICINE

## 2023-08-29 PROCEDURE — 80048 BASIC METABOLIC PNL TOTAL CA: CPT | Performed by: INTERNAL MEDICINE

## 2023-08-29 PROCEDURE — 85027 COMPLETE CBC AUTOMATED: CPT | Performed by: INTERNAL MEDICINE

## 2023-08-29 PROCEDURE — 82948 REAGENT STRIP/BLOOD GLUCOSE: CPT

## 2023-08-29 RX ORDER — WARFARIN SODIUM 5 MG/1
TABLET ORAL
Refills: 0 | Status: CANCELLED
Start: 2023-08-31

## 2023-08-29 RX ORDER — CLOPIDOGREL BISULFATE 75 MG/1
75 TABLET ORAL DAILY
Status: DISCONTINUED | OUTPATIENT
Start: 2023-08-30 | End: 2023-08-29 | Stop reason: HOSPADM

## 2023-08-29 RX ORDER — CLOPIDOGREL BISULFATE 75 MG/1
75 TABLET ORAL DAILY
Qty: 30 TABLET | Refills: 11 | Status: SHIPPED | OUTPATIENT
Start: 2023-08-30

## 2023-08-29 RX ORDER — NITROGLYCERIN 0.4 MG/1
0.4 TABLET SUBLINGUAL
Qty: 30 TABLET | Refills: 1 | Status: SHIPPED | OUTPATIENT
Start: 2023-08-29

## 2023-08-29 RX ORDER — ASPIRIN 81 MG/1
81 TABLET, CHEWABLE ORAL DAILY
Refills: 0 | Status: CANCELLED
Start: 2023-08-29 | End: 2023-09-05

## 2023-08-29 RX ADMIN — CLOPIDOGREL BISULFATE 600 MG: 75 TABLET, FILM COATED ORAL at 08:24

## 2023-08-29 RX ADMIN — ASPIRIN 81 MG: 81 TABLET, COATED ORAL at 12:37

## 2023-08-29 NOTE — PLAN OF CARE
Problem: PAIN - ADULT  Goal: Verbalizes/displays adequate comfort level or baseline comfort level  Description: Interventions:  - Encourage patient to monitor pain and request assistance  - Assess pain using appropriate pain scale  - Administer analgesics based on type and severity of pain and evaluate response  - Implement non-pharmacological measures as appropriate and evaluate response  - Consider cultural and social influences on pain and pain management  - Notify physician/advanced practitioner if interventions unsuccessful or patient reports new pain  Outcome: Progressing     Problem: INFECTION - ADULT  Goal: Absence or prevention of progression during hospitalization  Description: INTERVENTIONS:  - Assess and monitor for signs and symptoms of infection  - Monitor lab/diagnostic results  - Monitor all insertion sites, i.e. indwelling lines, tubes, and drains  - Monitor endotracheal if appropriate and nasal secretions for changes in amount and color  - [place] appropriate cooling/warming therapies per order  - Administer medications as ordered  - Instruct and encourage patient and family to use good hand hygiene technique  - Identify and instruct in appropriate isolation precautions for identified infection/condition  Outcome: Progressing  Goal: Absence of fever/infection during neutropenic period  Description: INTERVENTIONS:  - Monitor WBC    Outcome: Progressing     Problem: DISCHARGE PLANNING  Goal: Discharge to home or other facility with appropriate resources  Description: INTERVENTIONS:  - Identify barriers to discharge w/patient and caregiver  - Arrange for needed discharge resources and transportation as appropriate  - Identify discharge learning needs (meds, wound care, etc.)  - Arrange for interpretive services to assist at discharge as needed  - Refer to Case Management Department for coordinating discharge planning if the patient needs post-hospital services based on physician/advanced practitioner order or complex needs related to functional status, cognitive ability, or social support system  Outcome: Progressing

## 2023-08-31 ENCOUNTER — TRANSITIONAL CARE MANAGEMENT (OUTPATIENT)
Dept: FAMILY MEDICINE CLINIC | Facility: CLINIC | Age: 78
End: 2023-08-31

## 2023-09-01 ENCOUNTER — HOSPITAL ENCOUNTER (OUTPATIENT)
Dept: NON INVASIVE DIAGNOSTICS | Facility: CLINIC | Age: 78
Discharge: HOME | End: 2023-09-01
Payer: MEDICARE

## 2023-09-01 DIAGNOSIS — I65.23 BILATERAL CAROTID ARTERY STENOSIS: ICD-10-CM

## 2023-09-01 PROCEDURE — 93880 EXTRACRANIAL BILAT STUDY: CPT | Performed by: SURGERY

## 2023-09-01 PROCEDURE — 93880 EXTRACRANIAL BILAT STUDY: CPT

## 2023-09-05 ENCOUNTER — APPOINTMENT (OUTPATIENT)
Dept: LAB | Facility: CLINIC | Age: 78
End: 2023-09-05
Payer: MEDICARE

## 2023-09-05 ENCOUNTER — OFFICE VISIT (OUTPATIENT)
Dept: FAMILY MEDICINE CLINIC | Facility: CLINIC | Age: 78
End: 2023-09-05

## 2023-09-05 ENCOUNTER — ANTICOAG VISIT (OUTPATIENT)
Dept: CARDIOLOGY CLINIC | Facility: CLINIC | Age: 78
End: 2023-09-05

## 2023-09-05 ENCOUNTER — TELEPHONE (OUTPATIENT)
Dept: FAMILY MEDICINE CLINIC | Facility: CLINIC | Age: 78
End: 2023-09-05

## 2023-09-05 VITALS
WEIGHT: 215 LBS | DIASTOLIC BLOOD PRESSURE: 80 MMHG | HEART RATE: 59 BPM | OXYGEN SATURATION: 97 % | HEIGHT: 71 IN | RESPIRATION RATE: 18 BRPM | TEMPERATURE: 97.6 F | BODY MASS INDEX: 30.1 KG/M2 | SYSTOLIC BLOOD PRESSURE: 140 MMHG

## 2023-09-05 DIAGNOSIS — I35.0 SEVERE AORTIC STENOSIS: ICD-10-CM

## 2023-09-05 DIAGNOSIS — I25.10 CORONARY ARTERY DISEASE INVOLVING NATIVE CORONARY ARTERY OF NATIVE HEART WITHOUT ANGINA PECTORIS: ICD-10-CM

## 2023-09-05 DIAGNOSIS — I48.92 ATRIAL FLUTTER, UNSPECIFIED TYPE (HCC): ICD-10-CM

## 2023-09-05 DIAGNOSIS — E11.49 TYPE 2 DIABETES MELLITUS WITH OTHER NEUROLOGIC COMPLICATION, WITHOUT LONG-TERM CURRENT USE OF INSULIN (HCC): Chronic | ICD-10-CM

## 2023-09-05 DIAGNOSIS — Z23 FLU VACCINE NEED: Primary | ICD-10-CM

## 2023-09-05 DIAGNOSIS — I10 BENIGN ESSENTIAL HYPERTENSION: Chronic | ICD-10-CM

## 2023-09-05 LAB
INR PPP: 1.66 (ref 0.84–1.19)
LEFT EYE DIABETIC RETINOPATHY: ABNORMAL
LEFT EYE IMAGE QUALITY: ABNORMAL
LEFT EYE MACULAR EDEMA: ABNORMAL
LEFT EYE OTHER RETINOPATHY: ABNORMAL
PROTHROMBIN TIME: 19.9 SECONDS (ref 11.6–14.5)
RIGHT EYE DIABETIC RETINOPATHY: ABNORMAL
RIGHT EYE IMAGE QUALITY: ABNORMAL
RIGHT EYE MACULAR EDEMA: ABNORMAL
RIGHT EYE OTHER RETINOPATHY: ABNORMAL
SEVERITY (EYE EXAM): ABNORMAL
SL AMB POCT HEMOGLOBIN AIC: 6.9 (ref ?–6.5)

## 2023-09-05 PROCEDURE — 85610 PROTHROMBIN TIME: CPT

## 2023-09-05 PROCEDURE — 36415 COLL VENOUS BLD VENIPUNCTURE: CPT

## 2023-09-05 NOTE — TELEPHONE ENCOUNTER
Patient called to state that he was not supposed to start the Plavix until 8/30 but he states he started it on 8/28, is this okay? Please advise.
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
## 2023-09-05 NOTE — ASSESSMENT & PLAN NOTE
Diabetes. A1c is stable at 6.9. Patient will continue current regimen of medications.   His eye and foot exams are up-to-date  Lab Results   Component Value Date    HGBA1C 6.9 (A) 09/05/2023

## 2023-09-05 NOTE — ASSESSMENT & PLAN NOTE
Hypertension.   Patient blood pressure is stable at this time and he will continue current regimen of medications

## 2023-09-06 ENCOUNTER — OFFICE VISIT (OUTPATIENT)
Dept: CARDIAC SURGERY | Facility: CLINIC | Age: 78
End: 2023-09-06
Payer: MEDICARE

## 2023-09-06 VITALS
BODY MASS INDEX: 29.82 KG/M2 | TEMPERATURE: 97.7 F | OXYGEN SATURATION: 100 % | HEIGHT: 71 IN | SYSTOLIC BLOOD PRESSURE: 129 MMHG | DIASTOLIC BLOOD PRESSURE: 62 MMHG | WEIGHT: 213 LBS | HEART RATE: 60 BPM

## 2023-09-06 DIAGNOSIS — I35.0 SEVERE AORTIC STENOSIS: Primary | ICD-10-CM

## 2023-09-06 PROCEDURE — 99213 OFFICE O/P EST LOW 20 MIN: CPT | Performed by: THORACIC SURGERY (CARDIOTHORACIC VASCULAR SURGERY)

## 2023-09-06 RX ORDER — CHLORHEXIDINE GLUCONATE 0.12 MG/ML
15 RINSE ORAL ONCE
OUTPATIENT
Start: 2023-09-06 | End: 2023-09-06

## 2023-09-06 RX ORDER — CEFAZOLIN SODIUM 2 G/50ML
2000 SOLUTION INTRAVENOUS ONCE
OUTPATIENT
Start: 2023-09-06 | End: 2023-09-06
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
## 2023-09-11 DIAGNOSIS — I65.21 RIGHT CAROTID ARTERY OCCLUSION: Primary | ICD-10-CM

## 2023-09-11 DIAGNOSIS — I65.22 LEFT CAROTID STENOSIS: ICD-10-CM

## 2023-09-12 ENCOUNTER — APPOINTMENT (OUTPATIENT)
Dept: LAB | Facility: CLINIC | Age: 78
DRG: 267 | End: 2023-09-12
Payer: MEDICARE

## 2023-09-12 ENCOUNTER — ANTICOAG VISIT (OUTPATIENT)
Dept: CARDIOLOGY CLINIC | Facility: CLINIC | Age: 78
End: 2023-09-12

## 2023-09-12 DIAGNOSIS — I48.92 ATRIAL FLUTTER, UNSPECIFIED TYPE (HCC): ICD-10-CM

## 2023-09-12 LAB
INR PPP: 2.28 (ref 0.84–1.19)
PROTHROMBIN TIME: 25.4 SECONDS (ref 11.6–14.5)

## 2023-09-12 PROCEDURE — 36415 COLL VENOUS BLD VENIPUNCTURE: CPT

## 2023-09-12 PROCEDURE — 85610 PROTHROMBIN TIME: CPT
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
## 2023-09-13 ENCOUNTER — TELEPHONE (OUTPATIENT)
Dept: NEUROLOGY | Facility: CLINIC | Age: 78
End: 2023-09-13

## 2023-09-13 ENCOUNTER — OFFICE VISIT (OUTPATIENT)
Dept: CARDIOLOGY CLINIC | Facility: CLINIC | Age: 78
End: 2023-09-13
Payer: MEDICARE

## 2023-09-13 ENCOUNTER — APPOINTMENT (OUTPATIENT)
Dept: LAB | Facility: CLINIC | Age: 78
DRG: 267 | End: 2023-09-13
Payer: MEDICARE

## 2023-09-13 VITALS
HEART RATE: 61 BPM | BODY MASS INDEX: 30.31 KG/M2 | DIASTOLIC BLOOD PRESSURE: 70 MMHG | WEIGHT: 216.5 LBS | SYSTOLIC BLOOD PRESSURE: 130 MMHG | HEIGHT: 71 IN | OXYGEN SATURATION: 98 %

## 2023-09-13 DIAGNOSIS — Z95.5 S/P DRUG ELUTING CORONARY STENT PLACEMENT: Primary | ICD-10-CM

## 2023-09-13 DIAGNOSIS — I35.0 SEVERE AORTIC STENOSIS: ICD-10-CM

## 2023-09-13 DIAGNOSIS — Z95.0 CARDIAC PACEMAKER IN SITU: ICD-10-CM

## 2023-09-13 DIAGNOSIS — I48.92 ATRIAL FLUTTER, UNSPECIFIED TYPE (HCC): ICD-10-CM

## 2023-09-13 DIAGNOSIS — I35.0 AORTIC VALVE STENOSIS, ETIOLOGY OF CARDIAC VALVE DISEASE UNSPECIFIED: ICD-10-CM

## 2023-09-13 LAB
ABO GROUP BLD: NORMAL
BLD GP AB SCN SERPL QL: NEGATIVE
RH BLD: POSITIVE
SPECIMEN EXPIRATION DATE: NORMAL

## 2023-09-13 PROCEDURE — 99215 OFFICE O/P EST HI 40 MIN: CPT | Performed by: NURSE PRACTITIONER

## 2023-09-13 PROCEDURE — 86900 BLOOD TYPING SEROLOGIC ABO: CPT

## 2023-09-13 PROCEDURE — 86850 RBC ANTIBODY SCREEN: CPT

## 2023-09-13 PROCEDURE — 87081 CULTURE SCREEN ONLY: CPT

## 2023-09-13 PROCEDURE — 36415 COLL VENOUS BLD VENIPUNCTURE: CPT

## 2023-09-13 PROCEDURE — 86901 BLOOD TYPING SEROLOGIC RH(D): CPT

## 2023-09-13 PROCEDURE — 86923 COMPATIBILITY TEST ELECTRIC: CPT

## 2023-09-13 NOTE — TELEPHONE ENCOUNTER
Called patient's cell phone number as requested. No answer. Left a voice message requesting for a return call. Provided the office's phone number. Called patient's home phone number listed on file. No answer. Left a voice message requesting for a return call. Provided the office's phone number.

## 2023-09-13 NOTE — TELEPHONE ENCOUNTER
Alice Zavaleta MD  P Neurology [facility] Clinical Team 4  Please please let Barby Salter know that his carotid Doppler shows the ongoing occlusion of the right internal carotid but the left internal carotid is still looking reasonably good.  I agree with his ongoing plan for stroke prevention and we will repeat this study in 6 months.  I believe he is planning/is scheduled for an upcoming aortic valve replacement which is fine from my standpoint.  Thanks!

## 2023-09-13 NOTE — TELEPHONE ENCOUNTER
Patient came to office asking for the results of the VAS Coratid were. Please assist. Please call his cell phone [phone].

## 2023-09-14 LAB — MRSA NOSE QL CULT: NORMAL

## 2023-09-14 NOTE — TELEPHONE ENCOUNTER
Pt left vm re: VAS Carotid results. ______________________________    Andrew Lopez w/pt. Advised him of results of 9/1/23 VAS Carotid study. Pt verbalized understanding.

## 2023-09-20 DIAGNOSIS — E11.49 TYPE 2 DIABETES MELLITUS WITH OTHER NEUROLOGIC COMPLICATION, WITHOUT LONG-TERM CURRENT USE OF INSULIN (HCC): ICD-10-CM

## 2023-09-21 ENCOUNTER — ANESTHESIA EVENT (OUTPATIENT)
Dept: PERIOP | Facility: HOSPITAL | Age: 78
DRG: 267 | End: 2023-09-21
Payer: MEDICARE

## 2023-09-21 ENCOUNTER — APPOINTMENT (INPATIENT)
Dept: NON INVASIVE DIAGNOSTICS | Facility: HOSPITAL | Age: 78
DRG: 267 | End: 2023-09-21
Payer: MEDICARE

## 2023-09-21 ENCOUNTER — APPOINTMENT (INPATIENT)
Dept: RADIOLOGY | Facility: HOSPITAL | Age: 78
DRG: 267 | End: 2023-09-21
Payer: MEDICARE

## 2023-09-21 ENCOUNTER — HOSPITAL ENCOUNTER (INPATIENT)
Facility: HOSPITAL | Age: 78
LOS: 1 days | Discharge: HOME HEALTH | DRG: 267 | End: 2023-09-22
Attending: THORACIC SURGERY (CARDIOTHORACIC VASCULAR SURGERY) | Admitting: THORACIC SURGERY (CARDIOTHORACIC VASCULAR SURGERY)
Payer: MEDICARE

## 2023-09-21 ENCOUNTER — ANESTHESIA (OUTPATIENT)
Dept: PERIOP | Facility: HOSPITAL | Age: 78
DRG: 267 | End: 2023-09-21
Payer: MEDICARE

## 2023-09-21 DIAGNOSIS — Z95.2 S/P TAVR (TRANSCATHETER AORTIC VALVE REPLACEMENT): Primary | ICD-10-CM

## 2023-09-21 DIAGNOSIS — I50.32 CHRONIC DIASTOLIC CHF (CONGESTIVE HEART FAILURE) (HCC): ICD-10-CM

## 2023-09-21 DIAGNOSIS — I48.92 ATRIAL FLUTTER, UNSPECIFIED TYPE (HCC): ICD-10-CM

## 2023-09-21 DIAGNOSIS — I35.0 SEVERE AORTIC STENOSIS: Primary | ICD-10-CM

## 2023-09-21 DIAGNOSIS — I35.9 NONRHEUMATIC AORTIC VALVE DISORDER, UNSPECIFIED: ICD-10-CM

## 2023-09-21 DIAGNOSIS — I35.0 SEVERE AORTIC STENOSIS: ICD-10-CM

## 2023-09-21 DIAGNOSIS — Z95.2 S/P TAVR (TRANSCATHETER AORTIC VALVE REPLACEMENT): ICD-10-CM

## 2023-09-21 LAB
ANION GAP SERPL CALCULATED.3IONS-SCNC: 6 MMOL/L
AORTIC ROOT: 3.1 CM
AORTIC VALVE MEAN VELOCITY: 12.8 M/S
APICAL FOUR CHAMBER EJECTION FRACTION: 56 %
ASCENDING AORTA: 3.4 CM
ATRIAL RATE: 60 BPM
AV AREA BY CONTINUOUS VTI: 2.1 CM2
AV AREA PEAK VELOCITY: 1.9 CM2
AV LVOT MEAN GRADIENT: 3 MMHG
AV LVOT PEAK GRADIENT: 6 MMHG
AV MEAN GRADIENT: 8 MMHG
AV PEAK GRADIENT: 15 MMHG
AV VALVE AREA: 1.93 CM2
AV VELOCITY RATIO: 0.62
BASE EXCESS BLDA CALC-SCNC: 2 MMOL/L (ref -2–3)
BASE EXCESS BLDA CALC-SCNC: 4 MMOL/L (ref -2–3)
BASE EXCESS BLDA CALC-SCNC: 4 MMOL/L (ref -2–3)
BUN SERPL-MCNC: 18 MG/DL (ref 5–25)
CA-I BLD-SCNC: 1.24 MMOL/L (ref 1.12–1.32)
CA-I BLD-SCNC: 1.26 MMOL/L (ref 1.12–1.32)
CA-I BLD-SCNC: 1.32 MMOL/L (ref 1.12–1.32)
CALCIUM SERPL-MCNC: 8.7 MG/DL (ref 8.4–10.2)
CHLORIDE SERPL-SCNC: 107 MMOL/L (ref 96–108)
CO2 SERPL-SCNC: 27 MMOL/L (ref 21–32)
CREAT SERPL-MCNC: 1.04 MG/DL (ref 0.6–1.3)
DOP CALC AO PEAK VEL: 1.92 M/S
DOP CALC AO VTI: 37.47 CM
DOP CALC LVOT AREA: 2.83 CM2
DOP CALC LVOT CARDIAC INDEX: 2.37 L/MIN/M2
DOP CALC LVOT CARDIAC OUTPUT: 5.18 L/MIN
DOP CALC LVOT DIAMETER: 1.9 CM
DOP CALC LVOT PEAK VEL VTI: 25.46 CM
DOP CALC LVOT PEAK VEL: 1.19 M/S
DOP CALC LVOT STROKE INDEX: 37.2 ML/M2
DOP CALC LVOT STROKE VOLUME: 72.15 CM3
DS:DELIVERY SYSTEM: 2
E WAVE DECELERATION TIME: 263 MS
FRACTIONAL SHORTENING: 31 (ref 28–44)
GFR SERPL CREATININE-BSD FRML MDRD: 68 ML/MIN/1.73SQ M
GLUCOSE SERPL-MCNC: 143 MG/DL (ref 65–140)
GLUCOSE SERPL-MCNC: 143 MG/DL (ref 65–140)
GLUCOSE SERPL-MCNC: 144 MG/DL (ref 65–140)
GLUCOSE SERPL-MCNC: 145 MG/DL (ref 65–140)
GLUCOSE SERPL-MCNC: 147 MG/DL (ref 65–140)
GLUCOSE SERPL-MCNC: 169 MG/DL (ref 65–140)
GLUCOSE SERPL-MCNC: 233 MG/DL (ref 65–140)
GLUCOSE SERPL-MCNC: 244 MG/DL (ref 65–140)
HCO3 BLDA-SCNC: 28.8 MMOL/L (ref 24–30)
HCO3 BLDA-SCNC: 29.8 MMOL/L (ref 22–28)
HCO3 BLDA-SCNC: 30.9 MMOL/L (ref 22–28)
HCT VFR BLD AUTO: 40.3 % (ref 36.5–49.3)
HCT VFR BLD CALC: 35 % (ref 36.5–49.3)
HCT VFR BLD CALC: 35 % (ref 36.5–49.3)
HCT VFR BLD CALC: 38 % (ref 36.5–49.3)
HGB BLD-MCNC: 13.5 G/DL (ref 12–17)
HGB BLDA-MCNC: 11.9 G/DL (ref 12–17)
HGB BLDA-MCNC: 11.9 G/DL (ref 12–17)
HGB BLDA-MCNC: 12.9 G/DL (ref 12–17)
INR PPP: 1.04 (ref 0.84–1.19)
INR PPP: 1.21 (ref 0.84–1.19)
INTERVENTRICULAR SEPTUM IN DIASTOLE (PARASTERNAL SHORT AXIS VIEW): 1.6 CM
INTERVENTRICULAR SEPTUM: 1.6 CM (ref 0.6–1.1)
KCT BLD-ACNC: 132 SEC (ref 89–137)
KCT BLD-ACNC: 138 SEC (ref 89–137)
KCT BLD-ACNC: 299 SEC (ref 89–137)
LAAS-AP2: 23 CM2
LAAS-AP4: 23.3 CM2
LEFT ATRIUM SIZE: 4.6 CM
LEFT ATRIUM VOLUME (MOD BIPLANE): 78 ML
LEFT INTERNAL DIMENSION IN SYSTOLE: 2.9 CM (ref 2.1–4)
LEFT VENTRICLE DIASTOLIC VOLUME (MOD BIPLANE): 99 ML
LEFT VENTRICLE SYSTOLIC VOLUME (MOD BIPLANE): 43 ML
LEFT VENTRICULAR INTERNAL DIMENSION IN DIASTOLE: 4.2 CM (ref 3.5–6)
LEFT VENTRICULAR POSTERIOR WALL IN END DIASTOLE: 1.2 CM
LEFT VENTRICULAR STROKE VOLUME: 44 ML
LV EF: 57 %
LVSV (TEICH): 44 ML
MV E'TISSUE VEL-SEP: 6 CM/S
MV PEAK A VEL: 0.64 M/S
MV PEAK E VEL: 55 CM/S
MV STENOSIS PRESSURE HALF TIME: 76 MS
MV VALVE AREA P 1/2 METHOD: 2.89
P AXIS: 0 DEGREES
PCO2 BLD: 30 MMOL/L (ref 21–32)
PCO2 BLD: 31 MMOL/L (ref 21–32)
PCO2 BLD: 33 MMOL/L (ref 21–32)
PCO2 BLD: 46.9 MM HG (ref 36–44)
PCO2 BLD: 53.2 MM HG (ref 42–50)
PCO2 BLD: 57.4 MM HG (ref 36–44)
PH BLD: 7.34 [PH] (ref 7.35–7.45)
PH BLD: 7.34 [PH] (ref 7.3–7.4)
PH BLD: 7.41 [PH] (ref 7.35–7.45)
PLATELET # BLD AUTO: 139 THOUSANDS/UL (ref 149–390)
PMV BLD AUTO: 10.3 FL (ref 8.9–12.7)
PO2 BLD: 317 MM HG (ref 75–129)
PO2 BLD: 358 MM HG (ref 75–129)
PO2 BLD: 72 MM HG (ref 35–45)
POTASSIUM BLD-SCNC: 4 MMOL/L (ref 3.5–5.3)
POTASSIUM BLD-SCNC: 4.1 MMOL/L (ref 3.5–5.3)
POTASSIUM BLD-SCNC: 4.5 MMOL/L (ref 3.5–5.3)
POTASSIUM SERPL-SCNC: 4.3 MMOL/L (ref 3.5–5.3)
PR INTERVAL: 0 MS
PROTHROMBIN TIME: 13.8 SECONDS (ref 11.6–14.5)
PROTHROMBIN TIME: 15.5 SECONDS (ref 11.6–14.5)
QRS AXIS: -79 DEGREES
QRSD INTERVAL: 163 MS
QT INTERVAL: 479 MS
QTC INTERVAL: 479 MS
RIGHT ATRIUM AREA SYSTOLE A4C: 22.2 CM2
RIGHT VENTRICLE ID DIMENSION: 3.9 CM
SAO2 % BLD FROM PO2: 100 % (ref 60–85)
SAO2 % BLD FROM PO2: 100 % (ref 60–85)
SAO2 % BLD FROM PO2: 93 % (ref 60–85)
SL CV LEFT ATRIUM LENGTH A2C: 5.4 CM
SL CV LV EF: 60
SL CV PED ECHO LEFT VENTRICLE DIASTOLIC VOLUME (MOD BIPLANE) 2D: 77 ML
SL CV PED ECHO LEFT VENTRICLE SYSTOLIC VOLUME (MOD BIPLANE) 2D: 32 ML
SODIUM BLD-SCNC: 140 MMOL/L (ref 136–145)
SODIUM BLD-SCNC: 141 MMOL/L (ref 136–145)
SODIUM BLD-SCNC: 142 MMOL/L (ref 136–145)
SODIUM SERPL-SCNC: 140 MMOL/L (ref 135–147)
SPECIMEN SOURCE: ABNORMAL
SPECIMEN SOURCE: NORMAL
T WAVE AXIS: 107 DEGREES
TR MAX PG: 20 MMHG
TR PEAK VELOCITY: 2.2 M/S
TRICUSPID ANNULAR PLANE SYSTOLIC EXCURSION: 2.2 CM
TRICUSPID VALVE PEAK REGURGITATION VELOCITY: 2.24 M/S
VENTRICULAR RATE: 60 BPM

## 2023-09-21 PROCEDURE — C1894 INTRO/SHEATH, NON-LASER: HCPCS | Performed by: THORACIC SURGERY (CARDIOTHORACIC VASCULAR SURGERY)

## 2023-09-21 PROCEDURE — 93010 ELECTROCARDIOGRAM REPORT: CPT | Performed by: INTERNAL MEDICINE

## 2023-09-21 PROCEDURE — C1769 GUIDE WIRE: HCPCS | Performed by: THORACIC SURGERY (CARDIOTHORACIC VASCULAR SURGERY)

## 2023-09-21 PROCEDURE — 02RF38Z REPLACEMENT OF AORTIC VALVE WITH ZOOPLASTIC TISSUE, PERCUTANEOUS APPROACH: ICD-10-PCS | Performed by: THORACIC SURGERY (CARDIOTHORACIC VASCULAR SURGERY)

## 2023-09-21 PROCEDURE — 85610 PROTHROMBIN TIME: CPT | Performed by: PHYSICIAN ASSISTANT

## 2023-09-21 PROCEDURE — 84132 ASSAY OF SERUM POTASSIUM: CPT

## 2023-09-21 PROCEDURE — 93306 TTE W/DOPPLER COMPLETE: CPT

## 2023-09-21 PROCEDURE — 85347 COAGULATION TIME ACTIVATED: CPT

## 2023-09-21 PROCEDURE — C1781 MESH (IMPLANTABLE): HCPCS | Performed by: THORACIC SURGERY (CARDIOTHORACIC VASCULAR SURGERY)

## 2023-09-21 PROCEDURE — C1751 CATH, INF, PER/CENT/MIDLINE: HCPCS | Performed by: THORACIC SURGERY (CARDIOTHORACIC VASCULAR SURGERY)

## 2023-09-21 PROCEDURE — C1760 CLOSURE DEV, VASC: HCPCS | Performed by: THORACIC SURGERY (CARDIOTHORACIC VASCULAR SURGERY)

## 2023-09-21 PROCEDURE — 02HV33Z INSERTION OF INFUSION DEVICE INTO SUPERIOR VENA CAVA, PERCUTANEOUS APPROACH: ICD-10-PCS | Performed by: ANESTHESIOLOGY

## 2023-09-21 PROCEDURE — 93306 TTE W/DOPPLER COMPLETE: CPT | Performed by: INTERNAL MEDICINE

## 2023-09-21 PROCEDURE — 71045 X-RAY EXAM CHEST 1 VIEW: CPT

## 2023-09-21 PROCEDURE — 93005 ELECTROCARDIOGRAM TRACING: CPT

## 2023-09-21 PROCEDURE — 76377 3D RENDER W/INTRP POSTPROCES: CPT

## 2023-09-21 PROCEDURE — 33361 REPLACE AORTIC VALVE PERQ: CPT | Performed by: THORACIC SURGERY (CARDIOTHORACIC VASCULAR SURGERY)

## 2023-09-21 PROCEDURE — 85018 HEMOGLOBIN: CPT | Performed by: PHYSICIAN ASSISTANT

## 2023-09-21 PROCEDURE — 82948 REAGENT STRIP/BLOOD GLUCOSE: CPT

## 2023-09-21 PROCEDURE — 85049 AUTOMATED PLATELET COUNT: CPT | Performed by: PHYSICIAN ASSISTANT

## 2023-09-21 PROCEDURE — 80048 BASIC METABOLIC PNL TOTAL CA: CPT | Performed by: PHYSICIAN ASSISTANT

## 2023-09-21 PROCEDURE — 94664 DEMO&/EVAL PT USE INHALER: CPT

## 2023-09-21 PROCEDURE — 82330 ASSAY OF CALCIUM: CPT

## 2023-09-21 PROCEDURE — 82947 ASSAY GLUCOSE BLOOD QUANT: CPT

## 2023-09-21 PROCEDURE — 33361 REPLACE AORTIC VALVE PERQ: CPT | Performed by: INTERNAL MEDICINE

## 2023-09-21 PROCEDURE — 84295 ASSAY OF SERUM SODIUM: CPT

## 2023-09-21 PROCEDURE — 82803 BLOOD GASES ANY COMBINATION: CPT

## 2023-09-21 PROCEDURE — 85014 HEMATOCRIT: CPT | Performed by: PHYSICIAN ASSISTANT

## 2023-09-21 PROCEDURE — 85014 HEMATOCRIT: CPT

## 2023-09-21 PROCEDURE — 93355 ECHO TRANSESOPHAGEAL (TEE): CPT

## 2023-09-21 DEVICE — SAPIEN 3 ULTRA RESILIA TRANSCATHETER HEART VALVE, 26MM
Type: IMPLANTABLE DEVICE | Site: HEART | Status: FUNCTIONAL
Brand: SAPIEN 3 ULTRA RESILIA

## 2023-09-21 DEVICE — PERCLOSE™ PROSTYLE™ SUTURE-MEDIATED CLOSURE AND REPAIR SYSTEM
Type: IMPLANTABLE DEVICE | Site: GROIN | Status: FUNCTIONAL
Brand: PERCLOSE™ PROSTYLE™

## 2023-09-21 RX ORDER — CEFAZOLIN SODIUM 2 G/50ML
2000 SOLUTION INTRAVENOUS EVERY 8 HOURS
Status: COMPLETED | OUTPATIENT
Start: 2023-09-21 | End: 2023-09-22

## 2023-09-21 RX ORDER — HEPARIN SODIUM 1000 [USP'U]/ML
400 INJECTION, SOLUTION INTRAVENOUS; SUBCUTANEOUS ONCE
Status: DISCONTINUED | OUTPATIENT
Start: 2023-09-21 | End: 2023-09-21

## 2023-09-21 RX ORDER — POLYETHYLENE GLYCOL 3350 17 G/17G
17 POWDER, FOR SOLUTION ORAL DAILY
Status: DISCONTINUED | OUTPATIENT
Start: 2023-09-21 | End: 2023-09-22 | Stop reason: HOSPADM

## 2023-09-21 RX ORDER — LIDOCAINE HYDROCHLORIDE 10 MG/ML
INJECTION, SOLUTION EPIDURAL; INFILTRATION; INTRACAUDAL; PERINEURAL
Status: COMPLETED | OUTPATIENT
Start: 2023-09-21 | End: 2023-09-21

## 2023-09-21 RX ORDER — ROCURONIUM BROMIDE 10 MG/ML
INJECTION, SOLUTION INTRAVENOUS AS NEEDED
Status: DISCONTINUED | OUTPATIENT
Start: 2023-09-21 | End: 2023-09-21

## 2023-09-21 RX ORDER — MIDAZOLAM HYDROCHLORIDE 2 MG/2ML
INJECTION, SOLUTION INTRAMUSCULAR; INTRAVENOUS AS NEEDED
Status: DISCONTINUED | OUTPATIENT
Start: 2023-09-21 | End: 2023-09-21

## 2023-09-21 RX ORDER — FENTANYL CITRATE/PF 50 MCG/ML
25 SYRINGE (ML) INJECTION
Status: DISCONTINUED | OUTPATIENT
Start: 2023-09-21 | End: 2023-09-21

## 2023-09-21 RX ORDER — ACETAMINOPHEN 325 MG/1
650 TABLET ORAL EVERY 6 HOURS PRN
Status: DISCONTINUED | OUTPATIENT
Start: 2023-09-21 | End: 2023-09-22 | Stop reason: HOSPADM

## 2023-09-21 RX ORDER — SODIUM CHLORIDE, SODIUM GLUCONATE, SODIUM ACETATE, POTASSIUM CHLORIDE, MAGNESIUM CHLORIDE, SODIUM PHOSPHATE, DIBASIC, AND POTASSIUM PHOSPHATE .53; .5; .37; .037; .03; .012; .00082 G/100ML; G/100ML; G/100ML; G/100ML; G/100ML; G/100ML; G/100ML
50 INJECTION, SOLUTION INTRAVENOUS CONTINUOUS
Status: DISPENSED | OUTPATIENT
Start: 2023-09-21 | End: 2023-09-21

## 2023-09-21 RX ORDER — CLOPIDOGREL BISULFATE 75 MG/1
75 TABLET ORAL DAILY
Status: DISCONTINUED | OUTPATIENT
Start: 2023-09-21 | End: 2023-09-22 | Stop reason: HOSPADM

## 2023-09-21 RX ORDER — HEPARIN SODIUM 5000 [USP'U]/ML
5000 INJECTION, SOLUTION INTRAVENOUS; SUBCUTANEOUS EVERY 8 HOURS SCHEDULED
Status: DISCONTINUED | OUTPATIENT
Start: 2023-09-22 | End: 2023-09-22 | Stop reason: HOSPADM

## 2023-09-21 RX ORDER — PROTAMINE SULFATE 10 MG/ML
INJECTION, SOLUTION INTRAVENOUS AS NEEDED
Status: DISCONTINUED | OUTPATIENT
Start: 2023-09-21 | End: 2023-09-21

## 2023-09-21 RX ORDER — INSULIN LISPRO 100 [IU]/ML
1-6 INJECTION, SOLUTION INTRAVENOUS; SUBCUTANEOUS
Status: DISCONTINUED | OUTPATIENT
Start: 2023-09-21 | End: 2023-09-22 | Stop reason: HOSPADM

## 2023-09-21 RX ORDER — SODIUM CHLORIDE 9 MG/ML
125 INJECTION, SOLUTION INTRAVENOUS CONTINUOUS
Status: CANCELLED | OUTPATIENT
Start: 2023-09-21

## 2023-09-21 RX ORDER — ONDANSETRON 2 MG/ML
4 INJECTION INTRAMUSCULAR; INTRAVENOUS ONCE AS NEEDED
Status: DISCONTINUED | OUTPATIENT
Start: 2023-09-21 | End: 2023-09-21

## 2023-09-21 RX ORDER — WARFARIN SODIUM 5 MG/1
5 TABLET ORAL
Status: COMPLETED | OUTPATIENT
Start: 2023-09-21 | End: 2023-09-21

## 2023-09-21 RX ORDER — ONDANSETRON 2 MG/ML
4 INJECTION INTRAMUSCULAR; INTRAVENOUS EVERY 6 HOURS PRN
Status: DISCONTINUED | OUTPATIENT
Start: 2023-09-21 | End: 2023-09-22 | Stop reason: HOSPADM

## 2023-09-21 RX ORDER — PANTOPRAZOLE SODIUM 40 MG/1
40 TABLET, DELAYED RELEASE ORAL
Status: DISCONTINUED | OUTPATIENT
Start: 2023-09-21 | End: 2023-09-22 | Stop reason: HOSPADM

## 2023-09-21 RX ORDER — CHLORHEXIDINE GLUCONATE ORAL RINSE 1.2 MG/ML
15 SOLUTION DENTAL ONCE
Status: COMPLETED | OUTPATIENT
Start: 2023-09-21 | End: 2023-09-21

## 2023-09-21 RX ORDER — BISACODYL 10 MG
10 SUPPOSITORY, RECTAL RECTAL DAILY PRN
Status: DISCONTINUED | OUTPATIENT
Start: 2023-09-21 | End: 2023-09-22 | Stop reason: HOSPADM

## 2023-09-21 RX ORDER — LIDOCAINE HYDROCHLORIDE 10 MG/ML
INJECTION, SOLUTION INFILTRATION; PERINEURAL AS NEEDED
Status: DISCONTINUED | OUTPATIENT
Start: 2023-09-21 | End: 2023-09-21

## 2023-09-21 RX ORDER — CEFAZOLIN SODIUM 2 G/50ML
SOLUTION INTRAVENOUS AS NEEDED
Status: DISCONTINUED | OUTPATIENT
Start: 2023-09-21 | End: 2023-09-21

## 2023-09-21 RX ORDER — ATORVASTATIN CALCIUM 80 MG/1
80 TABLET, FILM COATED ORAL
Status: DISCONTINUED | OUTPATIENT
Start: 2023-09-21 | End: 2023-09-22 | Stop reason: HOSPADM

## 2023-09-21 RX ORDER — CHLORHEXIDINE GLUCONATE ORAL RINSE 1.2 MG/ML
15 SOLUTION DENTAL EVERY 12 HOURS
Status: DISCONTINUED | OUTPATIENT
Start: 2023-09-21 | End: 2023-09-22 | Stop reason: HOSPADM

## 2023-09-21 RX ORDER — SODIUM CHLORIDE 9 MG/ML
INJECTION, SOLUTION INTRAVENOUS CONTINUOUS PRN
Status: DISCONTINUED | OUTPATIENT
Start: 2023-09-21 | End: 2023-09-21

## 2023-09-21 RX ORDER — METOPROLOL SUCCINATE 25 MG/1
25 TABLET, EXTENDED RELEASE ORAL
Status: DISCONTINUED | OUTPATIENT
Start: 2023-09-21 | End: 2023-09-22 | Stop reason: HOSPADM

## 2023-09-21 RX ORDER — CEFAZOLIN SODIUM 2 G/50ML
2000 SOLUTION INTRAVENOUS ONCE
Status: DISCONTINUED | OUTPATIENT
Start: 2023-09-21 | End: 2023-09-21

## 2023-09-21 RX ORDER — HYDRALAZINE HYDROCHLORIDE 20 MG/ML
10 INJECTION INTRAMUSCULAR; INTRAVENOUS EVERY 6 HOURS PRN
Status: DISCONTINUED | OUTPATIENT
Start: 2023-09-21 | End: 2023-09-22 | Stop reason: HOSPADM

## 2023-09-21 RX ORDER — PROPOFOL 10 MG/ML
INJECTION, EMULSION INTRAVENOUS AS NEEDED
Status: DISCONTINUED | OUTPATIENT
Start: 2023-09-21 | End: 2023-09-21

## 2023-09-21 RX ORDER — HEPARIN SODIUM 1000 [USP'U]/ML
INJECTION, SOLUTION INTRAVENOUS; SUBCUTANEOUS AS NEEDED
Status: DISCONTINUED | OUTPATIENT
Start: 2023-09-21 | End: 2023-09-21

## 2023-09-21 RX ORDER — FENTANYL CITRATE 50 UG/ML
INJECTION, SOLUTION INTRAMUSCULAR; INTRAVENOUS AS NEEDED
Status: DISCONTINUED | OUTPATIENT
Start: 2023-09-21 | End: 2023-09-21

## 2023-09-21 RX ADMIN — CEFAZOLIN SODIUM 2000 MG: 2 SOLUTION INTRAVENOUS at 09:45

## 2023-09-21 RX ADMIN — SODIUM CHLORIDE, SODIUM GLUCONATE, SODIUM ACETATE, POTASSIUM CHLORIDE, MAGNESIUM CHLORIDE, SODIUM PHOSPHATE, DIBASIC, AND POTASSIUM PHOSPHATE 50 ML/HR: .53; .5; .37; .037; .03; .012; .00082 INJECTION, SOLUTION INTRAVENOUS at 11:25

## 2023-09-21 RX ADMIN — FENTANYL CITRATE 100 MCG: 50 INJECTION, SOLUTION INTRAMUSCULAR; INTRAVENOUS at 09:29

## 2023-09-21 RX ADMIN — MUPIROCIN 1 APPLICATION: 20 OINTMENT TOPICAL at 21:08

## 2023-09-21 RX ADMIN — MIDAZOLAM 2 MG: 1 INJECTION INTRAMUSCULAR; INTRAVENOUS at 09:25

## 2023-09-21 RX ADMIN — CHLORHEXIDINE GLUCONATE 15 ML: 1.2 SOLUTION ORAL at 21:10

## 2023-09-21 RX ADMIN — LIDOCAINE HYDROCHLORIDE 100 MG: 10 INJECTION, SOLUTION INFILTRATION; PERINEURAL at 09:29

## 2023-09-21 RX ADMIN — NICARDIPINE HYDROCHLORIDE 200 MCG: 2.5 INJECTION, SOLUTION INTRAVENOUS at 10:16

## 2023-09-21 RX ADMIN — SUGAMMADEX 200 MG: 100 INJECTION, SOLUTION INTRAVENOUS at 10:22

## 2023-09-21 RX ADMIN — ATORVASTATIN CALCIUM 80 MG: 80 TABLET, FILM COATED ORAL at 17:43

## 2023-09-21 RX ADMIN — INSULIN LISPRO 3 UNITS: 100 INJECTION, SOLUTION INTRAVENOUS; SUBCUTANEOUS at 17:44

## 2023-09-21 RX ADMIN — LIDOCAINE HYDROCHLORIDE 0.5 ML: 10 INJECTION, SOLUTION EPIDURAL; INFILTRATION; INTRACAUDAL; PERINEURAL at 09:27

## 2023-09-21 RX ADMIN — CHLORHEXIDINE GLUCONATE 15 ML: 1.2 SOLUTION ORAL at 07:53

## 2023-09-21 RX ADMIN — SODIUM CHLORIDE: 0.9 INJECTION, SOLUTION INTRAVENOUS at 09:20

## 2023-09-21 RX ADMIN — CLOPIDOGREL BISULFATE 75 MG: 75 TABLET ORAL at 12:29

## 2023-09-21 RX ADMIN — PROTAMINE SULFATE 50 MG: 10 INJECTION, SOLUTION INTRAVENOUS at 10:21

## 2023-09-21 RX ADMIN — CEFAZOLIN SODIUM 2000 MG: 2 SOLUTION INTRAVENOUS at 17:43

## 2023-09-21 RX ADMIN — ROCURONIUM BROMIDE 40 MG: 10 INJECTION, SOLUTION INTRAVENOUS at 09:29

## 2023-09-21 RX ADMIN — HEPARIN SODIUM 13000 UNITS: 1000 INJECTION INTRAVENOUS; SUBCUTANEOUS at 10:07

## 2023-09-21 RX ADMIN — PROPOFOL 150 MG: 10 INJECTION, EMULSION INTRAVENOUS at 09:29

## 2023-09-21 RX ADMIN — ASPIRIN 81 MG: 81 TABLET, COATED ORAL at 12:29

## 2023-09-21 RX ADMIN — WARFARIN SODIUM 5 MG: 5 TABLET ORAL at 17:43

## 2023-09-21 RX ADMIN — METOPROLOL SUCCINATE 25 MG: 25 TABLET, FILM COATED, EXTENDED RELEASE ORAL at 21:07

## 2023-09-21 RX ADMIN — PANTOPRAZOLE SODIUM 40 MG: 40 TABLET, DELAYED RELEASE ORAL at 12:29

## 2023-09-21 RX ADMIN — NICARDIPINE HYDROCHLORIDE 5 MG/HR: 2.5 INJECTION, SOLUTION INTRAVENOUS at 10:23

## 2023-09-21 RX ADMIN — INSULIN LISPRO 3 UNITS: 100 INJECTION, SOLUTION INTRAVENOUS; SUBCUTANEOUS at 21:11

## 2023-09-21 RX ADMIN — METFORMIN HYDROCHLORIDE 1000 MG: 500 TABLET ORAL at 12:29

## 2023-09-21 RX ADMIN — MUPIROCIN 1 APPLICATION: 20 OINTMENT TOPICAL at 07:53

## 2023-09-21 RX ADMIN — NICARDIPINE HYDROCHLORIDE 200 MCG: 2.5 INJECTION, SOLUTION INTRAVENOUS at 10:23

## 2023-09-21 RX ADMIN — PHENYLEPHRINE HYDROCHLORIDE 50 MCG/MIN: 10 INJECTION INTRAVENOUS at 09:33

## 2023-09-21 NOTE — ANESTHESIA POSTPROCEDURE EVALUATION
Post-Op Assessment Note    CV Status:  Stable  Pain Score: 0    Pain management: adequate     Mental Status:  Alert and awake   Hydration Status:  Euvolemic and stable   PONV Controlled:  Controlled   Airway Patency:  Patent      Post Op Vitals Reviewed: Yes      Staff: CRNA         No notable events documented.     BP      Temp     Pulse     Resp      SpO2

## 2023-09-21 NOTE — OP NOTE
OPERATIVE REPORT  PATIENT NAME: Duran Peralta    :  1945  MRN: [id number]  Pt Location: [facility]BR[facility] OR ROOM 02    SURGERY DATE: 2023    SURGEON: Venessa Whyte DO    CO-SURGEON: Tiffany Zaldivar DO    ASSISTANT: Marcus Solorio PA-C    ADDITIONAL ASSISTANT: N/A    PREOPERATIVE DIAGNOSIS: Symptomatic severe aortic stenosis. POSTOPERATIVE DIAGNOSIS: Symptomatic severe aortic stenosis. NYHA Class: 3  CCS Class: 3    PROCEDURE:   Transcatheter aortic valve replacement with a 26 mm Davila MARIBELL 3 Ultra Resilia bioprosthetic valve via right transfemoral approach. CARDIOPULMONARY BYPASS TIME: 0 minutes. ANESTHESIA Dr. Da Munguia, general endotracheal anesthesia with transesophageal echocardiogram guidance. INDICATIONS:  The patient is a 66y.o. year-old male with clinical and echocardiographic findings consistent with severe aortic stenosis. FINDINGS:  1. Intraoperative transesophageal echocardiogram revealed severe aortic stenosis. 2. Final transesophageal echocardiogram demonstrated prosthetic valve with normal function trace perivalvular leak. OPERATIVE TECHNIQUE:    The patient was taken to the operating room and placed supine on the operating table. Following the satisfactory induction of general anesthesia and placement of monitoring lines, the patient was prepped and draped in the usual sterile fashion. A time-out procedure was performed. The right common femoral artery and right common femoral vein were accessed percutaneously using Seldinger technique and fluoroscopy. A pigtail catheter was inserted and advanced to the right coronary cusp, an aortogram was performed to determine proper angle and orientation for valve deployment . Through the right common femoral vein sheath, a balloon-tip temporary pacing catheter was inserted and advanced into the right ventricle and its capture was confirmed.      The right common femoral artery was accessed percutaneously using Seldinger technique and fluoroscopy. Two (2) Perclose sutures were deployed in the standard fashion. The patient was systemically heparinized. The right common femoral artery was then accessed, a Viewposterquist extra-stiff wire was positioned in the ascending aorta over an exchange catheter. The sheath for the delivery system was inserted through the right common femoral artery and advanced into the aorta. A 6 Turkmen Annmarie right 4 coronary catheter was advanced through the delivery sheath to the aortic valve. The aortic valve was crossed with a 0.035 straight-tip wire, the right coronary catheter was advanced into the left ventricular cavity, this was then exchanged for a curved tip Amplatzer extra stiff wire. A 26 mm MARIBELL 3 Ultra Resilia valve delivery system was advanced through the delivery sheath into the aorta, the delivery system was configured for deployment. The valve on the delivery system was then advanced and the aortic valve was crossed. At this point, the catheter was desheathed in the standard fashion. The valve was positioned appropriately using a combination of fluoroscopy and transesophageal echocardiogram guidance. During an episode of rapid pacing, balloon deployment of the valve was performed. Following deployment, the position of the valve was confirmed by fluoroscopy and echocardiography and its position appeared appropriate with trace perivalvular leak. The valve delivery system was subsequently removed followed by removal of the sheath while the Perclose sutures were secured and direct pressure was held. Protamine was administered with normalization of the ACT. Pressure was released, without evidence of active bleeding. The right common femoral artery sheath was removed followed by deployment of a closure device. The right common femoral vein sheath was removed and pressure was held. COMPLICATIONS: None    PACKS/TUBES/DRAINS: None.      EBL: Minimal.    TRANSFUSION: None. SPECIMENS: None      As the attending surgeon, I was present and scrubbed for all critical portions of this procedure. There was no qualified surgical resident available. Sponge, needle and instrument counts were reported as correct by the nursing staff.      SIGNATURE: Linsey Gunn DO  DATE: September 21, 2023  TIME: 12:53 PM

## 2023-09-21 NOTE — INTERVAL H&P NOTE
H&P reviewed. After examining the patient I find no changes in the patients condition since the H&P had been written. Vitals:    09/21/23 0720   BP: 170/95   Pulse: 60   Resp: 18   Temp: (!) 97.4 °F (36.3 °C)   SpO2: 98%     Anticipated Length of Stay:  Patient will be admitted on an Inpatient basis with an anticipated length of stay of  greater than 2 midnights. Justification for Hospital Stay:  Post surgical recovery following open heart surgery.

## 2023-09-21 NOTE — ANESTHESIA PROCEDURE NOTES
Central Line Insertion    Performed by: Mary Lou Guajardo MD  Authorized by: Mary Lou Guajardo MD    Date/Time: 9/21/2023 9:48 AM  Catheter Type:  triple lumen  Consent: Verbal consent obtained. Written consent obtained. Risks and benefits: risks, benefits and alternatives were discussed  Consent given by: patient  Patient understanding: patient states understanding of the procedure being performed  Patient consent: the patient's understanding of the procedure matches consent given  Procedure consent: procedure consent matches procedure scheduled  Relevant documents: relevant documents present and verified  Required items: required blood products, implants, devices, and special equipment available  Patient identity confirmed: arm band  Indications: vascular access and central pressure monitoring  Catheter size: 7 Fr  Patient position: Trendelenburg  Anesthesia method: ga.   Assessment: blood return through all ports and free fluid flow  Preparation: skin prepped with ChloraPrep  Skin prep agent dried: skin prep agent completely dried prior to procedure  Sterile barriers: all five maximum sterile barriers used - cap, mask, sterile gown, sterile gloves, and large sterile sheet  Hand hygiene: hand hygiene performed prior to central venous catheter insertion  sterile gel and probe cover used in ultrasound-guided central venous catheter insertionVessel of Catheter Tip End: central venous  Number of attempts: 1  Successful placement: yes  Post-procedure: dressing applied  Patient tolerance: patient tolerated the procedure well with no immediate complications

## 2023-09-21 NOTE — ANESTHESIA PREPROCEDURE EVALUATION
Procedure:  REPLACEMENT AORTIC VALVE TRANSCATHETER (TAVR) TRANSFEMORAL W/ 26MM CALDERON MARIBELL S3 UR VALVE(ACCESS ON RIGHT) LILY (Chest)  Cardiac tavr (Chest)    Relevant Problems   CARDIO   (+) Benign essential hypertension   (+) Coronary artery disease involving native coronary artery   (+) Hyperlipidemia   (+) Severe aortic stenosis   (+) Sick sinus syndrome (HCC)      ENDO   (+) Type 2 diabetes mellitus (HCC)      /RENAL   (+) Diabetic nephropathy associated with type 2 diabetes mellitus (HCC)   (+) Stage 3a chronic kidney disease (HCC)      MUSCULOSKELETAL   (+) Back strain   (+) Chronic bilateral low back pain without sciatica   (+) Gout   (+) Impingement syndrome of right shoulder   (+) Osteoarthritis of left knee      NEURO/PSYCH   (+) Chronic bilateral low back pain without sciatica   (+) Claudication of both lower extremities (HCC)   (+) Continuous opioid dependence (HCC)      PULMONARY   (+) Asthma             Anesthesia Plan  ASA Score- 4     Anesthesia Type- general with ASA Monitors. Additional Monitors: arterial line and central venous line. Airway Plan: ETT. Comment: General anesthesia, endotracheal intubation, standard ASA monitors; large bore IV access (possible Cordis); pre-induction arterial line; TLC CVL; LILY (no LILY contraindications noted); plan for post-op PACU, possiblve ICU/vent. Risks discussed - nausea, discomfort, sore throat, dental damage; rare anesthetic emergencies; patient understands, agrees to proceed. .       Plan Factors-    Chart reviewed. Existing labs reviewed. Induction- intravenous. Postoperative Plan- Plan for postoperative opioid use. Planned trial extubation    Informed Consent- Anesthetic plan and risks discussed with patient. I personally reviewed this patient with the CRNA. Discussed and agreed on the Anesthesia Plan with the CRNA. Ashely Deleon

## 2023-09-21 NOTE — ANESTHESIA PROCEDURE NOTES
Arterial Line Insertion    Performed by: Nurys Hamm MD  Authorized by: Nurys Hamm MD  Consent: Verbal consent obtained. Risks and benefits: risks, benefits and alternatives were discussed  Consent given by: patient  Patient understanding: patient states understanding of the procedure being performed  Patient consent: the patient's understanding of the procedure matches consent given  Procedure consent: procedure consent matches procedure scheduled  Relevant documents: relevant documents present and verified  Required items: required blood products, implants, devices, and special equipment available  Patient identity confirmed: arm band  Preparation: Patient was prepped and draped in the usual sterile fashion.   Indications: multiple ABGs and hemodynamic monitoring  Orientation:  Right  Location: radial arterylidocaine (PF) (XYLOCAINE-MPF) 1 % - Infiltration   0.5 mL - 9/21/2023 9:27:00 AM  Procedure Details:  Needle gauge: 20  Seldinger technique: Seldinger technique used  Number of attempts: 1    Post-procedure:  Post-procedure: dressing applied  Waveform: good waveform and waveform confirmed  Post-procedure CNS: normal and unchanged  Patient tolerance: patient tolerated the procedure well with no immediate complications  Comments: Pre-induction

## 2023-09-21 NOTE — ANESTHESIA PROCEDURE NOTES
Procedure Performed: LILY Anesthesia  Start Time:  9/21/2023 9:50 AM        Preanesthesia Checklist    Patient identified, IV assessed, risks and benefits discussed, monitors and equipment assessed, procedure being performed at surgeon's request and anesthesia consent obtained. Procedure    Diagnostic Indications for LILY:  assessment of surgical repair, defect repair evaluation, hemodynamic monitoring and suspected pericardial effusion. Type of LILY: interventional LILY with real time guidance of intracardiac procedure. Images Saved: ultrasound permanent image saved. Physician Requesting Echo: Estuardo Cazares DO. Location performed: OR. Intubated. Bite block not placed. Heart visualized. Insertion of LILY Probe:  Atraumatic. Probe Type:  Multiplane. Modalities:  3D, color flow mapping, continuous wave Doppler and pulse wave Doppler. Echocardiographic and Doppler Measurements    PREPROCEDURE    LEFT VENTRICLE:  Systolic Function: mildly impaired. Ejection Fraction: 45%. Regional Wall Motion Abnormalities: global HK. RIGHT VENTRICLE:  Systolic Function: normal.  Cavity size normal.              AORTIC VALVE:  Leaflets: trileaflet. Leaflet motions restricted. Stenosis: severe. Mean Gradient: 32 mmHg. Peak Gradient: 45 mmHg. Maximum velocity (cm/s): 3.35 m/sec. Regurgitation: none. MITRAL VALVE:  Leaflets: calcified and normal. Leaflet Motions: normal. Regurgitation: mild. Stenosis: none. TRICUSPID VALVE:  Leaflets: normal.   Regurgitation: mild. ASCENDING AORTA:    Dissection not present. AORTIC ARCH:    dissection not present. Grade 3: atheroma protruding < 0.5 cm into lumen. DESCENDING AORTA:    Dissection not present. Grade 3: atheroma protruding < 0.5 cm into lumen. OTHER ATRIAL FINDINGS:  No CHELLY clot. ATRIAL SEPTUM:  Intra-atrial septal morphology: no PFO by CFD. POSTPROCEDURE    LEFT VENTRICLE: Unchanged . RIGHT VENTRICLE: Unchanged . AORTIC VALVE:   Leaflets: MARIBELL valve in aortic position, well-seated, does not rock, good leaflet excursion and closure, trace PVL and bioprosthetic. Stenosis: AV VTI 31 cm, LVOT VTI 17 cm, CHOE 1.88 cm^2. Mean Gradient: 4 (4) mmHg. MITRAL VALVE: Unchanged . TRICUSPID VALVE: Unchanged . AORTA: Unchanged . Dissection: Dissection not present. REMOVAL:  Probe Removal: atraumatic.

## 2023-09-22 ENCOUNTER — HOME HEALTH ADMISSION (OUTPATIENT)
Dept: HOME HEALTH SERVICES | Facility: HOME HEALTHCARE | Age: 78
End: 2023-09-22
Payer: MEDICARE

## 2023-09-22 ENCOUNTER — TRANSITIONAL CARE MANAGEMENT (OUTPATIENT)
Dept: FAMILY MEDICINE CLINIC | Facility: CLINIC | Age: 78
End: 2023-09-22

## 2023-09-22 ENCOUNTER — APPOINTMENT (INPATIENT)
Dept: RADIOLOGY | Facility: HOSPITAL | Age: 78
DRG: 267 | End: 2023-09-22
Payer: MEDICARE

## 2023-09-22 VITALS
DIASTOLIC BLOOD PRESSURE: 57 MMHG | HEIGHT: 71 IN | HEART RATE: 59 BPM | RESPIRATION RATE: 18 BRPM | WEIGHT: 223.11 LBS | BODY MASS INDEX: 31.23 KG/M2 | OXYGEN SATURATION: 95 % | TEMPERATURE: 98.8 F | SYSTOLIC BLOOD PRESSURE: 133 MMHG

## 2023-09-22 PROBLEM — Z95.0 HX OF CARDIAC PACEMAKER: Status: ACTIVE | Noted: 2023-09-22

## 2023-09-22 PROBLEM — I48.92 CHRONIC ATRIAL FLUTTER (HCC): Status: ACTIVE | Noted: 2023-09-22

## 2023-09-22 PROBLEM — I50.32 CHRONIC DIASTOLIC CHF (CONGESTIVE HEART FAILURE) (HCC): Status: ACTIVE | Noted: 2023-09-22

## 2023-09-22 LAB
ABO GROUP BLD BPU: NORMAL
ANION GAP SERPL CALCULATED.3IONS-SCNC: 8 MMOL/L
ATRIAL RATE: 60 BPM
BPU ID: NORMAL
BUN SERPL-MCNC: 21 MG/DL (ref 5–25)
CALCIUM SERPL-MCNC: 8.7 MG/DL (ref 8.4–10.2)
CHLORIDE SERPL-SCNC: 104 MMOL/L (ref 96–108)
CO2 SERPL-SCNC: 27 MMOL/L (ref 21–32)
CREAT SERPL-MCNC: 1.15 MG/DL (ref 0.6–1.3)
CROSSMATCH: NORMAL
ERYTHROCYTE [DISTWIDTH] IN BLOOD BY AUTOMATED COUNT: 12.7 % (ref 11.6–15.1)
GFR SERPL CREATININE-BSD FRML MDRD: 60 ML/MIN/1.73SQ M
GLUCOSE SERPL-MCNC: 156 MG/DL (ref 65–140)
GLUCOSE SERPL-MCNC: 196 MG/DL (ref 65–140)
GLUCOSE SERPL-MCNC: 222 MG/DL (ref 65–140)
HCT VFR BLD AUTO: 37.5 % (ref 36.5–49.3)
HGB BLD-MCNC: 12.7 G/DL (ref 12–17)
INR PPP: 1.04 (ref 0.84–1.19)
INR PPP: 1.05 (ref 0.84–1.19)
MAGNESIUM SERPL-MCNC: 1.8 MG/DL (ref 1.9–2.7)
MCH RBC QN AUTO: 29.5 PG (ref 26.8–34.3)
MCHC RBC AUTO-ENTMCNC: 33.9 G/DL (ref 31.4–37.4)
MCV RBC AUTO: 87 FL (ref 82–98)
PLATELET # BLD AUTO: 130 THOUSANDS/UL (ref 149–390)
PLATELET # BLD AUTO: 140 THOUSANDS/UL (ref 149–390)
PMV BLD AUTO: 10.7 FL (ref 8.9–12.7)
PMV BLD AUTO: 10.7 FL (ref 8.9–12.7)
POTASSIUM SERPL-SCNC: 4.2 MMOL/L (ref 3.5–5.3)
PR INTERVAL: 170 MS
PROTHROMBIN TIME: 13.8 SECONDS (ref 11.6–14.5)
PROTHROMBIN TIME: 13.9 SECONDS (ref 11.6–14.5)
QRS AXIS: 145 DEGREES
QRSD INTERVAL: 158 MS
QT INTERVAL: 490 MS
QTC INTERVAL: 490 MS
RBC # BLD AUTO: 4.31 MILLION/UL (ref 3.88–5.62)
SODIUM SERPL-SCNC: 139 MMOL/L (ref 135–147)
T WAVE AXIS: 148 DEGREES
UNIT DISPENSE STATUS: NORMAL
UNIT PRODUCT CODE: NORMAL
UNIT PRODUCT VOLUME: 350 ML
UNIT RH: NORMAL
VENTRICULAR RATE: 60 BPM
WBC # BLD AUTO: 12.27 THOUSAND/UL (ref 4.31–10.16)

## 2023-09-22 PROCEDURE — 99238 HOSP IP/OBS DSCHRG MGMT 30/<: CPT | Performed by: THORACIC SURGERY (CARDIOTHORACIC VASCULAR SURGERY)

## 2023-09-22 PROCEDURE — 99223 1ST HOSP IP/OBS HIGH 75: CPT | Performed by: INTERNAL MEDICINE

## 2023-09-22 PROCEDURE — 97167 OT EVAL HIGH COMPLEX 60 MIN: CPT

## 2023-09-22 PROCEDURE — 85027 COMPLETE CBC AUTOMATED: CPT | Performed by: PHYSICIAN ASSISTANT

## 2023-09-22 PROCEDURE — 82948 REAGENT STRIP/BLOOD GLUCOSE: CPT

## 2023-09-22 PROCEDURE — 93010 ELECTROCARDIOGRAM REPORT: CPT | Performed by: INTERNAL MEDICINE

## 2023-09-22 PROCEDURE — NC001 PR NO CHARGE: Performed by: PHYSICIAN ASSISTANT

## 2023-09-22 PROCEDURE — 94664 DEMO&/EVAL PT USE INHALER: CPT

## 2023-09-22 PROCEDURE — 80048 BASIC METABOLIC PNL TOTAL CA: CPT | Performed by: PHYSICIAN ASSISTANT

## 2023-09-22 PROCEDURE — 85610 PROTHROMBIN TIME: CPT | Performed by: PHYSICIAN ASSISTANT

## 2023-09-22 PROCEDURE — 85049 AUTOMATED PLATELET COUNT: CPT | Performed by: PHYSICIAN ASSISTANT

## 2023-09-22 PROCEDURE — 97163 PT EVAL HIGH COMPLEX 45 MIN: CPT

## 2023-09-22 PROCEDURE — 71045 X-RAY EXAM CHEST 1 VIEW: CPT

## 2023-09-22 PROCEDURE — 93005 ELECTROCARDIOGRAM TRACING: CPT

## 2023-09-22 PROCEDURE — 99222 1ST HOSP IP/OBS MODERATE 55: CPT | Performed by: INTERNAL MEDICINE

## 2023-09-22 PROCEDURE — 83735 ASSAY OF MAGNESIUM: CPT | Performed by: PHYSICIAN ASSISTANT

## 2023-09-22 RX ORDER — WARFARIN SODIUM 5 MG/1
TABLET ORAL
Qty: 45 TABLET | Refills: 0 | Status: SHIPPED | OUTPATIENT
Start: 2023-09-22

## 2023-09-22 RX ORDER — POTASSIUM CHLORIDE 750 MG/1
10 TABLET, EXTENDED RELEASE ORAL ONCE
Qty: 1 TABLET | Refills: 0 | Status: SHIPPED | OUTPATIENT
Start: 2023-09-22 | End: 2023-09-22

## 2023-09-22 RX ORDER — PANTOPRAZOLE SODIUM 40 MG/1
40 TABLET, DELAYED RELEASE ORAL DAILY
Status: DISCONTINUED | OUTPATIENT
Start: 2023-09-22 | End: 2023-09-22 | Stop reason: SDUPTHER

## 2023-09-22 RX ORDER — POLYETHYLENE GLYCOL 3350 17 G/17G
17 POWDER, FOR SOLUTION ORAL DAILY PRN
Refills: 0
Start: 2023-09-22

## 2023-09-22 RX ORDER — POTASSIUM CHLORIDE 750 MG/1
10 TABLET, EXTENDED RELEASE ORAL ONCE
Status: COMPLETED | OUTPATIENT
Start: 2023-09-22 | End: 2023-09-22

## 2023-09-22 RX ORDER — ONDANSETRON 2 MG/ML
4 INJECTION INTRAMUSCULAR; INTRAVENOUS EVERY 6 HOURS PRN
Status: DISCONTINUED | OUTPATIENT
Start: 2023-09-22 | End: 2023-09-22 | Stop reason: SDUPTHER

## 2023-09-22 RX ORDER — HEPARIN SODIUM 5000 [USP'U]/ML
5000 INJECTION, SOLUTION INTRAVENOUS; SUBCUTANEOUS EVERY 8 HOURS SCHEDULED
Status: DISCONTINUED | OUTPATIENT
Start: 2023-09-22 | End: 2023-09-22 | Stop reason: SDUPTHER

## 2023-09-22 RX ORDER — ACETAMINOPHEN 325 MG/1
650 TABLET ORAL EVERY 6 HOURS PRN
Status: DISCONTINUED | OUTPATIENT
Start: 2023-09-22 | End: 2023-09-22 | Stop reason: SDUPTHER

## 2023-09-22 RX ORDER — TORSEMIDE 10 MG/1
10 TABLET ORAL DAILY
Status: DISCONTINUED | OUTPATIENT
Start: 2023-09-22 | End: 2023-09-22 | Stop reason: HOSPADM

## 2023-09-22 RX ORDER — TORSEMIDE 10 MG/1
10 TABLET ORAL DAILY
Qty: 5 TABLET | Refills: 0 | Status: SHIPPED | OUTPATIENT
Start: 2023-09-22

## 2023-09-22 RX ORDER — DOCUSATE SODIUM 100 MG/1
100 CAPSULE, LIQUID FILLED ORAL 2 TIMES DAILY PRN
Refills: 0
Start: 2023-09-22

## 2023-09-22 RX ORDER — PANTOPRAZOLE SODIUM 40 MG/1
40 TABLET, DELAYED RELEASE ORAL
Qty: 30 TABLET | Refills: 3 | Status: SHIPPED | OUTPATIENT
Start: 2023-09-23

## 2023-09-22 RX ORDER — DOCUSATE SODIUM 100 MG/1
100 CAPSULE, LIQUID FILLED ORAL 2 TIMES DAILY
Status: DISCONTINUED | OUTPATIENT
Start: 2023-09-22 | End: 2023-09-22 | Stop reason: HOSPADM

## 2023-09-22 RX ADMIN — INSULIN LISPRO 2 UNITS: 100 INJECTION, SOLUTION INTRAVENOUS; SUBCUTANEOUS at 06:32

## 2023-09-22 RX ADMIN — CEFAZOLIN SODIUM 2000 MG: 2 SOLUTION INTRAVENOUS at 01:19

## 2023-09-22 RX ADMIN — HEPARIN SODIUM 5000 UNITS: 5000 INJECTION INTRAVENOUS; SUBCUTANEOUS at 06:27

## 2023-09-22 RX ADMIN — CEFAZOLIN SODIUM 2000 MG: 2 SOLUTION INTRAVENOUS at 09:04

## 2023-09-22 RX ADMIN — TORSEMIDE 10 MG: 10 TABLET ORAL at 11:54

## 2023-09-22 RX ADMIN — CHLORHEXIDINE GLUCONATE 15 ML: 1.2 SOLUTION ORAL at 09:04

## 2023-09-22 RX ADMIN — PANTOPRAZOLE SODIUM 40 MG: 40 TABLET, DELAYED RELEASE ORAL at 06:27

## 2023-09-22 RX ADMIN — POTASSIUM CHLORIDE 10 MEQ: 750 TABLET, EXTENDED RELEASE ORAL at 11:54

## 2023-09-22 RX ADMIN — CLOPIDOGREL BISULFATE 75 MG: 75 TABLET ORAL at 09:04

## 2023-09-22 RX ADMIN — INSULIN LISPRO 1 UNITS: 100 INJECTION, SOLUTION INTRAVENOUS; SUBCUTANEOUS at 11:57

## 2023-09-22 RX ADMIN — MUPIROCIN 1 APPLICATION: 20 OINTMENT TOPICAL at 09:04

## 2023-09-22 RX ADMIN — ASPIRIN 81 MG: 81 TABLET, COATED ORAL at 09:04

## 2023-09-22 RX ADMIN — METFORMIN HYDROCHLORIDE 1000 MG: 500 TABLET ORAL at 09:04

## 2023-09-22 NOTE — PROGRESS NOTES
-- Patient:  -- MRN: [id number]  -- Aidin Request ID: [id number]  -- Level of care reserved: Reyes Hunter  -- Partner Reserved[facility] [place],  [address] [phone]  -- Clinical needs requested:  -- Geography searched: [id number]  -- Start of Service:  -- Request sent: 10:20am EDT on 9/22/2023 by Valorie Whaley  -- Partner reserved: 11:33am EDT on 9/22/2023 by Valorie Whaley  -- Choice list shared: 10:56am EDT on 9/22/2023 by Valorie Whaley
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
## 2023-09-22 NOTE — PROGRESS NOTES
Progress Note - Cardiothoracic Surgery   Anastacio Bravo 66 y.o. male MRN: [id number]  Unit/Bed#: [facility] 429-01 Encounter: [id number]    Aortic stenosis, Non-Rheumatic. S/P transfemoral transcatheter aortic valve replacement; POD # 1    24 Hour Events: successful TAVR yest w/o complications. No events or issues overnight. Neuro intact at baseline.      Medications:   Scheduled Meds:  Current Facility-Administered Medications   Medication Dose Route Frequency Provider Last Rate   • acetaminophen  650 mg Rectal Q4H PRN Ruchi Ma PA-C     • acetaminophen  650 mg Oral Q6H PRN Ruchi Ma PA-C     • aspirin  81 mg Oral Daily Mary Carmen Michael PA-C     • atorvastatin  80 mg Oral Daily With [employer] Christos Finn PA-C     • bisacodyl  10 mg Rectal Daily PRN Ruchi Ma PA-C     • cefazolin  2,000 mg Intravenous Q8H Mary Carmen Michael PA-C 2,000 mg (09/22/23 3978)   • chlorhexidine  15 mL Mouth/Throat Q12H Ruchi Ma PA-C     • clopidogrel  75 mg Oral Daily Mary Carmen Michael PA-C     • heparin (porcine)  5,000 Units Subcutaneous [facility] Romel Michael, [place]     • hydrALAZINE  10 mg Intravenous Q6H PRN Mary Carmen Michael PA-C     • insulin lispro  1-6 Units Subcutaneous TID AC Ruchi Ma PA-C     • insulin lispro  1-6 Units Subcutaneous HS Mary Carmen Michael PA-C     • metFORMIN  1,000 mg Oral Daily Mary Carmen Finn PA-C     • metoprolol succinate  25 mg Oral HS Ruchi Ma PA-C     • mupirocin  1 Application Nasal F53K [address] Mary Carmen Michael PA-C     • niCARdipine  1-15 mg/hr Intravenous Continuous Gonzalo Calderon CRNA Stopped (09/21/23 2044)   • niCARdipine  1-15 mg/hr Intravenous Titrated Ruchi Ma PA-C 5 mg/hr (09/21/23 1115)   • ondansetron  4 mg Intravenous Q6H PRN Ruchi Ma PA-C     • pantoprazole  40 mg Oral Early Morning Mary Carmen Michael PA-C     • polyethylene glycol  17 g Oral Daily Ruchi Ma PA-C       Continuous Infusions:niCARdipine, 1-15 mg/hr, Last Rate: Stopped (23 1854)  niCARdipine, 1-15 mg/hr, Last Rate: 5 mg/hr (23 1115)      PRN Meds:.•  acetaminophen  •  acetaminophen  •  bisacodyl  •  hydrALAZINE  •  ondansetron    Vitals:   Vitals:    23 0300 23 0500 23 0600 23 0700   BP: 91/52 104/54 119/61 148/69   BP Location:    Right arm   Pulse: 61 60 60 59   Resp:    18   Temp:       TempSrc:       SpO2:    95%   Weight:   101 kg (223 lb 1.7 oz)    Height:           Telemetry: A-V Paced; Heart Rate: 60    Respiratory:   SpO2: SpO2: 95 %; Room Air    Intake/Output:     Intake/Output Summary (Last 24 hours) at 2023 0909  Last data filed at 2023 0830  Gross per 24 hour   Intake 1390 ml   Output 2025 ml   Net -635 ml        Weights:   Weight (last 2 days)     Date/Time Weight    23 0600 101 (223.11)    23 1523 98.4 (217)    23 0720 98.4 (217)        Admit weight: 217 lbs. Results:   Results from last 7 days   Lab Units 23  0501 23  1054 23  1028   WBC Thousand/uL 12.27*  --   --    HEMOGLOBIN g/dL 12.7 13.5  --    I STAT HEMOGLOBIN g/dl  --   --  11.9*   HEMATOCRIT % 37.5 40.3  --    HEMATOCRIT, ISTAT %  --   --  35*   PLATELETS Thousands/uL 140* 139*  --      Results from last 7 days   Lab Units 23  0501 23  1054 23  1028   POTASSIUM mmol/L 4.2 4.3  --    CHLORIDE mmol/L 104 107  --    CO2 mmol/L 27 27  --    CO2, I-STAT mmol/L  --   --  33*   BUN mg/dL 21 18  --    CREATININE mg/dL 1.15 1.04  --    GLUCOSE, ISTAT mg/dl  --   --  143*   CALCIUM mg/dL 8.7 8.7  --      Results from last 7 days   Lab Units 23  2359 23  1054 23  0726   INR  1.05 1.21* 1.04     Point of care glucose:196-244    Studies:    CXR:       EK/22  AV paced at 60    Echo:   Findings    Left Ventricle Left ventricular cavity size is normal. Wall thickness is moderately increased. There is moderate concentric hypertrophy.  The left ventricular ejection fraction is 60%. Systolic function is vigorous. Diastolic function is mildly abnormal, consistent with grade I (abnormal) relaxation. Wall Scoring Baseline     The following segments are akinetic: basal inferoseptal and basal inferior. All other segments are normal.               Right Ventricle Systolic function is normal.      Left Atrium The atrium is mildly dilated. Aortic Valve There is an Davila MARIBELL 3 Ultra 26 mm TAVR bioprosthetic valve. There is no evidence of regurgitation. The aortic valve has no significant stenosis. The aortic valve peak velocity is 1.92 m/s. The aortic valve mean gradient is 8 mmHg. The dimensionless velocity index is 0.62. The aortic valve area is 1.93 cm2. Mitral Valve Mitral valve structure is normal. There is trace regurgitation. There is no evidence of stenosis. Tricuspid Valve Tricuspid valve structure is normal. There is trace regurgitation. There is no evidence of stenosis. The right ventricular systolic pressure is normal.      Pulmonic Valve Pulmonic valve structure is normal. There is no evidence of regurgitation. There is no evidence of stenosis. Ascending Aorta The aortic root is normal in size. Pericardium There is no pericardial effusion. The pericardium is normal in appearance. I have personally reviewed pertinent reports. and I have personally reviewed pertinent films in PACS    Invasive Lines/Tubes:  Invasive Devices     Central Venous Catheter Line  Duration           CVC Central Lines 09/21/23 Triple <1 day          Peripheral Intravenous Line  Duration           Peripheral IV 09/21/23 Left Hand <1 day                Physical Exam:    HEENT/NECK:  Normocephalic. Atraumatic.  no jugular venous distention.     Cardiac: Regular rate and rhythm and No murmurs/rubs/gallops  Pulmonary:  Breath sounds clear bilaterally and No rales/rhonchi/wheezes  Abdomen:  Non-tender, Non-distended and Normal bowel sounds  Incisions: Groin puncture sites clean, dry, and intact without hematoma  Extremities: Extremities warm/dry and No edema B/L  Neuro: Alert and oriented X 3  Skin: Warm/Dry, without rashes or lesions. Assessment:  Principal Problem:    Severe aortic stenosis  Active Problems:    Benign essential hypertension    Type 2 diabetes mellitus (HCC)    Hyperlipidemia    Obesity    Claudication of both lower extremities (HCC)    Benign prostatic hyperplasia with nocturia    Stage 3a chronic kidney disease (HCC)    Sick sinus syndrome (HCC)    Coronary artery disease involving native coronary artery    S/P TAVR (transcatheter aortic valve replacement)    Hx of cardiac pacemaker    Chronic atrial flutter (HCC)       Aortic stenosis, Non-Rheumatic. S/P transfemoral transcatheter aortic valve replacement; POD # 1    Plan:    1. Cardiac:   A-V Paced at 60; HR well-controlled, BP well-controlled  Continue home toprol XL 25mg HS Central IV access no longer required; Remove central venous catheter today  On ASA/Plavix for hx CVA  and coumadin preop for chronic afib/flutter, continue  INR today pending, received 5mg last pm  Consult cardiology for postoperative medical management  Postoperative transthoracic echocardiogram:   Echo completed; Well seated AV, without PVL  Continue Statin therapy  Continue DVT prophylaxis    2. Pulmonary:   Extubated in procedure room yest  CXR findings: clear  Good Room air oxygen saturation; Continue incentive spirometry/Coughing/Deep breathing exercises    3. Renal:   Post op Creatinine stable; Follow up labs prn   Intake/Output net: -765 mL/24 hours    4. Neuro:  Neurologically intact; No active issues  Incisional pain well-controlled; Continue prn Tylenol    5. GI:  Controlled carbohydrate diet level two/Cardiac diet, with 1800 mL fluid restriction  Continue daily stool softeners and prn suppository  Continue GI prophylaxis    6.    Endo:   DM2 on oral meds, A1c 6.9, continue home metformin and SSI    7. Hematology:    Post-operative blood count acceptable; Trend prn    8.    Disposition:   Gerontology consultation already completed for routine assessment of TAVR patient over 72years old  Transfer from ICU to step down level of care today  Routine postoperative course to this point; Home likely today    VTE Pharmacologic Prophylaxis: Heparin  VTE Mechanical Prophylaxis: sequential compression device    Bedside rounds completed with supervising physician  Plan of care discussed with bedside nurse    SIGNATURE: Jan Craig PA-C  DATE: September 22, 2023  TIME: 9:09 AM
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
## 2023-09-22 NOTE — CONSULTS
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 (cm/s)  Direction of Flow  Ratio  Dist. ICA                 54          19                      0.57  Mid. ICA                  83          28                      0.87  Prox. ICA    1 - 49%     175          69                      1.85  Dist CCA                  90          22                            Mid CCA                   95          33                      1.20  Prox CCA                  79          20                            Ext Carotid               80          10                      0.84  Prox Vert                 27          10  Antegrade                 Subclavian               122           0                               CONCLUSION: Impression RIGHT: Known occlusion of the internal carotid artery. Moderate external carotid artery disease is noted. Vertebral artery flow is antegrade. There is no significant subclavian artery disease. LEFT: There is <50% stenosis noted in the internal carotid artery. Plaque is heterogenous and irregular. Vertebral artery flow is antegrade.  There is no significant subclavian artery disease. Compared to previous study on 01/20/2023, there is no significant change noted. SIGNATURE: Electronically Signed by: More Dennis MD on 2023-09-01 04:21:11 PM    Cardiac catheterization    Result Date: 8/28/2023  Narrative: •  Ost RCA lesion was 80% stenosed. S/P successful PCI with CHARMAINE x 1. Thank you for allowing us to participate in this patient's care. Counseling / Coordination of Care  Total floor / unit time spent today 45 minutes. Greater than 50% of total time was spent with the patient and / or family counseling and / or coordination of care. A description of the counseling / coordination of care: Review of history, current assessment, development of a plan. Code Status: Level 1 - Full Code    ** Please Note: Dragon 360 Dictation voice to text software may have been used in the creation of this document.  **

## 2023-09-22 NOTE — CASE MANAGEMENT
Case Management Assessment & Discharge Planning Note    Patient name Gomez Soto  [facility] [id number]  : 1945 Date 2023       Current Admission Date: 2023  Current Admission Diagnosis:Severe aortic stenosis   Patient Active Problem List    Diagnosis Date Noted   • Hx of cardiac pacemaker 2023   • Chronic atrial flutter ([address]) 2023   • Chronic diastolic CHF (congestive heart failure) ([address]) 2023   • S/P TAVR (transcatheter aortic valve replacement) 2023   • Coronary artery disease involving native coronary artery 2023   • Back strain 2023   • Sick sinus syndrome ([address]) 2023   • Continuous opioid dependence ([address]) 10/24/2022   • Dysuria 2022   • Multiple fractures 2022   • Right arm pain 2022   • Severe aortic stenosis 2022   • Bradycardia 2022   • Urinary retention 2022   • L3 osteophyte fracture 2022   • Fall 2022   • Rib fractures 08/15/2022   • Fracture of thoracic transverse process ([address]) 08/15/2022   • Lumbar transverse process fracture ([address]) 08/15/2022   • Stage 3a chronic kidney disease ([address]) 2022   • RLS (restless legs syndrome) 2021   • 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 ([address]) 2021   • Chronic bilateral low back pain without sciatica 2021   • Hemiplegia and hemiparesis following cerebral infarction affecting left non-dominant side ([address]) 2020   • Benign prostatic hyperplasia with nocturia 2020   • Claudication of both lower extremities ([address]) 2019   • Colon cancer screening 2019   • Osteoarthritis of left knee 01/10/2019   • Bilateral carotid artery stenosis 10/08/2018   • Dermatosis 10/08/2018   • Cramps of left lower extremity 2018   • Plantar fasciitis 2018   • Tinea cruris 2018   • Constipation 2018   • Seborrheic dermatitis 11/10/2017   • History of stroke 09/15/2017   • Asthma 07/26/2017   • Benign essential hypertension 07/26/2017   • Type 2 diabetes mellitus ([address]) 07/26/2017   • Hyperlipidemia 07/26/2017   • Obesity 07/26/2017   • Impingement syndrome of right shoulder 07/24/2017   • Diabetic nephropathy associated with type 2 diabetes mellitus ([address]) 06/22/2017   • Non-rheumatic aortic sclerosis 06/22/2017   • Popliteal cyst, left 10/15/2015   • Acquired hallux malleus of right foot 10/06/2015   • Pre-ulcerative corn or callous 10/06/2015   • Gout 12/11/2014   • Allergic rhinitis 05/03/2012   • Retina disorder 05/03/2012      LOS (days): 1  Geometric Mean LOS (GMLOS) (days): 2.20  Days to GMLOS:1.1     OBJECTIVE:    Risk of Unplanned Readmission Score: 13.39         Current admission status: Inpatient       Preferred Pharmacy:   [address] [facility]. [place] [address]  Phone: [phone] Fax: [facility], [address] [place], [place] [address] [address]  Phone: [phone] Fax: [phone]    Primary Care Provider: Feli Joshi MD    Primary Insurance: MEDICARE  Secondary Insurance: Nathan MIN    ASSESSMENT:  [address], [address] - Spouse   Primary Phone: [phone] (Home)                         Readmission Root Cause  30 Day Readmission: No    Patient Information  Admitted from[de-identified] Home  Mental Status: Alert  During Assessment patient was accompanied by: Not accompanied during assessment  Assessment information provided by[de-identified] Patient  Primary Caregiver: Self  Support Systems: Self, Spouse/significant other, Son, Family members  [place] of Residence: [facility] [address] [place] [address] do you live in?: [address] entry access options.  Select all that apply.: No steps to enter home  Type of Current Residence: [place]  In the last 12 months, was there a time when you were not able to pay the mortgage or rent on time?: No  In the last 12 months, how many places have you lived?: 1  In the last 12 months, was there a time when you did not have a steady place to sleep or slept in a shelter (including now)?: No  Homeless/housing insecurity resource given?: N/A  Living Arrangements: Lives w/ Spouse/significant other  Is patient a ?: Yes  Is patient active with [facility])?: No    Activities of Daily Living Prior to Admission  Functional Status: Independent  Completes ADLs independently?: Yes  Ambulates independently?: Yes  Does patient use assisted devices?: Yes  Assisted Devices (DME) used: Straight Cane  Does patient currently own DME?: Yes  What DME does the patient currently own?: Ron Watt  Does patient have a history of Outpatient Therapy (PT/OT)?: Yes  Does the patient have a history of Short-Term Rehab?: Yes ([facility] 6 years ago)  Does patient have a history of HHC?: No  Does patient currently have [address]?: No         Patient Information Continued  Income Source: Pension/long term  Does patient have prescription coverage?: Yes  Within the past 12 months, you worried that your food would run out before you got the money to buy more.: Never true  Within the past 12 months, the food you bought just didn't last and you didn't have money to get more.: Never true  Food insecurity resource given?: N/A  Does patient receive dialysis treatments?: No  Does patient have a history of substance abuse?: No  Does patient have a history of Mental Health Diagnosis?: No         Means of Transportation  Means of Transport to Appts[de-identified] Drives Self  In the past 12 months, has lack of transportation kept you from medical appointments or from getting medications?: No  In the past 12 months, has lack of transportation kept you from meetings, work, or from getting things needed for daily living?: No  Was application for public transport provided?: N/A        DISCHARGE DETAILS:    Discharge planning discussed with[de-identified] pt at bedside  [place] of Choice: Yes  Comments - Freedom of Choice: CM discussed discharge recommendation with pt. Pt is agreeable.  VNA reserved per pt choice for PT/nursing.   CM contacted family/caregiver?: No- see comments  Were Treatment Team discharge recommendations reviewed with patient/caregiver?: Yes  Did patient/caregiver verbalize understanding of patient care needs?: Yes  Were patient/caregiver advised of the risks associated with not following Treatment Team discharge recommendations?: Yes    Contacts  Patient Contacts: De Ramirez (Spouse) [phone]  Relationship to Patient[de-identified] Family  Contact Method: Phone  Phone Number: [phone]  Reason/Outcome: Continuity of Care, Emergency Contact, Discharge [facility]         Is the patient interested in [address] at discharge?: Yes  [facility] requested[de-identified] Occupational Therapy, Physical [address] Name[de-identified] [facility] External Referral Reason (only applicable if external HHA name selected): Patient has established relationship with provider  [facility] Provider[de-identified] PCP  Home Health Services Needed[de-identified] Strengthening/Theraputic Exercises to Improve Function, Evaluate Functional Status and Safety, Gait/ADL Training, Diabetes Management  Homebound Criteria Met[de-identified] Requires the Assistance of Another Person for Safe Ambulation or to Leave the Home, Uses an Assist Device (i.e. cane, walker, etc)  Supporting Clincal Findings[de-identified] Fatigues Easliy in [employer], Limited Endurance    DME Referral Provided  Referral made for DME?: No    Other Referral/Resources/Interventions Provided:  Interventions: [facility]  Referral Comments: [facility] reserved    Would you like to participate in our [facility] [employer] service program?  : No - Declined    Treatment Team Recommendation: Home with [address]   Discharge Destination Plan[de-identified] Home with [address] at Discharge : Family                                      Additional Comments: CM introduced herself and role to pt at bedsider. Pt stated [place] wife and son. Pt stated he is independent at baseline. Pt stated he ambulates with a cane. Pt stated his pouse and son is POA. Pt stated once medically cleared and ready for discharge his son will be able to provide transport. CM discussed discharge recommendation with pt. Pt is agreeable. [facility] reserved per pt choice for PT/nursing. CM will continue to follow as needed.
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
## 2023-09-22 NOTE — DISCHARGE SUMMARY
Discharge Summary - Cardiothoracic Surgery   Solomon Vasquez 66 y.o. male MRN: [id number]  Unit/Bed#: 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 Consulting Physician(s):   Cardiology  Gerontology    Procedures Performed:   Procedure(s):  REPLACEMENT AORTIC VALVE TRANSCATHETER (TAVR) TRANSFEMORAL W/ 26MM DAVILA AMBIKA S3 UR VALVE(ACCESS ON RIGHT) LILY  Cardiac tavr     Hospital Course: The patient was seen in consultation prior to this admission for evaluation of severe symptomatic aortic stenosis. Risks and benefits of transfemoral transcatheter aortic valve replacement were discussed in detail, and patient was agreeable. Routine preoperative evaluation was completed and informed consent was obtained prior to admission. 9/21: Elective admission for TF TAVR #26mm Davila Ambika S3 Ultra Resilia via R approach. Extubated and transferred to PACU supported with Cardene 5. DP pulses 1+ left and Doppler on the right. Restarted on home Toprol xl 25 mg daily, ASA/Plavix for recent PCI/CHARMAINE to RCA and hx of CVA and Coumadin for afib/flutter and CVA (on triple agent preoperatively). ECG shows V paced. Gerontology and cardiology consulted.  Cardene off in evening.      9/22: AV Paced at 60, no issues/events, RA, BP stable, right groin intact, neuro at baseline (hx CVA with residual left sided weakness). CXR clear. Echo stable no AI/PVL, normal gradients, EF normal. Weight up 5lbs, start torsemide 10mg for 5 days. Continue coumadin (afib/CVA hx), and ASA and plavix (recent PCI/CHARMAINE and CVA/PAD hx). Discharge home today. Instructed to take 7.5 mg coumadin next 3 days, then have INR checked on Monday and resume home dosing of coumadin or as instructed per coumadin clinic. Condition at Discharge:   good     Discharge Physical Exam:    Please see the documented physical exam from this morning's progress note for details. Discharge Data:  Results from last 7 days   Lab Units 09/22/23  1010 09/22/23  0501 09/21/23  1054 09/21/23  1028   WBC Thousand/uL  --  12.27*  --   --    HEMOGLOBIN g/dL  --  12.7 13.5  --    I STAT HEMOGLOBIN g/dl  --   --   --  11.9*   HEMATOCRIT %  --  37.5 40.3  --    HEMATOCRIT, ISTAT %  --   --   --  35*   PLATELETS Thousands/uL 130* 140* 139*  --      Results from last 7 days   Lab Units 09/22/23  0501 09/21/23  1054 09/21/23  1028   POTASSIUM mmol/L 4.2 4.3  --    CHLORIDE mmol/L 104 107  --    CO2 mmol/L 27 27  --    CO2, I-STAT mmol/L  --   --  33*   BUN mg/dL 21 18  --    CREATININE mg/dL 1.15 1.04  --    GLUCOSE, ISTAT mg/dl  --   --  143*   CALCIUM mg/dL 8.7 8.7  --      Results from last 7 days   Lab Units 09/21/23  2359 09/21/23  1054 09/21/23  0726   INR  1.05 1.21* 1.04       Discharge instructions/Information to patient and family:   See after visit summary for information provided to patient and family. Sharan Felty was educated on restrictions regarding driving and lifting, and techniques of proper incisional care. They were specifically counselled on signs and symptoms of an incisional infection, and advised to contact our service immediately should they develop fevers, sweats, chill, redness or drainage at the site of any incisions.     Provisions for Follow-Up Care:  See after visit summary for information related to follow-up care and any pertinent home health orders. Disposition:  See After Visit Summary for discharge disposition information. Planned Readmission:   No    Discharge Medications:  See after visit summary for reconciled discharge medications provided to patient and family. Duran Peralta was provided contact information and scheduled a follow up appointment with Venessa Whyte D.O. Additionally, follow up appointments have been scheduled for their primary care physician and primary cardiologist.  Contact information was provided. Duran Peralta was counseled on the importance of avoiding tobacco products. As with all patients whom have undergone open heart surgery, tobacco cessation medication was contraindicated at the time of discharge. ACE/ARB was Contraindicated secondary to hypotension    Beta Blocker was Prescribed at discharge    Aspirin was Prescribed at discharge    Statin was Prescribed at discharge     Plavix was resumed on discharge for recent PCI/CHARMAINE and CVA/PAD and TAVR      The patient was discharged on ongoing diuretic therapy with Torsemide,10 mg, PO QD and Potassium Chloride 10 mEq, PO QD. They were advised to continue these medications for 5 days, unless otherwise directed. Duran Peralta is being discharged on anticoagulation therapy for treatment of chronic atrial fibrillation/flutter and history of CVA. As they have been treated with Coumadin prior to this admission, arrangements have been made for the [address] [place] [address] to continue to monitor INR (goal  2.0 to 3.0) and provide dosing instructions. The Coumadin clinic was notified by direct Epic messaging, which itemized the patient’s daily INR’s and correlating Coumadin doses during their hospitalization. The patient was instructed to take 7.5 mg on night of discharge 9/22, and 9/23, and 9/24.  Then have INR checked 9/25 and resume prior dosing or as instructed per the coumadin clinic. They have been advised to resume their preoperative regimen, unless otherwise directed. Follow up PT/INR will be ordered at the discretion of the Coumadin clinic. The patient was informed that following their postoperative surgical evaluation, they will be referred to outpatient cardiac rehabilitation. They were counseled that this program is run by specialists who will help them safely strengthen their heart and prevent more heart disease. Cardiac rehabilitation will include exercise, relaxation, stress management, and heart-healthy nutrition. Caregivers will also check to make sure their medication regimen is working. During this admission, the patient was questioned on their use of tobacco, alcohol, and illicit/non-prescription drug use in the  previous 24 months. Franklin Greer states that they have not used any of these substances in this time frame. I spent 30 minutes discharging the patient. This time was spent on the day of discharge. I had direct contact with the patient on the day of discharge. Additional documentation is required if more than 30 minutes were spent on discharge.      SIGNATURE: Delmar Nobles PA-C  DATE: September 22, 2023  TIME: 10:32 AM

## 2023-09-22 NOTE — DISCHARGE INSTRUCTIONS
Transcatheter Aortic Valve Replacement (TAVR)    What you need to know about a TAVR:     A TAVR is a procedure to replace your aortic valve. It is done without removing your old valve. The aortic valve opens and closes to let blood flow from your heart to the rest of your body. Your valve has been replaced with a tissue valve. The tissue has been made from the lining that surrounds the heart of a cow. Recovering from surgery: There may be bruising or pain in your groin, where the valve was inserted. You may take over the counter acetaminophen (Tylenol) as needed for discomfort. Your incision is sealed with surgical glue. This will fall off after a few weeks. Do not pick this off. Do not drive for two weeks  Do not lift over 25 pounds for two weeks. Shower every day. Keep your incision dry. Do not apply lotions, powders, or ointments to your incision. Blood thinners after surgery: You have been prescribed blood thinners to help prevent blood clots. Blood clots can cause strokes, heart attacks, and death. While taking any blood thinner, watch for bleeding and bruising. Watch for blood in your urine and bowel movements. Use a soft washcloth on your skin, and a soft toothbrush to brush your teeth. If you shave, use an electric shaver. Do not play contact sports. Do not start or stop any other medicines unless your healthcare provider tells you to do so. (Many medicines cannot be used with blood thinners)    Take your blood thinner exactly as prescribed by your healthcare provider. Do not skip a dose or take less than prescribed. Tell your provider right away if you forget to take your blood thinner, or if you took too much. Call your doctor if:     You develop any bleeding from the incisions in your groin. Your leg feels numb, cool, or looks pale. You feel dizzy or lightheaded  Your groin puncture is red, swollen, or draining pus.   Your groin puncture looks more bruised/swollen or you have new bruising on the side of your leg. You have nausea or are vomiting. Antibiotic Prophylaxis:     After TAVR, you are at an increased risk for developing a valve infection with many common dental procedures. Bacteria that normally lives in your mouth can cross into your bloodstream with any dental work (even cleanings). The immune system normally kills these bacteria, but antibiotic prophylaxis provides extra protection. Inform your dentist that you have had a TAVR  Do not schedule routine dental cleaning for six months following surgery. Antibiotics will be prescribed by your dentist, prior to your procedure               Transcatheter Aortic Valve Replacement (TAVR)    What you need to know about a TAVR:     A TAVR is a procedure to replace your aortic valve. It is done without removing your old valve. The aortic valve opens and closes to let blood flow from your heart to the rest of your body. Your valve has been replaced with a tissue valve. The tissue has been made from the lining that surrounds the heart of a cow. Recovering from surgery: There may be bruising or pain in your groin, where the valve was inserted. You may take over the counter acetaminophen (Tylenol) as needed for discomfort. Your incision is sealed with surgical glue. This will fall off after a few weeks. Do not pick this off. Do not drive for two weeks  Do not lift over 25 pounds for two weeks. Shower every day. Keep your incision dry. Do not apply lotions, powders, or ointments to your incision. Blood thinners after surgery: You have been prescribed blood thinners to help prevent blood clots. Blood clots can cause strokes, heart attacks, and death. While taking any blood thinner, watch for bleeding and bruising. Watch for blood in your urine and bowel movements. Use a soft washcloth on your skin, and a soft toothbrush to brush your teeth. If you shave, use an electric shaver.  Do not play contact sports. Do not start or stop any other medicines unless your healthcare provider tells you to do so. (Many medicines cannot be used with blood thinners)    Take your blood thinner exactly as prescribed by your healthcare provider. Do not skip a dose or take less than prescribed. Tell your provider right away if you forget to take your blood thinner, or if you took too much. Call your doctor if:     You develop any bleeding from the incisions in your groin. Your leg feels numb, cool, or looks pale. You feel dizzy or lightheaded  Your groin puncture is red, swollen, or draining pus. Your groin puncture looks more bruised/swollen or you have new bruising on the side of your leg. You have nausea or are vomiting. Antibiotic Prophylaxis:     After TAVR, you are at an increased risk for developing a valve infection with many common dental procedures. Bacteria that normally lives in your mouth can cross into your bloodstream with any dental work (even cleanings). The immune system normally kills these bacteria, but antibiotic prophylaxis provides extra protection. Inform your dentist that you have had a TAVR  Do not schedule routine dental cleaning for six months following surgery.    Antibiotics will be prescribed by your dentist, prior to your procedure

## 2023-09-22 NOTE — PLAN OF CARE
Problem: PHYSICAL THERAPY ADULT  Goal: Performs mobility at highest level of function for planned discharge setting. See evaluation for individualized goals. Description: Treatment/Interventions: Functional transfer training, LE strengthening/ROM, Therapeutic exercise, Endurance training, Cognitive reorientation, Equipment eval/education, Bed mobility, Gait training, Spoke to nursing, Spoke to case management, OT  Equipment Recommended: Morteza Almodovar       See flowsheet documentation for full assessment, interventions and recommendations. Note: Prognosis: Good  Problem List: Decreased strength, Decreased endurance, Impaired balance, Decreased mobility, Obesity, Decreased safety awareness, Decreased cognition  Assessment: Pt is 66 y.o. male admitted with Dx of Severe aortic stenosis and underwent Transcatheter aortic valve replacement with a 26 mm Davila MARIBELL 3 Ultra Resilia bioprosthetic valve via right transfemoral approach on 9/21/2023. Pt 's comorbidities affecting POC include: Asthma, Atrial flutter ([address]), CAD (coronary artery disease), Carotid stenosis, CHF (congestive heart failure) ([address]), Chronic pain, CKD (chronic kidney disease), stage III ([address]), COPD (chronic obstructive pulmonary disease) ([address]), DM (diabetes mellitus), type 2 ([address]), Gout, History of CVA (cerebrovascular accident), Hypertension, Macular degeneration, Obesity, PERCY (obstructive sleep apnea), and SSS (sick sinus syndrome). Pt's clinical presentation is currently  unstable/unpredictable which is evident in ongoing telem monitoring, abn lab values and need for  assist w/ all phases of mobility incl amb w/ rw when usually mobilizing independently w/ use of SPC. Pt presents w/ min overall weakness, decreased endurance and inconsistent amb balance and gait patterns w/ use of rw at this time. Will cont to follow pt in PT for progressive mobilization to max level of (I), endurance, and safety.  Otherwise, anticipate pt will return home w/ available family support upon D/C pending additional progress and when medically cleared; home PT follow up is recommended; will follow. PT Discharge Recommendation: Home with home health rehabilitation    See flowsheet documentation for full assessment.

## 2023-09-24 ENCOUNTER — LAB REQUISITION (OUTPATIENT)
Dept: LAB | Facility: HOSPITAL | Age: 78
End: 2023-09-24
Payer: MEDICARE

## 2023-09-24 ENCOUNTER — HOME CARE VISIT (OUTPATIENT)
Dept: HOME HEALTH SERVICES | Facility: HOME HEALTHCARE | Age: 78
End: 2023-09-24
Payer: MEDICARE

## 2023-09-24 VITALS
DIASTOLIC BLOOD PRESSURE: 82 MMHG | OXYGEN SATURATION: 99 % | SYSTOLIC BLOOD PRESSURE: 138 MMHG | TEMPERATURE: 97.9 F | HEART RATE: 89 BPM | RESPIRATION RATE: 18 BRPM

## 2023-09-24 DIAGNOSIS — I48.92 UNSPECIFIED ATRIAL FLUTTER (HCC): ICD-10-CM

## 2023-09-24 DIAGNOSIS — Z79.01 LONG TERM (CURRENT) USE OF ANTICOAGULANTS: ICD-10-CM

## 2023-09-24 LAB
INR PPP: 1.14 (ref 0.84–1.19)
PROTHROMBIN TIME: 14.8 SECONDS (ref 11.6–14.5)

## 2023-09-24 PROCEDURE — 400013 VN SOC

## 2023-09-24 PROCEDURE — 85610 PROTHROMBIN TIME: CPT | Performed by: INTERNAL MEDICINE

## 2023-09-24 PROCEDURE — G0299 HHS/HOSPICE OF RN EA 15 MIN: HCPCS

## 2023-09-24 PROCEDURE — 10330081 VN NO-PAY CLAIM PROCEDURE

## 2023-09-24 NOTE — CASE COMMUNICATION
[facility] has admitted your patient to [facility] service with the following disciplines:      SN and PT  This report is informational only, no responses is needed  Primary focus of home health care. CARDIAC  Patient stated goals of care. Remain at home and be stronger  Anticipated visit pattern and next visit date. 3w1. 2w2. 1w3  next visit weds  See medication list - meds in home differ from AVS. all meds at home  Significant cl inical findings. weakness, fatigue, decrease endurance, pain that interferes with adls  Potential barriers to goal achievement. na  Other pertinent information. per referring that its fine to obtain PT INR today as pt does not have transportation tmrw to obtain it    Thank you for allowing us to participate in the care of your patient.

## 2023-09-25 ENCOUNTER — ANTICOAG VISIT (OUTPATIENT)
Dept: CARDIOLOGY CLINIC | Facility: CLINIC | Age: 78
End: 2023-09-25

## 2023-09-25 ENCOUNTER — HOME CARE VISIT (OUTPATIENT)
Dept: HOME HEALTH SERVICES | Facility: HOME HEALTHCARE | Age: 78
End: 2023-09-25
Payer: MEDICARE

## 2023-09-25 PROCEDURE — G0151 HHCP-SERV OF PT,EA 15 MIN: HCPCS

## 2023-09-25 NOTE — CASE COMMUNICATION
[facility] has admitted your patient to [facility] service with the following disciplines:   SN and PT   This report is informational only, no responses is needed   Primary focus of home health care. CARDIAC   Patient stated goals of care. Remain at home and be stronger   Anticipated visit pattern and next visit date. 2w3. 1w3   next visit thursday  See medication list - meds in home differ from AVS. all meds at home   Significant  clinical findings. weakness, fatigue, decrease endurance, pain that interferes with adls   Potential barriers to goal achievement. na   Other pertinent information. per referring that its fine to obtain PT INR today as pt does not have transportation tmrw to obtain it   Thank you for allowing us to participate in the care of your patient.

## 2023-09-27 ENCOUNTER — OFFICE VISIT (OUTPATIENT)
Dept: FAMILY MEDICINE CLINIC | Facility: CLINIC | Age: 78
End: 2023-09-27
Payer: MEDICARE

## 2023-09-27 VITALS
WEIGHT: 214.13 LBS | DIASTOLIC BLOOD PRESSURE: 60 MMHG | HEART RATE: 71 BPM | TEMPERATURE: 97.6 F | BODY MASS INDEX: 29.98 KG/M2 | RESPIRATION RATE: 17 BRPM | OXYGEN SATURATION: 99 % | SYSTOLIC BLOOD PRESSURE: 110 MMHG | HEIGHT: 71 IN

## 2023-09-27 DIAGNOSIS — I48.92 CHRONIC ATRIAL FLUTTER (HCC): ICD-10-CM

## 2023-09-27 DIAGNOSIS — I10 BENIGN ESSENTIAL HYPERTENSION: Primary | Chronic | ICD-10-CM

## 2023-09-27 DIAGNOSIS — E11.49 TYPE 2 DIABETES MELLITUS WITH OTHER NEUROLOGIC COMPLICATION, WITHOUT LONG-TERM CURRENT USE OF INSULIN (HCC): Chronic | ICD-10-CM

## 2023-09-27 DIAGNOSIS — Z95.2 S/P TAVR (TRANSCATHETER AORTIC VALVE REPLACEMENT): ICD-10-CM

## 2023-09-27 PROCEDURE — 99214 OFFICE O/P EST MOD 30 MIN: CPT | Performed by: FAMILY MEDICINE

## 2023-09-27 NOTE — ASSESSMENT & PLAN NOTE
Diabetes. Most recent A1c was 6.9 in May 2023. Patient will continue current regimen of medications.   He will have follow-up office visit in 6 months  Lab Results   Component Value Date    HGBA1C 6.9 (A) 09/05/2023
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
## 2023-09-27 NOTE — ASSESSMENT & PLAN NOTE
Aortic valve replacement. Overall the patient appears to be recovering well after having aortic valve replacement. He will follow-up with cardiac surgeon next month. He will subsequently start cardiac rehabilitation.

## 2023-09-27 NOTE — ASSESSMENT & PLAN NOTE
Atrial flutter. Patient continues on warfarin therapy. INR followed by cardiology to titrate warfarin dose.

## 2023-09-28 ENCOUNTER — ANTICOAG VISIT (OUTPATIENT)
Dept: CARDIOLOGY CLINIC | Facility: CLINIC | Age: 78
End: 2023-09-28

## 2023-09-28 ENCOUNTER — HOME CARE VISIT (OUTPATIENT)
Dept: HOME HEALTH SERVICES | Facility: HOME HEALTHCARE | Age: 78
End: 2023-09-28
Payer: MEDICARE

## 2023-09-28 VITALS
DIASTOLIC BLOOD PRESSURE: 70 MMHG | SYSTOLIC BLOOD PRESSURE: 126 MMHG | TEMPERATURE: 97.9 F | HEART RATE: 55 BPM | RESPIRATION RATE: 18 BRPM | OXYGEN SATURATION: 96 %

## 2023-09-28 LAB — INR PPP: 2.4 (ref 0.84–1.19)

## 2023-09-28 PROCEDURE — G0299 HHS/HOSPICE OF RN EA 15 MIN: HCPCS

## 2023-09-29 ENCOUNTER — TELEPHONE (OUTPATIENT)
Dept: CARDIAC SURGERY | Facility: CLINIC | Age: 78
End: 2023-09-29

## 2023-09-29 ENCOUNTER — HOME CARE VISIT (OUTPATIENT)
Dept: HOME HEALTH SERVICES | Facility: HOME HEALTHCARE | Age: 78
End: 2023-09-29
Payer: MEDICARE

## 2023-09-29 ENCOUNTER — REMOTE DEVICE CLINIC VISIT (OUTPATIENT)
Dept: CARDIOLOGY CLINIC | Facility: CLINIC | Age: 78
End: 2023-09-29
Payer: MEDICARE

## 2023-09-29 DIAGNOSIS — Z95.0 CARDIAC PACEMAKER IN SITU: Primary | ICD-10-CM

## 2023-09-29 PROCEDURE — 93296 REM INTERROG EVL PM/IDS: CPT | Performed by: INTERNAL MEDICINE

## 2023-09-29 PROCEDURE — 93294 REM INTERROG EVL PM/LDLS PM: CPT | Performed by: INTERNAL MEDICINE

## 2023-09-29 PROCEDURE — G0151 HHCP-SERV OF PT,EA 15 MIN: HCPCS

## 2023-09-29 NOTE — PROGRESS NOTES
Results for orders placed or performed in visit on 09/29/23   Cardiac EP device report    Narrative    MDT DUAL CHAMBER PPM (MVP ON) - ACTIVE SYSTEM IS MRI CONDITIONAL  CARELINK TRANSMISSION: BATTERY STATUS "11 YRS." AP 84%  90%. ALL AVAILABLE LEAD PARAMETERS WITHIN NORMAL LIMITS. 1 VT NOTED; DURING TAVR SAME DAY; APPROX >25 BEATS@ 182 BPM. PT ON METO SUCC & EF 60% (ECHO 2023). 1 AT/AF NOTED; 0% BURDEN-PT PRESENTLY IN AF. ON WARFARIN. NORMAL DEVICE FUNCTION.  NC

## 2023-09-29 NOTE — TELEPHONE ENCOUNTER
Called patient to perform routine post op followup  after hospital discharge s/p TAVR. No answer, left voicemail to call office at [phone].

## 2023-10-02 ENCOUNTER — HOME CARE VISIT (OUTPATIENT)
Dept: HOME HEALTH SERVICES | Facility: HOME HEALTHCARE | Age: 78
End: 2023-10-02
Payer: MEDICARE

## 2023-10-02 PROCEDURE — G0180 MD CERTIFICATION HHA PATIENT: HCPCS | Performed by: THORACIC SURGERY (CARDIOTHORACIC VASCULAR SURGERY)

## 2023-10-03 ENCOUNTER — HOME CARE VISIT (OUTPATIENT)
Dept: HOME HEALTH SERVICES | Facility: HOME HEALTHCARE | Age: 78
End: 2023-10-03
Payer: MEDICARE

## 2023-10-03 VITALS
TEMPERATURE: 97.3 F | DIASTOLIC BLOOD PRESSURE: 76 MMHG | RESPIRATION RATE: 18 BRPM | OXYGEN SATURATION: 97 % | SYSTOLIC BLOOD PRESSURE: 146 MMHG | HEART RATE: 63 BPM

## 2023-10-03 PROCEDURE — G0299 HHS/HOSPICE OF RN EA 15 MIN: HCPCS

## 2023-10-03 PROCEDURE — G0151 HHCP-SERV OF PT,EA 15 MIN: HCPCS

## 2023-10-06 ENCOUNTER — ANTICOAG VISIT (OUTPATIENT)
Dept: CARDIOLOGY CLINIC | Facility: CLINIC | Age: 78
End: 2023-10-06

## 2023-10-06 ENCOUNTER — HOME CARE VISIT (OUTPATIENT)
Dept: HOME HEALTH SERVICES | Facility: HOME HEALTHCARE | Age: 78
End: 2023-10-06
Payer: MEDICARE

## 2023-10-06 VITALS
SYSTOLIC BLOOD PRESSURE: 132 MMHG | HEART RATE: 64 BPM | DIASTOLIC BLOOD PRESSURE: 76 MMHG | OXYGEN SATURATION: 97 % | RESPIRATION RATE: 18 BRPM | TEMPERATURE: 98.5 F

## 2023-10-06 LAB — INR PPP: 3.3 (ref 0.84–1.19)

## 2023-10-06 PROCEDURE — G0299 HHS/HOSPICE OF RN EA 15 MIN: HCPCS

## 2023-10-10 ENCOUNTER — HOME CARE VISIT (OUTPATIENT)
Dept: HOME HEALTH SERVICES | Facility: HOME HEALTHCARE | Age: 78
End: 2023-10-10
Payer: MEDICARE

## 2023-10-10 ENCOUNTER — ANTICOAG VISIT (OUTPATIENT)
Dept: CARDIOLOGY CLINIC | Facility: CLINIC | Age: 78
End: 2023-10-10

## 2023-10-10 VITALS
SYSTOLIC BLOOD PRESSURE: 150 MMHG | HEART RATE: 60 BPM | RESPIRATION RATE: 18 BRPM | OXYGEN SATURATION: 97 % | DIASTOLIC BLOOD PRESSURE: 70 MMHG | TEMPERATURE: 97.6 F

## 2023-10-10 LAB — INR PPP: 2.7 (ref 0.84–1.19)

## 2023-10-10 PROCEDURE — G0299 HHS/HOSPICE OF RN EA 15 MIN: HCPCS

## 2023-10-12 ENCOUNTER — TELEPHONE (OUTPATIENT)
Dept: FAMILY MEDICINE CLINIC | Facility: CLINIC | Age: 78
End: 2023-10-12

## 2023-10-12 DIAGNOSIS — M10.9 GOUT, UNSPECIFIED CAUSE, UNSPECIFIED CHRONICITY, UNSPECIFIED SITE: Primary | ICD-10-CM

## 2023-10-12 RX ORDER — PREDNISONE 20 MG/1
TABLET ORAL
Qty: 5 TABLET | Refills: 0 | Status: SHIPPED | OUTPATIENT
Start: 2023-10-12

## 2023-10-12 NOTE — TELEPHONE ENCOUNTER
Patient is having a GOUT flare up in his lower left ankle and would like to know if he can have something for the pain?   Please call to advise

## 2023-10-12 NOTE — TELEPHONE ENCOUNTER
Prescription for prednisone sent to pharmacy to take once daily with food until gout symptoms improve

## 2023-10-17 ENCOUNTER — ANTICOAG VISIT (OUTPATIENT)
Dept: CARDIOLOGY CLINIC | Facility: CLINIC | Age: 78
End: 2023-10-17

## 2023-10-17 ENCOUNTER — APPOINTMENT (OUTPATIENT)
Dept: LAB | Facility: CLINIC | Age: 78
End: 2023-10-17
Payer: MEDICARE

## 2023-10-17 DIAGNOSIS — Z95.2 S/P TAVR (TRANSCATHETER AORTIC VALVE REPLACEMENT): ICD-10-CM

## 2023-10-17 DIAGNOSIS — I35.0 SEVERE AORTIC STENOSIS: ICD-10-CM

## 2023-10-17 LAB
ANION GAP SERPL CALCULATED.3IONS-SCNC: 5 MMOL/L
BASOPHILS # BLD AUTO: 0.04 THOUSANDS/ÂΜL (ref 0–0.1)
BASOPHILS NFR BLD AUTO: 1 % (ref 0–1)
BUN SERPL-MCNC: 15 MG/DL (ref 5–25)
CALCIUM SERPL-MCNC: 9.5 MG/DL (ref 8.4–10.2)
CHLORIDE SERPL-SCNC: 104 MMOL/L (ref 96–108)
CO2 SERPL-SCNC: 31 MMOL/L (ref 21–32)
CREAT SERPL-MCNC: 1.05 MG/DL (ref 0.6–1.3)
EOSINOPHIL # BLD AUTO: 0.12 THOUSAND/ÂΜL (ref 0–0.61)
EOSINOPHIL NFR BLD AUTO: 2 % (ref 0–6)
ERYTHROCYTE [DISTWIDTH] IN BLOOD BY AUTOMATED COUNT: 13.2 % (ref 11.6–15.1)
GFR SERPL CREATININE-BSD FRML MDRD: 67 ML/MIN/1.73SQ M
GLUCOSE P FAST SERPL-MCNC: 186 MG/DL (ref 65–99)
HCT VFR BLD AUTO: 43.8 % (ref 36.5–49.3)
HGB BLD-MCNC: 14.2 G/DL (ref 12–17)
IMM GRANULOCYTES # BLD AUTO: 0.03 THOUSAND/UL (ref 0–0.2)
IMM GRANULOCYTES NFR BLD AUTO: 1 % (ref 0–2)
LYMPHOCYTES # BLD AUTO: 2.13 THOUSANDS/ÂΜL (ref 0.6–4.47)
LYMPHOCYTES NFR BLD AUTO: 34 % (ref 14–44)
MCH RBC QN AUTO: 29.4 PG (ref 26.8–34.3)
MCHC RBC AUTO-ENTMCNC: 32.4 G/DL (ref 31.4–37.4)
MCV RBC AUTO: 91 FL (ref 82–98)
MONOCYTES # BLD AUTO: 0.48 THOUSAND/ÂΜL (ref 0.17–1.22)
MONOCYTES NFR BLD AUTO: 8 % (ref 4–12)
NEUTROPHILS # BLD AUTO: 3.52 THOUSANDS/ÂΜL (ref 1.85–7.62)
NEUTS SEG NFR BLD AUTO: 54 % (ref 43–75)
NRBC BLD AUTO-RTO: 0 /100 WBCS
PLATELET # BLD AUTO: 144 THOUSANDS/UL (ref 149–390)
PMV BLD AUTO: 10.8 FL (ref 8.9–12.7)
POTASSIUM SERPL-SCNC: 5 MMOL/L (ref 3.5–5.3)
RBC # BLD AUTO: 4.83 MILLION/UL (ref 3.88–5.62)
SODIUM SERPL-SCNC: 140 MMOL/L (ref 135–147)
WBC # BLD AUTO: 6.32 THOUSAND/UL (ref 4.31–10.16)

## 2023-10-17 PROCEDURE — 80048 BASIC METABOLIC PNL TOTAL CA: CPT

## 2023-10-17 PROCEDURE — 85025 COMPLETE CBC W/AUTO DIFF WBC: CPT

## 2023-10-23 DIAGNOSIS — I47.29 NSVT (NONSUSTAINED VENTRICULAR TACHYCARDIA) (HCC): ICD-10-CM

## 2023-10-23 RX ORDER — METOPROLOL SUCCINATE 25 MG/1
TABLET, EXTENDED RELEASE ORAL
Qty: 30 TABLET | Refills: 3 | Status: SHIPPED | OUTPATIENT
Start: 2023-10-23

## 2023-10-25 ENCOUNTER — OFFICE VISIT (OUTPATIENT)
Dept: CARDIAC SURGERY | Facility: CLINIC | Age: 78
End: 2023-10-25
Payer: MEDICARE

## 2023-10-25 ENCOUNTER — HOSPITAL ENCOUNTER (OUTPATIENT)
Dept: NON INVASIVE DIAGNOSTICS | Facility: HOSPITAL | Age: 78
Discharge: HOME | End: 2023-10-25
Payer: MEDICARE

## 2023-10-25 VITALS
SYSTOLIC BLOOD PRESSURE: 138 MMHG | HEART RATE: 60 BPM | BODY MASS INDEX: 30.55 KG/M2 | WEIGHT: 218.2 LBS | TEMPERATURE: 98.2 F | DIASTOLIC BLOOD PRESSURE: 68 MMHG | OXYGEN SATURATION: 98 % | HEIGHT: 71 IN

## 2023-10-25 VITALS
BODY MASS INDEX: 29.96 KG/M2 | HEART RATE: 60 BPM | HEIGHT: 71 IN | WEIGHT: 214 LBS | SYSTOLIC BLOOD PRESSURE: 150 MMHG | DIASTOLIC BLOOD PRESSURE: 70 MMHG

## 2023-10-25 DIAGNOSIS — I35.0 SEVERE AORTIC STENOSIS: ICD-10-CM

## 2023-10-25 DIAGNOSIS — Z95.2 S/P TAVR (TRANSCATHETER AORTIC VALVE REPLACEMENT): ICD-10-CM

## 2023-10-25 DIAGNOSIS — Z48.89 ENCOUNTER FOR POSTOPERATIVE CARE: ICD-10-CM

## 2023-10-25 DIAGNOSIS — Z95.2 S/P TAVR (TRANSCATHETER AORTIC VALVE REPLACEMENT): Primary | ICD-10-CM

## 2023-10-25 LAB
AORTIC ROOT: 3.5 CM
AORTIC VALVE MEAN VELOCITY: 15.1 M/S
APICAL FOUR CHAMBER EJECTION FRACTION: 60 %
ASCENDING AORTA: 3.5 CM
ATRIAL RATE: 60 BPM
AV AREA BY CONTINUOUS VTI: 1.7 CM2
AV AREA PEAK VELOCITY: 1.6 CM2
AV LVOT MEAN GRADIENT: 4 MMHG
AV LVOT PEAK GRADIENT: 6 MMHG
AV MEAN GRADIENT: 10 MMHG
AV PEAK GRADIENT: 17 MMHG
AV VALVE AREA: 2.97 CM2
AV VELOCITY RATIO: 0.58
DOP CALC AO PEAK VEL: 2.07 M/S
DOP CALC AO VTI: 43.65 CM
DOP CALC LVOT AREA: 4.91 CM2
DOP CALC LVOT CARDIAC INDEX: 2.08 L/MIN/M2
DOP CALC LVOT CARDIAC OUTPUT: 4.51 L/MIN
DOP CALC LVOT DIAMETER: 2.5 CM
DOP CALC LVOT PEAK VEL VTI: 26.41 CM
DOP CALC LVOT PEAK VEL: 1.2 M/S
DOP CALC LVOT STROKE INDEX: 34.6 ML/M2
DOP CALC LVOT STROKE VOLUME: 129.57 CM3
E WAVE DECELERATION TIME: 257 MS
E/A RATIO: 1.14
FRACTIONAL SHORTENING: 35 (ref 28–44)
INTERVENTRICULAR SEPTUM IN DIASTOLE (PARASTERNAL SHORT AXIS VIEW): 1.6 CM
INTERVENTRICULAR SEPTUM: 1.6 CM (ref 0.6–1.1)
LAAS-AP2: 28.2 CM2
LAAS-AP4: 23 CM2
LEFT ATRIUM SIZE: 4.8 CM
LEFT ATRIUM VOLUME (MOD BIPLANE): 88 ML
LEFT ATRIUM VOLUME INDEX (MOD BIPLANE): 40.6 ML/M2
LEFT INTERNAL DIMENSION IN SYSTOLE: 3.1 CM (ref 2.1–4)
LEFT VENTRICULAR INTERNAL DIMENSION IN DIASTOLE: 4.8 CM (ref 3.5–6)
LEFT VENTRICULAR POSTERIOR WALL IN END DIASTOLE: 1.3 CM
LEFT VENTRICULAR STROKE VOLUME: 71 ML
LVSV (TEICH): 71 ML
MV E'TISSUE VEL-LAT: 7 CM/S
MV E'TISSUE VEL-SEP: 7 CM/S
MV PEAK A VEL: 0.71 M/S
MV PEAK E VEL: 81 CM/S
MV STENOSIS PRESSURE HALF TIME: 74 MS
MV VALVE AREA P 1/2 METHOD: 2.97
P AXIS: 59 DEGREES
PR INTERVAL: 180 MS
QRS AXIS: 129 DEGREES
QRSD INTERVAL: 160 MS
QT INTERVAL: 478 MS
QTC INTERVAL: 478 MS
RA PRESSURE ESTIMATED: 8 MMHG
RIGHT ATRIUM AREA SYSTOLE A4C: 23.6 CM2
RIGHT VENTRICLE ID DIMENSION: 4.1 CM
RV PSP: 34 MMHG
SL CV LEFT ATRIUM LENGTH A2C: 6 CM
SL CV LV EF: 55
SL CV PED ECHO LEFT VENTRICLE DIASTOLIC VOLUME (MOD BIPLANE) 2D: 109 ML
SL CV PED ECHO LEFT VENTRICLE SYSTOLIC VOLUME (MOD BIPLANE) 2D: 38 ML
T WAVE AXIS: 109 DEGREES
TR MAX PG: 26 MMHG
TR PEAK VELOCITY: 2.6 M/S
TRICUSPID ANNULAR PLANE SYSTOLIC EXCURSION: 2.1 CM
TRICUSPID VALVE PEAK REGURGITATION VELOCITY: 2.55 M/S
VENTRICULAR RATE: 60 BPM

## 2023-10-25 PROCEDURE — 93306 TTE W/DOPPLER COMPLETE: CPT | Performed by: INTERNAL MEDICINE

## 2023-10-25 PROCEDURE — 93005 ELECTROCARDIOGRAM TRACING: CPT

## 2023-10-25 PROCEDURE — 99214 OFFICE O/P EST MOD 30 MIN: CPT | Performed by: THORACIC SURGERY (CARDIOTHORACIC VASCULAR SURGERY)

## 2023-10-25 PROCEDURE — 93010 ELECTROCARDIOGRAM REPORT: CPT | Performed by: INTERNAL MEDICINE

## 2023-10-25 PROCEDURE — 93306 TTE W/DOPPLER COMPLETE: CPT

## 2023-10-25 NOTE — PROGRESS NOTES
POST OP FOLLOW UP VISIT S/P TAVR    Procedure: S/P Transcatheter aortic valve replacement with a 26 mm Davila MARIBELL 3 Ultra Resilia bioprosthetic valve via right transfemoral approach, performed on 9/21/23. History: Luh Peñaloza is a 66y.o. year old male who presents to our office today for routine follow up care from transfemoral transcatheter aortic valve replacement. Patient tolerated procedure well. Post op echo and ECG stable. Patient discharged to home on POD # 1. Patient has been seen by his PCP for follow-up but not by Cardiology at this point. Today patient reports he has been outside doing yard work. He denies chest pian, weight gain, edema,lightheadedness, palpitations or significant NORTH. HE mentions his legs remain tired but are actually feeling a little stronger at this point. He mentions an occasional SOB with exertion. Patient reports bruising but denies abnormal bleeding and his groin site has healed well. Patient is scheduled to begin cardiac rehab in a week.        Review of System:     History obtained from chart review and the patient  General ROS: negative  Psychological ROS: negative  Ophthalmic ROS: negative  ENT ROS: negative  Allergy and Immunology ROS: negative  Hematological and Lymphatic ROS: positive for - bruising  negative for - bleeding problems or jaundice  Endocrine ROS: negative  Breast ROS: negative  Respiratory ROS: no cough, shortness of breath, or wheezing  Cardiovascular ROS: no chest pain or dyspnea on exertion  Gastrointestinal ROS: no abdominal pain, change in bowel habits, or black or bloody stools  Genito-Urinary ROS: no dysuria, trouble voiding, or hematuria  Musculoskeletal ROS: positive for - leg fatigue, ambulates with cane due to residual lewft sided weakness (old CVA)_  Neurological ROS: no TIA or stroke symptoms  Dermatological ROS: negative    Vital Signs:     Vitals:    10/25/23 1305 10/25/23 1307   BP: 140/64 138/68   BP Location: Left arm Right arm   Patient Position: Sitting Sitting   Cuff Size: Standard Standard   Pulse: 60    Temp: 98.2 °F (36.8 °C)    TempSrc: Tympanic    SpO2: 98%    Weight: 99 kg (218 lb 3.2 oz)    Height: 5' 11" (1.803 m)        Allergies: Allergies   Allergen Reactions    Penicillins Hives       Home Medications:     Prior to Admission medications    Medication Sig Start Date End Date Taking?  Authorizing Provider   acetaminophen (TYLENOL) 325 mg tablet Take 2 tablets (650 mg total) by mouth every 6 (six) hours as needed for mild pain 8/31/22  Yes Avila Garcia,    aspirin (ECOTRIN LOW STRENGTH) 81 mg EC tablet Take 1 tablet (81 mg total) by mouth daily 9/13/23  Yes DONTE Elizondo   clopidogrel (PLAVIX) 75 mg tablet Take 1 tablet (75 mg total) by mouth daily Do not start before August 30, 2023. 8/30/23  Yes DONTE Key   FREESTYLE LITE test strip daily 4/1/23  Yes Historical Provider, MD   metFORMIN (GLUCOPHAGE) 1000 MG tablet TAKE ONE TABLET BY MOUTH EVERY DAY 6/58/37  Yes Amalia Reed MD   metoprolol succinate (TOPROL-XL) 25 mg 24 hr tablet TAKE ONE TABLET BY MOUTH AT BEDTIME 10/23/23  Yes Juan Alberto Carvalho,    nitroglycerin (NITROSTAT) 0.4 mg SL tablet Place 1 tablet (0.4 mg total) under the tongue every 5 (five) minutes as needed for chest pain 8/29/23  Yes DONTE Key   predniSONE 20 mg tablet One po q day with food  Patient not taking: Reported on 10/25/2023 22/17/70   Amalia Reed MD   rosuvastatin (CRESTOR) 5 mg tablet TAKE ONE TABLET BY MOUTH EVERY DAY 2/5/96  Yes Amalia Reed MD   warfarin (Coumadin) 5 mg tablet Take 7.5 mg (1.5 tabs on night of discharge 9/22, 9/23, and 9/24) then have INR checked 9/25 and resume normal coumadin dosing or as instructed) 9/22/23  Yes Fabio Mendoza PA-C   docusate sodium (COLACE) 100 mg capsule Take 1 capsule (100 mg total) by mouth 2 (two) times a day as needed for constipation  Patient not taking: Reported on 10/25/2023 9/22/23   Fabio Mendoza PA-C pantoprazole (PROTONIX) 40 mg tablet Take 1 tablet (40 mg total) by mouth daily in the early morning See family doctor or cardiologist for continued refills Do not start before September 23, 2023. Patient not taking: Reported on 9/27/2023 9/23/23   Mar MorningMADISON   polyethylene glycol (MIRALAX) 17 g packet Take 17 g by mouth daily as needed (constipation)  Patient not taking: Reported on 9/27/2023 9/22/23 Marea MorningMADISON   potassium chloride (K-DUR,KLOR-CON) 10 mEq tablet Take 1 tablet (10 mEq total) by mouth once for 1 dose Take one tablet once daily x 5 days then stop 9/22/23 9/22/23  Marea Morning, PA-ARMIN   torsemide [facility]) 10 mg tablet Take 1 tablet (10 mg total) by mouth daily Take one tablet once daily x 5 days then stop  Patient not taking: Reported on 9/27/2023 9/22/23   Mar MorningMADISON       Physical Exam:    General: Alert, oriented, well developed, no acute distress  HEENT/NECK:  PERRLA. No jugular venous distention. Cardiac:Regular rate and rhythm, no murmurs rubs or gallops. Pulmonary:Breath sounds clear bilaterally  Abdomen:  Non-tender, Non-distended. Positive bowel sounds. Upper extremities: 2+ radial pulses; brisk capillary refill  Lower extremities: Extremities warm/dry. Right femoral pulse 2+, no hematoma. PT/DP pulses 2+ bilaterally. No edema B/L  Incisions: Inguinal puncture site is clean, dry, and intact. Musculoskeletal: MAEE, ambulates with cane  Neuro: Alert and oriented X 3. Sensation is grossly intact. No focal deficits  Skin: Warm/Dry, without rashes or lesions.     Lab Results:     Lab Results   Component Value Date    WBC 6.32 10/17/2023    HGB 14.2 10/17/2023    HCT 43.8 10/17/2023    MCV 91 10/17/2023     (L) 10/17/2023     Lab Results   Component Value Date    SODIUM 140 10/17/2023    K 5.0 10/17/2023     10/17/2023    CO2 31 10/17/2023    BUN 15 10/17/2023    CREATININE 1.05 10/17/2023    GLUC 222 (H) 09/22/2023    CALCIUM 9.5 10/17/2023       Imaging Studies:     Transthoracic Echocardiogram: TODAY       Left Ventricle: Left ventricular cavity size is normal. Wall thickness is moderately increased. There is moderate concentric hypertrophy. The left ventricular ejection fraction is 55%. Systolic function is normal. Diastolic function is mildly abnormal, consistent with grade I (abnormal) relaxation. The following segments are akinetic: basal inferior and basal inferolateral.    The following segments are hypokinetic: basal inferoseptal.    Right Ventricle: Right ventricular cavity size is normal. Systolic function is normal.    Left Atrium: The atrium is mildly dilated (35-41 mL/m2). Right Atrium: The atrium is dilated. Aortic Valve: There is an Davila MARIBELL 3 Ultra Resilia 26 mm TAVR bioprosthetic valve. The prosthetic valve appears to be functioning normally. There is no evidence of paravalvular regurgitation. There is no evidence of transvalvular regurgitation. The gradient recorded across the prosthetic aortic valve is within the expected range. The aortic valve peak velocity is 2.07 m/s. The aortic valve mean gradient is 10 mmHg. The dimensionless velocity index is 0.58. The aortic valve area is 2.97 cm2. Mitral Valve: There is mild to moderate regurgitation with a centrally directed jet. Tricuspid Valve: There is mild to moderate regurgitation. Stable TAVR bioprosthesis compared to prior study dated 09/21/2023. Findings    Left Ventricle Left ventricular cavity size is normal. Wall thickness is moderately increased. There is moderate concentric hypertrophy. The left ventricular ejection fraction is 55%. Systolic function is normal.  Diastolic function is mildly abnormal, consistent with grade I (abnormal) relaxation.    Wall Scoring Baseline     The following segments are akinetic: basal inferior and basal inferolateral.  The following segments are hypokinetic: basal inferoseptal.  All other segments are normal.            Right Ventricle Right ventricular cavity size is normal. Systolic function is normal.   Left Atrium The atrium is mildly dilated (35-41 mL/m2). Right Atrium The atrium is dilated. Aortic Valve There is an Davila MARIBELL 3 Ultra Resilia 26 mm TAVR bioprosthetic valve. The prosthetic valve appears to be functioning normally. There is no evidence of paravalvular regurgitation. There is no evidence of transvalvular regurgitation. The gradient recorded across the prosthetic aortic valve is within the expected range. The aortic valve peak velocity is 2.07 m/s. The aortic valve mean gradient is 10 mmHg. The dimensionless velocity index is 0.58. The aortic valve area is 2.97 cm2. Mitral Valve Mitral valve structure is normal. There is mild to moderate regurgitation with a centrally directed jet. There is no evidence of stenosis. Tricuspid Valve Tricuspid valve structure is normal. There is mild to moderate regurgitation. There is no evidence of stenosis. The right ventricular systolic pressure is normal. The estimated right ventricular systolic pressure is 91.57 mmHg. Pulmonic Valve The pulmonic valve was not well visualized. There is trace regurgitation. There is no evidence of stenosis. Ascending Aorta The aortic root is normal in size. The ascending aorta is normal in size. The aortic root is 3.50 cm (1.6 cm/m2). The ascending aorta is 3.5 cm (1.6 cm/m2). IVC/SVC The right atrial pressure is estimated at 8.0 mmHg. The inferior vena cava is not well visualized. Pericardium There is no pericardial effusion.      Left Ventricle Measurements    Function/Volumes   A4C EF 60 %         LVOT stroke volume 129.57 cm3         LVOT stroke volume index 34.6 ml/m2         LVOT Cardiac Output 4.51 l/min         LVOT Cardiac Index 2.08 l/min/m2         Dimensions   LVIDd 4.8 cm         LVIDS 3.1 cm         IVSd 1.6 cm         LVPWd 1.3 cm         LVOT area 4.91 cm2         FS 35         Diastolic Filling   MV E' Tissue Velocity Septal 7 cm/s         MV E' Tissue Velocity Lateral 7 cm/s         LA Volume Index (BP) 40.6 mL/m2         E/A ratio 1.14         E wave deceleration time 257 ms         MV Peak E Zac 81 cm/s         MV Peak A Zac 0.71 m/s          Report Measurements   AV LVOT peak gradient 6 mmHg              Interventricular Septum Measurements    Shunt Ratio   LVOT peak VTI 26.41 cm         LVOT peak zac 1.2 m/s              Right Ventricle Measurements    Dimensions   RVID d 4.1 cm         Tricuspid annular plane systolic excursion 2.1 cm               Left Atrium Measurements    Dimensions   LA size 4.8 cm         LA length (A2C) 6 cm         Volumes   LA volume (BP) 88 mL         LA Volume Index (BP) 40.6 mL/m2               Right Atrium Measurements    Dimensions   RAA A4C 23.6 cm2               Atrial Septum Measurements    Shunt Ratio   LVOT peak VTI 26.41 cm         LVOT peak zac 1.2 m/s               Aortic Valve Measurements    Stenosis   Aortic valve peak velocity 2.07 m/s         LVOT peak zac 1.2 m/s         Ao VTI 43.65 cm         LVOT peak VTI 26.41 cm         AV mean gradient 10 mmHg         LVOT mn grad 4 mmHg         AV peak gradient 17 mmHg         AV LVOT peak gradient 6 mmHg         Area/Dimensions   DVI 0.58         AV valve area 2.97 cm2         AV area by cont VTI 1.7 cm2         AV area peak zac 1.6 cm2         LVOT diameter 2.5 cm         LVOT area 4.91 cm2               Mitral Valve Measurements    Stenosis   MV stenosis pressure 1/2 time 74 ms         MV valve area p 1/2 method 2.97               Tricuspid Valve Measurements    RVSP Parameters   TR Peak Zac 2.6 m/s         Est. RA pres 8 mmHg         Triscuspid Valve Regurgitation Peak Gradient 26 mmHg         Right Ventricular Peak Systolic Pressure 34 mmHg               Aorta Measurements    Aortic Dimensions   Ao root 3.5 cm         Asc Ao 3.5 cm               IVC/SVC Measurements    IVC/SVC   Est. RA pres 8 mmHg EKG: TODAY    AV dual-paced rhythm     I have personally reviewed pertinent films in PACS    TAVR evaluation Assessment:     [address]: I    Assessment:   Aortic stenosis, Non-Rheumatic. S/P transfemoral transcatheter aortic valve replacement    Luh Peñaloza is making good progress in their post-op recovery. NYHA functional class I. Right groin incision is well healed. Weight and VS are stable. Recent echocardiogram demonstrates normally functioning TAVR bioprosthesis, no PVL. ECG, BMP & CBC stable . Plan:   Test results reviewed with patient. Medications reviewed with patient. Patient remains on Coumadin, Aspirin and Plavix as taken pre-op for Afib, (Aspirin 81 mg daily is lifelong)    Benefits of participating in cardiac rehabilitation discussed with patient and they are cleared to proceed with the program.    May resume driving and all normal activities. Luh Peñaloza will return for one year follow-up in our office with repeat echocardiogram, ECG, CBC & BMP. Our office will contact patient to schedule appointment. They have been advised to maintain routine follow up with their cardiologist and PCP for ongoing medical care. Antibiotic prophylaxis for dental procedures, as per guidelines set by [employer], reviewed with patient. Patient was comfortable with our recommendations and their questions were answered to their satisfaction.     Routine referral to gastroenterology for colonoscopy screening was not indicated, as the patient is over 76years old    Criselda Melendez, [address] [place] [address]  10/25/23  1:25 PM

## 2023-11-01 ENCOUNTER — APPOINTMENT (OUTPATIENT)
Dept: LAB | Facility: CLINIC | Age: 78
End: 2023-11-01
Payer: MEDICARE

## 2023-11-01 ENCOUNTER — ANTICOAG VISIT (OUTPATIENT)
Dept: CARDIOLOGY CLINIC | Facility: CLINIC | Age: 78
End: 2023-11-01

## 2023-11-10 ENCOUNTER — OFFICE VISIT (OUTPATIENT)
Dept: CARDIOLOGY CLINIC | Facility: CLINIC | Age: 78
End: 2023-11-10
Payer: MEDICARE

## 2023-11-10 VITALS
HEART RATE: 64 BPM | DIASTOLIC BLOOD PRESSURE: 74 MMHG | WEIGHT: 217.1 LBS | HEIGHT: 71 IN | OXYGEN SATURATION: 98 % | BODY MASS INDEX: 30.39 KG/M2 | SYSTOLIC BLOOD PRESSURE: 136 MMHG

## 2023-11-10 DIAGNOSIS — E78.2 MIXED HYPERLIPIDEMIA: ICD-10-CM

## 2023-11-10 DIAGNOSIS — R00.1 SYMPTOMATIC BRADYCARDIA: ICD-10-CM

## 2023-11-10 DIAGNOSIS — Z95.2 S/P TAVR (TRANSCATHETER AORTIC VALVE REPLACEMENT): ICD-10-CM

## 2023-11-10 DIAGNOSIS — Z95.5 S/P DRUG ELUTING CORONARY STENT PLACEMENT: ICD-10-CM

## 2023-11-10 DIAGNOSIS — I35.0 SEVERE AORTIC STENOSIS: Primary | ICD-10-CM

## 2023-11-10 DIAGNOSIS — I48.92 ATRIAL FLUTTER, UNSPECIFIED TYPE (HCC): ICD-10-CM

## 2023-11-10 DIAGNOSIS — Z95.0 CARDIAC PACEMAKER IN SITU: ICD-10-CM

## 2023-11-10 PROCEDURE — 99214 OFFICE O/P EST MOD 30 MIN: CPT | Performed by: INTERNAL MEDICINE
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
## 2023-11-10 NOTE — PATIENT INSTRUCTIONS
You were seen today in the Cardiology office for follow up evaluation. Please continue your current cardiac medications as prescribed. Please make the following changes to your cardiac medications: You may stop taking Plavix on January 1st 2024. Please continue taking Aspirin and Coumadin. Thank you for choosing [facility]. Please call our office or use [employer] with any questions.

## 2023-11-13 ENCOUNTER — OFFICE VISIT (OUTPATIENT)
Dept: FAMILY MEDICINE CLINIC | Facility: CLINIC | Age: 78
End: 2023-11-13
Payer: MEDICARE

## 2023-11-13 VITALS
TEMPERATURE: 98 F | RESPIRATION RATE: 18 BRPM | WEIGHT: 214.25 LBS | HEIGHT: 71 IN | BODY MASS INDEX: 29.99 KG/M2 | SYSTOLIC BLOOD PRESSURE: 140 MMHG | DIASTOLIC BLOOD PRESSURE: 60 MMHG | OXYGEN SATURATION: 98 % | HEART RATE: 70 BPM

## 2023-11-13 DIAGNOSIS — Z00.00 MEDICARE ANNUAL WELLNESS VISIT, SUBSEQUENT: Primary | ICD-10-CM

## 2023-11-13 DIAGNOSIS — I48.92 CHRONIC ATRIAL FLUTTER (HCC): ICD-10-CM

## 2023-11-13 DIAGNOSIS — J06.9 UPPER RESPIRATORY TRACT INFECTION, UNSPECIFIED TYPE: ICD-10-CM

## 2023-11-13 PROBLEM — D69.6 PLATELETS DECREASED (HCC): Status: ACTIVE | Noted: 2023-11-13

## 2023-11-13 PROCEDURE — 99213 OFFICE O/P EST LOW 20 MIN: CPT | Performed by: FAMILY MEDICINE

## 2023-11-13 PROCEDURE — G0439 PPPS, SUBSEQ VISIT: HCPCS | Performed by: FAMILY MEDICINE

## 2023-11-13 RX ORDER — AZITHROMYCIN 250 MG/1
TABLET, FILM COATED ORAL
Qty: 6 TABLET | Refills: 0 | Status: SHIPPED | OUTPATIENT
Start: 2023-11-13 | End: 2023-11-18

## 2023-11-13 NOTE — ASSESSMENT & PLAN NOTE
History of atrial fibrillation.   Patient will have INR testing as recommended over the next 2 weeks to manage his warfarin therapy due to being on an antibiotic

## 2023-11-13 NOTE — ASSESSMENT & PLAN NOTE
URI.  Patient given prescription for Zithromax Z-Wilder to take as directed. He may continue with over-the-counter Mucinex.   Patient will call if symptoms persist after medication completed

## 2023-11-13 NOTE — PATIENT INSTRUCTIONS
Medicare Preventive Visit Patient Instructions  Thank you for completing your Welcome to Medicare Visit or Medicare Annual Wellness Visit today. Your next wellness visit will be due in one year (11/13/2024). The screening/preventive services that you may require over the next 5-10 years are detailed below. Some tests may not apply to you based off risk factors and/or age. Screening tests ordered at today's visit but not completed yet may show as past due. Also, please note that scanned in results may not display below. Preventive Screenings:  Service Recommendations Previous Testing/Comments   Colorectal Cancer Screening  Colonoscopy    Fecal Occult Blood Test (FOBT)/Fecal Immunochemical Test (FIT)  Fecal DNA/Cologuard Test  Flexible Sigmoidoscopy Age: 43-73 years old   Colonoscopy: every 10 years (May be performed more frequently if at higher risk)  OR  FOBT/FIT: every 1 year  OR  Cologuard: every 3 years  OR  Sigmoidoscopy: every 5 years  Screening may be recommended earlier than age 39 if at higher risk for colorectal cancer. Also, an individualized decision between you and your healthcare provider will decide whether screening between the ages of 77-80 would be appropriate.  Colonoscopy: 06/21/2019  FOBT/FIT: Not on file  Cologuard: Not on file  Sigmoidoscopy: Not on file          Prostate Cancer Screening Individualized decision between patient and health care provider in men between ages of 53-66   Medicare will cover every 12 months beginning on the day after your 50th birthday PSA: 1.0 ng/mL     Screening Not Indicated     Hepatitis C Screening Once for adults born between 1945 and 1965  More frequently in patients at high risk for Hepatitis C Hep C Antibody: 10/08/2018    Screening Current   Diabetes Screening 1-2 times per year if you're at risk for diabetes or have pre-diabetes Fasting glucose: 186 mg/dL (10/17/2023)  A1C: 6.9 (9/5/2023)  Screening Not Indicated  History Diabetes   Cholesterol Screening Once every 5 years if you don't have a lipid disorder. May order more often based on risk factors. Lipid panel: 05/01/2023  Screening Not Indicated  History Lipid Disorder      Other Preventive Screenings Covered by Medicare:  Abdominal Aortic Aneurysm (AAA) Screening: covered once if your at risk. You're considered to be at risk if you have a family history of AAA or a male between the age of 70-76 who smoking at least 100 cigarettes in your lifetime. Lung Cancer Screening: covers low dose CT scan once per year if you meet all of the following conditions: (1) Age 48-67; (2) No signs or symptoms of lung cancer; (3) Current smoker or have quit smoking within the last 15 years; (4) You have a tobacco smoking history of at least 20 pack years (packs per day x number of years you smoked); (5) You get a written order from a healthcare provider. Glaucoma Screening: covered annually if you're considered high risk: (1) You have diabetes OR (2) Family history of glaucoma OR (3)  aged 48 and older OR (3)  American aged 72 and older  Osteoporosis Screening: covered every 2 years if you meet one of the following conditions: (1) Have a vertebral abnormality; (2) On glucocorticoid therapy for more than 3 months; (3) Have primary hyperparathyroidism; (4) On osteoporosis medications and need to assess response to drug therapy. HIV Screening: covered annually if you're between the age of 14-79. Also covered annually if you are younger than 13 and older than 72 with risk factors for HIV infection. For pregnant patients, it is covered up to 3 times per pregnancy.     Immunizations:  Immunization Recommendations   Influenza Vaccine Annual influenza vaccination during flu season is recommended for all persons aged >= 6 months who do not have contraindications   Pneumococcal Vaccine   * Pneumococcal conjugate vaccine = PCV13 (Prevnar 13), PCV15 (Vaxneuvance), PCV20 (Prevnar 20)  * Pneumococcal polysaccharide vaccine = PPSV23 (Pneumovax) Adults 79-86 yo with certain risk factors or if 69+ yo  If never received any pneumonia vaccine: recommend Prevnar 20 (PCV20)  Give PCV20 if previously received 1 dose of PCV13 or PPSV23   Hepatitis B Vaccine 3 dose series if at intermediate or high risk (ex: diabetes, end stage renal disease, liver disease)   Respiratory syncytial virus (RSV) Vaccine - COVERED BY MEDICARE PART D  * RSVPreF3 (Arexvy) CDC recommends that adults 61years of age and older may receive a single dose of RSV vaccine using shared clinical decision-making (SCDM)   Tetanus (Td) Vaccine - COST NOT COVERED BY MEDICARE PART B Following completion of primary series, a booster dose should be given every 10 years to maintain immunity against tetanus. Td may also be given as tetanus wound prophylaxis. Tdap Vaccine - COST NOT COVERED BY MEDICARE PART B Recommended at least once for all adults. For pregnant patients, recommended with each pregnancy. Shingles Vaccine (Shingrix) - COST NOT COVERED BY MEDICARE PART B  2 shot series recommended in those 19 years and older who have or will have weakened immune systems or those 50 years and older     Health Maintenance Due:      Topic Date Due   • Hepatitis C Screening  Completed   • Colorectal Cancer Screening  Discontinued     Immunizations Due:      Topic Date Due   • COVID-19 Vaccine (4 - Moderna series) 12/25/2021     Advance Directives   What are advance directives? Advance directives are legal documents that state your wishes and plans for medical care. These plans are made ahead of time in case you lose your ability to make decisions for yourself. Advance directives can apply to any medical decision, such as the treatments you want, and if you want to donate organs. What are the types of advance directives? There are many types of advance directives, and each state has rules about how to use them.  You may choose a combination of any of the following:  Living will:  This is a written record of the treatment you want. You can also choose which treatments you do not want, which to limit, and which to stop at a certain time. This includes surgery, medicine, IV fluid, and tube feedings. Durable power of  for [facility]): This is a written record that states who you want to make healthcare choices for you when you are unable to make them for yourself. This person, called a proxy, is usually a family member or a friend. You may choose more than 1 proxy. Do not resuscitate (DNR) order:  A DNR order is used in case your heart stops beating or you stop breathing. It is a request not to have certain forms of treatment, such as CPR. A DNR order may be included in other types of advance directives. Medical directive: This covers the care that you want if you are in a coma, near death, or unable to make decisions for yourself. You can list the treatments you want for each condition. Treatment may include pain medicine, surgery, blood transfusions, dialysis, IV or tube feedings, and a ventilator (breathing machine). Values history: This document has questions about your views, beliefs, and how you feel and think about life. This information can help others choose the care that you would choose. Why are advance directives important? An advance directive helps you control your care. Although spoken wishes may be used, it is better to have your wishes written down. Spoken wishes can be misunderstood, or not followed. Treatments may be given even if you do not want them. An advance directive may make it easier for your family to make difficult choices about your care. Weight Management   Why it is important to manage your weight:  Being overweight increases your risk of health conditions such as heart disease, high blood pressure, type 2 diabetes, and certain types of cancer.  It can also increase your risk for osteoarthritis, sleep apnea, and other respiratory problems. Aim for a slow, steady weight loss. Even a small amount of weight loss can lower your risk of health problems. How to lose weight safely:  A safe and healthy way to lose weight is to eat fewer calories and get regular exercise. You can lose up about 1 pound a week by decreasing the number of calories you eat by 500 calories each day. Healthy meal plan for weight management:  A healthy meal plan includes a variety of foods, contains fewer calories, and helps you stay healthy. A healthy meal plan includes the following:  Eat whole-grain foods more often. A healthy meal plan should contain fiber. Fiber is the part of grains, fruits, and vegetables that is not broken down by your body. Whole-grain foods are healthy and provide extra fiber in your diet. Some examples of whole-grain foods are whole-wheat breads and pastas, oatmeal, brown rice, and bulgur. Eat a variety of vegetables every day. Include dark, leafy greens such as spinach, kale, carlito greens, and mustard greens. Eat yellow and orange vegetables such as carrots, sweet potatoes, and winter squash. Eat a variety of fruits every day. Choose fresh or canned fruit (canned in its own juice or light syrup) instead of juice. Fruit juice has very little or no fiber. Eat low-fat dairy foods. Drink fat-free (skim) milk or 1% milk. Eat fat-free yogurt and low-fat cottage cheese. Try low-fat cheeses such as mozzarella and other reduced-fat cheeses. Choose meat and other protein foods that are low in fat. Choose beans or other legumes such as split peas or lentils. Choose fish, skinless poultry (chicken or turkey), or lean cuts of red meat (beef or pork). Before you cook meat or poultry, cut off any visible fat. Use less fat and oil. Try baking foods instead of frying them. Add less fat, such as margarine, sour cream, regular salad dressing and mayonnaise to foods. Eat fewer high-fat foods.  Some examples of high-fat foods include french fries, doughnuts, ice cream, and cakes. Eat fewer sweets. Limit foods and drinks that are high in sugar. This includes candy, cookies, regular soda, and sweetened drinks. Exercise:  Exercise at least 30 minutes per day on most days of the week. Some examples of exercise include walking, biking, dancing, and swimming. You can also fit in more physical activity by taking the stairs instead of the elevator or parking farther away from stores. Ask your healthcare provider about the best exercise plan for you. © Copyright [employer] 2018 Information is for End User's use only and may not be sold, redistributed or otherwise used for commercial purposes.  All illustrations and images included in CareNotes® are the copyrighted property of [employer] or  [address]
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
## 2023-11-20 DIAGNOSIS — Z95.5 S/P DRUG ELUTING CORONARY STENT PLACEMENT: ICD-10-CM

## 2023-11-20 DIAGNOSIS — I48.92 ATRIAL FLUTTER, UNSPECIFIED TYPE (HCC): ICD-10-CM

## 2023-11-20 RX ORDER — WARFARIN SODIUM 5 MG/1
TABLET ORAL
Qty: 45 TABLET | Refills: 11 | Status: SHIPPED | OUTPATIENT
Start: 2023-11-20

## 2023-11-21 ENCOUNTER — TELEPHONE (OUTPATIENT)
Dept: FAMILY MEDICINE CLINIC | Facility: CLINIC | Age: 78
End: 2023-11-21

## 2023-11-21 NOTE — TELEPHONE ENCOUNTER
Patient called and stated that he was here last week and he is still experiencing a cough and blosked ears,  he is asking if you could send over an antibiotic to clear this up.   Please call to advise

## 2023-11-22 DIAGNOSIS — J06.9 UPPER RESPIRATORY TRACT INFECTION, UNSPECIFIED TYPE: Primary | ICD-10-CM

## 2023-11-22 RX ORDER — BENZONATATE 100 MG/1
100 CAPSULE ORAL 3 TIMES DAILY PRN
Qty: 20 CAPSULE | Refills: 0 | Status: SHIPPED | OUTPATIENT
Start: 2023-11-22

## 2023-11-22 NOTE — TELEPHONE ENCOUNTER
I sent sent to pharmacy for [facility] which she may take up to 3 times a day as needed for coughing however a cough may last 1 to 2 weeks even after antibiotics have been completed. He may continue with Mucinex as he has been taking.

## 2023-11-27 ENCOUNTER — APPOINTMENT (OUTPATIENT)
Dept: LAB | Facility: CLINIC | Age: 78
End: 2023-11-27
Payer: MEDICARE

## 2023-11-28 ENCOUNTER — OFFICE VISIT (OUTPATIENT)
Dept: FAMILY MEDICINE CLINIC | Facility: CLINIC | Age: 78
End: 2023-11-28
Payer: MEDICARE

## 2023-11-28 ENCOUNTER — ANTICOAG VISIT (OUTPATIENT)
Dept: CARDIOLOGY CLINIC | Facility: CLINIC | Age: 78
End: 2023-11-28

## 2023-11-28 VITALS
HEART RATE: 60 BPM | WEIGHT: 217.5 LBS | TEMPERATURE: 97.5 F | HEIGHT: 71 IN | SYSTOLIC BLOOD PRESSURE: 140 MMHG | RESPIRATION RATE: 18 BRPM | BODY MASS INDEX: 30.45 KG/M2 | DIASTOLIC BLOOD PRESSURE: 60 MMHG | OXYGEN SATURATION: 98 %

## 2023-11-28 DIAGNOSIS — R05.9 COUGH, UNSPECIFIED TYPE: Primary | ICD-10-CM

## 2023-11-28 PROCEDURE — 99214 OFFICE O/P EST MOD 30 MIN: CPT | Performed by: FAMILY MEDICINE

## 2023-11-28 RX ORDER — PREDNISONE 20 MG/1
20 TABLET ORAL DAILY
Qty: 20 TABLET | Refills: 0 | Status: SHIPPED | OUTPATIENT
Start: 2023-11-28

## 2023-11-28 RX ORDER — DOXYCYCLINE HYCLATE 100 MG/1
100 CAPSULE ORAL EVERY 12 HOURS SCHEDULED
Qty: 14 CAPSULE | Refills: 0 | Status: SHIPPED | OUTPATIENT
Start: 2023-11-28 | End: 2023-12-05
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
## 2023-11-28 NOTE — ASSESSMENT & PLAN NOTE
Cough. Patient will obtain chest x-ray for further evaluation. He may take Tylenol over-the-counter for back discomfort. He was given prescription for prednisone to take 20 mg once daily for 5 days. He was also given doxycycline 100 mg twice daily to take for 7 days. Patient will continue checking INR blood work for monitoring of Coumadin dosing as per cardiology.   We will make further recommendations pending results of test.

## 2023-11-29 ENCOUNTER — CLINICAL SUPPORT (OUTPATIENT)
Dept: CARDIAC REHAB | Facility: CLINIC | Age: 78
End: 2023-11-29
Payer: MEDICARE

## 2023-11-29 ENCOUNTER — HOSPITAL ENCOUNTER (OUTPATIENT)
Dept: RADIOLOGY | Facility: HOSPITAL | Age: 78
Discharge: HOME | End: 2023-11-29
Payer: MEDICARE

## 2023-11-29 ENCOUNTER — TELEPHONE (OUTPATIENT)
Dept: FAMILY MEDICINE CLINIC | Facility: CLINIC | Age: 78
End: 2023-11-29

## 2023-11-29 DIAGNOSIS — Z95.2 S/P TAVR (TRANSCATHETER AORTIC VALVE REPLACEMENT): ICD-10-CM

## 2023-11-29 DIAGNOSIS — R05.9 COUGH, UNSPECIFIED TYPE: ICD-10-CM

## 2023-11-29 PROCEDURE — 93797 PHYS/QHP OP CAR RHAB WO ECG: CPT

## 2023-11-29 PROCEDURE — 71046 X-RAY EXAM CHEST 2 VIEWS: CPT

## 2023-11-29 NOTE — TELEPHONE ENCOUNTER
----- Message from Philipp Garcia MD sent at [id number] 11:58 AM EST -----  Recent chest x-ray for lungs and ribs was read as normal

## 2023-11-29 NOTE — PROGRESS NOTES
CARDIAC REHAB ASSESSMENT    Today's date: 2023  Patient name: Ciro Leon     : 1945       MRN: [id number]  PCP: Sydnie Aguayo MD  Referring Physician: Christina Serna DO  Cardiologist: Emerson Siegel MD   Surgeon: Christina Serna DO  Dx:   Encounter Diagnosis   Name Primary?     S/P TAVR (transcatheter aortic valve replacement)        Date of onset: 23  Cultural needs: none     Weight    Wt Readings from Last 1 Encounters:   23 98.7 kg (217 lb 8 oz)      Height:   Ht Readings from Last 1 Encounters:   23 5' 11" (1.803 m)     Medical History:   Past Medical History:   Diagnosis Date    Asthma     Atrial flutter (HCC)     s/p Medtronic PPM, Coumadin    BPH (benign prostatic hyperplasia)     CAD (coronary artery disease)     Carotid stenosis     YANDEL occlusion, 61-29% LICA    CHF (congestive heart failure) ([facility])     Chronic pain     no regimen    CKD (chronic kidney disease), stage III ([facility])     baseline Cr 1.0-1.3    COPD (chronic obstructive pulmonary disease) ([facility])     moderate    DM (diabetes mellitus), type 2 (HCC)     non-insulin dependent    Gout     History of CVA (cerebrovascular accident)     w/ residual left-sided weakness, Plavix    HLD (hyperlipidemia)     Hypertension     Macular degeneration     Obesity     PERCY (obstructive sleep apnea)     Severe aortic stenosis     SSS (sick sinus syndrome) ([address])     s/p Medtronic PPM, Coumadin         Physical Limitations: Hx of Lumbar fracture and rib fracture     Fall Risk: Low   Comments: Ambulates with a steady gait with no assist device and Denies a fall in the past 6 months    Anginal Equivalent: None/denies angina   NTG use: No prescription    Risk Factors   Cholesterol: Yes  Smoking: Never used  HTN: Yes  DM: Type 2   average -130 (check 2x/wk)  No insulin  Obesity: Yes   Inactivity: No  Stress: perceived stress: 4/10   Stressors: reports none    Goals for Stress Management:Practice Relaxation Techniques and Exercise    Family History:  Family History   Problem Relation Age of Onset    Mental illness Son     Cancer Mother     Cancer Brother        Allergies: Penicillins  ETOH:   Social History     Substance and Sexual Activity   Alcohol Use Not Currently         Current Medications:   Current Outpatient Medications   Medication Sig Dispense Refill    acetaminophen (TYLENOL) 325 mg tablet Take 2 tablets (650 mg total) by mouth every 6 (six) hours as needed for mild pain  0    aspirin (ECOTRIN LOW STRENGTH) 81 mg EC tablet Take 1 tablet (81 mg total) by mouth daily 30 tablet 10    benzonatate (TESSALON PERLES) 100 mg capsule Take 1 capsule (100 mg total) by mouth 3 (three) times a day as needed for cough 20 capsule 0    clopidogrel (PLAVIX) 75 mg tablet Take 1 tablet (75 mg total) by mouth daily Do not start before August 30, 2023. 30 tablet 11    doxycycline hyclate (VIBRAMYCIN) 100 mg capsule Take 1 capsule (100 mg total) by mouth every 12 (twelve) hours for 7 days 14 capsule 0    FREESTYLE LITE test strip daily      metFORMIN (GLUCOPHAGE) 1000 MG tablet TAKE ONE TABLET BY MOUTH EVERY DAY 90 tablet 1    metoprolol succinate (TOPROL-XL) 25 mg 24 hr tablet TAKE ONE TABLET BY MOUTH AT BEDTIME 30 tablet 3    nitroglycerin (NITROSTAT) 0.4 mg SL tablet Place 1 tablet (0.4 mg total) under the tongue every 5 (five) minutes as needed for chest pain 30 tablet 1    potassium chloride (K-DUR,KLOR-CON) 10 mEq tablet Take 1 tablet (10 mEq total) by mouth once for 1 dose Take one tablet once daily x 5 days then stop (Patient not taking: Reported on 11/10/2023) 1 tablet 0    predniSONE 20 mg tablet One po q day with food (Patient not taking: Reported on 10/25/2023) 5 tablet 0    predniSONE 20 mg tablet Take 1 tablet (20 mg total) by mouth daily 20 tablet 0    rosuvastatin (CRESTOR) 5 mg tablet TAKE ONE TABLET BY MOUTH EVERY DAY 90 tablet 1    torsemide (DEMADEX) 10 mg tablet Take 1 tablet (10 mg total) by mouth daily Take one tablet once daily x 5 days then stop (Patient not taking: Reported on 9/27/2023) 5 tablet 0    warfarin (Coumadin) 5 mg tablet TAKE 1 TO 1 1/2 TABS BY MOUTH DAILY OR AS DIRECTED BY PHYSICIAN 45 tablet 11     No current facility-administered medications for this visit. Functional Status Prior to Diagnosis for Treatment   Occupation: retired  Recreation: yard work   ADL’s: Capable of performing light to moderate ADLs limited by Weakness  [place]: able to perform self-care   Exercise: none   Other: none     Current Functional Status  Occupation: retired  Recreation: yard work - 3 acres   ADL’s: Capable of performing light to moderate ADLs limited by Weakness  [place]: able to perform self-care  Exercise: none   Other: reports no change since surgery     Patient Specific Goals: improve strength/stamina     Short Term Program Goals: dietary modifications increased strength improved energy/stamina with ADLs exercise 120-150 mins/wk    Long Term Goals: intial claudication time extended  increased maximal walking duration  increased intial training workload  Improved [facility] Activity Status score  Improved functional capacity based on initial fitness assessment  improved exercise tolerance  Improved Quality of Life - [facility] score reduced  Improved lipid profile  Abstain from smoking  Reduced stress    Ability to reach goals/rehabilitation potential:  Excellent    Projected return to function: 12 weeks  Objective tests: 6 MWT      Nutritional   Reviewed details of Rate your Plate. Discussed key elements of heart healthy eating. Reviewed patient goals for dietary modifications and their clinical implications. Reviewed most recent lipid profile.      Goals for dietary modification based on Rate Your Plate Dietary Assessment:  choose lean cuts of meat  poultry without the skin  low fat ground meat and poultry  eliminate processed meats  reduce portions of meat to 3 oz  increase fish intake  more meatless meals  low fat dairy   reduced fat cheese  increase whole grains  increase fruits and vegetables  eliminate butter  low sodium  improved snack choices  more nuts/seeds  reduce sweets/frozen desserts  heathier choices while dining out      Emotional/Social  Patient reports they are coping well with good social support and denies depression or anxiety    Marital status:     Domestic Violence Screening: No    Comments: CHARMAINE x2 to pRCA (80% stenosis) 8/28/23, S/P TAVR on 9/21/23.  EF = 55%    Hx of pacer implant 8/19/22 for SSS, stroke (2017),

## 2023-11-29 NOTE — PROGRESS NOTES
Cardiac Rehabilitation Plan of Care   Initial Care Plan          Today's date: 2023   # of Exercise Sessions Completed: 1 - Eval   Patient name: Denzel Luna      : 1945  Age: 66 y.o. MRN: [id number]  Referring Physician: Maria Luz Reza DO  Cardiologist: Christine Vuong MD   Provider: Nicholas  Clinician: Hannah Hyman MS, CEP    Dx:   Encounter Diagnosis   Name Primary? S/P TAVR (transcatheter aortic valve replacement)      Date of onset: 2023      SUMMARY OF PROGRESS:  Today is Isauro's initial evaluation to begin Cardiac Rehab post TAVR. Estephanie Falk was being followed by his primary cardiologist for AS which became severe this year. He reported some SOB in office however denied this today stating no change. Prior to his AV placement, Isauro had Cath showing 80% stenosis at [facility] which was stented. Estephanie Falk has Hx of pacer implant (22), Atrial flutter/Afib, CHF, COPD, T2D - no insulin, Stroke (), HTN, HDL, and COPD. The patient does not currently follow a formal exercise program at home. He has resumed light to moderate ADLs reporting weakness and fatigue in his legs. Depression screening using the PHQ-9 interprets the patient's score of  1  as  1-4 = Minimal Depression and anxiety screening using the GLADYS-7 interprets the patient's score of 2 as 0-4  = Not anxious. When addressed, the patient denies having depression/anxiety. They report minimal stressors in his life including family matters. Stress management techniques will be reviewed with patient to limit risk factor. Patient reports excellent social/emotional support from his family. Information to utilize [employer] was provided as well as contact information for counseling through [employer]. The patient is a non-smoker. Patient admits to 100% medication compliance. They reports the following physical limitations: unsteady gait (uses cane occasionally), fractures to lower back vertebrae.  The patient completed an initial 6MWT. They walked 750 feet/ 2.09 METs. Resting /70 with Normal hemodynamic response to exercise reaching 152/64. BP will be monitored throughout program and cardiologist will be notified of abnormal changes. The patient had fatigue and back pain symptoms during exercise. Telemetry during testing revealed BiV pacing and occ PVCs. Dustin Herrera was counseled on exercise guidelines to achieve a minimum of 150 mins/wk of moderate intensity (RPE 4-6) exercise and encouraged to add 1-2 days of exercise on opposite days of cardiac rehab as tolerated. We discussed current dietary habits and goals of heart healthy eating for lipid management, diabetes management, and weight loss. The patient has T2D, Patient reported fasting -130, no insulin. Patient's personal goals include: improve his strength/stamina. The patient's CAD risk factors include: inactivity, stress, obesity/overweight, hypertension, hyperlipidemia, and diabetes. His education will focus on lifestyle modification/education specific to His needs. Patient will attend group education classes on heart healthy eating, reading food labels, stress management, risk factor reduction, understanding heart disease and common heart medications. Dustin Herrera will attend 35 monitored exercise sessions, 3x/wk for 12-18 weeks beginning 12/5/2023.        Medication compliance: Yes   Comments: Pt reports to be compliant with medications  Fall Risk: Low   Comments: Ambulates with a steady gait with no assist device and Denies a fall in the past 6 months    EKG Interpretation: BiV pacing, occ PVCs, Hx of Diogenes, SSS, NSVT, Afib/flutter       EXERCISE ASSESSMENT and PLAN    Exercise Prescription:      Frequency: 3 days/week   Supplement with home exercise 2+ days/wk as tolerated       Minutes: 30-40         METS: 2-3            HR: resting + 30    RPE: 4-6         Modalities: UBE, NuStep, and Recumbent bike      30 Day Goals for Exercise Progression: Frequency: 3 days/week of cardiac rehab       Supplement with home exercise 2+ days/wk as tolerated    Minutes: 35-40                              >150 mins/wk of moderate intensity exercise   METS: 2-3   HR: resting + 30     RPE: 4-6   Modalities: Treadmill, UBE, NuStep, and Recumbent bike    Strength trainin-3 days / week  12-15 repetitions  1-2 sets per modality   Will be added following 2-3 weeks of monitored exercise sessions   Modalities: Lateral Raise, Arm Curl, Sit to Stands, Upright Rows, and Front Raises    Home Exercise: none    Goals: 10% improvement in functional capacity - based on max METs achieved in fitness assessment, Reduced dyspnea with physical activity  0-1/10, improved DASI score by 10%, Exercise 5 days/wk, >150mins/wk of moderate intensity exercise, Improved 6MWT distance by 10%, Attend Rehab regularly, and Start a walking program    Progression Toward Goals:  Reviewed Pt goals and determined plan of care, Patient will continue ADLs at home and attend rehab regularly in the next 30 days, Will continue to educate and progress as tolerated.     Education: benefit of exercise for CAD risk factors, home exercise guidelines, AHA guidelines to achieve >150 mins/wk of moderate exercise, and RPE scale   Plan:education on home exercise guidelines, home exercise 30+ mins 2 days opposite CR, and Education class: Risk Factors for Heart Disease  Readiness to change: Preparation:  (Getting ready to change)       NUTRITION ASSESSMENT AND PLAN    Weight control:    Starting weight: 218.2 lbs    Current weight:         Diabetes: T2D, no insulin  A1c: 6.9    last measured: 23    Lipid management: Discussed diet and lipid management and Last lipid profile 23  Chol 135  TRG 90  HDL 52  LDL 65    Goals:decreased body fat% <25%   (M), Improved Rate Your Plate score  >84, eat 6 or more servings of grain products per day, eat whole grain breads, brown rice and whole grain cereals, eat 5 or more servings of fruits and vegetables a day, dine out less than once per week or choose low fat restaurant meals, choose lean beef or rarely eat beef, rarely eat processed meats, eat poultry without the skin, avoid fried chicken and/or fried fish and shellfish, eat meatless meals twice a week or more\, choose 1% or skim milk, rarely eat cheese or choose reduced fat or skim, rarely eat frozen desserts or choose fruit or fat-free sweets, never add salt to food when cooking or at the table, choose low sodium canned, frozen/packaged foods or rarely/never eat, choose low sugar desserts and sweets, Do not cook with oil, butter or margarine, Do not eat fried foods, use "light" tub margarine on bread, potatoes and vegetables, choose light or fat-free salad dressings or santos, choose healthy snacks such as fruit, pretzels, and low fat crackers, choose healthy desserts such as  fat-free sweets or fruit, do not use added salts,  choose low sodium canned, frozen, packaged foods, and choose low sugar desserts and sweets    Measurable goals were based Rate Your Plate Dietary Self-Assessment. These are the areas in which the patient could score higher on the assessment. Goals include recommendations for a heart healthy diet based on American Heart Association. Progression Toward Goals: Reviewed Pt goals and determined plan of care, Patient will continue to make dietary changes for heart health such as increasing food intake and decreasing added sugar/salt/fat in the next 30 days, Will continue to educate and progress as tolerated.     Education: heart healthy eating  low sodium diet  hydration  nutrition for Improved BG control  exercise and diabetes management   wt. loss   healthy choices while dining out  portion control  Plan: Education class: Reading Food Labels, Education Class: Heart Healthy Eating, increase intake of whole grains, replace refined flours with whole grains, increase daily intake of fruits and vegetables, limit meat intake to less than 6oz per day, choose healthy meals while dining out, reduce intake and /or  rarely eat processed meats , eat poultry without the skin, eat more meatless meals, drink/use 1%  low fat or skim  milk, eat reduced-fat or part-skim cheese or rarely eat, rarely eat frozen desserts, cook without fats or oils, never/rarely eat fried foods, use "light" tub margarine, use light or fat-free salad dressings and mayonnaise, eat healthy snacks like fruit, pretzels, and low fat crackers, choose desserts such as fruit, aaron food cake, low-fat or fat-free sweets, never/rarely add salt to food when cooking or at the table, choose low sodium canned, frozen, packaged foods or rarely eat these foods, and drink no more than 1-2 alcoholic drinks in a day  Readiness to change: Preparation:  (Getting ready to change)       PSYCHOSOCIAL ASSESSMENT AND PLAN    Emotional:  Depression assessment:  PHQ-9 = 1  1-4 = Minimal Depression            Anxiety measure:  GLADYS-7 = 2  0-4  = Not anxious  Self-reported stress level:  4  Social support: Excellent    Goals:  Reduce perceived stress to 1-3/10, Physical Fitness in [facility] Score < 3, Daily Activity in [facility] Score < 3, Pain in [facility] Score < 3, Overall Health in [facility] Score < 3, Change in Health in [place] Score < 3 , improved positive thoughts of well being, and increased energy    Progression Toward Goals: Reviewed Pt goals and determined plan of care, Patient will maintain emotional health and work on stress management in the next 30 days, Will continue to educate and progress as tolerated.     Education: signs/sxs of depression, benefits of a positive support system, stress management techniques, benefits of enrolling in [email or web address], and depression and CAD  Plan: Class: Stress and Your Health, Class: Relaxation, Practice relaxation techniques, and Exercise  Readiness to change: Preparation:  (Getting ready to change)       OTHER CORE COMPONENTS Tobacco:   Social History     Tobacco Use   Smoking Status Never   Smokeless Tobacco Never       Tobacco Use Intervention:   N/A:  Patient is a non-smoker     Anginal Symptoms:  None   NTG use: No prescription, Understands proper use, and Reviewed Proper use    Blood pressure:    Restin/70   Exercise: 152/64    Goals: consistent BP < 130/80, reduced dietary sodium <2300mg, moderate intensity exercise >150 mins/wk, and medication compliance    Progression Toward Goals: Reviewed Pt goals and determined plan of care, Patient will monitor weight at home for weight gain and monitor BP in the next 30 days, Will continue to educate and progress as tolerated.     Education:  understanding high blood pressure and it's relationship to CAD, low sodium diet and HTN, and proper use of sublingual NTG  Plan: Class: Understanding Heart Disease, Class: Common Heart Medications, medication compliance, use salt substitutes, and monitor home BP  Readiness to change: Preparation:  (Getting ready to change)

## 2023-12-05 ENCOUNTER — CLINICAL SUPPORT (OUTPATIENT)
Dept: CARDIAC REHAB | Facility: CLINIC | Age: 78
End: 2023-12-05
Payer: MEDICARE

## 2023-12-05 DIAGNOSIS — Z95.2 S/P TAVR (TRANSCATHETER AORTIC VALVE REPLACEMENT): Primary | ICD-10-CM

## 2023-12-05 PROCEDURE — 93798 PHYS/QHP OP CAR RHAB W/ECG: CPT

## 2023-12-06 ENCOUNTER — TELEPHONE (OUTPATIENT)
Dept: FAMILY MEDICINE CLINIC | Facility: CLINIC | Age: 78
End: 2023-12-06

## 2023-12-06 ENCOUNTER — CLINICAL SUPPORT (OUTPATIENT)
Dept: CARDIAC REHAB | Facility: CLINIC | Age: 78
End: 2023-12-06
Payer: MEDICARE

## 2023-12-06 DIAGNOSIS — Z95.2 S/P TAVR (TRANSCATHETER AORTIC VALVE REPLACEMENT): Primary | ICD-10-CM

## 2023-12-06 PROCEDURE — 93798 PHYS/QHP OP CAR RHAB W/ECG: CPT

## 2023-12-06 NOTE — TELEPHONE ENCOUNTER
Patient's son called to say Isauro's back pain is getting progressively worse and he is having trouble walking. He was recently in for office visit and wondering if you want to see him again or what his next steps should be. Please advise.

## 2023-12-07 ENCOUNTER — CLINICAL SUPPORT (OUTPATIENT)
Dept: CARDIAC REHAB | Facility: CLINIC | Age: 78
End: 2023-12-07
Payer: MEDICARE

## 2023-12-07 ENCOUNTER — OFFICE VISIT (OUTPATIENT)
Dept: FAMILY MEDICINE CLINIC | Facility: CLINIC | Age: 78
End: 2023-12-07
Payer: MEDICARE

## 2023-12-07 ENCOUNTER — APPOINTMENT (OUTPATIENT)
Dept: CARDIAC REHAB | Facility: CLINIC | Age: 78
End: 2023-12-07
Payer: MEDICARE

## 2023-12-07 VITALS
DIASTOLIC BLOOD PRESSURE: 80 MMHG | TEMPERATURE: 97.6 F | OXYGEN SATURATION: 95 % | HEART RATE: 60 BPM | HEIGHT: 71 IN | BODY MASS INDEX: 30.41 KG/M2 | SYSTOLIC BLOOD PRESSURE: 116 MMHG | WEIGHT: 217.25 LBS | RESPIRATION RATE: 18 BRPM

## 2023-12-07 DIAGNOSIS — M54.50 CHRONIC MIDLINE LOW BACK PAIN WITHOUT SCIATICA: Primary | ICD-10-CM

## 2023-12-07 DIAGNOSIS — Z95.2 S/P TAVR (TRANSCATHETER AORTIC VALVE REPLACEMENT): Primary | ICD-10-CM

## 2023-12-07 DIAGNOSIS — G89.29 CHRONIC MIDLINE LOW BACK PAIN WITHOUT SCIATICA: Primary | ICD-10-CM

## 2023-12-07 PROCEDURE — 99213 OFFICE O/P EST LOW 20 MIN: CPT | Performed by: FAMILY MEDICINE

## 2023-12-07 PROCEDURE — 93798 PHYS/QHP OP CAR RHAB W/ECG: CPT

## 2023-12-07 RX ORDER — LIDOCAINE 50 MG/G
1 PATCH TOPICAL DAILY
Qty: 15 PATCH | Refills: 1 | Status: SHIPPED | OUTPATIENT
Start: 2023-12-07

## 2023-12-07 RX ORDER — METHOCARBAMOL 500 MG/1
500 TABLET, FILM COATED ORAL 3 TIMES DAILY PRN
Qty: 30 TABLET | Refills: 0 | Status: SHIPPED | OUTPATIENT
Start: 2023-12-07
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
## 2023-12-07 NOTE — ASSESSMENT & PLAN NOTE
Back pain. Patient given recommendations to take methocarbamol 500 mg 3 times daily as well as Lido Derm patch 12 hours/day. He may also use over-the-counter Tylenol.   If patient's persist without improvement over the first week he will call the office and we will consider referral to physical therapy or comprehensive spine program.

## 2023-12-08 ENCOUNTER — APPOINTMENT (OUTPATIENT)
Dept: CARDIAC REHAB | Facility: CLINIC | Age: 78
End: 2023-12-08
Payer: MEDICARE

## 2023-12-11 ENCOUNTER — CLINICAL SUPPORT (OUTPATIENT)
Dept: CARDIAC REHAB | Facility: CLINIC | Age: 78
End: 2023-12-11
Payer: MEDICARE

## 2023-12-11 DIAGNOSIS — Z95.2 S/P TAVR (TRANSCATHETER AORTIC VALVE REPLACEMENT): Primary | ICD-10-CM

## 2023-12-11 PROCEDURE — 93798 PHYS/QHP OP CAR RHAB W/ECG: CPT

## 2023-12-12 ENCOUNTER — APPOINTMENT (OUTPATIENT)
Dept: CARDIAC REHAB | Facility: CLINIC | Age: 78
End: 2023-12-12
Payer: MEDICARE

## 2023-12-13 ENCOUNTER — HOSPITAL ENCOUNTER (EMERGENCY)
Facility: HOSPITAL | Age: 78
Discharge: HOME | End: 2023-12-13
Attending: EMERGENCY MEDICINE
Payer: MEDICARE

## 2023-12-13 ENCOUNTER — APPOINTMENT (EMERGENCY)
Dept: CT IMAGING | Facility: HOSPITAL | Age: 78
End: 2023-12-13
Payer: MEDICARE

## 2023-12-13 ENCOUNTER — CLINICAL SUPPORT (OUTPATIENT)
Dept: CARDIAC REHAB | Facility: CLINIC | Age: 78
End: 2023-12-13
Payer: MEDICARE

## 2023-12-13 ENCOUNTER — TELEPHONE (OUTPATIENT)
Dept: FAMILY MEDICINE CLINIC | Facility: CLINIC | Age: 78
End: 2023-12-13

## 2023-12-13 VITALS
SYSTOLIC BLOOD PRESSURE: 150 MMHG | HEART RATE: 61 BPM | OXYGEN SATURATION: 96 % | DIASTOLIC BLOOD PRESSURE: 69 MMHG | RESPIRATION RATE: 18 BRPM | TEMPERATURE: 97.8 F

## 2023-12-13 DIAGNOSIS — M54.50 CHRONIC MIDLINE LOW BACK PAIN WITHOUT SCIATICA: Primary | ICD-10-CM

## 2023-12-13 DIAGNOSIS — M54.50 ACUTE EXACERBATION OF CHRONIC LOW BACK PAIN: Primary | ICD-10-CM

## 2023-12-13 DIAGNOSIS — G89.29 CHRONIC MIDLINE LOW BACK PAIN WITHOUT SCIATICA: Primary | ICD-10-CM

## 2023-12-13 DIAGNOSIS — Z95.2 S/P TAVR (TRANSCATHETER AORTIC VALVE REPLACEMENT): Primary | ICD-10-CM

## 2023-12-13 DIAGNOSIS — G89.29 ACUTE EXACERBATION OF CHRONIC LOW BACK PAIN: Primary | ICD-10-CM

## 2023-12-13 LAB
BILIRUB UR QL STRIP: NEGATIVE
CLARITY UR: CLEAR
COLOR UR: YELLOW
GLUCOSE UR STRIP-MCNC: ABNORMAL MG/DL
HGB UR QL STRIP.AUTO: NEGATIVE
KETONES UR STRIP-MCNC: NEGATIVE MG/DL
LEUKOCYTE ESTERASE UR QL STRIP: NEGATIVE
NITRITE UR QL STRIP: NEGATIVE
PH UR STRIP.AUTO: 5 [PH]
PROT UR STRIP-MCNC: NEGATIVE MG/DL
SP GR UR STRIP.AUTO: 1.03 (ref 1–1.03)
UROBILINOGEN UR STRIP-ACNC: <2 MG/DL

## 2023-12-13 PROCEDURE — 99284 EMERGENCY DEPT VISIT MOD MDM: CPT

## 2023-12-13 PROCEDURE — 74176 CT ABD & PELVIS W/O CONTRAST: CPT

## 2023-12-13 PROCEDURE — G1004 CDSM NDSC: HCPCS

## 2023-12-13 PROCEDURE — 93798 PHYS/QHP OP CAR RHAB W/ECG: CPT

## 2023-12-13 RX ORDER — NAPROXEN 250 MG/1
500 TABLET ORAL ONCE
Status: COMPLETED | OUTPATIENT
Start: 2023-12-13 | End: 2023-12-13

## 2023-12-13 RX ORDER — PREDNISONE 20 MG/1
20 TABLET ORAL DAILY
Qty: 5 TABLET | Refills: 0 | Status: SHIPPED | OUTPATIENT
Start: 2023-12-13 | End: 2023-12-18

## 2023-12-13 RX ADMIN — NAPROXEN 500 MG: 250 TABLET ORAL at 11:32

## 2023-12-13 NOTE — TELEPHONE ENCOUNTER
Patient should have repeat x-ray of lumbar spine to compare to last year's films. He can go to any [facility]'s facility since orders are in [facility]. We would call with results.   If pain is significantly worse he may certainly go back to emergency room for evaluation

## 2023-12-13 NOTE — TELEPHONE ENCOUNTER
It would depend on results of x-ray as to who we would recommend.   We will call with x-ray results and recommendation after we receive results on epic

## 2023-12-13 NOTE — TELEPHONE ENCOUNTER
Received VM from pt's son:       Good morning. This is Dirk Aguayo calling for my father Yuriy Riggins. Birth date April 27th, 1945. Doctor Carolina Nowak saw him last week for back pain. His pain has not subsided. In fact, some days it's worse than others. If he could, please give us instructions for the next step to take to alleviate his back pain. My number is [phone]. Thank you.

## 2023-12-13 NOTE — TELEPHONE ENCOUNTER
Spoke with Jak Ulloa, they will proceed with X-ray and possibly ED evaluation. He was looking for back specialists and wondering if you had any recommendations or would that be pending results of Xray? Please advise.

## 2023-12-14 ENCOUNTER — CLINICAL SUPPORT (OUTPATIENT)
Dept: CARDIAC REHAB | Facility: CLINIC | Age: 78
End: 2023-12-14
Payer: MEDICARE

## 2023-12-14 ENCOUNTER — APPOINTMENT (OUTPATIENT)
Dept: CARDIAC REHAB | Facility: CLINIC | Age: 78
End: 2023-12-14
Payer: MEDICARE

## 2023-12-14 ENCOUNTER — VBI (OUTPATIENT)
Dept: FAMILY MEDICINE CLINIC | Facility: CLINIC | Age: 78
End: 2023-12-14

## 2023-12-14 DIAGNOSIS — Z95.2 S/P TAVR (TRANSCATHETER AORTIC VALVE REPLACEMENT): Primary | ICD-10-CM

## 2023-12-14 PROCEDURE — 93798 PHYS/QHP OP CAR RHAB W/ECG: CPT

## 2023-12-14 NOTE — TELEPHONE ENCOUNTER
12/14/23 12:34 PM    Patient contacted post ED visit, first outreach attempt made. Message was left for patient to return a call to the [facility] Department at [facility]: Phone [phone]. Thank you.   Jalen Done  [facility]

## 2023-12-14 NOTE — TELEPHONE ENCOUNTER
12/14/23 12:38 PM    Patient contacted post ED visit, [facility] department spoke with patient/caregiver and outreach was successful. Thank you.   Shaista Campbell  [facility]
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
## 2023-12-15 ENCOUNTER — APPOINTMENT (OUTPATIENT)
Dept: CARDIAC REHAB | Facility: CLINIC | Age: 78
End: 2023-12-15
Payer: MEDICARE

## 2023-12-15 DIAGNOSIS — E11.49 TYPE 2 DIABETES MELLITUS WITH OTHER NEUROLOGIC COMPLICATION, WITHOUT LONG-TERM CURRENT USE OF INSULIN (HCC): ICD-10-CM

## 2023-12-18 ENCOUNTER — APPOINTMENT (OUTPATIENT)
Dept: CARDIAC REHAB | Facility: CLINIC | Age: 78
End: 2023-12-18
Payer: MEDICARE

## 2023-12-18 ENCOUNTER — CLINICAL SUPPORT (OUTPATIENT)
Dept: CARDIAC REHAB | Facility: CLINIC | Age: 78
End: 2023-12-18
Payer: MEDICARE

## 2023-12-18 DIAGNOSIS — Z95.2 S/P TAVR (TRANSCATHETER AORTIC VALVE REPLACEMENT): Primary | ICD-10-CM

## 2023-12-18 PROCEDURE — 93798 PHYS/QHP OP CAR RHAB W/ECG: CPT

## 2023-12-19 ENCOUNTER — APPOINTMENT (OUTPATIENT)
Dept: CARDIAC REHAB | Facility: CLINIC | Age: 78
End: 2023-12-19
Payer: MEDICARE

## 2023-12-20 ENCOUNTER — APPOINTMENT (OUTPATIENT)
Dept: CARDIAC REHAB | Facility: CLINIC | Age: 78
End: 2023-12-20
Payer: MEDICARE

## 2023-12-20 ENCOUNTER — CLINICAL SUPPORT (OUTPATIENT)
Dept: CARDIAC REHAB | Facility: CLINIC | Age: 78
End: 2023-12-20
Payer: MEDICARE

## 2023-12-20 DIAGNOSIS — Z95.2 S/P TAVR (TRANSCATHETER AORTIC VALVE REPLACEMENT): Primary | ICD-10-CM

## 2023-12-20 PROCEDURE — 93798 PHYS/QHP OP CAR RHAB W/ECG: CPT

## 2023-12-21 ENCOUNTER — CLINICAL SUPPORT (OUTPATIENT)
Dept: CARDIAC REHAB | Facility: CLINIC | Age: 78
End: 2023-12-21
Payer: MEDICARE

## 2023-12-21 ENCOUNTER — APPOINTMENT (OUTPATIENT)
Dept: CARDIAC REHAB | Facility: CLINIC | Age: 78
End: 2023-12-21
Payer: MEDICARE

## 2023-12-21 DIAGNOSIS — Z95.2 S/P TAVR (TRANSCATHETER AORTIC VALVE REPLACEMENT): Primary | ICD-10-CM

## 2023-12-21 PROCEDURE — 93798 PHYS/QHP OP CAR RHAB W/ECG: CPT

## 2023-12-22 ENCOUNTER — APPOINTMENT (OUTPATIENT)
Dept: CARDIAC REHAB | Facility: CLINIC | Age: 78
End: 2023-12-22
Payer: MEDICARE

## 2023-12-22 DIAGNOSIS — E11.49 TYPE 2 DIABETES MELLITUS WITH OTHER NEUROLOGIC COMPLICATION, WITHOUT LONG-TERM CURRENT USE OF INSULIN (HCC): ICD-10-CM

## 2023-12-26 ENCOUNTER — APPOINTMENT (OUTPATIENT)
Dept: CARDIAC REHAB | Facility: CLINIC | Age: 78
End: 2023-12-26
Payer: MEDICARE

## 2023-12-27 ENCOUNTER — APPOINTMENT (OUTPATIENT)
Dept: LAB | Facility: CLINIC | Age: 78
End: 2023-12-27
Payer: MEDICARE

## 2023-12-27 ENCOUNTER — APPOINTMENT (OUTPATIENT)
Dept: CARDIAC REHAB | Facility: CLINIC | Age: 78
End: 2023-12-27
Payer: MEDICARE

## 2023-12-27 ENCOUNTER — ANTICOAG VISIT (OUTPATIENT)
Dept: CARDIOLOGY CLINIC | Facility: CLINIC | Age: 78
End: 2023-12-27

## 2023-12-27 ENCOUNTER — CLINICAL SUPPORT (OUTPATIENT)
Dept: CARDIAC REHAB | Facility: CLINIC | Age: 78
End: 2023-12-27
Payer: MEDICARE

## 2023-12-27 DIAGNOSIS — Z95.2 S/P TAVR (TRANSCATHETER AORTIC VALVE REPLACEMENT): Primary | ICD-10-CM

## 2023-12-27 PROCEDURE — 93798 PHYS/QHP OP CAR RHAB W/ECG: CPT

## 2023-12-28 ENCOUNTER — APPOINTMENT (OUTPATIENT)
Dept: CARDIAC REHAB | Facility: CLINIC | Age: 78
End: 2023-12-28
Payer: MEDICARE

## 2023-12-28 NOTE — PROGRESS NOTES
Cardiac Rehabilitation Plan of Care   30 Day Reassessment          Today's date: 2023   # of Exercise Sessions Completed: 11  Patient name: Isauro Sow      : 1945  Age: 78 y.o.       MRN: [id number]  Referring Physician: Ac Sharma DO  Cardiologist: Johnson Browning MD   Provider: Javi  Clinician: Starla Pryor MS, CEP    Dx:   Encounter Diagnosis   Name Primary?    S/P TAVR (transcatheter aortic valve replacement) Yes     Date of onset: 2023      SUMMARY OF PROGRESS:  Isauro is compliant attending cardiac rehab exercise sessions 3x/wk. He attends S/P TAVR. Isauro was being followed by his primary cardiologist for AS which became severe this year. He reported some SOB in office however denied this today stating no change. Prior to his AV placement, Isauro had Cath showing 80% stenosis at pRCA which was stented. Isauro has Hx of pacer implant (22), Atrial flutter/A/fib, CHF, COPD, T2D - no insulin, Stroke (2017), HTN, HDL, and COPD.  He tolerates 38 mins at 1.9 - 2.93 METs. A light strength training component will be added in a future exercise session. He is tolerating progression of intensity levels to maintain RPE 4-6. Resting BP  102/60 - 128/60 with Normal response to exercise reaching 118/60- 132/64. BiV pacing on tele with no ectopy observed.  RHR 60 - 74  with  no  response to exercise reaching 64 - 74. It is unknown at this time if he has attended home exercise. No cardiac complaints. He is not progressing toward wt loss goals with a gain of 4 pounds. Patient has been working on dietary modifications with the goal of rare red/processed meats, low fat dairy, reduced added sugars and refined flours. An update on his current eating habits is unknown due to his absence today. The patient has T2D reporting an avg. FBG of 120-130. The patient is a non-smoker. PHQ-9 and GLADYS-7 were not reassessed due to his absence today. Most recent assessment found PHQ-9 at a 1  suggesting 1-4 = Minimal Depression and GLADYS-7 at a 2 suggesting 0-4  = Not anxious.  Patient reports good  social/emotional support from his son and family. They were encouraged to discuss options for medical therapy and counseling with their PCP. Isauro rates their stress as 3/10. Stress management techniques were reinforced.  Patient attends group educational classes on cardiac risk factor modification. His exercise program will be progressed as tolerated to maintain RPE 4-6. The patient has the following personal goals he hopes to achieved by discharge: improve his strength/stamina. They will continue to be educated on lifestyle modification and encouraged to supplement with a home exercise program as tolerated to reach the following goals in the next 30 days: continue to improve strength/endurance, increase heart healthy eating, maintain good BP, med compliance, work on stress mangement.    Medication compliance: Yes   Comments: Pt reports to be compliant with medications  Fall Risk: Low   Comments: Ambulates with a steady gait with no assist device and Denies a fall in the past 6 months    EKG Interpretation: BiV pacing, occ PVCs, Hx of Diogenes, SSS, NSVT, Afib/flutter       EXERCISE ASSESSMENT and PLAN    Exercise Prescription:      Frequency: 3 days/week   Supplement with home exercise 2+ days/wk as tolerated       Minutes: 30-40         METS: 1.97 - 2.93            HR: 64-74   RPE: 4-6         Modalities: UBE, NuStep, and Recumbent bike      30 Day Goals for Exercise Progression:    Frequency: 3 days/week of cardiac rehab       Supplement with home exercise 2+ days/wk as tolerated    Minutes: 35-40                              >150 mins/wk of moderate intensity exercise   METS: 2.5 - 3.5    HR: resting + 30     RPE: 4-6   Modalities: Treadmill, UBE, NuStep, and Recumbent bike    Strength trainin-3 days / week  12-15 repetitions  1-2 sets per modality   Will be added following 2-3 weeks of monitored  exercise sessions   Modalities: Lateral Raise, Arm Curl, Sit to Stands, Upright Rows, and Front Raises    Home Exercise: none    Goals: 10% improvement in functional capacity - based on max METs achieved in fitness assessment, Reduced dyspnea with physical activity  0-1/10, improved DASI score by 10%, Exercise 5 days/wk, >150mins/wk of moderate intensity exercise, Improved 6MWT distance by 10%, Attend Rehab regularly, and Start a walking program    Progression Toward Goals:  Pt is progressing and showing improvement  toward the following goals:  increased exercise tolerance and improved strength/stamina.  , Patient will continue ADLs at home and attend rehab regularly in the next 30 days, Will continue to educate and progress as tolerated.    Education: benefit of exercise for CAD risk factors, home exercise guidelines, AHA guidelines to achieve >150 mins/wk of moderate exercise, and RPE scale   Plan:education on home exercise guidelines, home exercise 30+ mins 2 days opposite CR, and Education class: Risk Factors for Heart Disease  Readiness to change: Action:  (Changing behavior)      NUTRITION ASSESSMENT AND PLAN    Weight control:    Starting weight: 218.2 lbs    Current weight:   222.8 lbs       Diabetes: T2D, no insulin  A1c: 6.9    last measured: 9/5/23    Lipid management: Discussed diet and lipid management and Last lipid profile 5/1/23  Chol 135  TRG 90  HDL 52  LDL 65    Goals:decreased body fat% <25%   (M), Improved Rate Your Plate score  >64, eat 6 or more servings of grain products per day, eat whole grain breads, brown rice and whole grain cereals, eat 5 or more servings of fruits and vegetables a day, dine out less than once per week or choose low fat restaurant meals, choose lean beef or rarely eat beef, rarely eat processed meats, eat poultry without the skin, avoid fried chicken and/or fried fish and shellfish, eat meatless meals twice a week or more\, choose 1% or skim milk, rarely eat cheese or  "choose reduced fat or skim, rarely eat frozen desserts or choose fruit or fat-free sweets, never add salt to food when cooking or at the table, choose low sodium canned, frozen/packaged foods or rarely/never eat, choose low sugar desserts and sweets, Do not cook with oil, butter or margarine, Do not eat fried foods, use \"light\" tub margarine on bread, potatoes and vegetables, choose light or fat-free salad dressings or santos, choose healthy snacks such as fruit, pretzels, and low fat crackers, choose healthy desserts such as  fat-free sweets or fruit, do not use added salts,  choose low sodium canned, frozen, packaged foods, and choose low sugar desserts and sweets    Measurable goals were based Rate Your Plate Dietary Self-Assessment. These are the areas in which the patient could score higher on the assessment.  Goals include recommendations for a heart healthy diet based on American Heart Association.    Progression Toward Goals: Pt is progressing and showing improvement  toward the following goals:  improved strength/endurance .  , Pt has not made progress toward the following goals: gaining weight. , Patient will continue to make dietary changes for heart health such as increasing food intake and decreasing added sugar/salt/fat as well as focus on weight loss in the next 30 days, Will continue to educate and progress as tolerated.    Education: heart healthy eating  low sodium diet  hydration  nutrition for Improved BG control  exercise and diabetes management   wt. loss   healthy choices while dining out  portion control  education class: Heart Healthy Eating  Plan: Education class: Reading Food Labels, increase intake of whole grains, replace refined flours with whole grains, increase daily intake of fruits and vegetables, limit meat intake to less than 6oz per day, choose healthy meals while dining out, reduce intake and /or  rarely eat processed meats , eat poultry without the skin, eat more meatless meals, " "drink/use 1%  low fat or skim  milk, eat reduced-fat or part-skim cheese or rarely eat, rarely eat frozen desserts, cook without fats or oils, never/rarely eat fried foods, use \"light\" tub margarine, use light or fat-free salad dressings and mayonnaise, eat healthy snacks like fruit, pretzels, and low fat crackers, choose desserts such as fruit, aaron food cake, low-fat or fat-free sweets, never/rarely add salt to food when cooking or at the table, choose low sodium canned, frozen, packaged foods or rarely eat these foods, and drink no more than 1-2 alcoholic drinks in a day  Readiness to change: Action:  (Changing behavior)      PSYCHOSOCIAL ASSESSMENT AND PLAN    Emotional:  Depression assessment:  PHQ-9 = 1  1-4 = Minimal Depression            Anxiety measure:  GLADYS-7 = 2  0-4  = Not anxious  Self-reported stress level:  4  Social support: Excellent    Goals:  Reduce perceived stress to 1-3/10, Physical Fitness in [place] Score < 3, Daily Activity in [place] Score < 3, Pain in [place] Score < 3, Overall Health in [place] Score < 3, Change in Health in [place] Score < 3 , improved positive thoughts of well being, and increased energy    Progression Toward Goals: Reviewed Pt goals and determined plan of care, Patient will maintain emotional health and work on stress management in the next 30 days, Will continue to educate and progress as tolerated. No update due to absence    Education: signs/sxs of depression, benefits of a positive support system, stress management techniques, benefits of enrolling in [employer], and depression and CAD  Plan: Class: Stress and Your Health, Class: Relaxation, Practice relaxation techniques, and Exercise  Readiness to change: Preparation:  (Getting ready to change)  no update due to absence      OTHER CORE COMPONENTS     Tobacco:   Social History     Tobacco Use   Smoking Status Never   Smokeless Tobacco Never       Tobacco Use Intervention:   N/A:  Patient is a " non-smoker     Anginal Symptoms:  None   NTG use: No prescription, Understands proper use, and Reviewed Proper use    Blood pressure:    Restin/60 - 128/60    Exercise: 118/60 - 132/60    Goals: consistent BP < 130/80, reduced dietary sodium <2300mg, moderate intensity exercise >150 mins/wk, and medication compliance    Progression Toward Goals: Pt is progressing and showing improvement  toward the following goals:  BP under control.  , Patient will monitor weight at home for weight gain and monitor BP in the next 30 days, Will continue to educate and progress as tolerated.    Education:  understanding high blood pressure and it's relationship to CAD, low sodium diet and HTN, proper use of sublingual NTG, Education class: Understanding Heart Disease, and Education class:  Common Heart Medications  Plan: medication compliance, use salt substitutes, and monitor home BP  Readiness to change: Action:  (Changing behavior)

## 2023-12-29 ENCOUNTER — APPOINTMENT (OUTPATIENT)
Dept: CARDIAC REHAB | Facility: CLINIC | Age: 78
End: 2023-12-29
Payer: MEDICARE

## 2024-01-01 NOTE — Clinical Note
Advanced to RV for backup pacing Data: [facility] transferred to [id number] via Bassinet. Accompanied by mother in the transfer    Action: Receiving unit notified of transfer.  [place] parent notified of room change.  Patient and belongings accompanied by Registered Nurse.  Bedside Report given to Ish Carlin. Assessment verified and infant ID bands double-checked with receiving RN.      Response: Patient tolerated transfer.    Zahra Burgos RN  2024 10:56 PM

## 2024-01-02 ENCOUNTER — IN-CLINIC DEVICE VISIT (OUTPATIENT)
Dept: CARDIOLOGY CLINIC | Facility: CLINIC | Age: 79
End: 2024-01-02
Payer: MEDICARE

## 2024-01-02 ENCOUNTER — APPOINTMENT (OUTPATIENT)
Dept: CARDIAC REHAB | Facility: CLINIC | Age: 79
End: 2024-01-02
Payer: MEDICARE

## 2024-01-02 DIAGNOSIS — Z95.0 PRESENCE OF CARDIAC PACEMAKER: Primary | ICD-10-CM

## 2024-01-02 PROCEDURE — 93280 PM DEVICE PROGR EVAL DUAL: CPT | Performed by: STUDENT IN AN ORGANIZED HEALTH CARE EDUCATION/TRAINING PROGRAM

## 2024-01-02 NOTE — PROGRESS NOTES
Results for orders placed or performed in visit on 01/02/24   Cardiac EP device report    Narrative    MDT DUAL CHAMBER PPM (MVP ON) - ACTIVE SYSTEM IS MRI CONDITIONAL  DEVICE INTERROGATED IN THE [place] OFFICE. BATTERY VOLTAGE ADEQUATE (11.2 YRS). AP 80.6%  81.2% MVP ON. (>40% SSS AAI-DDD 60, HIS LEAD) ALL AVAILABLE LEAD PARAMETERS WITHIN NORMAL LIMITS & STABLE. 3 AT AF EPISODES, THE LONGEST EPISODE FOR 5 HRS, 38 MINS, AF BURDEN 2.0%. PT TAKING COUMADIN, ASA AND METOPROLOL SUCC.  DUE TO FLAT HISTOGRAMS, RATE RESPONSE TURNED ON.  NORMAL DEVICE FUNCTION. PAS/GV

## 2024-01-03 ENCOUNTER — CLINICAL SUPPORT (OUTPATIENT)
Dept: CARDIAC REHAB | Facility: CLINIC | Age: 79
End: 2024-01-03
Payer: MEDICARE

## 2024-01-03 ENCOUNTER — APPOINTMENT (OUTPATIENT)
Dept: CARDIAC REHAB | Facility: CLINIC | Age: 79
End: 2024-01-03
Payer: MEDICARE

## 2024-01-03 DIAGNOSIS — Z95.2 S/P TAVR (TRANSCATHETER AORTIC VALVE REPLACEMENT): Primary | ICD-10-CM

## 2024-01-03 PROCEDURE — 93798 PHYS/QHP OP CAR RHAB W/ECG: CPT

## 2024-01-04 ENCOUNTER — APPOINTMENT (OUTPATIENT)
Dept: CARDIAC REHAB | Facility: CLINIC | Age: 79
End: 2024-01-04
Payer: MEDICARE

## 2024-01-05 ENCOUNTER — APPOINTMENT (OUTPATIENT)
Dept: CARDIAC REHAB | Facility: CLINIC | Age: 79
End: 2024-01-05
Payer: MEDICARE

## 2024-01-08 ENCOUNTER — APPOINTMENT (OUTPATIENT)
Dept: CARDIAC REHAB | Facility: CLINIC | Age: 79
End: 2024-01-08
Payer: MEDICARE

## 2024-01-08 ENCOUNTER — CLINICAL SUPPORT (OUTPATIENT)
Dept: CARDIAC REHAB | Facility: CLINIC | Age: 79
End: 2024-01-08
Payer: MEDICARE

## 2024-01-08 DIAGNOSIS — Z95.2 S/P TAVR (TRANSCATHETER AORTIC VALVE REPLACEMENT): Primary | ICD-10-CM

## 2024-01-08 PROCEDURE — 93798 PHYS/QHP OP CAR RHAB W/ECG: CPT

## 2024-01-09 ENCOUNTER — APPOINTMENT (OUTPATIENT)
Dept: CARDIAC REHAB | Facility: CLINIC | Age: 79
End: 2024-01-09
Payer: MEDICARE

## 2024-01-10 ENCOUNTER — APPOINTMENT (OUTPATIENT)
Dept: LAB | Facility: CLINIC | Age: 79
End: 2024-01-10
Payer: MEDICARE

## 2024-01-10 ENCOUNTER — APPOINTMENT (OUTPATIENT)
Dept: CARDIAC REHAB | Facility: CLINIC | Age: 79
End: 2024-01-10
Payer: MEDICARE

## 2024-01-10 ENCOUNTER — ANTICOAG VISIT (OUTPATIENT)
Dept: CARDIOLOGY CLINIC | Facility: CLINIC | Age: 79
End: 2024-01-10

## 2024-01-10 ENCOUNTER — CLINICAL SUPPORT (OUTPATIENT)
Dept: CARDIAC REHAB | Facility: CLINIC | Age: 79
End: 2024-01-10
Payer: MEDICARE

## 2024-01-10 DIAGNOSIS — Z95.2 S/P TAVR (TRANSCATHETER AORTIC VALVE REPLACEMENT): Primary | ICD-10-CM

## 2024-01-10 PROCEDURE — 93798 PHYS/QHP OP CAR RHAB W/ECG: CPT

## 2024-01-11 ENCOUNTER — APPOINTMENT (OUTPATIENT)
Dept: CARDIAC REHAB | Facility: CLINIC | Age: 79
End: 2024-01-11
Payer: MEDICARE

## 2024-01-11 ENCOUNTER — CLINICAL SUPPORT (OUTPATIENT)
Dept: CARDIAC REHAB | Facility: CLINIC | Age: 79
End: 2024-01-11
Payer: MEDICARE

## 2024-01-11 DIAGNOSIS — Z95.2 S/P TAVR (TRANSCATHETER AORTIC VALVE REPLACEMENT): Primary | ICD-10-CM

## 2024-01-11 PROCEDURE — 93798 PHYS/QHP OP CAR RHAB W/ECG: CPT

## 2024-01-12 ENCOUNTER — APPOINTMENT (OUTPATIENT)
Dept: CARDIAC REHAB | Facility: CLINIC | Age: 79
End: 2024-01-12
Payer: MEDICARE

## 2024-01-12 PROBLEM — Z00.00 MEDICARE ANNUAL WELLNESS VISIT, SUBSEQUENT: Status: RESOLVED | Noted: 2023-11-13 | Resolved: 2024-01-12

## 2024-01-15 ENCOUNTER — APPOINTMENT (OUTPATIENT)
Dept: CARDIAC REHAB | Facility: CLINIC | Age: 79
End: 2024-01-15
Payer: MEDICARE

## 2024-01-15 ENCOUNTER — CLINICAL SUPPORT (OUTPATIENT)
Dept: CARDIAC REHAB | Facility: CLINIC | Age: 79
End: 2024-01-15
Payer: MEDICARE

## 2024-01-15 DIAGNOSIS — Z95.2 S/P TAVR (TRANSCATHETER AORTIC VALVE REPLACEMENT): Primary | ICD-10-CM

## 2024-01-15 PROCEDURE — 93798 PHYS/QHP OP CAR RHAB W/ECG: CPT

## 2024-01-16 ENCOUNTER — APPOINTMENT (OUTPATIENT)
Dept: CARDIAC REHAB | Facility: CLINIC | Age: 79
End: 2024-01-16
Payer: MEDICARE

## 2024-01-17 ENCOUNTER — CLINICAL SUPPORT (OUTPATIENT)
Dept: CARDIAC REHAB | Facility: CLINIC | Age: 79
End: 2024-01-17
Payer: MEDICARE

## 2024-01-17 ENCOUNTER — APPOINTMENT (OUTPATIENT)
Dept: CARDIAC REHAB | Facility: CLINIC | Age: 79
End: 2024-01-17
Payer: MEDICARE

## 2024-01-17 DIAGNOSIS — Z95.2 S/P TAVR (TRANSCATHETER AORTIC VALVE REPLACEMENT): Primary | ICD-10-CM

## 2024-01-17 PROCEDURE — 93798 PHYS/QHP OP CAR RHAB W/ECG: CPT

## 2024-01-18 ENCOUNTER — CLINICAL SUPPORT (OUTPATIENT)
Dept: CARDIAC REHAB | Facility: CLINIC | Age: 79
End: 2024-01-18
Payer: MEDICARE

## 2024-01-18 ENCOUNTER — APPOINTMENT (OUTPATIENT)
Dept: CARDIAC REHAB | Facility: CLINIC | Age: 79
End: 2024-01-18
Payer: MEDICARE

## 2024-01-18 DIAGNOSIS — Z95.2 S/P TAVR (TRANSCATHETER AORTIC VALVE REPLACEMENT): Primary | ICD-10-CM

## 2024-01-18 PROCEDURE — 93798 PHYS/QHP OP CAR RHAB W/ECG: CPT

## 2024-01-19 ENCOUNTER — APPOINTMENT (OUTPATIENT)
Dept: CARDIAC REHAB | Facility: CLINIC | Age: 79
End: 2024-01-19
Payer: MEDICARE

## 2024-01-21 DIAGNOSIS — E11.49 TYPE 2 DIABETES MELLITUS WITH OTHER NEUROLOGIC COMPLICATION, WITHOUT LONG-TERM CURRENT USE OF INSULIN (HCC): ICD-10-CM

## 2024-01-22 ENCOUNTER — APPOINTMENT (OUTPATIENT)
Dept: CARDIAC REHAB | Facility: CLINIC | Age: 79
End: 2024-01-22
Payer: MEDICARE

## 2024-01-22 ENCOUNTER — CLINICAL SUPPORT (OUTPATIENT)
Dept: CARDIAC REHAB | Facility: CLINIC | Age: 79
End: 2024-01-22
Payer: MEDICARE

## 2024-01-22 DIAGNOSIS — Z95.2 S/P TAVR (TRANSCATHETER AORTIC VALVE REPLACEMENT): Primary | ICD-10-CM

## 2024-01-22 PROCEDURE — 93798 PHYS/QHP OP CAR RHAB W/ECG: CPT

## 2024-01-22 RX ORDER — ROSUVASTATIN CALCIUM 5 MG/1
TABLET, COATED ORAL
Qty: 90 TABLET | Refills: 1 | Status: SHIPPED | OUTPATIENT
Start: 2024-01-22

## 2024-01-22 NOTE — PLAN OF CARE
Problem: INFECTION - ADULT  Goal: Absence or prevention of progression during hospitalization  Description: INTERVENTIONS:  - Assess and monitor for signs and symptoms of infection  - Monitor lab/diagnostic results  - Monitor all insertion sites, i e  indwelling lines, tubes, and drains  - Monitor endotracheal if appropriate and nasal secretions for changes in amount and color  - [place] appropriate cooling/warming therapies per order  - Administer medications as ordered  - Instruct and encourage patient and family to use good hand hygiene technique  - Identify and instruct in appropriate isolation precautions for identified infection/condition  Outcome: Progressing .

## 2024-01-22 NOTE — PROGRESS NOTES
Cardiac Rehabilitation Plan of Care   60 Day Reassessment          Today's date: 2024   # of Exercise Sessions Completed: 19  Patient name: Isauro Sow      : 1945  Age: 78 y.o.       MRN: [id number]  Referring Physician: Ac Sharma DO  Cardiologist: Johnson Browning MD   Provider: Javi  Clinician: Patricia Liao MS     Dx:   Encounter Diagnosis   Name Primary?    S/P TAVR (transcatheter aortic valve replacement) Yes     Date of onset: 2023      SUMMARY OF PROGRESS:  Isauro is compliant attending cardiac rehab exercise sessions 3x/wk. He attends S/P TAVR. Isauro was being followed by his primary cardiologist for AS which became severe this year. Prior to his AV placement, Isauro had Cath showing 80% stenosis at pRCA which was stented. Isauro has Hx of pacer implant (22), Atrial flutter/A/fib, CHF, COPD, T2D - no insulin, Stroke (2017), HTN, HDL, and COPD.  He tolerates 40-45 mins at 2.0-3.0 METs. A light strength training component has been added to their exercise program. He is tolerating progression of intensity levels to maintain RPE 4-6. Resting BP  96//62 with Normal response to exercise reaching 118//60. BiV pacing on tele with no ectopy observed.  RHR 62-72  with Normal response to exercise reaching 77-94. He has not added home exercise but does state doing a lot of work around the house such as shoveling snow. We discussed starting to think of a plan for exercise once he is done with the program. He states he has a treadmill at home he can use. We discussed trying to add using it 1x/wk to start getting in a routine. Recommended to use when someone is home with him. No cardiac complaints. He is not progressing toward wt loss goals with a loss of 3.4 pounds. Patient has been working on dietary modifications with the goal of rare red/processed meats, low fat dairy, reduced added sugars and refined flours. An update on his current eating habits is unknown  due to his absence today. The patient has T2D, Patient monitors BS 1 times/day, Patient reported fasting -120  reporting an avg. FBG of 120-130. The patient is a non-smoker. PHQ-9 and GLADYS-7 were not reassessed due to low initial scores. Most recent assessment found PHQ-9 at a 1 suggesting 1-4 = Minimal Depression and GLADYS-7 at a 2 suggesting 0-4  = Not anxious.  Patient reports good  social/emotional support from his son and family. They were encouraged to discuss options for medical therapy and counseling with their PCP. Isauro rates their stress as 4/10.   Patient attends group educational classes on cardiac risk factor modification. His exercise program will be progressed as tolerated to maintain RPE 4-6. The patient has the following personal goals he hopes to achieved by discharge: improve his strength/stamina, increase leg strength which he has saw and improvement, increase daily water intake, and add using his treadmill 1x/wk. They will continue to be educated on lifestyle modification and encouraged to supplement with a home exercise program as tolerated to reach the following goals in the next 30 days: continue to improve strength/endurance, increase heart healthy eating, maintain good BP, med compliance, work on stress mangement.    Medication compliance: Yes   Comments: Pt reports to be compliant with medications  Fall Risk: Low   Comments: Ambulates with a steady gait with no assist device and Denies a fall in the past 6 months    EKG Interpretation: BiV pacing, occ PVCs, Hx of Diogenes, SSS, NSVT, Afib/flutter       EXERCISE ASSESSMENT and PLAN    Exercise Prescription:      Frequency: 3 days/week   Supplement with home exercise 2+ days/wk as tolerated       Minutes: 40-45         METS: 2.0-3.0            HR: 77-94   RPE: 4-7         Modalities: Treadmill, UBE, NuStep, and Recumbent bike      30 Day Goals for Exercise Progression:    Frequency: 3 days/week of cardiac rehab       Supplement with home  exercise 2+ days/wk as tolerated    Minutes: 35-40                              >150 mins/wk of moderate intensity exercise   METS: 2.5 - 3.5    HR: resting + 30     RPE: 4-6   Modalities: Treadmill, UBE, NuStep, and Recumbent bike    Strength trainin-3 days / week  12-15 repetitions  1-2 sets per modality   Will be added following 2-3 weeks of monitored exercise sessions   Modalities: Lateral Raise, Arm Curl, Sit to Stands, Upright Rows, and Front Raises    Home Exercise: none    Goals: 10% improvement in functional capacity - based on max METs achieved in fitness assessment, Reduced dyspnea with physical activity  0-1/10, improved DASI score by 10%, Exercise 5 days/wk, >150mins/wk of moderate intensity exercise, Improved 6MWT distance by 10%, Attend Rehab regularly, and Start a walking program    Progression Toward Goals:  Pt is progressing and showing improvement  toward the following goals:  attending rehab regularly, increased exercise tolerance and improved strength/stamina.  , Patient will continue ADLs at home and attend rehab regularly in the next 30 days, Will continue to educate and progress as tolerated.    Education: benefit of exercise for CAD risk factors, home exercise guidelines, AHA guidelines to achieve >150 mins/wk of moderate exercise, and RPE scale   Plan:education on home exercise guidelines, home exercise 30+ mins 2 days opposite CR, and Education class: Risk Factors for Heart Disease  Readiness to change: Action:  (Changing behavior)      NUTRITION ASSESSMENT AND PLAN    Weight control:    Starting weight: 218.2 lbs    Current weight:   219.4 lbs       Diabetes: T2D, no insulin  A1c: 6.9    last measured: 23    Lipid management: Discussed diet and lipid management and Last lipid profile 23  Chol 135  TRG 90  HDL 52  LDL 65    Goals:decreased body fat% <25%   (M), Improved Rate Your Plate score  >64, eat 6 or more servings of grain products per day, eat whole grain breads, brown  "rice and whole grain cereals, eat 5 or more servings of fruits and vegetables a day, dine out less than once per week or choose low fat restaurant meals, choose lean beef or rarely eat beef, rarely eat processed meats, eat poultry without the skin, avoid fried chicken and/or fried fish and shellfish, eat meatless meals twice a week or more\, choose 1% or skim milk, rarely eat cheese or choose reduced fat or skim, rarely eat frozen desserts or choose fruit or fat-free sweets, never add salt to food when cooking or at the table, choose low sodium canned, frozen/packaged foods or rarely/never eat, choose low sugar desserts and sweets, Do not cook with oil, butter or margarine, Do not eat fried foods, use \"light\" tub margarine on bread, potatoes and vegetables, choose light or fat-free salad dressings or santos, choose healthy snacks such as fruit, pretzels, and low fat crackers, choose healthy desserts such as  fat-free sweets or fruit, do not use added salts,  choose low sodium canned, frozen, packaged foods, and choose low sugar desserts and sweets    Measurable goals were based Rate Your Plate Dietary Self-Assessment. These are the areas in which the patient could score higher on the assessment.  Goals include recommendations for a heart healthy diet based on American Heart Association.    Progression Toward Goals: Pt is progressing and showing improvement  toward the following goals:  improved strength/endurance, weight loss .  , Patient will continue to make dietary changes for heart health such as increasing food intake and decreasing added sugar/salt/fat as well as focus on weight loss in the next 30 days, Will continue to educate and progress as tolerated.    Education: heart healthy eating  low sodium diet  hydration  nutrition for Improved BG control  exercise and diabetes management   wt. loss   healthy choices while dining out  portion control  education class: Heart Healthy Eating  Plan: Education class: " "Reading Food Labels, increase intake of whole grains, replace refined flours with whole grains, increase daily intake of fruits and vegetables, limit meat intake to less than 6oz per day, choose healthy meals while dining out, reduce intake and /or  rarely eat processed meats , eat poultry without the skin, eat more meatless meals, drink/use 1%  low fat or skim  milk, eat reduced-fat or part-skim cheese or rarely eat, rarely eat frozen desserts, cook without fats or oils, never/rarely eat fried foods, use \"light\" tub margarine, use light or fat-free salad dressings and mayonnaise, eat healthy snacks like fruit, pretzels, and low fat crackers, choose desserts such as fruit, aaron food cake, low-fat or fat-free sweets, never/rarely add salt to food when cooking or at the table, choose low sodium canned, frozen, packaged foods or rarely eat these foods, and drink no more than 1-2 alcoholic drinks in a day  Readiness to change: Action:  (Changing behavior)      PSYCHOSOCIAL ASSESSMENT AND PLAN    Emotional:  Depression assessment:  PHQ-9 = 1  1-4 = Minimal Depression            Anxiety measure:  GLADYS-7 = 2  0-4  = Not anxious  Self-reported stress level:  4  Social support: Excellent    Goals:  Reduce perceived stress to 1-3/10, Physical Fitness in [place] Score < 3, Daily Activity in [place] Score < 3, Pain in [place] Score < 3, Overall Health in [place] Score < 3, Change in Health in [place] Score < 3 , improved positive thoughts of well being, and increased energy    Progression Toward Goals: Pt is progressing and showing improvement  toward the following goals:  maintaining emotional health.  , Patient will maintain emotional health and work on stress management in the next 30 days, Will continue to educate and progress as tolerated.     Education: signs/sxs of depression, benefits of a positive support system, stress management techniques, benefits of enrolling in [employer], and depression and " CAD  Plan: Class: Stress and Your Health, Class: Relaxation, Practice relaxation techniques, and Exercise  Readiness to change: Action:  (Changing behavior)     OTHER CORE COMPONENTS     Tobacco:   Social History     Tobacco Use   Smoking Status Never   Smokeless Tobacco Never       Tobacco Use Intervention:   N/A:  Patient is a non-smoker     Anginal Symptoms:  None   NTG use: No prescription, Understands proper use, and Reviewed Proper use    Blood pressure:    Restin//62    Exercise: 118//60    Goals: consistent BP < 130/80, reduced dietary sodium <2300mg, moderate intensity exercise >150 mins/wk, and medication compliance    Progression Toward Goals: Pt is progressing and showing improvement  toward the following goals:  BP under control.  , Patient will monitor weight at home for weight gain and monitor BP in the next 30 days, Will continue to educate and progress as tolerated.    Education:  understanding high blood pressure and it's relationship to CAD, low sodium diet and HTN, proper use of sublingual NTG, Education class: Understanding Heart Disease, and Education class:  Common Heart Medications  Plan: medication compliance, use salt substitutes, and monitor home BP  Readiness to change: Action:  (Changing behavior)

## 2024-01-23 ENCOUNTER — APPOINTMENT (OUTPATIENT)
Dept: CARDIAC REHAB | Facility: CLINIC | Age: 79
End: 2024-01-23
Payer: MEDICARE

## 2024-01-24 ENCOUNTER — APPOINTMENT (OUTPATIENT)
Dept: CARDIAC REHAB | Facility: CLINIC | Age: 79
End: 2024-01-24
Payer: MEDICARE

## 2024-01-24 ENCOUNTER — CLINICAL SUPPORT (OUTPATIENT)
Dept: CARDIAC REHAB | Facility: CLINIC | Age: 79
End: 2024-01-24
Payer: MEDICARE

## 2024-01-24 DIAGNOSIS — Z95.2 S/P TAVR (TRANSCATHETER AORTIC VALVE REPLACEMENT): Primary | ICD-10-CM

## 2024-01-24 PROCEDURE — 93798 PHYS/QHP OP CAR RHAB W/ECG: CPT

## 2024-01-25 ENCOUNTER — CLINICAL SUPPORT (OUTPATIENT)
Dept: CARDIAC REHAB | Facility: CLINIC | Age: 79
End: 2024-01-25
Payer: MEDICARE

## 2024-01-25 ENCOUNTER — APPOINTMENT (OUTPATIENT)
Dept: CARDIAC REHAB | Facility: CLINIC | Age: 79
End: 2024-01-25
Payer: MEDICARE

## 2024-01-25 DIAGNOSIS — Z95.2 S/P TAVR (TRANSCATHETER AORTIC VALVE REPLACEMENT): Primary | ICD-10-CM

## 2024-01-25 PROCEDURE — 93798 PHYS/QHP OP CAR RHAB W/ECG: CPT

## 2024-01-26 ENCOUNTER — APPOINTMENT (OUTPATIENT)
Dept: CARDIAC REHAB | Facility: CLINIC | Age: 79
End: 2024-01-26
Payer: MEDICARE

## 2024-01-27 PROBLEM — R05.9 COUGH: Status: RESOLVED | Noted: 2023-11-28 | Resolved: 2024-01-27

## 2024-01-29 ENCOUNTER — APPOINTMENT (OUTPATIENT)
Dept: CARDIAC REHAB | Facility: CLINIC | Age: 79
End: 2024-01-29
Payer: MEDICARE

## 2024-01-29 ENCOUNTER — CLINICAL SUPPORT (OUTPATIENT)
Dept: CARDIAC REHAB | Facility: CLINIC | Age: 79
End: 2024-01-29
Payer: MEDICARE

## 2024-01-29 ENCOUNTER — TELEPHONE (OUTPATIENT)
Dept: NEUROLOGY | Facility: CLINIC | Age: 79
End: 2024-01-29

## 2024-01-29 DIAGNOSIS — Z95.2 S/P TAVR (TRANSCATHETER AORTIC VALVE REPLACEMENT): Primary | ICD-10-CM

## 2024-01-29 PROCEDURE — 93798 PHYS/QHP OP CAR RHAB W/ECG: CPT

## 2024-01-30 ENCOUNTER — TELEPHONE (OUTPATIENT)
Dept: NEUROLOGY | Facility: CLINIC | Age: 79
End: 2024-01-30

## 2024-01-30 ENCOUNTER — APPOINTMENT (OUTPATIENT)
Dept: CARDIAC REHAB | Facility: CLINIC | Age: 79
End: 2024-01-30
Payer: MEDICARE

## 2024-01-30 NOTE — TELEPHONE ENCOUNTER
1/29/24, 12:59    Hello. Hi, this is Arjun Sow calling for Isauro Sow. Birth date april 27th 1945. I just wanted to see if he needed a carotid diagnostic test before his visit next week. Thank you. My number is [phone].

## 2024-01-31 ENCOUNTER — APPOINTMENT (OUTPATIENT)
Dept: CARDIAC REHAB | Facility: CLINIC | Age: 79
End: 2024-01-31
Payer: MEDICARE

## 2024-01-31 ENCOUNTER — CLINICAL SUPPORT (OUTPATIENT)
Dept: CARDIAC REHAB | Facility: CLINIC | Age: 79
End: 2024-01-31
Payer: MEDICARE

## 2024-01-31 DIAGNOSIS — Z95.2 S/P TAVR (TRANSCATHETER AORTIC VALVE REPLACEMENT): Primary | ICD-10-CM

## 2024-01-31 PROCEDURE — 93798 PHYS/QHP OP CAR RHAB W/ECG: CPT

## 2024-01-31 NOTE — TELEPHONE ENCOUNTER
MADISON Boateng, RN4 hours ago (10:19 AM)     ZF  Yes, he should! But at this point in time it may be hard to get an ultrasound before his next appointment. If he wants it doen before then he should reschedule his follow up with me. Thanks!    -Shaka

## 2024-01-31 NOTE — TELEPHONE ENCOUNTER
Spoke with Arjun and made him aware. Gave him the phone number for Central Scheduling to schedule the carotid duplex. Canceled the appointment with Mike Stevens that was scheduled for 2/6/24. Routed to the clerical team to follow up with Arjun and reschedule pt's OV with Mike after the carotid ultrasound is done.

## 2024-02-01 ENCOUNTER — APPOINTMENT (OUTPATIENT)
Dept: CARDIAC REHAB | Facility: CLINIC | Age: 79
End: 2024-02-01
Payer: MEDICARE

## 2024-02-01 ENCOUNTER — CLINICAL SUPPORT (OUTPATIENT)
Dept: CARDIAC REHAB | Facility: CLINIC | Age: 79
End: 2024-02-01
Payer: MEDICARE

## 2024-02-01 DIAGNOSIS — Z95.2 S/P TAVR (TRANSCATHETER AORTIC VALVE REPLACEMENT): Primary | ICD-10-CM

## 2024-02-01 PROCEDURE — 93798 PHYS/QHP OP CAR RHAB W/ECG: CPT

## 2024-02-02 ENCOUNTER — APPOINTMENT (OUTPATIENT)
Dept: CARDIAC REHAB | Facility: CLINIC | Age: 79
End: 2024-02-02
Payer: MEDICARE

## 2024-02-05 ENCOUNTER — APPOINTMENT (OUTPATIENT)
Dept: CARDIAC REHAB | Facility: CLINIC | Age: 79
End: 2024-02-05
Payer: MEDICARE

## 2024-02-05 ENCOUNTER — APPOINTMENT (OUTPATIENT)
Dept: LAB | Facility: CLINIC | Age: 79
End: 2024-02-05
Payer: MEDICARE

## 2024-02-05 ENCOUNTER — CLINICAL SUPPORT (OUTPATIENT)
Dept: CARDIAC REHAB | Facility: CLINIC | Age: 79
End: 2024-02-05
Payer: MEDICARE

## 2024-02-05 ENCOUNTER — ANTICOAG VISIT (OUTPATIENT)
Dept: CARDIOLOGY CLINIC | Facility: CLINIC | Age: 79
End: 2024-02-05

## 2024-02-05 DIAGNOSIS — Z95.2 S/P TAVR (TRANSCATHETER AORTIC VALVE REPLACEMENT): Primary | ICD-10-CM

## 2024-02-05 PROCEDURE — 93798 PHYS/QHP OP CAR RHAB W/ECG: CPT

## 2024-02-06 ENCOUNTER — APPOINTMENT (OUTPATIENT)
Dept: CARDIAC REHAB | Facility: CLINIC | Age: 79
End: 2024-02-06
Payer: MEDICARE

## 2024-02-06 ENCOUNTER — HOSPITAL ENCOUNTER (OUTPATIENT)
Dept: NON INVASIVE DIAGNOSTICS | Facility: HOSPITAL | Age: 79
Discharge: HOME | End: 2024-02-06
Attending: PSYCHIATRY & NEUROLOGY
Payer: MEDICARE

## 2024-02-06 DIAGNOSIS — I65.21 RIGHT CAROTID ARTERY OCCLUSION: ICD-10-CM

## 2024-02-06 DIAGNOSIS — I65.22 LEFT CAROTID STENOSIS: ICD-10-CM

## 2024-02-06 PROCEDURE — 93880 EXTRACRANIAL BILAT STUDY: CPT

## 2024-02-06 PROCEDURE — 93880 EXTRACRANIAL BILAT STUDY: CPT | Performed by: SURGERY

## 2024-02-06 NOTE — TELEPHONE ENCOUNTER
Spoke with patient son, Arley to schedule f/u for Carotic Doppler result. Made appointment for patient to Shaka but his next available is in April. Is there an earlier appointment patient can have for this f/u?

## 2024-02-07 ENCOUNTER — APPOINTMENT (OUTPATIENT)
Dept: CARDIAC REHAB | Facility: CLINIC | Age: 79
End: 2024-02-07
Payer: MEDICARE

## 2024-02-07 DIAGNOSIS — I65.21 RIGHT INTERNAL CAROTID OCCLUSION: Primary | ICD-10-CM

## 2024-02-07 DIAGNOSIS — I65.22 LEFT CAROTID ARTERY STENOSIS: ICD-10-CM

## 2024-02-07 NOTE — RESULT ENCOUNTER NOTE
Please call Isauro,    The carotid doppler looks good but they felt that things in that L ICA had progressed a little (more stenotic/narrow compared with what was saw in September).  It is not a huge change but I don;'t like that it is not moving in the right direction.  I would like for him to see the vascular surgery team to ask their help with monitoring at this point.as it might eventually need to be opened mechanically if it continues to progress.  The most recent cholesterol looks good so I would recommend focusing on blood pressure and blood sugar in terms of reducing the risk of progression.    I ordered the consult with vascular and also the next repeat for 6 months.  Please help him set the appointment.  Thanks!

## 2024-02-08 ENCOUNTER — APPOINTMENT (OUTPATIENT)
Dept: CARDIAC REHAB | Facility: CLINIC | Age: 79
End: 2024-02-08
Payer: MEDICARE

## 2024-02-08 ENCOUNTER — CLINICAL SUPPORT (OUTPATIENT)
Dept: CARDIAC REHAB | Facility: CLINIC | Age: 79
End: 2024-02-08
Payer: MEDICARE

## 2024-02-08 DIAGNOSIS — Z95.2 S/P TAVR (TRANSCATHETER AORTIC VALVE REPLACEMENT): Primary | ICD-10-CM

## 2024-02-08 PROCEDURE — 93798 PHYS/QHP OP CAR RHAB W/ECG: CPT

## 2024-02-09 ENCOUNTER — APPOINTMENT (OUTPATIENT)
Dept: CARDIAC REHAB | Facility: CLINIC | Age: 79
End: 2024-02-09
Payer: MEDICARE

## 2024-02-12 ENCOUNTER — APPOINTMENT (OUTPATIENT)
Dept: CARDIAC REHAB | Facility: CLINIC | Age: 79
End: 2024-02-12
Payer: MEDICARE

## 2024-02-12 NOTE — PROGRESS NOTES
Cardiac Rehabilitation Plan of Care   90 Day Reassessment and Medical Hold          Today's date: 2024   # of Exercise Sessions Completed: 26  Patient name: Isauro Sow      : 1945  Age: 78 y.o.       MRN: [id number]  Referring Physician: No ref. provider found  Cardiologist: Johnson Browning MD   Provider: Javi  Clinician: Starla Pryor MS, CEP    Dx:   Encounter Diagnosis   Name Primary?    S/P TAVR (transcatheter aortic valve replacement) Yes     Date of onset: 2023      SUMMARY OF PROGRESS:  Isauro is compliant attending cardiac rehab exercise sessions 2-3x/wk. He attends S/P TAVR. Isauro was being followed by his primary cardiologist for AS which became severe this year. Prior to his AV placement, Isauro had Cath showing 80% stenosis at pRCA which was stented. Isauro has Hx of pacer implant (22), Atrial flutter/A/fib, CHF, COPD, T2D - no insulin, Stroke (), HTN, HDL, and COPD.  He tolerates 40-45 mins at 2.0-3.0 METs including walking on a TM 1x/wk. A light strength training component has been added to their exercise program. He is tolerating progression of intensity levels to maintain RPE 4-6. Resting //72 with Normal response to exercise reaching 110//74. BiV pacing with occ PVCs observed on telemetry. RHR 64-80 with Normal response to exercise reaching . Update on his current home exercise is unknown at this time. No cardiac complaints. Isauro is opting to be placed on a medical hold at this time due to orthopaedic complaints with his back. He reports that he is in a lot of pain and will be looking into seeing his PCP. Isauro will be on hold for the next month until an update on his return or possible discharge is heard from the patient. Will follow up if needed. He is not progressing toward wt loss goals with a loss of 2 pounds. Patient has been working on dietary modifications with the goal of rare red/processed meats, low fat dairy,  reduced added sugars and refined flours. An update on his current eating habits is unknown due to his absence today. The patient has T2D, Patient monitors BS 1 times/day, Patient reported fasting -120  reporting an avg. FBG of 120-130. The patient is a non-smoker. PHQ-9 and GLADYS-7 were not reassessed due to low initial scores. Most recent assessment found PHQ-9 at a 1 suggesting 1-4 = Minimal Depression and GLADYS-7 at a 2 suggesting 0-4  = Not anxious.  Patient reports good  social/emotional support from his son and family. They were encouraged to discuss options for medical therapy and counseling with their PCP. Isauro rates their stress as 4/10. Patient attends group educational classes on cardiac risk factor modification. His exercise program will be progressed as tolerated to maintain RPE 4-6. The patient has the following personal goals he hopes to achieved by discharge: improve his strength/stamina, increase leg strength which he has saw and improvement, increase daily water intake, and add using his treadmill 1x/wk. They will continue to be educated on lifestyle modification and encouraged to supplement with a home exercise program as tolerated to reach the following goals in the next 30 days: continue to improve strength/endurance, increase heart healthy eating, maintain good BP, med compliance, work on stress mangement.    Medication compliance: Yes   Comments: Pt reports to be compliant with medications  Fall Risk: Low   Comments: Ambulates with a steady gait with no assist device and Denies a fall in the past 6 months    EKG Interpretation: BiV pacing, occ PVCs, Hx of Diogenes, SSS, NSVT, Afib/flutter       EXERCISE ASSESSMENT and PLAN    Exercise Prescription:      Frequency: 3 days/week   Supplement with home exercise 2+ days/wk as tolerated       Minutes: 40-45         METS: 2.0-3.0            HR:    RPE: 4-7         Modalities: Treadmill, UBE, NuStep, and Recumbent bike      30 Day Goals for  Exercise Progression:    Frequency: 3 days/week of cardiac rehab       Supplement with home exercise 2+ days/wk as tolerated    Minutes: 35-40                              >150 mins/wk of moderate intensity exercise   METS: 2.5 - 3.5    HR: resting + 30     RPE: 4-6   Modalities: Treadmill, UBE, NuStep, and Recumbent bike    Strength trainin-3 days / week  12-15 repetitions  1-2 sets per modality   Will be added following 2-3 weeks of monitored exercise sessions   Modalities: Lateral Raise, Arm Curl, Sit to Stands, Upright Rows, and Front Raises    Home Exercise: none    Goals: 10% improvement in functional capacity - based on max METs achieved in fitness assessment, Reduced dyspnea with physical activity  0-1/10, improved DASI score by 10%, Exercise 5 days/wk, >150mins/wk of moderate intensity exercise, Improved 6MWT distance by 10%, Attend Rehab regularly, and Start a walking program    Progression Toward Goals:  Pt is progressing and showing improvement  toward the following goals:  attending rehab regularly, increased exercise tolerance and improved strength/stamina.  , Patient will continue ADLs at home and attend rehab regularly in the next 30 days, Will continue to educate and progress as tolerated.    Education: benefit of exercise for CAD risk factors, home exercise guidelines, AHA guidelines to achieve >150 mins/wk of moderate exercise, and RPE scale   Plan:education on home exercise guidelines, home exercise 30+ mins 2 days opposite CR, and Education class: Risk Factors for Heart Disease  Readiness to change: Action:  (Changing behavior)      NUTRITION ASSESSMENT AND PLAN    Weight control:    Starting weight: 218.2 lbs    Current weight:   220.4 lbs       Diabetes: T2D, no insulin  A1c: 6.9    last measured: 23    Lipid management: Discussed diet and lipid management and Last lipid profile 23  Chol 135  TRG 90  HDL 52  LDL 65    Goals:decreased body fat% <25%   (M), Improved Rate Your Plate  "score  >64, eat 6 or more servings of grain products per day, eat whole grain breads, brown rice and whole grain cereals, eat 5 or more servings of fruits and vegetables a day, dine out less than once per week or choose low fat restaurant meals, choose lean beef or rarely eat beef, rarely eat processed meats, eat poultry without the skin, avoid fried chicken and/or fried fish and shellfish, eat meatless meals twice a week or more\, choose 1% or skim milk, rarely eat cheese or choose reduced fat or skim, rarely eat frozen desserts or choose fruit or fat-free sweets, never add salt to food when cooking or at the table, choose low sodium canned, frozen/packaged foods or rarely/never eat, choose low sugar desserts and sweets, Do not cook with oil, butter or margarine, Do not eat fried foods, use \"light\" tub margarine on bread, potatoes and vegetables, choose light or fat-free salad dressings or santos, choose healthy snacks such as fruit, pretzels, and low fat crackers, choose healthy desserts such as  fat-free sweets or fruit, do not use added salts,  choose low sodium canned, frozen, packaged foods, and choose low sugar desserts and sweets    Measurable goals were based Rate Your Plate Dietary Self-Assessment. These are the areas in which the patient could score higher on the assessment.  Goals include recommendations for a heart healthy diet based on American Heart Association.    Progression Toward Goals: Pt is progressing and showing improvement  toward the following goals:  improved strength/endurance.  , Patient will continue to make dietary changes for heart health such as increasing food intake and decreasing added sugar/salt/fat as well as focus on weight loss in the next 30 days, Will continue to educate and progress as tolerated.    Education: heart healthy eating  low sodium diet  hydration  nutrition for Improved BG control  exercise and diabetes management   wt. loss   healthy choices while dining " "out  portion control  education class: Heart Healthy Eating  Plan: Education class: Reading Food Labels, increase intake of whole grains, replace refined flours with whole grains, increase daily intake of fruits and vegetables, limit meat intake to less than 6oz per day, choose healthy meals while dining out, reduce intake and /or  rarely eat processed meats , eat poultry without the skin, eat more meatless meals, drink/use 1%  low fat or skim  milk, eat reduced-fat or part-skim cheese or rarely eat, rarely eat frozen desserts, cook without fats or oils, never/rarely eat fried foods, use \"light\" tub margarine, use light or fat-free salad dressings and mayonnaise, eat healthy snacks like fruit, pretzels, and low fat crackers, choose desserts such as fruit, aaron food cake, low-fat or fat-free sweets, never/rarely add salt to food when cooking or at the table, choose low sodium canned, frozen, packaged foods or rarely eat these foods, and drink no more than 1-2 alcoholic drinks in a day  Readiness to change: Action:  (Changing behavior)      PSYCHOSOCIAL ASSESSMENT AND PLAN    Emotional:  Depression assessment:  PHQ-9 = 1  1-4 = Minimal Depression            Anxiety measure:  GLADYS-7 = 2  0-4  = Not anxious  Self-reported stress level:  4  Social support: Excellent    Goals:  Reduce perceived stress to 1-3/10, Physical Fitness in [place] Score < 3, Daily Activity in [place] Score < 3, Pain in [place] Score < 3, Overall Health in [place] Score < 3, Change in Health in [place] Score < 3 , improved positive thoughts of well being, and increased energy    Progression Toward Goals: Pt is progressing and showing improvement  toward the following goals:  maintaining emotional health.  , Patient will maintain emotional health and work on stress management in the next 30 days, Will continue to educate and progress as tolerated.     Education: signs/sxs of depression, benefits of a positive support system, stress " management techniques, benefits of enrolling in vSocial, and depression and CAD  Plan: Class: Stress and Your Health, Class: Relaxation, Practice relaxation techniques, and Exercise  Readiness to change: Action:  (Changing behavior)     OTHER CORE COMPONENTS     Tobacco:   Social History     Tobacco Use   Smoking Status Never   Smokeless Tobacco Never       Tobacco Use Intervention:   N/A:  Patient is a non-smoker     Anginal Symptoms:  None   NTG use: No prescription, Understands proper use, and Reviewed Proper use    Blood pressure:    Restin//72   Exercise: 110//74    Goals: consistent BP < 130/80, reduced dietary sodium <2300mg, moderate intensity exercise >150 mins/wk, and medication compliance    Progression Toward Goals: Pt is progressing and showing improvement  toward the following goals:  BP under control.  , Patient will monitor weight at home for weight gain and monitor BP in the next 30 days, Will continue to educate and progress as tolerated.    Education:  understanding high blood pressure and it's relationship to CAD, low sodium diet and HTN, proper use of sublingual NTG, Education class: Understanding Heart Disease, and Education class:  Common Heart Medications  Plan: medication compliance, use salt substitutes, and monitor home BP  Readiness to change: Action:  (Changing behavior)

## 2024-02-13 ENCOUNTER — APPOINTMENT (OUTPATIENT)
Dept: CARDIAC REHAB | Facility: CLINIC | Age: 79
End: 2024-02-13
Payer: MEDICARE

## 2024-02-14 ENCOUNTER — APPOINTMENT (OUTPATIENT)
Dept: CARDIAC REHAB | Facility: CLINIC | Age: 79
End: 2024-02-14
Payer: MEDICARE

## 2024-02-14 ENCOUNTER — OFFICE VISIT (OUTPATIENT)
Dept: FAMILY MEDICINE CLINIC | Facility: CLINIC | Age: 79
End: 2024-02-14
Payer: MEDICARE

## 2024-02-14 VITALS
SYSTOLIC BLOOD PRESSURE: 128 MMHG | HEIGHT: 71 IN | DIASTOLIC BLOOD PRESSURE: 72 MMHG | OXYGEN SATURATION: 95 % | BODY MASS INDEX: 30.21 KG/M2 | TEMPERATURE: 97.6 F | WEIGHT: 215.8 LBS | RESPIRATION RATE: 16 BRPM | HEART RATE: 62 BPM

## 2024-02-14 DIAGNOSIS — G89.29 CHRONIC MIDLINE LOW BACK PAIN WITHOUT SCIATICA: ICD-10-CM

## 2024-02-14 DIAGNOSIS — M54.50 CHRONIC MIDLINE LOW BACK PAIN WITHOUT SCIATICA: ICD-10-CM

## 2024-02-14 DIAGNOSIS — E11.49 TYPE 2 DIABETES MELLITUS WITH OTHER NEUROLOGIC COMPLICATION, WITHOUT LONG-TERM CURRENT USE OF INSULIN (HCC): ICD-10-CM

## 2024-02-14 DIAGNOSIS — K59.09 OTHER CONSTIPATION: ICD-10-CM

## 2024-02-14 DIAGNOSIS — I65.22 STENOSIS OF LEFT CAROTID ARTERY: Primary | ICD-10-CM

## 2024-02-14 LAB — SL AMB POCT HEMOGLOBIN AIC: 7.2 (ref ?–6.5)

## 2024-02-14 PROCEDURE — 99214 OFFICE O/P EST MOD 30 MIN: CPT | Performed by: FAMILY MEDICINE

## 2024-02-14 PROCEDURE — 83036 HEMOGLOBIN GLYCOSYLATED A1C: CPT | Performed by: FAMILY MEDICINE
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
## 2024-02-15 ENCOUNTER — APPOINTMENT (OUTPATIENT)
Dept: CARDIAC REHAB | Facility: CLINIC | Age: 79
End: 2024-02-15
Payer: MEDICARE

## 2024-02-15 NOTE — ASSESSMENT & PLAN NOTE
Constipation.  Patient was given recommendation to start using over-the-counter Colace 1 tablet daily and attempts to avoid constipation.  He is aware to stay properly hydrated with fluids and water

## 2024-02-15 NOTE — ASSESSMENT & PLAN NOTE
Carotid stenosis.  Patient was given referral to [facility]'s vascular department to discuss any further treatment options that may be required

## 2024-02-15 NOTE — ASSESSMENT & PLAN NOTE
Diabetes.  A1c in the office today was not much changed and stable at 7.2.  Patient does admit to consuming more sweets during the [place] holidays.  Patient will be more mindful of dietary choices.  Lab Results   Component Value Date    HGBA1C 7.2 (A) 02/14/2024

## 2024-02-15 NOTE — ASSESSMENT & PLAN NOTE
Back pain.  Patient given prescription for Ultracet 37.5/325 mg to use 1 every 8 hours as needed for pain.  He may also continue using his lidocaine patches.  He was given referral to pain management for further consultation regarding his chronic symptoms

## 2024-02-16 ENCOUNTER — APPOINTMENT (OUTPATIENT)
Dept: CARDIAC REHAB | Facility: CLINIC | Age: 79
End: 2024-02-16
Payer: MEDICARE

## 2024-02-17 DIAGNOSIS — I47.29 NSVT (NONSUSTAINED VENTRICULAR TACHYCARDIA) (HCC): ICD-10-CM

## 2024-02-19 ENCOUNTER — APPOINTMENT (OUTPATIENT)
Dept: CARDIAC REHAB | Facility: CLINIC | Age: 79
End: 2024-02-19
Payer: MEDICARE

## 2024-02-19 ENCOUNTER — CLINICAL SUPPORT (OUTPATIENT)
Dept: CARDIAC REHAB | Facility: CLINIC | Age: 79
End: 2024-02-19
Payer: MEDICARE

## 2024-02-19 DIAGNOSIS — Z95.2 S/P TAVR (TRANSCATHETER AORTIC VALVE REPLACEMENT): Primary | ICD-10-CM

## 2024-02-19 PROCEDURE — 93798 PHYS/QHP OP CAR RHAB W/ECG: CPT

## 2024-02-19 RX ORDER — METOPROLOL SUCCINATE 25 MG/1
TABLET, EXTENDED RELEASE ORAL
Qty: 30 TABLET | Refills: 3 | Status: SHIPPED | OUTPATIENT
Start: 2024-02-19

## 2024-02-20 ENCOUNTER — APPOINTMENT (OUTPATIENT)
Dept: CARDIAC REHAB | Facility: CLINIC | Age: 79
End: 2024-02-20
Payer: MEDICARE

## 2024-02-21 ENCOUNTER — CLINICAL SUPPORT (OUTPATIENT)
Dept: CARDIAC REHAB | Facility: CLINIC | Age: 79
End: 2024-02-21
Payer: MEDICARE

## 2024-02-21 ENCOUNTER — APPOINTMENT (OUTPATIENT)
Dept: CARDIAC REHAB | Facility: CLINIC | Age: 79
End: 2024-02-21
Payer: MEDICARE

## 2024-02-21 DIAGNOSIS — Z95.2 S/P TAVR (TRANSCATHETER AORTIC VALVE REPLACEMENT): Primary | ICD-10-CM

## 2024-02-21 PROBLEM — Z12.11 COLON CANCER SCREENING: Status: RESOLVED | Noted: 2019-04-19 | Resolved: 2024-02-21

## 2024-02-21 PROCEDURE — 93798 PHYS/QHP OP CAR RHAB W/ECG: CPT

## 2024-02-22 ENCOUNTER — APPOINTMENT (OUTPATIENT)
Dept: CARDIAC REHAB | Facility: CLINIC | Age: 79
End: 2024-02-22
Payer: MEDICARE

## 2024-02-22 ENCOUNTER — CLINICAL SUPPORT (OUTPATIENT)
Dept: CARDIAC REHAB | Facility: CLINIC | Age: 79
End: 2024-02-22
Payer: MEDICARE

## 2024-02-22 DIAGNOSIS — Z95.2 S/P TAVR (TRANSCATHETER AORTIC VALVE REPLACEMENT): Primary | ICD-10-CM

## 2024-02-22 PROCEDURE — 93798 PHYS/QHP OP CAR RHAB W/ECG: CPT

## 2024-02-26 ENCOUNTER — CLINICAL SUPPORT (OUTPATIENT)
Dept: CARDIAC REHAB | Facility: CLINIC | Age: 79
End: 2024-02-26
Payer: MEDICARE

## 2024-02-26 DIAGNOSIS — Z95.2 S/P TAVR (TRANSCATHETER AORTIC VALVE REPLACEMENT): Primary | ICD-10-CM

## 2024-02-26 PROCEDURE — 93798 PHYS/QHP OP CAR RHAB W/ECG: CPT

## 2024-02-28 ENCOUNTER — CLINICAL SUPPORT (OUTPATIENT)
Dept: CARDIAC REHAB | Facility: CLINIC | Age: 79
End: 2024-02-28
Payer: MEDICARE

## 2024-02-28 DIAGNOSIS — Z95.2 S/P TAVR (TRANSCATHETER AORTIC VALVE REPLACEMENT): Primary | ICD-10-CM

## 2024-02-28 PROCEDURE — 93798 PHYS/QHP OP CAR RHAB W/ECG: CPT

## 2024-02-29 ENCOUNTER — APPOINTMENT (OUTPATIENT)
Dept: CARDIAC REHAB | Facility: CLINIC | Age: 79
End: 2024-02-29
Payer: MEDICARE

## 2024-02-29 ENCOUNTER — CLINICAL SUPPORT (OUTPATIENT)
Dept: CARDIAC REHAB | Facility: CLINIC | Age: 79
End: 2024-02-29
Payer: MEDICARE

## 2024-02-29 DIAGNOSIS — Z95.2 S/P TAVR (TRANSCATHETER AORTIC VALVE REPLACEMENT): Primary | ICD-10-CM

## 2024-02-29 PROCEDURE — 93798 PHYS/QHP OP CAR RHAB W/ECG: CPT

## 2024-03-04 ENCOUNTER — APPOINTMENT (OUTPATIENT)
Dept: CARDIAC REHAB | Facility: CLINIC | Age: 79
End: 2024-03-04
Payer: MEDICARE

## 2024-03-04 ENCOUNTER — CLINICAL SUPPORT (OUTPATIENT)
Dept: CARDIAC REHAB | Facility: CLINIC | Age: 79
End: 2024-03-04
Payer: MEDICARE

## 2024-03-04 ENCOUNTER — APPOINTMENT (OUTPATIENT)
Dept: LAB | Facility: CLINIC | Age: 79
End: 2024-03-04
Payer: MEDICARE

## 2024-03-04 ENCOUNTER — ANTICOAG VISIT (OUTPATIENT)
Dept: CARDIOLOGY CLINIC | Facility: CLINIC | Age: 79
End: 2024-03-04

## 2024-03-04 DIAGNOSIS — Z95.2 S/P TAVR (TRANSCATHETER AORTIC VALVE REPLACEMENT): Primary | ICD-10-CM

## 2024-03-04 PROCEDURE — 93798 PHYS/QHP OP CAR RHAB W/ECG: CPT

## 2024-03-06 ENCOUNTER — CLINICAL SUPPORT (OUTPATIENT)
Dept: CARDIAC REHAB | Facility: CLINIC | Age: 79
End: 2024-03-06
Payer: MEDICARE

## 2024-03-06 ENCOUNTER — APPOINTMENT (OUTPATIENT)
Dept: CARDIAC REHAB | Facility: CLINIC | Age: 79
End: 2024-03-06
Payer: MEDICARE

## 2024-03-06 DIAGNOSIS — Z95.2 S/P TAVR (TRANSCATHETER AORTIC VALVE REPLACEMENT): Primary | ICD-10-CM

## 2024-03-06 PROCEDURE — 93798 PHYS/QHP OP CAR RHAB W/ECG: CPT

## 2024-03-06 NOTE — ASSESSMENT & PLAN NOTE
Reviewed carotid ultrasound from 2/6/2024 which demonstrates right ICA chronically occluded.  Left ICA 50 to 69% stenosis with velocities 186/56 and a ratio of 1.59    -Reviewed most recent carotid ultrasound results in detail with pt and her family. Discussed pathophysiology of arterial disease and indication for vascular intervention. No vascular intervention planned at this time. Discussed that we will continue with medical management and non-invasive imaging at this time.     Plan  -Continue Aspirin 81mg daily.  -Continue Rosuvastatin daily as prescribed by PCP.  -Call 911/ Go to the ER with any S/S of TIA/ CVA.  -Continue to exercise daily.  -Complete carotid ultrasound in one year and return to the office for review.

## 2024-03-07 ENCOUNTER — CLINICAL SUPPORT (OUTPATIENT)
Dept: CARDIAC REHAB | Facility: CLINIC | Age: 79
End: 2024-03-07
Payer: MEDICARE

## 2024-03-07 ENCOUNTER — APPOINTMENT (OUTPATIENT)
Dept: CARDIAC REHAB | Facility: CLINIC | Age: 79
End: 2024-03-07
Payer: MEDICARE

## 2024-03-07 ENCOUNTER — CONSULT (OUTPATIENT)
Dept: VASCULAR SURGERY | Facility: CLINIC | Age: 79
End: 2024-03-07
Payer: MEDICARE

## 2024-03-07 VITALS
DIASTOLIC BLOOD PRESSURE: 74 MMHG | HEIGHT: 71 IN | OXYGEN SATURATION: 98 % | BODY MASS INDEX: 31.14 KG/M2 | HEART RATE: 78 BPM | WEIGHT: 222.4 LBS | SYSTOLIC BLOOD PRESSURE: 122 MMHG | RESPIRATION RATE: 18 BRPM

## 2024-03-07 DIAGNOSIS — I65.22 STENOSIS OF LEFT CAROTID ARTERY: ICD-10-CM

## 2024-03-07 DIAGNOSIS — R09.89 DECREASED PEDAL PULSES: ICD-10-CM

## 2024-03-07 DIAGNOSIS — Z95.2 S/P TAVR (TRANSCATHETER AORTIC VALVE REPLACEMENT): Primary | ICD-10-CM

## 2024-03-07 DIAGNOSIS — I73.9 LEFT LEG CLAUDICATION (HCC): ICD-10-CM

## 2024-03-07 DIAGNOSIS — I65.23 BILATERAL CAROTID ARTERY STENOSIS: Primary | ICD-10-CM

## 2024-03-07 PROCEDURE — 99204 OFFICE O/P NEW MOD 45 MIN: CPT | Performed by: NURSE PRACTITIONER

## 2024-03-07 PROCEDURE — 93798 PHYS/QHP OP CAR RHAB W/ECG: CPT

## 2024-03-07 NOTE — Clinical Note
Good Morning,  I am seeing Isauro Sow today a mutual patient. He was asking if you would like to continue following up with him for anything neuro wise? We will be taking over his carotid ultrasound studies but wanted to double check if he should be seeing you for anything else.  Thank you,  Lisa KENT
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
## 2024-03-07 NOTE — PATIENT INSTRUCTIONS
-Complete carotid ultrasound in August and return to the office for review if instructed.    -Complete lower extremity ultrasound due now and the office will call you if you need to return for review.    -Go to the emergency department/ Call 911 with any signs or symptoms of a stroke: numbness/ tingling/ weakness on one side of the body, facial droop, slurred speech or blindness in one eye.     -Continue to take Aspirin 81 mg daily, and Plavix daily as per your family doctor.     -Continue to take statin medication as per your family doctor daily.    -Call our office with any question or concerns.

## 2024-03-07 NOTE — ASSESSMENT & PLAN NOTE
-Reviewed most recent HgA1c with pt.  -Increasing blood sugars. Reviewed the importance of maintaining an optimized blood sugar for progression of vascular disease.  -management per PCP   Lab Results   Component Value Date    HGBA1C 7.2 (A) 02/14/2024

## 2024-03-07 NOTE — ASSESSMENT & PLAN NOTE
-B/L DP pulses +2  -Doppler R PT monophasic signals  -non-palpable, no doppler L PT   -Pt reports L calf cramping at night and with leg elevation that improves with walking. Denies any claudication, no wounds/ tissue loss  -Discussed we will order LEAD to assess for arterial disease. If significant findings will have him return for review, otherwise will call with results.  -Continue with ASA and statin therapy.  -Discussed increase walking.  -Instructed to call the office for any new or worsening symptoms.

## 2024-03-07 NOTE — ASSESSMENT & PLAN NOTE
Reviewed carotid ultrasound from 2/6/2024 which demonstrates right ICA chronically occluded.  Left ICA 50 to 69% stenosis with velocities 186/56 and a ratio of 1.59  -Denies symptoms of numbness/ tingling/ weakness on one side of the body, facial droop, slurred speech or blindness in one eye.   -Reviewed most recent carotid ultrasound results in detail with pt and her family. Discussed pathophysiology of arterial disease and indication for vascular intervention. No vascular intervention planned at this time. Discussed that we will continue with medical management and non-invasive imaging at this time.     Plan  -Complete carotid ultrasound in 6 months and return to the office for review.  -Continue Aspirin 81mg and plavix daily.  -Continue Rosuvastatin daily as prescribed by PCP.  -Call 911/ Go to the ER with any S/S of TIA/ CVA.  -Continue to exercise daily.

## 2024-03-11 ENCOUNTER — APPOINTMENT (OUTPATIENT)
Dept: CARDIAC REHAB | Facility: CLINIC | Age: 79
End: 2024-03-11
Payer: MEDICARE

## 2024-03-11 ENCOUNTER — CLINICAL SUPPORT (OUTPATIENT)
Dept: CARDIAC REHAB | Facility: CLINIC | Age: 79
End: 2024-03-11
Payer: MEDICARE

## 2024-03-11 DIAGNOSIS — Z95.2 S/P TAVR (TRANSCATHETER AORTIC VALVE REPLACEMENT): Primary | ICD-10-CM

## 2024-03-11 PROCEDURE — 93798 PHYS/QHP OP CAR RHAB W/ECG: CPT

## 2024-03-11 NOTE — PROGRESS NOTES
Cardiac Rehabilitation Plan of Care   120 Day Reassessment DAY and DISCHARGE            Today's date: 3/11/2024   # of Exercise Sessions Completed: 36  Patient name: Isauro Sow      : 1945  Age: 78 y.o.       MRN: [id number]  Referring Physician: Ac Sharma DO  Cardiologist: Johnson Browning MD   Provider: Javi  Clinician: Richard Coleman MS, CEP     Dx:   Encounter Diagnosis   Name Primary?    S/P TAVR (transcatheter aortic valve replacement) Yes     Date of onset: 2023      SUMMARY OF PROGRESS:  Discharge note for Isauro Sow for the diagnosis of S/P AVR.  Prior to his AV placement, Isauro had Cath showing 80% stenosis at pRCA which was stented. Isauro has Hx of pacer implant (22), Atrial flutter/A/fib, CHF, COPD, T2D - no insulin, Stroke (), HTN, HDL, and COPD. 6 MWT distance improved by 14% walking 855 ft (2.24 METs).  His exercise tolerance (max METs in tolerated in cardiac rehab) increased by 3.4%.  He had a 0% improvement in the [facility] activity estimated MET level with ADLs and physical activity. His [facility] QOL improved by 0%.  PHQ-9 score increased from 1 to 4.  GLADYS-7 increased from 2 to 3.  His weight decreased by 1 pounds. Rate Your Plate score declined from 57 to 48.  The patient has T2D, Patient monitors BS 1 times/day, Patient reported fasting -120 reporting an avg. FBG of 120-130. Isauro has not been supplementing CR sessions with home exercise. He reports increased stamina, strength and reduced SOB with activity. He is able to do more ADLs.  Isauro tolerates 40 mins at 2.9 - 3.0 METs plus wt training. AV paced with occ PVCs on telemetry.  RHR 57 - 80, ExHR 60 - 120. Resting BP 96/60 - 130/72 with appropriate response to exercise reaching 110/80 - 146/74.  All group education classes on cardiac risk factor modification were attended by the patient.  Discharge plans include walking on home TRM.  Encouraged Pt to continue exercise. Frequency: 4-6 days/wk,  Intensity: RPE 4-5, Time: 40-50 mins daily, 150-200 mins/wk. Pt was encouraged to continue eating heart healthy.  Pt was encouraged to remain compliant with medications and f/u with cardiologist with any cardiac symptoms, medication management and updated lipid profile.     Medication compliance: Yes   Comments: Pt reports to be compliant with medications  Fall Risk: Low   Comments: Ambulates with a steady gait with no assist device and Denies a fall in the past 6 months    EKG Interpretation: BiV pacing, occ PVCs, Hx of Diogenes, SSS, NSVT, Afib/flutter       EXERCISE ASSESSMENT and PLAN    Exercise Prescription:      Frequency: 3 days/week   Supplement with home exercise 2+ days/wk as tolerated       Minutes: 40-45         METS: 2.9-3.0            HR:    RPE: 4-7         Modalities: Treadmill, UBE, NuStep, and Recumbent bike      Exercise Progression after Discharge: Home TRM   Frequency: 3-5 days of gym or home exercise   Minutes: 30-50  >150 mins/wk of moderate intensity exercise   METS: 2.5 - 3.0+   HR: 60 - 120    RPE: 4-6   Modalities: Treadmill     Strength trainin-3 days / week  12-15 repetitions  1-2 sets per modality    Modalities: Lateral Raise, Arm Curl, Sit to Stands, Upright Rows, and Front Raises    Home Exercise: Type: Home TRM, Frequency: 3-5 days/week, Duration: 30-50 mins intervals     Goals: 10% improvement in functional capacity - based on max METs achieved in fitness assessment, Reduced dyspnea with physical activity  0-1/10, improved DASI score by 10%, Exercise 5 days/wk, >150mins/wk of moderate intensity exercise, Improved 6MWT distance by 10%, Attend Rehab regularly, and Start a walking program    Progression Toward Goals:  Goals not met: no formal home exercise with rehab, overall METs did not increase by 10%, and DASI score stayed the same .  , Goals met: improved 14% on 6MWT, increased overall strength and endurance, increased ADLs, and plan for continuation of exercise: home  "TRM., Patient will be encouraged to focus on lifestyle modifications following discharge.    Education: benefit of exercise for CAD risk factors, home exercise guidelines, AHA guidelines to achieve >150 mins/wk of moderate exercise, RPE scale, exercise instructions/guidelines for discharge , and Education handout: Exercise after cardiac rehab      Plan:education on home exercise guidelines, home exercise 30+ mins 2 days opposite CR, and Education class: Risk Factors for Heart Disease    Readiness to change: Action:  (Changing behavior)      NUTRITION ASSESSMENT AND PLAN    Weight control:    Starting weight: 218.2 lbs    Current weight:   217.6  lbs       Diabetes: T2D, no insulin  A1c: 7.2   last measured: 02/14/2024    Lipid management: Discussed diet and lipid management and Last lipid profile 5/1/23  Chol 135  TRG 90  HDL 52  LDL 65    Goals:decreased body fat% <25%   (M), Improved Rate Your Plate score  >64, eat 6 or more servings of grain products per day, eat whole grain breads, brown rice and whole grain cereals, eat 5 or more servings of fruits and vegetables a day, dine out less than once per week or choose low fat restaurant meals, choose lean beef or rarely eat beef, rarely eat processed meats, eat poultry without the skin, avoid fried chicken and/or fried fish and shellfish, eat meatless meals twice a week or more\, choose 1% or skim milk, rarely eat cheese or choose reduced fat or skim, rarely eat frozen desserts or choose fruit or fat-free sweets, never add salt to food when cooking or at the table, choose low sodium canned, frozen/packaged foods or rarely/never eat, choose low sugar desserts and sweets, Do not cook with oil, butter or margarine, Do not eat fried foods, use \"light\" tub margarine on bread, potatoes and vegetables, choose light or fat-free salad dressings or santos, choose healthy snacks such as fruit, pretzels, and low fat crackers, choose healthy desserts such as  fat-free sweets or " "fruit, do not use added salts,  choose low sodium canned, frozen, packaged foods, and choose low sugar desserts and sweets    Measurable goals were based Rate Your Plate Dietary Self-Assessment. These are the areas in which the patient could score higher on the assessment.  Goals include recommendations for a heart healthy diet based on American Heart Association.    Progression Toward Goals: Goals not met: RYP score decreased from initial and has no plans to change dietary habits at this time .  , Goals met: maintained current weight ., Patient will be encouraged to focus on lifestyle modifications following discharge.    Education: heart healthy eating  low sodium diet  hydration  nutrition for Improved BG control  exercise and diabetes management   wt. loss   healthy choices while dining out  portion control  education class: Heart Healthy Eating  Plan: Education class: Reading Food Labels, increase intake of whole grains, replace refined flours with whole grains, increase daily intake of fruits and vegetables, limit meat intake to less than 6oz per day, choose healthy meals while dining out, reduce intake and /or  rarely eat processed meats , eat poultry without the skin, eat more meatless meals, drink/use 1%  low fat or skim  milk, eat reduced-fat or part-skim cheese or rarely eat, rarely eat frozen desserts, cook without fats or oils, never/rarely eat fried foods, use \"light\" tub margarine, use light or fat-free salad dressings and mayonnaise, eat healthy snacks like fruit, pretzels, and low fat crackers, choose desserts such as fruit, aaron food cake, low-fat or fat-free sweets, never/rarely add salt to food when cooking or at the table, choose low sodium canned, frozen, packaged foods or rarely eat these foods, and drink no more than 1-2 alcoholic drinks in a day  Readiness to change: Preparation:  (Getting ready to change)       PSYCHOSOCIAL ASSESSMENT AND PLAN    Emotional:  Depression assessment:  PHQ-9 = " 4  1-4 = Minimal Depression            Anxiety measure:  GLADYS-7 = 3  0-4  = Not anxious  Self-reported stress level:  4  Social support: Excellent    Goals:  Reduce perceived stress to 1-3/10, Physical Fitness in [place] Score < 3, Daily Activity in [place] Score < 3, Pain in [place] Score < 3, Overall Health in [place] Score < 3, Change in Health in [place] Score < 3 , improved positive thoughts of well being, and increased energy    Progression Toward Goals: Goals not met: overall [place] score stayed the same from initial.  , Goals met: maintaining emotional health and great support system at home from family., Patient will be encouraged to focus on lifestyle modifications following discharge.     Education: signs/sxs of depression, benefits of a positive support system, stress management techniques, benefits of enrolling in [place], and depression and CAD  Plan: Class: Stress and Your Health, Class: Relaxation, Practice relaxation techniques, and Exercise  Readiness to change: Action:  (Changing behavior)     OTHER CORE COMPONENTS     Tobacco:   Social History     Tobacco Use   Smoking Status Never   Smokeless Tobacco Never       Tobacco Use Intervention:   N/A:  Patient is a non-smoker     Anginal Symptoms:  None   NTG use: No prescription, Understands proper use, and Reviewed Proper use    Blood pressure:    Restin/60 - 130/72   Exercise:110/80 - 146/74.    Goals: consistent BP < 130/80, reduced dietary sodium <2300mg, moderate intensity exercise >150 mins/wk, and medication compliance    Progression Toward Goals: Goals not met: not achieving >150 mins/week of moderate intensity exercise with rehab.  , Goals met: medication compliance and blood pressures mostly within normal limits at rest and exercise., Patient will be encouraged to focus on lifestyle modifications following discharge.    Education:  understanding high blood pressure and it's relationship to CAD, low sodium diet and HTN,  proper use of sublingual NTG, Education class: Understanding Heart Disease, and Education class:  Common Heart Medications  Plan: medication compliance, use salt substitutes, and monitor home BP  Readiness to change: Action:  (Changing behavior)

## 2024-03-12 ENCOUNTER — TELEPHONE (OUTPATIENT)
Dept: VASCULAR SURGERY | Facility: CLINIC | Age: 79
End: 2024-03-12

## 2024-03-12 NOTE — TELEPHONE ENCOUNTER
----- Message from DONTE Hay sent at 3/11/2024  6:08 PM EDT -----  Good afternoon,    Can you please call JoseMagi and let him know I sent an e-mail to Dr. Vishal Sherman with Neuro and they do not need to follow up with him any longer unless he has a question or concern. We will not be taking over for his vascular care and that is all he was ordering for him at the time.    Thanks   Lisa

## 2024-03-12 NOTE — TELEPHONE ENCOUNTER
"To clarify your message states \"we will not be taking over for his vascular care\" but you also say he does not need to f/u w/ [facility].   Please confirm vascular is to continue to follow.       "

## 2024-03-13 ENCOUNTER — APPOINTMENT (OUTPATIENT)
Dept: CARDIAC REHAB | Facility: CLINIC | Age: 79
End: 2024-03-13
Payer: MEDICARE

## 2024-03-13 NOTE — TELEPHONE ENCOUNTER
Called and s/w pt's son, Arley, who is listed on pt's medical communication consent. Informed him of below. He voiced understanding.

## 2024-03-13 NOTE — TELEPHONE ENCOUNTER
Sorry, We ARE taking over for the vascular care, he does NOT need to follow up with Neuro any longer. Thank you for clarifying.

## 2024-03-14 ENCOUNTER — CONSULT (OUTPATIENT)
Dept: PAIN MEDICINE | Facility: CLINIC | Age: 79
End: 2024-03-14
Payer: MEDICARE

## 2024-03-14 ENCOUNTER — APPOINTMENT (OUTPATIENT)
Dept: CARDIAC REHAB | Facility: CLINIC | Age: 79
End: 2024-03-14
Payer: MEDICARE

## 2024-03-14 VITALS
BODY MASS INDEX: 29.54 KG/M2 | WEIGHT: 211 LBS | HEIGHT: 71 IN | SYSTOLIC BLOOD PRESSURE: 130 MMHG | HEART RATE: 71 BPM | DIASTOLIC BLOOD PRESSURE: 82 MMHG

## 2024-03-14 DIAGNOSIS — G89.29 CHRONIC MIDLINE LOW BACK PAIN WITHOUT SCIATICA: ICD-10-CM

## 2024-03-14 DIAGNOSIS — M47.816 LUMBAR SPONDYLOSIS: Primary | ICD-10-CM

## 2024-03-14 DIAGNOSIS — M54.50 CHRONIC MIDLINE LOW BACK PAIN WITHOUT SCIATICA: ICD-10-CM

## 2024-03-14 PROCEDURE — 99204 OFFICE O/P NEW MOD 45 MIN: CPT | Performed by: ANESTHESIOLOGY
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
## 2024-03-18 ENCOUNTER — APPOINTMENT (OUTPATIENT)
Dept: CARDIAC REHAB | Facility: CLINIC | Age: 79
End: 2024-03-18
Payer: MEDICARE

## 2024-03-18 ENCOUNTER — APPOINTMENT (OUTPATIENT)
Dept: LAB | Facility: CLINIC | Age: 79
End: 2024-03-18
Payer: MEDICARE

## 2024-03-18 ENCOUNTER — ANTICOAG VISIT (OUTPATIENT)
Dept: CARDIOLOGY CLINIC | Facility: CLINIC | Age: 79
End: 2024-03-18

## 2024-03-20 ENCOUNTER — APPOINTMENT (OUTPATIENT)
Dept: CARDIAC REHAB | Facility: CLINIC | Age: 79
End: 2024-03-20
Payer: MEDICARE

## 2024-03-20 ENCOUNTER — EVALUATION (OUTPATIENT)
Dept: PHYSICAL THERAPY | Facility: REHABILITATION | Age: 79
End: 2024-03-20
Payer: MEDICARE

## 2024-03-20 DIAGNOSIS — G89.29 CHRONIC MIDLINE LOW BACK PAIN WITHOUT SCIATICA: ICD-10-CM

## 2024-03-20 DIAGNOSIS — M54.50 CHRONIC MIDLINE LOW BACK PAIN WITHOUT SCIATICA: ICD-10-CM

## 2024-03-20 DIAGNOSIS — M47.816 LUMBAR SPONDYLOSIS: Primary | ICD-10-CM

## 2024-03-20 PROCEDURE — 97162 PT EVAL MOD COMPLEX 30 MIN: CPT

## 2024-03-20 PROCEDURE — 97110 THERAPEUTIC EXERCISES: CPT

## 2024-03-20 NOTE — PROGRESS NOTES
PT Evaluation     Today's date: 3/20/2024  Patient name: Isauro Sow  : 1945  MRN: [id number]  Referring provider: Rangel Hahn DO  Dx:   Encounter Diagnosis     ICD-10-CM    1. Lumbar spondylosis  M47.816 Ambulatory referral to Physical Therapy      2. Chronic midline low back pain without sciatica  M54.50 Ambulatory referral to Physical Therapy    G89.29           Start Time: 0850  Stop Time: 0930  Total time in clinic (min): 40 minutes    Assessment  Assessment details:   Isauro Sow is a pleasant 78 y.o. male who presents with acute on chronic low back pain. Possible posterior derangement with an extension preference based on testing today. No red flags present. The impairments listed below are resulting in pain with ADLs/IADLs, reduced QoL, and reduced PLOF. I expect he will improve in 4-6 weeks.        Impairments: abnormal or restricted ROM, activity intolerance, impaired physical strength, lacks appropriate home exercise program, pain with function and weight-bearing intolerance    Symptom irritability: moderateUnderstanding of Dx/Px/POC: good   Prognosis: good    Goals  Short Term Goals (Week 4):  1. Decreased pain by 50%  2. Demonstrate pain free lumbar AROM in all directions    Long Term Goals (8 weeks):  1. GROC >75%  2. Patient will exceed FOTO predicted outcome score  3. Patient will be fully independent with HEP by discharge  4. Patient will be able to manage symptoms independently.       Plan  Patient would benefit from: skilled physical therapy  Planned modality interventions: low level laser therapy, thermotherapy: hydrocollator packs and electrical stimulation/Russian stimulation  Planned therapy interventions: IASTM, joint mobilization, manual therapy, massage, nerve gliding, neuromuscular re-education, patient education, stretching, strengthening, therapeutic exercise, therapeutic activities, home exercise program, graded exercise, graded activity, functional ROM exercises,  flexibility, activity modification and behavior modification  Frequency: 2x week  Duration in visits: 12  Duration in weeks: 6  Treatment plan discussed with: patient        Subjective  Patient reports acute on chronic low back pain that started up in November after he was mowing his lawn. Patient reports his symptoms initially were in the low back and have spread into the right side of his lower thoracic spine. Patient denies any radiating symptoms into his legs and/or numbness/tingling. Patient denies any b/b dysfunction, saddle anesthesia, and/or worsening of his gait. Patient states symptoms are aggravated with sitting, bed mobility, and bending over. Patient wanted to try PT as he previously had a good response last time her had LBP. Patient saw Dr. Hahn who referred him to PT.      Pain Levels  Current: 3/10  Worst: 8/10      Myotomes  Strength WNL bilaterally.    Dermatomes  Sensation intact bilaterally    Reflexes  R Patellar Reflex: (2+)  L Patellar Reflex: (2+)  R Achilles Reflex: (1+)  L Achilles Reflex: (1+)    Neurodynamic Testing  Slump Test: negative  SLR: negative   Femoral Nerve Traction Test: negative    Lumbar Active Range of Motion  Movement Loss Symptoms José Miguel Mod Min Nil Symptoms   Flexion  x   LBP   Extension x    LBP   R SG  x      L SG  x      Other          Neal Assessment  Rep FIS: produce, nb/nw  Rep EIS: produce, nb/nw  Rep SHEA: produced, nb/nw  Rep EIL: not tested  Rep R SGIS: not tested  Rep L SGIS: not tested    Hip Special Tests:  FADIRS= (+), FABERS= (-), Scours= (+), Distraction= (-)      SIJ Special Tests:  Compression= (NT), Gapping= (NT), FABERS= (-), Gaenslan's= (-), Sacral Thrust/PA Pressure= (NT), Post Thigh Thrust= (NT)         Precautions: falls, cardiac, hx of CVA      Manuals 3/20                                                                Neuro Re-Ed                                                                                                        Ther Ex              Pt Edu, HEP PRR            Supine Clamshells HEP            Supine Bridges HEP                                                                             Ther Activity                                       Gait Training                                       Modalities

## 2024-03-21 ENCOUNTER — APPOINTMENT (OUTPATIENT)
Dept: CARDIAC REHAB | Facility: CLINIC | Age: 79
End: 2024-03-21
Payer: MEDICARE

## 2024-03-21 ENCOUNTER — OFFICE VISIT (OUTPATIENT)
Dept: PHYSICAL THERAPY | Facility: REHABILITATION | Age: 79
End: 2024-03-21
Payer: MEDICARE

## 2024-03-21 DIAGNOSIS — G89.29 CHRONIC MIDLINE LOW BACK PAIN WITHOUT SCIATICA: ICD-10-CM

## 2024-03-21 DIAGNOSIS — M54.50 CHRONIC MIDLINE LOW BACK PAIN WITHOUT SCIATICA: ICD-10-CM

## 2024-03-21 DIAGNOSIS — M47.816 LUMBAR SPONDYLOSIS: Primary | ICD-10-CM

## 2024-03-21 PROCEDURE — 97112 NEUROMUSCULAR REEDUCATION: CPT

## 2024-03-21 PROCEDURE — 97110 THERAPEUTIC EXERCISES: CPT

## 2024-03-21 NOTE — PROGRESS NOTES
"Daily Note     Today's date: 3/21/2024  Patient name: Isauro Sow  : 1945  MRN: [id number]  Referring provider: Rangel Hahn DO  Dx:   Encounter Diagnosis     ICD-10-CM    1. Lumbar spondylosis  M47.816       2. Chronic midline low back pain without sciatica  M54.50     G89.29           Start Time: 1730  Stop Time: 1808  Total time in clinic (min): 38 minutes    Subjective: Patient reports he felt fine after last visit. States he did HEP early today.       Objective: See treatment diary below      Assessment: Tolerated treatment well today. Slight discomfort in right flank with supine to seated transition. Muscular fatigue in hips and knees end of session. Patient would benefit from continued PT      Plan: Continue per plan of care.      Precautions: falls, cardiac, hx of CVA      Manuals 3/20 3/21                                                               Neuro Re-Ed                                                                                                        Ther Ex             Pt Edu, HEP PRR            Supine Clamshells HEP 2x10 yhb 3\" hold           Supine Bridges HEP 2x10 yhb            LTR  2x10 ea           STS   5# kb 4x5 1 foam                                     VG   L7 5' DL           Ther Activity                                       Gait Training                                       Modalities                                            "

## 2024-03-25 ENCOUNTER — APPOINTMENT (OUTPATIENT)
Dept: CARDIAC REHAB | Facility: CLINIC | Age: 79
End: 2024-03-25
Payer: MEDICARE

## 2024-03-26 ENCOUNTER — RA CDI HCC (OUTPATIENT)
Dept: OTHER | Facility: HOSPITAL | Age: 79
End: 2024-03-26

## 2024-03-27 ENCOUNTER — OFFICE VISIT (OUTPATIENT)
Dept: PHYSICAL THERAPY | Facility: REHABILITATION | Age: 79
End: 2024-03-27
Payer: MEDICARE

## 2024-03-27 DIAGNOSIS — M47.816 LUMBAR SPONDYLOSIS: ICD-10-CM

## 2024-03-27 DIAGNOSIS — M54.50 CHRONIC MIDLINE LOW BACK PAIN WITHOUT SCIATICA: Primary | ICD-10-CM

## 2024-03-27 DIAGNOSIS — G89.29 CHRONIC MIDLINE LOW BACK PAIN WITHOUT SCIATICA: Primary | ICD-10-CM

## 2024-03-27 PROCEDURE — 97112 NEUROMUSCULAR REEDUCATION: CPT

## 2024-03-27 PROCEDURE — 97110 THERAPEUTIC EXERCISES: CPT

## 2024-03-27 NOTE — PROGRESS NOTES
"Daily Note     Today's date: 3/27/2024  Patient name: Isauro Sow  : 1945  MRN: [id number]  Referring provider: Rangel Hahn DO  Dx:   Encounter Diagnosis     ICD-10-CM    1. Chronic midline low back pain without sciatica  M54.50     G89.29       2. Lumbar spondylosis  M47.816           Start Time: 1010  Stop Time: 1050  Total time in clinic (min): 40 minutes    Subjective: Patient reports his LBP is doing better. States he only notices it when he is straining. Patient reports bending over is easier.       Objective: See treatment diary below      Assessment: Tolerated treatment well today. Progressed activities without issue. Improved symptoms end of session. . Patient would benefit from continued PT      Plan: Continue per plan of care.      Precautions: falls, cardiac, hx of CVA      Manuals 3/20 3/21 3/27                                                              Neuro Re-Ed                                                                                                        Ther Ex             Pt Edu, HEP PRR            Supine Clamshells HEP 2x10 yhb 3\" hold 2x10 rhb 3\" hold          Supine Bridges HEP 2x10 yhb  2x10 rhb          LTR  2x10 ea 2x10 ea          STS   5# kb 4x5 1 foam 5# kb 4x5 no foam          Deadlift from Chair   5# kb 3x5                       VG   L7 5' DL L7 5' DL          Ther Activity                                       Gait Training                                       Modalities                                              "

## 2024-03-29 ENCOUNTER — APPOINTMENT (OUTPATIENT)
Dept: LAB | Facility: CLINIC | Age: 79
End: 2024-03-29
Payer: MEDICARE

## 2024-03-29 ENCOUNTER — OFFICE VISIT (OUTPATIENT)
Dept: FAMILY MEDICINE CLINIC | Facility: CLINIC | Age: 79
End: 2024-03-29
Payer: MEDICARE

## 2024-03-29 VITALS
BODY MASS INDEX: 30.26 KG/M2 | TEMPERATURE: 98 F | OXYGEN SATURATION: 95 % | SYSTOLIC BLOOD PRESSURE: 110 MMHG | RESPIRATION RATE: 17 BRPM | WEIGHT: 216.13 LBS | DIASTOLIC BLOOD PRESSURE: 60 MMHG | HEIGHT: 71 IN | HEART RATE: 77 BPM

## 2024-03-29 DIAGNOSIS — M17.12 PRIMARY OSTEOARTHRITIS OF LEFT KNEE: ICD-10-CM

## 2024-03-29 DIAGNOSIS — I10 BENIGN ESSENTIAL HYPERTENSION: Chronic | ICD-10-CM

## 2024-03-29 DIAGNOSIS — N18.31 STAGE 3A CHRONIC KIDNEY DISEASE (HCC): ICD-10-CM

## 2024-03-29 DIAGNOSIS — E11.49 TYPE 2 DIABETES MELLITUS WITH OTHER NEUROLOGIC COMPLICATION, WITHOUT LONG-TERM CURRENT USE OF INSULIN (HCC): Primary | ICD-10-CM

## 2024-03-29 DIAGNOSIS — Z00.00 MEDICARE ANNUAL WELLNESS VISIT, SUBSEQUENT: ICD-10-CM

## 2024-03-29 DIAGNOSIS — G89.29 CHRONIC PAIN OF LEFT KNEE: ICD-10-CM

## 2024-03-29 DIAGNOSIS — M25.562 CHRONIC PAIN OF LEFT KNEE: ICD-10-CM

## 2024-03-29 LAB
CREAT UR-MCNC: 120.5 MG/DL
MICROALBUMIN UR-MCNC: <7 MG/L
MICROALBUMIN/CREAT 24H UR: <6 MG/G CREATININE (ref 0–30)

## 2024-03-29 PROCEDURE — 82043 UR ALBUMIN QUANTITATIVE: CPT | Performed by: FAMILY MEDICINE

## 2024-03-29 PROCEDURE — G0439 PPPS, SUBSEQ VISIT: HCPCS | Performed by: FAMILY MEDICINE

## 2024-03-29 PROCEDURE — 82570 ASSAY OF URINE CREATININE: CPT | Performed by: FAMILY MEDICINE

## 2024-03-29 PROCEDURE — 99213 OFFICE O/P EST LOW 20 MIN: CPT | Performed by: FAMILY MEDICINE

## 2024-03-29 NOTE — ASSESSMENT & PLAN NOTE
Diabetes.  Current A1c is stable at 7.0.  His eye and foot exams are up-to-date.  He will continue with current dose of metformin.  If he is required to be under 7.0 for surgery we may have to consider adding small dose of glimepiride.  Lab Results   Component Value Date    HGBA1C 7.2 (A) 02/14/2024
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
## 2024-03-29 NOTE — ASSESSMENT & PLAN NOTE
Lab Results   Component Value Date    EGFR 67 10/17/2023    EGFR 60 09/22/2023    EGFR 68 09/21/2023    CREATININE 1.05 10/17/2023    CREATININE 1.15 09/22/2023    CREATININE 1.04 09/21/2023   Chronic kidney disease.  Most recent renal function tests have been stable.  Patient will continue with periodic blood work to monitor chronic condition

## 2024-03-29 NOTE — PATIENT INSTRUCTIONS
Medicare Preventive Visit Patient Instructions  Thank you for completing your Welcome to Medicare Visit or Medicare Annual Wellness Visit today. Your next wellness visit will be due in one year (3/30/2025).  The screening/preventive services that you may require over the next 5-10 years are detailed below. Some tests may not apply to you based off risk factors and/or age. Screening tests ordered at today's visit but not completed yet may show as past due. Also, please note that scanned in results may not display below.  Preventive Screenings:  Service Recommendations Previous Testing/Comments   Colorectal Cancer Screening  Colonoscopy    Fecal Occult Blood Test (FOBT)/Fecal Immunochemical Test (FIT)  Fecal DNA/Cologuard Test  Flexible Sigmoidoscopy Age: 45-75 years old   Colonoscopy: every 10 years (May be performed more frequently if at higher risk)  OR  FOBT/FIT: every 1 year  OR  Cologuard: every 3 years  OR  Sigmoidoscopy: every 5 years  Screening may be recommended earlier than age 45 if at higher risk for colorectal cancer. Also, an individualized decision between you and your healthcare provider will decide whether screening between the ages of 76-85 would be appropriate. Colonoscopy: 06/21/2019  FOBT/FIT: Not on file  Cologuard: Not on file  Sigmoidoscopy: Not on file    Screening Not Indicated     Prostate Cancer Screening Individualized decision between patient and health care provider in men between ages of 55-69   Medicare will cover every 12 months beginning on the day after your 50th birthday PSA: 1.0 ng/mL     Screening Not Indicated     Hepatitis C Screening Once for adults born between 1945 and 1965  More frequently in patients at high risk for Hepatitis C Hep C Antibody: 10/08/2018    Screening Current   Diabetes Screening 1-2 times per year if you're at risk for diabetes or have pre-diabetes Fasting glucose: 186 mg/dL (10/17/2023)  A1C: 7.2 (2/14/2024)  Screening Not Indicated  History Diabetes    Cholesterol Screening Once every 5 years if you don't have a lipid disorder. May order more often based on risk factors. Lipid panel: 05/01/2023  Screening Not Indicated  History Lipid Disorder      Other Preventive Screenings Covered by Medicare:  Abdominal Aortic Aneurysm (AAA) Screening: covered once if your at risk. You're considered to be at risk if you have a family history of AAA or a male between the age of 65-75 who smoking at least 100 cigarettes in your lifetime.  Lung Cancer Screening: covers low dose CT scan once per year if you meet all of the following conditions: (1) Age 55-77; (2) No signs or symptoms of lung cancer; (3) Current smoker or have quit smoking within the last 15 years; (4) You have a tobacco smoking history of at least 20 pack years (packs per day x number of years you smoked); (5) You get a written order from a healthcare provider.  Glaucoma Screening: covered annually if you're considered high risk: (1) You have diabetes OR (2) Family history of glaucoma OR (3)  aged 50 and older OR (4)  American aged 65 and older  Osteoporosis Screening: covered every 2 years if you meet one of the following conditions: (1) Have a vertebral abnormality; (2) On glucocorticoid therapy for more than 3 months; (3) Have primary hyperparathyroidism; (4) On osteoporosis medications and need to assess response to drug therapy.  HIV Screening: covered annually if you're between the age of 15-65. Also covered annually if you are younger than 15 and older than 65 with risk factors for HIV infection. For pregnant patients, it is covered up to 3 times per pregnancy.    Immunizations:  Immunization Recommendations   Influenza Vaccine Annual influenza vaccination during flu season is recommended for all persons aged >= 6 months who do not have contraindications   Pneumococcal Vaccine   * Pneumococcal conjugate vaccine = PCV13 (Prevnar 13), PCV15 (Vaxneuvance), PCV20 (Prevnar 20)  *  Pneumococcal polysaccharide vaccine = PPSV23 (Pneumovax) Adults 19-65 yo with certain risk factors or if 65+ yo  If never received any pneumonia vaccine: recommend Prevnar 20 (PCV20)  Give PCV20 if previously received 1 dose of PCV13 or PPSV23   Hepatitis B Vaccine 3 dose series if at intermediate or high risk (ex: diabetes, end stage renal disease, liver disease)   Respiratory syncytial virus (RSV) Vaccine - COVERED BY MEDICARE PART D  * RSVPreF3 (Arexvy) CDC recommends that adults 60 years of age and older may receive a single dose of RSV vaccine using shared clinical decision-making (SCDM)   Tetanus (Td) Vaccine - COST NOT COVERED BY MEDICARE PART B Following completion of primary series, a booster dose should be given every 10 years to maintain immunity against tetanus. Td may also be given as tetanus wound prophylaxis.   Tdap Vaccine - COST NOT COVERED BY MEDICARE PART B Recommended at least once for all adults. For pregnant patients, recommended with each pregnancy.   Shingles Vaccine (Shingrix) - COST NOT COVERED BY MEDICARE PART B  2 shot series recommended in those 19 years and older who have or will have weakened immune systems or those 50 years and older     Health Maintenance Due:      Topic Date Due   • Hepatitis C Screening  Completed   • Colorectal Cancer Screening  Discontinued     Immunizations Due:      Topic Date Due   • COVID-19 Vaccine (5 - 2023-24 season) 09/01/2023     Advance Directives   What are advance directives?  Advance directives are legal documents that state your wishes and plans for medical care. These plans are made ahead of time in case you lose your ability to make decisions for yourself. Advance directives can apply to any medical decision, such as the treatments you want, and if you want to donate organs.   What are the types of advance directives?  There are many types of advance directives, and each state has rules about how to use them. You may choose a combination of any of  the following:  Living will:  This is a written record of the treatment you want. You can also choose which treatments you do not want, which to limit, and which to stop at a certain time. This includes surgery, medicine, IV fluid, and tube feedings.   Durable power of  for healthcare (DPAHC):  This is a written record that states who you want to make healthcare choices for you when you are unable to make them for yourself. This person, called a proxy, is usually a family member or a friend. You may choose more than 1 proxy.  Do not resuscitate (DNR) order:  A DNR order is used in case your heart stops beating or you stop breathing. It is a request not to have certain forms of treatment, such as CPR. A DNR order may be included in other types of advance directives.  Medical directive:  This covers the care that you want if you are in a coma, near death, or unable to make decisions for yourself. You can list the treatments you want for each condition. Treatment may include pain medicine, surgery, blood transfusions, dialysis, IV or tube feedings, and a ventilator (breathing machine).  Values history:  This document has questions about your views, beliefs, and how you feel and think about life. This information can help others choose the care that you would choose.  Why are advance directives important?  An advance directive helps you control your care. Although spoken wishes may be used, it is better to have your wishes written down. Spoken wishes can be misunderstood, or not followed. Treatments may be given even if you do not want them. An advance directive may make it easier for your family to make difficult choices about your care.   Weight Management   Why it is important to manage your weight:  Being overweight increases your risk of health conditions such as heart disease, high blood pressure, type 2 diabetes, and certain types of cancer. It can also increase your risk for osteoarthritis, sleep apnea,  and other respiratory problems. Aim for a slow, steady weight loss. Even a small amount of weight loss can lower your risk of health problems.  How to lose weight safely:  A safe and healthy way to lose weight is to eat fewer calories and get regular exercise. You can lose up about 1 pound a week by decreasing the number of calories you eat by 500 calories each day.   Healthy meal plan for weight management:  A healthy meal plan includes a variety of foods, contains fewer calories, and helps you stay healthy. A healthy meal plan includes the following:  Eat whole-grain foods more often.  A healthy meal plan should contain fiber. Fiber is the part of grains, fruits, and vegetables that is not broken down by your body. Whole-grain foods are healthy and provide extra fiber in your diet. Some examples of whole-grain foods are whole-wheat breads and pastas, oatmeal, brown rice, and bulgur.  Eat a variety of vegetables every day.  Include dark, leafy greens such as spinach, kale, carlito greens, and mustard greens. Eat yellow and orange vegetables such as carrots, sweet potatoes, and winter squash.   Eat a variety of fruits every day.  Choose fresh or canned fruit (canned in its own juice or light syrup) instead of juice. Fruit juice has very little or no fiber.  Eat low-fat dairy foods.  Drink fat-free (skim) milk or 1% milk. Eat fat-free yogurt and low-fat cottage cheese. Try low-fat cheeses such as mozzarella and other reduced-fat cheeses.  Choose meat and other protein foods that are low in fat.  Choose beans or other legumes such as split peas or lentils. Choose fish, skinless poultry (chicken or turkey), or lean cuts of red meat (beef or pork). Before you cook meat or poultry, cut off any visible fat.   Use less fat and oil.  Try baking foods instead of frying them. Add less fat, such as margarine, sour cream, regular salad dressing and mayonnaise to foods. Eat fewer high-fat foods. Some examples of high-fat foods  include french fries, doughnuts, ice cream, and cakes.  Eat fewer sweets.  Limit foods and drinks that are high in sugar. This includes candy, cookies, regular soda, and sweetened drinks.  Exercise:  Exercise at least 30 minutes per day on most days of the week. Some examples of exercise include walking, biking, dancing, and swimming. You can also fit in more physical activity by taking the stairs instead of the elevator or parking farther away from stores. Ask your healthcare provider about the best exercise plan for you.      © Copyright [employer] 2018 Information is for End User's use only and may not be sold, redistributed or otherwise used for commercial purposes. All illustrations and images included in CareNotes® are the copyrighted property of [employer] or [employer] (formerly [employer])

## 2024-03-29 NOTE — ASSESSMENT & PLAN NOTE
Left knee pain.  Patient was given a referral to see [facility] orthopedist Dr. Shannon for reevaluation for future knee replacement surgery.

## 2024-04-01 ENCOUNTER — ANTICOAG VISIT (OUTPATIENT)
Dept: CARDIOLOGY CLINIC | Facility: CLINIC | Age: 79
End: 2024-04-01

## 2024-04-02 ENCOUNTER — OFFICE VISIT (OUTPATIENT)
Dept: PHYSICAL THERAPY | Facility: REHABILITATION | Age: 79
End: 2024-04-02
Payer: MEDICARE

## 2024-04-02 DIAGNOSIS — M47.816 LUMBAR SPONDYLOSIS: ICD-10-CM

## 2024-04-02 DIAGNOSIS — G89.29 CHRONIC MIDLINE LOW BACK PAIN WITHOUT SCIATICA: Primary | ICD-10-CM

## 2024-04-02 DIAGNOSIS — M54.50 CHRONIC MIDLINE LOW BACK PAIN WITHOUT SCIATICA: Primary | ICD-10-CM

## 2024-04-02 PROCEDURE — 97110 THERAPEUTIC EXERCISES: CPT

## 2024-04-02 PROCEDURE — 97112 NEUROMUSCULAR REEDUCATION: CPT

## 2024-04-02 NOTE — PROGRESS NOTES
"Daily Note     Today's date: 2024  Patient name: Isauro Sow  : 1945  MRN: [id number]  Referring provider: Rangel Hahn DO  Dx:   Encounter Diagnosis     ICD-10-CM    1. Chronic midline low back pain without sciatica  M54.50     G89.29       2. Lumbar spondylosis  M47.816           Start Time: 08  Stop Time: 915  Total time in clinic (min): 30 minutes    Subjective: Patient reports his back did well over the weekend. States some mild soreness which improved when he did the HEP.       Objective: See treatment diary below      Assessment: Tolerated treatment well today. Muscular fatigue throughout session. Progress as tolerated. Patient would benefit from continued PT      Plan: Continue per plan of care.      Precautions: falls, cardiac, hx of CVA      Manuals 3/20 3/21 3/27 4                                                             Neuro Re-Ed             Kelsy Pallof Press    5# 2x10 ea                                                                                       Ther Ex             Pt Edu, HEP PRR            Supine Clamshells HEP 2x10 yhb 3\" hold 2x10 rhb 3\" hold 2x15 rhb 3\" hold         Supine Bridges HEP 2x10 yhb  2x10 rhb 2x15 rhb         LTR  2x10 ea 2x10 ea 2x10 ea         STS   5# kb 4x5 1 foam 5# kb 4x5 no foam 5# kb 2x10 no foam         Deadlift from Chair   5# kb 3x5 5# bk 2x10                       VG   L7 5' DL L7 5' DL L7 5' DL         Ther Activity                                       Gait Training                                       Modalities                                                "

## 2024-04-04 ENCOUNTER — OFFICE VISIT (OUTPATIENT)
Dept: PHYSICAL THERAPY | Facility: REHABILITATION | Age: 79
End: 2024-04-04
Payer: MEDICARE

## 2024-04-04 ENCOUNTER — REMOTE DEVICE CLINIC VISIT (OUTPATIENT)
Dept: CARDIOLOGY CLINIC | Facility: CLINIC | Age: 79
End: 2024-04-04
Payer: MEDICARE

## 2024-04-04 DIAGNOSIS — G89.29 CHRONIC MIDLINE LOW BACK PAIN WITHOUT SCIATICA: Primary | ICD-10-CM

## 2024-04-04 DIAGNOSIS — Z95.0 CARDIAC PACEMAKER IN SITU: Primary | ICD-10-CM

## 2024-04-04 DIAGNOSIS — M47.816 LUMBAR SPONDYLOSIS: ICD-10-CM

## 2024-04-04 DIAGNOSIS — M54.50 CHRONIC MIDLINE LOW BACK PAIN WITHOUT SCIATICA: Primary | ICD-10-CM

## 2024-04-04 PROCEDURE — 93296 REM INTERROG EVL PM/IDS: CPT | Performed by: STUDENT IN AN ORGANIZED HEALTH CARE EDUCATION/TRAINING PROGRAM

## 2024-04-04 PROCEDURE — 93294 REM INTERROG EVL PM/LDLS PM: CPT | Performed by: STUDENT IN AN ORGANIZED HEALTH CARE EDUCATION/TRAINING PROGRAM

## 2024-04-04 PROCEDURE — 97112 NEUROMUSCULAR REEDUCATION: CPT

## 2024-04-04 PROCEDURE — 97110 THERAPEUTIC EXERCISES: CPT

## 2024-04-04 NOTE — PROGRESS NOTES
"Daily Note     Today's date: 2024  Patient name: Isauro Sow  : 1945  MRN: [id number]  Referring provider: Rangel Hahn DO  Dx:   Encounter Diagnosis     ICD-10-CM    1. Chronic midline low back pain without sciatica  M54.50     G89.29       2. Lumbar spondylosis  M47.816           Start Time: 1700  Stop Time: 1738  Total time in clinic (min): 38 minutes    Subjective: patient reports some stiffness in the right low back that started last night and lingered into today.       Objective: See treatment diary below      Assessment: Tolerated treatment well today. Improvements in symptoms end of session. Progress as tolerated. Patient would benefit from continued PT      Plan: Continue per plan of care.      Precautions: falls, cardiac, hx of CVA      Manuals 3/20 3/21 3/27 4/2 4/4                                                            Neuro Re-Ed             Kelsy Pallof Press    5# 2x10 ea 7# 2x10 ea                                                                                      Ther Ex             Pt Edu, HEP PRR            Supine Clamshells HEP 2x10 yhb 3\" hold 2x10 rhb 3\" hold 2x15 rhb 3\" hold 2x10 bhb 3\" hold        Supine Bridges HEP 2x10 yhb  2x10 rhb 2x15 rhb 2x10 bhb        LTR  2x10 ea 2x10 ea 2x10 ea 2x10 ea        STS   5# kb 4x5 1 foam 5# kb 4x5 no foam 5# kb 2x10 no foam 5# kb 2x10 no foam        Deadlift from Chair   5# kb 3x5 5# bk 2x10  5# kb 2x10        Suitcase Carry     10# kb 3 laps up/back        VG   L7 5' DL L7 5' DL L7 5' DL L7 5' DL        Ther Activity                                       Gait Training                                       Modalities                                                  "

## 2024-04-04 NOTE — PROGRESS NOTES
Results for orders placed or performed in visit on 04/04/24   Cardiac EP device report    Narrative    MDT DUAL CHAMBER PPM (MVP ON) - ACTIVE SYSTEM IS MRI CONDITIONAL  CARELINK TRANSMISSION: BATTERY VOLTAGE ADEQUATE (10.9 YRS). AP-88%, -97% (>40% MVP@60PPM). ALL AVAILABLE LEAD PARAMETERS WITHIN NORMAL LIMITS. NO SIGNIFICANT HIGH RATE EPISODES. NORMAL DEVICE FUNCTION. GV

## 2024-04-05 ENCOUNTER — OFFICE VISIT (OUTPATIENT)
Dept: FAMILY MEDICINE CLINIC | Facility: CLINIC | Age: 79
End: 2024-04-05
Payer: MEDICARE

## 2024-04-05 VITALS
SYSTOLIC BLOOD PRESSURE: 116 MMHG | WEIGHT: 216 LBS | BODY MASS INDEX: 30.24 KG/M2 | HEIGHT: 71 IN | HEART RATE: 68 BPM | RESPIRATION RATE: 16 BRPM | DIASTOLIC BLOOD PRESSURE: 64 MMHG | OXYGEN SATURATION: 98 % | TEMPERATURE: 97.8 F

## 2024-04-05 DIAGNOSIS — L03.114 CELLULITIS OF LEFT UPPER EXTREMITY: Primary | ICD-10-CM

## 2024-04-05 DIAGNOSIS — L30.9 DERMATITIS: ICD-10-CM

## 2024-04-05 PROCEDURE — 99213 OFFICE O/P EST LOW 20 MIN: CPT | Performed by: FAMILY MEDICINE

## 2024-04-05 PROCEDURE — G2211 COMPLEX E/M VISIT ADD ON: HCPCS | Performed by: FAMILY MEDICINE

## 2024-04-05 RX ORDER — AZITHROMYCIN 250 MG/1
TABLET, FILM COATED ORAL DAILY
Qty: 6 TABLET | Refills: 0 | Status: SHIPPED | OUTPATIENT
Start: 2024-04-05 | End: 2024-04-10

## 2024-04-05 RX ORDER — TRIAMCINOLONE ACETONIDE 5 MG/G
CREAM TOPICAL 2 TIMES DAILY
Qty: 45 G | Refills: 2 | Status: SHIPPED | OUTPATIENT
Start: 2024-04-05

## 2024-04-05 NOTE — ASSESSMENT & PLAN NOTE
Cellulitis.  Patient perhaps with insect bite that caused mild cellulitis.  He was given prescription for Zithromax Z-Wilder to take as directed.  He will call if symptoms worsen at any point
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
## 2024-04-05 NOTE — ASSESSMENT & PLAN NOTE
Dermatitis.  Patient with suspected eczema.  He was given triamcinolone cream to use twice daily until dryness and itching improve where he can switch to using over-the-counter Eucerin cream to prevent dryness of skin.

## 2024-04-08 ENCOUNTER — OFFICE VISIT (OUTPATIENT)
Dept: PHYSICAL THERAPY | Facility: REHABILITATION | Age: 79
End: 2024-04-08
Payer: MEDICARE

## 2024-04-08 DIAGNOSIS — M54.50 CHRONIC MIDLINE LOW BACK PAIN WITHOUT SCIATICA: Primary | ICD-10-CM

## 2024-04-08 DIAGNOSIS — M47.816 LUMBAR SPONDYLOSIS: ICD-10-CM

## 2024-04-08 DIAGNOSIS — G89.29 CHRONIC MIDLINE LOW BACK PAIN WITHOUT SCIATICA: Primary | ICD-10-CM

## 2024-04-08 PROCEDURE — 97110 THERAPEUTIC EXERCISES: CPT

## 2024-04-08 PROCEDURE — 97112 NEUROMUSCULAR REEDUCATION: CPT

## 2024-04-08 NOTE — PROGRESS NOTES
"Daily Note     Today's date: 2024  Patient name: Isauro Sow  : 1945  MRN: [id number]  Referring provider: Rangel Hahn DO  Dx:   Encounter Diagnosis     ICD-10-CM    1. Chronic midline low back pain without sciatica  M54.50     G89.29       2. Lumbar spondylosis  M47.816           Start Time: 0800  Stop Time: 0840  Total time in clinic (min): 40 minutes    Subjective: Patient his back was sore after mowing all his lawn over weekend. States he should have broken it up. Patient reports his low back has been doing well with the exception of getting sore after mowing the lawn.       Objective: See treatment diary below      Assessment: Tolerated treatment well today. Improved soreness/stiffness in low back end of session. Patient continues to have positive responses to activities. Progress being made with each week. Patient would benefit from continued PT      Plan: Continue per plan of care.      Precautions: falls, cardiac, hx of CVA      Manuals 3/20 3/21 3/27 4/2 4/4 4/8                                                           Neuro Re-Ed             [place] Pallof Press    5# 2x10 ea 7# 2x10 ea 7# 2x10 ea                                                                                     Ther Ex             Pt Edu, HEP PRR            Supine Clamshells HEP 2x10 yhb 3\" hold 2x10 rhb 3\" hold 2x15 rhb 3\" hold 2x10 bhb 3\" hold 2x20 rhb 3\" hold       Supine Bridges HEP 2x10 yhb  2x10 rhb 2x15 rhb 2x10 bhb 2x20 rhb       LTR  2x10 ea 2x10 ea 2x10 ea 2x10 ea 2x10 ea       STS   5# kb 4x5 1 foam 5# kb 4x5 no foam 5# kb 2x10 no foam 5# kb 2x10 no foam 7# kb 2x10 chair       Deadlift from Chair   5# kb 3x5 5# bk 2x10  5# kb 2x10 7# kb 2x10        Suitcase Carry     10# kb 3 laps up/back 10# kb 4 laps up/back       [place] Step Backs      10# 10x handles       VG   L7 5' DL L7 5' DL L7 5' DL L7 5' DL L7 5' DL       Ther Activity                                       Gait Training                              "          Modalities

## 2024-04-09 ENCOUNTER — APPOINTMENT (OUTPATIENT)
Dept: PHYSICAL THERAPY | Facility: REHABILITATION | Age: 79
End: 2024-04-09
Payer: MEDICARE

## 2024-04-11 ENCOUNTER — APPOINTMENT (OUTPATIENT)
Dept: PHYSICAL THERAPY | Facility: REHABILITATION | Age: 79
End: 2024-04-11
Payer: MEDICARE

## 2024-04-11 ENCOUNTER — OFFICE VISIT (OUTPATIENT)
Dept: PHYSICAL THERAPY | Facility: REHABILITATION | Age: 79
End: 2024-04-11
Payer: MEDICARE

## 2024-04-11 DIAGNOSIS — M47.816 LUMBAR SPONDYLOSIS: ICD-10-CM

## 2024-04-11 DIAGNOSIS — G89.29 CHRONIC MIDLINE LOW BACK PAIN WITHOUT SCIATICA: Primary | ICD-10-CM

## 2024-04-11 DIAGNOSIS — M54.50 CHRONIC MIDLINE LOW BACK PAIN WITHOUT SCIATICA: Primary | ICD-10-CM

## 2024-04-11 PROCEDURE — 97112 NEUROMUSCULAR REEDUCATION: CPT

## 2024-04-11 PROCEDURE — 97110 THERAPEUTIC EXERCISES: CPT

## 2024-04-11 NOTE — PROGRESS NOTES
"Daily Note     Today's date: 2024  Patient name: Isauro Sow  : 1945  MRN: [id number]  Referring provider: Rangel Hahn DO  Dx:   Encounter Diagnosis     ICD-10-CM    1. Chronic midline low back pain without sciatica  M54.50     G89.29       2. Lumbar spondylosis  M47.816           Start Time: 855  Stop Time: 933  Total time in clinic (min): 38 minutes    Subjective: Patient reports he didn't have any pain getting out of pain since last visit. States his low back was sore from yardwork.       Objective: See treatment diary below      Assessment: Tolerated treatment well today. Patient continues to make progress with each week. Progress as tolerated. Patient would benefit from continued PT      Plan: Continue per plan of care.      Precautions: falls, cardiac, hx of CVA      Manuals 3/20 3/21 3/27 4/2 4/4 4/8 4/11                                                          Neuro Re-Ed             [place] Pallof Press    5# 2x10 ea 7# 2x10 ea 7# 2x10 ea 8# 2x10 ea      [place] Side Stepping       5# 1x10 ea                                                                       Ther Ex             Pt Edu, HEP PRR            Supine Clamshells HEP 2x10 yhb 3\" hold 2x10 rhb 3\" hold 2x15 rhb 3\" hold 2x10 bhb 3\" hold 2x20 rhb 3\" hold 2x20 bhb 3\" hold      Supine Bridges HEP 2x10 yhb  2x10 rhb 2x15 rhb 2x10 bhb 2x20 rhb 2x20 bhb      LTR  2x10 ea 2x10 ea 2x10 ea 2x10 ea 2x10 ea 2x10 ea      STS   5# kb 4x5 1 foam 5# kb 4x5 no foam 5# kb 2x10 no foam 5# kb 2x10 no foam 7# kb 2x10 chair 7# kb 2x10 chair      Deadlift from Chair   5# kb 3x5 5# bk 2x10  5# kb 2x10 7# kb 2x10  7# kb 3x10      Suitcase Carry     10# kb 3 laps up/back 10# kb 4 laps up/back 10# kb 4 laps up/back      [place] Step Backs      10# 10x handles Np resume      VG   L7 5' DL L7 5' DL L7 5' DL L7 5' DL L7 5' DL L7 5' DL      Ther Activity                                       Gait Training                                       Modalities      "

## 2024-04-11 NOTE — ASSESSMENT & PLAN NOTE
Lab Results   Component Value Date    HGBA1C 6 9 (A) 06/16/2022       Recent Labs     08/16/22  0838 08/16/22  1139 08/16/22  1222 08/16/22  1704   POCGLU 104 158* 150* 151*       Blood Sugar Average: Last 72 hrs:  (P) 77 [id number]     - Continue sliding scale insulin  - Monitor blood sugars  - Outpatient follow-up with PCP Spontaneous

## 2024-04-12 ENCOUNTER — OFFICE VISIT (OUTPATIENT)
Dept: OBGYN CLINIC | Facility: CLINIC | Age: 79
End: 2024-04-12
Payer: MEDICARE

## 2024-04-12 ENCOUNTER — TELEPHONE (OUTPATIENT)
Dept: NEUROLOGY | Facility: CLINIC | Age: 79
End: 2024-04-12

## 2024-04-12 VITALS
DIASTOLIC BLOOD PRESSURE: 71 MMHG | WEIGHT: 216 LBS | BODY MASS INDEX: 30.13 KG/M2 | HEART RATE: 84 BPM | SYSTOLIC BLOOD PRESSURE: 179 MMHG

## 2024-04-12 DIAGNOSIS — M17.12 PRIMARY OSTEOARTHRITIS OF LEFT KNEE: Primary | ICD-10-CM

## 2024-04-12 PROCEDURE — 20610 DRAIN/INJ JOINT/BURSA W/O US: CPT

## 2024-04-12 PROCEDURE — 99213 OFFICE O/P EST LOW 20 MIN: CPT | Performed by: ORTHOPAEDIC SURGERY

## 2024-04-12 RX ORDER — BETAMETHASONE SODIUM PHOSPHATE AND BETAMETHASONE ACETATE 3; 3 MG/ML; MG/ML
6 INJECTION, SUSPENSION INTRA-ARTICULAR; INTRALESIONAL; INTRAMUSCULAR; SOFT TISSUE
Status: COMPLETED | OUTPATIENT
Start: 2024-04-12 | End: 2024-04-12

## 2024-04-12 RX ORDER — BUPIVACAINE HYDROCHLORIDE 2.5 MG/ML
2 INJECTION, SOLUTION INFILTRATION; PERINEURAL
Status: COMPLETED | OUTPATIENT
Start: 2024-04-12 | End: 2024-04-12

## 2024-04-12 RX ADMIN — BUPIVACAINE HYDROCHLORIDE 2 ML: 2.5 INJECTION, SOLUTION INFILTRATION; PERINEURAL at 10:00

## 2024-04-12 RX ADMIN — BETAMETHASONE SODIUM PHOSPHATE AND BETAMETHASONE ACETATE 6 MG: 3; 3 INJECTION, SUSPENSION INTRA-ARTICULAR; INTRALESIONAL; INTRAMUSCULAR; SOFT TISSUE at 10:00

## 2024-04-12 NOTE — TELEPHONE ENCOUNTER
Patient daughter in law called in to inquire if the practiced would be able to provide surgery clearance for patient.    Patients LOV 2/16/23
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
## 2024-04-15 ENCOUNTER — ANTICOAG VISIT (OUTPATIENT)
Dept: CARDIOLOGY CLINIC | Facility: CLINIC | Age: 79
End: 2024-04-15

## 2024-04-15 ENCOUNTER — OFFICE VISIT (OUTPATIENT)
Dept: PHYSICAL THERAPY | Facility: REHABILITATION | Age: 79
End: 2024-04-15
Payer: MEDICARE

## 2024-04-15 ENCOUNTER — APPOINTMENT (OUTPATIENT)
Dept: LAB | Facility: CLINIC | Age: 79
End: 2024-04-15
Payer: MEDICARE

## 2024-04-15 DIAGNOSIS — M47.816 LUMBAR SPONDYLOSIS: ICD-10-CM

## 2024-04-15 DIAGNOSIS — M54.50 CHRONIC MIDLINE LOW BACK PAIN WITHOUT SCIATICA: Primary | ICD-10-CM

## 2024-04-15 DIAGNOSIS — G89.29 CHRONIC MIDLINE LOW BACK PAIN WITHOUT SCIATICA: Primary | ICD-10-CM

## 2024-04-15 PROCEDURE — 97112 NEUROMUSCULAR REEDUCATION: CPT | Performed by: PHYSICAL THERAPIST

## 2024-04-15 PROCEDURE — 97110 THERAPEUTIC EXERCISES: CPT | Performed by: PHYSICAL THERAPIST

## 2024-04-15 NOTE — PROGRESS NOTES
"Daily Note     Today's date: 4/15/2024  Patient name: Isauro Sow  : 1945  MRN: [id number]  Referring provider: Rangel Hahn DO  Dx:   Encounter Diagnosis     ICD-10-CM    1. Chronic midline low back pain without sciatica  M54.50     G89.29       2. Lumbar spondylosis  M47.816                      Subjective: Pt reports that is back is feeling better.       Objective: See treatment diary below      Assessment: Tolerated treatment well. Patient demonstrated fatigue post treatment, exhibited good technique with therapeutic exercises, and would benefit from continued PT. He was able to integrate retro walking at the [place] without LOB. Unable to progress STS this visit secondary to his knee. Continue to progress functional strengthening as able.       Plan: Continue per plan of care.  Progress treatment as tolerated.       Precautions: falls, cardiac, hx of CVA      Manuals 3/20 3/21 3/27 4/2 4/4 4/8 4/11 4/15                                                         Neuro Re-Ed             [place] Pallof Press    5# 2x10 ea 7# 2x10 ea 7# 2x10 ea 8# 2x10 ea 8# 2x10 ea     [place] Side Stepping       5# 1x10 ea 5# 1x10 ea                                                                      Ther Ex             Pt Edu, HEP PRR            Supine Clamshells HEP 2x10 yhb 3\" hold 2x10 rhb 3\" hold 2x15 rhb 3\" hold 2x10 bhb 3\" hold 2x20 rhb 3\" hold 2x20 bhb 3\" hold 2x20 bhb 3\" hold     Supine Bridges HEP 2x10 yhb  2x10 rhb 2x15 rhb 2x10 bhb 2x20 rhb 2x20 bhb 2x20 bhb     LTR  2x10 ea 2x10 ea 2x10 ea 2x10 ea 2x10 ea 2x10 ea 2x10 ea     STS   5# kb 4x5 1 foam 5# kb 4x5 no foam 5# kb 2x10 no foam 5# kb 2x10 no foam 7# kb 2x10 chair 7# kb 2x10 chair 7# kb 2x10 chair     Deadlift from Chair   5# kb 3x5 5# bk 2x10  5# kb 2x10 7# kb 2x10  7# kb 3x10 7# kb 3x10     Suitcase Carry     10# kb 3 laps up/back 10# kb 4 laps up/back 10# kb 4 laps up/back 10# kb 4 laps up/back     [place] Step Backs      10# 10x handles Np resume " 10# 10x handles     VG   L7 5' DL L7 5' DL L7 5' DL L7 5' DL L7 5' DL L7 5' DL L7 5' DL     Ther Activity                                       Gait Training                                       Modalities

## 2024-04-17 ENCOUNTER — OFFICE VISIT (OUTPATIENT)
Dept: PHYSICAL THERAPY | Facility: REHABILITATION | Age: 79
End: 2024-04-17
Payer: MEDICARE

## 2024-04-17 DIAGNOSIS — M47.816 LUMBAR SPONDYLOSIS: ICD-10-CM

## 2024-04-17 DIAGNOSIS — G89.29 CHRONIC MIDLINE LOW BACK PAIN WITHOUT SCIATICA: Primary | ICD-10-CM

## 2024-04-17 DIAGNOSIS — M54.50 CHRONIC MIDLINE LOW BACK PAIN WITHOUT SCIATICA: Primary | ICD-10-CM

## 2024-04-17 PROCEDURE — 97112 NEUROMUSCULAR REEDUCATION: CPT

## 2024-04-17 PROCEDURE — 97110 THERAPEUTIC EXERCISES: CPT

## 2024-04-17 NOTE — PROGRESS NOTES
"Daily Note     Today's date: 2024  Patient name: Isauro Sow  : 1945  MRN: [id number]  Referring provider: Rangel Hahn DO  Dx:   Encounter Diagnosis     ICD-10-CM    1. Chronic midline low back pain without sciatica  M54.50     G89.29       2. Lumbar spondylosis  M47.816           Start Time: 845  Stop Time: 925  Total time in clinic (min): 40 minutes    Subjective: Patient reports his back was feeling good over the weekend.       Objective: See treatment diary below      Assessment: Tolerated treatment well today. Patient making good progress in his low back symptoms. Will continue PT for another 2 weeks and then determine a possible d/c. Patient would benefit from continued PT      Plan: Continue per plan of care.      Precautions: falls, cardiac, hx of CVA      Manuals 3/20 3/21 3/27 4/2 4/4 4/8 4/11 4/15 4/17                                                        Neuro Re-Ed             [place] Pallof Press    5# 2x10 ea 7# 2x10 ea 7# 2x10 ea 8# 2x10 ea 8# 2x10 ea 8# 2x15 ea    [place] Side Stepping       5# 1x10 ea 5# 1x10 ea 5# 1x10 ea                                                                     Ther Ex             Pt Edu, HEP PRR            Supine Clamshells HEP 2x10 yhb 3\" hold 2x10 rhb 3\" hold 2x15 rhb 3\" hold 2x10 bhb 3\" hold 2x20 rhb 3\" hold 2x20 bhb 3\" hold 2x20 bhb 3\" hold 2x20 bhb 3\" hold    Supine Bridges HEP 2x10 yhb  2x10 rhb 2x15 rhb 2x10 bhb 2x20 rhb 2x20 bhb 2x20 bhb 2x20 bhb    LTR  2x10 ea 2x10 ea 2x10 ea 2x10 ea 2x10 ea 2x10 ea 2x10 ea 2x10 ea    STS   5# kb 4x5 1 foam 5# kb 4x5 no foam 5# kb 2x10 no foam 5# kb 2x10 no foam 7# kb 2x10 chair 7# kb 2x10 chair 7# kb 2x10 chair 8# kb 2x10    Deadlift from Chair   5# kb 3x5 5# bk 2x10  5# kb 2x10 7# kb 2x10  7# kb 3x10 7# kb 3x10 8# kb 3x10    Suitcase Carry     10# kb 3 laps up/back 10# kb 4 laps up/back 10# kb 4 laps up/back 10# kb 4 laps up/back 10# kb 4 laps up/back    [place] Step Backs      10# 10x handles Np resume " 10# 10x handles 10# 10x handles    VG   L7 5' DL L7 5' DL L7 5' DL L7 5' DL L7 5' DL L7 5' DL L7 5' DL L7 3' DL    Ther Activity                                       Gait Training                                       Modalities

## 2024-04-22 ENCOUNTER — OFFICE VISIT (OUTPATIENT)
Dept: PHYSICAL THERAPY | Facility: REHABILITATION | Age: 79
End: 2024-04-22
Payer: MEDICARE

## 2024-04-22 DIAGNOSIS — M54.50 CHRONIC MIDLINE LOW BACK PAIN WITHOUT SCIATICA: Primary | ICD-10-CM

## 2024-04-22 DIAGNOSIS — G89.29 CHRONIC MIDLINE LOW BACK PAIN WITHOUT SCIATICA: Primary | ICD-10-CM

## 2024-04-22 DIAGNOSIS — M47.816 LUMBAR SPONDYLOSIS: ICD-10-CM

## 2024-04-22 PROCEDURE — 97112 NEUROMUSCULAR REEDUCATION: CPT

## 2024-04-22 PROCEDURE — 97110 THERAPEUTIC EXERCISES: CPT

## 2024-04-22 NOTE — PROGRESS NOTES
"Daily Note     Today's date: 2024  Patient name: Isauro Sow  : 1945  MRN: [id number]  Referring provider: Rangel Hahn DO  Dx:   Encounter Diagnosis     ICD-10-CM    1. Chronic midline low back pain without sciatica  M54.50     G89.29       2. Lumbar spondylosis  M47.816           Start Time: 1600  Stop Time: 1635  Total time in clinic (min): 35 minutes  Patient 1 on  with PT from 400-430p.    Subjective: Patient reports his back is doing well. States soreness after mowing lawn on Saturday but denies any soreness after mowing lawn on .       Objective: See treatment diary below      Assessment: Tolerated treatment well today. Patient continues to have positive responses to light strengthening and ROM of hip and low back. Symptoms steadily improving. Patient would benefit from continued PT      Plan: Continue per plan of care.      Precautions: falls, cardiac, hx of CVA      Manuals 3/20 3/21 3/27 4/2 4/4 4/8 4/11 4/15 4/17 4/22                                                       Neuro Re-Ed             [place] Pall[place] [employer]    5# 2x10 ea 7# 2x10 ea 7# 2x10 ea 8# 2x10 ea 8# 2x10 ea 8# 2x15 ea 8# 2x15 ea   [place] Side Stepping       5# 1x10 ea 5# 1x10 ea 5# 1x10 ea 5# 2x10 ea                                                                    Ther Ex             Pt Edu, HEP PRR            Supine Clamshells HEP 2x10 yhb 3\" hold 2x10 rhb 3\" hold 2x15 rhb 3\" hold 2x10 bhb 3\" hold 2x20 rhb 3\" hold 2x20 bhb 3\" hold 2x20 bhb 3\" hold 2x20 bhb 3\" hold 2x20 rhb 3\" hold   Supine Bridges HEP 2x10 yhb  2x10 rhb 2x15 rhb 2x10 bhb 2x20 rhb 2x20 bhb 2x20 bhb 2x20 bhb 2x20 rhb   LTR  2x10 ea 2x10 ea 2x10 ea 2x10 ea 2x10 ea 2x10 ea 2x10 ea 2x10 ea 2x10 ea    STS   5# kb 4x5 1 foam 5# kb 4x5 no foam 5# kb 2x10 no foam 5# kb 2x10 no foam 7# kb 2x10 chair 7# kb 2x10 chair 7# kb 2x10 chair 8# kb 2x10 8# kb 2x10   Deadlift from Chair   5# kb 3x5 5# bk 2x10  5# kb 2x10 7# kb 2x10  7# kb 3x10 7# kb 3x10 8# kb 3x10 " 8# kb 3x10   Suitcase Carry     10# kb 3 laps up/back 10# kb 4 laps up/back 10# kb 4 laps up/back 10# kb 4 laps up/back 10# kb 4 laps up/back 10# kb 4 laps up/back   [place] Step Backs      10# 10x handles Np resume 10# 10x handles 10# 10x handles 10# 2x10 handles   VG   L7 5' DL L7 5' DL L7 5' DL L7 5' DL L7 5' DL L7 5' DL L7 5' DL L7 3' DL L7 4' DL   Ther Activity                                       Gait Training                                       Modalities

## 2024-04-24 ENCOUNTER — OFFICE VISIT (OUTPATIENT)
Dept: PHYSICAL THERAPY | Facility: REHABILITATION | Age: 79
End: 2024-04-24
Payer: MEDICARE

## 2024-04-24 DIAGNOSIS — M54.50 CHRONIC MIDLINE LOW BACK PAIN WITHOUT SCIATICA: Primary | ICD-10-CM

## 2024-04-24 DIAGNOSIS — M47.816 LUMBAR SPONDYLOSIS: ICD-10-CM

## 2024-04-24 DIAGNOSIS — G89.29 CHRONIC MIDLINE LOW BACK PAIN WITHOUT SCIATICA: Primary | ICD-10-CM

## 2024-04-24 PROCEDURE — 97140 MANUAL THERAPY 1/> REGIONS: CPT

## 2024-04-24 PROCEDURE — 97110 THERAPEUTIC EXERCISES: CPT

## 2024-04-24 PROCEDURE — 97112 NEUROMUSCULAR REEDUCATION: CPT

## 2024-04-24 NOTE — PROGRESS NOTES
"Progress / Daily Note     Today's date: 2024  Patient name: Isauro Sow  : 1945  MRN: [id number]  Referring provider: Rangel Hahn DO  Dx:   Encounter Diagnosis     ICD-10-CM    1. Chronic midline low back pain without sciatica  M54.50     G89.29       2. Lumbar spondylosis  M47.816           Start Time: 835  Stop Time: 920  Total time in clinic (min): 45 minutes    Subjective: Patient reports his low back is doing well. States it is about 80-85% improved at this time. Patient reports no significanyt pain in the low back at this time he just experiences some soreness after mowing the lawn.    Pain Levels  Worst: 2/10      Objective: See treatment diary below      Assessment:   Patient has actively participated in 10 visits of skilled physical therapy at this time and made good improvements in his pain and function. FOTO scored improved by 38 points (from 49 to 94) points indicating functional improvements. Symptoms limited to only following mowing the lawn, but resolve quickly. Patient would benefit from continued PT      Goals  Short Term Goals (Week 4): met all  1. Decreased pain by 50%  2. Demonstrate pain free lumbar AROM in all directions    Long Term Goals (8 weeks): met all  1. GROC >75%  2. Patient will exceed FOTO predicted outcome score  3. Patient will be fully independent with HEP by discharge  4. Patient will be able to manage symptoms independently.         Plan: Continue per plan of care.      Precautions: falls, cardiac, hx of CVA      Manuals 4/24    4/4 4/8 4/11 4/15 4/17 4/22                                          Re-Eval PRR            Neuro Re-Ed             [place] Pall[place] Press 9# 2x10    7# 2x10 ea 7# 2x10 ea 8# 2x10 ea 8# 2x10 ea 8# 2x15 ea 8# 2x15 ea   [place] Side Stepping       5# 1x10 ea 5# 1x10 ea 5# 1x10 ea 5# 2x10 ea                                                                    Ther Ex             Pt Edu, HEP             Supine Clamshells 2x20 bhb 3\" hold    " "2x10 bhb 3\" hold 2x20 rhb 3\" hold 2x20 bhb 3\" hold 2x20 bhb 3\" hold 2x20 bhb 3\" hold 2x20 rhb 3\" hold   Supine Bridges 2x20 bhb    2x10 bhb 2x20 rhb 2x20 bhb 2x20 bhb 2x20 bhb 2x20 rhb   LTR 2x10 ea    2x10 ea 2x10 ea 2x10 ea 2x10 ea 2x10 ea 2x10 ea    STS  8# kb 1x10    5# kb 2x10 no foam 7# kb 2x10 chair 7# kb 2x10 chair 7# kb 2x10 chair 8# kb 2x10 8# kb 2x10   Deadlift from Chair 8# kb 3x10    5# kb 2x10 7# kb 2x10  7# kb 3x10 7# kb 3x10 8# kb 3x10 8# kb 3x10   Suitcase Carry 15# kb 4 laps up/back    10# kb 3 laps up/back 10# kb 4 laps up/back 10# kb 4 laps up/back 10# kb 4 laps up/back 10# kb 4 laps up/back 10# kb 4 laps up/back   [place] Step Backs 10# 2x10 handles     10# 10x handles Np resume 10# 10x handles 10# 10x handles 10# 2x10 handles   VG      L7 5' DL L7 5' DL L7 5' DL L7 5' DL L7 3' DL L7 4' DL   Bike L1 5'         L1 5'   Ther Activity                                       Gait Training                                       Modalities                                                              "

## 2024-04-29 ENCOUNTER — OFFICE VISIT (OUTPATIENT)
Dept: PHYSICAL THERAPY | Facility: REHABILITATION | Age: 79
End: 2024-04-29
Payer: MEDICARE

## 2024-04-29 ENCOUNTER — APPOINTMENT (OUTPATIENT)
Dept: PHYSICAL THERAPY | Facility: REHABILITATION | Age: 79
End: 2024-04-29
Payer: MEDICARE

## 2024-04-29 DIAGNOSIS — M47.816 LUMBAR SPONDYLOSIS: ICD-10-CM

## 2024-04-29 DIAGNOSIS — M54.50 CHRONIC MIDLINE LOW BACK PAIN WITHOUT SCIATICA: Primary | ICD-10-CM

## 2024-04-29 DIAGNOSIS — G89.29 CHRONIC MIDLINE LOW BACK PAIN WITHOUT SCIATICA: Primary | ICD-10-CM

## 2024-04-29 PROCEDURE — 97110 THERAPEUTIC EXERCISES: CPT

## 2024-04-29 PROCEDURE — 97140 MANUAL THERAPY 1/> REGIONS: CPT

## 2024-04-29 PROCEDURE — 97112 NEUROMUSCULAR REEDUCATION: CPT

## 2024-04-29 NOTE — PROGRESS NOTES
"Daily Note     Today's date: 2024  Patient name: Isauro Sow  : 1945  MRN: [id number]  Referring provider: Rangel Hahn DO  Dx:   Encounter Diagnosis     ICD-10-CM    1. Chronic midline low back pain without sciatica  M54.50     G89.29       2. Lumbar spondylosis  M47.816           Start Time:   Stop Time:   Total time in clinic (min): 40 minutes    Subjective: Patient his low back held up well over the weekend with mowing the lawn.      Objective: See treatment diary below      Assessment: Tolerated treatment well today. No discomfort in back today with exercises even with patient having mowed his lawn earlier today. Patient . Patient would benefit from continued PT      Plan: Continue per plan of care.      Precautions: falls, cardiac, hx of CVA      Manuals 4/24 4/29   4/4 4/8 4/11 4/15 4/17 4/22                                          Re-Eval PRR            Neuro Re-Ed             [place] Pallof Press 9# 2x10 9# 2x10   7# 2x10 ea 7# 2x10 ea 8# 2x10 ea 8# 2x10 ea 8# 2x15 ea 8# 2x15 ea   [place] Side Stepping  8# 2x01     5# 1x10 ea 5# 1x10 ea 5# 1x10 ea 5# 2x10 ea                                                                    Ther Ex             Pt Edu, HEP             Supine Clamshells 2x20 bhb 3\" hold 2x20 bhb 3\" hold   2x10 bhb 3\" hold 2x20 rhb 3\" hold 2x20 bhb 3\" hold 2x20 bhb 3\" hold 2x20 bhb 3\" hold 2x20 rhb 3\" hold   Supine Bridges 2x20 bhb 2x20 bhb   2x10 bhb 2x20 rhb 2x20 bhb 2x20 bhb 2x20 bhb 2x20 rhb   LTR 2x10 ea 2x10 ea   2x10 ea 2x10 ea 2x10 ea 2x10 ea 2x10 ea 2x10 ea    STS  8# kb 1x10 8# kb 1x10   5# kb 2x10 no foam 7# kb 2x10 chair 7# kb 2x10 chair 7# kb 2x10 chair 8# kb 2x10 8# kb 2x10   Deadlift from Chair 8# kb 3x10 8# kb 3x10   5# kb 2x10 7# kb 2x10  7# kb 3x10 7# kb 3x10 8# kb 3x10 8# kb 3x10   Suitcase Carry 15# kb 4 laps up/back 15# kb 4 laps up/back   10# kb 3 laps up/back 10# kb 4 laps up/back 10# kb 4 laps up/back 10# kb 4 laps up/back 10# kb 4 laps " up/back 10# kb 4 laps up/back   [place] Step Backs 10# 2x10 handles 10# 2x10 handles    10# 10x handles Np resume 10# 10x handles 10# 10x handles 10# 2x10 handles   VG      L7 5' DL L7 5' DL L7 5' DL L7 5' DL L7 3' DL L7 4' DL   Bike L1 5' L1 5'        L1 5'   Ther Activity                                       Gait Training                                       Modalities

## 2024-05-01 ENCOUNTER — OFFICE VISIT (OUTPATIENT)
Dept: PHYSICAL THERAPY | Facility: REHABILITATION | Age: 79
End: 2024-05-01
Payer: MEDICARE

## 2024-05-01 DIAGNOSIS — G89.29 CHRONIC MIDLINE LOW BACK PAIN WITHOUT SCIATICA: Primary | ICD-10-CM

## 2024-05-01 DIAGNOSIS — M47.816 LUMBAR SPONDYLOSIS: ICD-10-CM

## 2024-05-01 DIAGNOSIS — M54.50 CHRONIC MIDLINE LOW BACK PAIN WITHOUT SCIATICA: Primary | ICD-10-CM

## 2024-05-01 PROCEDURE — 97112 NEUROMUSCULAR REEDUCATION: CPT | Performed by: PHYSICAL THERAPIST

## 2024-05-01 PROCEDURE — 97110 THERAPEUTIC EXERCISES: CPT | Performed by: PHYSICAL THERAPIST

## 2024-05-01 NOTE — PROGRESS NOTES
"Daily Note     Today's date: 2024  Patient name: Isauro Sow  : 1945  MRN: [id number]  Referring provider: Rangel Hahn DO  Dx:   Encounter Diagnosis     ICD-10-CM    1. Chronic midline low back pain without sciatica  M54.50     G89.29       2. Lumbar spondylosis  M47.816             Start Time: 0815  Stop Time: 08  Total time in clinic (min): 40 minutes    Subjective: Patient reports feeling tired/fatigued today.       Objective: See treatment diary below      Assessment: Tolerated treatment well today. Cues for hip ABD L when performing supine clams. Demonstrates good balance with kelsy resisted stepping. Fatigue with decreased step length L noted with suitcase carry.       Plan: Continue per plan of care.      Precautions: falls, cardiac, hx of CVA      Manuals 4/24 4/29 5/1  4/4 4/8 4/11 4/15 4/17 4/22                                          Re-Eval PRR            Neuro Re-Ed             [place] Pallof Press 9# 2x10 9# 2x10 9#  2x10  7# 2x10 ea 7# 2x10 ea 8# 2x10 ea 8# 2x10 ea 8# 2x15 ea 8# 2x15 ea   [place] Side Stepping  8# 2x01 8#  2x10    5# 1x10 ea 5# 1x10 ea 5# 1x10 ea 5# 2x10 ea                                                                    Ther Ex             Pt Edu, HEP             Supine Clamshells 2x20 bhb 3\" hold 2x20 bhb 3\" hold 2x20 bhb 3\" hold  2x10 bhb 3\" hold 2x20 rhb 3\" hold 2x20 bhb 3\" hold 2x20 bhb 3\" hold 2x20 bhb 3\" hold 2x20 rhb 3\" hold   Supine Bridges 2x20 bhb 2x20 bhb 2x20 bhb  2x10 bhb 2x20 rhb 2x20 bhb 2x20 bhb 2x20 bhb 2x20 rhb   LTR 2x10 ea 2x10 ea 2x10 ea  2x10 ea 2x10 ea 2x10 ea 2x10 ea 2x10 ea 2x10 ea    STS  8# kb 1x10 8# kb 1x10 8# kb 1x10  5# kb 2x10 no foam 7# kb 2x10 chair 7# kb 2x10 chair 7# kb 2x10 chair 8# kb 2x10 8# kb 2x10   Deadlift from Chair 8# kb 3x10 8# kb 3x10 8# kb 3x10  5# kb 2x10 7# kb 2x10  7# kb 3x10 7# kb 3x10 8# kb 3x10 8# kb 3x10   Suitcase Carry 15# kb 4 laps up/back 15# kb 4 laps up/back 15# kb 4 laps up/back  10# kb 3 laps " up/back 10# kb 4 laps up/back 10# kb 4 laps up/back 10# kb 4 laps up/back 10# kb 4 laps up/back 10# kb 4 laps up/back   [place] Step Backs 10# 2x10 handles 10# 2x10 handles 10# 2x10 handles   10# 10x handles Np resume 10# 10x handles 10# 10x handles 10# 2x10 handles   VG      L7 5' DL L7 5' DL L7 5' DL L7 5' DL L7 3' DL L7 4' DL   Bike L1 5' L1 5' L1 5'       L1 5'   Ther Activity                                       Gait Training                                       Modalities

## 2024-05-06 ENCOUNTER — OFFICE VISIT (OUTPATIENT)
Dept: PHYSICAL THERAPY | Facility: REHABILITATION | Age: 79
End: 2024-05-06
Payer: MEDICARE

## 2024-05-06 ENCOUNTER — APPOINTMENT (OUTPATIENT)
Dept: LAB | Facility: CLINIC | Age: 79
End: 2024-05-06
Payer: MEDICARE

## 2024-05-06 DIAGNOSIS — G89.29 CHRONIC MIDLINE LOW BACK PAIN WITHOUT SCIATICA: Primary | ICD-10-CM

## 2024-05-06 DIAGNOSIS — M47.816 LUMBAR SPONDYLOSIS: ICD-10-CM

## 2024-05-06 DIAGNOSIS — M54.50 CHRONIC MIDLINE LOW BACK PAIN WITHOUT SCIATICA: Primary | ICD-10-CM

## 2024-05-06 PROCEDURE — 97112 NEUROMUSCULAR REEDUCATION: CPT

## 2024-05-06 PROCEDURE — 97110 THERAPEUTIC EXERCISES: CPT

## 2024-05-06 NOTE — PROGRESS NOTES
"Daily Note     Today's date: 2024  Patient name: Isauro Sow  : 1945  MRN: [id number]  Referring provider: Rangel Hahn DO  Dx:   Encounter Diagnosis     ICD-10-CM    1. Chronic midline low back pain without sciatica  M54.50     G89.29       2. Lumbar spondylosis  M47.816           Start Time: 1148  Stop Time: 1230  Total time in clinic (min): 42 minutes    Subjective: Pt reports generally, his LB has been feeling good.  Notes he did get down on the ground to clean his mower deck and blades which aggravated symptoms at that time.        Objective: See treatment diary below      Assessment: Tolerated treatment well. Continued with program as outlined below.  Notable fatigue and limitations in strength of LLE during supine clamshells and bridges.  L knee began to bother him during STS and remained during Kelsy side steps and farmer's carry.  Reps during farmer's carry reduced d/t this pain at L knee.  Patient would benefit from continued PT to further improve strength and maximize overall function with reduced frequency/intensity of symptoms.        Plan: Continue per plan of care.      Precautions: falls, cardiac, hx of CVA      Manuals 4/24 4/29 5/1 5/6  4/8 4/11 4/15 4/17 4/22                                          Re-Eval PRR            Neuro Re-Ed             [place] Pallof Press 9# 2x10 9# 2x10 9#  2x10 9#  2x10  7# 2x10 ea 8# 2x10 ea 8# 2x10 ea 8# 2x15 ea 8# 2x15 ea   [place] Side Stepping  8# 2x01 8#  2x10 8#  2x10   5# 1x10 ea 5# 1x10 ea 5# 1x10 ea 5# 2x10 ea                                                                    Ther Ex             Pt Edu, HEP             Supine Clamshells 2x20 bhb 3\" hold 2x20 bhb 3\" hold 2x20 bhb 3\" hold 2x20 bhb 3\" hold  2x20 rhb 3\" hold 2x20 bhb 3\" hold 2x20 bhb 3\" hold 2x20 bhb 3\" hold 2x20 rhb 3\" hold   Supine Bridges 2x20 bhb 2x20 bhb 2x20 bhb 2x20 bhb  2x20 rhb 2x20 bhb 2x20 bhb 2x20 bhb 2x20 rhb   LTR 2x10 ea 2x10 ea 2x10 ea 2x10 ea  2x10 ea 2x10 ea " 2x10 ea 2x10 ea 2x10 ea    STS  8# kb 1x10 8# kb 1x10 8# kb 1x10 8# kb 1x10  7# kb 2x10 chair 7# kb 2x10 chair 7# kb 2x10 chair 8# kb 2x10 8# kb 2x10   Deadlift from Chair 8# kb 3x10 8# kb 3x10 8# kb 3x10 8# kb 3x10  7# kb 2x10  7# kb 3x10 7# kb 3x10 8# kb 3x10 8# kb 3x10   Suitcase Carry 15# kb 4 laps up/back 15# kb 4 laps up/back 15# kb 4 laps up/back 15# kb 3 laps up/back  10# kb 4 laps up/back 10# kb 4 laps up/back 10# kb 4 laps up/back 10# kb 4 laps up/back 10# kb 4 laps up/back   [place] Step Backs 10# 2x10 handles 10# 2x10 handles 10# 2x10 handles 10# 2x10 handles  10# 10x handles Np resume 10# 10x handles 10# 10x handles 10# 2x10 handles   VG       L7 5' DL L7 5' DL L7 5' DL L7 3' DL L7 4' DL   Bike L1 5' L1 5' L1 5' L1 5'      L1 5'   Ther Activity                                       Gait Training                                       Modalities

## 2024-05-07 ENCOUNTER — TELEPHONE (OUTPATIENT)
Dept: NEUROLOGY | Facility: CLINIC | Age: 79
End: 2024-05-07

## 2024-05-07 ENCOUNTER — ANTICOAG VISIT (OUTPATIENT)
Dept: CARDIOLOGY CLINIC | Facility: CLINIC | Age: 79
End: 2024-05-07

## 2024-05-07 NOTE — TELEPHONE ENCOUNTER
INR is monitored by cardiology. Called pt's son. He stated that cardiology did reach out to pt today regarding the INR results. He thanked this nurse for the return call.

## 2024-05-07 NOTE — TELEPHONE ENCOUNTER
Patient called in because he will like to discuss his INR test results that were done yesterday. Patient will like a call back.   Thanks

## 2024-05-08 ENCOUNTER — OFFICE VISIT (OUTPATIENT)
Dept: PHYSICAL THERAPY | Facility: REHABILITATION | Age: 79
End: 2024-05-08
Payer: MEDICARE

## 2024-05-08 DIAGNOSIS — M54.50 CHRONIC MIDLINE LOW BACK PAIN WITHOUT SCIATICA: Primary | ICD-10-CM

## 2024-05-08 DIAGNOSIS — G89.29 CHRONIC MIDLINE LOW BACK PAIN WITHOUT SCIATICA: Primary | ICD-10-CM

## 2024-05-08 PROCEDURE — 97110 THERAPEUTIC EXERCISES: CPT

## 2024-05-08 PROCEDURE — 97112 NEUROMUSCULAR REEDUCATION: CPT

## 2024-05-08 NOTE — PROGRESS NOTES
"Daily Note     Today's date: 2024  Patient name: Isauro Sow  : 1945  MRN: [id number]  Referring provider: Rangel Hahn DO  Dx:   Encounter Diagnosis     ICD-10-CM    1. Chronic midline low back pain without sciatica  M54.50     G89.29           Start Time: 0804  Stop Time: 0845  Total time in clinic (min): 41 minutes    Subjective: Patient reports his back is doing well. Patient he is very pleased with how he is doing and is ready to finish up PT today.       Objective: See treatment diary below      Assessment:   Will discharge patient today. Patient has met all goals established at IE, see last progress note () for more detail. Patient will f/u with PT as needed      Plan: Discharge to [facility] at this time.     Precautions: falls, cardiac, hx of CVA      Manuals  5                                          Re-Eval PRR            Neuro Re-Ed             [place] Pallof Press 9# 2x10 9# 2x10 9#  2x10 9#  2x10 9#  2x10    8# 2x15 ea 8# 2x15 ea   [place] Side Stepping  8# 2x01 8#  2x10 8#  2x10 8#  2x10    5# 1x10 ea 5# 2x10 ea                                                                    Ther Ex             Pt Edu, HEP             Supine Clamshells 2x20 bhb 3\" hold 2x20 bhb 3\" hold 2x20 bhb 3\" hold 2x20 bhb 3\" hold 2x20 bhb 3\" hold    2x20 bhb 3\" hold 2x20 rhb 3\" hold   Supine Bridges 2x20 bhb 2x20 bhb 2x20 bhb 2x20 bhb 2x20 bhb    2x20 bhb 2x20 rhb   LTR 2x10 ea 2x10 ea 2x10 ea 2x10 ea 2x10 ea    2x10 ea 2x10 ea    STS  8# kb 1x10 8# kb 1x10 8# kb 1x10 8# kb 1x10 8# kb 1x10    8# kb 2x10 8# kb 2x10   Deadlift from Chair 8# kb 3x10 8# kb 3x10 8# kb 3x10 8# kb 3x10 8# kb 3x10    8# kb 3x10 8# kb 3x10   Suitcase Carry 15# kb 4 laps up/back 15# kb 4 laps up/back 15# kb 4 laps up/back 15# kb 3 laps up/back 10# kb 3 laps up/back    10# kb 4 laps up/back 10# kb 4 laps up/back   [place] Step Backs 10# 2x10 handles 10# 2x10 handles 10# 2x10 handles 10# 2x10 handles 10# 2x10 " handles    10# 10x handles 10# 2x10 handles   VG          L7 3' DL L7 4' DL   Bike L1 5' L1 5' L1 5' L1 5' L1 6'     L1 5'   Ther Activity                                       Gait Training                                       Modalities

## 2024-05-09 ENCOUNTER — HOSPITAL ENCOUNTER (OUTPATIENT)
Dept: NON INVASIVE DIAGNOSTICS | Facility: CLINIC | Age: 79
Discharge: HOME | End: 2024-05-09
Payer: MEDICARE

## 2024-05-09 DIAGNOSIS — I73.9 LEFT LEG CLAUDICATION (HCC): ICD-10-CM

## 2024-05-09 DIAGNOSIS — R09.89 DECREASED PEDAL PULSES: ICD-10-CM

## 2024-05-09 PROCEDURE — 93922 UPR/L XTREMITY ART 2 LEVELS: CPT | Performed by: SURGERY

## 2024-05-09 PROCEDURE — 93925 LOWER EXTREMITY STUDY: CPT | Performed by: SURGERY

## 2024-05-09 PROCEDURE — 93925 LOWER EXTREMITY STUDY: CPT

## 2024-05-09 PROCEDURE — 93923 UPR/LXTR ART STDY 3+ LVLS: CPT

## 2024-05-14 NOTE — PROGRESS NOTES
Exercise session detail.  
placed 1/2024 for surveillance of Postural orthostatic tachycardia syndrome (POTS)  Pt denies any symptoms since 1/2024

## 2024-05-20 ENCOUNTER — APPOINTMENT (OUTPATIENT)
Dept: LAB | Facility: CLINIC | Age: 79
End: 2024-05-20
Payer: MEDICARE

## 2024-05-20 ENCOUNTER — ANTICOAG VISIT (OUTPATIENT)
Dept: CARDIOLOGY CLINIC | Facility: CLINIC | Age: 79
End: 2024-05-20

## 2024-06-03 ENCOUNTER — ANTICOAG VISIT (OUTPATIENT)
Dept: CARDIOLOGY CLINIC | Facility: CLINIC | Age: 79
End: 2024-06-03

## 2024-06-03 ENCOUNTER — APPOINTMENT (OUTPATIENT)
Dept: LAB | Facility: CLINIC | Age: 79
End: 2024-06-03
Payer: MEDICARE

## 2024-06-16 DIAGNOSIS — I47.29 NSVT (NONSUSTAINED VENTRICULAR TACHYCARDIA) (HCC): ICD-10-CM

## 2024-06-16 RX ORDER — METOPROLOL SUCCINATE 25 MG/1
25 TABLET, EXTENDED RELEASE ORAL
Qty: 30 TABLET | Refills: 3 | Status: SHIPPED | OUTPATIENT
Start: 2024-06-16

## 2024-06-28 ENCOUNTER — TELEPHONE (OUTPATIENT)
Age: 79
End: 2024-06-28

## 2024-06-28 NOTE — TELEPHONE ENCOUNTER
Daughter called stating her father missed Wed nights dose of coumadin and asking if he should still test on Monday? I advised ok to test on Monday.

## 2024-07-01 ENCOUNTER — ANTICOAG VISIT (OUTPATIENT)
Dept: CARDIOLOGY CLINIC | Facility: CLINIC | Age: 79
End: 2024-07-01

## 2024-07-01 ENCOUNTER — APPOINTMENT (OUTPATIENT)
Dept: LAB | Facility: CLINIC | Age: 79
End: 2024-07-01
Payer: MEDICARE

## 2024-07-05 ENCOUNTER — REMOTE DEVICE CLINIC VISIT (OUTPATIENT)
Dept: CARDIOLOGY CLINIC | Facility: CLINIC | Age: 79
End: 2024-07-05
Payer: MEDICARE

## 2024-07-05 DIAGNOSIS — Z95.0 CARDIAC PACEMAKER IN SITU: Primary | ICD-10-CM

## 2024-07-05 PROCEDURE — 93296 REM INTERROG EVL PM/IDS: CPT | Performed by: STUDENT IN AN ORGANIZED HEALTH CARE EDUCATION/TRAINING PROGRAM

## 2024-07-05 PROCEDURE — 93294 REM INTERROG EVL PM/LDLS PM: CPT | Performed by: STUDENT IN AN ORGANIZED HEALTH CARE EDUCATION/TRAINING PROGRAM

## 2024-07-05 NOTE — PROGRESS NOTES
"Results for orders placed or performed in visit on 07/05/24   Cardiac EP device report    Narrative    MDT DUAL CHAMBER PPM (MVP ON) - ACTIVE SYSTEM IS MRI CONDITIONAL  CARELINK TRANSMISSION: BATTERY STATUS \"10 YRS.\" AP 87%  95%. ALL AVAILABLE LEAD PARAMETERS WITHIN NORMAL LIMITS. NO SIGNIFICANT HIGH RATE EPISODES. NORMAL DEVICE FUNCTION. NC         "

## 2024-07-10 ENCOUNTER — TELEPHONE (OUTPATIENT)
Age: 79
End: 2024-07-10

## 2024-07-10 ENCOUNTER — TELEPHONE (OUTPATIENT)
Dept: OBGYN CLINIC | Facility: CLINIC | Age: 79
End: 2024-07-10

## 2024-07-10 NOTE — TELEPHONE ENCOUNTER
Returned call to patient's daughter-in-law Farzana. Patient was present.    Instructed patient & Farzana since patient's last office visit with cardiology, Dr Browning was 11/10/23, he will need an OV for a cardiac clearance for scheduled surgery on 10/8.    Scheduled patient for OV with Dr Browning on 8/6/24 for CC.
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
## 2024-07-10 NOTE — TELEPHONE ENCOUNTER
Patient calling to scheduled pre op clearance with cardiology.  Transferred call to Cardiology scheduling.

## 2024-07-10 NOTE — TELEPHONE ENCOUNTER
Patient has surgery scheduled for 10/08/24 . Needs a cardiac clearance prior. Please contact Daughter in law (taylor) if available or if the patient must come in to be seen. Scheduled an appointment for 08/13/24 just incase.     Phone : ([phone])

## 2024-07-10 NOTE — TELEPHONE ENCOUNTER
Caller: Farzana (D-I-L)    Doctor: Shiva    Reason for call: Patient is supposed to be having surgery in Oct. But he needed to have to medical clearances' prior to surgery. But she lost the notes on which 2 doctors he needed to schedule with and when he needs to be seen by    Call back#: [phone]

## 2024-07-11 ENCOUNTER — OFFICE VISIT (OUTPATIENT)
Dept: VASCULAR SURGERY | Facility: CLINIC | Age: 79
End: 2024-07-11
Payer: MEDICARE

## 2024-07-11 VITALS
SYSTOLIC BLOOD PRESSURE: 110 MMHG | DIASTOLIC BLOOD PRESSURE: 70 MMHG | WEIGHT: 215 LBS | BODY MASS INDEX: 30.1 KG/M2 | HEART RATE: 68 BPM | HEIGHT: 71 IN

## 2024-07-11 DIAGNOSIS — I73.9 PAD (PERIPHERAL ARTERY DISEASE) (HCC): ICD-10-CM

## 2024-07-11 DIAGNOSIS — E11.21 DIABETIC NEPHROPATHY ASSOCIATED WITH TYPE 2 DIABETES MELLITUS (HCC): ICD-10-CM

## 2024-07-11 DIAGNOSIS — I65.23 BILATERAL CAROTID ARTERY STENOSIS: Primary | ICD-10-CM

## 2024-07-11 DIAGNOSIS — I10 BENIGN ESSENTIAL HYPERTENSION: Chronic | ICD-10-CM

## 2024-07-11 PROCEDURE — 99214 OFFICE O/P EST MOD 30 MIN: CPT | Performed by: NURSE PRACTITIONER

## 2024-07-11 NOTE — ASSESSMENT & PLAN NOTE
5/9/24 LEAD  Rt: 50-75% stenosis in the tibio-peroneal trunk and proximal KEVIN  FLORIAN: NC/  mm Hg/  mm Hg     Lt: High-grade stenosis vs. occlusion in the distal PTA and peroneal arteries. Collateral flow is suggested.  FLORIAN:   1.18  (Prior: 1.1)/  mm Hg/  mm Hg    -Denies leg claudication, intermittent L calf cramping at night, no wounds/ tissue loss.   -B/L feet are warm and dry, <3 Sec cap refill.   -B/L palpable DP pulses, doppler PT signals appreciated.   -Reviewed lower extremity ultrasound in detail with pt and answered all questions. Educated pt on peripheral arterial disease and indication for vascular intervention. No indications for vascular intervention at this time. Recommending continued surveillance with non-invasive imagining yearly with medical management at this time. Pt verbalized understanding.  -Pt requesting vascular clearance for upcoming L TKR. No contraindication for orthopedic surgery from a vascular standpoint for arterial stenosis. Not planning any vascular intervention. If pt requires additional medical clearance he should be cleared by PCP.    Recommendations:  -Complete LEAD in one year and return to the office for review.  -Call the office with any new or worsening leg pain, discoloration, swelling, new wounds/ tissue loss.  -Continue to take aspirin 81 mg and Warfarin daily as per PCP/cardiology (these are not prescribed by vascular and defer to PCP/Cardiology for any upcoming procedure inquiries).  -Continue to take statin medication daily as per PCP.  -Do daily foot checks, food protection, wear well fitted shoes.  -Increase daily walking for 20-30 min everyday.

## 2024-07-11 NOTE — ASSESSMENT & PLAN NOTE
Lab Results   Component Value Date    HGBA1C 7.2 (A) 02/14/2024   -Reviewed most recent HgA1c with pt.  -Stable blood sugars. Reviewed the importance of maintaining an optimized blood sugar for progression of vascular disease.  -management per PCP

## 2024-07-11 NOTE — ASSESSMENT & PLAN NOTE
2/6/24 Carotid ultrasound demonstrates  R ICA chronically occluded  L ICA 50-69% stenosis with velocities 186/56 velocity of 1.59    -Denies symptoms of numbness/ tingling/ weakness on one side of the body, facial droop, slurred speech or blindness in one eye.   -Reviewed most recent carotid ultrasound results in detail with pt and discussed pathophysiology of arterial disease and indication for vascular intervention. No vascular intervention planned at this time. Discussed that we will continue with medical management and non-invasive imaging at this time.     Plan  -Complete carotid ultrasound due in one month, if no change will order ultrasound in 6 months and return to the office for review.  -Continue Aspirin 81mg and Warfarin daily as per PCP/ cardiology.  -Continue Statin medication daily as prescribed by PCP.  -Call 911/ Go to the ER with any S/S of TIA/ CVA.  -Continue with BP control

## 2024-07-12 ENCOUNTER — HOSPITAL ENCOUNTER (EMERGENCY)
Facility: HOSPITAL | Age: 79
Discharge: HOME | End: 2024-07-13
Attending: EMERGENCY MEDICINE
Payer: MEDICARE

## 2024-07-12 VITALS
RESPIRATION RATE: 17 BRPM | OXYGEN SATURATION: 95 % | DIASTOLIC BLOOD PRESSURE: 90 MMHG | SYSTOLIC BLOOD PRESSURE: 170 MMHG | TEMPERATURE: 98.3 F | HEART RATE: 78 BPM

## 2024-07-12 DIAGNOSIS — M54.50 ACUTE LEFT-SIDED LOW BACK PAIN WITHOUT SCIATICA: Primary | ICD-10-CM

## 2024-07-12 LAB
BILIRUB UR QL STRIP: NEGATIVE
CLARITY UR: CLEAR
COLOR UR: ABNORMAL
GLUCOSE UR STRIP-MCNC: ABNORMAL MG/DL
HGB UR QL STRIP.AUTO: NEGATIVE
KETONES UR STRIP-MCNC: NEGATIVE MG/DL
LEUKOCYTE ESTERASE UR QL STRIP: NEGATIVE
NITRITE UR QL STRIP: NEGATIVE
PH UR STRIP.AUTO: 6.5 [PH]
PROT UR STRIP-MCNC: NEGATIVE MG/DL
SP GR UR STRIP.AUTO: 1.01 (ref 1–1.03)
UROBILINOGEN UR STRIP-ACNC: 2 MG/DL

## 2024-07-12 PROCEDURE — 99283 EMERGENCY DEPT VISIT LOW MDM: CPT

## 2024-07-12 PROCEDURE — 96372 THER/PROPH/DIAG INJ SC/IM: CPT

## 2024-07-12 PROCEDURE — 99284 EMERGENCY DEPT VISIT MOD MDM: CPT | Performed by: EMERGENCY MEDICINE

## 2024-07-12 RX ORDER — LIDOCAINE 50 MG/G
1 PATCH TOPICAL ONCE
Status: DISCONTINUED | OUTPATIENT
Start: 2024-07-12 | End: 2024-07-13 | Stop reason: HOSPADM

## 2024-07-12 RX ORDER — METHOCARBAMOL 500 MG/1
500 TABLET, FILM COATED ORAL ONCE
Status: COMPLETED | OUTPATIENT
Start: 2024-07-12 | End: 2024-07-12

## 2024-07-12 RX ORDER — KETOROLAC TROMETHAMINE 30 MG/ML
30 INJECTION, SOLUTION INTRAMUSCULAR; INTRAVENOUS ONCE
Status: COMPLETED | OUTPATIENT
Start: 2024-07-12 | End: 2024-07-12

## 2024-07-12 RX ORDER — NAPROXEN 500 MG/1
500 TABLET ORAL 2 TIMES DAILY WITH MEALS
Qty: 30 TABLET | Refills: 0 | Status: SHIPPED | OUTPATIENT
Start: 2024-07-12 | End: 2024-07-13

## 2024-07-12 RX ORDER — METHOCARBAMOL 500 MG/1
500 TABLET, FILM COATED ORAL 2 TIMES DAILY
Qty: 20 TABLET | Refills: 0 | Status: SHIPPED | OUTPATIENT
Start: 2024-07-12

## 2024-07-12 RX ORDER — LIDOCAINE 50 MG/G
1 PATCH TOPICAL EVERY 24 HOURS
Qty: 15 PATCH | Refills: 0 | Status: SHIPPED | OUTPATIENT
Start: 2024-07-12

## 2024-07-12 RX ORDER — ACETAMINOPHEN 325 MG/1
975 TABLET ORAL ONCE
Status: COMPLETED | OUTPATIENT
Start: 2024-07-12 | End: 2024-07-12

## 2024-07-12 RX ORDER — OXYCODONE HYDROCHLORIDE 5 MG/1
5 TABLET ORAL EVERY 4 HOURS PRN
Qty: 8 TABLET | Refills: 0 | Status: SHIPPED | OUTPATIENT
Start: 2024-07-12 | End: 2024-07-20

## 2024-07-12 RX ORDER — OXYCODONE HYDROCHLORIDE 5 MG/1
5 TABLET ORAL ONCE
Status: COMPLETED | OUTPATIENT
Start: 2024-07-12 | End: 2024-07-12

## 2024-07-12 RX ORDER — ACETAMINOPHEN 325 MG/1
975 TABLET ORAL ONCE
Status: DISCONTINUED | OUTPATIENT
Start: 2024-07-12 | End: 2024-07-12

## 2024-07-12 RX ADMIN — LIDOCAINE 5% 1 PATCH: 700 PATCH TOPICAL at 22:32

## 2024-07-12 RX ADMIN — KETOROLAC TROMETHAMINE 30 MG: 30 INJECTION, SOLUTION INTRAMUSCULAR; INTRAVENOUS at 22:30

## 2024-07-12 RX ADMIN — METHOCARBAMOL TABLETS 500 MG: 500 TABLET, COATED ORAL at 22:30

## 2024-07-12 RX ADMIN — ACETAMINOPHEN 975 MG: 325 TABLET, FILM COATED ORAL at 23:49

## 2024-07-12 RX ADMIN — OXYCODONE HYDROCHLORIDE 5 MG: 5 TABLET ORAL at 23:49

## 2024-07-13 PROCEDURE — NC001 PR NO CHARGE: Performed by: EMERGENCY MEDICINE

## 2024-07-13 NOTE — DISCHARGE INSTRUCTIONS
Please follow up with your primary care provider concerning your visit today.  Please return to the Emergency Department for any other concerns.

## 2024-07-13 NOTE — ED PROVIDER NOTES
0829.  7/13/24    Pharmacy called with concern about naproxen prescription as patient is anticoagulated on warfarin.  After review of patient's encounter and chart, the naproxen prescription was discontinued as the risks of taking this appear to outweigh benefits.  Pharmacy was updated on this order change.     Rios Lima MD  07/13/24 0830
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
## 2024-07-15 ENCOUNTER — VBI (OUTPATIENT)
Dept: FAMILY MEDICINE CLINIC | Facility: CLINIC | Age: 79
End: 2024-07-15

## 2024-07-15 NOTE — TELEPHONE ENCOUNTER
07/15/24 12:11 PM    Patient contacted post ED visit, first outreach attempt made. Message was left for patient to return a call to the VBI Department at [facility]: Phone [phone].    Thank you.  LOUIS DAVIDSON  [facility]

## 2024-07-16 NOTE — TELEPHONE ENCOUNTER
07/16/24 9:14 AM    Patient contacted post ED visit, VBI department spoke with patient/caregiver and outreach was successful.    Thank you.  LOUIS DAVIDSON  [facility]

## 2024-07-16 NOTE — ED ATTENDING ATTESTATION
7/12/2024  ITerrence DO, saw and evaluated the patient. I have discussed the patient with the resident/non-physician practitioner and agree with the resident's/non-physician practitioner's findings, Plan of Care, and MDM as documented in the resident's/non-physician practitioner's note, except where noted. All available labs and Radiology studies were reviewed.  I was present for key portions of any procedure(s) performed by the resident/non-physician practitioner and I was immediately available to provide assistance.       At this point I agree with the current assessment done in the Emergency Department.  I have conducted an independent evaluation of this patient a history and physical is as follows:    78 yo M in the ED for eval of left lower back radiating into the left leg and groin.  Symptoms for about 5 days, he believes this episode starting after riding on his  recently, attributing it to the bouncing of the mower. Denies any loss of bowel or bladder control, no weakness in his legs.    PE:  The patient is well appearing, non-toxic, in NAD. Head: normocephalic, atraumatic. HEENT: mucous membranes moist.  Lungs: CTA b/l, no resp distress. Heart: RRR. No M/R/G. Abdomen: NT, ND, no R/R/G. Neuro: CN2-12 intact, GCS 15. Normal strength and sensation, normal speech.  He has an antalgic gait. He is leaning over forwards in the chair. Tenderness to the left lower lumbar paraspinal musculature, no midline spinal tenderness. 5/5 strength in the LE's b/l, sensation grossly intact b/l. Cap refill < 2 sec, skin warm and dry. No rashes or lesions.    78 yo w/ low back, likely sciatica or lumbar radiculopathy, no red flag signs or symptoms to suggest cord impingement such as cauda equina, epidural abscess, doubt kidney stone with reproducible pain and negative UA. Stable for d/c home with pain control, f/u with PCP and PT outpt.          ED Course         Critical Care Time  Procedures

## 2024-07-17 ENCOUNTER — EVALUATION (OUTPATIENT)
Dept: PHYSICAL THERAPY | Facility: REHABILITATION | Age: 79
End: 2024-07-17
Payer: MEDICARE

## 2024-07-17 DIAGNOSIS — M54.50 ACUTE LEFT-SIDED LOW BACK PAIN WITHOUT SCIATICA: Primary | ICD-10-CM

## 2024-07-17 PROCEDURE — 97110 THERAPEUTIC EXERCISES: CPT

## 2024-07-17 PROCEDURE — 97162 PT EVAL MOD COMPLEX 30 MIN: CPT

## 2024-07-17 NOTE — PROGRESS NOTES
PT Evaluation     Today's date: 2024  Patient name: Isauro Sow  : 1945  MRN: [id number]  Referring provider: Terrence Bonner DO  Dx:   Encounter Diagnosis     ICD-10-CM    1. Acute left-sided low back pain without sciatica  M54.50 Ambulatory referral to Physical Therapy          Start Time: 1530  Stop Time: 1615  Total time in clinic (min): 45 minutes    Assessment  Impairments: abnormal gait, abnormal muscle tone, abnormal or restricted ROM, activity intolerance, impaired balance, impaired physical strength, lacks appropriate home exercise program, pain with function and weight-bearing intolerance  Symptom irritability: moderate    Assessment details:   Isauro Sow is a pleasant 79 y.o. male who presents with acute on chronic left-sided low back pain with radiating pain into the left buttock. No red flags present on examination. Patient demonstrates lumbar hypomobility with possible directional preference. Patient has previously had very good response to PT. The impairments listed below are resulting in reduced QoL, pain with self-care, and difficulties with light/heavy household duties. I expect he will improve in the next 4-6 weeks.        Understanding of Dx/Px/POC: good     Prognosis: good    Goals  Short Term Goals (Week 4):  1. Decreased pain by 50%  2. Improve ROM by 10 degrees       Long Term Goals (8 weeks):  1. GROC >75%  2. Patient will exceed FOTO predicted outcome score  3. Patient will be fully independent with HEP by discharge  4. Patient will be able to manage symptoms independently.       Plan  Patient would benefit from: skilled physical therapy    Planned therapy interventions: graded exercise, functional ROM exercises, flexibility, gait training, graded activity, home exercise program, therapeutic training, therapeutic exercise, therapeutic activities, stretching, strengthening, patient education, neuromuscular re-education, nerve gliding, behavior modification, balance,  activity modification, manual therapy, IASTM and joint mobilization    Frequency: 2x week  Duration in weeks: 5  Treatment plan discussed with: patient          Subjective  Patient presents to PT for acute on chronic low back pain with radiation into the left buttock/hip. Patient reports symptoms started about 4-5 days ago after he was out mowing his lawn. Patient reports his pain was very intense initially prompting him to go the ED. Hospital workup came back negative. Patient reports his symptoms have since improved. Patient reports the pain is localized to L buttock and hip with occasional radiation into the thigh. Patient reports his pain is aggravated with sitting, transitional movements, STS, and rolling in bed. Patient reports his symptoms are improved with HEP, walking, and stay moving. Patient denies any b/b dysfunction, saddle anesthesia, and/or gait disturbances. Patient is able to fall asleep without being awoken by pain. Patient has previously responded very well to PT in the past.     Pain Levels  Rest: 4/10  Worst: 7/10    Objective  Myotomes  Strength WNL bilaterally.    Dermatomes  Sensation intact bilaterally    Reflexes  R Patellar Reflex: (1+)  L Patellar Reflex: (1+)  R Achilles Reflex: (1+)  L Achilles Reflex: (1+)    Neurodynamic Testing  Slump Test: -  SLR: -   Femoral Nerve Traction Test: -    Lumbar Active Range of Motion  Movement Loss Symptoms José Miguel Mod Min Nil Symptoms   Flexion  x   L buttock   Extension  x   L buttock   R SG  x   L buttock   L SG  x   L buttock   Other          Hip Passive Range of Motion  Pain with end range flexion, ER, IR, and abd    Neal Assessment  Rep FISeated: no effect, slightly better  Rep EIS: no effect, nb/nw    Hip Special Tests:  FADIRS= (+), FABERS= (+), Scours= (+), Distraction= (-)      SIJ Special Tests:  Compression= (-), Gapping= (-), FABERS= (+), Gaenslan's= (+), Sacral Thrust/PA Pressure= (-), Post Thigh Thrust= (-)           Precautions: falls,  cardiac, hx of CVA       Manuals 7/17                                                                Neuro Re-Ed                                                                                                        Ther Ex             RFISeated HEP            Clamshells HEP            Bridges HEP            LTRs HEP                                                                Ther Activity                                       Gait Training                                       Modalities

## 2024-07-18 DIAGNOSIS — I48.92 ATRIAL FLUTTER, UNSPECIFIED TYPE (HCC): Primary | ICD-10-CM

## 2024-07-19 DIAGNOSIS — E11.49 TYPE 2 DIABETES MELLITUS WITH OTHER NEUROLOGIC COMPLICATION, WITHOUT LONG-TERM CURRENT USE OF INSULIN (HCC): ICD-10-CM

## 2024-07-19 RX ORDER — ROSUVASTATIN CALCIUM 5 MG/1
TABLET, COATED ORAL
Qty: 30 TABLET | Refills: 0 | Status: SHIPPED | OUTPATIENT
Start: 2024-07-19

## 2024-07-19 NOTE — TELEPHONE ENCOUNTER
Patient needs updated blood work. Please place orders. A courtesy refill was provided.   Lipid Panel    Please order and forward to clinical staff to inform pt to complete prior to next refill

## 2024-07-22 ENCOUNTER — OFFICE VISIT (OUTPATIENT)
Dept: PHYSICAL THERAPY | Facility: REHABILITATION | Age: 79
End: 2024-07-22
Payer: MEDICARE

## 2024-07-22 DIAGNOSIS — M54.50 ACUTE LEFT-SIDED LOW BACK PAIN WITHOUT SCIATICA: Primary | ICD-10-CM

## 2024-07-22 PROCEDURE — 97110 THERAPEUTIC EXERCISES: CPT

## 2024-07-22 NOTE — PROGRESS NOTES
"Daily Note     Today's date: 2024  Patient name: Isauro Sow  : 1945  MRN: [id number]  Referring provider: Terrence Bonner DO  Dx:   Encounter Diagnosis     ICD-10-CM    1. Acute left-sided low back pain without sciatica  M54.50           Start Time: 1145  Stop Time: 1215  Total time in clinic (min): 30 minutes    Subjective: Patient reports improvements in his symptoms. States they are localized to L SIJ today. States he feels better when up and walking.       Objective: See treatment diary below      Assessment: Tolerated treatment well today. Improved symptoms end of session. Progress as tolerated. Patient would benefit from continued PT      Plan: Continue per plan of care.      Precautions: falls, cardiac, hx of CVA       Manuals                                                                Neuro Re-Ed                                                                                                        Ther Ex             RFISeated HEP            Clamshells HEP Yhb 3x10 3\" hold           Bridges HEP 3x10 yhb            LTRs HEP 2x10           STS  4x5 hcair           Seated Deadlift  4x5 5# kb           [place]els[place] Pallof Press  5# 2x10 ea           [place] Step Out  5# 2x10 ea           Ther Activity                                       Gait Training                                       Modalities                                            "

## 2024-07-25 ENCOUNTER — OFFICE VISIT (OUTPATIENT)
Dept: PHYSICAL THERAPY | Facility: REHABILITATION | Age: 79
End: 2024-07-25
Payer: MEDICARE

## 2024-07-25 DIAGNOSIS — M54.50 ACUTE LEFT-SIDED LOW BACK PAIN WITHOUT SCIATICA: Primary | ICD-10-CM

## 2024-07-25 PROCEDURE — 97110 THERAPEUTIC EXERCISES: CPT

## 2024-07-25 NOTE — PROGRESS NOTES
"Daily Note     Today's date: 2024  Patient name: Isauro Sow  : 1945  MRN: [id number]  Referring provider: Terrence Bonner DO  Dx:   Encounter Diagnosis     ICD-10-CM    1. Acute left-sided low back pain without sciatica  M54.50           Start Time: 1430  Stop Time: 1455  Total time in clinic (min): 25 minutes  Patient 1 on 1 with PT from 230-255p.    Subjective: patient reports his back is feeling good. States his hip is sore, might be related to sitting on a hard chair this morning.       Objective: See treatment diary below      Assessment: Tolerated treatment well today. Patient had improvements in all symptoms end of session. Patient mildly fatigued throughout session. Progress as tolerated. Patient would benefit from continued PT      Plan: Continue per plan of care.      Precautions: falls, cardiac, hx of CVA       Manuals                                                               Neuro Re-Ed                                                                                                        Ther Ex             RFISeated HEP            Clamshells HEP Yhb 3x10 3\" hold Bhb 3x10 3\" hold          Bridges HEP 3x10 yhb  3x10 bhb          LTRs HEP 2x10 2x10          STS  4x5 chair 4x5 chair no hands          Seated Deadlift  4x5 5# kb 3x10 6# kb          [place] [employer]  5# 2x10 ea 6# 2x10 ea          [place] Step Out  5# 2x10 ea 6# 2x10 ea          Ther Activity                                       Gait Training                                       Modalities                                              "

## 2024-07-29 ENCOUNTER — OFFICE VISIT (OUTPATIENT)
Dept: PHYSICAL THERAPY | Facility: REHABILITATION | Age: 79
End: 2024-07-29
Payer: MEDICARE

## 2024-07-29 ENCOUNTER — APPOINTMENT (OUTPATIENT)
Dept: LAB | Facility: CLINIC | Age: 79
End: 2024-07-29
Payer: MEDICARE

## 2024-07-29 ENCOUNTER — ANTICOAG VISIT (OUTPATIENT)
Dept: CARDIOLOGY CLINIC | Facility: CLINIC | Age: 79
End: 2024-07-29

## 2024-07-29 DIAGNOSIS — I48.92 ATRIAL FLUTTER, UNSPECIFIED TYPE (HCC): ICD-10-CM

## 2024-07-29 DIAGNOSIS — M54.50 ACUTE LEFT-SIDED LOW BACK PAIN WITHOUT SCIATICA: Primary | ICD-10-CM

## 2024-07-29 PROCEDURE — 97110 THERAPEUTIC EXERCISES: CPT

## 2024-07-29 NOTE — PROGRESS NOTES
"Daily Note     Today's date: 2024  Patient name: Isauro Sow  : 1945  MRN: [id number]  Referring provider: Terrence Bonner DO  Dx:   Encounter Diagnosis     ICD-10-CM    1. Acute left-sided low back pain without sciatica  M54.50             Start Time: 1635  Stop Time: 1700  Total time in clinic (min): 25 minutes      Subjective:  Patient believes his hip is getting worse, ice seems to help but not for a long period of time.       Objective: See treatment diary below      Assessment: Tolerated treatment well. Subjective reports of improvement in symptoms with lisa exercises, however and increase in symptoms during resisted hip abduction. Patient would benefit from continued PT services.       Plan: Continue per plan of care.     Treatment by Corazon Gray SPT, while being supervised by Kwasi Donahue PT.        Precautions: falls, cardiac, hx of CVA       Manuals                                                              Neuro Re-Ed                                                                                                        Ther Ex             RFISeated HEP            Clamshells HEP Yhb 3x10 3\" hold Bhb 3x10 3\" hold Bhb 3x10 3\" hold         Bridges HEP 3x10 yhb  3x10 bhb 2x15 BHB         LTRs HEP 2x10 2x10 2x10         STS  4x5 chair 4x5 chair no hands 4x5 chair no hands         Seated Deadlift  4x5 5# kb 3x10 6# kb 3x10 7# kb          [place] [employer]  5# 2x10 ea 6# 2x10 ea 6# 2x10 ea         [place] Step Out  5# 2x10 ea 6# 2x10 ea 6# 2x10 ea         Stnd hip abd    Ymb   R LE 2x10          Ther Activity                                       Gait Training                                       Modalities                                              "

## 2024-07-31 ENCOUNTER — OFFICE VISIT (OUTPATIENT)
Dept: PHYSICAL THERAPY | Facility: REHABILITATION | Age: 79
End: 2024-07-31
Payer: MEDICARE

## 2024-07-31 DIAGNOSIS — M54.50 ACUTE LEFT-SIDED LOW BACK PAIN WITHOUT SCIATICA: Primary | ICD-10-CM

## 2024-07-31 PROCEDURE — 97110 THERAPEUTIC EXERCISES: CPT

## 2024-07-31 PROCEDURE — 97530 THERAPEUTIC ACTIVITIES: CPT

## 2024-07-31 NOTE — PROGRESS NOTES
"Daily Note     Today's date: 2024  Patient name: Isauro Sow  : 1945  MRN: [id number]  Referring provider: Terrence Bonner DO  Dx:   Encounter Diagnosis     ICD-10-CM    1. Acute left-sided low back pain without sciatica  M54.50           Start Time: 1525  Stop Time: 1603  Total time in clinic (min): 38 minutes    Subjective: Patient reports his hip is feeling better since using hit on it.      Objective: See treatment diary below      Assessment: Tolerated treatment well today. Slight discomfort in hip today with activities, this improved throughout session. Progressed activities without issue. Patient has not reported pain in his back for a few sessions now. Patient would benefit from continued PT      Plan: Continue per plan of care.      Precautions: falls, cardiac, hx of CVA       Manuals                                                             Neuro Re-Ed                                                                                                        Ther Ex             RFISeated HEP            Clamshells HEP Yhb 3x10 3\" hold Bhb 3x10 3\" hold Bhb 3x10 3\" hold Bhb 2x20 3\" hold        Bridges HEP 3x10 yhb  3x10 bhb 2x15 BHB Bhb 2x20        LTRs HEP 2x10 2x10 2x10 2x15        STS  4x5 chair 4x5 chair no hands 4x5 chair no hands 4x4 chair no hands        Seated Deadlift  4x5 5# kb 3x10 6# kb 3x10 7# kb  3x10 8# kb        [place] Pallof [employer]  5# 2x10 ea 6# 2x10 ea 6# 2x10 ea 10# 2x15 ea        [place] Step Out  5# 2x10 ea 6# 2x10 ea 6# 2x10 ea 10# 2x15 ea        Suitcase Car[employer]     8# kb 100ft 5 laps        Stnd hip abd    Ymb   R LE 2x10          Ther Activity                                       Gait Training                                       Modalities                                                "

## 2024-08-05 ENCOUNTER — OFFICE VISIT (OUTPATIENT)
Dept: PHYSICAL THERAPY | Facility: REHABILITATION | Age: 79
End: 2024-08-05
Payer: MEDICARE

## 2024-08-05 DIAGNOSIS — M54.50 ACUTE LEFT-SIDED LOW BACK PAIN WITHOUT SCIATICA: Primary | ICD-10-CM

## 2024-08-05 PROCEDURE — 97110 THERAPEUTIC EXERCISES: CPT

## 2024-08-05 PROCEDURE — 97530 THERAPEUTIC ACTIVITIES: CPT

## 2024-08-05 NOTE — PROGRESS NOTES
"Daily Note     Today's date: 2024  Patient name: Isauro Sow  : 1945  MRN: [id number]  Referring provider: Terrence Bonner DO  Dx:   Encounter Diagnosis     ICD-10-CM    1. Acute left-sided low back pain without sciatica  M54.50           Start Time: 0930  Stop Time: 1008  Total time in clinic (min): 38 minutes    Subjective: Patient reports he had a little bit of soreness in the left buttock over the weekend, states the heat continues to be relieving. States his symptoms are aggravated when he sits for extended periods.       Objective: See treatment diary below      Assessment: Tolerated treatment well today. Slight left buttock discomfort. Progressed activities without issue. Patient would benefit from continued PT      Plan: Continue per plan of care.      Precautions: falls, cardiac, hx of CVA       Manuals  8/5                                                           Neuro Re-Ed                                       Ther Ex             RFISeated HEP            Clamshells HEP Yhb 3x10 3\" hold Bhb 3x10 3\" hold Bhb 3x10 3\" hold Bhb 2x20 3\" hold Bhb 2x20 3\" hold       Bridges HEP 3x10 yhb  3x10 bhb 2x15 BHB Bhb 2x20 Bhb 2x20       LTRs HEP 2x10 2x10 2x10 2x15 2x15       STS  4x5 chair 4x5 chair no hands 4x5 chair no hands 4x5 chair no hands 4x5 chair no hands       Seated Deadlift  4x5 5# kb 3x10 6# kb 3x10 7# kb  3x10 8# kb 3x10 10# kb       [place] Pallof Press  5# 2x10 ea 6# 2x10 ea 6# 2x10 ea 10# 2x15 ea 10# 2x15 ea       [place] Step Out  5# 2x10 ea 6# 2x10 ea 6# 2x10 ea 10# 2x15 ea 10# 2x15 ea       Suitcase Carry     8# kb 100ft 5 laps 10# kb 100 ft 5 laps       Stnd hip abd    Ymb   R LE 2x10          Ther Activity                                       Gait Training                                       Modalities                                                  "

## 2024-08-06 ENCOUNTER — OFFICE VISIT (OUTPATIENT)
Dept: CARDIOLOGY CLINIC | Facility: CLINIC | Age: 79
End: 2024-08-06
Payer: MEDICARE

## 2024-08-06 VITALS
BODY MASS INDEX: 29.96 KG/M2 | HEIGHT: 71 IN | HEART RATE: 60 BPM | WEIGHT: 214 LBS | DIASTOLIC BLOOD PRESSURE: 74 MMHG | SYSTOLIC BLOOD PRESSURE: 130 MMHG

## 2024-08-06 DIAGNOSIS — Z95.2 S/P TAVR (TRANSCATHETER AORTIC VALVE REPLACEMENT): ICD-10-CM

## 2024-08-06 DIAGNOSIS — R00.1 SYMPTOMATIC BRADYCARDIA: ICD-10-CM

## 2024-08-06 DIAGNOSIS — I48.92 ATRIAL FLUTTER, UNSPECIFIED TYPE (HCC): ICD-10-CM

## 2024-08-06 DIAGNOSIS — I10 BENIGN ESSENTIAL HYPERTENSION: ICD-10-CM

## 2024-08-06 DIAGNOSIS — Z01.810 PREOP CARDIOVASCULAR EXAM: Primary | ICD-10-CM

## 2024-08-06 DIAGNOSIS — Z95.0 PRESENCE OF CARDIAC PACEMAKER: ICD-10-CM

## 2024-08-06 DIAGNOSIS — I35.0 SEVERE AORTIC STENOSIS: ICD-10-CM

## 2024-08-06 DIAGNOSIS — Z95.5 S/P DRUG ELUTING CORONARY STENT PLACEMENT: ICD-10-CM

## 2024-08-06 PROCEDURE — 93000 ELECTROCARDIOGRAM COMPLETE: CPT | Performed by: INTERNAL MEDICINE

## 2024-08-06 PROCEDURE — 99214 OFFICE O/P EST MOD 30 MIN: CPT | Performed by: INTERNAL MEDICINE

## 2024-08-06 RX ORDER — AZITHROMYCIN 500 MG/1
500 TABLET, FILM COATED ORAL ONCE AS NEEDED
Qty: 1 TABLET | Refills: 3 | Status: SHIPPED | OUTPATIENT
Start: 2024-08-06 | End: 2025-07-29
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
## 2024-08-06 NOTE — PATIENT INSTRUCTIONS
You were seen today in the Cardiology office for follow up evaluation.     Please continue your current cardiac medications as prescribed.    Thank you for choosing [facility].    Please call our office or use [facility] with any questions.

## 2024-08-07 ENCOUNTER — TELEPHONE (OUTPATIENT)
Dept: OBGYN CLINIC | Facility: CLINIC | Age: 79
End: 2024-08-07

## 2024-08-14 ENCOUNTER — APPOINTMENT (OUTPATIENT)
Dept: PHYSICAL THERAPY | Facility: REHABILITATION | Age: 79
End: 2024-08-14
Payer: MEDICARE

## 2024-08-15 ENCOUNTER — OFFICE VISIT (OUTPATIENT)
Dept: PHYSICAL THERAPY | Facility: REHABILITATION | Age: 79
End: 2024-08-15
Payer: MEDICARE

## 2024-08-15 DIAGNOSIS — M54.50 ACUTE LEFT-SIDED LOW BACK PAIN WITHOUT SCIATICA: Primary | ICD-10-CM

## 2024-08-15 PROCEDURE — 97110 THERAPEUTIC EXERCISES: CPT

## 2024-08-15 NOTE — PROGRESS NOTES
"Daily Note     Today's date: 8/15/2024  Patient name: Isauro Sow  : 1945  MRN: [id number]  Referring provider: Terrence Bonner DO  Dx:   Encounter Diagnosis     ICD-10-CM    1. Acute left-sided low back pain without sciatica  M54.50           Start Time: 1100  Stop Time: 1135  Total time in clinic (min): 35 minutes    Subjective: Patient reports back is holding up well. States he was chiropractor because his son set up an appointment for him. Patient states his back gets discomfort after he sits for a bit.       Objective: See treatment diary below      Assessment: Tolerated treatment well today. Improved symptoms end of session. Progress as tolerated. Patient would benefit from continued PT      Plan: Continue per plan of care.      Precautions: falls, cardiac, hx of CVA       Manuals 7/17 7/22 7/25 7/29 7/31 8/5 8/15                                                          Neuro Re-Ed                                       Ther Ex             RFISeated HEP            Clamshells HEP Yhb 3x10 3\" hold Bhb 3x10 3\" hold Bhb 3x10 3\" hold Bhb 2x20 3\" hold Bhb 2x20 3\" hold Bhb 2x20 3\" hold      Bridges HEP 3x10 yhb  3x10 bhb 2x15 BHB Bhb 2x20 Bhb 2x20 Bhb 2x20      LTRs HEP 2x10 2x10 2x10 2x15 2x15 2x15      STS  4x5 chair 4x5 chair no hands 4x5 chair no hands 4x5 chair no hands 4x5 chair no hands 2x10 5# kb chair       Seated Deadlift  4x5 5# kb 3x10 6# kb 3x10 7# kb  3x10 8# kb 3x10 10# kb 3x10 10# kb      Kelsy Pallof Press  5# 2x10 ea 6# 2x10 ea 6# 2x10 ea 10# 2x15 ea 10# 2x15 ea 10# 2x15 ea      [place] Step Out  5# 2x10 ea 6# 2x10 ea 6# 2x10 ea 10# 2x15 ea 10# 2x15 ea 10# 2x15 ea      Suitcase Carry     8# kb 100ft 5 laps 10# kb 100 ft 5 laps 10# kb 100 ft 5 laps      Stnd hip abd    Ymb   R LE 2x10          Ther Activity                                       Gait Training                                       Modalities                                                    "

## 2024-08-19 ENCOUNTER — OFFICE VISIT (OUTPATIENT)
Dept: PHYSICAL THERAPY | Facility: REHABILITATION | Age: 79
End: 2024-08-19
Payer: MEDICARE

## 2024-08-19 DIAGNOSIS — M54.50 ACUTE LEFT-SIDED LOW BACK PAIN WITHOUT SCIATICA: Primary | ICD-10-CM

## 2024-08-19 PROCEDURE — 97530 THERAPEUTIC ACTIVITIES: CPT

## 2024-08-19 PROCEDURE — 97110 THERAPEUTIC EXERCISES: CPT

## 2024-08-19 NOTE — PROGRESS NOTES
"Daily Note     Today's date: 2024  Patient name: Isauro Sow  : 1945  MRN: [id number]  Referring provider: Terrence Bonner DO  Dx:   Encounter Diagnosis     ICD-10-CM    1. Acute left-sided low back pain without sciatica  M54.50           Start Time: 1445  Stop Time: 1515  Total time in clinic (min): 30 minutes    Subjective: Patient reports he is holding up well. States his hip/low back is mildly sore to start session.      Objective: See treatment diary below      Assessment: Tolerated treatment well today. Steady progress in pain and function with each. Progressed activities without issue. Patient would benefit from continued PT      Plan: Continue per plan of care.      Precautions: falls, cardiac, hx of CVA       Manuals 7/17 7/22 7/25 7/29 7/31 8/5 8/15 8/19                               Neuro Re-Ed                                       Ther Ex             RFISeated HEP            Clamshells HEP Yhb 3x10 3\" hold Bhb 3x10 3\" hold Bhb 3x10 3\" hold Bhb 2x20 3\" hold Bhb 2x20 3\" hold Bhb 2x20 3\" hold Bhb 2x20 3\" hold     Bridges HEP 3x10 yhb  3x10 bhb 2x15 BHB Bhb 2x20 Bhb 2x20 Bhb 2x20 Bhb 2x20     LTRs HEP 2x10 2x10 2x10 2x15 2x15 2x15 2x15     STS  4x5 chair 4x5 chair no hands 4x5 chair no hands 4x5 chair no hands 4x5 chair no hands 2x10 5# kb chair  1x10 5# kb chair     Seated Deadlift  4x5 5# kb 3x10 6# kb 3x10 7# kb  3x10 8# kb 3x10 10# kb 3x10 10# kb 3x15 10# kb     Kelsy Pallof Press  5# 2x10 ea 6# 2x10 ea 6# 2x10 ea 10# 2x15 ea 10# 2x15 ea 10# 2x15 ea 15# 2x15 ea     [place] Step Out  5# 2x10 ea 6# 2x10 ea 6# 2x10 ea 10# 2x15 ea 10# 2x15 ea 10# 2x15 ea 15# 2x15 ea     Suitcase Carry     8# kb 100ft 5 laps 10# kb 100 ft 5 laps 10# kb 100 ft 5 laps Np resume     Stnd hip abd    Ymb   R LE 2x10          Ther Activity                                       Gait Training                                       Modalities                                                      "

## 2024-08-21 ENCOUNTER — OFFICE VISIT (OUTPATIENT)
Dept: PHYSICAL THERAPY | Facility: REHABILITATION | Age: 79
End: 2024-08-21
Payer: MEDICARE

## 2024-08-21 DIAGNOSIS — M54.50 ACUTE LEFT-SIDED LOW BACK PAIN WITHOUT SCIATICA: Primary | ICD-10-CM

## 2024-08-21 PROCEDURE — 97110 THERAPEUTIC EXERCISES: CPT

## 2024-08-21 PROCEDURE — 97530 THERAPEUTIC ACTIVITIES: CPT

## 2024-08-21 NOTE — PROGRESS NOTES
"Daily Note     Today's date: 2024  Patient name: Isauro Sow  : 1945  MRN: [id number]  Referring provider: Terrence Bonner DO  Dx:   Encounter Diagnosis     ICD-10-CM    1. Acute left-sided low back pain without sciatica  M54.50           Start Time: 1445  Stop Time: 1515  Total time in clinic (min): 30 minutes    Subjective: Patient reports his back/hip is feeling good today. States low back was sore after mowing lawn but after a hot shower and ice he felt great.       Objective: See treatment diary below      Assessment: Tolerated treatment well today. Able to complete all activities today without limitations. Patient overall doing well. Patient would benefit from continued PT      Plan: Continue per plan of care.      Precautions: falls, cardiac, hx of CVA       Manuals 7/17 7/22 7/25 7/29 7/31 8/5 8/15 8/19                               Neuro Re-Ed                                       Ther Ex             RFISeated HEP            Clamshells HEP Yhb 3x10 3\" hold Bhb 3x10 3\" hold Bhb 3x10 3\" hold Bhb 2x20 3\" hold Bhb 2x20 3\" hold Bhb 2x20 3\" hold Bhb 2x20 3\" hold Bhb 2x20 3\" hold    Bridges HEP 3x10 yhb  3x10 bhb 2x15 BHB Bhb 2x20 Bhb 2x20 Bhb 2x20 Bhb 2x20 Bhb 2x20    LTRs HEP 2x10 2x10 2x10 2x15 2x15 2x15 2x15 2x15    STS  4x5 chair 4x5 chair no hands 4x5 chair no hands 4x5 chair no hands 4x5 chair no hands 2x10 5# kb chair  1x10 5# kb chair 2x10 3# kb chair    Seated Deadlift  4x5 5# kb 3x10 6# kb 3x10 7# kb  3x10 8# kb 3x10 10# kb 3x10 10# kb 3x15 10# kb 3x15 10# kb    [place]abel Pallof Press  5# 2x10 ea 6# 2x10 ea 6# 2x10 ea 10# 2x15 ea 10# 2x15 ea 10# 2x15 ea 15# 2x15 ea 15# 2x15 ea     Kelsy Step Out  5# 2x10 ea 6# 2x10 ea 6# 2x10 ea 10# 2x15 ea 10# 2x15 ea 10# 2x15 ea 15# 2x15 ea 15# 2x15 ea    Suitcase Carry     8# kb 100ft 5 laps 10# kb 100 ft 5 laps 10# kb 100 ft 5 laps Np resume 10# kb 100ft 5 laps    Stnd hip abd    Ymb   R LE 2x10          Ther Activity                                  "      Gait Training                                       Modalities

## 2024-08-22 ENCOUNTER — OFFICE VISIT (OUTPATIENT)
Dept: OBGYN CLINIC | Facility: CLINIC | Age: 79
End: 2024-08-22
Payer: MEDICARE

## 2024-08-22 VITALS — HEIGHT: 71 IN | WEIGHT: 216.8 LBS | BODY MASS INDEX: 30.35 KG/M2

## 2024-08-22 DIAGNOSIS — G89.29 CHRONIC PAIN OF LEFT KNEE: ICD-10-CM

## 2024-08-22 DIAGNOSIS — M25.562 CHRONIC PAIN OF LEFT KNEE: ICD-10-CM

## 2024-08-22 DIAGNOSIS — M17.12 PRIMARY OSTEOARTHRITIS OF LEFT KNEE: Primary | ICD-10-CM

## 2024-08-22 PROCEDURE — 99213 OFFICE O/P EST LOW 20 MIN: CPT | Performed by: ORTHOPAEDIC SURGERY
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
## 2024-08-26 ENCOUNTER — APPOINTMENT (OUTPATIENT)
Dept: LAB | Facility: CLINIC | Age: 79
End: 2024-08-26
Payer: MEDICARE

## 2024-08-26 ENCOUNTER — TELEPHONE (OUTPATIENT)
Dept: CARDIAC SURGERY | Facility: CLINIC | Age: 79
End: 2024-08-26

## 2024-08-26 ENCOUNTER — ANTICOAG VISIT (OUTPATIENT)
Dept: CARDIOLOGY CLINIC | Facility: CLINIC | Age: 79
End: 2024-08-26

## 2024-08-26 ENCOUNTER — OFFICE VISIT (OUTPATIENT)
Dept: PHYSICAL THERAPY | Facility: REHABILITATION | Age: 79
End: 2024-08-26
Payer: MEDICARE

## 2024-08-26 DIAGNOSIS — Z95.2 S/P TAVR (TRANSCATHETER AORTIC VALVE REPLACEMENT): Primary | ICD-10-CM

## 2024-08-26 DIAGNOSIS — I48.92 ATRIAL FLUTTER, UNSPECIFIED TYPE (HCC): ICD-10-CM

## 2024-08-26 DIAGNOSIS — I35.0 SEVERE AORTIC STENOSIS: ICD-10-CM

## 2024-08-26 DIAGNOSIS — M54.50 ACUTE LEFT-SIDED LOW BACK PAIN WITHOUT SCIATICA: Primary | ICD-10-CM

## 2024-08-26 LAB
INR PPP: 2.48 (ref 0.85–1.19)
PROTHROMBIN TIME: 26.8 SECONDS (ref 12.3–15)

## 2024-08-26 PROCEDURE — 36415 COLL VENOUS BLD VENIPUNCTURE: CPT

## 2024-08-26 PROCEDURE — 97140 MANUAL THERAPY 1/> REGIONS: CPT

## 2024-08-26 PROCEDURE — 85610 PROTHROMBIN TIME: CPT

## 2024-08-26 PROCEDURE — 97110 THERAPEUTIC EXERCISES: CPT

## 2024-08-26 NOTE — TELEPHONE ENCOUNTER
Left voicemail for patient in regards to an appt that was made for him on 9/4/24. This was made in error, he is due for a 1 year f/u but needs an echo done prior to the appt. I was able to schedule this for 8/30/24 at 9am at [address]. Asked that he return my call so we can then get him a follow up appt with our office. Can be scheduled with DONTE Phan.

## 2024-08-26 NOTE — PROGRESS NOTES
"Progress / Daily Note     Today's date: 2024  Patient name: Isauro oSw  : 1945  MRN: [id number]  Referring provider: Terrence Bonner DO  Dx:   Encounter Diagnosis     ICD-10-CM    1. Acute left-sided low back pain without sciatica  M54.50           Start Time: 1450  Stop Time: 1520  Total time in clinic (min): 30 minutes    Subjective: Patient reports he can mow the lawn again without aggravting his symptoms. States he will take a shower after mowing the lawn and he feels good.    Pain Levels  Current: 0/10  Worst: 1-2/10    GROC: 90%      Objective: See treatment diary below      Assessment:   Patient has actively participated in 10 visits of physical therapy over the last 5 weeks and made good progress in his pain, function, and mobility. Patient has been able to resume mowing the lawn without flaring up any symptoms. Patient still gets very mild symptoms while mowing which will resolve with rest and heat. Patient has made progress towards goals established at  (see below). Will continue to work with patient for the next 2 weeks to ensure no increase in symptoms before considering a possible discharge to [facility]. Patient would benefit from continued PT      Goals  Short Term Goals (Week 4): met all  1. Decreased pain by 50%  2. Improve ROM by 10 degrees         Long Term Goals (8 weeks):  1. GROC >75%  2. Patient will exceed FOTO predicted outcome score  3. Patient will be fully independent with HEP by discharge  4. Patient will be able to manage symptoms independently.       Plan: Continue per plan of care.      Precautions: falls, cardiac, hx of CVA       Manuals  8/5 8/15 8/19 8/21 8/26                Re-Evaluation          PRR   Neuro Re-Ed                                       Ther Ex             RFISeated HEP            Clamshells HEP Yhb 3x10 3\" hold Bhb 3x10 3\" hold Bhb 3x10 3\" hold Bhb 2x20 3\" hold Bhb 2x20 3\" hold Bhb 2x20 3\" hold Bhb 2x20 3\" hold Bhb 2x20 3\" hold " "Bhb 2x20 3\" hold   Bridges HEP 3x10 yhb  3x10 bhb 2x15 BHB Bhb 2x20 Bhb 2x20 Bhb 2x20 Bhb 2x20 Bhb 2x20 Bhb 2x20   LTRs HEP 2x10 2x10 2x10 2x15 2x15 2x15 2x15 2x15 2x15   STS  4x5 chair 4x5 chair no hands 4x5 chair no hands 4x5 chair no hands 4x5 chair no hands 2x10 5# kb chair  1x10 5# kb chair 2x10 3# kb chair 2x10 3# kb chair   Seated Deadlift  4x5 5# kb 3x10 6# kb 3x10 7# kb  3x10 8# kb 3x10 10# kb 3x10 10# kb 3x15 10# kb 3x15 10# kb 3x15 10# kb   Kelsy Pallof Press  5# 2x10 ea 6# 2x10 ea 6# 2x10 ea 10# 2x15 ea 10# 2x15 ea 10# 2x15 ea 15# 2x15 ea 15# 2x15 ea  15# 2x15 ea    [place] Step Out  5# 2x10 ea 6# 2x10 ea 6# 2x10 ea 10# 2x15 ea 10# 2x15 ea 10# 2x15 ea 15# 2x15 ea 15# 2x15 ea 15# 2x15 ea   Suitcase Carry     8# kb 100ft 5 laps 10# kb 100 ft 5 laps 10# kb 100 ft 5 laps Np resume 10# kb 100ft 5 laps 10# kb 100ft 5 laps   Stnd hip abd    Ymb   R LE 2x10          Ther Activity                                       Gait Training                                       Modalities                                                          "

## 2024-08-28 ENCOUNTER — OFFICE VISIT (OUTPATIENT)
Dept: PHYSICAL THERAPY | Facility: REHABILITATION | Age: 79
End: 2024-08-28
Payer: MEDICARE

## 2024-08-28 DIAGNOSIS — M54.50 ACUTE LEFT-SIDED LOW BACK PAIN WITHOUT SCIATICA: Primary | ICD-10-CM

## 2024-08-28 PROCEDURE — 97110 THERAPEUTIC EXERCISES: CPT

## 2024-08-28 PROCEDURE — 97140 MANUAL THERAPY 1/> REGIONS: CPT

## 2024-08-28 NOTE — PROGRESS NOTES
"Daily Note     Today's date: 2024  Patient name: Isauro Sow  : 1945  MRN: [id number]  Referring provider: Terrence Bonner DO  Dx:   Encounter Diagnosis     ICD-10-CM    1. Acute left-sided low back pain without sciatica  M54.50           Start Time: 1445  Stop Time: 1515  Total time in clinic (min): 30 minutes    Subjective: patient reports he is feeling well.       Objective: See treatment diary below      Assessment: Tolerated treatment well today. Patient is doing very well and this has maintained for the last week. Progress as tolerated. Patient would benefit from continued PT      Plan: Continue per plan of care.      Precautions: falls, cardiac, hx of CVA       Manuals 8/28    7/31 8/5 8/15 8/19 8/21 8/26                Re-Evaluation          PRR   Neuro Re-Ed                                       Ther Ex             RFISeated             Clamshells Rhb 2x20 3\" hold    Bhb 2x20 3\" hold Bhb 2x20 3\" hold Bhb 2x20 3\" hold Bhb 2x20 3\" hold Bhb 2x20 3\" hold Bhb 2x20 3\" hold   Bridges Rhb 2x20    Bhb 2x20 Bhb 2x20 Bhb 2x20 Bhb 2x20 Bhb 2x20 Bhb 2x20   LTRs 2x15    2x15 2x15 2x15 2x15 2x15 2x15   STS 2x10 3# kb chair    4x5 chair no hands 4x5 chair no hands 2x10 5# kb chair  1x10 5# kb chair 2x10 3# kb chair 2x10 3# kb chair   Seated Deadlift 3x15 10# kb    3x10 8# kb 3x10 10# kb 3x10 10# kb 3x15 10# kb 3x15 10# kb 3x15 10# kb   Kelsy Pallof Press 15# 2x15 ea    10# 2x15 ea 10# 2x15 ea 10# 2x15 ea 15# 2x15 ea 15# 2x15 ea  15# 2x15 ea    [place]els[place] Step Out 15# 2x15 ea    10# 2x15 ea 10# 2x15 ea 10# 2x15 ea 15# 2x15 ea 15# 2x15 ea 15# 2x15 ea   Suitcase Carry 10# kb 100ft 5 laps    8# kb 100ft 5 laps 10# kb 100 ft 5 laps 10# kb 100 ft 5 laps Np resume 10# kb 100ft 5 laps 10# kb 100ft 5 laps   Stnd hip abd             Ther Activity                                       Gait Training                                       Modalities                                                            "

## 2024-08-30 ENCOUNTER — HOSPITAL ENCOUNTER (OUTPATIENT)
Dept: NON INVASIVE DIAGNOSTICS | Facility: CLINIC | Age: 79
Discharge: HOME | End: 2024-08-30
Payer: MEDICARE

## 2024-08-30 VITALS
HEIGHT: 71 IN | DIASTOLIC BLOOD PRESSURE: 74 MMHG | WEIGHT: 216 LBS | BODY MASS INDEX: 30.24 KG/M2 | HEART RATE: 60 BPM | SYSTOLIC BLOOD PRESSURE: 130 MMHG

## 2024-08-30 DIAGNOSIS — Z95.2 S/P TAVR (TRANSCATHETER AORTIC VALVE REPLACEMENT): ICD-10-CM

## 2024-08-30 DIAGNOSIS — I35.0 SEVERE AORTIC STENOSIS: ICD-10-CM

## 2024-08-30 LAB
AORTIC ROOT: 2.6 CM
AORTIC VALVE MEAN VELOCITY: 11.6 M/S
APICAL FOUR CHAMBER EJECTION FRACTION: 51 %
ASCENDING AORTA: 3.5 CM
AV AREA BY CONTINUOUS VTI: 1.7 CM2
AV AREA PEAK VELOCITY: 1.7 CM2
AV LVOT MEAN GRADIENT: 2 MMHG
AV LVOT PEAK GRADIENT: 3 MMHG
AV MEAN GRADIENT: 6 MMHG
AV PEAK GRADIENT: 12 MMHG
AV VALVE AREA: 1.66 CM2
AV VELOCITY RATIO: 0.53
BSA FOR ECHO PROCEDURE: 2.18 M2
DOP CALC AO PEAK VEL: 1.72 M/S
DOP CALC AO VTI: 35.72 CM
DOP CALC LVOT AREA: 3.14 CM2
DOP CALC LVOT CARDIAC INDEX: 1.64 L/MIN/M2
DOP CALC LVOT CARDIAC OUTPUT: 3.58 L/MIN
DOP CALC LVOT DIAMETER: 2 CM
DOP CALC LVOT PEAK VEL VTI: 18.91 CM
DOP CALC LVOT PEAK VEL: 0.91 M/S
DOP CALC LVOT STROKE INDEX: 27.1 ML/M2
DOP CALC LVOT STROKE VOLUME: 59.38
E WAVE DECELERATION TIME: 212 MS
E/A RATIO: 1.02
FRACTIONAL SHORTENING: 14 (ref 28–44)
INTERVENTRICULAR SEPTUM IN DIASTOLE (PARASTERNAL SHORT AXIS VIEW): 1.5 CM
INTERVENTRICULAR SEPTUM: 1.5 CM (ref 0.6–1.1)
LAAS-AP2: 19.7 CM2
LAAS-AP4: 23.8 CM2
LEFT ATRIUM SIZE: 4.4 CM
LEFT ATRIUM VOLUME (MOD BIPLANE): 68 ML
LEFT ATRIUM VOLUME INDEX (MOD BIPLANE): 31.2 ML/M2
LEFT INTERNAL DIMENSION IN SYSTOLE: 3.6 CM (ref 2.1–4)
LEFT VENTRICULAR INTERNAL DIMENSION IN DIASTOLE: 4.2 CM (ref 3.5–6)
LEFT VENTRICULAR POSTERIOR WALL IN END DIASTOLE: 1.4 CM
LEFT VENTRICULAR STROKE VOLUME: 24 ML
LVSV (TEICH): 24 ML
MV E'TISSUE VEL-SEP: 7 CM/S
MV PEAK A VEL: 0.63 M/S
MV PEAK E VEL: 64 CM/S
MV STENOSIS PRESSURE HALF TIME: 61 MS
MV VALVE AREA P 1/2 METHOD: 3.61
RIGHT ATRIAL 2D VOLUME: 60 ML
RIGHT ATRIUM AREA SYSTOLE A4C: 20.6 CM2
RIGHT VENTRICLE ID DIMENSION: 4.6 CM
SL CV LEFT ATRIUM LENGTH A2C: 5.5 CM
SL CV LV EF: 55
SL CV PED ECHO LEFT VENTRICLE DIASTOLIC VOLUME (MOD BIPLANE) 2D: 77 ML
SL CV PED ECHO LEFT VENTRICLE SYSTOLIC VOLUME (MOD BIPLANE) 2D: 53 ML
TR MAX PG: 29 MMHG
TR PEAK VELOCITY: 2.7 M/S
TRICUSPID ANNULAR PLANE SYSTOLIC EXCURSION: 1.6 CM
TRICUSPID VALVE PEAK REGURGITATION VELOCITY: 2.71 M/S

## 2024-08-30 PROCEDURE — 93306 TTE W/DOPPLER COMPLETE: CPT

## 2024-08-30 PROCEDURE — 93306 TTE W/DOPPLER COMPLETE: CPT | Performed by: INTERNAL MEDICINE

## 2024-08-31 DIAGNOSIS — E11.49 TYPE 2 DIABETES MELLITUS WITH OTHER NEUROLOGIC COMPLICATION, WITHOUT LONG-TERM CURRENT USE OF INSULIN (HCC): ICD-10-CM

## 2024-09-02 RX ORDER — ROSUVASTATIN CALCIUM 5 MG/1
TABLET, COATED ORAL
Qty: 30 TABLET | Refills: 5 | Status: SHIPPED | OUTPATIENT
Start: 2024-09-02

## 2024-09-03 ENCOUNTER — OFFICE VISIT (OUTPATIENT)
Dept: CARDIAC SURGERY | Facility: CLINIC | Age: 79
End: 2024-09-03
Payer: MEDICARE

## 2024-09-03 ENCOUNTER — OFFICE VISIT (OUTPATIENT)
Dept: PHYSICAL THERAPY | Facility: REHABILITATION | Age: 79
End: 2024-09-03
Payer: MEDICARE

## 2024-09-03 VITALS
BODY MASS INDEX: 29.96 KG/M2 | SYSTOLIC BLOOD PRESSURE: 151 MMHG | OXYGEN SATURATION: 98 % | DIASTOLIC BLOOD PRESSURE: 69 MMHG | HEART RATE: 66 BPM | HEIGHT: 71 IN | WEIGHT: 214 LBS

## 2024-09-03 DIAGNOSIS — Z95.2 S/P TAVR (TRANSCATHETER AORTIC VALVE REPLACEMENT): Primary | ICD-10-CM

## 2024-09-03 DIAGNOSIS — M54.50 ACUTE LEFT-SIDED LOW BACK PAIN WITHOUT SCIATICA: Primary | ICD-10-CM

## 2024-09-03 PROCEDURE — 97110 THERAPEUTIC EXERCISES: CPT

## 2024-09-03 PROCEDURE — 99213 OFFICE O/P EST LOW 20 MIN: CPT | Performed by: NURSE PRACTITIONER

## 2024-09-03 PROCEDURE — 97140 MANUAL THERAPY 1/> REGIONS: CPT

## 2024-09-03 RX ORDER — ASPIRIN 81 MG/1
81 TABLET, CHEWABLE ORAL DAILY
Start: 2024-09-03

## 2024-09-03 NOTE — PROGRESS NOTES
"Discharge Note     Today's date: 9/3/2024  Patient name: Isauro Sow  : 1945  MRN: [id number]  Referring provider: Terrence Bonner DO  Dx:   Encounter Diagnosis     ICD-10-CM    1. Acute left-sided low back pain without sciatica  M54.50           Start Time: 1030  Stop Time: 1104  Total time in clinic (min): 34 minutes    Subjective: Patient reports he has mowed the lawn multiple times over the last 2 weeks without any pain. States the combo of PT, hot showers, and ice has made his back feel back to normal. Patient feels he is ready for discharge today.     Pain Levels  Worst: 1-2/10      Objective: See treatment diary below      Assessment:   Plan to discharge patient to [facility] at this time. Patient has met all goals established at . See progress note on 24 for more details. Patient will f/u with PT as needed over the next month.       Long Term Goals (8 weeks):  1. GROC >75%  2. Patient will exceed FOTO predicted outcome score  3. Patient will be fully independent with HEP by discharge  4. Patient will be able to manage symptoms independently.       Plan: Discharge to [facility] at this time       Precautions: falls, cardiac, hx of CVA       Manuals 8/28 9/3   7/31 8/5 8/15 8/19 8/21 8/26                Re-Evaluation          PRR   Neuro Re-Ed                                       Ther Ex             RFISeated             Clamshells Rhb 2x20 3\" hold Bhb 2x20    Bhb 2x20 3\" hold Bhb 2x20 3\" hold Bhb 2x20 3\" hold Bhb 2x20 3\" hold Bhb 2x20 3\" hold Bhb 2x20 3\" hold   Bridges Rhb 2x20 Bhb 2x20    Bhb 2x20 Bhb 2x20 Bhb 2x20 Bhb 2x20 Bhb 2x20 Bhb 2x20   LTRs 2x15 2x15   2x15 2x15 2x15 2x15 2x15 2x15   STS 2x10 3# kb chair 2x10 3# kb chair   4x5 chair no hands 4x5 chair no hands 2x10 5# kb chair  1x10 5# kb chair 2x10 3# kb chair 2x10 3# kb chair   Seated [employer] 3x15 10# kb 3x15 10# kb   3x10 8# kb 3x10 10# kb 3x10 10# kb 3x15 10# kb 3x15 10# kb 3x15 10# kb   [place] [employer] 15# 2x15 ea 15# 2x15 ea   10# " 2x15 ea 10# 2x15 ea 10# 2x15 ea 15# 2x15 ea 15# 2x15 ea  15# 2x15 ea    [place] [employer] 15# 2x15 ea 15# 2x15 ea   10# 2x15 ea 10# 2x15 ea 10# 2x15 ea 15# 2x15 ea 15# 2x15 ea 15# 2x15 ea   S[employer] 10# kb 100ft 5 laps 10# kb 100ft 5 laps   8# kb 100ft 5 laps 10# kb 100 ft 5 laps 10# kb 100 ft 5 laps Np resume 10# kb 100ft 5 laps 10# kb 100ft 5 laps   Stnd hip abd             Ther Activity                                       Gait Training                                       Modalities
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
## 2024-09-05 ENCOUNTER — APPOINTMENT (OUTPATIENT)
Dept: PHYSICAL THERAPY | Facility: REHABILITATION | Age: 79
End: 2024-09-05
Payer: MEDICARE

## 2024-09-06 ENCOUNTER — APPOINTMENT (OUTPATIENT)
Dept: LAB | Facility: CLINIC | Age: 79
End: 2024-09-06
Payer: MEDICARE

## 2024-09-06 DIAGNOSIS — I35.0 SEVERE AORTIC STENOSIS: ICD-10-CM

## 2024-09-06 DIAGNOSIS — Z95.2 S/P TAVR (TRANSCATHETER AORTIC VALVE REPLACEMENT): ICD-10-CM

## 2024-09-06 LAB
ANION GAP SERPL CALCULATED.3IONS-SCNC: 6 MMOL/L (ref 4–13)
BASOPHILS # BLD AUTO: 0.04 THOUSANDS/ÂΜL (ref 0–0.1)
BASOPHILS NFR BLD AUTO: 1 % (ref 0–1)
BUN SERPL-MCNC: 15 MG/DL (ref 5–25)
CALCIUM SERPL-MCNC: 9.8 MG/DL (ref 8.4–10.2)
CHLORIDE SERPL-SCNC: 104 MMOL/L (ref 96–108)
CO2 SERPL-SCNC: 32 MMOL/L (ref 21–32)
CREAT SERPL-MCNC: 1.11 MG/DL (ref 0.6–1.3)
EOSINOPHIL # BLD AUTO: 0.12 THOUSAND/ÂΜL (ref 0–0.61)
EOSINOPHIL NFR BLD AUTO: 2 % (ref 0–6)
ERYTHROCYTE [DISTWIDTH] IN BLOOD BY AUTOMATED COUNT: 13.9 % (ref 11.6–15.1)
GFR SERPL CREATININE-BSD FRML MDRD: 62 ML/MIN/1.73SQ M
GLUCOSE SERPL-MCNC: 134 MG/DL (ref 65–140)
HCT VFR BLD AUTO: 48 % (ref 36.5–49.3)
HGB BLD-MCNC: 15.4 G/DL (ref 12–17)
IMM GRANULOCYTES # BLD AUTO: 0.02 THOUSAND/UL (ref 0–0.2)
IMM GRANULOCYTES NFR BLD AUTO: 0 % (ref 0–2)
LYMPHOCYTES # BLD AUTO: 2.17 THOUSANDS/ÂΜL (ref 0.6–4.47)
LYMPHOCYTES NFR BLD AUTO: 28 % (ref 14–44)
MCH RBC QN AUTO: 28.9 PG (ref 26.8–34.3)
MCHC RBC AUTO-ENTMCNC: 32.1 G/DL (ref 31.4–37.4)
MCV RBC AUTO: 90 FL (ref 82–98)
MONOCYTES # BLD AUTO: 0.63 THOUSAND/ÂΜL (ref 0.17–1.22)
MONOCYTES NFR BLD AUTO: 8 % (ref 4–12)
NEUTROPHILS # BLD AUTO: 4.68 THOUSANDS/ÂΜL (ref 1.85–7.62)
NEUTS SEG NFR BLD AUTO: 61 % (ref 43–75)
NRBC BLD AUTO-RTO: 0 /100 WBCS
PLATELET # BLD AUTO: 129 THOUSANDS/UL (ref 149–390)
PMV BLD AUTO: 11.2 FL (ref 8.9–12.7)
POTASSIUM SERPL-SCNC: 4.6 MMOL/L (ref 3.5–5.3)
RBC # BLD AUTO: 5.32 MILLION/UL (ref 3.88–5.62)
SODIUM SERPL-SCNC: 142 MMOL/L (ref 135–147)
WBC # BLD AUTO: 7.66 THOUSAND/UL (ref 4.31–10.16)

## 2024-09-06 PROCEDURE — 36415 COLL VENOUS BLD VENIPUNCTURE: CPT

## 2024-09-06 PROCEDURE — 80048 BASIC METABOLIC PNL TOTAL CA: CPT

## 2024-09-06 PROCEDURE — 85025 COMPLETE CBC W/AUTO DIFF WBC: CPT

## 2024-09-13 ENCOUNTER — HOSPITAL ENCOUNTER (OUTPATIENT)
Dept: NON INVASIVE DIAGNOSTICS | Facility: CLINIC | Age: 79
Discharge: HOME | End: 2024-09-13
Payer: MEDICARE

## 2024-09-13 DIAGNOSIS — I65.23 BILATERAL CAROTID ARTERY STENOSIS: ICD-10-CM

## 2024-09-13 DIAGNOSIS — I65.22 STENOSIS OF LEFT CAROTID ARTERY: ICD-10-CM

## 2024-09-13 PROCEDURE — 93880 EXTRACRANIAL BILAT STUDY: CPT

## 2024-09-13 PROCEDURE — 93880 EXTRACRANIAL BILAT STUDY: CPT | Performed by: SURGERY

## 2024-09-14 DIAGNOSIS — E11.49 TYPE 2 DIABETES MELLITUS WITH OTHER NEUROLOGIC COMPLICATION, WITHOUT LONG-TERM CURRENT USE OF INSULIN (HCC): ICD-10-CM

## 2024-09-16 ENCOUNTER — TRANSCRIBE ORDERS (OUTPATIENT)
Dept: ADMINISTRATIVE | Facility: HOSPITAL | Age: 79
End: 2024-09-16

## 2024-09-16 DIAGNOSIS — I65.23 BILATERAL CAROTID ARTERY STENOSIS: Primary | ICD-10-CM

## 2024-09-17 DIAGNOSIS — I47.29 NSVT (NONSUSTAINED VENTRICULAR TACHYCARDIA) (HCC): ICD-10-CM

## 2024-09-18 RX ORDER — METOPROLOL SUCCINATE 25 MG/1
25 TABLET, EXTENDED RELEASE ORAL
Qty: 30 TABLET | Refills: 5 | Status: SHIPPED | OUTPATIENT
Start: 2024-09-18

## 2024-09-23 ENCOUNTER — ANTICOAG VISIT (OUTPATIENT)
Dept: CARDIOLOGY CLINIC | Facility: CLINIC | Age: 79
End: 2024-09-23

## 2024-09-23 ENCOUNTER — APPOINTMENT (OUTPATIENT)
Dept: LAB | Facility: CLINIC | Age: 79
End: 2024-09-23
Payer: MEDICARE

## 2024-09-23 DIAGNOSIS — I48.92 ATRIAL FLUTTER, UNSPECIFIED TYPE (HCC): ICD-10-CM

## 2024-09-23 LAB
INR PPP: 3.06 (ref 0.85–1.19)
PROTHROMBIN TIME: 31.3 SECONDS (ref 12.3–15)

## 2024-09-23 PROCEDURE — 85610 PROTHROMBIN TIME: CPT

## 2024-09-23 PROCEDURE — 36415 COLL VENOUS BLD VENIPUNCTURE: CPT

## 2024-09-24 ENCOUNTER — OFFICE VISIT (OUTPATIENT)
Age: 79
End: 2024-09-24
Payer: MEDICARE

## 2024-09-24 ENCOUNTER — APPOINTMENT (OUTPATIENT)
Age: 79
End: 2024-09-24
Payer: MEDICARE

## 2024-09-24 ENCOUNTER — PREP FOR PROCEDURE (OUTPATIENT)
Age: 79
End: 2024-09-24

## 2024-09-24 VITALS
DIASTOLIC BLOOD PRESSURE: 78 MMHG | BODY MASS INDEX: 29.12 KG/M2 | SYSTOLIC BLOOD PRESSURE: 153 MMHG | WEIGHT: 208 LBS | HEART RATE: 73 BPM | HEIGHT: 71 IN

## 2024-09-24 DIAGNOSIS — Z01.89 ENCOUNTER FOR LOWER EXTREMITY COMPARISON IMAGING STUDY: ICD-10-CM

## 2024-09-24 DIAGNOSIS — M25.562 CHRONIC PAIN OF LEFT KNEE: ICD-10-CM

## 2024-09-24 DIAGNOSIS — M17.12 PRIMARY OSTEOARTHRITIS OF LEFT KNEE: Primary | ICD-10-CM

## 2024-09-24 DIAGNOSIS — M25.59 PAIN IN OTHER JOINT: ICD-10-CM

## 2024-09-24 DIAGNOSIS — M25.562 LEFT KNEE PAIN, UNSPECIFIED CHRONICITY: ICD-10-CM

## 2024-09-24 DIAGNOSIS — G89.29 CHRONIC PAIN OF LEFT KNEE: ICD-10-CM

## 2024-09-24 PROCEDURE — 73562 X-RAY EXAM OF KNEE 3: CPT

## 2024-09-24 PROCEDURE — 77073 BONE LENGTH STUDIES: CPT

## 2024-09-24 PROCEDURE — 99215 OFFICE O/P EST HI 40 MIN: CPT | Performed by: STUDENT IN AN ORGANIZED HEALTH CARE EDUCATION/TRAINING PROGRAM

## 2024-09-24 PROCEDURE — 73564 X-RAY EXAM KNEE 4 OR MORE: CPT

## 2024-09-24 RX ORDER — CHLORHEXIDINE GLUCONATE 40 MG/ML
SOLUTION TOPICAL DAILY PRN
OUTPATIENT
Start: 2024-09-24

## 2024-09-24 RX ORDER — FOLIC ACID 1 MG/1
1 TABLET ORAL DAILY
Qty: 30 TABLET | Refills: 1 | Status: SHIPPED | OUTPATIENT
Start: 2024-09-24

## 2024-09-24 RX ORDER — CHLORHEXIDINE GLUCONATE ORAL RINSE 1.2 MG/ML
15 SOLUTION DENTAL ONCE
OUTPATIENT
Start: 2024-09-24 | End: 2024-09-24

## 2024-09-24 RX ORDER — ASCORBIC ACID 500 MG
500 TABLET ORAL 2 TIMES DAILY
Qty: 60 TABLET | Refills: 1 | Status: SHIPPED | OUTPATIENT
Start: 2024-09-24

## 2024-09-24 RX ORDER — MULTIVIT-MIN/IRON FUM/FOLIC AC 7.5 MG-4
1 TABLET ORAL DAILY
Qty: 30 TABLET | Refills: 1 | Status: SHIPPED | OUTPATIENT
Start: 2024-09-24

## 2024-09-24 RX ORDER — ACETAMINOPHEN 325 MG/1
975 TABLET ORAL ONCE
OUTPATIENT
Start: 2024-09-24 | End: 2024-09-24

## 2024-09-24 RX ORDER — SODIUM CHLORIDE, SODIUM LACTATE, POTASSIUM CHLORIDE, CALCIUM CHLORIDE 600; 310; 30; 20 MG/100ML; MG/100ML; MG/100ML; MG/100ML
125 INJECTION, SOLUTION INTRAVENOUS CONTINUOUS
OUTPATIENT
Start: 2024-09-24
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
## 2024-09-25 ENCOUNTER — TELEPHONE (OUTPATIENT)
Dept: CARDIOLOGY CLINIC | Facility: CLINIC | Age: 79
End: 2024-09-25

## 2024-09-25 NOTE — TELEPHONE ENCOUNTER
CC letter sent to your in basket.    LOV- 8/6/24    Sx- Left Total Knee    Surgeon- Fox    Sx Date- 11/13/24    Thinners- Warfarin and Aspirin    Asking for hold recommendations for thinners.     Please review and advise letter. Thank you Dr. Browning.

## 2024-09-30 ENCOUNTER — TELEPHONE (OUTPATIENT)
Dept: ANESTHESIOLOGY | Facility: CLINIC | Age: 79
End: 2024-09-30

## 2024-09-30 DIAGNOSIS — Z01.89 ENCOUNTER FOR GERIATRIC ASSESSMENT: Primary | ICD-10-CM

## 2024-10-04 ENCOUNTER — REMOTE DEVICE CLINIC VISIT (OUTPATIENT)
Dept: CARDIOLOGY CLINIC | Facility: CLINIC | Age: 79
End: 2024-10-04
Payer: MEDICARE

## 2024-10-04 DIAGNOSIS — Z95.0 CARDIAC PACEMAKER IN SITU: Primary | ICD-10-CM

## 2024-10-04 PROCEDURE — 93294 REM INTERROG EVL PM/LDLS PM: CPT | Performed by: STUDENT IN AN ORGANIZED HEALTH CARE EDUCATION/TRAINING PROGRAM

## 2024-10-04 PROCEDURE — 93296 REM INTERROG EVL PM/IDS: CPT | Performed by: STUDENT IN AN ORGANIZED HEALTH CARE EDUCATION/TRAINING PROGRAM

## 2024-10-04 NOTE — PROGRESS NOTES
Results for orders placed or performed in visit on 10/04/24   Cardiac EP device report    Narrative    MDT DUAL CHAMBER PPM (MVP ON) - ACTIVE SYSTEM IS MRI CONDITIONAL  CARELINK TRANSMISSION: BATTERY VOLTAGE ADEQUATE (10.2 YRS). AP-88%, -98% (>40% MVP@60PPM). ALL AVAILABLE LEAD PARAMETERS WITHIN NORMAL LIMITS. NO SIGNIFICANT HIGH RATE EPISODES. NORMAL DEVICE FUNCTION. GV

## 2024-10-07 ENCOUNTER — APPOINTMENT (OUTPATIENT)
Dept: LAB | Facility: CLINIC | Age: 79
End: 2024-10-07
Payer: MEDICARE

## 2024-10-07 ENCOUNTER — TELEPHONE (OUTPATIENT)
Dept: OBGYN CLINIC | Facility: HOSPITAL | Age: 79
End: 2024-10-07

## 2024-10-07 DIAGNOSIS — M25.59 PAIN IN OTHER JOINT: ICD-10-CM

## 2024-10-07 DIAGNOSIS — I48.92 ATRIAL FLUTTER, UNSPECIFIED TYPE (HCC): ICD-10-CM

## 2024-10-07 DIAGNOSIS — M17.12 PRIMARY OSTEOARTHRITIS OF LEFT KNEE: ICD-10-CM

## 2024-10-07 DIAGNOSIS — M25.562 CHRONIC PAIN OF LEFT KNEE: Primary | ICD-10-CM

## 2024-10-07 DIAGNOSIS — G89.29 CHRONIC PAIN OF LEFT KNEE: Primary | ICD-10-CM

## 2024-10-07 LAB
ALBUMIN SERPL BCG-MCNC: 4.1 G/DL (ref 3.5–5)
ALP SERPL-CCNC: 60 U/L (ref 34–104)
ALT SERPL W P-5'-P-CCNC: 14 U/L (ref 7–52)
ANION GAP SERPL CALCULATED.3IONS-SCNC: 4 MMOL/L (ref 4–13)
APTT PPP: 32 SECONDS (ref 23–34)
AST SERPL W P-5'-P-CCNC: 15 U/L (ref 13–39)
BASOPHILS # BLD AUTO: 0.03 THOUSANDS/ΜL (ref 0–0.1)
BASOPHILS NFR BLD AUTO: 0 % (ref 0–1)
BILIRUB SERPL-MCNC: 0.63 MG/DL (ref 0.2–1)
BUN SERPL-MCNC: 21 MG/DL (ref 5–25)
CALCIUM SERPL-MCNC: 9.7 MG/DL (ref 8.4–10.2)
CHLORIDE SERPL-SCNC: 103 MMOL/L (ref 96–108)
CO2 SERPL-SCNC: 34 MMOL/L (ref 21–32)
CREAT SERPL-MCNC: 1.12 MG/DL (ref 0.6–1.3)
EOSINOPHIL # BLD AUTO: 0.07 THOUSAND/ΜL (ref 0–0.61)
EOSINOPHIL NFR BLD AUTO: 1 % (ref 0–6)
ERYTHROCYTE [DISTWIDTH] IN BLOOD BY AUTOMATED COUNT: 13.2 % (ref 11.6–15.1)
EST. AVERAGE GLUCOSE BLD GHB EST-MCNC: 160 MG/DL
GFR SERPL CREATININE-BSD FRML MDRD: 62 ML/MIN/1.73SQ M
GLUCOSE SERPL-MCNC: 145 MG/DL (ref 65–140)
HBA1C MFR BLD: 7.2 %
HCT VFR BLD AUTO: 46.5 % (ref 36.5–49.3)
HGB BLD-MCNC: 14.8 G/DL (ref 12–17)
IMM GRANULOCYTES # BLD AUTO: 0.03 THOUSAND/UL (ref 0–0.2)
IMM GRANULOCYTES NFR BLD AUTO: 0 % (ref 0–2)
INR PPP: 2.26 (ref 0.85–1.19)
LYMPHOCYTES # BLD AUTO: 1.84 THOUSANDS/ΜL (ref 0.6–4.47)
LYMPHOCYTES NFR BLD AUTO: 26 % (ref 14–44)
MCH RBC QN AUTO: 28.1 PG (ref 26.8–34.3)
MCHC RBC AUTO-ENTMCNC: 31.8 G/DL (ref 31.4–37.4)
MCV RBC AUTO: 88 FL (ref 82–98)
MONOCYTES # BLD AUTO: 0.47 THOUSAND/ΜL (ref 0.17–1.22)
MONOCYTES NFR BLD AUTO: 7 % (ref 4–12)
NEUTROPHILS # BLD AUTO: 4.65 THOUSANDS/ΜL (ref 1.85–7.62)
NEUTS SEG NFR BLD AUTO: 66 % (ref 43–75)
NRBC BLD AUTO-RTO: 0 /100 WBCS
PLATELET # BLD AUTO: 126 THOUSANDS/UL (ref 149–390)
PMV BLD AUTO: 10.8 FL (ref 8.9–12.7)
POTASSIUM SERPL-SCNC: 4.3 MMOL/L (ref 3.5–5.3)
PROT SERPL-MCNC: 6.9 G/DL (ref 6.4–8.4)
PROTHROMBIN TIME: 24.9 SECONDS (ref 12.3–15)
RBC # BLD AUTO: 5.26 MILLION/UL (ref 3.88–5.62)
SODIUM SERPL-SCNC: 141 MMOL/L (ref 135–147)
WBC # BLD AUTO: 7.09 THOUSAND/UL (ref 4.31–10.16)

## 2024-10-07 PROCEDURE — 83036 HEMOGLOBIN GLYCOSYLATED A1C: CPT

## 2024-10-07 PROCEDURE — 86900 BLOOD TYPING SEROLOGIC ABO: CPT | Performed by: PHYSICIAN ASSISTANT

## 2024-10-07 PROCEDURE — 86850 RBC ANTIBODY SCREEN: CPT | Performed by: PHYSICIAN ASSISTANT

## 2024-10-07 PROCEDURE — 85025 COMPLETE CBC W/AUTO DIFF WBC: CPT

## 2024-10-07 PROCEDURE — 36415 COLL VENOUS BLD VENIPUNCTURE: CPT

## 2024-10-07 PROCEDURE — 85610 PROTHROMBIN TIME: CPT

## 2024-10-07 PROCEDURE — 80053 COMPREHEN METABOLIC PANEL: CPT

## 2024-10-07 PROCEDURE — 85730 THROMBOPLASTIN TIME PARTIAL: CPT

## 2024-10-07 PROCEDURE — 86901 BLOOD TYPING SEROLOGIC RH(D): CPT | Performed by: PHYSICIAN ASSISTANT

## 2024-10-07 NOTE — TELEPHONE ENCOUNTER
Preoperative Elective Admission Assessment, spoke to pt/pt son    Living Situation:    Who does pt live with:  Son, son's fiance, and spouse  What kind of home: single level  How many levels in home: 1 + basement -- Per family, pt does not need to access basement during postop recovery  # of steps to enter home: 0  Sleeping arrangement: first/entry floor  Where is Bathroom: first floor   Where is the tub or shower: first floor, tub/shower.      First Floor Setup:   Is there a bathroom: Yes  Where would pt sleep: bed     DME:  Pt has RW and a cane.  No DME ordered. Instructed pt to bring RW on DOS, verbalizes understanding.   We discussed clearing pathways in the home and making sure there is accessibly to use the walker, for example, removing throw rugs.      Patient's Current Level of Function: Ambulates with cane and ADLs: Independent    Post-op Caregiver:  Son and/or son's fiance  -- Requesting SW consult d/t work schedules and meals   Caregiver Name and phone number for Inpatient discharge needs: Arley (son) [phone]   Currently receive any HHC/aides/community supports: No     Post-op Transport:  Son to/from surgery, Requesting SW consult to/from PT  To/from hospital: child  To/from PT 2-3x/week:  Requesting SW   Uses community transport now: No     Outpatient Physical Therapy Site:   Site: [facility]   pre and post-op appts scheduled? No, will route      Medication Management: with assistance and pillbox  Preferred Pharmacy for Post-op Medications: [id number]  [address] [id number]   Blood Management Vitamin Regimen: Pt confirms taking as prescribed  Post-op anticoagulant: to be determined by surgical team postoperatively     DC Plan: Pt plans to be discharged home    Barriers to DC identified preoperatively: caregiver support and transportation    BMI: 29.01    Patient Education:  Pt educated on post-op pain, early mobilization (POD0), LOS goals, OP PT goals, and  preoperative bathing. Patient educated that our goal is to appropriately discharge patient based off their post-op function while striving to maintain maximal independence. The goal is to discharge patient to home and for them to attend outpatient physical therapy.    Assigned to care team? Yes    SW referral: yes, pt/pt son requesting

## 2024-10-08 ENCOUNTER — ANTICOAG VISIT (OUTPATIENT)
Dept: CARDIOLOGY CLINIC | Facility: CLINIC | Age: 79
End: 2024-10-08

## 2024-10-08 ENCOUNTER — LAB REQUISITION (OUTPATIENT)
Dept: LAB | Facility: HOSPITAL | Age: 79
End: 2024-10-08
Payer: MEDICARE

## 2024-10-08 DIAGNOSIS — M25.59 PAIN IN OTHER SPECIFIED JOINT: ICD-10-CM

## 2024-10-09 ENCOUNTER — PATIENT OUTREACH (OUTPATIENT)
Dept: OBGYN CLINIC | Facility: HOSPITAL | Age: 79
End: 2024-10-09

## 2024-10-09 NOTE — PROGRESS NOTES
[facility] received a new referral in regard to pt scheduled for arthroplasty of left knee on 11/13/2024. [facility] reviewed NN notes prior to contacting pt. Pt lives with son, sons kp, and his spouse in a single level home. Pt ambulates with a cane and is independent with ADLs. Pts post op caregiver support will be his son or his sons kp. Pt requested [facility] consult d/t work schedules and meals.  Pt son will take him to and from surgery. Pt is unsure about transportation to and from OP PT.     [facility] attempted to reach pt today and spoke with pts son. Pt son did confirm himself or his fiance will provide caregiver support after pt surgery. However, as pts schedule becomes busier with work, pt and his wife may need to consider self pay HHC services in addition. [facility] provide pt son with a few self pay HHC options:  [facility] [employer] @ [phone]  [facility] @ [phone]  [facility] @ [phone]  [facility] @ [phone]  We also discussed [employer] who has more flexibility with scheduling. [facility] did inquire if pt has any difficulty with bathing/dressing and pt does not. Referral to [facility] is not appropriate.   Pt son will also take pt to and from surgery. However, family is interested in additional transportation resources. We discussed [employer] and phone number and website provided. We also discussed [employer] as an option. Pt son will assist pt with application process.  Lastly, pt son is interested in [employer]. [facility] will make referral today vis Pete. At this time, pt son will assist with [employer] application, look into self HHC services, and [facility] will refer to [facility]. [facility] will remain available.

## 2024-10-14 ENCOUNTER — TELEPHONE (OUTPATIENT)
Dept: VASCULAR SURGERY | Facility: CLINIC | Age: 79
End: 2024-10-14

## 2024-10-14 NOTE — TELEPHONE ENCOUNTER
----- Message from DONTE Ortega sent at 9/17/2024 12:36 PM EDT -----  Reviewed, pt requires non-urgent yearly office visit for review of study.

## 2024-10-16 ENCOUNTER — PATIENT OUTREACH (OUTPATIENT)
Dept: OBGYN CLINIC | Facility: HOSPITAL | Age: 79
End: 2024-10-16

## 2024-10-16 NOTE — PROGRESS NOTES
SW contacted pt son today for follow up in regard to caregiver support and transportation. Pt son reports he has received the application for [employer] and the family will be completing same. Family will provide support to pt after surgery and family will use self pay [facility] support if needed. Pt son does share that they have not heard from [employer] yet. SWCM will look into same. No other needs noted. [facility] will remain  available to support pt.   After call with pt son, TERE attempted to reach [employer] @ [phone] to follow up in regard to referral. [facility] left a message to please contact pts son to start application for meals.

## 2024-10-18 ENCOUNTER — ANESTHESIA EVENT (OUTPATIENT)
Dept: PERIOP | Facility: HOSPITAL | Age: 79
End: 2024-10-18
Payer: MEDICARE

## 2024-10-22 RX ORDER — AMMONIUM LACTATE 12 G/100G
CREAM TOPICAL AS NEEDED
COMMUNITY
End: 2024-11-18

## 2024-10-22 NOTE — PRE-PROCEDURE INSTRUCTIONS
Pre-Surgery Instructions:   Medication Instructions    ascorbic acid (VITAMIN C) 500 MG tablet Surgeon prescribed - instructed to use up to and including 11/12/24    aspirin 81 mg chewable tablet Verifying ASA use with cards pool - pt will need instructions    Cholecalciferol (VITAMIN D3) 1,000 units tablet Surgeon prescribed - instructed to use up to and including 11/12/24     enoxaparin (Lovenox) 100 mg/mL Instructed per surgeon instructions to start Lovenox 11/9 and use as prescribed up to and including 11/12/24,    folic acid (FOLVITE) 1 mg tablet Surgeon prescribed - instructed to use up to and including 11/12/24     FREESTYLE LITE test strip Continue to use as prescribed     metFORMIN (GLUCOPHAGE) 1000 MG tablet Hold day of surgery.    metoprolol succinate (TOPROL-XL) 25 mg 24 hr tablet Take day of surgery. With sip of water     Multiple Vitamins-Minerals (multivitamin with minerals) tablet Stop taking 7 days prior to surgery. ( Pt states his own vitamin)    nitroglycerin (NITROSTAT) 0.4 mg SL tablet Continue to use as prescribed     rosuvastatin (CRESTOR) 5 mg tablet Take day of surgery.    warfarin (Coumadin) 5 mg tablet Stop taking 5 days prior to surgery. Surgeon indicated hold coumadin starting 11/8.    Pt also  reports at end of Pat call using Ammonium lactate cream to feet as needed - instructed to hold DOS.    Pt instructed on use of cleanser for DOS and instructions for showering both night before and DOS reviewed with pt - pt verbalized understanding of instructions given  Pt also instructed on no hair or body products on skin for DOS.  No shaving with a razor blade for 7 days prior to surgery to decrease any incident of infection - electric razor is ok to use up till 24 hrs prior to DOS.  Pt instructed that if with any changes in health status between now and DOS - notify surgeon office.  Tylenol is ok to use as needed up to and including DOS with sips of water - if needed - did instruct NO NSAIDS  to be used at least 3 days prior to surgery - or if given a longer hold time of NSAIDS from MD please follow MD hold instructions.  Instructed to bring photo ID and medical insurance card for DOS as forms of identification for DOS.  Also instructed pt on no jewelry or body piercings, no valuables on body for DOS. Contact lenses, if worn - need to be removed for DOS.  Pt instructed does need transport home after surgery- pt is not allowed to drive.    See Geriatric Assessment below...  Cognitive Assessment:   CAM:   TUG <15 sec:  Falls (last 6 months): no falls reported within the last 6 months to one years time   Hand  score:  -Attempt 1:  -Attempt 2:  -Attempt 3:  Dean Total Score: 21  PHQ- 9 Depression Scale: 0  Nutrition Assessment Score: 14  METS: 7.34  Incentive Spirometry Level:   Health goals:  -What are your overall health goals? (quit smoking, wt. loss, rest, decrease stress)  Just to get back to my normal self  -What brings you strength? (family, friends, Zoroastrian, health)  Family and friends   -What activities are important to you? (exercise, reading, travel, work)  More time in the backyard     Pt instructed that Jeannine Vasquez is ONN got pt - given name and phone # [phone] - pt instructed if with any questions regarding surgery to contact her after today's call.    Pt has CHG wipes - instructed to use wipes only after morning shower and thoroughly towel dried - pt verbalized understanding.  Pt aware of  appt 10/29/24     Medication instructions for day surgery reviewed. Please use only a sip of water to take your instructed medications. Avoid all over the counter vitamins, supplements and NSAIDS for one week prior to surgery per anesthesia guidelines. Tylenol is ok to take as needed.     You will receive a call one business day prior to surgery with an arrival time and hospital directions. If your surgery is scheduled on a Monday, the hospital will be calling you on the Friday prior to  your surgery. If you have not heard from anyone by 8pm, please call the hospital supervisor through the hospital  at [phone]. ([place] [phone] or [place] [phone]).    Do not eat or drink anything after midnight the night before your surgery, including candy, mints, lifesavers, or chewing gum. Do not drink alcohol 24hrs before your surgery. Try not to smoke at least 24hrs before your surgery.       Follow the pre surgery showering instructions as listed in the “My Surgical Experience Booklet” or otherwise provided by your surgeon's office. Do not use a blade to shave the surgical area 1 week before surgery. It is okay to use a clean electric clippers up to 24 hours before surgery. Do not apply any lotions, creams, including makeup, cologne, deodorant, or perfumes after showering on the day of your surgery. Do not use dry shampoo, hair spray, hair gel, or any type of hair products.     No contact lenses, eye make-up, or artificial eyelashes. Remove nail polish, including gel polish, and any artificial, gel, or acrylic nails if possible. Remove all jewelry including rings and body piercing jewelry.     Wear causal clothing that is easy to take on and off. Consider your type of surgery.    Keep any valuables, jewelry, piercings at home. Please bring any specially ordered equipment (sling, braces) if indicated.    Arrange for a responsible person to drive you to and from the hospital on the day of your surgery. Please confirm the visitor policy for the day of your procedure when you receive your phone call with an arrival time.     Call the surgeon's office with any new illnesses, exposures, or additional questions prior to surgery.    Please reference your “My Surgical Experience Booklet” for additional information to prepare for your upcoming surgery.

## 2024-10-24 PROBLEM — S22.49XA RIB FRACTURES: Status: RESOLVED | Noted: 2022-08-15 | Resolved: 2024-10-24

## 2024-10-24 PROBLEM — S39.012A BACK STRAIN: Status: RESOLVED | Noted: 2023-01-24 | Resolved: 2024-10-24

## 2024-10-24 PROBLEM — J06.9 UPPER RESPIRATORY TRACT INFECTION: Status: RESOLVED | Noted: 2023-11-13 | Resolved: 2024-10-24

## 2024-10-24 NOTE — PATIENT INSTRUCTIONS
BEFORE SURGERY    Contact your surgical nurse  navigator with any questions regarding preoperative plan or schedule.  Stop all over the counter supplements, herbal, naturopathic  medications for 1 week prior to surgery UNLESS prescribed by your surgeon  Hold NSAIDS (i.e. advil, alleve, motrin, ibuprofen, celebrex) minimum 5 days prior to surgery  Follow presurgical medication instructions provided by preadmission nursing team reviewed during your presurgery phone call  Strategies for optimizing your surgery through breathing exercises, nutrition and physical activity can be found at [email or web address]/best  Call [phone] with any presurgical concerns or medications questions or use the messaging feature in your [employer] trisha to contact your provider  Last dose of coumadin to be take 11/8/2024 in the PM. This will be 5 days before surgery.   On 11/10/2024 start lovenox injection every 12 hours until 11/12 at 7 Am. The last dose of lovenox before surgery will be at 7 AM on 11/12/2024.   You will then resume coumadin the day after surgery as well as resume Lovenox as a bridge as per the instructions you will receive form your surgical team after surgery.    AFTER SURGERY    Recommend using Tylenol ( acetaminophen ) 1000 mg every eight hours during the first week post discharge along with icing the area for 20 mins every 3-4 hours while awake can be helpful in reducing your need for post operative opioid use. This opioid sparing plan can be used along side your surgeons pain plan.  Use stool softener over the counter (colace) daily after surgery during the first 1-2 weeks to avoid post operative constipation issues  If no bowel movement within 3 days after surgery then use over the counter Miralax in addition to your stool softener   If cleared by your surgical team for activity then early and often walking is encouraged and can be important in prevention of post surgical blood clots. Additionally spend as much time out of  bed as possible and allowed by your surgical team  Use your incentive spirometer twice per hour in the first seven days after surgery to help prevent post surgery lung complications and infections  It is very important you follow the instructions from your surgeon regarding any medications for after surgery blood clot prevention. Compliance with these medications is very important.  Call [phone] with any post discharge concerns or medical issues or use the messaging feature in your [employer] trisha to contact your provider

## 2024-10-24 NOTE — ASSESSMENT & PLAN NOTE
S/p pacemaker  F/b cardiology and cleared with moderate risk in August  They recommend bridging with lovenox/coumadin as above

## 2024-10-24 NOTE — ASSESSMENT & PLAN NOTE
Wt Readings from Last 3 Encounters:   10/29/24 99.4 kg (219 lb 3.2 oz)   09/24/24 94.3 kg (208 lb)   09/03/24 97.1 kg (214 lb)

## 2024-10-24 NOTE — ASSESSMENT & PLAN NOTE
Lab Results   Component Value Date    EGFR 62 10/07/2024    EGFR 62 09/06/2024    EGFR 67 10/17/2023    CREATININE 1.12 10/07/2024    CREATININE 1.11 09/06/2024    CREATININE 1.05 10/17/2023
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
## 2024-10-29 ENCOUNTER — CONSULT (OUTPATIENT)
Dept: ANESTHESIOLOGY | Facility: CLINIC | Age: 79
End: 2024-10-29
Payer: MEDICARE

## 2024-10-29 ENCOUNTER — APPOINTMENT (OUTPATIENT)
Dept: LAB | Facility: CLINIC | Age: 79
End: 2024-10-29
Payer: MEDICARE

## 2024-10-29 ENCOUNTER — LAB REQUISITION (OUTPATIENT)
Dept: LAB | Facility: HOSPITAL | Age: 79
End: 2024-10-29
Payer: MEDICARE

## 2024-10-29 ENCOUNTER — OFFICE VISIT (OUTPATIENT)
Age: 79
End: 2024-10-29
Payer: MEDICARE

## 2024-10-29 VITALS
SYSTOLIC BLOOD PRESSURE: 124 MMHG | HEIGHT: 70 IN | DIASTOLIC BLOOD PRESSURE: 70 MMHG | BODY MASS INDEX: 31.38 KG/M2 | WEIGHT: 219.2 LBS | HEART RATE: 69 BPM | OXYGEN SATURATION: 98 %

## 2024-10-29 VITALS
HEART RATE: 69 BPM | OXYGEN SATURATION: 98 % | SYSTOLIC BLOOD PRESSURE: 124 MMHG | BODY MASS INDEX: 31.38 KG/M2 | HEIGHT: 70 IN | DIASTOLIC BLOOD PRESSURE: 70 MMHG | WEIGHT: 219.2 LBS

## 2024-10-29 DIAGNOSIS — I10 BENIGN ESSENTIAL HYPERTENSION: Chronic | ICD-10-CM

## 2024-10-29 DIAGNOSIS — Z01.89 ENCOUNTER FOR GERIATRIC ASSESSMENT: ICD-10-CM

## 2024-10-29 DIAGNOSIS — I48.92 CHRONIC ATRIAL FLUTTER (HCC): ICD-10-CM

## 2024-10-29 DIAGNOSIS — F11.20 CONTINUOUS OPIOID DEPENDENCE (HCC): ICD-10-CM

## 2024-10-29 DIAGNOSIS — I69.954 HEMIPLEGIA AND HEMIPARESIS FOLLOWING UNSPECIFIED CEREBROVASCULAR DISEASE AFFECTING LEFT NON-DOMINANT SIDE (HCC): ICD-10-CM

## 2024-10-29 DIAGNOSIS — N18.31 STAGE 3A CHRONIC KIDNEY DISEASE (HCC): ICD-10-CM

## 2024-10-29 DIAGNOSIS — E66.811 CLASS 1 OBESITY WITHOUT SERIOUS COMORBIDITY WITH BODY MASS INDEX (BMI) OF 30.0 TO 30.9 IN ADULT, UNSPECIFIED OBESITY TYPE: ICD-10-CM

## 2024-10-29 DIAGNOSIS — I25.10 CORONARY ARTERY DISEASE INVOLVING NATIVE CORONARY ARTERY OF NATIVE HEART WITHOUT ANGINA PECTORIS: ICD-10-CM

## 2024-10-29 DIAGNOSIS — E11.21 DIABETIC NEPHROPATHY ASSOCIATED WITH TYPE 2 DIABETES MELLITUS (HCC): Primary | ICD-10-CM

## 2024-10-29 DIAGNOSIS — Z22.322 MRSA COLONIZATION: ICD-10-CM

## 2024-10-29 DIAGNOSIS — Z01.818 PREOPERATIVE CLEARANCE: Primary | ICD-10-CM

## 2024-10-29 DIAGNOSIS — M17.12 PRIMARY OSTEOARTHRITIS OF LEFT KNEE: ICD-10-CM

## 2024-10-29 DIAGNOSIS — Z95.0 HX OF CARDIAC PACEMAKER: ICD-10-CM

## 2024-10-29 DIAGNOSIS — Z95.2 S/P TAVR (TRANSCATHETER AORTIC VALVE REPLACEMENT): ICD-10-CM

## 2024-10-29 DIAGNOSIS — I48.92 UNSPECIFIED ATRIAL FLUTTER (HCC): ICD-10-CM

## 2024-10-29 DIAGNOSIS — I48.92 ATRIAL FLUTTER, UNSPECIFIED TYPE (HCC): ICD-10-CM

## 2024-10-29 DIAGNOSIS — E11.49 TYPE 2 DIABETES MELLITUS WITH OTHER NEUROLOGIC COMPLICATION, WITHOUT LONG-TERM CURRENT USE OF INSULIN (HCC): Chronic | ICD-10-CM

## 2024-10-29 DIAGNOSIS — D69.6 PLATELETS DECREASED (HCC): ICD-10-CM

## 2024-10-29 DIAGNOSIS — I50.32 CHRONIC DIASTOLIC CHF (CONGESTIVE HEART FAILURE) (HCC): ICD-10-CM

## 2024-10-29 LAB
ABO GROUP BLD: NORMAL
BLD GP AB SCN SERPL QL: NEGATIVE
INR PPP: 1.72 (ref 0.85–1.19)
PROTHROMBIN TIME: 20.3 SECONDS (ref 12.3–15)
RH BLD: POSITIVE
SPECIMEN EXPIRATION DATE: NORMAL

## 2024-10-29 PROCEDURE — 99212 OFFICE O/P EST SF 10 MIN: CPT | Performed by: NURSE PRACTITIONER

## 2024-10-29 PROCEDURE — 36415 COLL VENOUS BLD VENIPUNCTURE: CPT

## 2024-10-29 PROCEDURE — 99215 OFFICE O/P EST HI 40 MIN: CPT | Performed by: INTERNAL MEDICINE

## 2024-10-29 PROCEDURE — 85610 PROTHROMBIN TIME: CPT

## 2024-10-29 PROCEDURE — 86901 BLOOD TYPING SEROLOGIC RH(D): CPT | Performed by: STUDENT IN AN ORGANIZED HEALTH CARE EDUCATION/TRAINING PROGRAM

## 2024-10-29 PROCEDURE — 86900 BLOOD TYPING SEROLOGIC ABO: CPT | Performed by: STUDENT IN AN ORGANIZED HEALTH CARE EDUCATION/TRAINING PROGRAM

## 2024-10-29 PROCEDURE — 86850 RBC ANTIBODY SCREEN: CPT | Performed by: STUDENT IN AN ORGANIZED HEALTH CARE EDUCATION/TRAINING PROGRAM

## 2024-10-29 RX ORDER — MUPIROCIN 20 MG/G
OINTMENT TOPICAL 2 TIMES DAILY
Qty: 22 G | Refills: 0 | Status: SHIPPED | OUTPATIENT
Start: 2024-10-29 | End: 2024-11-03

## 2024-10-29 NOTE — ASSESSMENT & PLAN NOTE
Saw CT sx for one year follow up  Ok for sx from their standpoint  Cardiology recommended lovenox bridging for coumadin wind down and ramp up for sx per their note

## 2024-10-29 NOTE — PROGRESS NOTES
[facility]  CONSULT: GERIATRIC SURGERY    Ambulatory Visit      Name: Isauro Sow      : 1945      MRN: [id number]  Encounter Provider: DONTE Pérez  Encounter Date: 10/29/2024   Encounter department: [facility] SURGICAL OPTIMIZATION [place]        Assessment  79 year old male referred to SOC for pre-surgery geriatric screening 2.2 age   Other consult concerns include:  surgical optimization & BEST program   He is scheduled on   Case: [id number] Date/Time: 24 1355   Procedure: ARTHROPLASTY KNEE TOTAL (Left: Knee)   Anesthesia type: Choice   Diagnosis:      Chronic pain of left knee [M25.562, G89.29]      Primary osteoarthritis of left knee [M17.12]   Pre-op diagnosis:      Chronic pain of left knee [M25.562, G89.29]      Primary osteoarthritis of left knee [M17.12]   Location:  OR ROOM 02 / [facility]   Surgeons: Dre Braxton,      Last anesthesia   No concerns  Recommend spinal - he prefers general     Assessment & Plan  Encounter for geriatric assessment  Seen for SO   Seen for geriatrics   Recommend spinal 2.2 age  Recommend geriatric pain protocol  Recommend  Precautions on admit secondary to age      Orders:    Ambulatory Referral to Surgical Optimization    Diabetic nephropathy associated with type 2 diabetes mellitus (HCC)  SSI RISK - LOW   CONTINUE ADA DIET   CONTINUE HIGH PROTEIN DIET   Lab Results   Component Value Date    HGBA1C 7.2 (H) 10/07/2024       Hx of cardiac pacemaker  AWAIT CIED FORM   Type 2 diabetes mellitus with other neurologic complication, without long-term current use of insulin (HCC)    Lab Results   Component Value Date    HGBA1C 7.2 (H) 10/07/2024              History of Present Illness     Isauro Sow is a 79 y.o. male who presents to [facility] for his geriatric assessment due to advanced age, having surgery, and receiving anesthetics.  He has been managing ongoing left knee pain and is  electing to have a left knee replacement.    I met patient in the SOC.  He arrived with his daughter-in-law.  Both were pleasant and a pleasure to care for.  Patient walked independently.  Gait was slow.  Gait was stiff.  Patient denies any new developments in his health.  Admits to being well today.  Independent with all ADLs.  Lives at home with his wife.  He is  56 years.  Doing well today.  He  I did recommend spinal anesthesia due to his advanced age of 79.  Recommend geriatric pain protocol.  Recommend delirium precautions on admit.  Education provided to patient today    Surgical optimization complete  LB risk low  SSI risk low SOC anemia-no needs      History obtained from : patient  Review of Systems   All other systems reviewed and are negative.          Objective     There were no vitals taken for this visit.    Physical Exam  [facility]  CONSULT         Allergies reviewed today   PMH reviewed today   Medications reviewed today     See Geriatric Assessment below...  Cognitive Assessment:   CAM:   TUG <15 sec:  Falls (last 6 months): no  Hand  score:24  -Attempt 1:24  -Attempt 2:24  -Attempt 3:24  Dean Total Score: 21  PHQ- 9 Depression Scale:0  Nutrition Assessment Score: 14  METS: 7.34  DX SLEEP APNEA; YES       As always we discussed having your BEST surgery, and BEST recovery.  Surgery goals reviewed today.      Breathing exercises   Patient was encouraged to begin lung exercises today.  This could be accomplished through deep breathing and cough exercises.  Patient was taught how to use an incentive spirometer.  Return demonstration provided.    Eating/nutrition   Encouraged patient to increase oral protein intake prior to surgery.  This can be accomplished by consuming chicken, fish, tuna fish, cottage cheese, cheese, eggs, Greek yogurt, and protein shakes as needed.  I encouraged use of protein shakes such ENLIVE.  I also recommended making your own protein  shakes with protein powder.     Sleep/Stress management  Patient was encouraged to rest their body prior to surgery.  Encouraged attempting to get 8 hours of sleep at night.  Avoid stress.  Avoid sick contacts.  Encouraged to find a relaxing hobby such as reading, meditation, listening to music.  Training exercises  Patient was encouraged to remain active as possible.  Today bilateral lower extremity generic exercises were taught for muscle strengthening and balance.  All exercises to be done sitting down.

## 2024-10-29 NOTE — ASSESSMENT & PLAN NOTE
SSI RISK - LOW   CONTINUE ADA DIET   CONTINUE HIGH PROTEIN DIET   Lab Results   Component Value Date    HGBA1C 7.2 (H) 10/07/2024

## 2024-10-29 NOTE — ASSESSMENT & PLAN NOTE
Seen for SO   Seen for geriatrics   Recommend spinal 2.2 age  Recommend geriatric pain protocol  Recommend  Precautions on admit secondary to age      Orders:    Ambulatory Referral to Surgical Optimization

## 2024-10-30 ENCOUNTER — ANTICOAG VISIT (OUTPATIENT)
Dept: CARDIOLOGY CLINIC | Facility: CLINIC | Age: 79
End: 2024-10-30

## 2024-10-30 ENCOUNTER — EVALUATION (OUTPATIENT)
Dept: PHYSICAL THERAPY | Facility: REHABILITATION | Age: 79
End: 2024-10-30
Payer: MEDICARE

## 2024-10-30 DIAGNOSIS — M17.12 PRIMARY OSTEOARTHRITIS OF LEFT KNEE: Primary | ICD-10-CM

## 2024-10-30 PROCEDURE — 97110 THERAPEUTIC EXERCISES: CPT

## 2024-10-30 NOTE — PROGRESS NOTES
PT Evaluation     Today's date: 10/30/2024  Patient name: Isauro Sow  : 1945  MRN: [id number]  Referring provider: Pau Julian PA-C  Dx:   Encounter Diagnosis     ICD-10-CM    1. Primary osteoarthritis of left knee  M17.12 Ambulatory referral to Physical Therapy          Start Time: 1400  Stop Time: 1440  Total time in clinic (min): 40 minutes    Assessment  Impairments: abnormal gait, abnormal muscle tone, abnormal or restricted ROM, activity intolerance, impaired balance, impaired physical strength, lacks appropriate home exercise program, pain with function and weight-bearing intolerance  Symptom irritability: moderate    Assessment details: Isauro Sow is a 79 y.o. male presenting to PT for 1 pre-operative visit for a left TKA DOS 24. Risk Assessment and Prediction Tool results reviewed. Patient reported functional outcome scores reviewed. Discussed DOS and patient's questions were answered to patient's satisfaction. Mobility/ROM results per below. Strength results per below. Balance/Gait (including Timed Up & Go) per above. Virtual Home Assessment was reviewed with patient. Home Preparation Checklist was reviewed with patient including identification of care partner and encouragement of single level set-up. Post-operative pain management expectations discussed to the patient's satisfaction. Post-operative gait training for level ground, stairs, and car transfers was performed. Patient demonstrated competence with immediate post-operative home exercise program.  Expectation is that patient will be d/c to home with outpatient PT and will benefit from PT to address gait/strength dysfunction along with knee Range of Motion deficits to return to Prior level of functioning.      Understanding of Dx/Px/POC: good     Prognosis: good    Goals  Post-Operative   Short Term Goals (Week 4):  1. Patient will report <4/10 pain  2. Demonstrate   3. Demonstrate 4/5 strength with knee  extension  4. Ambulate short community distances with least restrictive assistive device.      Long Term Goals (8 weeks):  1. GROC >75%  2. Ambulate long community distances with a SPC   3. Patient will exceed FOTO predicted outcome score  4. Patient will be fully independent with HEP by discharge  5. Patient will be able to manage symptoms independently.       Plan  Patient would benefit from: skilled physical therapy    Planned therapy interventions: graded exercise, functional ROM exercises, flexibility, gait training, graded activity, home exercise program, therapeutic training, therapeutic exercise, therapeutic activities, stretching, strengthening, patient education, neuromuscular re-education, nerve gliding, behavior modification, balance, activity modification, manual therapy, IASTM and joint mobilization    Frequency: 2x week  Duration in weeks: 10  Treatment plan discussed with: patient        Subjective Evaluation    History of Present Illness  Mechanism of injury: Patient presents to PT for his pre-op evaluation for a scheduled L TKA 24 with Dr. Braxton. Patient reports his symptoms are chronic in nature and have ongoing for >6 months. Patient reports his symptoms are worsened with activity. Patient denies any swelling in the knee with activity. Patient denies any difficulties sleeping due to the knee. Patient reports his symptoms are localized to the medial JL. X-rays from ortho revealed OA. Patient has elected to proceed with surgery at this time.   Patient Goals  Patient goals for therapy: decreased pain, improved balance, increased motion, return to sport/leisure activities, independence with ADLs/IADLs and increased strength    Pain  Current pain ratin  At worst pain ratin  Quality: pressure    Social Support  Steps to enter house: no  Stairs in house: yes (1 4 inch step in house)   Lives with: parents (son's girlfriend will stay and help him with the recovery)      Diagnostic  Tests  X-ray: abnormal        Objective  Range of Motion  Left Knee:  5-125    Right Knee:  3-130      Myotomes/Strength Testing  Right Knee:   Ext (5/5), Flex (5/5)    Left Knee:  Ext (4+/5), Flex (/4+5)      Functional Testing   10/30/24     TUG 14.69 s     5xSTS 15.56 s     2-Min Walk Test 360 ft                Precautions: falls, cardiac, hx of CVA     Home Exercise Program  Access Code: [email or web address]  URL: [email or web address]  Date: 10/30/2024  Prepared by: Kwasi Donahue    Exercises  - Seated Ankle Pumps  - 2 x daily - 7 x weekly - 3 sets - 10 reps  - Supine Heel Slide with Strap  - 2 x daily - 7 x weekly - 1 sets - 10 reps - 10 hold  - Supine Calf Stretch with Strap  - 1 x daily - 7 x weekly - 1 sets - 10 reps - 10 hold  - Supine Gluteal Sets  - 2 x daily - 7 x weekly - 2 sets - 10 reps - 5 hold  - Supine Quadricep Sets  - 2 x daily - 7 x weekly - 2 sets - 10 reps - 5 hold  - Seated Long Arc Quad  - 2 x daily - 7 x weekly - 3 sets - 10 reps      Manuals 10/30                                                                Neuro Re-Ed                                                                                                        Ther Ex                                                                                                                     Ther Activity                                       Gait Training                                       Modalities

## 2024-10-31 ENCOUNTER — CONSULT (OUTPATIENT)
Dept: FAMILY MEDICINE CLINIC | Facility: CLINIC | Age: 79
End: 2024-10-31
Payer: MEDICARE

## 2024-10-31 ENCOUNTER — TELEPHONE (OUTPATIENT)
Age: 79
End: 2024-10-31

## 2024-10-31 VITALS
TEMPERATURE: 97.3 F | OXYGEN SATURATION: 97 % | HEART RATE: 63 BPM | HEIGHT: 70 IN | DIASTOLIC BLOOD PRESSURE: 70 MMHG | WEIGHT: 217.13 LBS | BODY MASS INDEX: 31.09 KG/M2 | SYSTOLIC BLOOD PRESSURE: 134 MMHG | RESPIRATION RATE: 16 BRPM

## 2024-10-31 DIAGNOSIS — Z01.818 PREOPERATIVE EVALUATION OF A MEDICAL CONDITION TO RULE OUT SURGICAL CONTRAINDICATIONS (TAR REQUIRED): Primary | ICD-10-CM

## 2024-10-31 DIAGNOSIS — I10 BENIGN ESSENTIAL HYPERTENSION: Chronic | ICD-10-CM

## 2024-10-31 DIAGNOSIS — Z95.0 HX OF CARDIAC PACEMAKER: ICD-10-CM

## 2024-10-31 DIAGNOSIS — E11.49 TYPE 2 DIABETES MELLITUS WITH OTHER NEUROLOGIC COMPLICATION, WITHOUT LONG-TERM CURRENT USE OF INSULIN (HCC): Chronic | ICD-10-CM

## 2024-10-31 PROBLEM — T07.XXXA MULTIPLE FRACTURES: Status: RESOLVED | Noted: 2022-08-24 | Resolved: 2024-10-31

## 2024-10-31 PROBLEM — Z01.89 ENCOUNTER FOR GERIATRIC ASSESSMENT: Status: RESOLVED | Noted: 2024-10-29 | Resolved: 2024-10-31
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
## 2024-10-31 PROBLEM — L30.9 DERMATITIS: Status: RESOLVED | Noted: 2024-04-05 | Resolved: 2024-10-31

## 2024-10-31 PROBLEM — R33.9 URINARY RETENTION: Status: RESOLVED | Noted: 2022-08-19 | Resolved: 2024-10-31

## 2024-10-31 PROBLEM — M75.41 IMPINGEMENT SYNDROME OF RIGHT SHOULDER: Status: RESOLVED | Noted: 2017-07-24 | Resolved: 2024-10-31

## 2024-10-31 PROBLEM — B35.6 TINEA CRURIS: Status: RESOLVED | Noted: 2018-06-04 | Resolved: 2024-10-31

## 2024-10-31 PROBLEM — L03.114 CELLULITIS OF LEFT UPPER EXTREMITY: Status: RESOLVED | Noted: 2024-04-05 | Resolved: 2024-10-31

## 2024-10-31 PROBLEM — M79.601 RIGHT ARM PAIN: Status: RESOLVED | Noted: 2022-08-24 | Resolved: 2024-10-31

## 2024-10-31 PROBLEM — I35.0 SEVERE AORTIC STENOSIS: Status: RESOLVED | Noted: 2022-08-19 | Resolved: 2024-10-31

## 2024-10-31 PROBLEM — R00.1 BRADYCARDIA: Status: RESOLVED | Noted: 2022-08-19 | Resolved: 2024-10-31

## 2024-10-31 PROBLEM — E66.9 OBESITY: Status: RESOLVED | Noted: 2017-07-26 | Resolved: 2024-10-31

## 2024-10-31 PROBLEM — R25.2 CRAMPS OF LEFT LOWER EXTREMITY: Status: RESOLVED | Noted: 2018-06-04 | Resolved: 2024-10-31

## 2024-10-31 PROCEDURE — 99214 OFFICE O/P EST MOD 30 MIN: CPT | Performed by: FAMILY MEDICINE

## 2024-10-31 NOTE — TELEPHONE ENCOUNTER
Caller: Farzana - Daughter in Law    Doctor: Fox     Reason for call: Farzana is calling to see if Dr Braxton can place an order for a walker with a seat for after his surgery on 11/13. Farzana would also like to see if this could go through insurance and be sent to the home address. Please call back.     Call back#: [phone]

## 2024-10-31 NOTE — TELEPHONE ENCOUNTER
Spoke to patients son (number provided in call) - patient has standard RW, and we discussed using that postop instead of the rollator- as the rollator has 4 wheels and can be too fast/causing falls postop.     Instructed to bring RW DOS.
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
## 2024-10-31 NOTE — ASSESSMENT & PLAN NOTE
Preoperative consultation.  Overall the patient appears to be in stable health.  I reviewed his most recent preadmission blood work.  EKG from August 2024 was stable.  Patient is medically cleared to have upcoming left total knee replacement surgery as described.  Surgery tentatively scheduled for November 13, 2024

## 2024-11-12 RX ORDER — ASPIRIN 81 MG/1
81 TABLET ORAL 2 TIMES DAILY
Status: CANCELLED | OUTPATIENT
Start: 2024-11-12

## 2024-11-12 NOTE — DISCHARGE INSTR - AVS FIRST PAGE
Dr. Braxton Knee Replacement    What to Expect/Activity  It is normal to have some discomfort in your knee for several days to weeks.  You are weight bearing as tolerated to your operative leg with assist devices.  Please use crutches/walker when ambulating until your follow-up  Swelling and discomfort in the knee is normal for several days after surgery. For the first 2-3 days, use ice around the knee to help. Use for 20-30 minutes every 1-2 hours for 48 hours, while awake. You may continue beyond 48 hours as needed.  Place one or two pillows underneath your calf, not your knee, to reduce swelling.  Physical therapy on your own at home should start as soon as possible (see below). Please perform heel slides and extension exercises on your own as well (see diagram).  Please use incentive spirometer 10 times per hour while awake (see diagram).    Dressing/Wound Care/Bathing  You may remove your toe-to-groin dressing 24 hours after surgery. There will be a surgical dressing over your incision that stays in place until follow-up unless water gets under the bandage and then it should be removed.   You may start showering 24 hours after surgery, the surgical dressing will remain in place. Please pat the dressing dry. If you notice the dressing appears saturated or is starting to come off, please replace with dry dressing.  You can keep the dressing in place until follow-up in the office.   Do not place any creams, ointments or gels on or around the incision.  No baths, swimming or submerging until cleared by Dr. Braxton    Pain Management/Medications  You may resume your usual medications.  Please take the following medications:  Anti-coagulation (blood clot prevention) - Resume coumadin with lovenox bridge as discussed  Pain medication:  Narcotic: Take as directed  NSAID/Anti-inflammatory: Take as directed  Tylenol 1000mg every 8 hours  Zofran (ondasetron) - 4mg every 8 hours as needed for nausea  Stool softeners  (senna/colace) - take daily to prevent constipation as narcotic pain medication causes constipation  Antibiotic - take as directed if prescribed   If you have questions or pain concerns, please contact the office. Pain medication cannot remove all post-operative pain.    Follow up/Call if:  The findings of your surgery will be explained to you and your family immediately after surgery. However, in the post-operative period, during recovery from anesthesia you may not fully remember or fully understand what was said. This will be again gone over when you return for your post-op appointment.  Please contact Dr. Braxton's office if you experience the following:  Excessive bleeding (bleeding through your dressing)  Fever greater than 101 degrees F after 48 hours (low grade fevers the day or two after surgery are normal)  Persistent nausea or vomiting  Decreased sensation or discoloration of the operative limb  Pain or swelling that is getting worse and not better with medication    Dr. Braxton's Office Contact: [phone]

## 2024-11-13 ENCOUNTER — ANESTHESIA (OUTPATIENT)
Dept: PERIOP | Facility: HOSPITAL | Age: 79
End: 2024-11-13
Payer: MEDICARE

## 2024-11-13 ENCOUNTER — APPOINTMENT (OUTPATIENT)
Dept: RADIOLOGY | Facility: HOSPITAL | Age: 79
End: 2024-11-13
Payer: MEDICARE

## 2024-11-13 ENCOUNTER — HOSPITAL ENCOUNTER (OUTPATIENT)
Facility: HOSPITAL | Age: 79
Setting detail: OUTPATIENT SURGERY
Discharge: HOME | End: 2024-11-14
Attending: STUDENT IN AN ORGANIZED HEALTH CARE EDUCATION/TRAINING PROGRAM | Admitting: STUDENT IN AN ORGANIZED HEALTH CARE EDUCATION/TRAINING PROGRAM
Payer: MEDICARE

## 2024-11-13 DIAGNOSIS — M19.09 PRIMARY OSTEOARTHRITIS, OTHER SPECIFIED SITE: ICD-10-CM

## 2024-11-13 DIAGNOSIS — M17.12 PRIMARY OSTEOARTHRITIS OF LEFT KNEE: Primary | ICD-10-CM

## 2024-11-13 DIAGNOSIS — Z95.2 S/P TAVR (TRANSCATHETER AORTIC VALVE REPLACEMENT): ICD-10-CM

## 2024-11-13 LAB
ABO GROUP BLD: NORMAL
APTT PPP: 29 SECONDS (ref 23–34)
ATRIAL RATE: 63 BPM
BLD GP AB SCN SERPL QL: NEGATIVE
GLUCOSE SERPL-MCNC: 112 MG/DL (ref 65–140)
GLUCOSE SERPL-MCNC: 330 MG/DL (ref 65–140)
INR PPP: 1.04 (ref 0.85–1.19)
P AXIS: 102 DEGREES
PR INTERVAL: 176 MS
PROTHROMBIN TIME: 14.1 SECONDS (ref 12.3–15)
QRS AXIS: -36 DEGREES
QRSD INTERVAL: 160 MS
QT INTERVAL: 470 MS
QTC INTERVAL: 480 MS
RH BLD: POSITIVE
SPECIMEN EXPIRATION DATE: NORMAL
T WAVE AXIS: 118 DEGREES
VENTRICULAR RATE: 63 BPM

## 2024-11-13 PROCEDURE — C1776 JOINT DEVICE (IMPLANTABLE): HCPCS | Performed by: STUDENT IN AN ORGANIZED HEALTH CARE EDUCATION/TRAINING PROGRAM

## 2024-11-13 PROCEDURE — 99222 1ST HOSP IP/OBS MODERATE 55: CPT | Performed by: PHYSICIAN ASSISTANT

## 2024-11-13 PROCEDURE — 86850 RBC ANTIBODY SCREEN: CPT | Performed by: STUDENT IN AN ORGANIZED HEALTH CARE EDUCATION/TRAINING PROGRAM

## 2024-11-13 PROCEDURE — 97166 OT EVAL MOD COMPLEX 45 MIN: CPT

## 2024-11-13 PROCEDURE — C9290 INJ, BUPIVACAINE LIPOSOME: HCPCS | Performed by: ANESTHESIOLOGY

## 2024-11-13 PROCEDURE — 73560 X-RAY EXAM OF KNEE 1 OR 2: CPT

## 2024-11-13 PROCEDURE — 86900 BLOOD TYPING SEROLOGIC ABO: CPT | Performed by: STUDENT IN AN ORGANIZED HEALTH CARE EDUCATION/TRAINING PROGRAM

## 2024-11-13 PROCEDURE — 85730 THROMBOPLASTIN TIME PARTIAL: CPT | Performed by: ANESTHESIOLOGY

## 2024-11-13 PROCEDURE — 93005 ELECTROCARDIOGRAM TRACING: CPT

## 2024-11-13 PROCEDURE — 85610 PROTHROMBIN TIME: CPT | Performed by: ANESTHESIOLOGY

## 2024-11-13 PROCEDURE — 97163 PT EVAL HIGH COMPLEX 45 MIN: CPT

## 2024-11-13 PROCEDURE — 27447 TOTAL KNEE ARTHROPLASTY: CPT | Performed by: STUDENT IN AN ORGANIZED HEALTH CARE EDUCATION/TRAINING PROGRAM

## 2024-11-13 PROCEDURE — 86901 BLOOD TYPING SEROLOGIC RH(D): CPT | Performed by: STUDENT IN AN ORGANIZED HEALTH CARE EDUCATION/TRAINING PROGRAM

## 2024-11-13 PROCEDURE — 27447 TOTAL KNEE ARTHROPLASTY: CPT | Performed by: PHYSICIAN ASSISTANT

## 2024-11-13 PROCEDURE — 82948 REAGENT STRIP/BLOOD GLUCOSE: CPT

## 2024-11-13 PROCEDURE — C1713 ANCHOR/SCREW BN/BN,TIS/BN: HCPCS | Performed by: STUDENT IN AN ORGANIZED HEALTH CARE EDUCATION/TRAINING PROGRAM

## 2024-11-13 DEVICE — ATTUNE KNEE SYSTEM TIBIAL BASE FIXED BEARING SIZE 7 CEMENTED
Type: IMPLANTABLE DEVICE | Site: KNEE | Status: FUNCTIONAL
Brand: ATTUNE

## 2024-11-13 DEVICE — ATTUNE KNEE SYSTEM FEMORAL CRUCIATE RETAINING SIZE 6 LEFT CEMENTED
Type: IMPLANTABLE DEVICE | Site: KNEE | Status: FUNCTIONAL
Brand: ATTUNE

## 2024-11-13 DEVICE — ATTUNE PATELLA MEDIALIZED DOME 38MM CEMENTED AOX
Type: IMPLANTABLE DEVICE | Site: KNEE | Status: FUNCTIONAL
Brand: ATTUNE

## 2024-11-13 DEVICE — SMARTSET HIGH PERFORMANCE MV MEDIUM VISCOSITY BONE CEMENT 40G
Type: IMPLANTABLE DEVICE | Site: KNEE | Status: FUNCTIONAL
Brand: SMARTSET

## 2024-11-13 DEVICE — ATTUNE KNEE SYSTEM TIBIAL INSERT FIXED BEARING MEDIAL STABILIZED LEFT AOX 6, 7MM
Type: IMPLANTABLE DEVICE | Site: KNEE | Status: FUNCTIONAL
Brand: ATTUNE

## 2024-11-13 RX ORDER — FENTANYL CITRATE/PF 50 MCG/ML
50 SYRINGE (ML) INJECTION
Status: DISCONTINUED | OUTPATIENT
Start: 2024-11-13 | End: 2024-11-13 | Stop reason: HOSPADM

## 2024-11-13 RX ORDER — SODIUM CHLORIDE, SODIUM LACTATE, POTASSIUM CHLORIDE, CALCIUM CHLORIDE 600; 310; 30; 20 MG/100ML; MG/100ML; MG/100ML; MG/100ML
100 INJECTION, SOLUTION INTRAVENOUS CONTINUOUS
Status: DISCONTINUED | OUTPATIENT
Start: 2024-11-13 | End: 2024-11-14

## 2024-11-13 RX ORDER — ONDANSETRON 2 MG/ML
INJECTION INTRAMUSCULAR; INTRAVENOUS AS NEEDED
Status: DISCONTINUED | OUTPATIENT
Start: 2024-11-13 | End: 2024-11-13

## 2024-11-13 RX ORDER — CEFAZOLIN SODIUM 2 G/50ML
2000 SOLUTION INTRAVENOUS ONCE
Status: COMPLETED | OUTPATIENT
Start: 2024-11-13 | End: 2024-11-13

## 2024-11-13 RX ORDER — FENTANYL CITRATE 50 UG/ML
INJECTION, SOLUTION INTRAMUSCULAR; INTRAVENOUS AS NEEDED
Status: DISCONTINUED | OUTPATIENT
Start: 2024-11-13 | End: 2024-11-13

## 2024-11-13 RX ORDER — CHLORHEXIDINE GLUCONATE 40 MG/ML
SOLUTION TOPICAL DAILY PRN
Status: DISCONTINUED | OUTPATIENT
Start: 2024-11-13 | End: 2024-11-13

## 2024-11-13 RX ORDER — DIPHENHYDRAMINE HYDROCHLORIDE 50 MG/ML
12.5 INJECTION INTRAMUSCULAR; INTRAVENOUS ONCE AS NEEDED
Status: DISCONTINUED | OUTPATIENT
Start: 2024-11-13 | End: 2024-11-13 | Stop reason: HOSPADM

## 2024-11-13 RX ORDER — ACETAMINOPHEN 325 MG/1
650 TABLET ORAL EVERY 4 HOURS PRN
Status: DISCONTINUED | OUTPATIENT
Start: 2024-11-13 | End: 2024-11-14 | Stop reason: HOSPADM

## 2024-11-13 RX ORDER — BUPIVACAINE HYDROCHLORIDE 5 MG/ML
INJECTION, SOLUTION EPIDURAL; INTRACAUDAL AS NEEDED
Status: DISCONTINUED | OUTPATIENT
Start: 2024-11-13 | End: 2024-11-13

## 2024-11-13 RX ORDER — ACETAMINOPHEN 500 MG
1000 TABLET ORAL EVERY 8 HOURS
Qty: 60 TABLET | Refills: 0 | Status: ON HOLD | OUTPATIENT
Start: 2024-11-13

## 2024-11-13 RX ORDER — SIMETHICONE 80 MG
80 TABLET,CHEWABLE ORAL 4 TIMES DAILY PRN
Status: DISCONTINUED | OUTPATIENT
Start: 2024-11-13 | End: 2024-11-14 | Stop reason: HOSPADM

## 2024-11-13 RX ORDER — ACETAMINOPHEN 325 MG/1
975 TABLET ORAL EVERY 8 HOURS
Status: DISCONTINUED | OUTPATIENT
Start: 2024-11-13 | End: 2024-11-14 | Stop reason: HOSPADM

## 2024-11-13 RX ORDER — GABAPENTIN 300 MG/1
300 CAPSULE ORAL
Status: DISCONTINUED | OUTPATIENT
Start: 2024-11-13 | End: 2024-11-14 | Stop reason: HOSPADM

## 2024-11-13 RX ORDER — AMOXICILLIN 250 MG
1 CAPSULE ORAL DAILY
Qty: 30 TABLET | Refills: 0 | Status: ON HOLD | OUTPATIENT
Start: 2024-11-13

## 2024-11-13 RX ORDER — TRANEXAMIC ACID 10 MG/ML
1000 INJECTION, SOLUTION INTRAVENOUS ONCE
Status: COMPLETED | OUTPATIENT
Start: 2024-11-13 | End: 2024-11-13

## 2024-11-13 RX ORDER — BUPIVACAINE HYDROCHLORIDE 7.5 MG/ML
INJECTION, SOLUTION INTRASPINAL AS NEEDED
Status: DISCONTINUED | OUTPATIENT
Start: 2024-11-13 | End: 2024-11-13

## 2024-11-13 RX ORDER — INSULIN LISPRO 100 [IU]/ML
1-6 INJECTION, SOLUTION INTRAVENOUS; SUBCUTANEOUS
Status: DISCONTINUED | OUTPATIENT
Start: 2024-11-13 | End: 2024-11-14

## 2024-11-13 RX ORDER — SODIUM CHLORIDE, SODIUM LACTATE, POTASSIUM CHLORIDE, CALCIUM CHLORIDE 600; 310; 30; 20 MG/100ML; MG/100ML; MG/100ML; MG/100ML
125 INJECTION, SOLUTION INTRAVENOUS CONTINUOUS
Status: DISCONTINUED | OUTPATIENT
Start: 2024-11-13 | End: 2024-11-14 | Stop reason: HOSPADM

## 2024-11-13 RX ORDER — DEXAMETHASONE SODIUM PHOSPHATE 10 MG/ML
INJECTION, SOLUTION INTRAMUSCULAR; INTRAVENOUS AS NEEDED
Status: DISCONTINUED | OUTPATIENT
Start: 2024-11-13 | End: 2024-11-13

## 2024-11-13 RX ORDER — DOCUSATE SODIUM 100 MG/1
100 CAPSULE, LIQUID FILLED ORAL 2 TIMES DAILY
Status: DISCONTINUED | OUTPATIENT
Start: 2024-11-13 | End: 2024-11-14 | Stop reason: HOSPADM

## 2024-11-13 RX ORDER — ENOXAPARIN SODIUM 100 MG/ML
100 INJECTION SUBCUTANEOUS EVERY 12 HOURS
Status: DISCONTINUED | OUTPATIENT
Start: 2024-11-14 | End: 2024-11-14 | Stop reason: HOSPADM

## 2024-11-13 RX ORDER — SENNOSIDES 8.6 MG
1 TABLET ORAL DAILY
Status: DISCONTINUED | OUTPATIENT
Start: 2024-11-13 | End: 2024-11-14 | Stop reason: HOSPADM

## 2024-11-13 RX ORDER — MIDAZOLAM HYDROCHLORIDE 2 MG/2ML
INJECTION, SOLUTION INTRAMUSCULAR; INTRAVENOUS AS NEEDED
Status: DISCONTINUED | OUTPATIENT
Start: 2024-11-13 | End: 2024-11-13

## 2024-11-13 RX ORDER — CALCIUM CARBONATE 500 MG/1
1000 TABLET, CHEWABLE ORAL DAILY PRN
Status: DISCONTINUED | OUTPATIENT
Start: 2024-11-13 | End: 2024-11-14 | Stop reason: HOSPADM

## 2024-11-13 RX ORDER — PANTOPRAZOLE SODIUM 40 MG/1
40 TABLET, DELAYED RELEASE ORAL DAILY
Status: DISCONTINUED | OUTPATIENT
Start: 2024-11-13 | End: 2024-11-14 | Stop reason: HOSPADM

## 2024-11-13 RX ORDER — PROPOFOL 10 MG/ML
INJECTION, EMULSION INTRAVENOUS CONTINUOUS PRN
Status: DISCONTINUED | OUTPATIENT
Start: 2024-11-13 | End: 2024-11-13

## 2024-11-13 RX ORDER — MAGNESIUM HYDROXIDE 1200 MG/15ML
LIQUID ORAL AS NEEDED
Status: DISCONTINUED | OUTPATIENT
Start: 2024-11-13 | End: 2024-11-13 | Stop reason: HOSPADM

## 2024-11-13 RX ORDER — WARFARIN SODIUM 5 MG/1
10 TABLET ORAL
Status: COMPLETED | OUTPATIENT
Start: 2024-11-13 | End: 2024-11-13

## 2024-11-13 RX ORDER — METOPROLOL SUCCINATE 25 MG/1
25 TABLET, EXTENDED RELEASE ORAL
Status: DISCONTINUED | OUTPATIENT
Start: 2024-11-13 | End: 2024-11-14 | Stop reason: HOSPADM

## 2024-11-13 RX ORDER — CHLORHEXIDINE GLUCONATE ORAL RINSE 1.2 MG/ML
15 SOLUTION DENTAL ONCE
Status: COMPLETED | OUTPATIENT
Start: 2024-11-13 | End: 2024-11-13

## 2024-11-13 RX ORDER — METOCLOPRAMIDE HYDROCHLORIDE 5 MG/ML
10 INJECTION INTRAMUSCULAR; INTRAVENOUS ONCE AS NEEDED
Status: DISCONTINUED | OUTPATIENT
Start: 2024-11-13 | End: 2024-11-13 | Stop reason: HOSPADM

## 2024-11-13 RX ORDER — ASCORBIC ACID 500 MG
500 TABLET ORAL 2 TIMES DAILY
Status: DISCONTINUED | OUTPATIENT
Start: 2024-11-13 | End: 2024-11-14 | Stop reason: HOSPADM

## 2024-11-13 RX ORDER — PRAVASTATIN SODIUM 40 MG
40 TABLET ORAL
Status: DISCONTINUED | OUTPATIENT
Start: 2024-11-13 | End: 2024-11-14 | Stop reason: HOSPADM

## 2024-11-13 RX ORDER — CEFAZOLIN SODIUM 2 G/50ML
2000 SOLUTION INTRAVENOUS EVERY 8 HOURS
Status: COMPLETED | OUTPATIENT
Start: 2024-11-13 | End: 2024-11-14

## 2024-11-13 RX ORDER — HYDROMORPHONE HCL/PF 1 MG/ML
0.5 SYRINGE (ML) INJECTION
Status: DISCONTINUED | OUTPATIENT
Start: 2024-11-13 | End: 2024-11-13 | Stop reason: HOSPADM

## 2024-11-13 RX ORDER — ACETAMINOPHEN 325 MG/1
975 TABLET ORAL ONCE
Status: COMPLETED | OUTPATIENT
Start: 2024-11-13 | End: 2024-11-13

## 2024-11-13 RX ORDER — OXYCODONE HYDROCHLORIDE 5 MG/1
5 TABLET ORAL EVERY 4 HOURS PRN
Qty: 42 TABLET | Refills: 0 | Status: ON HOLD | OUTPATIENT
Start: 2024-11-13 | End: 2024-11-23

## 2024-11-13 RX ORDER — OXYCODONE HYDROCHLORIDE 5 MG/1
5 TABLET ORAL EVERY 4 HOURS PRN
Status: DISCONTINUED | OUTPATIENT
Start: 2024-11-13 | End: 2024-11-14 | Stop reason: HOSPADM

## 2024-11-13 RX ORDER — ONDANSETRON 2 MG/ML
4 INJECTION INTRAMUSCULAR; INTRAVENOUS ONCE AS NEEDED
Status: DISCONTINUED | OUTPATIENT
Start: 2024-11-13 | End: 2024-11-13 | Stop reason: HOSPADM

## 2024-11-13 RX ORDER — BUPIVACAINE HYDROCHLORIDE AND EPINEPHRINE 2.5; 5 MG/ML; UG/ML
INJECTION, SOLUTION EPIDURAL; INFILTRATION; INTRACAUDAL; PERINEURAL AS NEEDED
Status: DISCONTINUED | OUTPATIENT
Start: 2024-11-13 | End: 2024-11-13 | Stop reason: HOSPADM

## 2024-11-13 RX ORDER — ONDANSETRON 2 MG/ML
4 INJECTION INTRAMUSCULAR; INTRAVENOUS EVERY 6 HOURS PRN
Status: DISCONTINUED | OUTPATIENT
Start: 2024-11-13 | End: 2024-11-14 | Stop reason: HOSPADM

## 2024-11-13 RX ORDER — OXYCODONE HYDROCHLORIDE 10 MG/1
10 TABLET ORAL EVERY 4 HOURS PRN
Status: DISCONTINUED | OUTPATIENT
Start: 2024-11-13 | End: 2024-11-14 | Stop reason: HOSPADM

## 2024-11-13 RX ORDER — LIDOCAINE HYDROCHLORIDE 20 MG/ML
INJECTION, SOLUTION EPIDURAL; INFILTRATION; INTRACAUDAL; PERINEURAL AS NEEDED
Status: DISCONTINUED | OUTPATIENT
Start: 2024-11-13 | End: 2024-11-13

## 2024-11-13 RX ORDER — FOLIC ACID 1 MG/1
1 TABLET ORAL DAILY
Status: DISCONTINUED | OUTPATIENT
Start: 2024-11-13 | End: 2024-11-14 | Stop reason: HOSPADM

## 2024-11-13 RX ORDER — ONDANSETRON 4 MG/1
4 TABLET, ORALLY DISINTEGRATING ORAL EVERY 6 HOURS PRN
Qty: 20 TABLET | Refills: 0 | Status: SHIPPED | OUTPATIENT
Start: 2024-11-13 | End: 2024-11-18

## 2024-11-13 RX ADMIN — BUPIVACAINE 20 ML: 13.3 INJECTION, SUSPENSION, LIPOSOMAL INFILTRATION at 12:04

## 2024-11-13 RX ADMIN — MORPHINE SULFATE 2 MG: 2 INJECTION, SOLUTION INTRAMUSCULAR; INTRAVENOUS at 22:04

## 2024-11-13 RX ADMIN — FENTANYL CITRATE 25 MCG: 50 INJECTION, SOLUTION INTRAMUSCULAR; INTRAVENOUS at 13:49

## 2024-11-13 RX ADMIN — BUPIVACAINE HYDROCHLORIDE 5 ML: 5 INJECTION, SOLUTION EPIDURAL; INTRACAUDAL; PERINEURAL at 12:04

## 2024-11-13 RX ADMIN — PRAVASTATIN SODIUM 40 MG: 40 TABLET ORAL at 18:02

## 2024-11-13 RX ADMIN — ACETAMINOPHEN 975 MG: 325 TABLET, FILM COATED ORAL at 18:01

## 2024-11-13 RX ADMIN — SODIUM CHLORIDE, SODIUM LACTATE, POTASSIUM CHLORIDE, AND CALCIUM CHLORIDE 100 ML/HR: .6; .31; .03; .02 INJECTION, SOLUTION INTRAVENOUS at 18:03

## 2024-11-13 RX ADMIN — BUPIVACAINE HYDROCHLORIDE IN DEXTROSE 1.4 ML: 7.5 INJECTION, SOLUTION SUBARACHNOID at 13:07

## 2024-11-13 RX ADMIN — SODIUM CHLORIDE, SODIUM LACTATE, POTASSIUM CHLORIDE, AND CALCIUM CHLORIDE: .6; .31; .03; .02 INJECTION, SOLUTION INTRAVENOUS at 14:13

## 2024-11-13 RX ADMIN — CEFAZOLIN SODIUM 2000 MG: 2 SOLUTION INTRAVENOUS at 13:07

## 2024-11-13 RX ADMIN — PHENYLEPHRINE HYDROCHLORIDE 20 MCG/MIN: 10 INJECTION, SOLUTION INTRAVENOUS at 13:17

## 2024-11-13 RX ADMIN — PROPOFOL 100 MCG/KG/MIN: 10 INJECTION, EMULSION INTRAVENOUS at 13:16

## 2024-11-13 RX ADMIN — PANTOPRAZOLE SODIUM 40 MG: 40 TABLET, DELAYED RELEASE ORAL at 18:02

## 2024-11-13 RX ADMIN — WARFARIN SODIUM 10 MG: 5 TABLET ORAL at 20:57

## 2024-11-13 RX ADMIN — MIDAZOLAM 2 MG: 1 INJECTION INTRAMUSCULAR; INTRAVENOUS at 12:01

## 2024-11-13 RX ADMIN — FENTANYL CITRATE 25 MCG: 50 INJECTION, SOLUTION INTRAMUSCULAR; INTRAVENOUS at 13:13

## 2024-11-13 RX ADMIN — METOPROLOL SUCCINATE 25 MG: 25 TABLET, EXTENDED RELEASE ORAL at 21:00

## 2024-11-13 RX ADMIN — CHLORHEXIDINE GLUCONATE 15 ML: 1.2 RINSE ORAL at 11:12

## 2024-11-13 RX ADMIN — OXYCODONE HYDROCHLORIDE AND ACETAMINOPHEN 500 MG: 500 TABLET ORAL at 18:02

## 2024-11-13 RX ADMIN — TRANEXAMIC ACID 1000 MG: 10 INJECTION, SOLUTION INTRAVENOUS at 12:55

## 2024-11-13 RX ADMIN — DOCUSATE SODIUM 100 MG: 100 CAPSULE, LIQUID FILLED ORAL at 18:02

## 2024-11-13 RX ADMIN — GABAPENTIN 300 MG: 300 CAPSULE ORAL at 21:01

## 2024-11-13 RX ADMIN — ACETAMINOPHEN 975 MG: 325 TABLET, FILM COATED ORAL at 11:12

## 2024-11-13 RX ADMIN — SODIUM CHLORIDE, SODIUM LACTATE, POTASSIUM CHLORIDE, AND CALCIUM CHLORIDE 125 ML/HR: .6; .31; .03; .02 INJECTION, SOLUTION INTRAVENOUS at 11:33

## 2024-11-13 RX ADMIN — FENTANYL CITRATE 25 MCG: 50 INJECTION, SOLUTION INTRAMUSCULAR; INTRAVENOUS at 14:08

## 2024-11-13 RX ADMIN — SENNOSIDES 8.6 MG: 8.6 TABLET, FILM COATED ORAL at 18:02

## 2024-11-13 RX ADMIN — FENTANYL CITRATE 25 MCG: 50 INJECTION, SOLUTION INTRAMUSCULAR; INTRAVENOUS at 12:01

## 2024-11-13 RX ADMIN — MULTIPLE VITAMINS W/ MINERALS TAB 1 TABLET: TAB ORAL at 18:02

## 2024-11-13 RX ADMIN — FOLIC ACID 1 MG: 1 TABLET ORAL at 18:02

## 2024-11-13 RX ADMIN — CEFAZOLIN SODIUM 2000 MG: 2 SOLUTION INTRAVENOUS at 20:57

## 2024-11-13 RX ADMIN — OXYCODONE HYDROCHLORIDE 10 MG: 10 TABLET ORAL at 20:42

## 2024-11-13 RX ADMIN — ONDANSETRON 4 MG: 2 INJECTION INTRAMUSCULAR; INTRAVENOUS at 13:19

## 2024-11-13 RX ADMIN — DEXAMETHASONE SODIUM PHOSPHATE 10 MG: 10 INJECTION, SOLUTION INTRAMUSCULAR; INTRAVENOUS at 13:16

## 2024-11-13 RX ADMIN — INSULIN LISPRO 5 UNITS: 100 INJECTION, SOLUTION INTRAVENOUS; SUBCUTANEOUS at 18:13

## 2024-11-13 RX ADMIN — LIDOCAINE HYDROCHLORIDE 0.5 ML: 20 INJECTION, SOLUTION EPIDURAL; INFILTRATION; INTRACAUDAL; PERINEURAL at 12:03
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
## 2024-11-13 NOTE — OP NOTE
OPERATIVE REPORT  PATIENT NAME: Isauro Sow  : 1945  MRN: [id number]  Pt Location:  [facility] OR ROOM 02    Surgery Date: 2024    Surgeons and Role:     * Dre Braxton, DO - Primary     * Pau Julian, PA-C - Assisting      * Francesca More OT-C - Assisting     Preop Diagnosis:  Chronic pain of left knee [M25.562, G89.29]  Primary osteoarthritis of left knee [M17.12]    Post-Op Diagnosis Codes:     * Chronic pain of left knee [M25.562, G89.29]     * Primary osteoarthritis of left knee [M17.12]    Procedure(s):  Left - ARTHROPLASTY KNEE TOTAL    Specimens:  * No specimens in log *    Estimated Blood Loss:   50 mL    Drains:  * No LDAs found *    Anesthesia Type:   Spinal      Operative Indications:  Chronic pain of left knee [M25.562, G89.29]  Primary osteoarthritis of left knee [M17.12]    79M with left knee pain 2/2 severe bone on bone osteoarthritis. He has failed non operative management in the form of bracing, low-impact activities, PT, CSI's and Visco injections. His pain continues to limit his ADLs. The patient has elected to proceed with a left TKA. Risks and benefits of surgery to include but not limited to bleeding, infection, damage to surrounding structures, hardware failure, instability, fracture, dislocation, need for further surgery, continued pain, stiffness, blood clots, stroke, and heart attack was discussed with the patient.     Operative Findings:  Severe tricompartmental osteoarthritis   Pre-op ROM 5-115  Post-op ROM 0-130+ calf to thigh gravity-assisted flexion    Implant Name Type Inv. Item Serial No.  Lot No. LRB No. Used Action   CEMENT BONE SMART SET GRAY MED VISC - [id number]  CEMENT BONE SMART SET GRAY MED VISC  DEPUY [id number] Left 1 Implanted   CEMENT BONE SMART SET GRAY MED VISC - [id number]  CEMENT BONE SMART SET GRAY MED VISC  DEPUY [id number] Left 1 Implanted   COMPONENT PATELLA 38MM MEDIAL DOME ATTUNE - [id number]  COMPONENT PATELLA 38MM MEDIAL DOME  ATTUNE  DEPUY [id number] Left 1 Implanted   COMPONENT FEM SZ 6 LT CMNT CR ATTUNE - [id number]  COMPONENT FEM SZ 6 LT CMNT CR ATTUNE  DEPUY [id number] Left 1 Implanted   BASEPLATE TIBIAL SZ 7 CMNT FX BRNG ATTUNE S PLUS - [id number]  BASEPLATE TIBIAL SZ 7 CMNT FX BRNG ATTUNE S PLUS  DEPUY [id number] Left 1 Implanted   INSERT TIB SZ 6 7MM LT FX BRNG MED STAB ATTUNE - [id number]  INSERT TIB SZ 6 7MM LT FX BRNG MED STAB ATTUNE  DEPUY [id number] Left 1 Implanted     Complications:   None    Knee Technique: Suture (direct) Repair  Knee Approach: Medial Parapatellar    Chronic Narcotic Use:  No      Procedure and Technique:  Patient was seen in the preoperative holding area.  Informed consent was confirmed and all questions were answered. Operative site was confirmed and marked. Patient was taken to the operating room and transferred to the operating room table. Anesthesia was performed as above. The patient was then placed supine and all bony prominences were well-padded. Left lower extremity was prepped and draped in usual sterile fashion with chlorhexidine scrub.  Patient was given perioperative antibiotics prior to incision and SCDs were placed on the non-operative leg.  A formal time-out was performed identifying the patient and confirmed operative site.  The knee was exsanguinated and a pneumatic tourniquet was inflated at 250 mmHg.  A slightly medial midline incision was performed from 3 fingerbreadths above the patella to the tibial tubercle.  This incision was carried down through skin and subcutaneous tissues to the level of the extensor mechanism.  A small medial skin flap was created.  A medial parapatellar approach was performed into the knee being careful to avoid the patellar tendon.  The anterior horn of the medial and lateral meniscus was released.  The medial peel was performed to the mid coronal line.  Lateral patellofemoral plica was excised and the patella was everted.  The fat pad was removed being careful to  avoid the patellar tendon.  Peripheral osteophytes removed at this time as was the anterior cruciate ligament.  The intramedullary femoral drill was now used just medial and anterior to the PCL insertion at the sulcus terminalis.  A drop kourtney was used to confirm intramedullary placement of the drill.  The distal femoral cutting jig was now inserted into the intramedullary canal set to 5° of valgus per pre-op template and 11 mm distal resection.  Distal resection was checked with an Samuel wing and deemed appropriate.  The distal femur was cut.  At this time the distal femoral cutting guide was removed and the sizing guide was placed on the distal femur.  A posterior referencing system was used and pins placed with 3° of external rotation.  The femur was sized to the above size.  The appropriate cutting block was then placed over the pins and rotation was confirmed being parallel to the epicondylar access and perpendicular to Whitesides line.  Retractors were placed to protect the collateral ligaments and the anterior, posterior, posterior chamfer, anterior chamfer was cut. The distal 5th cut was also performed.  Bone fragments were removed with curved osteotome and then posterior Hohmann was placed to sublux the tibia forward.  An extramedullary tibial guide was used veing cognizant of varus valgus alignment, slope and depth of resection.  The guide was pinned in place and then a drop kourtney was used to confirm appropriate alignment.  The tibia was cut and removed.  At this time the bilateral menisci and posterior osteophytes of the femur were resected with the use of a laminar .  Once adequate osteophytes were removed the knee was trialed with the above implants.  The knee was found to have excellent stability with a 7 mm MS insert.  At this time the patella was measured and resected to appropriate depth.  The patella sized to the above size and 3 drill holes were performed.  At this time the knee was again  taken through range of motion and found to have excellent stability and excellent patellar tracking.  The trials were floated and rotation of tibia marked.  The femoral lug drills were drilled.  The femur and trial poly were removed and posterior Hohmann placed to sublux the tibia forward.  The cemented tibial base plate was then aligned on the cut surface of the tibia matching where the trial was floated at approximately the medial 1/3 the tibial tubercle.  The base plate was pinned in place and prep tower impacted.  The Boss Reamer for the cemented tibia was used 1st followed by keel punch.  At this time all trials were removed and the knee was copiously irrigated with normal saline solution.  The cemented base plate was impacted into place and assured to be flush and all excess cement was cleared.  The MS polyethylene was impacted into the clean tray. The posterior Hohmann was then removed.  The cemented femoral component was impacted in place and assured to be flush on all bony surfaces. Peripheral cement was removed. The patella cut surface was dried and the domed patella was cemented into place and held with a clamp.  Peripheral cement was cleared and the clamp was held until cement cured.  The tourniquet was released at 35 minutes and all bleeders were coagulated.  The knee was irrigated with Irrisept solution and then the remainder of the 3 L of normal saline solution.  The joint local was injected in the posterior capsule and around the tissues of the knee.  The knee was taken through a final range of motion and found to have excellent stability and patellar tracking.  All instrument and sponge counts were correct x2.  The arthrotomy was closed with #1 Stratafix suture.  Subcutaneous tissues were closed with 2-0 Vicryl.  The skin was closed with 3-0 Stratafix followed by Dermabond.  A sterile Mepilex was placed along with a thigh foot Ace wrap.  Patient was awoken from anesthesia and taken to recovery room  in stable condition.      79M s/p L TKA 11/13  - multi-modal pain control  - ancef x 24 hrs  - DVT ppx: bridging lovenox back to coumadin, 10 mg dose this evening   - PT/OT  - WBAT  - ROM as tolerated, pillow/blankets under achilles not behind knee while in bed  - f/u 10-14 days      I was present for the entire procedure., A qualified resident physician was not available., and A physician assistant was required during the procedure for retraction, tissue handling, dissection and suturing.    Patient Disposition:  PACU       SIGNATURE: Dre Braxton,   DATE: November 13, 2024  TIME: 2:33 PM

## 2024-11-13 NOTE — ANESTHESIA POSTPROCEDURE EVALUATION
Post-Op Assessment Note    Last Filed PACU Vitals:  Vitals Value Taken Time   Temp 97.9 °F (36.6 °C) 11/13/24 1437   Pulse 62 11/13/24 1530   /58 11/13/24 1530   Resp 13 11/13/24 1530   SpO2 97 % 11/13/24 1530       Modified Larry:  Activity: 1 (11/13/2024  3:10 PM)  Respiration: 2 (11/13/2024  3:10 PM)  Circulation: 2 (11/13/2024  3:10 PM)  Consciousness: 2 (11/13/2024  3:10 PM)  Oxygen Saturation: 2 (11/13/2024  3:10 PM)  Modified Larry Score: 9 (11/13/2024  3:10 PM)

## 2024-11-13 NOTE — PHYSICAL THERAPY NOTE
PHYSICAL THERAPY EVALUATION NOTE      Patient Name: Isauro Sow  Today's Date: 2024    AGE:   79 y.o.  Mrn:   [id number]  ADMIT DX:  Chronic pain of left knee [M25.562, G89.29]  Primary osteoarthritis of left knee [M17.12]    Past Medical History:   Diagnosis Date    Asthma     Atrial flutter ([facility])     s/p Medtronic PPM, Coumadin    BPH (benign prostatic hyperplasia)     CAD (coronary artery disease)     Carotid stenosis     YANDEL occlusion, 50-69% LICA    CHF (congestive heart failure) ([facility])     Chronic pain     no regimen    CKD (chronic kidney disease), stage III ([facility])     baseline Cr 1.0-1.3    COPD (chronic obstructive pulmonary disease) ([facility])     moderate    DM (diabetes mellitus), type 2 ([facility])     non-insulin dependent    Gout     History of CVA (cerebrovascular accident)     w/ residual left-sided weakness,    HLD (hyperlipidemia)     Hypertension     Macular degeneration     Obesity     PERCY (obstructive sleep apnea)     NO use of any CPAP    Severe aortic stenosis     SSS (sick sinus syndrome) ([facility])     s/p Medtronic PPM, Coumadin    Stroke ([facility])      Length Of Stay: 0  PHYSICAL THERAPY EVALUATION :   Patient's identity confirmed via 2 patient identifiers (full name and ) at start of session       24 1723   PT Last Visit   PT Visit Date 24   Note Type   Note type Evaluation   Pain Assessment   Pain Assessment Tool 0-10   Pain Score No Pain   Restrictions/Precautions   Weight Bearing Precautions Per Order Yes   LLE Weight Bearing Per Order WBAT   Other Precautions Chair Alarm;Bed Alarm;WBS;Fall Risk;Pain   Home Living   Type of Home House   Home Layout One level  (0 MARYA)   Home Equipment Walker;Cane   Additional Comments Pt reports ambulating w/ SPC PTA   Prior Function   Level of [place] Independent with ADLs;Independent with functional mobility   Lives With Spouse;Son   Receives Help From  Family   IADLs Independent with meal prep;Independent with medication management   Falls in the last 6 months 0   Vocational Retired   General   Additional Pertinent History POD0 s/p L TKA   Family/Caregiver Present No   Cognition   Overall Cognitive Status WFL   Arousal/Participation Alert   Orientation Level Oriented X4   Memory Within functional limits   Following Commands Follows all commands and directions without difficulty   Comments Rich is very pleasant and agreeable to participate in PT evaluation   RLE Assessment   RLE Assessment WFL   Strength RLE   RLE Overall Strength 4+/5   LLE Assessment   LLE Assessment WFL   Strength LLE   LLE Overall Strength 4/5   Bed Mobility   Supine to Sit 5  Supervision   Additional items Assist x 1;Increased time required;Bedrails   Transfers   Sit to Stand 5  Supervision   Additional items Assist x 1   Stand to Sit 5  Supervision   Additional items Assist x 1   Additional Comments Benefits from cues for hand placement   Ambulation/Elevation   Gait pattern Decreased foot clearance;Short stride;Step through pattern   Gait Assistance 5  Supervision  (progressing to Modified I)   Assistive Device Rolling walker   Distance 200 ft   Balance   Static Sitting Good   Dynamic Sitting Good   Static Standing Fair +   Dynamic Standing Fair   Ambulatory Fair   Activity Tolerance   Activity Tolerance Patient tolerated treatment well   Medical Staff Made Aware Dr. Fox Alegre, EVELIA Mai   Nurse Made Aware RN David   Assessment   Prognosis Good   Problem List Decreased strength;Decreased range of motion;Impaired balance;Decreased mobility;Pain;Orthopedic restrictions   Assessment Isauro Sow is a 79 y.o. Male who presents to [facility] on 11/13/2024 from home for scheduled L TKA. Orders for PT eval and treat received, w/ activity orders of WBAT L LE and fall precautions. Pt presents w/ comorbidities of HTN, DMII, hx of CVA, CHF, gout. At baseline, pt mobilizes modified I w/ SPC, and  reports 0 falls in the last 6 months. Upon evaluation, pt presents w/ the following deficits: weakness, decreased ROM, impaired balance, and pain limiting functional mobility. Upon eval, pt requires supervision for bed mobility, supervision for transfers, and modified I for gait. Based on this PT evaluation today, patient's discharge recommendation is for Level III - Low Rehab Resource Intensity. During this admission, pt would benefit from continued skilled inpatient PT in the acute care setting in order to address the abovementioned deficits to maximize function and mobility before DC from acute care.   Goals   Patient Goals to go home, be able to get around better   STG Expiration Date 11/16/24   Short Term Goal #1 Patient will: Perform all bed mobility tasks modified independent to improve pt's independence w/ repositioning for decrease risk of skin breakdown, Perform all transfers modified independent consistently from various height surfaces in order to improve I w/ engagement w/ real-world environments/situations, Ambulate at least 250 ft. with roller walker modified independent w/o LOB to facilitate return and engagement w/ previous living environment, and Increase all balance 1/2 grade to decrease risk for falls   PT Treatment Day 0   Plan   Treatment/Interventions Functional transfer training;LE strengthening/ROM;Elevations;Therapeutic exercise;Endurance training;Cognitive reorientation;Patient/family training;Equipment eval/education;Bed mobility;Gait training   PT Frequency 5-7x/wk   Discharge Recommendation   Rehab Resource Intensity Level, PT III (Minimum Resource Intensity)   AM-PAC Basic Mobility Inpatient   Turning in Flat Bed Without Bedrails 3   Lying on Back to Sitting on Edge of Flat Bed Without Bedrails 3   Moving Bed to Chair 4   Standing Up From Chair Using Arms 4   Walk in Room 4   Climb 3-5 Stairs With Railing 3   Basic Mobility Inpatient Raw Score 21   Basic Mobility Standardized Score  45.55   [facility] Highest Level Of Mobility   -[facility] Goal 6: Walk 10 steps or more   -HLM Achieved 7: Walk 25 feet or more   End of Consult   Patient Position at End of Consult Bedside chair;Bed/Chair alarm activated;All needs within reach       The patient's AM-PAC Basic Mobility Inpatient Short Form Raw Score is 21, Standardized Score is 45.55. A standardized score greater than 38.32 (raw score of 16) suggests the patient may benefit from discharge to home which may not coincide with above PT recommendations. However please refer to therapist recommendation for discharge planning given other factors that may influence destination.    Pt would benefit from skilled inpatient PT during this admission in order to facilitate progress towards goals to maximize functional independence    Kadie Banda PT, DPT

## 2024-11-13 NOTE — ANESTHESIA POSTPROCEDURE EVALUATION
Post-Op Assessment Note    CV Status:  Stable  Pain Score: 0    Pain management: adequate       Mental Status:  Alert and awake   Hydration Status:  Euvolemic and stable   PONV Controlled:  None   Airway Patency:  Patent     Post Op Vitals Reviewed: Yes    No anethesia notable event occurred.    Staff: CRNA       Last Filed PACU Vitals:  Vitals Value Taken Time   Temp 97.9 °F (36.6 °C) 11/13/24 1437   Pulse 60 11/13/24 1437   BP 97/50 11/13/24 1437   Resp 12 11/13/24 1437   SpO2 99 % 11/13/24 1437       Modified Larry:  No data recorded

## 2024-11-13 NOTE — ANESTHESIA PREPROCEDURE EVALUATION
Procedure:  ARTHROPLASTY KNEE TOTAL (Left: Knee)    Relevant Problems   CARDIO   (+) Benign essential hypertension   (+) Bilateral carotid artery stenosis   (+) Chronic atrial flutter (HCC)   (+) Coronary artery disease involving native coronary artery   (+) Hyperlipidemia   (+) Non-rheumatic aortic sclerosis   (+) PAD (peripheral artery disease) ([facility])   (+) Sick sinus syndrome (HCC)   (+) Stenosis of left carotid artery      ENDO   (+) Type 2 diabetes mellitus (HCC)      /RENAL   (+) Diabetic nephropathy associated with type 2 diabetes mellitus ([facility])   (+) Stage 3a chronic kidney disease (HCC)      MUSCULOSKELETAL   (+) Gout   (+) Lumbar spondylosis   (+) Primary osteoarthritis of left knee      NEURO/PSYCH   (+) Continuous opioid dependence (HCC)      PULMONARY   (+) Asthma      Neurology/Sleep   (+) Hemiplegia and hemiparesis following cerebral infarction affecting left non-dominant side ([facility])   (+) RLS (restless legs syndrome)      Surgery/Wound/Pain   (+) S/P TAVR (transcatheter aortic valve replacement)      Cardiovascular/Peripheral Vascular   (+) Chronic diastolic CHF (congestive heart failure) ([facility])      Other   (+) Hx of cardiac pacemaker      Medtronic dual chamber pacemaker interrogation: 98% v-paced. Normal device function      Stroke (2017) - left hand weakness    Carotid scan (2024)  Right: known internal carotid artery occlusion with moderate external carotid artery disease notded    Left: 50-70% stenosis of internal carotid artery  Physical Exam    Airway    Mallampati score: III  TM Distance: <3 FB  Neck ROM: full     Dental   Comment: None loose     Cardiovascular      Pulmonary      Other Findings       Latest Reference Range & Units 10/07/24 11:57   Sodium 135 - 147 mmol/L 141   Potassium 3.5 - 5.3 mmol/L 4.3   Chloride 96 - 108 mmol/L 103   Carbon Dioxide 21 - 32 mmol/L 34 (H)   ANION GAP 4 - 13 mmol/L 4   BUN 5 - 25 mg/dL 21   Creatinine 0.60 - 1.30 mg/dL 1.12   GLUCOSE 65 - 140 mg/dL 145  (H)   Calcium 8.4 - 10.2 mg/dL 9.7   AST 13 - 39 U/L 15   ALT 7 - 52 U/L 14   ALK PHOS 34 - 104 U/L 60   Total Protein 6.4 - 8.4 g/dL 6.9   Albumin 3.5 - 5.0 g/dL 4.1   Total Bilirubin 0.20 - 1.00 mg/dL 0.63   GFR, Calculated ml/min/1.73sq m 62   (H): Data is abnormally high           Latest Reference Range & Units 10/07/24 11:57   WBC 4.31 - 10.16 Thousand/uL 7.09   RBC 3.88 - 5.62 Million/uL 5.26   Hemoglobin 12.0 - 17.0 g/dL 14.8   Hematocrit 36.5 - 49.3 % 46.5   MCV 82 - 98 fL 88   MCH 26.8 - 34.3 pg 28.1   MCHC 31.4 - 37.4 g/dL 31.8   RDW 11.6 - 15.1 % 13.2   Platelet Count 149 - 390 Thousands/uL 126 (L)   (L): Data is abnormally low     Latest Reference Range & Units 10/29/24 08:54   PROTIME 12.3 - 15.0 seconds 20.3 (H)   POCT INR 0.85 - 1.19  1.72 (H)   (H): Data is abnormally high        EKG (2024)  V-paced    ECHO (2024)    Left Ventricle: Left ventricular cavity size is normal. Wall thickness is normal. The left ventricular ejection fraction is 55%. Systolic function is normal. Diastolic function is moderately abnormal, consistent with grade II (pseudonormal) relaxation.    The following segments are akinetic: basal inferolateral.    The following segments are hypokinetic: basal inferior.    All other segments are normal.    Right Ventricle: Right ventricular cavity size is normal. Systolic function is normal.    Aortic Valve: There is a TAVR bioprosthetic valve. The aortic valve has no significant stenosis. The aortic valve peak velocity is 1.72 m/s. The aortic valve peak gradient is 12 mmHg. The aortic valve mean gradient is 6 mmHg. The dimensionless velocity index is 0.53.    Mitral Valve: There is moderate regurgitation.    Aorta: The aortic root is normal in size. The ascending aorta is mildly dilated. The aortic root is 2.60 cm. The ascending aorta is 3.5 cm.  Anesthesia Plan  ASA Score- 3     Anesthesia Type- spinal with ASA Monitors.         Additional Monitors:     Airway Plan:     Comment: Npo  after MN    Patient educated on the possibility for awareness under sedation and of the possibility of airway intervention in the event of an airway or procedural emergency  .       Plan Factors-Exercise tolerance (METS): <4 METS.    Chart reviewed. EKG reviewed.  Existing labs reviewed. Patient summary reviewed.    Patient is not a current smoker.              Induction- intravenous.    Postoperative Plan- Plan for postoperative opioid use.         Informed Consent- Anesthetic plan and risks discussed with patient and son (Patient gave verbal consent with son at bedside. Anxiolytic given before written consent obtained. Son at bedside for signature and to serve as witness).  I personally reviewed this patient with the CRNA. Discussed and agreed on the Anesthesia Plan with the CRNA..

## 2024-11-13 NOTE — ASSESSMENT & PLAN NOTE
Lab Results   Component Value Date    HGBA1C 7.2 (H) 10/07/2024   A1c is 7.2  Resume home metformin  Monitor blood sugars

## 2024-11-13 NOTE — PLAN OF CARE
Problem: PHYSICAL THERAPY ADULT  Goal: Performs mobility at highest level of function for planned discharge setting.  See evaluation for individualized goals.  Description: Treatment/Interventions: Functional transfer training, LE strengthening/ROM, Elevations, Therapeutic exercise, Endurance training, Cognitive reorientation, Patient/family training, Equipment eval/education, Bed mobility, Gait training          See flowsheet documentation for full assessment, interventions and recommendations.  11/13/2024 1801 by Kadie Banda, PT  Note: Prognosis: Good  Problem List: Decreased strength, Decreased range of motion, Impaired balance, Decreased mobility, Pain, Orthopedic restrictions  Assessment: Isauro Sow is a 79 y.o. Male who presents to [facility] on 11/13/2024 from home for scheduled L TKA. Orders for PT eval and treat received, w/ activity orders of WBAT L LE and fall precautions. Pt presents w/ comorbidities of HTN, DMII, hx of CVA, CHF, gout. At baseline, pt mobilizes modified I w/ SPC, and reports 0 falls in the last 6 months. Upon evaluation, pt presents w/ the following deficits: weakness, decreased ROM, impaired balance, and pain limiting functional mobility. Upon eval, pt requires supervision for bed mobility, supervision for transfers, and modified I for gait. Based on this PT evaluation today, patient's discharge recommendation is for Level III - Low Rehab Resource Intensity. During this admission, pt would benefit from continued skilled inpatient PT in the acute care setting in order to address the abovementioned deficits to maximize function and mobility before DC from acute care.        Rehab Resource Intensity Level, PT: III (Minimum Resource Intensity)    See flowsheet documentation for full assessment.

## 2024-11-13 NOTE — PLAN OF CARE
Problem: PAIN - ADULT  Goal: Verbalizes/displays adequate comfort level or baseline comfort level  Description: Interventions:  - Encourage patient to monitor pain and request assistance  - Assess pain using appropriate pain scale  - Administer analgesics based on type and severity of pain and evaluate response  - Implement non-pharmacological measures as appropriate and evaluate response  - Consider cultural and social influences on pain and pain management  - Notify physician/advanced practitioner if interventions unsuccessful or patient reports new pain  Outcome: Progressing     Problem: INFECTION - ADULT  Goal: Absence or prevention of progression during hospitalization  Description: INTERVENTIONS:  - Assess and monitor for signs and symptoms of infection  - Monitor lab/diagnostic results  - Monitor all insertion sites, i.e. indwelling lines, tubes, and drains  - Monitor endotracheal if appropriate and nasal secretions for changes in amount and color  - [place] appropriate cooling/warming therapies per order  - Administer medications as ordered  - Instruct and encourage patient and family to use good hand hygiene technique  - Identify and instruct in appropriate isolation precautions for identified infection/condition  Outcome: Progressing     Problem: SAFETY ADULT  Goal: Patient will remain free of falls  Description: INTERVENTIONS:  - Educate patient/family on patient safety including physical limitations  - Instruct patient to call for assistance with activity   - Consult OT/PT to assist with strengthening/mobility   - Keep Call bell within reach  - Keep bed low and locked with side rails adjusted as appropriate  - Keep care items and personal belongings within reach  - Initiate and maintain comfort rounds  - Make Fall Risk Sign visible to staff  - Offer Toileting every 2 Hours, in advance of need  - Initiate/Maintain alarm  - Obtain necessary fall risk management equipment  - Apply yellow socks and  bracelet for high fall risk patients  - Consider moving patient to room near nurses station  Outcome: Progressing  Goal: Maintain or return to baseline ADL function  Description: INTERVENTIONS:  -  Assess patient's ability to carry out ADLs; assess patient's baseline for ADL function and identify physical deficits which impact ability to perform ADLs (bathing, care of mouth/teeth, toileting, grooming, dressing, etc.)  - Assess/evaluate cause of self-care deficits   - Assess range of motion  - Assess patient's mobility; develop plan if impaired  - Assess patient's need for assistive devices and provide as appropriate  - Encourage maximum independence but intervene and supervise when necessary  - Involve family in performance of ADLs  - Assess for home care needs following discharge   - Consider OT consult to assist with ADL evaluation and planning for discharge  - Provide patient education as appropriate  Outcome: Progressing  Goal: Maintains/Returns to pre admission functional level  Description: INTERVENTIONS:  - Perform AM-PAC 6 Click Basic Mobility/ Daily Activity assessment daily.  - Set and communicate daily mobility goal to care team and patient/family/caregiver.   - Collaborate with rehabilitation services on mobility goals if consulted  - Perform Range of Motion 3 times a day.  - Reposition patient every 3 hours.  - Dangle patient 3 times a day  - Stand patient 3 times a day  - Ambulate patient 3 times a day  - Out of bed to chair 3 times a day   - Out of bed for meals 3 times a day  - Out of bed for toileting  - Record patient progress and toleration of activity level   Outcome: Progressing     Problem: DISCHARGE PLANNING  Goal: Discharge to home or other facility with appropriate resources  Description: INTERVENTIONS:  - Identify barriers to discharge w/patient and caregiver  - Arrange for needed discharge resources and transportation as appropriate  - Identify discharge learning needs (meds, wound care,  etc.)  - Arrange for interpretive services to assist at discharge as needed  - Refer to Case Management Department for coordinating discharge planning if the patient needs post-hospital services based on physician/advanced practitioner order or complex needs related to functional status, cognitive ability, or social support system  Outcome: Progressing     Problem: Knowledge Deficit  Goal: Patient/family/caregiver demonstrates understanding of disease process, treatment plan, medications, and discharge instructions  Description: Complete learning assessment and assess knowledge base.  Interventions:  - Provide teaching at level of understanding  - Provide teaching via preferred learning methods  Outcome: Progressing

## 2024-11-13 NOTE — ANESTHESIA PROCEDURE NOTES
Spinal Block    Patient location during procedure: OR  Start time: 11/13/2024 1:07 PM  Reason for block: at surgeon's request and primary anesthetic  Staffing  Performed by: Alejandra Venegas CRNA  Authorized by: Esther Talavera MD    Preanesthetic Checklist  Completed: patient identified, IV checked, site marked, risks and benefits discussed, surgical consent, monitors and equipment checked, pre-op evaluation and timeout performed  Spinal Block  Patient position: sitting  Prep: ChloraPrep and site prepped and draped  Patient monitoring: frequent blood pressure checks, continuous pulse ox and heart rate  Approach: midline  Location: L2-3  Needle  Needle type: Pencan   Needle gauge: 24 G  Needle length: 4 in  Assessment  Sensory level: T4  Injection Assessment:  negative aspiration for heme, no paresthesia on injection and positive aspiration for clear CSF.  Post-procedure:  site cleaned

## 2024-11-13 NOTE — ASSESSMENT & PLAN NOTE
Wt Readings from Last 3 Encounters:   11/13/24 99.3 kg (219 lb)   10/31/24 98.5 kg (217 lb 2 oz)   10/29/24 99.4 kg (219 lb 3.2 oz)     Appearing compensated  Resume home beta-blocker  Monitor daily weights
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
## 2024-11-13 NOTE — ANESTHESIA PROCEDURE NOTES
Peripheral Block    Patient location during procedure: holding area  Start time: 11/13/2024 12:04 PM  Reason for block: at surgeon's request and post-op pain management  Staffing  Performed by: Esther Talavera MD  Authorized by: Esther Talavera MD    Preanesthetic Checklist  Completed: patient identified, IV checked, site marked, risks and benefits discussed, surgical consent, monitors and equipment checked, pre-op evaluation and timeout performed  Peripheral Block  Patient position: supine  Prep: ChloraPrep  Patient monitoring: continuous pulse oximetry, heart rate and frequent blood pressure checks  Block type: Adductor Canal  Laterality: left  Injection technique: single-shot  Procedures: ultrasound guided, Ultrasound guidance required for the procedure to increase accuracy and safety of medication placement and decrease risk of complications.  Ultrasound permanent image saved    Needle  Needle type: Stimuplex   Needle gauge: 20 G  Needle length: 4 in  Needle localization: ultrasound guidance  Needle insertion depth: 2 cm  Assessment  Injection assessment: frequent aspiration, incremental injection, needle tip visualized at all times, negative aspiration, no paresthesia on injection, no symptoms of intraneural/intravenous injection, negative for heart rate change and injected with ease  Paresthesia pain: none  Post-procedure:  site cleaned  patient tolerated the procedure well with no immediate complications

## 2024-11-13 NOTE — ASSESSMENT & PLAN NOTE
Postop day #0 status post left knee arthroplasty with Dr. Braxton  Management per primary team  Pain control, DVT prophylaxis, PT OT postoperatively

## 2024-11-14 ENCOUNTER — TELEPHONE (OUTPATIENT)
Dept: LAB | Facility: HOSPITAL | Age: 79
End: 2024-11-14

## 2024-11-14 ENCOUNTER — PATIENT OUTREACH (OUTPATIENT)
Dept: OBGYN CLINIC | Facility: HOSPITAL | Age: 79
End: 2024-11-14

## 2024-11-14 VITALS
DIASTOLIC BLOOD PRESSURE: 88 MMHG | HEART RATE: 60 BPM | OXYGEN SATURATION: 85 % | HEIGHT: 70 IN | TEMPERATURE: 97.5 F | RESPIRATION RATE: 18 BRPM | SYSTOLIC BLOOD PRESSURE: 113 MMHG | BODY MASS INDEX: 31.35 KG/M2 | WEIGHT: 219 LBS

## 2024-11-14 DIAGNOSIS — I48.92 CHRONIC ATRIAL FLUTTER (HCC): Primary | ICD-10-CM

## 2024-11-14 DIAGNOSIS — I48.92 ATRIAL FLUTTER, UNSPECIFIED TYPE (HCC): ICD-10-CM

## 2024-11-14 LAB
ANION GAP SERPL CALCULATED.3IONS-SCNC: 4 MMOL/L (ref 4–13)
BASOPHILS # BLD AUTO: 0.01 THOUSANDS/ÂΜL (ref 0–0.1)
BASOPHILS NFR BLD AUTO: 0 % (ref 0–1)
BUN SERPL-MCNC: 17 MG/DL (ref 5–25)
CALCIUM SERPL-MCNC: 9.3 MG/DL (ref 8.4–10.2)
CHLORIDE SERPL-SCNC: 106 MMOL/L (ref 96–108)
CO2 SERPL-SCNC: 28 MMOL/L (ref 21–32)
CREAT SERPL-MCNC: 1.01 MG/DL (ref 0.6–1.3)
EOSINOPHIL # BLD AUTO: 0 THOUSAND/ÂΜL (ref 0–0.61)
EOSINOPHIL NFR BLD AUTO: 0 % (ref 0–6)
ERYTHROCYTE [DISTWIDTH] IN BLOOD BY AUTOMATED COUNT: 12.7 % (ref 11.6–15.1)
GFR SERPL CREATININE-BSD FRML MDRD: 70 ML/MIN/1.73SQ M
GLUCOSE SERPL-MCNC: 189 MG/DL (ref 65–140)
GLUCOSE SERPL-MCNC: 204 MG/DL (ref 65–140)
GLUCOSE SERPL-MCNC: 216 MG/DL (ref 65–140)
GLUCOSE SERPL-MCNC: 224 MG/DL (ref 65–140)
HCT VFR BLD AUTO: 41.6 % (ref 36.5–49.3)
HGB BLD-MCNC: 13.4 G/DL (ref 12–17)
IMM GRANULOCYTES # BLD AUTO: 0.13 THOUSAND/UL (ref 0–0.2)
IMM GRANULOCYTES NFR BLD AUTO: 1 % (ref 0–2)
INR PPP: 1 (ref 0.85–1.19)
LYMPHOCYTES # BLD AUTO: 0.86 THOUSANDS/ÂΜL (ref 0.6–4.47)
LYMPHOCYTES NFR BLD AUTO: 8 % (ref 14–44)
MCH RBC QN AUTO: 28.7 PG (ref 26.8–34.3)
MCHC RBC AUTO-ENTMCNC: 32.2 G/DL (ref 31.4–37.4)
MCV RBC AUTO: 89 FL (ref 82–98)
MONOCYTES # BLD AUTO: 0.86 THOUSAND/ÂΜL (ref 0.17–1.22)
MONOCYTES NFR BLD AUTO: 8 % (ref 4–12)
NEUTROPHILS # BLD AUTO: 9.11 THOUSANDS/ÂΜL (ref 1.85–7.62)
NEUTS SEG NFR BLD AUTO: 83 % (ref 43–75)
NRBC BLD AUTO-RTO: 0 /100 WBCS
PLATELET # BLD AUTO: 114 THOUSANDS/UL (ref 149–390)
PMV BLD AUTO: 10.4 FL (ref 8.9–12.7)
POTASSIUM SERPL-SCNC: 4.6 MMOL/L (ref 3.5–5.3)
PROTHROMBIN TIME: 13.7 SECONDS (ref 12.3–15)
RBC # BLD AUTO: 4.67 MILLION/UL (ref 3.88–5.62)
SODIUM SERPL-SCNC: 138 MMOL/L (ref 135–147)
WBC # BLD AUTO: 10.97 THOUSAND/UL (ref 4.31–10.16)

## 2024-11-14 PROCEDURE — 82948 REAGENT STRIP/BLOOD GLUCOSE: CPT

## 2024-11-14 PROCEDURE — 80048 BASIC METABOLIC PNL TOTAL CA: CPT | Performed by: STUDENT IN AN ORGANIZED HEALTH CARE EDUCATION/TRAINING PROGRAM

## 2024-11-14 PROCEDURE — 85025 COMPLETE CBC W/AUTO DIFF WBC: CPT | Performed by: STUDENT IN AN ORGANIZED HEALTH CARE EDUCATION/TRAINING PROGRAM

## 2024-11-14 PROCEDURE — 99232 SBSQ HOSP IP/OBS MODERATE 35: CPT | Performed by: INTERNAL MEDICINE

## 2024-11-14 PROCEDURE — 85610 PROTHROMBIN TIME: CPT | Performed by: STUDENT IN AN ORGANIZED HEALTH CARE EDUCATION/TRAINING PROGRAM

## 2024-11-14 PROCEDURE — 99024 POSTOP FOLLOW-UP VISIT: CPT | Performed by: PHYSICIAN ASSISTANT

## 2024-11-14 RX ORDER — WARFARIN SODIUM 5 MG/1
TABLET ORAL
Qty: 45 TABLET | Refills: 11 | Status: SHIPPED | OUTPATIENT
Start: 2024-11-14 | End: 2024-11-14

## 2024-11-14 RX ORDER — INSULIN LISPRO 100 [IU]/ML
1-6 INJECTION, SOLUTION INTRAVENOUS; SUBCUTANEOUS
Status: DISCONTINUED | OUTPATIENT
Start: 2024-11-14 | End: 2024-11-14 | Stop reason: HOSPADM

## 2024-11-14 RX ORDER — ENOXAPARIN SODIUM 100 MG/ML
100 INJECTION SUBCUTANEOUS EVERY 12 HOURS
Qty: 10 ML | Refills: 0 | Status: ON HOLD | OUTPATIENT
Start: 2024-11-14 | End: 2024-11-19

## 2024-11-14 RX ORDER — WARFARIN SODIUM 1 MG/1
TABLET ORAL
Status: CANCELLED
Start: 2024-11-14

## 2024-11-14 RX ADMIN — DOCUSATE SODIUM 100 MG: 100 CAPSULE, LIQUID FILLED ORAL at 07:39

## 2024-11-14 RX ADMIN — INSULIN LISPRO 2 UNITS: 100 INJECTION, SOLUTION INTRAVENOUS; SUBCUTANEOUS at 07:39

## 2024-11-14 RX ADMIN — FOLIC ACID 1 MG: 1 TABLET ORAL at 07:39

## 2024-11-14 RX ADMIN — SODIUM CHLORIDE, SODIUM LACTATE, POTASSIUM CHLORIDE, AND CALCIUM CHLORIDE 100 ML/HR: .6; .31; .03; .02 INJECTION, SOLUTION INTRAVENOUS at 03:16

## 2024-11-14 RX ADMIN — ENOXAPARIN SODIUM 100 MG: 100 INJECTION SUBCUTANEOUS at 06:02

## 2024-11-14 RX ADMIN — INSULIN LISPRO 2 UNITS: 100 INJECTION, SOLUTION INTRAVENOUS; SUBCUTANEOUS at 11:26

## 2024-11-14 RX ADMIN — PANTOPRAZOLE SODIUM 40 MG: 40 TABLET, DELAYED RELEASE ORAL at 07:39

## 2024-11-14 RX ADMIN — OXYCODONE HYDROCHLORIDE 5 MG: 5 TABLET ORAL at 10:26

## 2024-11-14 RX ADMIN — MORPHINE SULFATE 2 MG: 2 INJECTION, SOLUTION INTRAMUSCULAR; INTRAVENOUS at 14:13

## 2024-11-14 RX ADMIN — OXYCODONE HYDROCHLORIDE 5 MG: 5 TABLET ORAL at 03:15

## 2024-11-14 RX ADMIN — CEFAZOLIN SODIUM 2000 MG: 2 SOLUTION INTRAVENOUS at 04:22

## 2024-11-14 RX ADMIN — METFORMIN HYDROCHLORIDE 1000 MG: 500 TABLET ORAL at 07:39

## 2024-11-14 RX ADMIN — SENNOSIDES 8.6 MG: 8.6 TABLET, FILM COATED ORAL at 07:39

## 2024-11-14 RX ADMIN — MULTIPLE VITAMINS W/ MINERALS TAB 1 TABLET: TAB ORAL at 07:39

## 2024-11-14 RX ADMIN — ACETAMINOPHEN 975 MG: 325 TABLET, FILM COATED ORAL at 03:09

## 2024-11-14 RX ADMIN — OXYCODONE HYDROCHLORIDE AND ACETAMINOPHEN 500 MG: 500 TABLET ORAL at 08:14

## 2024-11-14 RX ADMIN — ACETAMINOPHEN 975 MG: 325 TABLET, FILM COATED ORAL at 10:26

## 2024-11-14 NOTE — PROGRESS NOTES
Progress Note - Hospitalist   Name: Isauro Sow 79 y.o. male I MRN: [id number]  Unit/Bed#: -01 I Date of Admission: 11/13/2024   Date of Service: 11/14/2024 I Hospital Day: 0    Assessment & Plan  Primary osteoarthritis of left knee  Postop day #1 status post left knee arthroplasty with Dr. Braxton  Management per primary team  Pain control, DVT prophylaxis, PT OT postoperatively  Chronic atrial flutter ([facility])  Chronic a flutter, on Toprol-XL and Coumadin  [id number] of 7, needs bridging  Resume home Toprol-XL 25 mg daily  Resume therapeutic Lovenox twice daily with Coumadin concomitantly  Continue coumadin of 7.5mg daily with repeat inr on Saturday. Further instructions per his coumadin clinic   Benign essential hypertension  Bp stable   Resume home Toprol-XL  Monitor blood pressure.  Type 2 diabetes mellitus ([facility])    Lab Results   Component Value Date    HGBA1C 7.2 (H) 10/07/2024   A1c is 7.2  Resume home metformin  Monitor blood sugars  Chronic diastolic CHF (congestive heart failure) ([facility])  Wt Readings from Last 3 Encounters:   11/13/24 99.3 kg (219 lb)   10/31/24 98.5 kg (217 lb 2 oz)   10/29/24 99.4 kg (219 lb 3.2 oz)     Appearing compensated  Resume home beta-blocker  Monitor daily weights  History of stroke  With history of stroke, bridging anticoagulation as above    Discharge Plan: medically stable for discharge     Code Status: Level 1 - Full Code    Subjective   Pt seen and examined. No f/c no cp no sob no n/v/d no abd pain     Objective :  Temp:  [96.8 °F (36 °C)-98.3 °F (36.8 °C)] 97.5 °F (36.4 °C)  HR:  [60-67] 62  BP: ()/(50-83) 126/64  Resp:  [12-25] 18  SpO2:  [92 %-100 %] 98 %  O2 Device: None (Room air)  Nasal Cannula O2 Flow Rate (L/min):  [4 L/min] 4 L/min    Body mass index is 31.42 kg/m².     Input and Output Summary (last 24 hours):     Intake/Output Summary (Last 24 hours) at 11/14/2024 1328  Last data filed at 11/14/2024 1246  Gross per 24 hour   Intake 2872.92 ml   Output  2025 ml   Net 847.92 ml       Physical Exam  Constitutional:       Appearance: Normal appearance.   HENT:      Head: Normocephalic and atraumatic.   Eyes:      Extraocular Movements: Extraocular movements intact.      Pupils: Pupils are equal, round, and reactive to light.   Cardiovascular:      Rate and Rhythm: Normal rate and regular rhythm.      Heart sounds: No murmur heard.     No friction rub. No gallop.   Pulmonary:      Effort: Pulmonary effort is normal. No respiratory distress.      Breath sounds: Normal breath sounds. No wheezing, rhonchi or rales.   Abdominal:      General: Bowel sounds are normal. There is no distension.      Palpations: Abdomen is soft.      Tenderness: There is no abdominal tenderness. There is no guarding or rebound.   Neurological:      Mental Status: He is alert and oriented to person, place, and time.       Lines/Drains:        Lab Results: I have reviewed the following results:   Results from last 7 days   Lab Units 11/14/24  0336   WBC Thousand/uL 10.97*   HEMOGLOBIN g/dL 13.4   HEMATOCRIT % 41.6   PLATELETS Thousands/uL 114*   SEGS PCT % 83*   LYMPHO PCT % 8*   MONO PCT % 8   EOS PCT % 0     Results from last 7 days   Lab Units 11/14/24  0336   SODIUM mmol/L 138   POTASSIUM mmol/L 4.6   CHLORIDE mmol/L 106   CO2 mmol/L 28   BUN mg/dL 17   CREATININE mg/dL 1.01   ANION GAP mmol/L 4   CALCIUM mg/dL 9.3   GLUCOSE RANDOM mg/dL 216*     Results from last 7 days   Lab Units 11/14/24  0336   INR  1.00     Results from last 7 days   Lab Units 11/14/24  1042 11/14/24  0712 11/13/24  1813 11/13/24  1128   POC GLUCOSE mg/dl 224* 204* 330* 112               Recent Cultures (last 7 days):         Imaging Results Review: No pertinent imaging studies reviewed.  Other Study Results Review: No additional pertinent studies reviewed.    Last 24 Hours Medication List:     Current Facility-Administered Medications:     acetaminophen (TYLENOL) tablet 650 mg, Q4H PRN    acetaminophen (TYLENOL) tablet  975 mg, Q8H    ascorbic acid (VITAMIN C) tablet 500 mg, BID    calcium carbonate (TUMS) chewable tablet 1,000 mg, Daily PRN    docusate sodium (COLACE) capsule 100 mg, BID    enoxaparin (LOVENOX) subcutaneous injection 100 mg, Q12H    folic acid (FOLVITE) tablet 1 mg, Daily    gabapentin (NEURONTIN) capsule 300 mg, HS    insulin lispro (HumALOG/ADMELOG) 100 units/mL subcutaneous injection 1-6 Units, TID With Meals **AND** Fingerstick Glucose (POCT), 4x Daily AC and at bedtime    lactated ringers bolus 1,000 mL, Once PRN **AND** lactated ringers bolus 1,000 mL, Once PRN    lactated ringers infusion, Continuous, Last Rate: Stopped (11/13/24 1600)    metFORMIN (GLUCOPHAGE) tablet 1,000 mg, Daily With Breakfast    metoprolol succinate (TOPROL-XL) 24 hr tablet 25 mg, HS    morphine injection 2 mg, Q2H PRN    multivitamin-minerals (CENTRUM) tablet 1 tablet, Daily    ondansetron (ZOFRAN) injection 4 mg, Q6H PRN    oxyCODONE (ROXICODONE) immediate release tablet 10 mg, Q4H PRN    oxyCODONE (ROXICODONE) IR tablet 5 mg, Q4H PRN    pantoprazole (PROTONIX) EC tablet 40 mg, Daily    pravastatin (PRAVACHOL) tablet 40 mg, Daily With Dinner    senna (SENOKOT) tablet 8.6 mg, Daily    simethicone (MYLICON) chewable tablet 80 mg, 4x Daily PRN    sodium chloride 0.9 % bolus 1,000 mL, Once PRN **AND** sodium chloride 0.9 % bolus 1,000 mL, Once PRN    warfarin (COUMADIN) tablet 7.5 mg, Daily (warfarin)    Administrative Statements   Today, Patient Was Seen By: Sarita Oliva DO

## 2024-11-14 NOTE — PLAN OF CARE
Problem: PAIN - ADULT  Goal: Verbalizes/displays adequate comfort level or baseline comfort level  Description: Interventions:  - Encourage patient to monitor pain and request assistance  - Assess pain using appropriate pain scale  - Administer analgesics based on type and severity of pain and evaluate response  - Implement non-pharmacological measures as appropriate and evaluate response  - Consider cultural and social influences on pain and pain management  - Notify physician/advanced practitioner if interventions unsuccessful or patient reports new pain  Outcome: Progressing     Problem: INFECTION - ADULT  Goal: Absence or prevention of progression during hospitalization  Description: INTERVENTIONS:  - Assess and monitor for signs and symptoms of infection  - Monitor lab/diagnostic results  - Monitor all insertion sites, i.e. indwelling lines, tubes, and drains  - Monitor endotracheal if appropriate and nasal secretions for changes in amount and color  - [place] appropriate cooling/warming therapies per order  - Administer medications as ordered  - Instruct and encourage patient and family to use good hand hygiene technique  - Identify and instruct in appropriate isolation precautions for identified infection/condition  Outcome: Progressing     Problem: SAFETY ADULT  Goal: Patient will remain free of falls  Description: INTERVENTIONS:  - Educate patient/family on patient safety including physical limitations  - Instruct patient to call for assistance with activity   - Consult OT/PT to assist with strengthening/mobility   - Keep Call bell within reach  - Keep bed low and locked with side rails adjusted as appropriate  - Keep care items and personal belongings within reach  - Initiate and maintain comfort rounds  - Make Fall Risk Sign visible to staff  - Offer Toileting every 2 Hours, in advance of need  - Initiate/Maintain niall/bed alarm  - Obtain necessary fall risk management equipment: yellow  socks  Problem: DISCHARGE PLANNING  Goal: Discharge to home or other facility with appropriate resources  Description: INTERVENTIONS:  - Identify barriers to discharge w/patient and caregiver  - Arrange for needed discharge resources and transportation as appropriate  - Identify discharge learning needs (meds, wound care, etc.)  - Arrange for interpretive services to assist at discharge as needed  - Refer to Case Management Department for coordinating discharge planning if the patient needs post-hospital services based on physician/advanced practitioner order or complex needs related to functional status, cognitive ability, or social support system  Outcome: Progressing     - Apply yellow socks and bracelet for high fall risk patients  - Consider moving patient to room near nurses station  Outcome: Progressing     Problem: SAFETY ADULT  Goal: Maintain or return to baseline ADL function  Description: INTERVENTIONS:  -  Assess patient's ability to carry out ADLs; assess patient's baseline for ADL function and identify physical deficits which impact ability to perform ADLs (bathing, care of mouth/teeth, toileting, grooming, dressing, etc.)  - Assess/evaluate cause of self-care deficits   - Assess range of motion  - Assess patient's mobility; develop plan if impaired  - Assess patient's need for assistive devices and provide as appropriate  - Encourage maximum independence but intervene and supervise when necessary  - Involve family in performance of ADLs  - Assess for home care needs following discharge   - Consider OT consult to assist with ADL evaluation and planning for discharge  - Provide patient education as appropriate  Outcome: Progressing

## 2024-11-14 NOTE — PROGRESS NOTES
[facility] reviewed pt chart and pt had arthroplasty of left knee completed on 11/13/2024. [facility] has spoke to pt son preoperatively for DC planning. [facility] contacted pt son today and per tp son, pt is doing well. All caregiver support is planned, pt and wife are both receiving Meals on Wheels. No other needs noted. [facility] will close referral and remain available as needed.

## 2024-11-14 NOTE — ASSESSMENT & PLAN NOTE
POD1 s/p left total knee arthroplasty by Dr. Braxton  -Weightbearing as tolerated to left lower extremity with assistance  -PT/OT  -Ice to left knee.  Elevation with pillow under foot and ankle.  No pillows underneath the knee.  -Multimodal pain regimen  -Perioperative Ancef complete  -Continue Lovenox for bridge to Coumadin.  -Resume Coumadin 7.5 mg daily today.  -Will continue to assess for ABLA  -Leukocytosis most likely reactive. Encourage incentive spirometry  -DC planning. Discharge home once cleared by PT/OT and SLIM

## 2024-11-14 NOTE — PLAN OF CARE
Problem: PAIN - ADULT  Goal: Verbalizes/displays adequate comfort level or baseline comfort level  Description: Interventions:  - Encourage patient to monitor pain and request assistance  - Assess pain using appropriate pain scale  - Administer analgesics based on type and severity of pain and evaluate response  - Implement non-pharmacological measures as appropriate and evaluate response  - Consider cultural and social influences on pain and pain management  - Notify physician/advanced practitioner if interventions unsuccessful or patient reports new pain  Outcome: Progressing     Problem: INFECTION - ADULT  Goal: Absence or prevention of progression during hospitalization  Description: INTERVENTIONS:  - Assess and monitor for signs and symptoms of infection  - Monitor lab/diagnostic results  - Monitor all insertion sites, i.e. indwelling lines, tubes, and drains  - Monitor endotracheal if appropriate and nasal secretions for changes in amount and color  - [place] appropriate cooling/warming therapies per order  - Administer medications as ordered  - Instruct and encourage patient and family to use good hand hygiene technique  - Identify and instruct in appropriate isolation precautions for identified infection/condition  Outcome: Progressing     Problem: SAFETY ADULT  Goal: Patient will remain free of falls  Description: INTERVENTIONS:  - Educate patient/family on patient safety including physical limitations  - Instruct patient to call for assistance with activity   - Consult OT/PT to assist with strengthening/mobility   - Keep Call bell within reach  - Keep bed low and locked with side rails adjusted as appropriate  - Keep care items and personal belongings within reach  - Initiate and maintain comfort rounds  - Make Fall Risk Sign visible to staff  - Offer Toileting every 2 Hours, in advance of need  - Initiate/Maintain alarm  - Obtain necessary fall risk management equipment  - Apply yellow socks and  bracelet for high fall risk patients  - Consider moving patient to room near nurses station  Outcome: Progressing  Goal: Maintain or return to baseline ADL function  Description: INTERVENTIONS:  -  Assess patient's ability to carry out ADLs; assess patient's baseline for ADL function and identify physical deficits which impact ability to perform ADLs (bathing, care of mouth/teeth, toileting, grooming, dressing, etc.)  - Assess/evaluate cause of self-care deficits   - Assess range of motion  - Assess patient's mobility; develop plan if impaired  - Assess patient's need for assistive devices and provide as appropriate  - Encourage maximum independence but intervene and supervise when necessary  - Involve family in performance of ADLs  - Assess for home care needs following discharge   - Consider OT consult to assist with ADL evaluation and planning for discharge  - Provide patient education as appropriate  Outcome: Progressing  Goal: Maintains/Returns to pre admission functional level  Description: INTERVENTIONS:  - Perform AM-PAC 6 Click Basic Mobility/ Daily Activity assessment daily.  - Set and communicate daily mobility goal to care team and patient/family/caregiver.   - Collaborate with rehabilitation services on mobility goals if consulted  - Perform Range of Motion 3 times a day.  - Reposition patient every 3 hours.  - Dangle patient 3 times a day  - Stand patient 3 times a day  - Ambulate patient 3 times a day  - Out of bed to chair 3 times a day   - Out of bed for meals 3 times a day  - Out of bed for toileting  - Record patient progress and toleration of activity level   Outcome: Progressing     Problem: DISCHARGE PLANNING  Goal: Discharge to home or other facility with appropriate resources  Description: INTERVENTIONS:  - Identify barriers to discharge w/patient and caregiver  - Arrange for needed discharge resources and transportation as appropriate  - Identify discharge learning needs (meds, wound care,  etc.)  - Arrange for interpretive services to assist at discharge as needed  - Refer to Case Management Department for coordinating discharge planning if the patient needs post-hospital services based on physician/advanced practitioner order or complex needs related to functional status, cognitive ability, or social support system  Outcome: Progressing     Problem: Knowledge Deficit  Goal: Patient/family/caregiver demonstrates understanding of disease process, treatment plan, medications, and discharge instructions  Description: Complete learning assessment and assess knowledge base.  Interventions:  - Provide teaching at level of understanding  - Provide teaching via preferred learning methods  Outcome: Progressing

## 2024-11-14 NOTE — PROGRESS NOTES
Progress Note - Orthopedics   Name: Isauro Sow 79 y.o. male I MRN: [id number]  Unit/Bed#: -01 I Date of Admission: 11/13/2024   Date of Service: 11/14/2024 I Hospital Day: 0     Assessment & Plan  Primary osteoarthritis of left knee  POD1 s/p left total knee arthroplasty by Dr. Braxton  -Weightbearing as tolerated to left lower extremity with assistance  -PT/OT  -Ice to left knee.  Elevation with pillow under foot and ankle.  No pillows underneath the knee.  -Multimodal pain regimen  -Perioperative Ancef complete  -Continue Lovenox for bridge to Coumadin.  -Resume Coumadin 7.5 mg daily today.  -Will continue to assess for ABLA  -Leukocytosis most likely reactive. Encourage incentive spirometry  -DC planning. Discharge home once cleared by PT/OT and [facility]  Type 2 diabetes mellitus (HCC)    Lab Results   Component Value Date    HGBA1C 7.2 (H) 10/07/2024     History of stroke    Chronic atrial flutter (HCC)      Please contact the SecureChat role for the Orthopedics service with any questions/concerns.    Subjective   79 y.o.male seen and examined this morning.  Lying comfortably in bed.  Easily arousable from sleep.  No acute events, no new complaints. Pain well controlled. Denies fevers, chills, CP, SOB, N/V, numbness or tingling. Patient reports no issues with urination or bowel movements.     Objective :  Temp:  [96.8 °F (36 °C)-98.3 °F (36.8 °C)] 98.3 °F (36.8 °C)  HR:  [60-67] 62  BP: ()/(50-83) 126/64  Resp:  [12-25] 16  SpO2:  [92 %-100 %] 98 %  O2 Device: None (Room air)  Nasal Cannula O2 Flow Rate (L/min):  [4 L/min] 4 L/min    Physical Exam  Musculoskeletal: left knee  Dressing c/d/I  TTP diffusely over knee and distal thigh  Motor intact to +FHL/EHL, +ankle dorsi/plantar flexion  Sensation intact to saphenous, sural, tibial, superficial peroneal nerve, and deep peroneal  2+ DP pulse  No calf swelling or tenderness to palpation      Lab Results: I have reviewed the following results:  Recent  "Labs     11/13/24  1130 11/14/24  0336   WBC  --  10.97*   HGB  --  13.4   HCT  --  41.6   PLT  --  114*   BUN  --  17   CREATININE  --  1.01   PTT 29  --    INR 1.04 1.00     Blood Culture:    Lab Results   Component Value Date    BLOODCX No Growth After 5 Days. 10/20/2022     Wound Culture: No results found for: \"WOUNDCULT\"  "

## 2024-11-14 NOTE — ASSESSMENT & PLAN NOTE
Chronic a flutter, on Toprol-XL and Coumadin  [id number] of 7, needs bridging  Resume home Toprol-XL 25 mg daily  Resume therapeutic Lovenox twice daily with Coumadin concomitantly  Continue coumadin of 7.5mg daily with repeat inr on Saturday. Further instructions per his coumadin clinic

## 2024-11-14 NOTE — CASE MANAGEMENT
Case Management Discharge Planning Note    Patient name Isauro Sow  Location /-01 MRN [id number]  : 1945 Date 2024       Current Admission Date: 2024  Current Admission Diagnosis:Primary osteoarthritis of left knee   Patient Active Problem List    Diagnosis Date Noted Date Diagnosed    Preoperative evaluation of a medical condition to rule out surgical contraindications (TAR required) 10/31/2024     Chronic pain of left knee 2024     Lumbar spondylosis 2024     Decreased pedal pulses 2024     Stenosis of left carotid artery 2024     Other constipation 2024     Chronic midline low back pain without sciatica 2023     Platelets decreased ([facility]) 2023     Aortic valve stenosis 11/10/2023     Hx of cardiac pacemaker 2023     Chronic atrial flutter ([facility]) 2023     Chronic diastolic CHF (congestive heart failure) ([facility]) 2023     S/P TAVR (transcatheter aortic valve replacement) 2023     Coronary artery disease involving native coronary artery 2023     Sick sinus syndrome ([facility]) 2023     Continuous opioid dependence ([facility]) 10/24/2022     Dysuria 2022     L3 osteophyte fracture 2022     Fracture of thoracic transverse process ([facility]) 08/15/2022     Lumbar transverse process fracture ([facility]) 08/15/2022     Stage 3a chronic kidney disease ([facility]) 2022     RLS (restless legs syndrome) 2021     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 ([facility]) 2021     Chronic bilateral low back pain without sciatica 2021     Hemiplegia and hemiparesis following cerebral infarction affecting left non-dominant side ([facility]) 2020     Benign prostatic hyperplasia with nocturia 2020     PAD (peripheral artery disease) ([facility]) 2019     Primary osteoarthritis of left knee 01/10/2019     Bilateral carotid artery stenosis 10/08/2018     Dermatosis 10/08/2018      Plantar fasciitis 06/04/2018     Constipation 01/16/2018     Seborrheic dermatitis 11/10/2017     History of stroke 09/15/2017     Asthma 07/26/2017     Benign essential hypertension 07/26/2017     Type 2 diabetes mellitus (HCC) 07/26/2017     Hyperlipidemia 07/26/2017     Diabetic nephropathy associated with type 2 diabetes mellitus (HCC) 06/22/2017     Non-rheumatic aortic sclerosis 06/22/2017     Popliteal cyst, left 10/15/2015     Acquired hallux malleus of right foot 10/06/2015     Pre-ulcerative corn or callous 10/06/2015     Gout 12/11/2014     Allergic rhinitis 05/03/2012     Retina disorder 05/03/2012       LOS (days): 0  Geometric Mean LOS (GMLOS) (days):   Days to GMLOS:     OBJECTIVE:            Current admission status: Outpatient Surgery   Preferred Pharmacy:   [id number]  [place] - [address]  [address]  [place] [address]  Phone: [phone] Fax: [phone]    Primary Care Provider: Anthony Roberts MD    Primary Insurance: MEDICARE  Secondary Insurance: [facility]    DISCHARGE DETAILS:     [facility]   Called and set up [facility] for the pt's INR for Monday 11/18 and spoke with Marsha to coordinate that referral.   Pt's son will provide transport to home.   Updated the pt's RN [place].

## 2024-11-14 NOTE — CASE MANAGEMENT
Case Management Assessment & Discharge Planning Note    Patient name Isauro Sow  Location /-01 MRN [id number]  : 1945 Date 2024       Current Admission Date: 2024  Current Admission Diagnosis:Primary osteoarthritis of left knee   Patient Active Problem List    Diagnosis Date Noted Date Diagnosed    Preoperative evaluation of a medical condition to rule out surgical contraindications (TAR required) 10/31/2024     Chronic pain of left knee 2024     Lumbar spondylosis 2024     Decreased pedal pulses 2024     Stenosis of left carotid artery 2024     Other constipation 2024     Chronic midline low back pain without sciatica 2023     Platelets decreased ([facility]) 2023     Aortic valve stenosis 11/10/2023     Hx of cardiac pacemaker 2023     Chronic atrial flutter ([facility]) 2023     Chronic diastolic CHF (congestive heart failure) ([facility]) 2023     S/P TAVR (transcatheter aortic valve replacement) 2023     Coronary artery disease involving native coronary artery 2023     Sick sinus syndrome ([facility]) 2023     Continuous opioid dependence ([facility]) 10/24/2022     Dysuria 2022     L3 osteophyte fracture 2022     Fracture of thoracic transverse process ([facility]) 08/15/2022     Lumbar transverse process fracture ([facility]) 08/15/2022     Stage 3a chronic kidney disease ([facility]) 2022     RLS (restless legs syndrome) 2021     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 ([facility]) 2021     Chronic bilateral low back pain without sciatica 2021     Hemiplegia and hemiparesis following cerebral infarction affecting left non-dominant side ([facility]) 2020     Benign prostatic hyperplasia with nocturia 2020     PAD (peripheral artery disease) ([facility]) 2019     Primary osteoarthritis of left knee 01/10/2019     Bilateral carotid artery stenosis 10/08/2018     Dermatosis  10/08/2018     Plantar fasciitis 06/04/2018     Constipation 01/16/2018     Seborrheic dermatitis 11/10/2017     History of stroke 09/15/2017     Asthma 07/26/2017     Benign essential hypertension 07/26/2017     Type 2 diabetes mellitus (HCC) 07/26/2017     Hyperlipidemia 07/26/2017     Diabetic nephropathy associated with type 2 diabetes mellitus (HCC) 06/22/2017     Non-rheumatic aortic sclerosis 06/22/2017     Popliteal cyst, left 10/15/2015     Acquired hallux malleus of right foot 10/06/2015     Pre-ulcerative corn or callous 10/06/2015     Gout 12/11/2014     Allergic rhinitis 05/03/2012     Retina disorder 05/03/2012       LOS (days): 0  Geometric Mean LOS (GMLOS) (days):   Days to GMLOS:     OBJECTIVE:              Current admission status: Outpatient Surgery       Preferred Pharmacy:   [id number] - [place] - [address]  [address]  [place] [address]  Phone: [phone] Fax: [phone]    Primary Care Provider: Anthony Roberts MD    Primary Insurance: MEDICARE  Secondary Insurance: [facility]    ASSESSMENT:  Active Health Care Proxies       [facility] Representative - Spouse   Primary Phone: [phone] (Home)  Mobile Phone: [phone]                                Patient Information  Admitted from:: Home  Mental Status: Alert  During Assessment patient was accompanied by: Not accompanied during assessment  Assessment information provided by:: Patient  Primary Caregiver: Self  Support Systems: Self, Spouse/significant other, Son, Family members  What city do you live in?: [place]  Home entry access options. Select all that apply.: No steps to enter home  Type of Current Residence: [facility]  Living Arrangements: Lives w/ Spouse/significant other, Lives w/ Son  Is patient a ?: Yes  Is [facility] active with [facility])?: No    Activities of Daily Living Prior to Admission  Functional Status: Independent  Completes ADLs  independently?: Yes  Ambulates independently?: Yes  Does patient use assisted devices?: Yes  Assisted Devices (DME) used: Straight Cane, Walker  Does patient currently own DME?: Yes  What DME does the patient currently own?: Straight Cane, Walker  Does patient have a history of Outpatient Therapy (PT/OT)?: No  Does the patient have a history of Short-Term Rehab?: No  Does patient have a history of HHC?: No  Does patient currently have HHC?: No    Current Home Health Care  Home Health Agency Name:: [facility]    Patient Information Continued  Income Source: Pension/detention  Does patient have prescription coverage?: Yes  Does patient receive dialysis treatments?: No  Does patient have a history of substance abuse?: No  Does patient have a history of Mental Health Diagnosis?: No         Means of Transportation  [facility]          DISCHARGE DETAILS:    Discharge planning discussed with:: Pt at bedside  [place] of Choice: Yes     CM contacted family/caregiver?: No- see comments  Were Treatment Team discharge recommendations reviewed with patient/caregiver?: Yes  Did patient/caregiver verbalize understanding of patient care needs?: Yes  Were patient/caregiver advised of the risks associated with not following Treatment Team discharge recommendations?: Yes         Requested Home Health Care         Is the patient interested in HHC at discharge?: Yes  Home Health Discipline requested:: Nursing, Physical Therapy, Occupational Therapy  Home Health Agency Name:: [facility]  Home Health Follow-Up Provider:: PCP  Home Health Services Needed:: Gait/ADL Training, Evaluate Functional Status and Safety, Strengthening/Theraputic Exercises to Improve Function, Other (comment) (INR checks)  Homebound Criteria Met:: Uses an Assist Device (i.e. cane, walker, etc), Requires the Assistance of Another Person for Safe Ambulation or to Leave the Home  Supporting Clincal Findings:: Fatigues  Rajinder in Short Distances    DME Referral Provided  Referral made for DME?: No                                                           Additional Comments: Met with pt at bedside, to reivew CM role and possible discharge planning needs. Pt lives with his wife and son in a 1SH with 1 step to enter. Pt has been using a cane at home and has been independent with all ADL care. Pt has no hx wtih HHC or STR placement. Pt will need HHC for PT OT and INR checks with a nurse. Reviewed HHC options with the pt and he has no preference on providers. Waiting for confirmation from  [facility] on acceptance and availability. Called  [facility] and cancelled the pt's upcoming visits as he will have HHC. Pt's son will provide transport to home later today.

## 2024-11-14 NOTE — ASSESSMENT & PLAN NOTE
Postop day #1 status post left knee arthroplasty with Dr. Braxton  Management per primary team  Pain control, DVT prophylaxis, PT OT postoperatively

## 2024-11-15 ENCOUNTER — APPOINTMENT (EMERGENCY)
Dept: NON INVASIVE DIAGNOSTICS | Facility: HOSPITAL | Age: 79
DRG: 556 | End: 2024-11-15
Payer: MEDICARE

## 2024-11-15 ENCOUNTER — HOME CARE VISIT (OUTPATIENT)
Dept: HOME HEALTH SERVICES | Facility: HOME HEALTHCARE | Age: 79
End: 2024-11-15

## 2024-11-15 ENCOUNTER — TELEPHONE (OUTPATIENT)
Age: 79
End: 2024-11-15

## 2024-11-15 ENCOUNTER — HOSPITAL ENCOUNTER (INPATIENT)
Facility: HOSPITAL | Age: 79
LOS: 3 days | DRG: 556 | End: 2024-11-18
Attending: EMERGENCY MEDICINE | Admitting: FAMILY MEDICINE
Payer: MEDICARE

## 2024-11-15 ENCOUNTER — TELEPHONE (OUTPATIENT)
Dept: OBGYN CLINIC | Facility: HOSPITAL | Age: 79
End: 2024-11-15

## 2024-11-15 DIAGNOSIS — E11.21 DIABETIC NEPHROPATHY ASSOCIATED WITH TYPE 2 DIABETES MELLITUS (HCC): ICD-10-CM

## 2024-11-15 DIAGNOSIS — K59.09 OTHER CONSTIPATION: ICD-10-CM

## 2024-11-15 DIAGNOSIS — M79.605 LEFT LEG PAIN: Primary | ICD-10-CM

## 2024-11-15 DIAGNOSIS — I48.92 CHRONIC ATRIAL FLUTTER (HCC): ICD-10-CM

## 2024-11-15 DIAGNOSIS — R26.2 AMBULATORY DYSFUNCTION: ICD-10-CM

## 2024-11-15 PROBLEM — Z96.652 STATUS POST TOTAL LEFT KNEE REPLACEMENT USING CEMENT: Status: ACTIVE | Noted: 2024-11-15

## 2024-11-15 PROBLEM — D64.9 ANEMIA: Status: ACTIVE | Noted: 2024-11-15

## 2024-11-15 LAB
ALBUMIN SERPL BCG-MCNC: 3.3 G/DL (ref 3.5–5)
ALP SERPL-CCNC: 40 U/L (ref 34–104)
ALT SERPL W P-5'-P-CCNC: 19 U/L (ref 7–52)
ANION GAP SERPL CALCULATED.3IONS-SCNC: 4 MMOL/L (ref 4–13)
AST SERPL W P-5'-P-CCNC: 18 U/L (ref 13–39)
ATRIAL RATE: 62 BPM
BASOPHILS # BLD AUTO: 0.02 THOUSANDS/ÂΜL (ref 0–0.1)
BASOPHILS NFR BLD AUTO: 0 % (ref 0–1)
BILIRUB SERPL-MCNC: 0.84 MG/DL (ref 0.2–1)
BUN SERPL-MCNC: 16 MG/DL (ref 5–25)
CALCIUM ALBUM COR SERPL-MCNC: 9.5 MG/DL (ref 8.3–10.1)
CALCIUM SERPL-MCNC: 8.9 MG/DL (ref 8.4–10.2)
CHLORIDE SERPL-SCNC: 103 MMOL/L (ref 96–108)
CO2 SERPL-SCNC: 32 MMOL/L (ref 21–32)
CREAT SERPL-MCNC: 1.12 MG/DL (ref 0.6–1.3)
EOSINOPHIL # BLD AUTO: 0.03 THOUSAND/ÂΜL (ref 0–0.61)
EOSINOPHIL NFR BLD AUTO: 0 % (ref 0–6)
ERYTHROCYTE [DISTWIDTH] IN BLOOD BY AUTOMATED COUNT: 13.3 % (ref 11.6–15.1)
GFR SERPL CREATININE-BSD FRML MDRD: 62 ML/MIN/1.73SQ M
GLUCOSE SERPL-MCNC: 121 MG/DL (ref 65–140)
GLUCOSE SERPL-MCNC: 180 MG/DL (ref 65–140)
GLUCOSE SERPL-MCNC: 223 MG/DL (ref 65–140)
HCT VFR BLD AUTO: 34.7 % (ref 36.5–49.3)
HGB BLD-MCNC: 11.3 G/DL (ref 12–17)
IMM GRANULOCYTES # BLD AUTO: 0.05 THOUSAND/UL (ref 0–0.2)
IMM GRANULOCYTES NFR BLD AUTO: 1 % (ref 0–2)
INR PPP: 1.4 (ref 0.85–1.19)
LYMPHOCYTES # BLD AUTO: 1.74 THOUSANDS/ÂΜL (ref 0.6–4.47)
LYMPHOCYTES NFR BLD AUTO: 16 % (ref 14–44)
MCH RBC QN AUTO: 29.5 PG (ref 26.8–34.3)
MCHC RBC AUTO-ENTMCNC: 32.6 G/DL (ref 31.4–37.4)
MCV RBC AUTO: 91 FL (ref 82–98)
MONOCYTES # BLD AUTO: 1.08 THOUSAND/ÂΜL (ref 0.17–1.22)
MONOCYTES NFR BLD AUTO: 10 % (ref 4–12)
NEUTROPHILS # BLD AUTO: 7.68 THOUSANDS/ÂΜL (ref 1.85–7.62)
NEUTS SEG NFR BLD AUTO: 73 % (ref 43–75)
NRBC BLD AUTO-RTO: 0 /100 WBCS
PLATELET BLD QL SMEAR: ABNORMAL
PMV BLD AUTO: 11.2 FL (ref 8.9–12.7)
POTASSIUM SERPL-SCNC: 4.3 MMOL/L (ref 3.5–5.3)
PROT SERPL-MCNC: 5.9 G/DL (ref 6.4–8.4)
PROTHROMBIN TIME: 17.4 SECONDS (ref 12.3–15)
QRS AXIS: -35 DEGREES
QRSD INTERVAL: 162 MS
QT INTERVAL: 472 MS
QTC INTERVAL: 479 MS
RBC # BLD AUTO: 3.83 MILLION/UL (ref 3.88–5.62)
RBC MORPH BLD: NORMAL
SODIUM SERPL-SCNC: 139 MMOL/L (ref 135–147)
T WAVE AXIS: 121 DEGREES
VENTRICULAR RATE: 62 BPM
WBC # BLD AUTO: 10.6 THOUSAND/UL (ref 4.31–10.16)

## 2024-11-15 PROCEDURE — 82948 REAGENT STRIP/BLOOD GLUCOSE: CPT

## 2024-11-15 PROCEDURE — 80053 COMPREHEN METABOLIC PANEL: CPT

## 2024-11-15 PROCEDURE — 99285 EMERGENCY DEPT VISIT HI MDM: CPT

## 2024-11-15 PROCEDURE — 99024 POSTOP FOLLOW-UP VISIT: CPT | Performed by: ORTHOPAEDIC SURGERY

## 2024-11-15 PROCEDURE — 36415 COLL VENOUS BLD VENIPUNCTURE: CPT

## 2024-11-15 PROCEDURE — 93971 EXTREMITY STUDY: CPT | Performed by: SURGERY

## 2024-11-15 PROCEDURE — 99223 1ST HOSP IP/OBS HIGH 75: CPT | Performed by: FAMILY MEDICINE

## 2024-11-15 PROCEDURE — 93971 EXTREMITY STUDY: CPT

## 2024-11-15 PROCEDURE — 96374 THER/PROPH/DIAG INJ IV PUSH: CPT

## 2024-11-15 PROCEDURE — 93010 ELECTROCARDIOGRAM REPORT: CPT | Performed by: INTERNAL MEDICINE

## 2024-11-15 PROCEDURE — 85025 COMPLETE CBC W/AUTO DIFF WBC: CPT

## 2024-11-15 PROCEDURE — 85610 PROTHROMBIN TIME: CPT

## 2024-11-15 PROCEDURE — 99285 EMERGENCY DEPT VISIT HI MDM: CPT | Performed by: EMERGENCY MEDICINE

## 2024-11-15 RX ORDER — METOPROLOL SUCCINATE 25 MG/1
25 TABLET, EXTENDED RELEASE ORAL
Status: DISCONTINUED | OUTPATIENT
Start: 2024-11-15 | End: 2024-11-18 | Stop reason: HOSPADM

## 2024-11-15 RX ORDER — OXYCODONE HYDROCHLORIDE 5 MG/1
5 TABLET ORAL EVERY 4 HOURS PRN
Status: DISCONTINUED | OUTPATIENT
Start: 2024-11-15 | End: 2024-11-15

## 2024-11-15 RX ORDER — ACETAMINOPHEN 325 MG/1
975 TABLET ORAL EVERY 8 HOURS SCHEDULED
Status: DISCONTINUED | OUTPATIENT
Start: 2024-11-15 | End: 2024-11-18 | Stop reason: HOSPADM

## 2024-11-15 RX ORDER — ENOXAPARIN SODIUM 100 MG/ML
100 INJECTION SUBCUTANEOUS EVERY 12 HOURS
Status: DISCONTINUED | OUTPATIENT
Start: 2024-11-15 | End: 2024-11-18 | Stop reason: HOSPADM

## 2024-11-15 RX ORDER — FOLIC ACID 1 MG/1
1 TABLET ORAL DAILY
Status: DISCONTINUED | OUTPATIENT
Start: 2024-11-16 | End: 2024-11-18 | Stop reason: HOSPADM

## 2024-11-15 RX ORDER — NITROGLYCERIN 0.4 MG/1
0.4 TABLET SUBLINGUAL
Status: DISCONTINUED | OUTPATIENT
Start: 2024-11-15 | End: 2024-11-18 | Stop reason: HOSPADM

## 2024-11-15 RX ORDER — OXYCODONE HYDROCHLORIDE 5 MG/1
5 TABLET ORAL EVERY 4 HOURS PRN
Refills: 0 | Status: DISCONTINUED | OUTPATIENT
Start: 2024-11-15 | End: 2024-11-18 | Stop reason: HOSPADM

## 2024-11-15 RX ORDER — MORPHINE SULFATE 4 MG/ML
4 INJECTION, SOLUTION INTRAMUSCULAR; INTRAVENOUS ONCE
Status: COMPLETED | OUTPATIENT
Start: 2024-11-15 | End: 2024-11-15

## 2024-11-15 RX ORDER — INSULIN LISPRO 100 [IU]/ML
1-6 INJECTION, SOLUTION INTRAVENOUS; SUBCUTANEOUS
Status: DISCONTINUED | OUTPATIENT
Start: 2024-11-15 | End: 2024-11-18 | Stop reason: HOSPADM

## 2024-11-15 RX ORDER — PRAVASTATIN SODIUM 40 MG
40 TABLET ORAL
Status: DISCONTINUED | OUTPATIENT
Start: 2024-11-15 | End: 2024-11-18 | Stop reason: HOSPADM

## 2024-11-15 RX ORDER — ASCORBIC ACID 500 MG
500 TABLET ORAL 2 TIMES DAILY
Status: DISCONTINUED | OUTPATIENT
Start: 2024-11-15 | End: 2024-11-18 | Stop reason: HOSPADM

## 2024-11-15 RX ORDER — POLYETHYLENE GLYCOL 3350 17 G/17G
17 POWDER, FOR SOLUTION ORAL DAILY
Status: DISCONTINUED | OUTPATIENT
Start: 2024-11-16 | End: 2024-11-18 | Stop reason: HOSPADM

## 2024-11-15 RX ORDER — WARFARIN SODIUM 5 MG/1
5 TABLET ORAL
Status: DISCONTINUED | OUTPATIENT
Start: 2024-11-15 | End: 2024-11-18 | Stop reason: HOSPADM

## 2024-11-15 RX ORDER — DOCUSATE SODIUM 100 MG/1
100 CAPSULE, LIQUID FILLED ORAL 2 TIMES DAILY
Status: DISCONTINUED | OUTPATIENT
Start: 2024-11-15 | End: 2024-11-15

## 2024-11-15 RX ORDER — AMOXICILLIN 250 MG
1 CAPSULE ORAL DAILY
Status: DISCONTINUED | OUTPATIENT
Start: 2024-11-16 | End: 2024-11-15

## 2024-11-15 RX ORDER — AMOXICILLIN 250 MG
2 CAPSULE ORAL DAILY
Status: DISCONTINUED | OUTPATIENT
Start: 2024-11-16 | End: 2024-11-18 | Stop reason: HOSPADM

## 2024-11-15 RX ORDER — INSULIN LISPRO 100 [IU]/ML
1-5 INJECTION, SOLUTION INTRAVENOUS; SUBCUTANEOUS
Status: DISCONTINUED | OUTPATIENT
Start: 2024-11-15 | End: 2024-11-18 | Stop reason: HOSPADM

## 2024-11-15 RX ADMIN — ACETAMINOPHEN 975 MG: 325 TABLET, FILM COATED ORAL at 21:30

## 2024-11-15 RX ADMIN — OXYCODONE HYDROCHLORIDE AND ACETAMINOPHEN 500 MG: 500 TABLET ORAL at 17:32

## 2024-11-15 RX ADMIN — ENOXAPARIN SODIUM 100 MG: 100 INJECTION SUBCUTANEOUS at 21:34

## 2024-11-15 RX ADMIN — OXYCODONE HYDROCHLORIDE 5 MG: 5 TABLET ORAL at 17:32

## 2024-11-15 RX ADMIN — OXYCODONE HYDROCHLORIDE 5 MG: 5 TABLET ORAL at 21:33

## 2024-11-15 RX ADMIN — PRAVASTATIN SODIUM 40 MG: 40 TABLET ORAL at 17:32

## 2024-11-15 RX ADMIN — WARFARIN SODIUM 5 MG: 5 TABLET ORAL at 17:32

## 2024-11-15 RX ADMIN — METOPROLOL SUCCINATE 25 MG: 25 TABLET, EXTENDED RELEASE ORAL at 21:30

## 2024-11-15 RX ADMIN — MORPHINE SULFATE 4 MG: 4 INJECTION INTRAVENOUS at 13:17

## 2024-11-15 RX ADMIN — INSULIN LISPRO 2 UNITS: 100 INJECTION, SOLUTION INTRAVENOUS; SUBCUTANEOUS at 21:34

## 2024-11-15 NOTE — ASSESSMENT & PLAN NOTE
Hemoglobin noted to be 11.3 which is below his baseline.  Patient is postoperative day 2.  Expected blood loss anemia from the surgery
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
## 2024-11-15 NOTE — ASSESSMENT & PLAN NOTE
Lab Results   Component Value Date    HGBA1C 7.2 (H) 10/07/2024       Recent Labs     11/13/24  1813 11/14/24  0712 11/14/24  1042 11/14/24  1525   POCGLU 330* 204* 224* 189*       Blood Sugar Average: Last 72 hrs:  Patient is on metformin as outpatient.  Will continue with sliding scale of insulin and monitor blood sugar  Adjust insulin regimen per blood sugar

## 2024-11-15 NOTE — TELEPHONE ENCOUNTER
Caller: Zora    Doctor: Dr. Braxton    Reason for call: Daughter in law calling stating that her father in law had surgery with Dr. Braxton yesterday.  Patient is currently in his home.  RACHEL stating that they cannot manage him at home as she is unable to lift patient and is wondering if there is any way patient can be placed in Rehab facility?  She can be reached at the number below.    Call back#: [phone]
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
## 2024-11-15 NOTE — ASSESSMENT & PLAN NOTE
Patient was recently admitted to [facility] for elective left TKA and was discharged yesterday.  Postoperative day 2  At home patient with ambulatory dysfunction and unable to get up or participate in PT  Orthopedic evaluation appreciated  Lower extremity Doppler is negative for DVT  Will admit the patient to the hospital  PT OT evaluation and case management for postacute needs/placement  Pain control

## 2024-11-15 NOTE — ASSESSMENT & PLAN NOTE
Patient with history of coronary artery disease - RCA lesion was 80% stenosed. S/P successful PCI with CHARMAINE x 1.  On 8/28/2023  Preoperative cardiac evaluation recommended stopping antiplatelet agents at the 1 year brandie and continue with Coumadin  No chest pain or shortness of breath  Continue with Coumadin  Outpatient follow-up with cardiology  With beta-blocker and statin

## 2024-11-15 NOTE — ASSESSMENT & PLAN NOTE
Patient anticoagulated with warfarin prior to hospitalization  Preoperative cardiology consultation recommended Lovenox to Coumadin bridge  Discussed with orthopedics.  Restarted back on Coumadin.    Start the patient on 5 mg of Coumadin.  Trend INR.

## 2024-11-15 NOTE — ASSESSMENT & PLAN NOTE
79 y.o.male s/p L TKA 11/13.  No evidence of DVT on ultrasound duplex study.  Components appear in good position without signs of loosening, subsidence, or other abnormalities. With regards to his surgery, he is doing well.    multi-modal pain control per primary  DVT ppx: Per primary while in-house, may continue home Warfarin on discharge.  PT/OT  WBAT  ROM as tolerated, pillow/blankets under achilles not behind knee while in bed  f/u as scheduled with Dr. Braxton.

## 2024-11-15 NOTE — PLAN OF CARE
Problem: Prexisting or High Potential for Compromised Skin Integrity  Goal: Skin integrity is maintained or improved  Description: INTERVENTIONS:  - Identify patients at risk for skin breakdown  - Assess and monitor skin integrity  - Assess and monitor nutrition and hydration status  - Monitor labs   - Assess for incontinence   - Turn and reposition patient  - Assist with mobility/ambulation  - Relieve pressure over bony prominences  - Avoid friction and shearing  - Provide appropriate hygiene as needed including keeping skin clean and dry  - Evaluate need for skin moisturizer/barrier cream  - Collaborate with interdisciplinary team   - Patient/family teaching  - Consider wound care consult   Outcome: Progressing     Problem: PAIN - ADULT  Goal: Verbalizes/displays adequate comfort level or baseline comfort level  Description: Interventions:  - Encourage patient to monitor pain and request assistance  - Assess pain using appropriate pain scale  - Administer analgesics based on type and severity of pain and evaluate response  - Implement non-pharmacological measures as appropriate and evaluate response  - Consider cultural and social influences on pain and pain management  - Notify physician/advanced practitioner if interventions unsuccessful or patient reports new pain  Outcome: Progressing     Problem: INFECTION - ADULT  Goal: Absence or prevention of progression during hospitalization  Description: INTERVENTIONS:  - Assess and monitor for signs and symptoms of infection  - Monitor lab/diagnostic results  - Monitor all insertion sites, i.e. indwelling lines, tubes, and drains  - Monitor endotracheal if appropriate and nasal secretions for changes in amount and color  - [place] appropriate cooling/warming therapies per order  - Administer medications as ordered  - Instruct and encourage patient and family to use good hand hygiene technique  - Identify and instruct in appropriate isolation precautions for  identified infection/condition  Outcome: Progressing  Goal: Absence of fever/infection during neutropenic period  Description: INTERVENTIONS:  - Monitor WBC    Outcome: Progressing     Problem: SAFETY ADULT  Goal: Patient will remain free of falls  Description: INTERVENTIONS:  - Educate patient/family on patient safety including physical limitations  - Instruct patient to call for assistance with activity   - Consult OT/PT to assist with strengthening/mobility   - Keep Call bell within reach  - Keep bed low and locked with side rails adjusted as appropriate  - Keep care items and personal belongings within reach  - Initiate and maintain comfort rounds  - Make Fall Risk Sign visible to staff  - Offer Toileting every 2 Hours, in advance of need  - Initiate/Maintain bed alarm  - Obtain necessary fall risk management equipment: walker  - Apply yellow socks and bracelet for high fall risk patients  - Consider moving patient to room near nurses station  Outcome: Progressing  Goal: Maintain or return to baseline ADL function  Description: INTERVENTIONS:  -  Assess patient's ability to carry out ADLs; assess patient's baseline for ADL function and identify physical deficits which impact ability to perform ADLs (bathing, care of mouth/teeth, toileting, grooming, dressing, etc.)  - Assess/evaluate cause of self-care deficits   - Assess range of motion  - Assess patient's mobility; develop plan if impaired  - Assess patient's need for assistive devices and provide as appropriate  - Encourage maximum independence but intervene and supervise when necessary  - Involve family in performance of ADLs  - Assess for home care needs following discharge   - Consider OT consult to assist with ADL evaluation and planning for discharge  - Provide patient education as appropriate  Outcome: Progressing  Goal: Maintains/Returns to pre admission functional level  Description: INTERVENTIONS:  - Perform AM-PAC 6 Click Basic Mobility/ Daily  Activity assessment daily.  - Set and communicate daily mobility goal to care team and patient/family/caregiver.   - Collaborate with rehabilitation services on mobility goals if consulted  - Perform Range of Motion 3 times a day.  - Reposition patient every 2 hours.  - Dangle patient 3 times a day  - Stand patient 3 times a day  - Ambulate patient 3 times a day  - Out of bed to chair 3 times a day   - Out of bed for meals 3 times a day  - Out of bed for toileting  - Record patient progress and toleration of activity level   Outcome: Progressing     Problem: DISCHARGE PLANNING  Goal: Discharge to home or other facility with appropriate resources  Description: INTERVENTIONS:  - Identify barriers to discharge w/patient and caregiver  - Arrange for needed discharge resources and transportation as appropriate  - Identify discharge learning needs (meds, wound care, etc.)  - Arrange for interpretive services to assist at discharge as needed  - Refer to Case Management Department for coordinating discharge planning if the patient needs post-hospital services based on physician/advanced practitioner order or complex needs related to functional status, cognitive ability, or social support system  Outcome: Progressing     Problem: Knowledge Deficit  Goal: Patient/family/caregiver demonstrates understanding of disease process, treatment plan, medications, and discharge instructions  Description: Complete learning assessment and assess knowledge base.  Interventions:  - Provide teaching at level of understanding  - Provide teaching via preferred learning methods  Outcome: Progressing

## 2024-11-15 NOTE — ED ATTENDING ATTESTATION
11/15/2024  I, Lincoln Bhat MD, saw and evaluated the patient. I have discussed the patient with the resident/non-physician practitioner and agree with the resident's/non-physician practitioner's findings, Plan of Care, and MDM as documented in the resident's/non-physician practitioner's note, except where noted. All available labs and Radiology studies were reviewed.  I was present for key portions of any procedure(s) performed by the resident/non-physician practitioner and I was immediately available to provide assistance.       At this point I agree with the current assessment done in the Emergency Department.  I have conducted an independent evaluation of this patient a history and physical is as follows:    ED Course     79-year-old male 2 days postop status post left total knee replacement performed by Dr. Braxton at [facility], presenting to the emergency department for evaluation of worsening left leg pain and swelling with inability to stand and perform his ADLs.  Patient denies any chest pain.  Patient had been on Lovenox which was transitioned to Coumadin.  Patient denies any chest pain or shortness of breath.    On examination the patient appears nontoxic.  Lungs are clear bilaterally.  Heart is regular rate and rhythm with no murmurs rubs or gallops.  Abdomen is nondistended, soft and nontender.  Extremities are significant for diffuse swelling left lower extremity.  Patient has tenderness to palpation at the level of the knee patient has tenderness to palpation in the left calf.    MEDICAL DECISION MAKING    Number and Complexity of Problems  Differential diagnosis: DVT, postoperative pain and swelling, inability to ambulate.    Medical Decision Making Data  External documents reviewed: Reviewed op note from 11/13/2024.    VAS lower limb venous duplex study, unilateral/limited    (Results Pending)       Labs Reviewed   CBC AND DIFFERENTIAL - Abnormal       Result Value Ref Range Status    WBC  10.60 (*) 4.31 - 10.16 Thousand/uL Final    RBC 3.83 (*) 3.88 - 5.62 Million/uL Final    Hemoglobin 11.3 (*) 12.0 - 17.0 g/dL Final    Hematocrit 34.7 (*) 36.5 - 49.3 % Final    MCV 91  82 - 98 fL Final    MCH 29.5  26.8 - 34.3 pg Final    MCHC 32.6  31.4 - 37.4 g/dL Final    RDW 13.3  11.6 - 15.1 % Final    MPV 11.2  8.9 - 12.7 fL Final    Platelets     Final    Comment: Not reported due to platelet clumping.    nRBC 0  /100 WBCs Final    Segmented % 73  43 - 75 % Final    Immature Grans % 1  0 - 2 % Final    Lymphocytes % 16  14 - 44 % Final    Monocytes % 10  4 - 12 % Final    Eosinophils Relative 0  0 - 6 % Final    Basophils Relative 0  0 - 1 % Final    Absolute Neutrophils 7.68 (*) 1.85 - 7.62 Thousands/µL Final    Absolute Immature Grans 0.05  0.00 - 0.20 Thousand/uL Final    Absolute Lymphocytes 1.74  0.60 - 4.47 Thousands/µL Final    Absolute Monocytes 1.08  0.17 - 1.22 Thousand/µL Final    Eosinophils Absolute 0.03  0.00 - 0.61 Thousand/µL Final    Basophils Absolute 0.02  0.00 - 0.10 Thousands/µL Final    Narrative:     This is an appended report.  These results have been appended to a previously verified report.   COMPREHENSIVE METABOLIC PANEL - Abnormal    Sodium 139  135 - 147 mmol/L Final    Potassium 4.3  3.5 - 5.3 mmol/L Final    Chloride 103  96 - 108 mmol/L Final    CO2 32  21 - 32 mmol/L Final    ANION GAP 4  4 - 13 mmol/L Final    BUN 16  5 - 25 mg/dL Final    Creatinine 1.12  0.60 - 1.30 mg/dL Final    Comment: Standardized to IDMS reference method    Glucose 180 (*) 65 - 140 mg/dL Final    Comment: If the patient is fasting, the ADA then defines impaired fasting glucose as > 100 mg/dL and diabetes as > or equal to 123 mg/dL.    Calcium 8.9  8.4 - 10.2 mg/dL Final    Corrected Calcium 9.5  8.3 - 10.1 mg/dL Final    AST 18  13 - 39 U/L Final    ALT 19  7 - 52 U/L Final    Comment: Specimen collection should occur prior to Sulfasalazine administration due to the potential for falsely depressed  results.     Alkaline Phosphatase 40  34 - 104 U/L Final    Total Protein 5.9 (*) 6.4 - 8.4 g/dL Final    Albumin 3.3 (*) 3.5 - 5.0 g/dL Final    Total Bilirubin 0.84  0.20 - 1.00 mg/dL Final    Comment: Use of this assay is not recommended for patients undergoing treatment with eltrombopag due to the potential for falsely elevated results.  N-acetyl-p-benzoquinone imine (metabolite of Acetaminophen) will generate erroneously low results in samples for patients that have taken an overdose of Acetaminophen.    eGFR 62  ml/min/1.73sq m Final    Narrative:     [employer] Kid[employer] guidelines for Chronic Kidney Disease (CKD):     Stage 1 with normal or high GFR (GFR > 90 mL/min/1.73 square meters)    Stage 2 Mild CKD (GFR = 60-89 mL/min/1.73 square meters)    Stage 3A Moderate CKD (GFR = 45-59 mL/min/1.73 square meters)    Stage 3B Moderate CKD (GFR = 30-44 mL/min/1.73 square meters)    Stage 4 Severe CKD (GFR = 15-29 mL/min/1.73 square meters)    Stage 5 End Stage CKD (GFR <15 mL/min/1.73 square meters)  Note: GFR calculation is accurate only with a steady state creatinine   PROTIME-INR - Abnormal    Protime 17.4 (*) 12.3 - 15.0 seconds Final    INR 1.40 (*) 0.85 - 1.19 Final    Narrative:     INR Therapeutic Range    Indication                                             INR Range      Atrial Fibrillation                                               2.0-3.0  Hypercoagulable State                                    2.0.2.3  Left Ventricular Asist Device                            2.0-3.0  Mechanical Heart Valve                                  -    Aortic(with afib, MI, embolism, HF, LA enlargement,    and/or coagulopathy)                                     2.0-3.0 (2.5-3.5)     Mitral                                                             2.5-3.5  Prosthetic/Bioprosthetic Heart Valve               2.0-3.0  Venous thromboembolism (VTE: VT, PE        2.0-3.0   SMEAR REVIEW(PHLEBS DO NOT ORDER) -  Abnormal    RBC Morphology Normal   Final    Platelet Estimate Unable to Estimate due to clumped platelets (*) Adequate Final       Labs reviewed by me are significant for:  INR is subtherapeutic.    Discussed case with: Orthopedics who saw the patient and believes that the findings are consistent with normal postoperative findings.  Medicine.  Considered admission for: Intractable left lower extremity pain with inability to ambulate.    Treatment and Disposition  ED course: Patient remained hemodynamically stable in the emergency department.  Duplex for DVT was unremarkable.  Patient was seen by orthopedics who recommended no further interventions.  Patient will be admitted to medicine for rehab placement.  Shared decision making: Patient agreeable with plan.  Code status: Full code.              Critical Care Time  Procedures

## 2024-11-16 LAB
ANION GAP SERPL CALCULATED.3IONS-SCNC: 4 MMOL/L (ref 4–13)
BUN SERPL-MCNC: 16 MG/DL (ref 5–25)
CALCIUM SERPL-MCNC: 8.8 MG/DL (ref 8.4–10.2)
CHLORIDE SERPL-SCNC: 103 MMOL/L (ref 96–108)
CO2 SERPL-SCNC: 32 MMOL/L (ref 21–32)
CREAT SERPL-MCNC: 1.09 MG/DL (ref 0.6–1.3)
ERYTHROCYTE [DISTWIDTH] IN BLOOD BY AUTOMATED COUNT: 13.3 % (ref 11.6–15.1)
GFR SERPL CREATININE-BSD FRML MDRD: 64 ML/MIN/1.73SQ M
GLUCOSE SERPL-MCNC: 140 MG/DL (ref 65–140)
GLUCOSE SERPL-MCNC: 146 MG/DL (ref 65–140)
GLUCOSE SERPL-MCNC: 161 MG/DL (ref 65–140)
GLUCOSE SERPL-MCNC: 165 MG/DL (ref 65–140)
GLUCOSE SERPL-MCNC: 193 MG/DL (ref 65–140)
HCT VFR BLD AUTO: 32.6 % (ref 36.5–49.3)
HGB BLD-MCNC: 10.6 G/DL (ref 12–17)
INR PPP: 1.45 (ref 0.85–1.19)
MCH RBC QN AUTO: 29.2 PG (ref 26.8–34.3)
MCHC RBC AUTO-ENTMCNC: 32.5 G/DL (ref 31.4–37.4)
MCV RBC AUTO: 90 FL (ref 82–98)
PLATELET # BLD AUTO: 92 THOUSANDS/UL (ref 149–390)
PMV BLD AUTO: 11.3 FL (ref 8.9–12.7)
POTASSIUM SERPL-SCNC: 4.1 MMOL/L (ref 3.5–5.3)
PROTHROMBIN TIME: 17.8 SECONDS (ref 12.3–15)
RBC # BLD AUTO: 3.63 MILLION/UL (ref 3.88–5.62)
SODIUM SERPL-SCNC: 139 MMOL/L (ref 135–147)
WBC # BLD AUTO: 9.8 THOUSAND/UL (ref 4.31–10.16)

## 2024-11-16 PROCEDURE — 82948 REAGENT STRIP/BLOOD GLUCOSE: CPT

## 2024-11-16 PROCEDURE — 97167 OT EVAL HIGH COMPLEX 60 MIN: CPT

## 2024-11-16 PROCEDURE — 97163 PT EVAL HIGH COMPLEX 45 MIN: CPT

## 2024-11-16 PROCEDURE — 80048 BASIC METABOLIC PNL TOTAL CA: CPT | Performed by: FAMILY MEDICINE

## 2024-11-16 PROCEDURE — 99232 SBSQ HOSP IP/OBS MODERATE 35: CPT | Performed by: FAMILY MEDICINE

## 2024-11-16 PROCEDURE — NC001 PR NO CHARGE: Performed by: ORTHOPAEDIC SURGERY

## 2024-11-16 PROCEDURE — 85027 COMPLETE CBC AUTOMATED: CPT | Performed by: FAMILY MEDICINE

## 2024-11-16 PROCEDURE — 85610 PROTHROMBIN TIME: CPT | Performed by: FAMILY MEDICINE

## 2024-11-16 RX ADMIN — SENNOSIDES AND DOCUSATE SODIUM 2 TABLET: 50; 8.6 TABLET ORAL at 08:19

## 2024-11-16 RX ADMIN — ENOXAPARIN SODIUM 100 MG: 100 INJECTION SUBCUTANEOUS at 21:47

## 2024-11-16 RX ADMIN — FOLIC ACID 1 MG: 1 TABLET ORAL at 08:19

## 2024-11-16 RX ADMIN — OXYCODONE HYDROCHLORIDE 5 MG: 5 TABLET ORAL at 17:26

## 2024-11-16 RX ADMIN — OXYCODONE HYDROCHLORIDE 5 MG: 5 TABLET ORAL at 05:56

## 2024-11-16 RX ADMIN — INSULIN LISPRO 1 UNITS: 100 INJECTION, SOLUTION INTRAVENOUS; SUBCUTANEOUS at 16:26

## 2024-11-16 RX ADMIN — ACETAMINOPHEN 975 MG: 325 TABLET, FILM COATED ORAL at 05:55

## 2024-11-16 RX ADMIN — OXYCODONE HYDROCHLORIDE AND ACETAMINOPHEN 500 MG: 500 TABLET ORAL at 17:26

## 2024-11-16 RX ADMIN — ACETAMINOPHEN 975 MG: 325 TABLET, FILM COATED ORAL at 21:48

## 2024-11-16 RX ADMIN — ENOXAPARIN SODIUM 100 MG: 100 INJECTION SUBCUTANEOUS at 08:19

## 2024-11-16 RX ADMIN — INSULIN LISPRO 1 UNITS: 100 INJECTION, SOLUTION INTRAVENOUS; SUBCUTANEOUS at 21:47

## 2024-11-16 RX ADMIN — PRAVASTATIN SODIUM 40 MG: 40 TABLET ORAL at 17:26

## 2024-11-16 RX ADMIN — WARFARIN SODIUM 5 MG: 5 TABLET ORAL at 17:26

## 2024-11-16 RX ADMIN — OXYCODONE HYDROCHLORIDE AND ACETAMINOPHEN 500 MG: 500 TABLET ORAL at 08:19

## 2024-11-16 RX ADMIN — Medication 2000 UNITS: at 08:19

## 2024-11-16 RX ADMIN — INSULIN LISPRO 1 UNITS: 100 INJECTION, SOLUTION INTRAVENOUS; SUBCUTANEOUS at 11:18

## 2024-11-16 RX ADMIN — OXYCODONE HYDROCHLORIDE 5 MG: 5 TABLET ORAL at 10:18

## 2024-11-16 RX ADMIN — POLYETHYLENE GLYCOL 3350 17 G: 17 POWDER, FOR SOLUTION ORAL at 08:19

## 2024-11-16 RX ADMIN — METOPROLOL SUCCINATE 25 MG: 25 TABLET, EXTENDED RELEASE ORAL at 21:48

## 2024-11-16 RX ADMIN — ACETAMINOPHEN 975 MG: 325 TABLET, FILM COATED ORAL at 13:05

## 2024-11-16 NOTE — ASSESSMENT & PLAN NOTE
Lab Results   Component Value Date    HGBA1C 7.2 (H) 10/07/2024       Recent Labs     11/14/24  1525 11/15/24  1631 11/15/24  2051 11/16/24  0556   POCGLU 189* 121 223* 140       Blood Sugar Average: Last 72 hrs:  (P) [id number] is on metformin as outpatient.  Will continue with sliding scale of insulin and monitor blood sugar  Adjust insulin regimen per blood sugar
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
## 2024-11-16 NOTE — PLAN OF CARE
Problem: Prexisting or High Potential for Compromised Skin Integrity  Goal: Skin integrity is maintained or improved  Description: INTERVENTIONS:  - Identify patients at risk for skin breakdown  - Assess and monitor skin integrity  - Assess and monitor nutrition and hydration status  - Monitor labs   - Assess for incontinence   - Turn and reposition patient  - Assist with mobility/ambulation  - Relieve pressure over bony prominences  - Avoid friction and shearing  - Provide appropriate hygiene as needed including keeping skin clean and dry  - Evaluate need for skin moisturizer/barrier cream  - Collaborate with interdisciplinary team   - Patient/family teaching  - Consider wound care consult   Outcome: Progressing     Problem: PAIN - ADULT  Goal: Verbalizes/displays adequate comfort level or baseline comfort level  Description: Interventions:  - Encourage patient to monitor pain and request assistance  - Assess pain using appropriate pain scale  - Administer analgesics based on type and severity of pain and evaluate response  - Implement non-pharmacological measures as appropriate and evaluate response  - Consider cultural and social influences on pain and pain management  - Notify physician/advanced practitioner if interventions unsuccessful or patient reports new pain  Outcome: Progressing     Problem: INFECTION - ADULT  Goal: Absence or prevention of progression during hospitalization  Description: INTERVENTIONS:  - Assess and monitor for signs and symptoms of infection  - Monitor lab/diagnostic results  - Monitor all insertion sites, i.e. indwelling lines, tubes, and drains  - Monitor endotracheal if appropriate and nasal secretions for changes in amount and color  - [place] appropriate cooling/warming therapies per order  - Administer medications as ordered  - Instruct and encourage patient and family to use good hand hygiene technique  - Identify and instruct in appropriate isolation precautions for  identified infection/condition  Outcome: Progressing  Goal: Absence of fever/infection during neutropenic period  Description: INTERVENTIONS:  - Monitor WBC    Outcome: Progressing     Problem: SAFETY ADULT  Goal: Patient will remain free of falls  Description: INTERVENTIONS:  - Educate patient/family on patient safety including physical limitations  - Instruct patient to call for assistance with activity   - Consult OT/PT to assist with strengthening/mobility   - Keep Call bell within reach  - Keep bed low and locked with side rails adjusted as appropriate  - Keep care items and personal belongings within reach  - Initiate and maintain comfort rounds  - Make Fall Risk Sign visible to staff  - Offer Toileting every 2 Hours, in advance of need  - Initiate/Maintain bed alarm  - Obtain necessary fall risk management equipment: walker  - Apply yellow socks and bracelet for high fall risk patients  - Consider moving patient to room near nurses station  Outcome: Progressing  Goal: Maintain or return to baseline ADL function  Description: INTERVENTIONS:  -  Assess patient's ability to carry out ADLs; assess patient's baseline for ADL function and identify physical deficits which impact ability to perform ADLs (bathing, care of mouth/teeth, toileting, grooming, dressing, etc.)  - Assess/evaluate cause of self-care deficits   - Assess range of motion  - Assess patient's mobility; develop plan if impaired  - Assess patient's need for assistive devices and provide as appropriate  - Encourage maximum independence but intervene and supervise when necessary  - Involve family in performance of ADLs  - Assess for home care needs following discharge   - Consider OT consult to assist with ADL evaluation and planning for discharge  - Provide patient education as appropriate  Outcome: Progressing  Goal: Maintains/Returns to pre admission functional level  Description: INTERVENTIONS:  - Perform AM-PAC 6 Click Basic Mobility/ Daily  Activity assessment daily.  - Set and communicate daily mobility goal to care team and patient/family/caregiver.   - Collaborate with rehabilitation services on mobility goals if consulted  - Perform Range of Motion 3 times a day.  - Reposition patient every 2 hours.  - Dangle patient 3 times a day  - Stand patient 3 times a day  - Ambulate patient 3 times a day  - Out of bed to chair 3 times a day   - Out of bed for meals 3 times a day  - Out of bed for toileting  - Record patient progress and toleration of activity level   Outcome: Progressing     Problem: DISCHARGE PLANNING  Goal: Discharge to home or other facility with appropriate resources  Description: INTERVENTIONS:  - Identify barriers to discharge w/patient and caregiver  - Arrange for needed discharge resources and transportation as appropriate  - Identify discharge learning needs (meds, wound care, etc.)  - Arrange for interpretive services to assist at discharge as needed  - Refer to Case Management Department for coordinating discharge planning if the patient needs post-hospital services based on physician/advanced practitioner order or complex needs related to functional status, cognitive ability, or social support system  Outcome: Progressing     Problem: Knowledge Deficit  Goal: Patient/family/caregiver demonstrates understanding of disease process, treatment plan, medications, and discharge instructions  Description: Complete learning assessment and assess knowledge base.  Interventions:  - Provide teaching at level of understanding  - Provide teaching via preferred learning methods  Outcome: Progressing

## 2024-11-16 NOTE — ASSESSMENT & PLAN NOTE
79 y.o.male s/p L TKA 11/13.  No evidence of DVT on ultrasound duplex study.  Components appear in good position without signs of loosening, subsidence, or other abnormalities. With regards to his surgery, he is doing well.    multi-modal pain control per primary  DVT ppx: Per primary while in-house, may continue home Warfarin on discharge.  PT/OT - will need evaluation to determine best post-op recovery pathway  WBAT  ROM as tolerated, pillow/blankets under achilles not behind knee while in bed  f/u as scheduled with Dr. Braxton.

## 2024-11-16 NOTE — CASE MANAGEMENT
Case Management Assessment & Discharge Planning Note    Patient name Isauro Sow  Location /-01 MRN [id number]  : 1945 Date 2024       Current Admission Date: 11/15/2024  Current Admission Diagnosis:Status post total left knee replacement using cement   Patient Active Problem List    Diagnosis Date Noted Date Diagnosed    Status post total left knee replacement using cement 11/15/2024     Anemia 11/15/2024     Preoperative evaluation of a medical condition to rule out surgical contraindications (TAR required) 10/31/2024     Chronic pain of left knee 2024     Lumbar spondylosis 2024     Decreased pedal pulses 2024     Stenosis of left carotid artery 2024     Other constipation 2024     Chronic midline low back pain without sciatica 2023     Platelets decreased ([facility]) 2023     Aortic valve stenosis 11/10/2023     Hx of cardiac pacemaker 2023     Chronic atrial flutter ([facility]) 2023     Chronic diastolic CHF (congestive heart failure) ([facility]) 2023     S/P TAVR (transcatheter aortic valve replacement) 2023     Coronary artery disease involving native coronary artery 2023     Sick sinus syndrome ([facility]) 2023     Continuous opioid dependence ([facility]) 10/24/2022     Dysuria 2022     L3 osteophyte fracture 2022     Fracture of thoracic transverse process ([facility]) 08/15/2022     Lumbar transverse process fracture ([facility]) 08/15/2022     Stage 3a chronic kidney disease ([facility]) 2022     RLS (restless legs syndrome) 2021     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 ([facility]) 2021     Chronic bilateral low back pain without sciatica 2021     Hemiplegia and hemiparesis following cerebral infarction affecting left non-dominant side ([facility]) 2020     Benign prostatic hyperplasia with nocturia 2020     PAD (peripheral artery disease) ([facility]) 2019      Primary osteoarthritis of left knee 01/10/2019     Bilateral carotid artery stenosis 10/08/2018     Dermatosis 10/08/2018     Plantar fasciitis 06/04/2018     Constipation 01/16/2018     Seborrheic dermatitis 11/10/2017     History of stroke 09/15/2017     Asthma 07/26/2017     Benign essential hypertension 07/26/2017     Type 2 diabetes mellitus (HCC) 07/26/2017     Hyperlipidemia 07/26/2017     Diabetic nephropathy associated with type 2 diabetes mellitus (HCC) 06/22/2017     Non-rheumatic aortic sclerosis 06/22/2017     Popliteal cyst, left 10/15/2015     Acquired hallux malleus of right foot 10/06/2015     Pre-ulcerative corn or callous 10/06/2015     Gout 12/11/2014     Allergic rhinitis 05/03/2012     Retina disorder 05/03/2012       LOS (days): 1  Geometric Mean LOS (GMLOS) (days):   Days to GMLOS:     OBJECTIVE:    Risk of Unplanned Readmission Score: 22.7         Current admission status: Inpatient  Referral Reason: Acute Rehab    Preferred Pharmacy:   [id number]  [address] [address]  On license of [facility] [place] [address]  Phone: [phone] Fax: [phone]    Primary Care Provider: Anthony Roberts MD    Primary Insurance: MEDICARE  Secondary Insurance: [facility]    ASSESSMENT:  Active Health Care Proxies       Rayne Sow [facility] Representative - Spouse   Primary Phone: [phone] (Home)  Mobile Phone: [phone]                           Readmission Root Cause  30 Day Readmission: No    Patient Information  Admitted from:: Home  Mental Status: Alert  During Assessment patient was accompanied by: Not accompanied during assessment  Assessment information provided by:: Patient  Primary Caregiver: Self  Support Systems: Self, Son, Spouse/significant other  Home entry access options. Select all that apply.: No steps to enter home  Type of Current Residence: [place]  Living Arrangements: Lives w/ Spouse/significant other, Lives w/ Son  Is patient a ?:  No    Activities of Daily Living Prior to Admission  Functional Status: Independent  Completes ADLs independently?: Yes  Ambulates independently?: Yes  Does patient use assisted devices?: Yes  Assisted Devices (DME) used: Walker, Straight Cane  Does patient currently own DME?: Yes  What DME does the patient currently own?: Straight Cane, Walker  Does patient have a history of Outpatient Therapy (PT/OT)?: No  Does the patient have a history of Short-Term Rehab?: Yes  Does patient have a history of HHC?: No  Does patient currently have HHC?: No         Patient Information Continued  Income Source: Pension/intermediate         Means of Transportation  Means of Transport to Appts:: Drives Self          DISCHARGE DETAILS:    Discharge planning discussed with:: Chart reviewed. Cm met with pt, introduced self, role and discharged process. Pt confirmed lives with spouse, son and his girlfriend in 1 story house and no step to enter. Pt reported being indep with ADLS PTA. Pt agreeable to rehab if recommended and requested [facility]. referral placed and [facility] for back up. Potential discharged to Acute rehab vs SNF pending review and determination. No auth requires for placement. Cm will continue to follow and assist with discharged planning needs.

## 2024-11-16 NOTE — ASSESSMENT & PLAN NOTE
Patient was recently admitted to [facility] for elective left TKA and was discharged two days ago, returned to ED as he was unable to ambulate or participate with home PT.   Orthopedic evaluation appreciated  Lower extremity Doppler is negative for DVT  PT OT evaluation and case management for postacute needs/placement  Pain control

## 2024-11-16 NOTE — PLAN OF CARE
Problem: Prexisting or High Potential for Compromised Skin Integrity  Goal: Skin integrity is maintained or improved  Description: INTERVENTIONS:  - Identify patients at risk for skin breakdown  - Assess and monitor skin integrity  - Assess and monitor nutrition and hydration status  - Monitor labs   - Assess for incontinence   - Turn and reposition patient  - Assist with mobility/ambulation  - Relieve pressure over bony prominences  - Avoid friction and shearing  - Provide appropriate hygiene as needed including keeping skin clean and dry  - Evaluate need for skin moisturizer/barrier cream  - Collaborate with interdisciplinary team   - Patient/family teaching  - Consider wound care consult   Outcome: Progressing     Problem: PAIN - ADULT  Goal: Verbalizes/displays adequate comfort level or baseline comfort level  Description: Interventions:  - Encourage patient to monitor pain and request assistance  - Assess pain using appropriate pain scale  - Administer analgesics based on type and severity of pain and evaluate response  - Implement non-pharmacological measures as appropriate and evaluate response  - Consider cultural and social influences on pain and pain management  - Notify physician/advanced practitioner if interventions unsuccessful or patient reports new pain  Outcome: Progressing     Problem: INFECTION - ADULT  Goal: Absence or prevention of progression during hospitalization  Description: INTERVENTIONS:  - Assess and monitor for signs and symptoms of infection  - Monitor lab/diagnostic results  - Monitor all insertion sites, i.e. indwelling lines, tubes, and drains  - Monitor endotracheal if appropriate and nasal secretions for changes in amount and color  - [place] appropriate cooling/warming therapies per order  - Administer medications as ordered  - Instruct and encourage patient and family to use good hand hygiene technique  - Identify and instruct in appropriate isolation precautions for  identified infection/condition  Outcome: Progressing  Goal: Absence of fever/infection during neutropenic period  Description: INTERVENTIONS:  - Monitor WBC    Outcome: Progressing     Problem: SAFETY ADULT  Goal: Patient will remain free of falls  Description: INTERVENTIONS:  - Educate patient/family on patient safety including physical limitations  - Instruct patient to call for assistance with activity   - Consult OT/PT to assist with strengthening/mobility   - Keep Call bell within reach  - Keep bed low and locked with side rails adjusted as appropriate  - Keep care items and personal belongings within reach  - Initiate and maintain comfort rounds  - Make Fall Risk Sign visible to staff  - Offer Toileting every 2 Hours, in advance of need  - Initiate/Maintain bed alarm  - Obtain necessary fall risk management equipment: walker  - Apply yellow socks and bracelet for high fall risk patients  - Consider moving patient to room near nurses station  Outcome: Progressing  Goal: Maintain or return to baseline ADL function  Description: INTERVENTIONS:  -  Assess patient's ability to carry out ADLs; assess patient's baseline for ADL function and identify physical deficits which impact ability to perform ADLs (bathing, care of mouth/teeth, toileting, grooming, dressing, etc.)  - Assess/evaluate cause of self-care deficits   - Assess range of motion  - Assess patient's mobility; develop plan if impaired  - Assess patient's need for assistive devices and provide as appropriate  - Encourage maximum independence but intervene and supervise when necessary  - Involve family in performance of ADLs  - Assess for home care needs following discharge   - Consider OT consult to assist with ADL evaluation and planning for discharge  - Provide patient education as appropriate  Outcome: Progressing  Goal: Maintains/Returns to pre admission functional level  Description: INTERVENTIONS:  - Perform AM-PAC 6 Click Basic Mobility/ Daily  Activity assessment daily.  - Set and communicate daily mobility goal to care team and patient/family/caregiver.   - Collaborate with rehabilitation services on mobility goals if consulted  - Perform Range of Motion 3 times a day.  - Reposition patient every 2 hours.  - Dangle patient 3 times a day  - Stand patient 3 times a day  - Ambulate patient 3 times a day  - Out of bed to chair 3 times a day   - Out of bed for meals 3 times a day  - Out of bed for toileting  - Record patient progress and toleration of activity level   Outcome: Progressing     Problem: DISCHARGE PLANNING  Goal: Discharge to home or other facility with appropriate resources  Description: INTERVENTIONS:  - Identify barriers to discharge w/patient and caregiver  - Arrange for needed discharge resources and transportation as appropriate  - Identify discharge learning needs (meds, wound care, etc.)  - Arrange for interpretive services to assist at discharge as needed  - Refer to Case Management Department for coordinating discharge planning if the patient needs post-hospital services based on physician/advanced practitioner order or complex needs related to functional status, cognitive ability, or social support system  Outcome: Progressing     Problem: Knowledge Deficit  Goal: Patient/family/caregiver demonstrates understanding of disease process, treatment plan, medications, and discharge instructions  Description: Complete learning assessment and assess knowledge base.  Interventions:  - Provide teaching at level of understanding  - Provide teaching via preferred learning methods  Outcome: Progressing

## 2024-11-16 NOTE — PROGRESS NOTES
Progress Note - Hospitalist   Name: Isauro Sow 79 y.o. male I MRN: [id number]  Unit/Bed#: -01 I Date of Admission: 11/15/2024   Date of Service: 11/16/2024 I Hospital Day: 1    Assessment & Plan  Status post total left knee replacement using cement  Patient was recently admitted to [facility] for elective left TKA and was discharged two days ago, returned to ED as he was unable to ambulate or participate with home PT.   Orthopedic evaluation appreciated  Lower extremity Doppler is negative for DVT  PT OT evaluation and case management for postacute needs/placement  Pain control  Benign essential hypertension  Patient with known history of hypertension.  Blood pressure acceptable.  Continue with metoprolol  Type 2 diabetes mellitus (HCC)  Lab Results   Component Value Date    HGBA1C 7.2 (H) 10/07/2024       Recent Labs     11/14/24  1525 11/15/24  1631 11/15/24  2051 11/16/24  0556   POCGLU 189* 121 223* 140       Blood Sugar Average: Last 72 hrs:  (P) [id number] is on metformin as outpatient.  Will continue with sliding scale of insulin and monitor blood sugar  Adjust insulin regimen per blood sugar    History of stroke  Patient with previous history of CVA.  Continue statin and on Lovenox to Coumadin bridge  Coronary artery disease involving native coronary artery  Patient with history of coronary artery disease - RCA lesion was 80% stenosed. S/P successful PCI with CHARMAINE x 1.  On 8/28/2023  Preoperative cardiac evaluation recommended stopping antiplatelet agents at the 1 year brandie and continue with Coumadin  No chest pain or shortness of breath  Continue with Coumadin  Outpatient follow-up with cardiology  With beta-blocker and statin  S/P TAVR (transcatheter aortic valve replacement)  History of TVAR  noted   Followed by cardiology as outpatient  Chronic atrial flutter (HCC)  Patient anticoagulated with warfarin prior to hospitalization  Preoperative cardiology consultation  recommended Lovenox to Coumadin bridge  Discussed with orthopedics.  Restarted back on Coumadin.    Start the patient on 5 mg of Coumadin.  Trend INR.  Anemia  Hemoglobin noted to be 11.3 which is below his baseline.  Patient is postoperative day 2.  Expected blood loss anemia from the surgery    VTE Pharmacologic Prophylaxis: VTE Score: 9 High Risk (Score >/= 5) - Pharmacological DVT Prophylaxis Ordered: enoxaparin (Lovenox). Sequential Compression Devices Ordered.    Mobility:   Basic Mobility Inpatient Raw Score: 11  JH-HLM Goal: 4: Move to chair/commode  JH-HLM Achieved: 4: Move to chair/commode  JH-HLM Goal achieved. Continue to encourage appropriate mobility.    Patient Centered Rounds: I performed bedside rounds with nursing staff today.        Current Length of Stay: 1 day(s)  Current Patient Status: Inpatient   Certification Statement: The patient will continue to require additional inpatient hospital stay due to pending PT/OT placeemnt  Discharge Plan: Anticipate discharge in 24-48 hrs to discharge location to be determined pending rehab evaluations.    Code Status: Level 3 - DNAR and DNI    Subjective     Patient examined at bedside, lying on the bed.  Reports no pain if immobile.  However has pain in the surgical site with movement.  No chest pain or dyspnea    Objective :  Temp:  [98.6 °F (37 °C)-99.3 °F (37.4 °C)] 99.3 °F (37.4 °C)  HR:  [55-74] 55  BP: (121-164)/(58-85) 121/59  Resp:  [15-20] 15  SpO2:  [92 %-97 %] 93 %  O2 Device: None (Room air)    Body mass index is 29.99 kg/m².     Input and Output Summary (last 24 hours):     Intake/Output Summary (Last 24 hours) at 11/16/2024 1007  Last data filed at 11/16/2024 0503  Gross per 24 hour   Intake --   Output 720 ml   Net -720 ml       Physical Exam  Vitals and nursing note reviewed.   Constitutional:       General: He is not in acute distress.     Appearance: Normal appearance.   HENT:      Head: Normocephalic and atraumatic.      Right Ear:  External ear normal.      Left Ear: External ear normal.      Nose: Nose normal.   Eyes:      Extraocular Movements: Extraocular movements intact.   Pulmonary:      Effort: Pulmonary effort is normal.   Musculoskeletal:      Cervical back: Normal range of motion.   Neurological:      Mental Status: He is alert. Mental status is at baseline.   Psychiatric:         Mood and Affect: Mood normal.           Lines/Drains:              Lab Results: I have reviewed the following results:   Results from last 7 days   Lab Units 11/16/24  0512 11/15/24  1317   WBC Thousand/uL 9.80 10.60*   HEMOGLOBIN g/dL 10.6* 11.3*   HEMATOCRIT % 32.6* 34.7*   PLATELETS Thousands/uL 92*  --    SEGS PCT %  --  73   LYMPHO PCT %  --  16   MONO PCT %  --  10   EOS PCT %  --  0     Results from last 7 days   Lab Units 11/16/24  0512 11/15/24  1317   SODIUM mmol/L 139 139   POTASSIUM mmol/L 4.1 4.3   CHLORIDE mmol/L 103 103   CO2 mmol/L 32 32   BUN mg/dL 16 16   CREATININE mg/dL 1.09 1.12   ANION GAP mmol/L 4 4   CALCIUM mg/dL 8.8 8.9   ALBUMIN g/dL  --  3.3*   TOTAL BILIRUBIN mg/dL  --  0.84   ALK PHOS U/L  --  40   ALT U/L  --  19   AST U/L  --  18   GLUCOSE RANDOM mg/dL 146* 180*     Results from last 7 days   Lab Units 11/16/24  0512   INR  1.45*     Results from last 7 days   Lab Units 11/16/24  0556 11/15/24  2051 11/15/24  1631 11/14/24  1525 11/14/24  1042 11/14/24  0712 11/13/24  1813 11/13/24  1128   POC GLUCOSE mg/dl 140 223* 121 189* 224* 204* 330* 112               Recent Cultures (last 7 days):         Last 24 Hours Medication List:     Current Facility-Administered Medications:     acetaminophen (TYLENOL) tablet 975 mg, Q8H MOON    ascorbic acid (VITAMIN C) tablet 500 mg, BID    Cholecalciferol (VITAMIN D3) tablet 2,000 Units, Daily    enoxaparin (LOVENOX) subcutaneous injection 100 mg, Q12H    folic acid (FOLVITE) tablet 1 mg, Daily    insulin lispro (HumALOG/ADMELOG) 100 units/mL subcutaneous injection 1-5 Units, HS    insulin  lispro (HumALOG/ADMELOG) 100 units/mL subcutaneous injection 1-6 Units, TID AC **AND** Fingerstick Glucose (POCT), TID AC    metoprolol succinate (TOPROL-XL) 24 hr tablet 25 mg, HS    morphine injection 2 mg, Q4H PRN    nitroglycerin (NITROSTAT) SL tablet 0.4 mg, Q5 Min PRN    oxyCODONE (ROXICODONE) IR tablet 5 mg, Q4H PRN    oxyCODONE (ROXICODONE) split tablet 2.5 mg, Q4H PRN    polyethylene glycol (MIRALAX) packet 17 g, Daily    pravastatin (PRAVACHOL) tablet 40 mg, Daily With Dinner    senna-docusate sodium (SENOKOT S) 8.6-50 mg per tablet 2 tablet, Daily    warfarin (COUMADIN) tablet 5 mg, Daily (warfarin)    Administrative Statements   Today, Patient Was Seen By: Sung Morales MD      **Please Note: This note may have been constructed using a voice recognition system.**

## 2024-11-16 NOTE — PLAN OF CARE
Problem: OCCUPATIONAL THERAPY ADULT  Goal: Performs self-care activities at highest level of function for planned discharge setting.  See evaluation for individualized goals.  Description: Treatment Interventions: ADL retraining, Functional transfer training, Endurance training, Patient/family training, Equipment evaluation/education, Compensatory technique education, Activityengagement, Energy conservation          See flowsheet documentation for full assessment, interventions and recommendations.   Note: Limitation: Decreased ADL status, Decreased endurance, Decreased self-care trans, Decreased high-level ADLs  Prognosis: Good  Assessment: Pt is a 79 y.o. male  admitted 11/15/24 s/p total L knee replacement - pt s/p fall at home post op. Pt w active OT eval and treat orders, WBAT in LLE. PMH includes  has a past medical history of Asthma, Atrial flutter ([facility]), BPH (benign prostatic hyperplasia), CAD (coronary artery disease), Carotid stenosis, CHF (congestive heart failure) ([facility]), Chronic pain, CKD (chronic kidney disease), stage III ([facility]), COPD (chronic obstructive pulmonary disease) ([facility]), DM (diabetes mellitus), type 2 ([facility]), Gout, History of CVA (cerebrovascular accident), HLD (hyperlipidemia), Hypertension, Macular degeneration, Obesity, PERCY (obstructive sleep apnea), Severe aortic stenosis, SSS (sick sinus syndrome) ([facility]), and Stroke ([facility]). Pt lives w spouse and son in a 1 sh w 0 anita, has walk in shower, standard toilet. Prior to surgery, pt was independent w/ ADL/IADL and functional mobility, was driving and was not using any DME at baseline. Currently, pt is Supervision for UB ADL, Max Ax1 for LB ADL, and completed transfers/FM w Mod Ax2. Pt is limited at this time 2* decreased endurance/activtiy tolerance, decreased cognition, decreased ADL/High-level ADL status, decreased self-care trans, decreased safety awareness, limited home support and is a fall risk. This impacts pt's ability to complete UB and  LB dressing and bathing, toileting, transfers, functional mobility, community mobility, home and health maintenance, and safe engagement in typical daily routine. The patient's raw score on the -PAC Daily Activity inpatient short form is 18, standardized score is 38.66, less than 39.4. Patients at this level are likely to benefit from discharge to post-acute rehabilitation services. Please refer to the recommendation of the Occupational Therapist for safe discharge planning.  From OT standpoint, pt should D/C to level 1  when medically stable. Pt will benefit from continued acute OT services 2-3 x/wk for 10-14 days to meet goals.     Rehab Resource Intensity Level, OT: I (Maximum Resource Intensity)

## 2024-11-16 NOTE — PROGRESS NOTES
"Progress Note - Orthopedics   Name: Isauro Sow 79 y.o. male I MRN: [id number]  Unit/Bed#: -01 I Date of Admission: 11/15/2024   Date of Service: 11/16/2024 I Hospital Day: 1    Assessment & Plan  Status post total left knee replacement using cement  79 y.o.male s/p L TKA 11/13.  No evidence of DVT on ultrasound duplex study.  Components appear in good position without signs of loosening, subsidence, or other abnormalities. With regards to his surgery, he is doing well.    multi-modal pain control per primary  DVT ppx: Per primary while in-house, may continue home Warfarin on discharge.  PT/OT - will need evaluation to determine best post-op recovery pathway  WBAT  ROM as tolerated, pillow/blankets under achilles not behind knee while in bed  f/u as scheduled with Dr. Braxton.      Subjective   79 y.o.male No acute events, no new complaints. Pain well controlled.     Objective :  Temp:  [98.6 °F (37 °C)-99.3 °F (37.4 °C)] 99.3 °F (37.4 °C)  HR:  [63-74] 63  BP: (129-164)/(58-85) 130/85  Resp:  [15-20] 15  SpO2:  [93 %-97 %] 93 %  O2 Device: None (Room air)    Physical Exam  Musculoskeletal: leftlower  Dressing c/d/i  TTP harry incisionally  SILT s/s/t/sp/dp  Motor intact ankle df/pf, +EHL/FHL      Lab Results: I have reviewed the following results:  Recent Labs     11/13/24  1130 11/14/24  0336 11/15/24  1317 11/16/24  0512   WBC  --  10.97* 10.60*  --    HGB  --  13.4 11.3*  --    HCT  --  41.6 34.7*  --    PLT  --  114*  --   --    BUN  --  17 16 16   CREATININE  --  1.01 1.12 1.09   PTT 29  --   --   --    INR 1.04 1.00 1.40* 1.45*     Blood Culture:    Lab Results   Component Value Date    BLOODCX No Growth After 5 Days. 10/20/2022     Wound Culture: No results found for: \"WOUNDCULT\"  "

## 2024-11-16 NOTE — PLAN OF CARE
Problem: PHYSICAL THERAPY ADULT  Goal: Performs mobility at highest level of function for planned discharge setting.  See evaluation for individualized goals.  Description: Treatment/Interventions: ADL retraining, Functional transfer training, Endurance training, Bed mobility, Gait training, Spoke to nursing, OT          See flowsheet documentation for full assessment, interventions and recommendations.  Note: Prognosis: Fair  Problem List: Decreased strength, Decreased range of motion, Decreased endurance, Impaired balance, Decreased mobility, Decreased coordination, Pain  Assessment: Pt is a 79 y.o. male who [facility] to [facility]  with lower left leg pain after a fall at home. Patient recently underwent left total knee arthroplasty on 11/13. Pt  has a past medical history of Asthma, Atrial flutter ([facility]), BPH (benign prostatic hyperplasia), CAD (coronary artery disease), Carotid stenosis, CHF (congestive heart failure) ([facility]), Chronic pain, CKD (chronic kidney disease), stage III ([facility]), COPD (chronic obstructive pulmonary disease) ([facility]), DM (diabetes mellitus), type 2 ([facility]), Gout, History of CVA (cerebrovascular accident), HLD (hyperlipidemia), Hypertension, Macular degeneration, Obesity, PERCY (obstructive sleep apnea), Severe aortic stenosis, SSS (sick sinus syndrome) ([facility]), and Stroke ([facility]).. Pt is alert and oriented, able to answer questions about home setup and prior level of function. Prior to surgery + admission, pt reports being independent with ADLs and IADLs. Currently pt is presenting as Mod assist of 1 for bed mobility and mod assist of 2 for transfers and gait. Pt would benefit from continued therapy to address deficits in strength, endurance, and balance as well as trialing extended ambulation and stair negotiation. At this time PT is recommending level 1 resources to maximize functional ability and return pt to OF.        Rehab Resource Intensity Level, PT: I (Maximum Resource Intensity)    See flowsheet  documentation for full assessment.

## 2024-11-16 NOTE — PHYSICAL THERAPY NOTE
PHYSICAL THERAPY NOTE          Patient Name: Isauro Sow  Today's Date: 11/16/2024 11/16/24 1056   PT Last Visit   PT Visit Date 11/16/24   Note Type   Note type Evaluation   Pain Assessment   Pain Assessment Tool 0-10   Pain Score 6   Pain Location/Orientation Location: Knee;Orientation: Left   Hospital Pain Intervention(s) Repositioned;Ambulation/increased activity   Restrictions/Precautions   Weight Bearing Precautions Per Order Yes   LLE Weight Bearing Per Order (S)  WBAT   Home Living   Type of Home House   Home Layout One level  (no steps to enter)   Bathroom Shower/Tub Walk-in shower   Bathroom Toilet Standard   Home Equipment Walker;Cane  (not using prior to surgery)   Prior Function   Level of [place] Independent with ADLs;Independent with functional mobility;Independent with IADLS   Lives With Spouse;Son   Receives Help From Family   IADLs Independent with driving;Independent with meal prep;Independent with medication management   Falls in the last 6 months 1 to 4  (1)   Vocational Retired   Cognition   Overall Cognitive Status WFL   Orientation Level Oriented X4   Subjective   Subjective Pt agreeable to participating in therapy   RLE Assessment   RLE Assessment WFL   LLE Assessment   LLE Assessment X  (grossly decreased strength, unable to fully bend or straighten leg due to recent surgery)   Coordination   Movements are Fluid and Coordinated 0   Coordination and Movement Description movements not fluid/coordinated   Bed Mobility   Supine to Sit 3  Moderate assistance   Additional items Assist x 1;Increased time required;LE management;Verbal cues   Sit to Supine Unable to assess   Additional Comments Pt in bedside chair at end of visit   Transfers   Sit to Stand 3  Moderate assistance   Additional items Assist x 2;Increased time required;Verbal cues   Stand to Sit 3  Moderate assistance   Additional items  Assist x 2;Increased time required;Verbal cues   Additional Comments HHA   Ambulation/Elevation   Gait pattern R Knee [place];Improper Weight shift;Decreased L stance;Short stride;Shuffling   Gait Assistance 3  Moderate assist   Additional items Assist x 2   Assistive Device   (No AD used for bed to chair transfer, use AD as needed for ambulation)   Distance 3' bed to chair   Stair Management Assistance Not tested   Balance   Static Sitting Good   Dynamic Sitting Fair +   Static Standing Poor +   Dynamic Standing Poor -   Ambulatory Poor -   Endurance Deficit   Endurance Deficit Yes   Endurance Deficit Description limited by muscular endurance, pain   Activity Tolerance   Activity Tolerance Patient limited by pain;Patient limited by fatigue   Medical Staff Made Aware OT present for co-evaluation   Nurse Made Aware Nursing cleared pt for therapy   Assessment   Prognosis Fair   Problem List Decreased strength;Decreased range of motion;Decreased endurance;Impaired balance;Decreased mobility;Decreased coordination;Pain   Assessment Pt is a 79 y.o. male who presented to [facility]  with lower left leg pain after a fall at home. Patient recently underwent left total knee arthroplasty on 11/13. Pt  has a past medical history of Asthma, Atrial flutter ([facility]), BPH (benign prostatic hyperplasia), CAD (coronary artery disease), Carotid stenosis, CHF (congestive heart failure) ([facility]), Chronic pain, CKD (chronic kidney disease), stage III ([facility]), COPD (chronic obstructive pulmonary disease) ([facility]), DM (diabetes mellitus), type 2 ([facility]), Gout, History of CVA (cerebrovascular accident), HLD (hyperlipidemia), Hypertension, Macular degeneration, Obesity, PERCY (obstructive sleep apnea), Severe aortic stenosis, SSS (sick sinus syndrome) ([facility]), and Stroke ([facility]).. Pt is alert and oriented, able to answer questions about home setup and prior level of function. Prior to surgery + admission, pt reports being independent with ADLs and IADLs. Currently  pt is presenting as Mod assist of 1 for bed mobility and mod assist of 2 for transfers and gait. Pt would benefit from continued therapy to address deficits in strength, endurance, and balance as well as trialing extended ambulation and stair negotiation. At this time PT is recommending level 1 resources to maximize functional ability and return pt to PLOF.   Goals   Patient Goals To get better   STG Expiration Date 11/30/24   Short Term Goal #1 1) Pt will perform all bed mobility and transfers with supervision to increase independence and ability to participate in ADLs, 2) pt will be able to ambulate 250' or more with least restrictive AD to increase muscular endurance and ability to ambulate in community, 3) pt able to negotiate full flight of stairs to allow pt to navigate home environment, 4) Pt will improve balance to fair+ or better to improve patient safety and decrease risk of future falls   Plan   Treatment/Interventions ADL retraining;Functional transfer training;Endurance training;Bed mobility;Gait training;Spoke to nursing;OT   PT Frequency 3-5x/wk   Discharge Recommendation   Rehab Resource Intensity Level, PT I (Maximum Resource Intensity)   AM-PAC Basic Mobility Inpatient   Turning in Flat Bed Without Bedrails 2   Lying on Back to Sitting on Edge of Flat Bed Without Bedrails 2   Moving Bed to Chair 2   Standing Up From Chair Using Arms 2   Walk in Room 2   Climb 3-5 Stairs With Railing 2   Basic Mobility Inpatient Raw Score 12   Basic Mobility Standardized Score 32.23   [facility] Highest Level Of Mobility   -[facility] Goal 4: Move to chair/commode   -HLM Achieved 4: Move to chair/commode   End of Consult   Patient Position at End of Consult Bedside chair;All needs within reach   Dre Ji, SPT

## 2024-11-17 LAB
GLUCOSE SERPL-MCNC: 151 MG/DL (ref 65–140)
GLUCOSE SERPL-MCNC: 158 MG/DL (ref 65–140)
GLUCOSE SERPL-MCNC: 176 MG/DL (ref 65–140)
GLUCOSE SERPL-MCNC: 180 MG/DL (ref 65–140)
INR PPP: 1.5 (ref 0.85–1.19)
PROTHROMBIN TIME: 18.3 SECONDS (ref 12.3–15)

## 2024-11-17 PROCEDURE — 85610 PROTHROMBIN TIME: CPT | Performed by: FAMILY MEDICINE

## 2024-11-17 PROCEDURE — 82948 REAGENT STRIP/BLOOD GLUCOSE: CPT

## 2024-11-17 PROCEDURE — 99232 SBSQ HOSP IP/OBS MODERATE 35: CPT | Performed by: FAMILY MEDICINE

## 2024-11-17 RX ADMIN — INSULIN LISPRO 1 UNITS: 100 INJECTION, SOLUTION INTRAVENOUS; SUBCUTANEOUS at 16:55

## 2024-11-17 RX ADMIN — INSULIN LISPRO 1 UNITS: 100 INJECTION, SOLUTION INTRAVENOUS; SUBCUTANEOUS at 06:08

## 2024-11-17 RX ADMIN — Medication 2000 UNITS: at 08:29

## 2024-11-17 RX ADMIN — OXYCODONE HYDROCHLORIDE 5 MG: 5 TABLET ORAL at 06:08

## 2024-11-17 RX ADMIN — SENNOSIDES AND DOCUSATE SODIUM 2 TABLET: 50; 8.6 TABLET ORAL at 08:29

## 2024-11-17 RX ADMIN — ENOXAPARIN SODIUM 100 MG: 100 INJECTION SUBCUTANEOUS at 22:01

## 2024-11-17 RX ADMIN — METOPROLOL SUCCINATE 25 MG: 25 TABLET, EXTENDED RELEASE ORAL at 22:02

## 2024-11-17 RX ADMIN — ACETAMINOPHEN 975 MG: 325 TABLET, FILM COATED ORAL at 13:02

## 2024-11-17 RX ADMIN — INSULIN LISPRO 1 UNITS: 100 INJECTION, SOLUTION INTRAVENOUS; SUBCUTANEOUS at 11:01

## 2024-11-17 RX ADMIN — INSULIN LISPRO 1 UNITS: 100 INJECTION, SOLUTION INTRAVENOUS; SUBCUTANEOUS at 22:02

## 2024-11-17 RX ADMIN — FOLIC ACID 1 MG: 1 TABLET ORAL at 08:29

## 2024-11-17 RX ADMIN — PRAVASTATIN SODIUM 40 MG: 40 TABLET ORAL at 17:00

## 2024-11-17 RX ADMIN — POLYETHYLENE GLYCOL 3350 17 G: 17 POWDER, FOR SOLUTION ORAL at 08:29

## 2024-11-17 RX ADMIN — OXYCODONE HYDROCHLORIDE AND ACETAMINOPHEN 500 MG: 500 TABLET ORAL at 17:00

## 2024-11-17 RX ADMIN — ACETAMINOPHEN 975 MG: 325 TABLET, FILM COATED ORAL at 22:01

## 2024-11-17 RX ADMIN — ENOXAPARIN SODIUM 100 MG: 100 INJECTION SUBCUTANEOUS at 08:29

## 2024-11-17 RX ADMIN — WARFARIN SODIUM 5 MG: 5 TABLET ORAL at 17:00

## 2024-11-17 RX ADMIN — ACETAMINOPHEN 975 MG: 325 TABLET, FILM COATED ORAL at 06:08

## 2024-11-17 RX ADMIN — OXYCODONE HYDROCHLORIDE 5 MG: 5 TABLET ORAL at 16:59

## 2024-11-17 RX ADMIN — OXYCODONE HYDROCHLORIDE 5 MG: 5 TABLET ORAL at 12:40

## 2024-11-17 RX ADMIN — OXYCODONE HYDROCHLORIDE AND ACETAMINOPHEN 500 MG: 500 TABLET ORAL at 08:29

## 2024-11-17 NOTE — PROGRESS NOTES
Progress Note - Hospitalist   Name: Isauro Sow 79 y.o. male I MRN: [id number]  Unit/Bed#: -01 I Date of Admission: 11/15/2024   Date of Service: 11/17/2024 I Hospital Day: 2    Assessment & Plan  Status post total left knee replacement using cement  Patient was recently admitted to [facility] for elective left TKA and was discharged two days ago, returned to ED as he was unable to ambulate or participate with home PT.   Orthopedic evaluation appreciated  Lower extremity Doppler is negative for DVT  PT OT evaluation and case management for postacute needs/placement  Pain control  PT/OT recs rehab, pending referral to [facility]  One episode of low grade temp last night 10 PM. No further recurrence. No dyspnea, no chest pain, no abdominal pain, no cough, no diarrhea. Observe off of abx.  If recurs, will need full workup including blood cultures.   Benign essential hypertension  Patient with known history of hypertension.  Blood pressure acceptable.  Continue with metoprolol  Type 2 diabetes mellitus (HCC)  Lab Results   Component Value Date    HGBA1C 7.2 (H) 10/07/2024       Recent Labs     11/16/24  1544 11/16/24  2100 11/17/24  0512 11/17/24  1028   POCGLU 161* 165* 151* 180*       Blood Sugar Average: Last 72 hrs:  (P) 166.75Patient is on metformin as outpatient.  Will continue with sliding scale of insulin and monitor blood sugar  Adjust insulin regimen per blood sugar    History of stroke  Patient with previous history of CVA.  Continue statin and on Lovenox to Coumadin bridge  Coronary artery disease involving native coronary artery  Patient with history of coronary artery disease - RCA lesion was 80% stenosed. S/P successful PCI with CHARMAINE x 1.  On 8/28/2023  Preoperative cardiac evaluation recommended stopping antiplatelet agents at the 1 year brandie and continue with Coumadin  No chest pain or shortness of breath  Continue with Coumadin  Outpatient follow-up with cardiology  With beta-blocker and  statin  S/P TAVR (transcatheter aortic valve replacement)  History of TVAR  noted   Followed by cardiology as outpatient  Chronic atrial flutter (HCC)  Patient anticoagulated with warfarin prior to hospitalization  Preoperative cardiology consultation recommended Lovenox to Coumadin bridge  Discussed with orthopedics.  Restarted back on Coumadin.    Start the patient on 5 mg of Coumadin.  Trend INR.  Anemia  Hemoglobin noted to be 11.3 which is below his baseline.  Patient is postoperative day 2.  Expected blood loss anemia from the surgery    VTE Pharmacologic Prophylaxis: VTE Score: 9 High Risk (Score >/= 5) - Pharmacological DVT Prophylaxis Ordered: enoxaparin (Lovenox). Sequential Compression Devices Ordered.    Mobility:   Basic Mobility Inpatient Raw Score: 13  JH-HLM Goal: 4: Move to chair/commode  JH-HLM Achieved: 4: Move to chair/commode  JH-HLM Goal achieved. Continue to encourage appropriate mobility.    Patient Centered Rounds: I performed bedside rounds with nursing staff today.        Education and Discussions with Family / Patient: Updated  (son and daughter in law) at bedside.    Current Length of Stay: 2 day(s)  Current Patient Status: Inpatient   Certification Statement: The patient will continue to require additional inpatient hospital stay due to pendign placement in [facility]  Discharge Plan: Anticipate discharge in 24-48 hrs to rehab facility.    Code Status: Level 3 - DNAR and DNI    Subjective     Patient examined at bedside. Has pain on left knee with movement.   One episode of low grade temp last night 10 PM. No further recurrence. No dyspnea, no chest pain, no abdominal pain, no cough, no diarrhea. Observe off of abx.    Objective :  Temp:  [98.1 °F (36.7 °C)-101 °F (38.3 °C)] 98.2 °F (36.8 °C)  HR:  [60-68] 63  BP: (105-139)/(59-89) 105/60  Resp:  [16-18] 16  SpO2:  [93 %-98 %] 93 %  O2 Device: None (Room air)    Body mass index is 29.99 kg/m².     Input and Output Summary (last  24 hours):     Intake/Output Summary (Last 24 hours) at 11/17/2024 1421  Last data filed at 11/17/2024 1301  Gross per 24 hour   Intake 240 ml   Output 950 ml   Net -710 ml       Physical Exam  Vitals and nursing note reviewed.   Constitutional:       General: He is not in acute distress.     Appearance: Normal appearance.   HENT:      Head: Normocephalic and atraumatic.      Right Ear: External ear normal.      Left Ear: External ear normal.      Nose: Nose normal.   Eyes:      Extraocular Movements: Extraocular movements intact.   Pulmonary:      Effort: Pulmonary effort is normal.   Musculoskeletal:      Cervical back: Normal range of motion.      Comments: Left knee swelling   Neurological:      Mental Status: He is alert.   Psychiatric:         Mood and Affect: Mood normal.           Lines/Drains:              Lab Results: I have reviewed the following results:   Results from last 7 days   Lab Units 11/16/24  0512 11/15/24  1317   WBC Thousand/uL 9.80 10.60*   HEMOGLOBIN g/dL 10.6* 11.3*   HEMATOCRIT % 32.6* 34.7*   PLATELETS Thousands/uL 92*  --    SEGS PCT %  --  73   LYMPHO PCT %  --  16   MONO PCT %  --  10   EOS PCT %  --  0     Results from last 7 days   Lab Units 11/16/24  0512 11/15/24  1317   SODIUM mmol/L 139 139   POTASSIUM mmol/L 4.1 4.3   CHLORIDE mmol/L 103 103   CO2 mmol/L 32 32   BUN mg/dL 16 16   CREATININE mg/dL 1.09 1.12   ANION GAP mmol/L 4 4   CALCIUM mg/dL 8.8 8.9   ALBUMIN g/dL  --  3.3*   TOTAL BILIRUBIN mg/dL  --  0.84   ALK PHOS U/L  --  40   ALT U/L  --  19   AST U/L  --  18   GLUCOSE RANDOM mg/dL 146* 180*     Results from last 7 days   Lab Units 11/17/24  0505   INR  1.50*     Results from last 7 days   Lab Units 11/17/24  1028 11/17/24  0512 11/16/24  2100 11/16/24  1544 11/16/24  1039 11/16/24  0556 11/15/24  2051 11/15/24  1631 11/14/24  1525 11/14/24  1042 11/14/24  0712 11/13/24  1813   POC GLUCOSE mg/dl 180* 151* 165* 161* 193* 140 223* 121 189* 224* 204* 330*                Recent Cultures (last 7 days):          Current Facility-Administered Medications:     acetaminophen (TYLENOL) tablet 975 mg, Q8H MOON    ascorbic acid (VITAMIN C) tablet 500 mg, BID    Cholecalciferol (VITAMIN D3) tablet 2,000 Units, Daily    enoxaparin (LOVENOX) subcutaneous injection 100 mg, Q12H    folic acid (FOLVITE) tablet 1 mg, Daily    insulin lispro (HumALOG/ADMELOG) 100 units/mL subcutaneous injection 1-5 Units, HS    insulin lispro (HumALOG/ADMELOG) 100 units/mL subcutaneous injection 1-6 Units, TID AC **AND** Fingerstick Glucose (POCT), TID AC    metoprolol succinate (TOPROL-XL) 24 hr tablet 25 mg, HS    morphine injection 2 mg, Q4H PRN    nitroglycerin (NITROSTAT) SL tablet 0.4 mg, Q5 Min PRN    oxyCODONE (ROXICODONE) IR tablet 5 mg, Q4H PRN    oxyCODONE (ROXICODONE) split tablet 2.5 mg, Q4H PRN    polyethylene glycol (MIRALAX) packet 17 g, Daily    pravastatin (PRAVACHOL) tablet 40 mg, Daily With Dinner    senna-docusate sodium (SENOKOT S) 8.6-50 mg per tablet 2 tablet, Daily    warfarin (COUMADIN) tablet 5 mg, Daily (warfarin)    Administrative Statements   Today, Patient Was Seen By: Sung Morales MD      **Please Note: This note may have been constructed using a voice recognition system.**

## 2024-11-17 NOTE — RESTORATIVE TECHNICIAN NOTE
Restorative Technician Note      Patient Name: Isauro Sow     Restorative Tech Visit Date: 11/17/24  Note Type: Mobility  Patient Position Upon Consult: Supine  Activity Performed: Transferred; Dangled; Stood  Assistive Device: Other (Comment) (BHHAx2 SPT)  Patient Position at End of Consult: Bedside chair; All needs within reach  Nurse Communication: Nurse aware of consult, application of brace    Rizwan Vasques, Restorative Technician

## 2024-11-17 NOTE — ASSESSMENT & PLAN NOTE
Lab Results   Component Value Date    HGBA1C 7.2 (H) 10/07/2024       Recent Labs     11/16/24  1544 11/16/24  2100 11/17/24  0512 11/17/24  1028   POCGLU 161* 165* 151* 180*       Blood Sugar Average: Last 72 hrs:  (P) 166.75Patient is on metformin as outpatient.  Will continue with sliding scale of insulin and monitor blood sugar  Adjust insulin regimen per blood sugar

## 2024-11-17 NOTE — PLAN OF CARE
Problem: Prexisting or High Potential for Compromised Skin Integrity  Goal: Skin integrity is maintained or improved  Description: INTERVENTIONS:  - Identify patients at risk for skin breakdown  - Assess and monitor skin integrity  - Assess and monitor nutrition and hydration status  - Monitor labs   - Assess for incontinence   - Turn and reposition patient  - Assist with mobility/ambulation  - Relieve pressure over bony prominences  - Avoid friction and shearing  - Provide appropriate hygiene as needed including keeping skin clean and dry  - Evaluate need for skin moisturizer/barrier cream  - Collaborate with interdisciplinary team   - Patient/family teaching  - Consider wound care consult   Outcome: Progressing     Problem: PAIN - ADULT  Goal: Verbalizes/displays adequate comfort level or baseline comfort level  Description: Interventions:  - Encourage patient to monitor pain and request assistance  - Assess pain using appropriate pain scale  - Administer analgesics based on type and severity of pain and evaluate response  - Implement non-pharmacological measures as appropriate and evaluate response  - Consider cultural and social influences on pain and pain management  - Notify physician/advanced practitioner if interventions unsuccessful or patient reports new pain  Outcome: Progressing     Problem: INFECTION - ADULT  Goal: Absence or prevention of progression during hospitalization  Description: INTERVENTIONS:  - Assess and monitor for signs and symptoms of infection  - Monitor lab/diagnostic results  - Monitor all insertion sites, i.e. indwelling lines, tubes, and drains  - Monitor endotracheal if appropriate and nasal secretions for changes in amount and color  - [place] appropriate cooling/warming therapies per order  - Administer medications as ordered  - Instruct and encourage patient and family to use good hand hygiene technique  - Identify and instruct in appropriate isolation precautions for  identified infection/condition  Outcome: Progressing  Goal: Absence of fever/infection during neutropenic period  Description: INTERVENTIONS:  - Monitor WBC    Outcome: Progressing     Problem: SAFETY ADULT  Goal: Patient will remain free of falls  Description: INTERVENTIONS:  - Educate patient/family on patient safety including physical limitations  - Instruct patient to call for assistance with activity   - Consult OT/PT to assist with strengthening/mobility   - Keep Call bell within reach  - Keep bed low and locked with side rails adjusted as appropriate  - Keep care items and personal belongings within reach  - Initiate and maintain comfort rounds  - Make Fall Risk Sign visible to staff  - Offer Toileting every 2 Hours, in advance of need  - Initiate/Maintain bed alarm  - Obtain necessary fall risk management equipment: walker  - Apply yellow socks and bracelet for high fall risk patients  - Consider moving patient to room near nurses station  Outcome: Progressing  Goal: Maintain or return to baseline ADL function  Description: INTERVENTIONS:  -  Assess patient's ability to carry out ADLs; assess patient's baseline for ADL function and identify physical deficits which impact ability to perform ADLs (bathing, care of mouth/teeth, toileting, grooming, dressing, etc.)  - Assess/evaluate cause of self-care deficits   - Assess range of motion  - Assess patient's mobility; develop plan if impaired  - Assess patient's need for assistive devices and provide as appropriate  - Encourage maximum independence but intervene and supervise when necessary  - Involve family in performance of ADLs  - Assess for home care needs following discharge   - Consider OT consult to assist with ADL evaluation and planning for discharge  - Provide patient education as appropriate  Outcome: Progressing  Goal: Maintains/Returns to pre admission functional level  Description: INTERVENTIONS:  - Perform AM-PAC 6 Click Basic Mobility/ Daily  Activity assessment daily.  - Set and communicate daily mobility goal to care team and patient/family/caregiver.   - Collaborate with rehabilitation services on mobility goals if consulted  - Perform Range of Motion 3 times a day.  - Reposition patient every 2 hours.  - Dangle patient 3 times a day  - Stand patient 3 times a day  - Ambulate patient 3 times a day  - Out of bed to chair 3 times a day   - Out of bed for meals 3 times a day  - Out of bed for toileting  - Record patient progress and toleration of activity level   Outcome: Progressing     Problem: DISCHARGE PLANNING  Goal: Discharge to home or other facility with appropriate resources  Description: INTERVENTIONS:  - Identify barriers to discharge w/patient and caregiver  - Arrange for needed discharge resources and transportation as appropriate  - Identify discharge learning needs (meds, wound care, etc.)  - Arrange for interpretive services to assist at discharge as needed  - Refer to Case Management Department for coordinating discharge planning if the patient needs post-hospital services based on physician/advanced practitioner order or complex needs related to functional status, cognitive ability, or social support system  Outcome: Progressing     Problem: Knowledge Deficit  Goal: Patient/family/caregiver demonstrates understanding of disease process, treatment plan, medications, and discharge instructions  Description: Complete learning assessment and assess knowledge base.  Interventions:  - Provide teaching at level of understanding  - Provide teaching via preferred learning methods  Outcome: Progressing

## 2024-11-17 NOTE — ASSESSMENT & PLAN NOTE
Patient was recently admitted to [facility] for elective left TKA and was discharged two days ago, returned to ED as he was unable to ambulate or participate with home PT.   Orthopedic evaluation appreciated  Lower extremity Doppler is negative for DVT  PT OT evaluation and case management for postacute needs/placement  Pain control  PT/OT recs rehab, pending referral to [facility]  One episode of low grade temp last night 10 PM. No further recurrence. No dyspnea, no chest pain, no abdominal pain, no cough, no diarrhea. Observe off of abx.  If recurs, will need full workup including blood cultures.

## 2024-11-18 ENCOUNTER — TRANSITIONAL CARE MANAGEMENT (OUTPATIENT)
Dept: FAMILY MEDICINE CLINIC | Facility: CLINIC | Age: 79
End: 2024-11-18

## 2024-11-18 ENCOUNTER — HOSPITAL ENCOUNTER (INPATIENT)
Facility: HOSPITAL | Age: 79
LOS: 15 days | Discharge: NURSING, REHAB OR LONG-TERM CARE | DRG: 560 | End: 2024-12-03
Attending: INTERNAL MEDICINE | Admitting: FAMILY MEDICINE
Payer: MEDICARE

## 2024-11-18 VITALS
BODY MASS INDEX: 30.1 KG/M2 | DIASTOLIC BLOOD PRESSURE: 66 MMHG | OXYGEN SATURATION: 94 % | HEART RATE: 66 BPM | RESPIRATION RATE: 16 BRPM | HEIGHT: 71 IN | SYSTOLIC BLOOD PRESSURE: 130 MMHG | WEIGHT: 215 LBS | TEMPERATURE: 98.4 F

## 2024-11-18 DIAGNOSIS — N40.1 BENIGN PROSTATIC HYPERPLASIA WITH NOCTURIA: ICD-10-CM

## 2024-11-18 DIAGNOSIS — I48.92 CHRONIC ATRIAL FLUTTER (HCC): ICD-10-CM

## 2024-11-18 DIAGNOSIS — K59.00 CONSTIPATION: ICD-10-CM

## 2024-11-18 DIAGNOSIS — Z96.652 STATUS POST TOTAL LEFT KNEE REPLACEMENT USING CEMENT: Primary | ICD-10-CM

## 2024-11-18 DIAGNOSIS — R52 ACUTE PAIN: ICD-10-CM

## 2024-11-18 DIAGNOSIS — R35.1 BENIGN PROSTATIC HYPERPLASIA WITH NOCTURIA: ICD-10-CM

## 2024-11-18 DIAGNOSIS — E11.49 TYPE 2 DIABETES MELLITUS WITH OTHER NEUROLOGIC COMPLICATION, WITHOUT LONG-TERM CURRENT USE OF INSULIN (HCC): ICD-10-CM

## 2024-11-18 DIAGNOSIS — M17.12 PRIMARY OSTEOARTHRITIS OF LEFT KNEE: ICD-10-CM

## 2024-11-18 DIAGNOSIS — K59.03 DRUG-INDUCED CONSTIPATION: ICD-10-CM

## 2024-11-18 DIAGNOSIS — R11.0 NAUSEA: ICD-10-CM

## 2024-11-18 LAB
ANION GAP SERPL CALCULATED.3IONS-SCNC: 9 MMOL/L (ref 4–13)
BUN SERPL-MCNC: 19 MG/DL (ref 5–25)
CALCIUM SERPL-MCNC: 9 MG/DL (ref 8.4–10.2)
CHLORIDE SERPL-SCNC: 99 MMOL/L (ref 96–108)
CO2 SERPL-SCNC: 30 MMOL/L (ref 21–32)
CREAT SERPL-MCNC: 1.07 MG/DL (ref 0.6–1.3)
ERYTHROCYTE [DISTWIDTH] IN BLOOD BY AUTOMATED COUNT: 13.2 % (ref 11.6–15.1)
GFR SERPL CREATININE-BSD FRML MDRD: 65 ML/MIN/1.73SQ M
GLUCOSE SERPL-MCNC: 126 MG/DL (ref 65–140)
GLUCOSE SERPL-MCNC: 144 MG/DL (ref 65–140)
GLUCOSE SERPL-MCNC: 185 MG/DL (ref 65–140)
GLUCOSE SERPL-MCNC: 195 MG/DL (ref 65–140)
GLUCOSE SERPL-MCNC: 199 MG/DL (ref 65–140)
HCT VFR BLD AUTO: 31.1 % (ref 36.5–49.3)
HGB BLD-MCNC: 10.4 G/DL (ref 12–17)
INR PPP: 1.59 (ref 0.85–1.19)
MCH RBC QN AUTO: 29.6 PG (ref 26.8–34.3)
MCHC RBC AUTO-ENTMCNC: 33.4 G/DL (ref 31.4–37.4)
MCV RBC AUTO: 89 FL (ref 82–98)
PLATELET # BLD AUTO: 136 THOUSANDS/UL (ref 149–390)
PMV BLD AUTO: 10.6 FL (ref 8.9–12.7)
POTASSIUM SERPL-SCNC: 3.9 MMOL/L (ref 3.5–5.3)
PROTHROMBIN TIME: 19.1 SECONDS (ref 12.3–15)
RBC # BLD AUTO: 3.51 MILLION/UL (ref 3.88–5.62)
SODIUM SERPL-SCNC: 138 MMOL/L (ref 135–147)
WBC # BLD AUTO: 7.19 THOUSAND/UL (ref 4.31–10.16)

## 2024-11-18 PROCEDURE — 82948 REAGENT STRIP/BLOOD GLUCOSE: CPT

## 2024-11-18 PROCEDURE — 97530 THERAPEUTIC ACTIVITIES: CPT

## 2024-11-18 PROCEDURE — 85027 COMPLETE CBC AUTOMATED: CPT | Performed by: FAMILY MEDICINE

## 2024-11-18 PROCEDURE — 99239 HOSP IP/OBS DSCHRG MGMT >30: CPT | Performed by: FAMILY MEDICINE

## 2024-11-18 PROCEDURE — 80048 BASIC METABOLIC PNL TOTAL CA: CPT | Performed by: FAMILY MEDICINE

## 2024-11-18 PROCEDURE — 85610 PROTHROMBIN TIME: CPT | Performed by: FAMILY MEDICINE

## 2024-11-18 PROCEDURE — 99222 1ST HOSP IP/OBS MODERATE 55: CPT | Performed by: FAMILY MEDICINE

## 2024-11-18 PROCEDURE — NC001 PR NO CHARGE: Performed by: PHYSICAL MEDICINE & REHABILITATION

## 2024-11-18 PROCEDURE — 99232 SBSQ HOSP IP/OBS MODERATE 35: CPT | Performed by: FAMILY MEDICINE

## 2024-11-18 PROCEDURE — 97116 GAIT TRAINING THERAPY: CPT

## 2024-11-18 RX ORDER — INSULIN LISPRO 100 [IU]/ML
1-6 INJECTION, SOLUTION INTRAVENOUS; SUBCUTANEOUS
Status: DISCONTINUED | OUTPATIENT
Start: 2024-11-19 | End: 2024-12-03 | Stop reason: HOSPADM

## 2024-11-18 RX ORDER — POLYETHYLENE GLYCOL 3350 17 G/17G
17 POWDER, FOR SOLUTION ORAL DAILY
Qty: 510 G | Refills: 0 | Status: ON HOLD | OUTPATIENT
Start: 2024-11-19

## 2024-11-18 RX ORDER — POLYETHYLENE GLYCOL 3350 17 G/17G
17 POWDER, FOR SOLUTION ORAL DAILY
Status: DISCONTINUED | OUTPATIENT
Start: 2024-11-19 | End: 2024-11-29

## 2024-11-18 RX ORDER — BISACODYL 10 MG
10 SUPPOSITORY, RECTAL RECTAL DAILY
Status: DISCONTINUED | OUTPATIENT
Start: 2024-11-19 | End: 2024-11-19

## 2024-11-18 RX ORDER — ONDANSETRON 4 MG/1
4 TABLET, ORALLY DISINTEGRATING ORAL EVERY 6 HOURS PRN
Status: DISCONTINUED | OUTPATIENT
Start: 2024-11-18 | End: 2024-12-03 | Stop reason: HOSPADM

## 2024-11-18 RX ORDER — PRAVASTATIN SODIUM 40 MG
40 TABLET ORAL
Status: DISCONTINUED | OUTPATIENT
Start: 2024-11-19 | End: 2024-12-03 | Stop reason: HOSPADM

## 2024-11-18 RX ORDER — HYDROMORPHONE HCL/PF 1 MG/ML
0.5 SYRINGE (ML) INJECTION EVERY 4 HOURS PRN
Status: DISCONTINUED | OUTPATIENT
Start: 2024-11-18 | End: 2024-11-19

## 2024-11-18 RX ORDER — BISACODYL 10 MG
10 SUPPOSITORY, RECTAL RECTAL DAILY
Status: DISCONTINUED | OUTPATIENT
Start: 2024-11-18 | End: 2024-11-18 | Stop reason: HOSPADM

## 2024-11-18 RX ORDER — METOPROLOL SUCCINATE 25 MG/1
25 TABLET, EXTENDED RELEASE ORAL
Status: DISCONTINUED | OUTPATIENT
Start: 2024-11-18 | End: 2024-12-03 | Stop reason: HOSPADM

## 2024-11-18 RX ORDER — ENOXAPARIN SODIUM 100 MG/ML
100 INJECTION SUBCUTANEOUS EVERY 12 HOURS
Status: DISCONTINUED | OUTPATIENT
Start: 2024-11-18 | End: 2024-11-20

## 2024-11-18 RX ORDER — INSULIN LISPRO 100 [IU]/ML
1-6 INJECTION, SOLUTION INTRAVENOUS; SUBCUTANEOUS
Qty: 5.4 ML | Refills: 0 | Status: SHIPPED | OUTPATIENT
Start: 2024-11-18 | End: 2024-11-18

## 2024-11-18 RX ORDER — OXYCODONE HYDROCHLORIDE 5 MG/1
5 TABLET ORAL EVERY 4 HOURS PRN
Refills: 0 | Status: DISCONTINUED | OUTPATIENT
Start: 2024-11-18 | End: 2024-11-20

## 2024-11-18 RX ORDER — FOLIC ACID 1 MG/1
1 TABLET ORAL DAILY
Status: DISCONTINUED | OUTPATIENT
Start: 2024-11-19 | End: 2024-12-03 | Stop reason: HOSPADM

## 2024-11-18 RX ORDER — ACETAMINOPHEN 325 MG/1
975 TABLET ORAL EVERY 8 HOURS SCHEDULED
Status: DISCONTINUED | OUTPATIENT
Start: 2024-11-18 | End: 2024-12-03 | Stop reason: HOSPADM

## 2024-11-18 RX ORDER — INSULIN LISPRO 100 [IU]/ML
1-5 INJECTION, SOLUTION INTRAVENOUS; SUBCUTANEOUS
Status: DISCONTINUED | OUTPATIENT
Start: 2024-11-19 | End: 2024-12-03 | Stop reason: HOSPADM

## 2024-11-18 RX ORDER — AMOXICILLIN 250 MG
2 CAPSULE ORAL DAILY
Status: DISCONTINUED | OUTPATIENT
Start: 2024-11-19 | End: 2024-11-22

## 2024-11-18 RX ORDER — INSULIN LISPRO 100 [IU]/ML
1-5 INJECTION, SOLUTION INTRAVENOUS; SUBCUTANEOUS
Qty: 1.5 ML | Refills: 0 | Status: SHIPPED | OUTPATIENT
Start: 2024-11-18 | End: 2024-11-18

## 2024-11-18 RX ORDER — WARFARIN SODIUM 5 MG/1
5 TABLET ORAL
Status: DISCONTINUED | OUTPATIENT
Start: 2024-11-19 | End: 2024-11-22

## 2024-11-18 RX ORDER — ASCORBIC ACID 500 MG
500 TABLET ORAL 2 TIMES DAILY
Status: DISCONTINUED | OUTPATIENT
Start: 2024-11-19 | End: 2024-12-03 | Stop reason: HOSPADM

## 2024-11-18 RX ORDER — BISACODYL 10 MG
10 SUPPOSITORY, RECTAL RECTAL DAILY
Qty: 12 SUPPOSITORY | Refills: 0 | Status: ON HOLD | OUTPATIENT
Start: 2024-11-19

## 2024-11-18 RX ORDER — WARFARIN SODIUM 5 MG/1
5 TABLET ORAL
Status: ON HOLD
Start: 2024-11-18

## 2024-11-18 RX ORDER — NITROGLYCERIN 0.4 MG/1
0.4 TABLET SUBLINGUAL
Status: DISCONTINUED | OUTPATIENT
Start: 2024-11-18 | End: 2024-12-03 | Stop reason: HOSPADM

## 2024-11-18 RX ADMIN — ACETAMINOPHEN 975 MG: 325 TABLET, FILM COATED ORAL at 06:11

## 2024-11-18 RX ADMIN — OXYCODONE HYDROCHLORIDE 5 MG: 5 TABLET ORAL at 17:19

## 2024-11-18 RX ADMIN — INSULIN LISPRO 2 UNITS: 100 INJECTION, SOLUTION INTRAVENOUS; SUBCUTANEOUS at 17:20

## 2024-11-18 RX ADMIN — PRAVASTATIN SODIUM 40 MG: 40 TABLET ORAL at 17:19

## 2024-11-18 RX ADMIN — OXYCODONE HYDROCHLORIDE 5 MG: 5 TABLET ORAL at 06:11

## 2024-11-18 RX ADMIN — OXYCODONE HYDROCHLORIDE AND ACETAMINOPHEN 500 MG: 500 TABLET ORAL at 10:20

## 2024-11-18 RX ADMIN — WARFARIN SODIUM 5 MG: 5 TABLET ORAL at 17:19

## 2024-11-18 RX ADMIN — POLYETHYLENE GLYCOL 3350 17 G: 17 POWDER, FOR SOLUTION ORAL at 10:20

## 2024-11-18 RX ADMIN — INSULIN LISPRO 1 UNITS: 100 INJECTION, SOLUTION INTRAVENOUS; SUBCUTANEOUS at 11:43

## 2024-11-18 RX ADMIN — HYDROMORPHONE HYDROCHLORIDE 0.5 MG: 1 INJECTION, SOLUTION INTRAMUSCULAR; INTRAVENOUS; SUBCUTANEOUS at 20:01

## 2024-11-18 RX ADMIN — INSULIN LISPRO 1 UNITS: 100 INJECTION, SOLUTION INTRAVENOUS; SUBCUTANEOUS at 22:48

## 2024-11-18 RX ADMIN — BISACODYL 10 MG: 10 SUPPOSITORY RECTAL at 12:31

## 2024-11-18 RX ADMIN — ENOXAPARIN SODIUM 100 MG: 100 INJECTION SUBCUTANEOUS at 10:20

## 2024-11-18 RX ADMIN — ENOXAPARIN SODIUM 100 MG: 100 INJECTION SUBCUTANEOUS at 22:41

## 2024-11-18 RX ADMIN — OXYCODONE HYDROCHLORIDE AND ACETAMINOPHEN 500 MG: 500 TABLET ORAL at 17:19

## 2024-11-18 RX ADMIN — Medication 2000 UNITS: at 10:20

## 2024-11-18 RX ADMIN — OXYCODONE HYDROCHLORIDE 5 MG: 5 TABLET ORAL at 11:42

## 2024-11-18 RX ADMIN — FOLIC ACID 1 MG: 1 TABLET ORAL at 10:20

## 2024-11-18 RX ADMIN — OXYCODONE HYDROCHLORIDE 5 MG: 5 TABLET ORAL at 00:08

## 2024-11-18 RX ADMIN — SENNOSIDES AND DOCUSATE SODIUM 2 TABLET: 50; 8.6 TABLET ORAL at 10:20

## 2024-11-18 RX ADMIN — METOPROLOL SUCCINATE 25 MG: 25 TABLET, EXTENDED RELEASE ORAL at 22:17

## 2024-11-18 RX ADMIN — ACETAMINOPHEN 975 MG: 325 TABLET, FILM COATED ORAL at 14:56

## 2024-11-18 RX ADMIN — ACETAMINOPHEN 975 MG: 325 TABLET, FILM COATED ORAL at 22:17

## 2024-11-18 NOTE — [facility] ADMISSION
Message sent to CM Sowmya in [place] stating patient can admit to [facility] today into Room 974 with a transfer time of 5:00 pm. To connect with a nurse to provide report please have pts nurse reach out in secure chat to [facility] Charge RN.

## 2024-11-18 NOTE — [facility] ADMISSION
[facility]  met with patient at bedside. Reviewed [facility] program, acute rehab criteria and approval process, medicare days, as well as [facility] locations and preferences. Patient's preferred [facility] location is [facility] .  [facility] Rehab folder left with patient and all questions answered. Patient was made aware that [facility] Reviewer will keep their  updated regarding referral status.

## 2024-11-18 NOTE — RESTORATIVE TECHNICIAN NOTE
Restorative Technician Note      Patient Name: Isauro Sow     Restorative Tech Visit Date: 11/18/24  Note Type: Mobility  Patient Position Upon Consult: Bedside chair  Activity Performed: Stood; Dangled; Transferred (3 sts; SPT to bed)  Assistive Device: Roller walker; Other (Comment) (ax2;)  Patient Position at End of Consult: Supine; All needs within reach  Nurse Communication: Nurse aware of consult, application of brace    Rizwan Vasques, Restorative Technician

## 2024-11-18 NOTE — PLAN OF CARE
Problem: Prexisting or High Potential for Compromised Skin Integrity  Goal: Skin integrity is maintained or improved  Description: INTERVENTIONS:  - Identify patients at risk for skin breakdown  - Assess and monitor skin integrity  - Assess and monitor nutrition and hydration status  - Monitor labs   - Assess for incontinence   - Turn and reposition patient  - Assist with mobility/ambulation  - Relieve pressure over bony prominences  - Avoid friction and shearing  - Provide appropriate hygiene as needed including keeping skin clean and dry  - Evaluate need for skin moisturizer/barrier cream  - Collaborate with interdisciplinary team   - Patient/family teaching  - Consider wound care consult   Outcome: Adequate for Discharge     Problem: PAIN - ADULT  Goal: Verbalizes/displays adequate comfort level or baseline comfort level  Description: Interventions:  - Encourage patient to monitor pain and request assistance  - Assess pain using appropriate pain scale  - Administer analgesics based on type and severity of pain and evaluate response  - Implement non-pharmacological measures as appropriate and evaluate response  - Consider cultural and social influences on pain and pain management  - Notify physician/advanced practitioner if interventions unsuccessful or patient reports new pain  Outcome: Adequate for Discharge     Problem: INFECTION - ADULT  Goal: Absence or prevention of progression during hospitalization  Description: INTERVENTIONS:  - Assess and monitor for signs and symptoms of infection  - Monitor lab/diagnostic results  - Monitor all insertion sites, i.e. indwelling lines, tubes, and drains  - Monitor endotracheal if appropriate and nasal secretions for changes in amount and color  - Tilly appropriate cooling/warming therapies per order  - Administer medications as ordered  - Instruct and encourage patient and family to use good hand hygiene technique  - Identify and instruct in appropriate isolation  precautions for identified infection/condition  Outcome: Adequate for Discharge  Goal: Absence of fever/infection during neutropenic period  Description: INTERVENTIONS:  - Monitor WBC    Outcome: Adequate for Discharge     Problem: SAFETY ADULT  Goal: Patient will remain free of falls  Description: INTERVENTIONS:  - Educate patient/family on patient safety including physical limitations  - Instruct patient to call for assistance with activity   - Consult OT/PT to assist with strengthening/mobility   - Keep Call bell within reach  - Keep bed low and locked with side rails adjusted as appropriate  - Keep care items and personal belongings within reach  - Initiate and maintain comfort rounds  - Make Fall Risk Sign visible to staff  - Offer Toileting every 2 Hours, in advance of need  - Initiate/Maintain bed alarm  - Obtain necessary fall risk management equipment: walker  - Apply yellow socks and bracelet for high fall risk patients  - Consider moving patient to room near nurses station  Outcome: Adequate for Discharge  Goal: Maintain or return to baseline ADL function  Description: INTERVENTIONS:  -  Assess patient's ability to carry out ADLs; assess patient's baseline for ADL function and identify physical deficits which impact ability to perform ADLs (bathing, care of mouth/teeth, toileting, grooming, dressing, etc.)  - Assess/evaluate cause of self-care deficits   - Assess range of motion  - Assess patient's mobility; develop plan if impaired  - Assess patient's need for assistive devices and provide as appropriate  - Encourage maximum independence but intervene and supervise when necessary  - Involve family in performance of ADLs  - Assess for home care needs following discharge   - Consider OT consult to assist with ADL evaluation and planning for discharge  - Provide patient education as appropriate  Outcome: Adequate for Discharge  Goal: Maintains/Returns to pre admission functional level  Description:  INTERVENTIONS:  - Perform AM-PAC 6 Click Basic Mobility/ Daily Activity assessment daily.  - Set and communicate daily mobility goal to care team and patient/family/caregiver.   - Collaborate with rehabilitation services on mobility goals if consulted  - Perform Range of Motion 3 times a day.  - Reposition patient every 2 hours.  - Dangle patient 3 times a day  - Stand patient 3 times a day  - Ambulate patient 3 times a day  - Out of bed to chair 3 times a day   - Out of bed for meals 3 times a day  - Out of bed for toileting  - Record patient progress and toleration of activity level   Outcome: Adequate for Discharge     Problem: DISCHARGE PLANNING  Goal: Discharge to home or other facility with appropriate resources  Description: INTERVENTIONS:  - Identify barriers to discharge w/patient and caregiver  - Arrange for needed discharge resources and transportation as appropriate  - Identify discharge learning needs (meds, wound care, etc.)  - Arrange for interpretive services to assist at discharge as needed  - Refer to Case Management Department for coordinating discharge planning if the patient needs post-hospital services based on physician/advanced practitioner order or complex needs related to functional status, cognitive ability, or social support system  Outcome: Adequate for Discharge     Problem: Knowledge Deficit  Goal: Patient/family/caregiver demonstrates understanding of disease process, treatment plan, medications, and discharge instructions  Description: Complete learning assessment and assess knowledge base.  Interventions:  - Provide teaching at level of understanding  - Provide teaching via preferred learning methods  Outcome: Adequate for Discharge

## 2024-11-18 NOTE — [facility]C ADMISSION
Reviewed patient's case with [facility] physician - patient is preapproved for [facility] pending medical stability, functional progress/tolerance and bed availability. CM has been updated. Will continue to follow at this time.   Name band; No
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
## 2024-11-18 NOTE — CASE MANAGEMENT
Case Management Discharge Planning Note    Patient name Isauro Sow  Location /-01 MRN [id number]  : 1945 Date 2024       Current Admission Date: 11/15/2024  Current Admission Diagnosis:Status post total left knee replacement using cement   Patient Active Problem List    Diagnosis Date Noted Date Diagnosed    Status post total left knee replacement using cement 11/15/2024     Anemia 11/15/2024     Preoperative evaluation of a medical condition to rule out surgical contraindications (TAR required) 10/31/2024     Chronic pain of left knee 2024     Lumbar spondylosis 2024     Decreased pedal pulses 2024     Stenosis of left carotid artery 2024     Other constipation 2024     Chronic midline low back pain without sciatica 2023     Platelets decreased ([facility]) 2023     Aortic valve stenosis 11/10/2023     Hx of cardiac pacemaker 2023     Chronic atrial flutter ([facility]) 2023     Chronic diastolic CHF (congestive heart failure) ([facility]) 2023     S/P TAVR (transcatheter aortic valve replacement) 2023     Coronary artery disease involving native coronary artery 2023     Sick sinus syndrome ([facility]) 2023     Continuous opioid dependence ([facility]) 10/24/2022     Dysuria 2022     L3 osteophyte fracture 2022     Fracture of thoracic transverse process ([facility]) 08/15/2022     Lumbar transverse process fracture ([facility]) 08/15/2022     Stage 3a chronic kidney disease ([facility]) 2022     RLS (restless legs syndrome) 2021     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 ([facility]) 2021     Chronic bilateral low back pain without sciatica 2021     Hemiplegia and hemiparesis following cerebral infarction affecting left non-dominant side ([facility]) 2020     Benign prostatic hyperplasia with nocturia 2020     PAD (peripheral artery disease) ([facility]) 2019     Primary  osteoarthritis of left knee 01/10/2019     Bilateral carotid artery stenosis 10/08/2018     Dermatosis 10/08/2018     Plantar fasciitis 06/04/2018     Constipation 01/16/2018     Seborrheic dermatitis 11/10/2017     History of stroke 09/15/2017     Asthma 07/26/2017     Benign essential hypertension 07/26/2017     Type 2 diabetes mellitus (HCC) 07/26/2017     Hyperlipidemia 07/26/2017     Diabetic nephropathy associated with type 2 diabetes mellitus (HCC) 06/22/2017     Non-rheumatic aortic sclerosis 06/22/2017     Popliteal cyst, left 10/15/2015     Acquired hallux malleus of right foot 10/06/2015     Pre-ulcerative corn or callous 10/06/2015     Gout 12/11/2014     Allergic rhinitis 05/03/2012     Retina disorder 05/03/2012       LOS (days): 3  Geometric Mean LOS (GMLOS) (days): 2.5  Days to GMLOS:-0.3     OBJECTIVE:  Risk of Unplanned Readmission Score: 23.04         Current admission status: Inpatient   Preferred Pharmacy:   [id number]  [address]  [facility] [place] [address]  Phone: [phone] Fax: [phone]    Primary Care Provider: Anthony Roberts MD    Primary Insurance: MEDICARE  Secondary Insurance: [facility]    DISCHARGE DETAILS:              Other Referral/Resources/Interventions Provided:  Referral Comments: Per prior CM notes - referral sent. This CM reviewed referrals in Aidin - only facesheet sent to [facility] - this CM updated referral w/clinicals and sent message to [facility] requesting review @ [phone] - awaiting response.  CM called [facility] @ 1042 spoke to [facility] in Admissions - she will review referral as backup in the event [facility] denies pt.  CM awaiting response from [facility] for acceptance.

## 2024-11-18 NOTE — ASSESSMENT & PLAN NOTE
Patient was recently admitted to [facility] for elective left TKA on 11/13/24, discharged with home therapy, returned to ED as he was unable to ambulate or participate with home PT.   Orthopedic evaluation appreciated: recommend rehab   Currently presents to [facility] for admission  Pain control  PT/OT, PMR consult   Clothing

## 2024-11-18 NOTE — ASSESSMENT & PLAN NOTE
Lab Results   Component Value Date    HGBA1C 7.2 (H) 10/07/2024       Recent Labs     11/17/24  1028 11/17/24  1550 11/17/24 2031 11/18/24  0541   POCGLU 180* 176* 158* 126       Blood Sugar Average: Last 72 hrs:  (P) [id number] is on metformin as outpatient.  Will continue with sliding scale of insulin and monitor blood sugar  Adjust insulin regimen per blood sugar

## 2024-11-18 NOTE — ASSESSMENT & PLAN NOTE
Lab Results   Component Value Date    EGFR 65 11/18/2024    EGFR 64 11/16/2024    EGFR 62 11/15/2024    CREATININE 1.07 11/18/2024    CREATININE 1.09 11/16/2024    CREATININE 1.12 11/15/2024     Renal function at baseline

## 2024-11-18 NOTE — DISCHARGE INSTR - AVS FIRST PAGE
Discharge Instructions - Orthopedics  Isauro Sow 79 y.o. male MRN: [id number]  Unit/Bed#: -01    Weight Bearing Status:                                           You may bear weight as tolerated on your Left Lower Extremity.    DVT prophylaxis:  Continue home warfarin    Pain:  Continue pain medications as needed.    Dressing Instructions:   Please keep clean, dry and intact until follow up.    Appt Instructions:   If you do not have your appointment, please call the clinic at [phone]  Otherwise follow up as scheduled/instructed.    Contact the office sooner if you experience any increased numbness/tingling in the extremities.

## 2024-11-18 NOTE — PROGRESS NOTES
Patient:  LIAM CHIN    MRN:  [id number]    Aidin Request ID:  [id number]    Level of care reserved:  Inpatient Rehab Facility    Partner Reserved:  [facility] ([place]), [place] [id number] [phone]    Clinical needs requested:    Geography searched:  10 miles around [id number]    Start of Service:    Request sent:  2:24pm EST on 11/16/2024 by Vannesa Gama    Partner reserved:  2:38pm EST on 11/18/2024 by Sowmya Murillo    Choice list shared:  2:38pm EST on 11/18/2024 by Sowmya Murillo

## 2024-11-18 NOTE — ASSESSMENT & PLAN NOTE
Hemoglobin noted to be 10.4 which is below his baseline. Expected blood loss anemia from the surgery  F/u iron panel

## 2024-11-18 NOTE — ASSESSMENT & PLAN NOTE
Patient was recently admitted to [facility] for elective left TKA and was discharged two days ago, returned to ED as he was unable to ambulate or participate with home PT.   Orthopedic evaluation appreciated  Lower extremity Doppler is negative for DVT  PT OT evaluation and case management for postacute needs/placement  Pain control  PT/OT recs rehab, pending referral to [facility]  No fever in last 24 hours  Medically stable for discharge to [facility]

## 2024-11-18 NOTE — CASE MANAGEMENT
Case Management Discharge Planning Note    Patient name Isauro Sow  Location /-01 MRN [id number]  : 1945 Date 2024       Current Admission Date: 11/15/2024  Current Admission Diagnosis:Status post total left knee replacement using cement   Patient Active Problem List    Diagnosis Date Noted Date Diagnosed    Status post total left knee replacement using cement 11/15/2024     Anemia 11/15/2024     Preoperative evaluation of a medical condition to rule out surgical contraindications (TAR required) 10/31/2024     Chronic pain of left knee 2024     Lumbar spondylosis 2024     Decreased pedal pulses 2024     Stenosis of left carotid artery 2024     Other constipation 2024     Chronic midline low back pain without sciatica 2023     Platelets decreased ([facility]) 2023     Aortic valve stenosis 11/10/2023     Hx of cardiac pacemaker 2023     Chronic atrial flutter ([facility]) 2023     Chronic diastolic CHF (congestive heart failure) ([facility]) 2023     S/P TAVR (transcatheter aortic valve replacement) 2023     Coronary artery disease involving native coronary artery 2023     Sick sinus syndrome ([facility]) 2023     Continuous opioid dependence ([facility]) 10/24/2022     Dysuria 2022     L3 osteophyte fracture 2022     Fracture of thoracic transverse process ([facility]) 08/15/2022     Lumbar transverse process fracture ([facility]) 08/15/2022     Stage 3a chronic kidney disease ([facility]) 2022     RLS (restless legs syndrome) 2021     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 ([facility]) 2021     Chronic bilateral low back pain without sciatica 2021     Hemiplegia and hemiparesis following cerebral infarction affecting left non-dominant side ([facility]) 2020     Benign prostatic hyperplasia with nocturia 2020     PAD (peripheral artery disease) ([facility]) 2019     Primary  osteoarthritis of left knee 01/10/2019     Bilateral carotid artery stenosis 10/08/2018     Dermatosis 10/08/2018     Plantar fasciitis 06/04/2018     Constipation 01/16/2018     Seborrheic dermatitis 11/10/2017     History of stroke 09/15/2017     Asthma 07/26/2017     Benign essential hypertension 07/26/2017     Type 2 diabetes mellitus (HCC) 07/26/2017     Hyperlipidemia 07/26/2017     Diabetic nephropathy associated with type 2 diabetes mellitus (HCC) 06/22/2017     Non-rheumatic aortic sclerosis 06/22/2017     Popliteal cyst, left 10/15/2015     Acquired hallux malleus of right foot 10/06/2015     Pre-ulcerative corn or callous 10/06/2015     Gout 12/11/2014     Allergic rhinitis 05/03/2012     Retina disorder 05/03/2012       LOS (days): 3  Geometric Mean LOS (GMLOS) (days): 2.5  Days to GMLOS:-0.4     OBJECTIVE:  Risk of Unplanned Readmission Score: 23.32         Current admission status: Inpatient   Preferred Pharmacy:   [id number]  [place] - [address] [facility].  [facility] [place] [address]  Phone: [phone] Fax: [phone]    Primary Care Provider: Anthony Roberts MD    Primary Insurance: MEDICARE  Secondary Insurance: [facility]    DISCHARGE DETAILS:    Discharge planning discussed with:: Patient          Other Referral/Resources/Interventions Provided:  Referral Comments: Received message from [facility] liaison - able to accept pt today.  Notified provider & bedside nurse of same.  Transport set up for 1700 today.             Accepting Facility Name, City & State :  [facility]

## 2024-11-18 NOTE — CASE MANAGEMENT
Case Management Discharge Planning Note    Patient name Isauro Sow  Location /-01 MRN [id number]  : 1945 Date 2024       Current Admission Date: 11/15/2024  Current Admission Diagnosis:Status post total left knee replacement using cement   Patient Active Problem List    Diagnosis Date Noted Date Diagnosed    Status post total left knee replacement using cement 11/15/2024     Anemia 11/15/2024     Preoperative evaluation of a medical condition to rule out surgical contraindications (TAR required) 10/31/2024     Chronic pain of left knee 2024     Lumbar spondylosis 2024     Decreased pedal pulses 2024     Stenosis of left carotid artery 2024     Other constipation 2024     Chronic midline low back pain without sciatica 2023     Platelets decreased ([facility]) 2023     Aortic valve stenosis 11/10/2023     Hx of cardiac pacemaker 2023     Chronic atrial flutter ([facility]) 2023     Chronic diastolic CHF (congestive heart failure) ([facility]) 2023     S/P TAVR (transcatheter aortic valve replacement) 2023     Coronary artery disease involving native coronary artery 2023     Sick sinus syndrome ([facility]) 2023     Continuous opioid dependence ([facility]) 10/24/2022     Dysuria 2022     L3 osteophyte fracture 2022     Fracture of thoracic transverse process ([facility]) 08/15/2022     Lumbar transverse process fracture ([facility]) 08/15/2022     Stage 3a chronic kidney disease ([facility]) 2022     RLS (restless legs syndrome) 2021     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 ([facility]) 2021     Chronic bilateral low back pain without sciatica 2021     Hemiplegia and hemiparesis following cerebral infarction affecting left non-dominant side ([facility]) 2020     Benign prostatic hyperplasia with nocturia 2020     PAD (peripheral artery disease) ([facility]) 2019     Primary  osteoarthritis of left knee 01/10/2019     Bilateral carotid artery stenosis 10/08/2018     Dermatosis 10/08/2018     Plantar fasciitis 06/04/2018     Constipation 01/16/2018     Seborrheic dermatitis 11/10/2017     History of stroke 09/15/2017     Asthma 07/26/2017     Benign essential hypertension 07/26/2017     Type 2 diabetes mellitus (HCC) 07/26/2017     Hyperlipidemia 07/26/2017     Diabetic nephropathy associated with type 2 diabetes mellitus (HCC) 06/22/2017     Non-rheumatic aortic sclerosis 06/22/2017     Popliteal cyst, left 10/15/2015     Acquired hallux malleus of right foot 10/06/2015     Pre-ulcerative corn or callous 10/06/2015     Gout 12/11/2014     Allergic rhinitis 05/03/2012     Retina disorder 05/03/2012       LOS (days): 3  Geometric Mean LOS (GMLOS) (days): 2.5  Days to GMLOS:-0.3     OBJECTIVE:  Risk of Unplanned Readmission Score: 23.04         Current admission status: Inpatient   Preferred Pharmacy:   [id number]  [place] - [address] [place] [address].  [address] [place], [place] [address] [place] [address]  Phone: [phone] Fax: [phone]    Primary Care Provider: Anthony Roberts MD    Primary Insurance: MEDICARE  Secondary Insurance: [facility]    DISCHARGE DETAILS:       Other Referral/Resources/Interventions Provided:  Referral Comments: Notified by provider pt clear for dc today. Message sent to [facility] at 0830 if able to accept pt - response @ 0832 - pend review.  Message sent to [facility] @ 1007 notifying pt clear for dc today & if they are able to accept - awaiting final response if able to accept pt.

## 2024-11-18 NOTE — PROGRESS NOTES
Progress Note - Hospitalist   Name: Isauro Sow 79 y.o. male I MRN: [id number]  Unit/Bed#: -01 I Date of Admission: 11/15/2024   Date of Service: 11/18/2024 I Hospital Day: 3    Assessment & Plan  Status post total left knee replacement using cement  Patient was recently admitted to [facility] for elective left TKA and was discharged two days ago, returned to ED as he was unable to ambulate or participate with home PT.   Orthopedic evaluation appreciated  Lower extremity Doppler is negative for DVT  PT OT evaluation and case management for postacute needs/placement  Pain control  PT/OT recs rehab, pending referral to [facility]  No fever in last 24 hours  Medically stable for discharge to [facility]  Benign essential hypertension  Patient with known history of hypertension.  Blood pressure acceptable.  Continue with metoprolol  Type 2 diabetes mellitus (HCC)  Lab Results   Component Value Date    HGBA1C 7.2 (H) 10/07/2024       Recent Labs     11/17/24  1028 11/17/24  1550 11/17/24  2031 11/18/24  0541   POCGLU 180* 176* 158* 126       Blood Sugar Average: Last 72 hrs:  (P) [id number] is on metformin as outpatient.  Will continue with sliding scale of insulin and monitor blood sugar  Adjust insulin regimen per blood sugar    History of stroke  Patient with previous history of CVA.  Continue statin and on Lovenox to Coumadin bridge  Coronary artery disease involving native coronary artery  Patient with history of coronary artery disease - RCA lesion was 80% stenosed. S/P successful PCI with CHARMAINE x 1.  On 8/28/2023  Preoperative cardiac evaluation recommended stopping antiplatelet agents at the 1 year brandie and continue with Coumadin  No chest pain or shortness of breath  Continue with Coumadin  Outpatient follow-up with cardiology  With beta-blocker and statin  S/P TAVR (transcatheter aortic valve replacement)  History of TVAR  noted   Followed by cardiology as outpatient  Chronic atrial flutter  (HCC)  Patient anticoagulated with warfarin prior to hospitalization  Preoperative cardiology consultation recommended Lovenox to Coumadin bridge  Discussed with orthopedics.  Restarted back on Coumadin.    Start the patient on 5 mg of Coumadin.  Trend INR.  Anemia  Hemoglobin noted to be 10.4 which is below his baseline. Expected blood loss anemia from the surgery  F/u iron panel    VTE Pharmacologic Prophylaxis: VTE Score: 9 High Risk (Score >/= 5) - Pharmacological DVT Prophylaxis Ordered: enoxaparin (Lovenox). Sequential Compression Devices Ordered.    Mobility:   Basic Mobility Inpatient Raw Score: 12  JH-HLM Goal: 4: Move to chair/commode  JH-HLM Achieved: 6: Walk 10 steps or more  JH-HLM Goal achieved. Continue to encourage appropriate mobility.    Patient Centered Rounds: I performed bedside rounds with nursing staff today.      Current Length of Stay: 3 day(s)  Current Patient Status: Inpatient   Certification Statement: The patient will continue to require additional inpatient hospital stay due to pending placement in [facility]  Discharge Plan: Anticipate discharge tomorrow to rehab facility.    Code Status: Level 3 - DNAR and DNI    Subjective     No overnight fever.  Denies any dyspnea, chills.  Ongoing pain on left knee with movement.  Currently sitting on the bedside sofa    Objective :  Temp:  [98.3 °F (36.8 °C)-99.2 °F (37.3 °C)] 98.4 °F (36.9 °C)  HR:  [55-73] 66  BP: (129-165)/(52-90) 130/66  Resp:  [16-18] 16  SpO2:  [94 %-98 %] 94 %  O2 Device: None (Room air)    Body mass index is 29.99 kg/m².     Input and Output Summary (last 24 hours):     Intake/Output Summary (Last 24 hours) at 11/18/2024 1010  Last data filed at 11/18/2024 0613  Gross per 24 hour   Intake 360 ml   Output 875 ml   Net -515 ml       Physical Exam  Vitals and nursing note reviewed.   Constitutional:       General: He is not in acute distress.     Appearance: Normal appearance.   HENT:      Head: Normocephalic and atraumatic.       Right Ear: External ear normal.      Left Ear: External ear normal.      Nose: Nose normal.   Eyes:      Extraocular Movements: Extraocular movements intact.   Pulmonary:      Effort: Pulmonary effort is normal.   Musculoskeletal:      Cervical back: Normal range of motion.      Comments: Left knee swelling   Neurological:      Mental Status: He is alert. Mental status is at baseline.   Psychiatric:         Mood and Affect: Mood normal.           Lines/Drains:              Lab Results: I have reviewed the following results:   Results from last 7 days   Lab Units 11/18/24  0453 11/16/24  0512 11/15/24  1317   WBC Thousand/uL 7.19   < > 10.60*   HEMOGLOBIN g/dL 10.4*   < > 11.3*   HEMATOCRIT % 31.1*   < > 34.7*   PLATELETS Thousands/uL 136*   < >  --    SEGS PCT %  --   --  73   LYMPHO PCT %  --   --  16   MONO PCT %  --   --  10   EOS PCT %  --   --  0    < > = values in this interval not displayed.     Results from last 7 days   Lab Units 11/18/24  0511 11/16/24  0512 11/15/24  1317   SODIUM mmol/L 138   < > 139   POTASSIUM mmol/L 3.9   < > 4.3   CHLORIDE mmol/L 99   < > 103   CO2 mmol/L 30   < > 32   BUN mg/dL 19   < > 16   CREATININE mg/dL 1.07   < > 1.12   ANION GAP mmol/L 9   < > 4   CALCIUM mg/dL 9.0   < > 8.9   ALBUMIN g/dL  --   --  3.3*   TOTAL BILIRUBIN mg/dL  --   --  0.84   ALK PHOS U/L  --   --  40   ALT U/L  --   --  19   AST U/L  --   --  18   GLUCOSE RANDOM mg/dL 144*   < > 180*    < > = values in this interval not displayed.     Results from last 7 days   Lab Units 11/18/24  0528   INR  1.59*     Results from last 7 days   Lab Units 11/18/24  0541 11/17/24  2031 11/17/24  1550 11/17/24  1028 11/17/24  0512 11/16/24  2100 11/16/24  1544 11/16/24  1039 11/16/24  0556 11/15/24  2051 11/15/24  1631 11/14/24  1525   POC GLUCOSE mg/dl 126 158* 176* 180* 151* 165* 161* 193* 140 223* 121 189*               Recent Cultures (last 7 days):            Last 24 Hours Medication List:     Current  Facility-Administered Medications:     acetaminophen (TYLENOL) tablet 975 mg, Q8H MOON    ascorbic acid (VITAMIN C) tablet 500 mg, BID    Cholecalciferol (VITAMIN D3) tablet 2,000 Units, Daily    enoxaparin (LOVENOX) subcutaneous injection 100 mg, Q12H    folic acid (FOLVITE) tablet 1 mg, Daily    insulin lispro (HumALOG/ADMELOG) 100 units/mL subcutaneous injection 1-5 Units, HS    insulin lispro (HumALOG/ADMELOG) 100 units/mL subcutaneous injection 1-6 Units, TID AC **AND** Fingerstick Glucose (POCT), TID AC    metoprolol succinate (TOPROL-XL) 24 hr tablet 25 mg, HS    morphine injection 2 mg, Q4H PRN    nitroglycerin (NITROSTAT) SL tablet 0.4 mg, Q5 Min PRN    oxyCODONE (ROXICODONE) IR tablet 5 mg, Q4H PRN    oxyCODONE (ROXICODONE) split tablet 2.5 mg, Q4H PRN    polyethylene glycol (MIRALAX) packet 17 g, Daily    pravastatin (PRAVACHOL) tablet 40 mg, Daily With Dinner    senna-docusate sodium (SENOKOT S) 8.6-50 mg per tablet 2 tablet, Daily    warfarin (COUMADIN) tablet 5 mg, Daily (warfarin)    Administrative Statements   Today, Patient Was Seen By: Sung Morales MD      **Please Note: This note may have been constructed using a voice recognition system.**

## 2024-11-18 NOTE — PLAN OF CARE
Problem: INFECTION - ADULT  Goal: Absence or prevention of progression during hospitalization  Description: INTERVENTIONS:  - Assess and monitor for signs and symptoms of infection  - Monitor lab/diagnostic results  - Monitor all insertion sites, i.e. indwelling lines, tubes, and drains  - Monitor endotracheal if appropriate and nasal secretions for changes in amount and color  - [place] appropriate cooling/warming therapies per order  - Administer medications as ordered  - Instruct and encourage patient and family to use good hand hygiene technique  - Identify and instruct in appropriate isolation precautions for identified infection/condition  11/18/2024 1725 by Patricia Arreguin RN  Outcome: Adequate for Discharge  11/18/2024 1724 by Patricia Arreguin RN  Outcome: Adequate for Discharge  Goal: Absence of fever/infection during neutropenic period  Description: INTERVENTIONS:  - Monitor WBC    11/18/2024 1725 by Patricia Arreguin RN  Outcome: Adequate for Discharge  11/18/2024 1724 by Patricia Arreguin RN  Outcome: Adequate for Discharge     Problem: SAFETY ADULT  Goal: Patient will remain free of falls  Description: INTERVENTIONS:  - Educate patient/family on patient safety including physical limitations  - Instruct patient to call for assistance with activity   - Consult OT/PT to assist with strengthening/mobility   - Keep Call bell within reach  - Keep bed low and locked with side rails adjusted as appropriate  - Keep care items and personal belongings within reach  - Initiate and maintain comfort rounds  - Make Fall Risk Sign visible to staff  - Offer Toileting every 2 Hours, in advance of need  - Initiate/Maintain bed alarm  - Obtain necessary fall risk management equipment: walker  - Apply yellow socks and bracelet for high fall risk patients  - Consider moving patient to room near nurses station  11/18/2024 1725 by Patricia Arreguin RN  Outcome: Adequate for Discharge  11/18/2024 1724 by Patricia Arreguin RN  Outcome:  Adequate for Discharge  Goal: Maintain or return to baseline ADL function  Description: INTERVENTIONS:  -  Assess patient's ability to carry out ADLs; assess patient's baseline for ADL function and identify physical deficits which impact ability to perform ADLs (bathing, care of mouth/teeth, toileting, grooming, dressing, etc.)  - Assess/evaluate cause of self-care deficits   - Assess range of motion  - Assess patient's mobility; develop plan if impaired  - Assess patient's need for assistive devices and provide as appropriate  - Encourage maximum independence but intervene and supervise when necessary  - Involve family in performance of ADLs  - Assess for home care needs following discharge   - Consider OT consult to assist with ADL evaluation and planning for discharge  - Provide patient education as appropriate  11/18/2024 1725 by Patricia Arreguin RN  Outcome: Adequate for Discharge  11/18/2024 1724 by Patricia Arreguin RN  Outcome: Adequate for Discharge  Goal: Maintains/Returns to pre admission functional level  Description: INTERVENTIONS:  - Perform AM-PAC 6 Click Basic Mobility/ Daily Activity assessment daily.  - Set and communicate daily mobility goal to care team and patient/family/caregiver.   - Collaborate with rehabilitation services on mobility goals if consulted  - Perform Range of Motion 3 times a day.  - Reposition patient every 2 hours.  - Dangle patient 3 times a day  - Stand patient 3 times a day  - Ambulate patient 3 times a day  - Out of bed to chair 3 times a day   - Out of bed for meals 3 times a day  - Out of bed for toileting  - Record patient progress and toleration of activity level   11/18/2024 1725 by Patricia Arreguin RN  Outcome: Adequate for Discharge  11/18/2024 1724 by Patricia Arreguin RN  Outcome: Adequate for Discharge

## 2024-11-18 NOTE — PLAN OF CARE
Problem: Prexisting or High Potential for Compromised Skin Integrity  Goal: Skin integrity is maintained or improved  Description: INTERVENTIONS:  - Identify patients at risk for skin breakdown  - Assess and monitor skin integrity  - Assess and monitor nutrition and hydration status  - Monitor labs   - Assess for incontinence   - Turn and reposition patient  - Assist with mobility/ambulation  - Relieve pressure over bony prominences  - Avoid friction and shearing  - Provide appropriate hygiene as needed including keeping skin clean and dry  - Evaluate need for skin moisturizer/barrier cream  - Collaborate with interdisciplinary team   - Patient/family teaching  - Consider wound care consult   Outcome: Progressing     Problem: PAIN - ADULT  Goal: Verbalizes/displays adequate comfort level or baseline comfort level  Description: Interventions:  - Encourage patient to monitor pain and request assistance  - Assess pain using appropriate pain scale  - Administer analgesics based on type and severity of pain and evaluate response  - Implement non-pharmacological measures as appropriate and evaluate response  - Consider cultural and social influences on pain and pain management  - Notify physician/advanced practitioner if interventions unsuccessful or patient reports new pain  Outcome: Progressing     Problem: INFECTION - ADULT  Goal: Absence or prevention of progression during hospitalization  Description: INTERVENTIONS:  - Assess and monitor for signs and symptoms of infection  - Monitor lab/diagnostic results  - Monitor all insertion sites, i.e. indwelling lines, tubes, and drains  - Monitor endotracheal if appropriate and nasal secretions for changes in amount and color  - [place] appropriate cooling/warming therapies per order  - Administer medications as ordered  - Instruct and encourage patient and family to use good hand hygiene technique  - Identify and instruct in appropriate isolation precautions for  identified infection/condition  Outcome: Progressing  Goal: Absence of fever/infection during neutropenic period  Description: INTERVENTIONS:  - Monitor WBC    Outcome: Progressing     Problem: SAFETY ADULT  Goal: Patient will remain free of falls  Description: INTERVENTIONS:  - Educate patient/family on patient safety including physical limitations  - Instruct patient to call for assistance with activity   - Consult OT/PT to assist with strengthening/mobility   - Keep Call bell within reach  - Keep bed low and locked with side rails adjusted as appropriate  - Keep care items and personal belongings within reach  - Initiate and maintain comfort rounds  - Make Fall Risk Sign visible to staff  - Offer Toileting every 2 Hours, in advance of need  - Initiate/Maintain bed alarm  - Obtain necessary fall risk management equipment: walker  - Apply yellow socks and bracelet for high fall risk patients  - Consider moving patient to room near nurses station  Outcome: Progressing  Goal: Maintain or return to baseline ADL function  Description: INTERVENTIONS:  -  Assess patient's ability to carry out ADLs; assess patient's baseline for ADL function and identify physical deficits which impact ability to perform ADLs (bathing, care of mouth/teeth, toileting, grooming, dressing, etc.)  - Assess/evaluate cause of self-care deficits   - Assess range of motion  - Assess patient's mobility; develop plan if impaired  - Assess patient's need for assistive devices and provide as appropriate  - Encourage maximum independence but intervene and supervise when necessary  - Involve family in performance of ADLs  - Assess for home care needs following discharge   - Consider OT consult to assist with ADL evaluation and planning for discharge  - Provide patient education as appropriate  Outcome: Progressing  Goal: Maintains/Returns to pre admission functional level  Description: INTERVENTIONS:  - Perform AM-PAC 6 Click Basic Mobility/ Daily  Activity assessment daily.  - Set and communicate daily mobility goal to care team and patient/family/caregiver.   - Collaborate with rehabilitation services on mobility goals if consulted  - Perform Range of Motion 3 times a day.  - Reposition patient every 2 hours.  - Dangle patient 3 times a day  - Stand patient 3 times a day  - Ambulate patient 3 times a day  - Out of bed to chair 3 times a day   - Out of bed for meals 3 times a day  - Out of bed for toileting  - Record patient progress and toleration of activity level   Outcome: Progressing     Problem: DISCHARGE PLANNING  Goal: Discharge to home or other facility with appropriate resources  Description: INTERVENTIONS:  - Identify barriers to discharge w/patient and caregiver  - Arrange for needed discharge resources and transportation as appropriate  - Identify discharge learning needs (meds, wound care, etc.)  - Arrange for interpretive services to assist at discharge as needed  - Refer to Case Management Department for coordinating discharge planning if the patient needs post-hospital services based on physician/advanced practitioner order or complex needs related to functional status, cognitive ability, or social support system  Outcome: Progressing     Problem: Knowledge Deficit  Goal: Patient/family/caregiver demonstrates understanding of disease process, treatment plan, medications, and discharge instructions  Description: Complete learning assessment and assess knowledge base.  Interventions:  - Provide teaching at level of understanding  - Provide teaching via preferred learning methods  Outcome: Progressing

## 2024-11-18 NOTE — PLAN OF CARE
Problem: PHYSICAL THERAPY ADULT  Goal: Performs mobility at highest level of function for planned discharge setting.  See evaluation for individualized goals.  Description: Treatment/Interventions: ADL retraining, Functional transfer training, Endurance training, Bed mobility, Gait training, Spoke to nursing, OT          See flowsheet documentation for full assessment, interventions and recommendations.  Outcome: Progressing  Note: Prognosis: Fair  Problem List: Decreased strength, Decreased range of motion, Decreased endurance, Impaired balance, Decreased mobility, Decreased coordination, Pain  Assessment: Pt demonstrated improved functional mobiilty & endurance this session, perfomring transfers & gait tasks with decreased assist overall despite ongoing pain & funcitonal limitations as result of LLE TKR.  Pt took short, shuffling steps as he pivoted to recliner then ambulated forwards after seated rest to recover.  He continues to demonstrate LLE buckling/instability, but is able to actively advance it without need for assist & compensate with use of RW for balance.  LLE knee blocking provided & constant sequencing given to ensure safe completion of mobility trials.  Pt performed LAQ exercises with acitve assist vs self assist wihtout change in complaints.  Extensive education given to pt regarding nature of his surgery, his current subjective complaints, and expected recovery.  pt instructed to continue to maintain knee extnension at rest & perform LAQ exercises to maintain AROM in advance of more aggressive OOB mobility in upcoming days.   Pt verbalized understanding & offers no new complaints at this time.  PT POC & d/c plan remain appropriate at this time.        Rehab Resource Intensity Level, PT: I (Maximum Resource Intensity)    See flowsheet documentation for full assessment.

## 2024-11-18 NOTE — DISCHARGE SUMMARY
Discharge Summary - Hospitalist   Name: Isauro Sow 79 y.o. male I MRN: [id number]  Unit/Bed#: -01 I Date of Admission: 11/15/2024   Date of Service: 11/18/2024 I Hospital Day: 3     Assessment & Plan  Status post total left knee replacement using cement  Patient was recently admitted to [facility] for elective left TKA and was discharged two days ago, returned to ED as he was unable to ambulate or participate with home PT.   Orthopedic evaluation appreciated  Lower extremity Doppler is negative for DVT  PT OT evaluation and case management for postacute needs/placement  Pain control   No fever in last 24 hours  Medically stable for discharge to [facility]  Benign essential hypertension  Patient with known history of hypertension.  Blood pressure acceptable.  Continue with metoprolol  Type 2 diabetes mellitus (HCC)  Lab Results   Component Value Date    HGBA1C 7.2 (H) 10/07/2024       Recent Labs     11/17/24  1550 11/17/24  2031 11/18/24  0541 11/18/24  1059   POCGLU 176* 158* 126 185*       Blood Sugar Average: Last 72 hrs:  (P) [id number] is on metformin as outpatient.  Will continue with sliding scale of insulin and monitor blood sugar  Adjust insulin regimen per blood sugar    History of stroke  Patient with previous history of CVA.  Continue statin and on Lovenox to Coumadin bridge  Coronary artery disease involving native coronary artery  Patient with history of coronary artery disease - RCA lesion was 80% stenosed. S/P successful PCI with CHARMAINE x 1.  On 8/28/2023  Preoperative cardiac evaluation recommended stopping antiplatelet agents at the 1 year brandie and continue with Coumadin  No chest pain or shortness of breath  Continue with Coumadin  Outpatient follow-up with cardiology  With beta-blocker and statin  S/P TAVR (transcatheter aortic valve replacement)  History of TVAR  noted   Followed by cardiology as outpatient  Chronic atrial flutter (HCC)  Patient anticoagulated with  "warfarin prior to hospitalization  Preoperative cardiology consultation recommended Lovenox to Coumadin bridge  Discussed with orthopedics.  Restarted back on Coumadin.    Start the patient on 5 mg of Coumadin.  Trend INR, goal 2-3  Anemia  Hemoglobin noted to be 10.4 which is below his baseline. Expected blood loss anemia from the surgery  F/u iron panel    Discharging Physician / Practitioner: Sung Morales MD  PCP: Anthony Roberts MD  Admission Date:   Admission Orders (From admission, onward)       Ordered        11/15/24 1603  INPATIENT ADMISSION  Once                          Discharge Date: 11/18/24    Medical Problems       Resolved Problems  Date Reviewed: 11/14/2024   None         Consultations During Hospital Stay:  orthopedic    Reason for Admission: ambulatory dysfunction    Hospital Course:     Isauro Sow is a 79 y.o. male patient who originally presented to the hospital on 11/15/2024 Patient was recently admitted to [facility] for elective left TKA and was discharged two days ago, returned to ED as he was unable to ambulate or participate with home PT. Patient admitted for ambulatory dysfunction. PT/OT recommended discharge to rehab        Please see above list of diagnoses and related plan for additional information.     Condition at Discharge: good     Discharge Day Visit / Exam:     Subjective:  No overnight fever.  Denies any dyspnea, chills.  Ongoing pain on left knee with movement.  Currently sitting on the bedside sofa   Vitals: Blood Pressure: 130/66 (11/18/24 0725)  Pulse: 66 (11/18/24 0725)  Temperature: 98.4 °F (36.9 °C) (11/18/24 0725)  Temp Source: Oral (11/15/24 2336)  Respirations: 16 (11/18/24 0725)  Height: 5' 11\" (180.3 cm) (11/15/24 1658)  Weight - Scale: 97.5 kg (215 lb) (11/15/24 1658)  SpO2: 94 % (11/18/24 0725)  Exam:   Physical Exam  Vitals and nursing note reviewed.   Constitutional:       General: He is not in acute distress.     Appearance: Normal appearance. "   HENT:      Head: Normocephalic and atraumatic.      Right Ear: External ear normal.      Left Ear: External ear normal.      Nose: Nose normal.   Eyes:      Extraocular Movements: Extraocular movements intact.   Pulmonary:      Effort: Pulmonary effort is normal.   Musculoskeletal:         General: Swelling present.      Cervical back: Normal range of motion.   Neurological:      Mental Status: He is alert. Mental status is at baseline.   Psychiatric:         Mood and Affect: Mood normal.          Discussion with Family:  will call son    Discharge instructions/Information to patient and family:   See after visit summary for information provided to patient and family.      Provisions for Follow-Up Care:  See after visit summary for information related to follow-up care and any pertinent home health orders.      Disposition:     Acute Rehab at [facility]      Planned Readmission: no     Discharge Statement:  I spent 45 minutes discharging the patient. This time was spent on the day of discharge. I had direct contact with the patient on the day of discharge. Greater than 50% of the total time was spent examining patient, answering all patient questions, arranging and discussing plan of care with patient as well as directly providing post-discharge instructions.  Additional time then spent on discharge activities.    Discharge Medications:  See after visit summary for reconciled discharge medications provided to patient and family.      ** Please Note: This note has been constructed using a voice recognition system **

## 2024-11-18 NOTE — ASSESSMENT & PLAN NOTE
Patient was recently admitted to [facility] for elective left TKA and was discharged two days ago, returned to ED as he was unable to ambulate or participate with home PT.   Orthopedic evaluation appreciated  Lower extremity Doppler is negative for DVT  PT OT evaluation and case management for postacute needs/placement  Pain control   No fever in last 24 hours  Medically stable for discharge to [facility]

## 2024-11-18 NOTE — ASSESSMENT & PLAN NOTE
Preoperative cardiology consultation recommended Lovenox to Coumadin bridge  Continue on 5 mg of Coumadin. Trend INR, goal 2-3.   Continue metoprolol

## 2024-11-18 NOTE — ASSESSMENT & PLAN NOTE
Patient anticoagulated with warfarin prior to hospitalization  Preoperative cardiology consultation recommended Lovenox to Coumadin bridge  Discussed with orthopedics.  Restarted back on Coumadin.    Start the patient on 5 mg of Coumadin.  Trend INR, goal 2-3

## 2024-11-18 NOTE — PHYSICAL THERAPY NOTE
Physical Therapy Progress Note     11/18/24 0855   PT Last Visit   PT Visit Date 11/18/24   Note Type   Note Type Treatment   Pain Assessment   Pain Assessment Tool FLACC   Pain Rating: FLACC (Rest) - Face 0   Pain Rating: FLACC (Rest) - Legs 0   Pain Rating: FLACC (Rest) - Activity 0   Pain Rating: FLACC (Rest) - Cry 0   Pain Rating: FLACC (Rest) - Consolability 0   Score: FLACC (Rest) 0   Pain Rating: FLACC (Activity) - Face 2   Pain Rating: FLACC (Activity) - Legs 1   Pain Rating: FLACC (Activity) - Activity 1   Pain Rating: FLACC (Activity) - Cry 1   Pain Rating: FLACC (Activity) - Consolability 0   Score: FLACC (Activity) 5   Restrictions/Precautions   LLE Weight Bearing Per Order WBAT   Other Precautions WBS;Fall Risk;Pain   Subjective   Subjective Pt encountered supine in bed, pleasant and agreeable to treatment.  Reports controlled pain at rest, but increased with activity.  No changes in status otherwise.  Pt reports LLE feels sore after, but better since admission.   Bed Mobility   Supine to Sit 4  Minimal assistance   Additional items Assist x 1   Transfers   Sit to Stand 3  Moderate assistance   Additional items Assist x 1;Armrests;Increased time required   Stand to Sit 3  Moderate assistance   Additional items Assist x 1;Armrests;Increased time required   Ambulation/Elevation   Gait pattern Step to;Short stride;Foward flexed;Inconsistent yara;Shuffling;Decreased foot clearance;Antalgic;Improper Weight shift;L Knee Trey;Knees flexed;Excessively slow   Gait Assistance 3  Moderate assist   Additional items Assist x 1  (+ 2nd for close chair follow)   Assistive Device Rolling walker;L platform   Distance 3' bed to chair, then ~5' forwards   Balance   Static Sitting Good   Static Standing Poor +   Ambulatory Poor   Activity Tolerance   Activity Tolerance Patient tolerated treatment well;Patient limited by fatigue;Patient limited by pain   Nurse Made Aware NATALIO Carmen   Assessment   Prognosis Fair    Problem List Decreased strength;Decreased range of motion;Decreased endurance;Impaired balance;Decreased mobility;Decreased coordination;Pain   Assessment Pt demonstrated improved functional mobiilty & endurance this session, perfomring transfers & gait tasks with decreased assist overall despite ongoing pain & funcitonal limitations as result of LLE TKR.  Pt took short, shuffling steps as he pivoted to recliner then ambulated forwards after seated rest to recover.  He continues to demonstrate LLE buckling/instability, but is able to actively advance it without need for assist & compensate with use of RW for balance.  LLE knee blocking provided & constant sequencing given to ensure safe completion of mobility trials.  Pt performed LAQ exercises with acitve assist vs self assist wihtout change in complaints.  Extensive education given to pt regarding nature of his surgery, his current subjective complaints, and expected recovery.  pt instructed to continue to maintain knee extnension at rest & perform LAQ exercises to maintain AROM in advance of more aggressive OOB mobility in upcoming days.   Pt verbalized understanding & offers no new complaints at this time.  PT POC & d/c plan remain appropriate at this time.   Goals   Patient Goals to be independent again   STG Expiration Date 11/30/24   PT Treatment Day 1   Plan   Treatment/Interventions Functional transfer training;LE strengthening/ROM;Therapeutic exercise;Endurance training;Patient/family training;Equipment eval/education;Bed mobility;Gait training   Progress Progressing toward goals   PT Frequency 3-5x/wk   Discharge Recommendation   Rehab Resource Intensity Level, PT I (Maximum Resource Intensity)   AM-PAC Basic Mobility Inpatient   Turning in Flat Bed Without Bedrails 3   Lying on Back to Sitting on Edge of Flat Bed Without Bedrails 2   Moving Bed to Chair 2   Standing Up From Chair Using Arms 2   Walk in Room 2   Climb 3-5 Stairs With Railing 1   Basic  Mobility Inpatient Raw Score 12   Basic Mobility Standardized Score 32.23   [facility] Highest Level Of Mobility   -HLM Goal 4: Move to chair/commode   -HLM Achieved 6: Walk 10 steps or more       Rashaun Rothman PTA    An [facility] Basic Mobility Raw Score less than 17 suggests pt would benefit from post acute rehab.  Please also refer to the recommendation of the Physical Therapist for safe discharge planning.

## 2024-11-18 NOTE — PROGRESS NOTES
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 thigh, LLE swelling with inability to perform his ADLs or participate in home physical therapy.  Patient states that his nerve block wore off and he has been having increasing pain since. Patient had been on Lovenox at home.  Per cardiology recommendation patient will now be on Coumadin, will trend INR. On examination the significant diffuse swelling left lower extremity.  Patient has tenderness to palpation at the level of the knee patient has tenderness to palpation in the left calf, c/o pain with passive ROM. On 11/13/2024 bilateral lower extremity dopplers was negative for DVT, L knee XR showed postop soft tissue changes, atherosclerotic vascular calcifications, left  knee arthroplasty in alignment. 11/18/2024 patients HGB is now 10.4, down from 13.4 on 11/14/2024. Will trend HGB, for blood loss anemia postoperatively. Patient is on Metformin outpatient. Glucose checks elevated, will continue with sliding scale of insulin, monitor blood sugar and adjust insulin regimen per blood sugar. Patient had one episode of low grade temp 11/14/2024, 10 PM. No further recurrence. No dyspnea, no  chest pain, no abdominal pain, no cough, no diarrhea. Observe off of abx. If recurs, will need full workup including blood cultures. Placed on multi-modal pain control regimen.  PT/OT therapies were consulted and continue to follow patient at this time and are recommending inpatient acute rehab when medically stable. All involved medical disciplines feel/agree patient is medically ready for discharge at this time. Inpatient acute rehabilitation physician was consulted. Upon review of patient's case and correspondence with PT/OT therapy services, [facility] physician feels Isauro will benefit and is a good candidate / appropriate for inpatient acute rehab at this time. He has demonstrated the willingness / desire and tolerance to participate in the required 3 hours or more of therapies per day. Prior to admission / hospital stay Isauro was independent with all ADLs /functional mobility / iADLs. Currently with PT therapies /  OT therapies:   Supervision/Setup with upper ADLs ; Max A with lower ADLs.  Mod A x 2 for transfers and B/L HHA and knee blocking on R side w/severe buckling for 2 small steps from EOB to chair. Nursing is being recommended for medication distribution / management , bowel / bladder management and educational purposes , internal medicine to continue to monitor and manage medical conditions, PM&R to maximize  function and provide medical oversight, and inpatient rehab to maximize self care ,mobility ,strength , and endurance upon discharge to home.

## 2024-11-18 NOTE — ASSESSMENT & PLAN NOTE
Lab Results   Component Value Date    HGBA1C 7.2 (H) 10/07/2024       Recent Labs     11/17/24  2031 11/18/24  0541 11/18/24  1059 11/18/24  1540   POCGLU 158* 126 185* 195*       Blood Sugar Average: Last 72 hrs:  Patient is on metformin as outpatient.  Will continue with sliding scale of insulin and monitor blood sugar  Adjust insulin regimen per blood sugar

## 2024-11-18 NOTE — CASE MANAGEMENT
Case Management Discharge Planning Note    Patient name Isauro Sow  Location /-01 MRN [id number]  : 1945 Date 2024       Current Admission Date: 11/15/2024  Current Admission Diagnosis:Status post total left knee replacement using cement   Patient Active Problem List    Diagnosis Date Noted Date Diagnosed    Status post total left knee replacement using cement 11/15/2024     Anemia 11/15/2024     Preoperative evaluation of a medical condition to rule out surgical contraindications (TAR required) 10/31/2024     Chronic pain of left knee 2024     Lumbar spondylosis 2024     Decreased pedal pulses 2024     Stenosis of left carotid artery 2024     Other constipation 2024     Chronic midline low back pain without sciatica 2023     Platelets decreased ([facility]) 2023     Aortic valve stenosis 11/10/2023     Hx of cardiac pacemaker 2023     Chronic atrial flutter ([facility]) 2023     Chronic diastolic CHF (congestive heart failure) ([facility]) 2023     S/P TAVR (transcatheter aortic valve replacement) 2023     Coronary artery disease involving native coronary artery 2023     Sick sinus syndrome ([facility]) 2023     Continuous opioid dependence ([facility]) 10/24/2022     Dysuria 2022     L3 osteophyte fracture 2022     Fracture of thoracic transverse process ([facility]) 08/15/2022     Lumbar transverse process fracture ([facility]) 08/15/2022     Stage 3a chronic kidney disease ([facility]) 2022     RLS (restless legs syndrome) 2021     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 ([facility]) 2021     Chronic bilateral low back pain without sciatica 2021     Hemiplegia and hemiparesis following cerebral infarction affecting left non-dominant side ([facility]) 2020     Benign prostatic hyperplasia with nocturia 2020     PAD (peripheral artery disease) ([facility]) 2019     Primary  osteoarthritis of left knee 01/10/2019     Bilateral carotid artery stenosis 10/08/2018     Dermatosis 10/08/2018     Plantar fasciitis 06/04/2018     Constipation 01/16/2018     Seborrheic dermatitis 11/10/2017     History of stroke 09/15/2017     Asthma 07/26/2017     Benign essential hypertension 07/26/2017     Type 2 diabetes mellitus (HCC) 07/26/2017     Hyperlipidemia 07/26/2017     Diabetic nephropathy associated with type 2 diabetes mellitus (HCC) 06/22/2017     Non-rheumatic aortic sclerosis 06/22/2017     Popliteal cyst, left 10/15/2015     Acquired hallux malleus of right foot 10/06/2015     Pre-ulcerative corn or callous 10/06/2015     Gout 12/11/2014     Allergic rhinitis 05/03/2012     Retina disorder 05/03/2012       LOS (days): 3  Geometric Mean LOS (GMLOS) (days): 2.5  Days to GMLOS:-0.5     OBJECTIVE:  Risk of Unplanned Readmission Score: 23.32         Current admission status: Inpatient   Preferred Pharmacy:   [id number]  [address]  [facility].  [place] [address]  Phone: [phone] Fax: [phone]    Primary Care Provider: Anthony Roberts MD    Primary Insurance: MEDICARE  Secondary Insurance: [facility]    DISCHARGE DETAILS:       Other Referral/Resources/Interventions Provided:  Referral Comments: Received message from provider: pt's son now asking if pt can go to [facility], explained CM met w/pt this am and confirmed pt wants to stay in the network and go to rehab here in .  CM called pt's son Arley to discuss dcp - explained CM met w/pt & pt would like to stay at  - rehab.  Pt's son Arley would like to discuss w/his Dad his options. CM notified pt's son Arley pt is set to discahrge to [facility] today at 5:00 pm.  Pt's son Arley asked if pt could go to [facility] today if they have a bed. Explained to son if pt is agreeable and they have a bed.  Awaiting for son to call back.  Message received in [facility] has a bed today if pt  choses to go to their facility. Messsage sent to [facility] - notifying facility pt would like to stay in network and pt's son Arley wants him to go to [facility].  Expalined to pt's son Arley - pt is A&O x 4 and is able to make his own decisions. If pt would like to go to [facility] then we can send him to  for rehab. CM met w/pt at bedside to discuss  - pt confirmed he would like to stay at [facility].  CM called pt's son Arley to update of same & Arley is requesting to discuss again w/his father.  Received call back from pt's son Arley - confirmed pt would like to stay at [facility]. Notified [facility] of same. [facility] reserved in [facility].  CM notified pt's son Arley pt will be going to [facility] Room [facility]74 at 1700.  Pt's son Arley verbalized understanding w/dcp & in agreement w/dcp.  Updated provider & bedside nurse of same.           Transport at Discharge : Wheelchair van           ETA of Transport (Date): 11/18/24  ETA of Transport (Time): 1700

## 2024-11-18 NOTE — ASSESSMENT & PLAN NOTE
Lab Results   Component Value Date    HGBA1C 7.2 (H) 10/07/2024       Recent Labs     11/17/24  1550 11/17/24 2031 11/18/24  0541 11/18/24  1059   POCGLU 176* 158* 126 185*       Blood Sugar Average: Last 72 hrs:  (P) [id number] is on metformin as outpatient.  Will continue with sliding scale of insulin and monitor blood sugar  Adjust insulin regimen per blood sugar

## 2024-11-18 NOTE — PROGRESS NOTES
Patient:  LIAM CHIN    MRN:  [id number]    [facility] Request ID:  [id number]    Level of care reserved:  Inpatient Rehab Facility    Partner Reserved:  [facility] - ([place]), [place] [id number] [phone]    Clinical needs requested:    Geography searched:  10 miles around [address]    Start of Service:    Request sent:  2:24pm EST on 11/16/2024 by Vannesa Gama    Partner reserved:  3:31pm EST on 11/18/2024 by Sowmya Murillo    Choice list shared:  3:31pm EST on 11/18/2024 by Sowmya Murillo

## 2024-11-18 NOTE — PROGRESS NOTES
Patient:  LIAM CHIN    MRN:  [id number]    [place] Request ID:  [id number]    Level of care reserved:  Inpatient Rehab Facility    Partner Reserved:  [facility] - ([place]), [place] [address] [phone]    Clinical needs requested:    Geography searched:  10 miles around [address]    Start of Service:    Request sent:  2:24pm EST on 11/16/2024 by Vannesa Gama    Partner reserved:  12:53pm EST on 11/18/2024 by Sowmya Murillo    Choice list shared:  11:51am EST on 11/18/2024 by Sowmya Murillo

## 2024-11-18 NOTE — CASE MANAGEMENT
Case Management Discharge Planning Note    Patient name Isauro Sow  Location /-01 MRN [id number]  : 1945 Date 2024       Current Admission Date: 11/15/2024  Current Admission Diagnosis:Status post total left knee replacement using cement   Patient Active Problem List    Diagnosis Date Noted Date Diagnosed    Status post total left knee replacement using cement 11/15/2024     Anemia 11/15/2024     Preoperative evaluation of a medical condition to rule out surgical contraindications (TAR required) 10/31/2024     Chronic pain of left knee 2024     Lumbar spondylosis 2024     Decreased pedal pulses 2024     Stenosis of left carotid artery 2024     Other constipation 2024     Chronic midline low back pain without sciatica 2023     Platelets decreased ([facility]) 2023     Aortic valve stenosis 11/10/2023     Hx of cardiac pacemaker 2023     Chronic atrial flutter ([facility]) 2023     Chronic diastolic CHF (congestive heart failure) ([facility]) 2023     S/P TAVR (transcatheter aortic valve replacement) 2023     Coronary artery disease involving native coronary artery 2023     Sick sinus syndrome ([facility]) 2023     Continuous opioid dependence ([facility]) 10/24/2022     Dysuria 2022     L3 osteophyte fracture 2022     Fracture of thoracic transverse process ([facility]) 08/15/2022     Lumbar transverse process fracture ([facility]) 08/15/2022     Stage 3a chronic kidney disease ([facility]) 2022     RLS (restless legs syndrome) 2021     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 ([facility]) 2021     Chronic bilateral low back pain without sciatica 2021     Hemiplegia and hemiparesis following cerebral infarction affecting left non-dominant side ([facility]) 2020     Benign prostatic hyperplasia with nocturia 2020     PAD (peripheral artery disease) ([facility]) 2019     Primary  osteoarthritis of left knee 01/10/2019     Bilateral carotid artery stenosis 10/08/2018     Dermatosis 10/08/2018     Plantar fasciitis 06/04/2018     Constipation 01/16/2018     Seborrheic dermatitis 11/10/2017     History of stroke 09/15/2017     Asthma 07/26/2017     Benign essential hypertension 07/26/2017     Type 2 diabetes mellitus (HCC) 07/26/2017     Hyperlipidemia 07/26/2017     Diabetic nephropathy associated with type 2 diabetes mellitus (HCC) 06/22/2017     Non-rheumatic aortic sclerosis 06/22/2017     Popliteal cyst, left 10/15/2015     Acquired hallux malleus of right foot 10/06/2015     Pre-ulcerative corn or callous 10/06/2015     Gout 12/11/2014     Allergic rhinitis 05/03/2012     Retina disorder 05/03/2012       LOS (days): 3  Geometric Mean LOS (GMLOS) (days): 2.5  Days to GMLOS:-0.4     OBJECTIVE:  Risk of Unplanned Readmission Score: 23.32         Current admission status: Inpatient   Preferred Pharmacy:   [id number]  [address]  [address] [place] [id number]  Phone: [phone] Fax: [phone]    Primary Care Provider: Anthony Roberts MD    Primary Insurance: MEDICARE  Secondary Insurance: [facility]    DISCHARGE DETAILS:    Discharge planning discussed with:: Patient  Freedom of Choice: Yes  Comments - Freedom of Choice: Discussed FOC  CM contacted family/caregiver?: No- see comments (Declined)              Other Referral/Resources/Interventions Provided:  Referral Comments: CM met w/pt at bedside to review dcp. INformed [facility] has accepted pt pending bed availability. If [facility] unable to accept pt, pt would be agreeable to [facility]. [facility] - pt stated he wants to go to [facility].  [facility] able to accept pt pending bed availabilty. Multiple messages sent to [facility] for response re: bed availability - response received no bed available today, can consider for tomorrow.  Referral sent to [facility] rehab for  review as pt is clear for dc today.  Per nursing pt pending BM (last BM 11/12- per pt he only goes once a week).  Referral being reviewed by Dameon Moreno.

## 2024-11-19 ENCOUNTER — PATIENT OUTREACH (OUTPATIENT)
Dept: CASE MANAGEMENT | Facility: OTHER | Age: 79
End: 2024-11-19

## 2024-11-19 LAB
ANION GAP SERPL CALCULATED.3IONS-SCNC: 8 MMOL/L (ref 4–13)
BUN SERPL-MCNC: 22 MG/DL (ref 5–25)
CALCIUM SERPL-MCNC: 8.9 MG/DL (ref 8.4–10.2)
CHLORIDE SERPL-SCNC: 102 MMOL/L (ref 96–108)
CO2 SERPL-SCNC: 30 MMOL/L (ref 21–32)
CREAT SERPL-MCNC: 1.04 MG/DL (ref 0.6–1.3)
ERYTHROCYTE [DISTWIDTH] IN BLOOD BY AUTOMATED COUNT: 13.2 % (ref 11.6–15.1)
GFR SERPL CREATININE-BSD FRML MDRD: 67 ML/MIN/1.73SQ M
GLUCOSE P FAST SERPL-MCNC: 165 MG/DL (ref 65–99)
GLUCOSE SERPL-MCNC: 162 MG/DL (ref 65–140)
GLUCOSE SERPL-MCNC: 162 MG/DL (ref 65–140)
GLUCOSE SERPL-MCNC: 165 MG/DL (ref 65–140)
GLUCOSE SERPL-MCNC: 178 MG/DL (ref 65–140)
GLUCOSE SERPL-MCNC: 191 MG/DL (ref 65–140)
HCT VFR BLD AUTO: 30.5 % (ref 36.5–49.3)
HGB BLD-MCNC: 9.6 G/DL (ref 12–17)
INR PPP: 1.78 (ref 0.85–1.19)
MCH RBC QN AUTO: 28.1 PG (ref 26.8–34.3)
MCHC RBC AUTO-ENTMCNC: 31.5 G/DL (ref 31.4–37.4)
MCV RBC AUTO: 89 FL (ref 82–98)
PLATELET # BLD AUTO: 159 THOUSANDS/UL (ref 149–390)
PMV BLD AUTO: 10.4 FL (ref 8.9–12.7)
POTASSIUM SERPL-SCNC: 4.1 MMOL/L (ref 3.5–5.3)
PROTHROMBIN TIME: 20.9 SECONDS (ref 12.3–15)
RBC # BLD AUTO: 3.42 MILLION/UL (ref 3.88–5.62)
SODIUM SERPL-SCNC: 140 MMOL/L (ref 135–147)
WBC # BLD AUTO: 7.49 THOUSAND/UL (ref 4.31–10.16)

## 2024-11-19 PROCEDURE — 97530 THERAPEUTIC ACTIVITIES: CPT

## 2024-11-19 PROCEDURE — 97167 OT EVAL HIGH COMPLEX 60 MIN: CPT

## 2024-11-19 PROCEDURE — 80048 BASIC METABOLIC PNL TOTAL CA: CPT | Performed by: FAMILY MEDICINE

## 2024-11-19 PROCEDURE — 97163 PT EVAL HIGH COMPLEX 45 MIN: CPT

## 2024-11-19 PROCEDURE — 85610 PROTHROMBIN TIME: CPT | Performed by: FAMILY MEDICINE

## 2024-11-19 PROCEDURE — 85027 COMPLETE CBC AUTOMATED: CPT | Performed by: FAMILY MEDICINE

## 2024-11-19 PROCEDURE — 97110 THERAPEUTIC EXERCISES: CPT

## 2024-11-19 PROCEDURE — 82948 REAGENT STRIP/BLOOD GLUCOSE: CPT

## 2024-11-19 PROCEDURE — 99232 SBSQ HOSP IP/OBS MODERATE 35: CPT | Performed by: PHYSICIAN ASSISTANT

## 2024-11-19 PROCEDURE — 99223 1ST HOSP IP/OBS HIGH 75: CPT | Performed by: PHYSICAL MEDICINE & REHABILITATION

## 2024-11-19 RX ORDER — DIPHENHYDRAMINE HYDROCHLORIDE, ZINC ACETATE 2; .1 G/100G; G/100G
CREAM TOPICAL 3 TIMES DAILY PRN
Status: DISCONTINUED | OUTPATIENT
Start: 2024-11-19 | End: 2024-12-03 | Stop reason: HOSPADM

## 2024-11-19 RX ORDER — METHOCARBAMOL 500 MG/1
500 TABLET, FILM COATED ORAL EVERY 8 HOURS SCHEDULED
Status: DISCONTINUED | OUTPATIENT
Start: 2024-11-19 | End: 2024-12-03 | Stop reason: HOSPADM

## 2024-11-19 RX ORDER — BISACODYL 10 MG
10 SUPPOSITORY, RECTAL RECTAL DAILY PRN
Status: DISCONTINUED | OUTPATIENT
Start: 2024-11-19 | End: 2024-12-03 | Stop reason: HOSPADM

## 2024-11-19 RX ORDER — METHOCARBAMOL 500 MG/1
500 TABLET, FILM COATED ORAL EVERY 6 HOURS PRN
Status: DISCONTINUED | OUTPATIENT
Start: 2024-11-19 | End: 2024-11-19

## 2024-11-19 RX ADMIN — ENOXAPARIN SODIUM 100 MG: 100 INJECTION SUBCUTANEOUS at 11:04

## 2024-11-19 RX ADMIN — INSULIN LISPRO 1 UNITS: 100 INJECTION, SOLUTION INTRAVENOUS; SUBCUTANEOUS at 07:56

## 2024-11-19 RX ADMIN — ENOXAPARIN SODIUM 100 MG: 100 INJECTION SUBCUTANEOUS at 22:35

## 2024-11-19 RX ADMIN — WARFARIN SODIUM 5 MG: 5 TABLET ORAL at 17:42

## 2024-11-19 RX ADMIN — METOPROLOL SUCCINATE 25 MG: 25 TABLET, EXTENDED RELEASE ORAL at 21:07

## 2024-11-19 RX ADMIN — Medication 2000 UNITS: at 08:00

## 2024-11-19 RX ADMIN — POLYETHYLENE GLYCOL 3350 17 G: 17 POWDER, FOR SOLUTION ORAL at 08:00

## 2024-11-19 RX ADMIN — OXYCODONE HYDROCHLORIDE AND ACETAMINOPHEN 500 MG: 500 TABLET ORAL at 08:00

## 2024-11-19 RX ADMIN — INSULIN LISPRO 2 UNITS: 100 INJECTION, SOLUTION INTRAVENOUS; SUBCUTANEOUS at 11:04

## 2024-11-19 RX ADMIN — OXYCODONE HYDROCHLORIDE 5 MG: 5 TABLET ORAL at 23:46

## 2024-11-19 RX ADMIN — ACETAMINOPHEN 975 MG: 325 TABLET, FILM COATED ORAL at 21:07

## 2024-11-19 RX ADMIN — SENNOSIDES AND DOCUSATE SODIUM 2 TABLET: 50; 8.6 TABLET ORAL at 07:59

## 2024-11-19 RX ADMIN — ACETAMINOPHEN 975 MG: 325 TABLET, FILM COATED ORAL at 15:26

## 2024-11-19 RX ADMIN — METHOCARBAMOL 500 MG: 500 TABLET ORAL at 21:07

## 2024-11-19 RX ADMIN — OXYCODONE HYDROCHLORIDE 5 MG: 5 TABLET ORAL at 05:25

## 2024-11-19 RX ADMIN — INSULIN LISPRO 1 UNITS: 100 INJECTION, SOLUTION INTRAVENOUS; SUBCUTANEOUS at 17:02

## 2024-11-19 RX ADMIN — OXYCODONE HYDROCHLORIDE 5 MG: 5 TABLET ORAL at 11:03

## 2024-11-19 RX ADMIN — OXYCODONE HYDROCHLORIDE AND ACETAMINOPHEN 500 MG: 500 TABLET ORAL at 17:42

## 2024-11-19 RX ADMIN — FOLIC ACID 1 MG: 1 TABLET ORAL at 08:00

## 2024-11-19 RX ADMIN — PRAVASTATIN SODIUM 40 MG: 40 TABLET ORAL at 15:28

## 2024-11-19 RX ADMIN — INSULIN LISPRO 1 UNITS: 100 INJECTION, SOLUTION INTRAVENOUS; SUBCUTANEOUS at 21:08

## 2024-11-19 RX ADMIN — ACETAMINOPHEN 975 MG: 325 TABLET, FILM COATED ORAL at 05:25
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
## 2024-11-19 NOTE — ASSESSMENT & PLAN NOTE
Patient with known history of hypertension.  Blood pressure acceptable.  Continue with home dose metoprolol

## 2024-11-19 NOTE — ASSESSMENT & PLAN NOTE
Patient was recently admitted to [facility] for elective left TKA on 11/13/24, discharged with home therapy, returned to ED as he was unable to ambulate or participate with home PT.   Orthopedic evaluation appreciated: recommend rehab   Currently admitted to [facility] for rehab  Pain control  PT/OT, PMR consult

## 2024-11-19 NOTE — ASSESSMENT & PLAN NOTE
-infrequent and poor bowel movements in setting of narcotic use for recent L TKA  -bowel regimen in place - adjust as needed  -attempt to wean down narcotic usage as tolerated   - Last BM 11/18.

## 2024-11-19 NOTE — PROGRESS NOTES
Progress Note - Hospitalist   Name: Isauro Sow 79 y.o. male I MRN: [id number]  Unit/Bed#: -01 I Date of Admission: 11/18/2024   Date of Service: 11/19/2024 I Hospital Day: 1    Assessment & Plan  Status post total left knee replacement using cement  Patient was recently admitted to [facility] for elective left TKA on 11/13/24, discharged with home therapy, returned to ED as he was unable to ambulate or participate with home PT.   Orthopedic evaluation appreciated: recommend rehab   Currently admitted to [facility] for rehab  Pain control  PT/OT, PMR consult  Benign essential hypertension  Patient with known history of hypertension.  Blood pressure acceptable.  Continue with home dose metoprolol  Type 2 diabetes mellitus (HCC)  Lab Results   Component Value Date    HGBA1C 7.2 (H) 10/07/2024       Recent Labs     11/18/24  1059 11/18/24  1540 11/18/24 2018 11/19/24  0608   POCGLU 185* 195* 199* 162*       Blood Sugar Average: Last 72 hrs:  (P) 180.5Patient is on metformin as outpatient.  Will continue with sliding scale of insulin and monitor blood sugar  Adjust insulin regimen per blood sugar  History of stroke  Continue statin and coumadin/lovenox  Stage 3a chronic kidney disease (HCC)  Lab Results   Component Value Date    EGFR 67 11/19/2024    EGFR 65 11/18/2024    EGFR 64 11/16/2024    CREATININE 1.04 11/19/2024    CREATININE 1.07 11/18/2024    CREATININE 1.09 11/16/2024     Renal function at baseline  Avoid nephrotoxins  S/P TAVR (transcatheter aortic valve replacement)  History of TAVR  noted   Followed by cardiology and CTS as outpatient  Chronic atrial flutter (HCC)  Preoperative cardiology consultation recommended Lovenox to Coumadin bridge  Continue on 5 mg of Coumadin. Trend INR, goal 2-3.   INR today 1.78, trending up. Remains on lovenox bridge until INR therapeutic  Continue metoprolol for rate control    The above assessment and plan was reviewed and updated as determined by my evaluation  of the patient on 11/19/2024.    History of Present Illness   Patient seen and examined. Patients overnight issues or events were reviewed with nursing staff. New or overnight issues include the following:   Patient admitted last evening to [facility] for rehab. Complains of pain in the left leg.  No calf tenderness. No CP/SOB    Review of Systems    Objective :  Temp:  [98.3 °F (36.8 °C)-99.6 °F (37.6 °C)] 98.6 °F (37 °C)  HR:  [63-68] 63  BP: (120-138)/(54-71) 130/64  Resp:  [16-17] 16  SpO2:  [91 %-96 %] 96 %  O2 Device: None (Room air)    Invasive Devices       Peripheral Intravenous Line  Duration             Peripheral IV 11/15/24 Right Antecubital 3 days              Airway  Duration             Non-Surgical Airway Oral pharyngeal airway 90 mm 5 days                    Physical Exam  General Appearance: NAD; pleasant  HEENT: PERRLA, conjuctiva normal; mucous membranes moist; face symmetrical  Neck:  Supple  Lungs: clear bilaterally, normal respiratory effort, no retractions, expiratory effort normal, on room air  CV: regular rate, no murmurs rubs or gallops noted   ABD: soft non tender, +BS x4  EXT: DP pulses intact, no lymphadenopathy, left knee dressing C/D/I  Skin: normal turgor, normal texture, no rash  Psych: affect normal, mood normal  Neuro: AAOx3    The above physical exam was reviewed and updated as determined by my evaluation of the patient on 11/19/2024.      Lab Results: I have reviewed the following results:  Results from last 7 days   Lab Units 11/19/24  0609 11/18/24  0453   WBC Thousand/uL 7.49 7.19   HEMOGLOBIN g/dL 9.6* 10.4*   HEMATOCRIT % 30.5* 31.1*   PLATELETS Thousands/uL 159 136*     Results from last 7 days   Lab Units 11/19/24  0609 11/18/24  0511   SODIUM mmol/L 140 138   POTASSIUM mmol/L 4.1 3.9   CHLORIDE mmol/L 102 99   CO2 mmol/L 30 30   BUN mg/dL 22 19   CREATININE mg/dL 1.04 1.07   CALCIUM mg/dL 8.9 9.0         Results from last 7 days   Lab Units 11/19/24  0609 11/18/24  0528   INR   1.78* 1.59*     Results from last 7 days   Lab Units 11/19/24  0608 11/18/24  2018 11/18/24  1540   POC GLUCOSE mg/dl 162* 199* 195*           Review of Scheduled Meds: Medications  reviewed and reconciled as needed  Current Facility-Administered Medications   Medication Dose Route Frequency Provider Last Rate    acetaminophen  975 mg Oral Q8H [facility] Sung Morales MD      ascorbic acid  500 mg Oral BID Sung Morales MD      bisacodyl  10 mg Rectal Daily Sung Morales MD      Cholecalciferol  2,000 Units Oral Daily Sung Morales MD      enoxaparin  100 mg Subcutaneous Q12H Sung Morales MD      folic acid  1 mg Oral Daily Sung Morales MD      insulin lispro  1-5 Units Subcutaneous HS Sung Morales MD      insulin lispro  1-6 Units Subcutaneous TID AC Sung Morales MD      metoprolol succinate  25 mg Oral HS Sung Morales MD      nitroglycerin  0.4 mg Sublingual Q5 Min PRN Sung Morales MD      ondansetron  4 mg Oral Q6H PRN Ashley Depadua, MD      oxyCODONE  5 mg Oral Q4H PRN Sung Morales MD      oxyCODONE  2.5 mg Oral Q4H PRN Sung Morales MD      polyethylene glycol  17 g Oral Daily Sung Morales MD      pravastatin  40 mg Oral Daily With Dinner Sung Morales MD      senna-docusate sodium  2 tablet Oral Daily Sung Morales MD      warfarin  5 mg Oral Daily (warfarin) Sung Morales MD         VTE Pharmacologic Prophylaxis: coumadin/lovenox  Code Status: Level 3 - DNAR and DNI  Current Length of Stay: 1 day(s)      ** Please Note:  voice to text software may have been used in the creation of this document. Although proof errors in transcription or interpretation are a potential of such software**

## 2024-11-19 NOTE — ASSESSMENT & PLAN NOTE
-Hx of a-flutter s/p PPM placement   -On Coumadin with Lovenox bridge  - Continue Toprol  -Trend INR, once therapeutic, can stop Lovenox

## 2024-11-19 NOTE — ASSESSMENT & PLAN NOTE
Lab Results   Component Value Date    EGFR 67 11/19/2024    EGFR 65 11/18/2024    EGFR 64 11/16/2024    CREATININE 1.04 11/19/2024    CREATININE 1.07 11/18/2024    CREATININE 1.09 11/16/2024     -Baseline Cr 1.0-1.3  Currently within baseline.   Avoid nephrotoxic meds, relative hypotension.   Monitor BMP, I/O.

## 2024-11-19 NOTE — ASSESSMENT & PLAN NOTE
Lab Results   Component Value Date    EGFR 67 11/19/2024    EGFR 65 11/18/2024    EGFR 64 11/16/2024    CREATININE 1.04 11/19/2024    CREATININE 1.07 11/18/2024    CREATININE 1.09 11/16/2024     Renal function at baseline  Avoid nephrotoxins

## 2024-11-19 NOTE — TREATMENT PLAN
Individualized Plan of Care - [facility]  Isauro Sow 79 y.o. male MRN: [id number]  Unit/Bed#: -01 Encounter: [id number]     PATIENT INFORMATION  ADMISSION DATE: 11/18/2024  5:42 PM MAGALYS CATEGORY:Orthopedic Disorders:  08.61  Unilateral Knee Replacement   ADMISSION DIAGNOSIS: Total knee replacement status, left [Z96.652]  EXPECTED LOS: 10 to 14 days     MEDICAL/FUNCTIONAL PROGNOSIS  Based on my assessment of the patient's medical conditions and current functional status, the prognosis for attaining medical and functional goals or the IRF stay is:  Good    Medical Goals: Patient will be able to manage medical conditions and comorbid conditions with medications and follow up upon completion of rehab program.    1. Adequate pain control  2. Prevention of VTE  3. Adequate secretion management, and monitoring of respiratory status  4. Bowel/bladder management  5. Maintain appropriate nutrition and hydration for healing and hemodynamic stability  6. Emotional adaptation to impairment  7. Monitor for polypharmacy  8. Fall prevention  9. Patient and family caregiver training/education  10. Skin protection and prevention of pressure injury  11. Appropriate management of new and chronic comorbidites and sequelae of her acute hospitalization.   12. Prevention of delirium  13. Arrange appropriate outpatient f/u  14. Incisional care to prevent infection/dehiscence      ANTICIPATED DISCHARGE DISPOSITION AND SERVICES  COMMUNITY SETTING: Home - independent/modified independent    ANTICIPATED FOLLOW-UP SERVICE:   Outpatient Therapy Services: PT         DISCIPLINE SPECIFIC PLANS:  Required Disciplines & Services: Rehabillitation Nursing    REQUIRED THERAPY:  Therapy Hours per Day Days per Week   Physical Therapy 1-2 5-6   Occupational Therapy 1-2 5-6   NOTE: Additional therapy time(s) or changes to allocation of therapies as appropriate to meet patient needs and to achieve functional goals.    Patient  will participate in above therapy regimen consisting of PT and OT due to the following medical procedure/condition:Orthopedic Disorders:  08.61  Unilateral Knee Replacement    ANTICIPATED FUNCTIONAL OUTCOMES:  ADL: Patient will be independent with ADLs with least restrictive device upon completion of rehab program   Bladder/Bowel:     Transfers: Patient will be independent with transfers with least restrictive device upon completion of rehab program   Locomotion: Patient will be independent with locomotion with least restrictive device upon completion of rehab program   Cognitive:       DISCHARGE PLANNING NEEDS  Equipment needs: Discharge needs to be reviewed with team      REHAB ANTICIPATED PARTICIPATION RESTRICTIONS:  None

## 2024-11-19 NOTE — PROGRESS NOTES
11/19/24 1235   Patient Data   Rehab Impairment Impairment of mobility, safety and Activities of Daily Living (ADLs) due to Orthopedic Disorders: 08.61 Unilateral Knee Replacement   Etiologic Diagnosis Severe Osteoarthritis of Left Knee   Date of Onset 11/13/24   Support System   Name Son Arley, dtr in law, brother Galo   Able to provide 24 hour supervision No   Able to provide physical help?   (wife cannot provide physical help, Son can, to confirm availabliyt of son and dtr in law. Galo present for part of session and did not offer that he could help pt with specific tasks at time of d/c)   Home Setup   Type of Home Single Level  (has a basement to laundry with FF with [facility] that you can reach at same time, current plan is for family to do laundry for him)   Method of Entry   (no MARYA)   Number of Stairs in Home 12  (to basement)   In Home Hand Rail Bilateral   First Floor Bathroom Full   Available Equipment Roller Walker   Prior Level of Function   Self-Care 3. Independent - Patient completed the activities by him/herself, with or without an assistive device, with no assistance from a helper.   Indoor-Mobility (Ambulation) 3. Independent - Patient completed the activities by him/herself, with or without an assistive device, with no assistance from a helper.   Stairs 3. Independent - Patient completed the activities by him/herself, with or without an assistive device, with no assistance from a helper.   Functional Cognition 3. Independent - Patient completed the activities by him/herself, with or without an assistive device, with no assistance from a helper.   Prior Assistance Needed for Driving;Shopping   Prior Device Used Z. None of the above   Falls in the Last Year   Number of falls in the past 12 months 0   Restrictions/Precautions   Precautions Bed/chair alarms;Cognitive;Fall Risk;Supervision on toilet/commode   LLE Weight Bearing Per Order WBAT   Pain Assessment   Pain Score 8   Pain Location/Orientation  Orientation: Left;Location: Knee   Pain Onset/Description Onset: Ongoing;Descriptor: Throbbing;Descriptor: Sharp;Descriptor: Aching   Effect of Pain on Daily Activities limits functional mobility in all aspects   Hospital Pain Intervention(s) Repositioned;Cold applied;Ambulation/increased activity;Rest  (ice on L knee for 20 mn during session while doing bed level therex and education, RN provided pain meds around 11 am)   Toileting   Findings stood x3 times during session to use urinal - A provided for balance adn to provide clothing maangement , last attempt he was able to hold urinal and one hand on RW. Ax2 to help wtih clothing, urinal placement, standing balance, Min-modA x1 and other person performing other duties listed   Transfer Bed/Chair/Wheelchair   Limitations Noted In Balance;Endurance;LE Strength;Problem Solving;Sequencing   Adaptive Equipment Roller Walker   Type of Assistance Needed Physical assistance;Adaptive equipment   Physical Assistance Level Total assistance   Comment Mod-MaxA x1, 2nd person at [place], for SPT as well as SB trasnfer. SPT pt has increased difficulty standing on LLE to take a step with RLE, increasing time needed for SPT, needed to sti back down in recliner on one attempt due to limited ability to WB LLE. Trialed slide board transfer as an option for him based upon his pain adn mobility.He can initiate some scooting across the board for SB transfer, but needs RLE blocked, VC for sequencing and then needed Min-ModAx2 to finish boosting across board   Chair/Bed-to-Chair Transfer CARE Score 1   Roll Left and Right   Type of Assistance Needed Physical assistance   Physical Assistance Level Total assistance   Comment MaxAx2, one to help manage LLE due to pain, other to help with reposition upper body and hips, use of jeff. practiced L sidelying with pillows between legs and under top arm to help him be more comfortable at night   Roll Left and Right CARE Score 1   Sit to Lying   Type  of Assistance Needed Physical assistance   Physical Assistance Level Total assistance   Comment ModAx1 to help LLE, 2nd person ModA to control trunk   Sit to Lying CARE Score 1   Lying to Sitting on Side of Bed   Type of Assistance Needed Physical assistance   Physical Assistance Level Total assistance   Comment ModAx1 to help LLE, 2nd person ModA to control trunk   Lying to Sitting on Side of Bed CARE Score 1   Sit to Stand   Type of Assistance Needed Physical assistance;Adaptive equipment   Physical Assistance Level Total assistance   Comment ModA-MaxAx 1, 2nd person at [facility], also present for safety as due to his very limited abiliyt to weight shift onto LLE, may need 2nd person to move WC closer to him   Sit to Stand CARE Score 1   Picking Up Object   Reason if not Attempted Safety concerns   Picking Up Object CARE Score 88   Car Transfer   Reason if not Attempted Safety concerns   Car Transfer CARE Score 88   Ambulation   Primary Mode of Locomotion Prior to Admission Walk   Distance Walked (feet) 2 ft   Assist Device Roller Walker   Gait Pattern Inconsistant Margarita;Slow Margarita;Decreased foot clearance;Step through;Improper weight shift   Limitations Noted In Balance;Endurance;Heel Strike;Device Management;Posture;Sequencing;Speed;Strength;Swing   Provided Assistance with: Balance;Trunk Support;Weight Shift   Walk Assist Level Moderate Assist  (2nd person present for safety and able to move WC as needed)   Findings attempted walking forward, unable to take more than a couple steps to completed SPT instead. Stood at edge fo bed and took 2 steps side stepping to L side, which he did better with than moving forward.   Walk 10 Feet   Reason if not Attempted Safety concerns   Walk 10 Feet CARE Score 88   Walk 50 Feet with Two Turns   Reason if not Attempted Safety concerns   Walk 50 Feet with Two Turns CARE Score 88   Walk 150 Feet   Reason if not Attempted Safety concerns   Walk 150 Feet CARE Score 88    Walking 10 Feet on Uneven Surfaces   Reason if not Attempted Safety concerns   Walking 10 Feet on Uneven Surfaces CARE Score 88   Wheel 50 Feet with Two Turns   Reason if not Attempted Safety concerns   Wheel 50 Feet with Two Turns CARE Score 88   Wheel 150 Feet   Comment patient fatigued during session, asking to lie down and finish session bed level. Put in WC level goals for home as a potential back up option to always walking due to current pain levels, and very limited walking ability. Adding in WC goal becuase we woudl need to make sure he can maange brakes, LLE (using leg rest or not) so recommending to try WC mobility early in stay to see if there are potential sequencing concerns with WC to assess if it's a viable option for him at home as a back up if walkign doesnt progress as intended   Reason if not Attempted Safety concerns   Wheel 150 Feet CARE Score 88   Curb or Single Stair   Reason if not Attempted Safety concerns   1 Step (Curb) CARE Score 88   4 Steps   Reason if not Attempted Safety concerns   4 Steps CARE Score 88   12 Steps   Reason if not Attempted Safety concerns   12 Steps CARE Score 88   Comprehension   Findings needed to repeat cues for hand placement with transfers adn not to pull on me   QI: Comprehension 3. Usually Understands: Understands most conversations, but misses some part/intent of message. Requires cues at times to understand.   Expression   QI: Expression 4. Express complex messages without difficulty and with speech that is clear and easy to understan   RLE Assessment   RLE Assessment WFL   LLE Assessment   LLE Assessment   (gross MMT 2/5. lacking 8 degress of extension at knee, and able to flex to 55 deg with AAROM supine position)   Coordination   Movements are Fluid and Coordinated   (noted some impaired motor planning with scooting forward/backwards/up/down side of bed or when in chair, unsure at this time if this is due to pain in LLE)   Sensation   Light Touch Not  tested   Cognition   Arousal/Participation Cooperative   Comments needed to assist with sequencing with use of RW today; lowered height of RW so he can use his arms more effectively to offload LLE during SPT. He needed repeated cues to push down on surface standing up from to facilitate leverage getting up, unsure if pain is resulting in him wanting to try different ways of moving to see what works best for him. To cont to have him work on sequencing with use of RW/mobility toassess if Jillian goals are still approrpaite or if he may need more help at home   Objective Measure   PT Measure(s) seated ankle pumps bilat, L calf stretching 2x60 seconds supine, with maroon bolster under LLE, AAROM for SAQ with my hand as an external target. AAROM Heel slides over slide board to dec friction, 3 reps of 12. seated heel raises LLE x12.   Therapeutic Exercise   Therapeutic Exercise/Activity education to pt and his brother about estimating 2 week LOS with goals that he can function (I) with use of RW for basic household mobility, reocmmdnign family/friends helpw tih meal prep, cooking, cleaning, laundry, dishes, garbage, grocery shopping. PTA Isauro was doing laundry, cooking, cleaning and helping his wife at home. Collectively we will need to confirm with pt's son and dtr in law what their schedules are and who can help Isauro more at home with IADLs.   Discharge Information   Vocational Plan Retired/not working   Patient's Discharge Plan to [place] home with family support, first floor set up with no MARYA   Patient's Rehab Expectations To be as Independent as possible   Barriers to Discharge Home Limited Family Support;Decreased Strength;Decreased Endurance;Pain;Safety Considerations  (dec balance, dec righting reactions)   Impressions Isauro is a 80 y/o male at [facility] s/p L TKA on 11/13/24. PMH includes CVA with L side weakness in 2017, HTN, PERCY, CAD, PAD, DOPD, NIDDM, diabetic neuropathy, restless leg syndrome, A flutter, sick  sinus syndrome, s/p [employer] PPM, aortic valve replaced 2023, asthma, CHF, B/L carotid stenosis, CKD stage 3, obesity, and macular degeneration.  Originally he was discharged home after surgery and came back to the hospital due to inability to ambulate. He currently presents with notable dec in LLE AROM, stability in stance/ability to WB and weakness on L side, with dec overall activity tolerance and increased pain; in part from deconditioning post surgery as well as impaired cardiac function. To further assess diabetic neuropathy to determine how this is impacting his standing functional mobility. All of these factors result in need for inc physical A with all mobility at this time, with all aspects of functional mobility being a barrier to return home at this time. PT POC to focus on progressing LLE AAROM, to provide HEP tomorrow based upon how he feels tomorrow after today's session, as well as to progress overall cardiovascular endurance to dec fatigue levels. PT POC also to include WC mobility as a back up plan to walking all the time at d/c pending progress, as well as to focus on sequencing with use of RW during mobility to ensure he doesn't need VC for sequencing at d/c. Recommending communication with family soon to discuss who can help Isauro more at d/c if that's needed.   PT Therapy Minutes   PT Time In 1235   PT Time Out 1415   PT Total Time (minutes) 100   PT Mode of treatment - Individual (minutes) 100   PT Mode of treatment - Concurrent (minutes) 0   PT Mode of treatment - Group (minutes) 0   PT Mode of treatment - Co-treat (minutes) 0   PT Mode of Treatment - Total time(minutes) 100 minutes   PT Cumulative Minutes 100   Cumulative Minutes   Cumulative therapy minutes 190

## 2024-11-19 NOTE — ASSESSMENT & PLAN NOTE
Lab Results   Component Value Date    HGBA1C 7.2 (H) 10/07/2024       Recent Labs     11/18/24  1059 11/18/24  1540 11/18/24 2018 11/19/24  0608   POCGLU 185* 195* 199* 162*       Blood Sugar Average: Last 72 hrs:  (P) 180.5Patient is on metformin as outpatient.  Will continue with sliding scale of insulin and monitor blood sugar  Adjust insulin regimen per blood sugar

## 2024-11-19 NOTE — ASSESSMENT & PLAN NOTE
Performed by Dr. Braxton on 11/13.   Can remove surgical dressing POD 7 and replace with DSD.   WBAT.   Failed home therapies.   AROM/PROM/AAROM with no degree restriction.   PT/OT 3-5 hours/day, 5-6 days/week.   DVT Ppx fully anticoagulated on coumadin.   F/u Ortho (~11/27) for 2 week POV.

## 2024-11-19 NOTE — PROGRESS NOTES
OT IE       11/19/24 0830   Patient Data   Rehab Impairment Impairment of mobility, safety and Activities of Daily Living (ADLs) due to Orthopedic Disorders:  08.61  Unilateral Knee Replacement   Etiologic Diagnosis Severe Osteoarthritis of Left Knee   Date of Onset 11/13/24   Support System   Name Rayne   Relationship Spouse   Able to provide 24 hour supervision No   Able to provide physical help? No   Multiple Support Systems Yes   Support System 2   Name 2 Arley   Relationship 2 Son  (Lives at home with pt)   Able to provide 24 hour supervision 2 No   Able to provide physical help? (2) Yes   Support System 3   Name 3 Desmond   Relationship 3 Brother   City of Residence 3 [place]   Able to provide 24 hour supervision 3 No   Able to provide physical help? (3) Yes  (Able to assist as needed, often drives pt to appointments/shopping)   Home Setup   Type of Home Single Level   Method of Entry   (no AMRYA)   Number of Stairs in Home 8  (8+ down to basement)   In Home Hand Rail Bilateral   First Floor Bathroom Tub;Full;Grab Bars;Curtain   First Floor Bathroom Accessibility Grab bars in tub/shower;Raised toilet seat  (High rise toilet)   First Floor Setup Available Yes   Home Modification Comment Pt reports PTA he was using basement bathroom for showers. Shower in basement is a walk in with small lip, curtain and shower chair with back. Pt asked if use of tub bench in main level bathroom would be possible, pt reporting it would be a tight fit.   Available Equipment Roller Walker;Single Point Cane;Shower Chair   Baseline Information   Transportation Family/friends drive   Prior Device(s) Used   (no AD PTA)   Prior IADL Participation   Money Management Manage Checkbook   Meal Preparation Full Participation   Laundry Full Participation   Home Cleaning Full Participation   Prior Level of Function   Self-Care 3. Independent - Patient completed the activities by him/herself, with or without an assistive device, with no  assistance from a helper.   Indoor-Mobility (Ambulation) 3. Independent - Patient completed the activities by him/herself, with or without an assistive device, with no assistance from a helper.   Stairs 3. Independent - Patient completed the activities by him/herself, with or without an assistive device, with no assistance from a helper.   Functional Cognition 3. Independent - Patient completed the activities by him/herself, with or without an assistive device, with no assistance from a helper.   Prior Assistance Needed for Driving;Shopping;Medication Management  (Pt managed meds on a day to day, but son assisted with refills)   Prior Device Used Z. None of the above   Falls in the Last Year   Number of falls in the past 12 months 0  (Pt reports no falls in the last year)   Psychosocial   Psychosocial (WDL) WDL   Restrictions/Precautions   Precautions Fall Risk;Pain;Supervision on toilet/commode   LLE Weight Bearing Per Order WBAT   Pain Assessment   Pain Assessment Tool 0-10   Pain Score 9   Pain Location/Orientation Orientation: Left;Location: Leg;Location: Knee   Pain Onset/Description Onset: Ongoing   Effect of Pain on Daily Activities Limited activity tolerance   Patient's Stated Pain Goal No pain   Hospital Pain Intervention(s) Repositioned;Rest;Emotional support   Oral Hygiene   Type of Assistance Needed Physical assistance   Physical Assistance Level 26%-50%   Comment To perform at PLOF. Pt completes seated in WC at sink after setup   Oral Hygiene CARE Score 3   Grooming   Able To Initiate Tasks;Wash/Dry Face;Brush/Clean Teeth;Wash/Dry Hands   Limitation Noted In Problem Solving   Findings Seated in WC at sink after setup   Tub/Shower Transfer   Reason Not Assessed Sponge Bath   Findings To be assessed when shower order present   Shower/Bathe Self   Type of Assistance Needed Physical assistance   Physical Assistance Level 51%-75%   Comment To perform at PLOF. Pt completes sponge bath seated in WC at sink to  bathe UB and B upper legs. In stance with UE support of sink pt able to wash harry/groin with CGA for balance, TA for buttocks. TA for B lower legs seated in WC secondary to c/o pain with functional reach   Shower/Bathe Self CARE Score 2   Bathing   Assessed Bath Style Sponge Bath   Anticipated D/C Bath Style Shower   Dressing/Undressing Clothing   Type of Assistance Needed Supervision   Physical Assistance Level No physical assistance   Comment seated in WC pt donned overhead t-shirt (gown tied to simulate overhead t-shirt) with verbal cuing for anterior trunk flexion to accelerate down over trunk   Upper Body Dressing CARE Score 4   Type of Assistance Needed Physical assistance   Physical Assistance Level 76% or more   Comment Pt requiring TA to thread BLE secondary to c/o pain, pt able to accelerate pants to upper thigs with extended time and verbal encouragement. In stance pt able to assist in accelerating over hips, min A for completion, Mod-Min A for standing balance with intermittent UE support of RW   Lower Body Dressing CARE Score 2   Putting On/Taking Off Footwear   Type of Assistance Needed Physical assistance   Physical Assistance Level Total assistance   Comment TA to doff/don B  socks secondary to c/o pain with functional reach and limited ROM   Putting On/Taking Off Footwear CARE Score 1   Toileting Hygiene   Type of Assistance Needed Physical assistance   Physical Assistance Level 51%-75%   Comment To perform at PLOF. Mod A for CM in stance. Pt completes hygiene after urination seated on commode. Would require TA for rear hygiene after BM   Toileting Hygiene CARE Score 2   Toilet Transfer   Type of Assistance Needed Physical assistance   Physical Assistance Level 76% or more   Comment To perform at PLOF. Pt completes SPT with use of RW and Mod A, extended time and verbal cuing for hand placement   Toilet Transfer CARE Score 2   Transfer Bed/Chair/Wheelchair   Type of Assistance Needed Physical  "assistance   Physical Assistance Level 76% or more   Comment To perform at PLOF. Pt completes SPT with use of RW and Mod A, extended time and verbal cuing for hand placement   Chair/Bed-to-Chair Transfer CARE Score 2   Roll Left and Right   Type of Assistance Needed Physical assistance   Physical Assistance Level 51%-75%   Comment To perform at PLOF. Mod A with use of bedrail   Roll Left and Right CARE Score 2   Sit to Lying   Type of Assistance Needed Physical assistance   Physical Assistance Level 51%-75%   Comment A for LLE management and incidental at trunk   Sit to Lying CARE Score 2   Lying to Sitting on Side of Bed   Type of Assistance Needed Physical assistance   Physical Assistance Level 51%-75%   Comment A for trunk and LLE management   Lying to Sitting on Side of Bed CARE Score 2   Sit to Stand   Type of Assistance Needed Physical assistance   Physical Assistance Level 76% or more   Comment To perform at PLOF. Pt completes with Mod A to RW   Sit to Stand CARE Score 2   Comprehension   QI: Comprehension 4. Undestands: Clear comprehension without cues or repetitions   Expression   QI: Expression 4. Express complex messages without difficulty and with speech that is clear and easy to understan   RUE Assessment   RUE Assessment WFL   LUE Assessment   LUE Assessment WFL   Sensation   Light Touch No apparent deficits   Cognition   Overall Cognitive Status WFL   Arousal/Participation Alert;Responsive;Cooperative   Attention Within functional limits   Orientation Level Oriented X4   Memory Within functional limits   Following Commands Follows one step commands without difficulty   Vision   Vision Comments macular degeneration   Discharge Information   Vocational Plan Retired/not working   Patient's Discharge Plan Home with support   Patient's Rehab Expectations \"I want to be be able to walk and get around like I used to\"   Barriers to Discharge Home Limited Family Support;Decreased Strength;Decreased " Endurance;Pain   Impressions Pt is a 79 y.o male s/p L TKA on 11/13/2024 at [facility] by surgeon Dr. Braxton. Patient was discharged home  the following day on 11/14/2024.  On 11/15/2024 patient presented to [facility] emergency department for evaluation of worsening left leg pain, radiating to his left calf and thigh, LLE swelling with inability to perform his ADLs or participate in home physical therapy. Pt has a past medical history significant for: CVA (2017) with residual left sided weakness, HTN, PERCY, CAD, PAD, DOPD, NIDDM, diabetic neuropathy, restless leg syndrome, A flutter, sick sinus syndrome, s/p [employer] PPM, aortic valve replaced 2023, asthma, CHF, B/L carotid stenosis, CKD stage 3, obesity, and macular degeneration. PRECAUTIONS: Falls, pain. Pt presents with the following impairments: strength, balance, endurance, ROM, mobility, pain. Pt would benefit from skilled occupational therapy services with focus on ADL retraining, functional transfers, ROM, standing balance/tolerance to ensure safe discharge and participation in functional activities to increase independence. Patient and caregiver education to be completed as appropriate. Anticipate  10 to 14 day ELOS to meet Mod I level goals.   OT Therapy Minutes   OT Time In 0830   OT Time Out 1000   OT Total Time (minutes) 90   OT Mode of treatment - Individual (minutes) 90   OT Mode of treatment - Concurrent (minutes) 0   OT Mode of treatment - Group (minutes) 0   OT Mode of treatment - Co-treat (minutes) 0   OT Mode of Treatment - Total time(minutes) 90 minutes   OT Cumulative Minutes 90   Cumulative Minutes   Cumulative therapy minutes 90

## 2024-11-19 NOTE — PROGRESS NOTES
OT LTG       11/19/24 0830   Rehab Team Goals   ADL Team Goal Patient will be independent with ADLs with least restrictive device upon completion of rehab program   Rehab Team Interventions   OT Interventions Self Care;Home Management;Therapeutic Exercise;Energy Conservation;Patient/Family Education   Eating Goal   Eating Goal 06. Independent - Patient completes the activity by him/herself with no assistance from a helper.   Status Met   Grooming Goal   Oral Hygiene Goal 06. Independent - Patient completes the activity by him/herself with no assistance from a helper.   Status Ongoing;Target goal - one week;Target goal - two weeks   Intervention Assistive Device;Balance Work;Therapeutic Exercise;Tolerance Work   Tub/Shower Transfer Goal   Method Tub Shower   Status Ongoing;Target goal - one week;Target goal - two weeks   Interventions ADL Training;Assistive Device   Bathing Goal   Shower/bathe self Goal 06. Independent - Patient completes the activity by him/herself with no assistance from a helper.   Status Ongoing;Target goal - one week;Target goal - two weeks   Intervention ADL Training;Assistive Device;Therapeutic Exercise   Upper Body Dressing Goal   Upper body dressing Goal 06. Independent - Patient completes the activity by him/herself with no assistance from a helper.   Status Ongoing;Target goal - one week;Target goal - two weeks   Intervention Assistive Device;Balance Work;Therapeutic Exercise;Tolerance Work   Lower Body Dressing Goal   Lower body dressing Goal 06. Independent - Patient completes the activity by him/herself with no assistance from a helper.   Putting on/taking off footwear Goal 06. Independent - Patient completes the activity by him/herself with no assistance from a helper.   Status Ongoing;Target goal - one week;Target goal - two weeks   Intervention Assistive Device;Balance Work;Therapeutic Exercise;Tolerance Work   Toileting Transfer Goal   Toilet transfer Goal 06. Independent - Patient  completes the activity by him/herself with no assistance from a helper.   Status Ongoing;Target goal - one week;Target goal - two weeks   Intervention Balance Work;ADL Training;Assistive Device   Toileting Goal   Toileting hygiene Goal 06. Independent - Patient completes the activity by him/herself with no assistance from a helper.   Status Ongoing;Target goal - one week;Target goal - two weeks   Intervention ADL Training;Balance Work;Assistive Device   Meal Prep and Kitchen Mobility   Assist Level Independent   Status Ongoing;Target goal - one week;Target goal - two weeks   Laundry   Assist Level Independent   Status Ongoing;Target goal - one week;Target goal - two weeks

## 2024-11-19 NOTE — CASE MANAGEMENT
Met w/pt and reviewed rehab routine and cm role. Pt known to staff from previous admissions. Cm confirmed he and his wife still live at home, ranch home w/0 anita. Pt states neither of them drive and their son assists. Pt states his wife is not doing anything to help herself get better from her stroke. Pt has had experience with hhc through [facility] and a snf stay at [facility]. Pt states no outpt therapy experience. Pt uses [facility] for rx needs. Cm reviewe dteam mtg process and potential los.

## 2024-11-19 NOTE — ASSESSMENT & PLAN NOTE
Lab Results   Component Value Date    HGBA1C 7.2 (H) 10/07/2024       Recent Labs     11/19/24  2106 11/20/24  0626 11/20/24  1051 11/20/24  1545   POCGLU 162* 143* 158* 237*       Blood Sugar Average: Last 72 hrs:  (P) 172.5    Home: Metformin 1000mg daily;   Here: CDI/Accuchecks.   If necessary, will try to get patient back on oral meds prior to discharge.   Diabetic Diet.   Management as per IM.

## 2024-11-19 NOTE — ASSESSMENT & PLAN NOTE
Preoperative cardiology consultation recommended Lovenox to Coumadin bridge  Continue on 5 mg of Coumadin. Trend INR, goal 2-3.   INR today 1.78, trending up. Remains on lovenox bridge until INR therapeutic  Continue metoprolol for rate control

## 2024-11-19 NOTE — PROGRESS NOTES
Outpatient Care Management LB/SNF Pathway. Discharged 11/18/24 to [facility]. SNF/STR Surveillance 11/19/24. This Admin Coordinator will continue to monitor via chart review.

## 2024-11-20 PROBLEM — Z91.89 AT RISK FOR VENOUS THROMBOEMBOLISM (VTE): Status: ACTIVE | Noted: 2024-11-20

## 2024-11-20 PROBLEM — R52 ACUTE PAIN: Status: ACTIVE | Noted: 2024-11-20

## 2024-11-20 LAB
GLUCOSE SERPL-MCNC: 143 MG/DL (ref 65–140)
GLUCOSE SERPL-MCNC: 158 MG/DL (ref 65–140)
GLUCOSE SERPL-MCNC: 180 MG/DL (ref 65–140)
GLUCOSE SERPL-MCNC: 237 MG/DL (ref 65–140)
INR PPP: 2.08 (ref 0.85–1.19)
PROTHROMBIN TIME: 23.4 SECONDS (ref 12.3–15)

## 2024-11-20 PROCEDURE — 99232 SBSQ HOSP IP/OBS MODERATE 35: CPT | Performed by: PHYSICIAN ASSISTANT

## 2024-11-20 PROCEDURE — 97110 THERAPEUTIC EXERCISES: CPT

## 2024-11-20 PROCEDURE — 99232 SBSQ HOSP IP/OBS MODERATE 35: CPT | Performed by: PHYSICAL MEDICINE & REHABILITATION

## 2024-11-20 PROCEDURE — 97112 NEUROMUSCULAR REEDUCATION: CPT

## 2024-11-20 PROCEDURE — 82948 REAGENT STRIP/BLOOD GLUCOSE: CPT

## 2024-11-20 PROCEDURE — 97535 SELF CARE MNGMENT TRAINING: CPT

## 2024-11-20 PROCEDURE — 85610 PROTHROMBIN TIME: CPT | Performed by: FAMILY MEDICINE

## 2024-11-20 PROCEDURE — 97530 THERAPEUTIC ACTIVITIES: CPT

## 2024-11-20 RX ORDER — OXYCODONE HYDROCHLORIDE 10 MG/1
10 TABLET ORAL EVERY 4 HOURS PRN
Refills: 0 | Status: DISCONTINUED | OUTPATIENT
Start: 2024-11-20 | End: 2024-11-22

## 2024-11-20 RX ORDER — OXYCODONE HYDROCHLORIDE 5 MG/1
5 TABLET ORAL EVERY 4 HOURS PRN
Refills: 0 | Status: DISCONTINUED | OUTPATIENT
Start: 2024-11-20 | End: 2024-11-22

## 2024-11-20 RX ADMIN — ACETAMINOPHEN 975 MG: 325 TABLET, FILM COATED ORAL at 21:51

## 2024-11-20 RX ADMIN — WARFARIN SODIUM 5 MG: 5 TABLET ORAL at 17:38

## 2024-11-20 RX ADMIN — METHOCARBAMOL 500 MG: 500 TABLET ORAL at 05:51

## 2024-11-20 RX ADMIN — OXYCODONE HYDROCHLORIDE AND ACETAMINOPHEN 500 MG: 500 TABLET ORAL at 17:38

## 2024-11-20 RX ADMIN — FOLIC ACID 1 MG: 1 TABLET ORAL at 09:47

## 2024-11-20 RX ADMIN — INSULIN LISPRO 3 UNITS: 100 INJECTION, SOLUTION INTRAVENOUS; SUBCUTANEOUS at 16:35

## 2024-11-20 RX ADMIN — OXYCODONE HYDROCHLORIDE 5 MG: 5 TABLET ORAL at 03:38

## 2024-11-20 RX ADMIN — SENNOSIDES AND DOCUSATE SODIUM 2 TABLET: 50; 8.6 TABLET ORAL at 09:47

## 2024-11-20 RX ADMIN — OXYCODONE HYDROCHLORIDE 5 MG: 5 TABLET ORAL at 07:31

## 2024-11-20 RX ADMIN — METOPROLOL SUCCINATE 25 MG: 25 TABLET, EXTENDED RELEASE ORAL at 21:51

## 2024-11-20 RX ADMIN — OXYCODONE HYDROCHLORIDE 10 MG: 10 TABLET ORAL at 12:05

## 2024-11-20 RX ADMIN — POLYETHYLENE GLYCOL 3350 17 G: 17 POWDER, FOR SOLUTION ORAL at 09:47

## 2024-11-20 RX ADMIN — PRAVASTATIN SODIUM 40 MG: 40 TABLET ORAL at 16:35

## 2024-11-20 RX ADMIN — ACETAMINOPHEN 975 MG: 325 TABLET, FILM COATED ORAL at 14:02

## 2024-11-20 RX ADMIN — OXYCODONE HYDROCHLORIDE 10 MG: 10 TABLET ORAL at 16:38

## 2024-11-20 RX ADMIN — Medication 2000 UNITS: at 09:47

## 2024-11-20 RX ADMIN — DIPHENHYDRAMINE HYDROCHLORIDE, ZINC ACETATE: 2; .1 CREAM TOPICAL at 05:52

## 2024-11-20 RX ADMIN — INSULIN LISPRO 1 UNITS: 100 INJECTION, SOLUTION INTRAVENOUS; SUBCUTANEOUS at 21:52

## 2024-11-20 RX ADMIN — INSULIN LISPRO 1 UNITS: 100 INJECTION, SOLUTION INTRAVENOUS; SUBCUTANEOUS at 12:02

## 2024-11-20 RX ADMIN — METHOCARBAMOL 500 MG: 500 TABLET ORAL at 21:51

## 2024-11-20 RX ADMIN — ACETAMINOPHEN 975 MG: 325 TABLET, FILM COATED ORAL at 05:51

## 2024-11-20 RX ADMIN — OXYCODONE HYDROCHLORIDE AND ACETAMINOPHEN 500 MG: 500 TABLET ORAL at 09:47

## 2024-11-20 RX ADMIN — METHOCARBAMOL 500 MG: 500 TABLET ORAL at 14:02

## 2024-11-20 NOTE — PROGRESS NOTES
Progress Note - Hospitalist   Name: Isauro Sow 79 y.o. male I MRN: [id number]  Unit/Bed#: -01 I Date of Admission: 11/18/2024   Date of Service: 11/20/2024 I Hospital Day: 2    Assessment & Plan  Status post total left knee replacement using cement  Patient was recently admitted to [facility] for elective left TKA on 11/13/24, discharged with home therapy, returned to ED as he was unable to ambulate or participate with home PT.   Orthopedic evaluation appreciated: recommend rehab   Currently admitted to [facility] for rehab  Pain control  PT/OT, PMR consult  Having some post op swelling. Recent duplex on 11/15 negative for DVT  Benign essential hypertension  Patient with known history of hypertension.  Blood pressure acceptable.  Continue with home dose metoprolol  Type 2 diabetes mellitus (HCC)  Lab Results   Component Value Date    HGBA1C 7.2 (H) 10/07/2024       Recent Labs     11/19/24  1055 11/19/24  1658 11/19/24  2106 11/20/24  0626   POCGLU 191* 178* 162* 143*       Blood Sugar Average: Last 72 hrs:  (P) 172.5Patient is on metformin as outpatient.  Will continue with sliding scale of insulin and monitor blood sugar  Adjust insulin regimen per blood sugar  History of stroke  Continue statin and coumadin  Stage 3a chronic kidney disease (HCC)  Lab Results   Component Value Date    EGFR 67 11/19/2024    EGFR 65 11/18/2024    EGFR 64 11/16/2024    CREATININE 1.04 11/19/2024    CREATININE 1.07 11/18/2024    CREATININE 1.09 11/16/2024     Renal function at baseline  Avoid nephrotoxins  S/P TAVR (transcatheter aortic valve replacement)  History of TAVR  noted   Followed by cardiology and CTS as outpatient  Chronic atrial flutter (HCC)  Preoperative cardiology consultation recommended Lovenox to Coumadin bridge  Continue on 5 mg of Coumadin. Trend INR, goal 2-3.   INR today 2.08, now therapeutic. Lovenox discontinued today  Continue metoprolol for rate control  Constipation    Acute pain    At risk for  "venous thromboembolism (VTE)      The above assessment and plan was reviewed and updated as determined by my evaluation of the patient on 11/20/2024.    History of Present Illness   Patient seen and examined. Patients overnight issues or events were reviewed with nursing staff. New or overnight issues include the following:   Patient laying in bed. Complains of left leg swelling and tenderness. Had therapy session yesterday, per notes \"very limited walking ability\" at this time.     Review of Systems    Objective :  Temp:  [97.9 °F (36.6 °C)-99 °F (37.2 °C)] 99 °F (37.2 °C)  HR:  [78-96] 96  BP: (150-168)/(68-91) 150/68  Resp:  [16-18] 18  SpO2:  [92 %-95 %] 95 %  O2 Device: None (Room air)    Invasive Devices       Airway  Duration             Non-Surgical Airway Oral pharyngeal airway 90 mm 6 days                    Physical Exam  General Appearance: NAD; pleasant  HEENT: PERRLA, conjuctiva normal; mucous membranes moist; face symmetrical  Neck:  Supple  Lungs: clear bilaterally, normal respiratory effort, no retractions, expiratory effort normal, on room air  CV: regular rate    ABD: soft non tender, +BS x4  EXT: DP pulses intact, no lymphadenopathy, moderate left LE edema  Skin: normal turgor, normal texture, no rash  Psych: affect normal, mood normal  Neuro: AAOx3    The above physical exam was reviewed and updated as determined by my evaluation of the patient on 11/20/2024.      Lab Results: I have reviewed the following results:  Results from last 7 days   Lab Units 11/19/24  0609 11/18/24  0453   WBC Thousand/uL 7.49 7.19   HEMOGLOBIN g/dL 9.6* 10.4*   HEMATOCRIT % 30.5* 31.1*   PLATELETS Thousands/uL 159 136*     Results from last 7 days   Lab Units 11/19/24  0609 11/18/24  0511   SODIUM mmol/L 140 138   POTASSIUM mmol/L 4.1 3.9   CHLORIDE mmol/L 102 99   CO2 mmol/L 30 30   BUN mg/dL 22 19   CREATININE mg/dL 1.04 1.07   CALCIUM mg/dL 8.9 9.0         Results from last 7 days   Lab Units 11/20/24  0544 " 11/19/24  0609   INR  2.08* 1.78*     Results from last 7 days   Lab Units 11/20/24  0626 11/19/24  2106 11/19/24  1658   POC GLUCOSE mg/dl 143* 162* 178*       Imaging Results Review: I reviewed radiology reports from this admission including: venous duplex.      Review of Scheduled Meds: Medications  reviewed and reconciled as needed  Current Facility-Administered Medications   Medication Dose Route Frequency Provider Last Rate    acetaminophen  975 mg Oral Q8H [facility] Sung Morales MD      ascorbic acid  500 mg Oral BID Sung Morales MD      bisacodyl  10 mg Rectal Daily PRN Willie Washington DO      Cholecalciferol  2,000 Units Oral Daily Sung Morales MD      diphenhydrAMINE-zinc acetate   Topical TID PRN Lisa Garcia PA-C      folic acid  1 mg Oral Daily Sung Morales MD      insulin lispro  1-5 Units Subcutaneous HS Sung Morales MD      insulin lispro  1-6 Units Subcutaneous TID AC Sung Morales MD      methocarbamol  500 mg Oral Q8H [facility] Ashley Depadua, MD      metoprolol succinate  25 mg Oral HS Sung Morales MD      nitroglycerin  0.4 mg Sublingual Q5 Min PRN Sung Morales MD      ondansetron  4 mg Oral Q6H PRN Ashley Depadua, MD      oxyCODONE  10 mg Oral Q4H PRN Ashley Depadua, MD      oxyCODONE  5 mg Oral Q4H PRN Ashley Depadua, MD      polyethylene glycol  17 g Oral Daily Sung Morales MD      pravastatin  40 mg Oral Daily With Dinner Sung Morales MD      senna-docusate sodium  2 tablet Oral Daily Sung Morales MD      warfarin  5 mg Oral Daily (warfarin) Sung Morales MD         VTE Pharmacologic Prophylaxis: coumadin  Code Status: Level 3 - DNAR and DNI  Current Length of Stay: 2 day(s)      ** Please Note:  voice to text software may have been used in the creation of this document. Although proof errors in transcription or interpretation are a potential of such software**

## 2024-11-20 NOTE — NURSING NOTE
We found pt laying flat trying to eat his breakfast on his tray next to him. We offered to reposition & sit him up but he refused.states the pain is too much in his left ankle & he can't move. Pt would sit up briefly to grab food then lay back down.

## 2024-11-20 NOTE — PROGRESS NOTES
"[facility] Occupational Therapy Daily Note    Occupational Therapy LTG's  Eating: Eating Goal: 06. Independent - Patient completes the activity by him/herself with no assistance from a helper.   Oral Care: Oral Hygiene Goal: 06. Independent - Patient completes the activity by him/herself with no assistance from a helper.   Bathing: Shower/bathe self Goal: 06. Independent - Patient completes the activity by him/herself with no assistance from a helper.   UB Dressing: Upper body dressing Goal: 06. Independent - Patient completes the activity by him/herself with no assistance from a helper.   LB dressing: Lower body dressing Goal: 06. Independent - Patient completes the activity by him/herself with no assistance from a helper.   Footwear: Putting on/taking off footwear Goal: 06. Independent - Patient completes the activity by him/herself with no assistance from a helper.   Toileting: Toileting hygiene Goal: 06. Independent - Patient completes the activity by him/herself with no assistance from a helper.   Toilet Transfer:  Toilet transfer Goal: 06. Independent - Patient completes the activity by him/herself with no assistance from a helper.   IADL's: Assist Level: Independent   Medication management:       Discharge Plan:  Home with family/friends support , Home alone with community supports, and Home OT services  DME:   Pt owns: Available Equipment: Roller Walker, Single Point Cane, Shower Chair     Recommended: TBD  Need to order: TBD  Ordered: TBD   11/20/24 1330   Pain Assessment   Pain Assessment Tool 0-10   Pain Score 9   Pain Location/Orientation Orientation: Left;Location: Knee   Hospital Pain Intervention(s) Cold applied;Repositioned   Restrictions/Precautions   Precautions Bed/chair alarms;Fall Risk;Pain;Supervision on toilet/commode;Cognitive   Lifestyle   Intrinsic Gratification pt reports that he pretty much completes all the house work, and some cooking at home. staes his wife \"does nothing\"   Sit to Lying "   Type of Assistance Needed Physical assistance   Physical Assistance Level 26%-50%   Comment able to use leg  to complete sit-sup. MIN A for LLE to full place on bed. proided more instruction to complete movement of L LE in bed with .   Sit to Lying CARE Score 3   Sit to Stand   Type of Assistance Needed Physical assistance   Physical Assistance Level 76% or more   Comment sit-stand from chair ranging from assist levels. does req inc time to manage ant weight shift in sitting prior to sit-stand   Sit to Stand CARE Score 2   Bed-Chair Transfer   Type of Assistance Needed Physical assistance   Physical Assistance Level Total assistance   Comment AX2 present for safety. poor weightshifting onto LLE. improvements with attempts at end. extensive time req to compelte to where pt was starting to get very tired.   Chair/Bed-to-Chair Transfer CARE Score 1   Toileting Hygiene   Type of Assistance Needed Physical assistance   Physical Assistance Level 76% or more   Comment in stance pt was able to complete clothing down on R side with SUSY  support for balance. completed standing urination with urinal.   Toileting Hygiene CARE Score 2   Functional Standing Tolerance   Time 2m   Activity 4 reps with various activites in room. still cont with little to no weight bearing on L LE in stance despite cues and facilitation for R leaning. provided with leg    Additional Activities   Additional Activities Comments extensive time initially required for AAROm and ice in sitting and semi reclined in chair prior to start to session as pt could not tolerate putting leg down to the floor.   Assessment   Treatment Assessment Pt participated in skilled OT treatment session with treatment focus on ADL re-training, fxnl xfers, and edema management . Pt tolerated session well, despite reports of high pain in LLE. Pt reporting he had not moved since his PT session ending at 12. Pt was set up with ice at end of session with L LE  heel floated and elevated.  Pt is currently limited by the following deficits: L LE PAIN. Higher level cognitive functions  (Judgment, executive functions, praxis, cognitive flexibility, insight), Attention, Memory , Impaired standing balance, Impaired righting reactions, impaired motor planning, Impaired sitting balance, Impaired vision, impaired Gross motor control, and limited activity tolerance. Pt continues to require skilled acute rehab OT services to increase overall functional independence and safety w/ I/ADLs and functional transfers.     Continued plan of care for OT sessions to focus on the following areas:  ADL Retraining , LB Dressing,  LHAE education/training, Meal preparation, Functional Transfers, Functional Cognition, Functional Attention, Standing tolerance, Standing balance , Gross motor coordination, Gross motor strengthening , DME training/education, Family training/education, Energy conservation training/education, healthy coping education, Leisure and social pursuits, and Core/trunk control/strengthening . Following OT sessions to cont to focus on: basic functional transfers , basic ADL participation/progression, ADL at EOB, ADL at sink, out of bed activity tolerance , sitting balance, Toileting sequence, and standing balance activities .   Prognosis Fair   Plan   Progress Progressing toward goals   OT Therapy Minutes   OT Time In 1330   OT Time Out 1500   OT Total Time (minutes) 90   OT Mode of treatment - Individual (minutes) 90   OT Mode of treatment - Concurrent (minutes) 0   OT Mode of treatment - Group (minutes) 0   OT Mode of treatment - Co-treat (minutes) 0   OT Mode of Treatment - Total time(minutes) 90 minutes   OT Cumulative Minutes 180

## 2024-11-20 NOTE — ASSESSMENT & PLAN NOTE
Lab Results   Component Value Date    HGBA1C 7.2 (H) 10/07/2024       Recent Labs     11/19/24  1055 11/19/24  1658 11/19/24  2106 11/20/24  0626   POCGLU 191* 178* 162* 143*       Blood Sugar Average: Last 72 hrs:  (P) 172.5Patient is on metformin as outpatient.  Will continue with sliding scale of insulin and monitor blood sugar  Adjust insulin regimen per blood sugar

## 2024-11-20 NOTE — PCC CARE MANAGEMENT
Pt is making slow gains and will not attain goals to return home by the end of his acute rehab stay. Pt and family aware and in agreement with contd subacute rehab. Cm following to initiate referral process.

## 2024-11-20 NOTE — PROGRESS NOTES
Progress Note - PMR   Name: Isauro Sow 79 y.o. male I MRN: [id number]  Unit/Bed#: -01 I Date of Admission: 11/18/2024   Date of Service: 11/20/2024 I Hospital Day: 2     Assessment & Plan  Status post total left knee replacement using cement  Performed by Dr. Braxton on 11/13.   Can remove surgical dressing POD 7 and replace with DSD.   WBAT.   Failed home therapies.   AROM/PROM/AAROM with no degree restriction.   PT/OT 3-5 hours/day, 5-6 days/week.   DVT Ppx fully anticoagulated on coumadin.   F/u Ortho (~11/27) for 2 week POV.   Acute pain  Related to TKA and edema in RLE  ACE Wraps  Increased oxycodone to 5-10mg Q4hr PRN - monitor response  Tylenol scheduled  Robaxin scheduled   Monitor and adjust as appropriate.   Constipation  -infrequent and poor bowel movements in setting of narcotic use for recent L TKA  -bowel regimen in place - adjust as needed  -attempt to wean down narcotic usage as tolerated   - Last BM 11/18.   At risk for venous thromboembolism (VTE)  Fully anticoagulated on warfarin,SCDs.  Benign essential hypertension  Home: Toprol 25mg QHS,   here: Same.H Monitor and adjust as per IM.   Type 2 diabetes mellitus (HCC)  Lab Results   Component Value Date    HGBA1C 7.2 (H) 10/07/2024       Recent Labs     11/19/24  2106 11/20/24  0626 11/20/24  1051 11/20/24  1545   POCGLU 162* 143* 158* 237*       Blood Sugar Average: Last 72 hrs:  (P) 172.5    Home: Metformin 1000mg daily;   Here: CDI/Accuchecks.   If necessary, will try to get patient back on oral meds prior to discharge.   Diabetic Diet.   Management as per IM.     History of stroke  -Hx of stroke in 2017 w/ residual L sided deficits   -Monitor   Stage 3a chronic kidney disease (HCC)  Lab Results   Component Value Date    EGFR 67 11/19/2024    EGFR 65 11/18/2024    EGFR 64 11/16/2024    CREATININE 1.04 11/19/2024    CREATININE 1.07 11/18/2024    CREATININE 1.09 11/16/2024     -Baseline Cr 1.0-1.3  Currently within baseline.   Avoid  nephrotoxic meds, relative hypotension.   Monitor BMP, I/O.   S/P TAVR (transcatheter aortic valve replacement)  -TAVR procedure done 9/2023  -Follows CTS outpatient   Chronic atrial flutter (HCC)  -Hx of a-flutter s/p PPM placement   -On Coumadin with Lovenox bridge  - Continue Toprol  -Trend INR, once therapeutic, can stop Lovenox     Health Maintenance  #Delirium/Sleep: At risk. Optimize sleep/wake, pain, bowel, bladder management. Avoid deliriogenic meds and physical restraint. Environmental/behavioral interventions.   #Skin/Pressure Injury Prevention: Turn Q2hr in bed, with weight shifts [id number] in wheelchair. Float heels in bed.  #Code Status: DNR/DNI  #FEN: Carb controlled diet   #Dispo: anticipate 1-2 week hospital course given debility from his recent surgery and co morbidities compounding adequate tissue healing and strength recovery.     To Review:   Mr. Sow is a 80yo M with medical history of asthma, aflutter and SSS s/p medtronic PPM on coumadin, BPH, CAD, R ICA occlusion/L ICA stenosisk, CKD 3 (BL 1-1.3), COPD, T2DM, Gout, HLD, HTN, PERCY, Macular Degeneration, Severe Aortic Stenosis, previous CVA in 2017 who presented to [place] on 11/18 POD 2 L TKA failing discharge to home with increased LLE pain. LE Doppler negative for DVT.  Ortho saw patient and had no other recommendations at this time. He had one low grade temp during stay, but no further episodes, so no further work-up. He was recommend for [facility] given acute sequelae of his surgery, chronic comorbidities and extent of his weakness/impaired function. He was admitted to the [facility] on 11/18.     Chief Complaint: L ankle/leg pain    Interval History/Subjective:  Continues with LLE ankle pain fairly significant. Improved with addition of ACE for edema control. Last BM 11/18. No new numbness/tingling. Eager to make progress to get home. Has no other new concerns    Functional Update:  PT: maxA x2 bed mobility,mod-maxA transfers, modA x2 2' RW   OT:  Juan M oral hygieneSup Ub dressing, maxA LB dressing, total A footwear, modA toileting hygiene, maxA toilet transfer    Objective     Temp:  [97.9 °F (36.6 °C)-99 °F (37.2 °C)] 99 °F (37.2 °C)  HR:  [78-96] 96  Resp:  [16-18] 18  BP: (150-168)/(68-91) 150/68  SpO2:  [92 %-95 %] 95 %  Physical Exam    Gen: No acute distress, Well-nourished, well-appearing.  HEENT: Moist mucus membranes, Normocephalic/Atraumatic  Cardiovascular: Regular rate, rhythm, S1/S2. Distal pulses palpable  Heme/Extr: Improved LLE edema.  Pulmonary: Non-labored breathing. Lungs CTAB  : No abbasi  GI: Soft, non-tender, non-distended. BS+  Integumentary: Skin is warm, dry.   Neuro: AAOx3, . Speech is intact. Appropriate to questioning.   Psych: Normal mood and affect.     Physical examination is otherwise unchanged from previous encounter, except as noted above.    Scheduled Meds:  Current Facility-Administered Medications   Medication Dose Route Frequency Provider Last Rate    acetaminophen  975 mg Oral Q8H MOON Morales MD      ascorbic acid  500 mg Oral BID Sung Morales MD      bisacodyl  10 mg Rectal Daily PRN Willie Washington DO      Cholecalciferol  2,000 Units Oral Daily Sung Morales MD      diphenhydrAMINE-zinc acetate   Topical TID PRN Lisa Garcia PA-C      folic acid  1 mg Oral Daily Sung Morales MD      insulin lispro  1-5 Units Subcutaneous HS Sung Morales MD      insulin lispro  1-6 Units Subcutaneous TID AC Sung Morales MD      methocarbamol  500 mg Oral Q8H MOON Ashley Depadua, MD      metoprolol succinate  25 mg Oral HS Sung Morales MD      nitroglycerin  0.4 mg Sublingual Q5 Min PRN Sung Morales MD      ondansetron  4 mg Oral Q6H PRN Ashley Depadua, MD      oxyCODONE  5 mg Oral Q4H PRN Sung Morales MD      oxyCODONE  2.5 mg Oral Q4H PRN Sung Morales MD      polyethylene glycol  17 g Oral Daily Sung Moralse MD      pravastatin  40 mg Oral Daily With Dinner Sung Morales MD      senna-docusate sodium  2 tablet Oral Daily Sung  MD Andrew      warfarin  5 mg Oral Daily (warfarin) Sung Morales MD       Imaging: No new imaging      Lab Results: I have reviewed the following results:  Results from last 7 days   Lab Units 11/19/24  0609 11/18/24  0453 11/16/24  0512   HEMOGLOBIN g/dL 9.6* 10.4* 10.6*   HEMATOCRIT % 30.5* 31.1* 32.6*   WBC Thousand/uL 7.49 7.19 9.80   PLATELETS Thousands/uL 159 136* 92*     Results from last 7 days   Lab Units 11/19/24  0609 11/18/24  0511 11/16/24  0512 11/15/24  1317   BUN mg/dL 22 19 16 16   SODIUM mmol/L 140 138 139 139   POTASSIUM mmol/L 4.1 3.9 4.1 4.3   CHLORIDE mmol/L 102 99 103 103   CREATININE mg/dL 1.04 1.07 1.09 1.12   AST U/L  --   --   --  18   ALT U/L  --   --   --  19       Results from last 7 days   Lab Units 11/20/24  0544 11/19/24  0609 11/18/24  0528   PROTIME seconds 23.4* 20.9* 19.1*   INR  2.08* 1.78* 1.59*        0658}      ** Please Note: Fluency Direct voice to text software may have been used in the creation of this document. **

## 2024-11-20 NOTE — PROGRESS NOTES
11/20/24 0830   Pain Assessment   Pain Assessment Tool 0-10   Pain Score 10 - Worst Possible Pain   Pain Location/Orientation Orientation: Left;Location: Knee   Restrictions/Precautions   Precautions Bed/chair alarms;Fall Risk;Pain;Supervision on toilet/commode;Cognitive   LLE Weight Bearing Per Order WBAT   Cognition   Overall Cognitive Status WFL   Arousal/Participation Cooperative   Attention Within functional limits   Orientation Level Oriented X4   Memory Within functional limits   Following Commands Follows one step commands without difficulty   Therapeutic Interventions   Strengthening ankle pumps x20, quad sets with 5 second hold 2x10 - tapping at quad for engagement; Knee Extension Stretch x4 minutes with gentle over pressure as tolerated.; LUIS SAQ 2x10   Assessment   Treatment Assessment Patient participated in brief 30 minute skilled physical therapy session. Introduced myself to patient as primary therapist that will overseeing his plan of care, goals, and discharge planning. Pt reports pain is his biggest barrier at this time. reported 10/10 pain when moving LLE in bed. Since only 30 minute session, focused on bed level exercise. Poor L quad activiation noted. Will benefit from NMR. Plan for afternoon session will be to head to PT gym and attempt work in the parallel [employer]. Patient will benefit from continued skilled PT services to improve LLE weight bearing and strength to maximize safety and independence   Problem List Decreased strength;Decreased range of motion;Impaired balance;Decreased endurance;Decreased mobility;Pain   Barriers to Discharge Decreased caregiver support   Plan   Treatment/Interventions Functional transfer training;LE strengthening/ROM;Therapeutic exercise;Endurance training;Patient/family training;Bed mobility;Gait training   Progress Progressing toward goals   Discharge Recommendation   Rehab Resource Intensity Level, PT   (TBD pending progress)   PT Therapy Minutes   PT Time  In 0830   PT Time Out 0900   PT Total Time (minutes) 30   PT Mode of treatment - Individual (minutes) 30   PT Mode of treatment - Concurrent (minutes) 0   PT Mode of treatment - Group (minutes) 0   PT Mode of treatment - Co-treat (minutes) 0   PT Mode of Treatment - Total time(minutes) 30 minutes   PT Cumulative Minutes 130     Sarah Mitchell, PT, DPT

## 2024-11-20 NOTE — [facility] OCCUPATIONAL THERAPY
Occupational Therapy Weekly Team Note    Pt continues to make good progress with skilled occupational therapy intervention and is progressing toward long term goals for ADL, IADL's, and functional transfers/mobility. Pts long term goals for ADLs are Independent with Rolling Walker. Pt is currently limited by the following deficits: L LE PAIN. Higher level cognitive functions  (Judgment, executive functions, praxis, cognitive flexibility, insight), Attention, Memory , Impaired standing balance, Impaired righting reactions, impaired motor planning, Impaired sitting balance, Impaired vision, impaired Gross motor control, and limited activity tolerance. Pt continues to require skilled acute rehab OT services to increase overall functional independence and safety w/ I/ADLs and functional transfers. Family training/education will be required prior to D/C.      Pt will continue to benefit from skilled acute rehab OT services to address above mentioned barriers and maximize functional independence in baseline areas of occupation to meet established treatment goals with overall decreased burden of care. Continued plan of care for OT sessions to focus on the following areas:  ADL Retraining , LB Dressing,  LHAE education/training, Meal preparation, Functional Transfers, Functional Cognition, Functional Attention, Standing tolerance, Standing balance , Gross motor coordination, Gross motor strengthening , DME training/education, Family training/education, Energy conservation training/education, healthy coping education, Leisure and social pursuits, and Core/trunk control/strengthening . Following OT sessions to cont to focus on: basic functional transfers , basic ADL participation/progression, ADL at EOB, ADL at sink, out of bed activity tolerance , sitting balance, Toileting sequence, and standing balance activities .. Goals for the upcoming week are: continue to monitor basic vitals during ADLs and functional transfers, Out  of bed tolerance, establish caregiver abilities, and establish DME needs  Anticipate Re-team at this time.. Home with family/friends support  and Home OT services

## 2024-11-20 NOTE — ASSESSMENT & PLAN NOTE
Patient was recently admitted to [facility] for elective left TKA on 11/13/24, discharged with home therapy, returned to ED as he was unable to ambulate or participate with home PT.   Orthopedic evaluation appreciated: recommend rehab   Currently admitted to [facility] for rehab  Pain control  PT/OT, PMR consult  Having some post op swelling. Recent duplex on 11/15 negative for DVT

## 2024-11-20 NOTE — PROGRESS NOTES
"Therapist walking into visit with pts supportive friend \"Berta\". Berta indicating that pts wife Rayne is \"not doing well.\" Pt states that she is not taking care of herself. Berta reports that \"she should go to respite care while your here,because her kids are not helping her all the time.\"   Pt states that \"she wont do it.\" Berta reported that the  should assist in pt getting respite care while he is here.   "

## 2024-11-20 NOTE — ASSESSMENT & PLAN NOTE
Preoperative cardiology consultation recommended Lovenox to Coumadin bridge  Continue on 5 mg of Coumadin. Trend INR, goal 2-3.   INR today 2.08, now therapeutic. Lovenox discontinued today  Continue metoprolol for rate control

## 2024-11-20 NOTE — PROGRESS NOTES
11/20/24 1100   Pain Assessment   Pain Assessment Tool 0-10   Pain Score 10 - Worst Possible Pain   Pain Location/Orientation Orientation: Left;Location: Knee   Restrictions/Precautions   Precautions Bed/chair alarms;Fall Risk;Pain;Supervision on toilet/commode;Cognitive   LLE Weight Bearing Per Order WBAT   Cognition   Overall Cognitive Status WFL   Arousal/Participation Cooperative   Attention Within functional limits   Orientation Level Oriented X4   Memory Within functional limits   Following Commands Follows one step commands without difficulty   Roll Left and Right   Type of Assistance Needed Physical assistance   Physical Assistance Level Total assistance   Comment Mod-MaxAx2, multiple bouts of rolling in bed for LB dressing/clothing management. Pt found with soiled pants and jeff pad as urinal likely spilled. Pt reports he could feel wet pants. Educated him on the importance of calling for assistance to get changed to prevent skin breakdown. He verbalized understanding   Roll Left and Right CARE Score 1   Lying to Sitting on Side of Bed   Type of Assistance Needed Physical assistance   Physical Assistance Level Total assistance   Comment with use of bed rail able to complete MaxAx1 for management of LEs and trunk. Without bed rail (baseline) requires Ax2   Lying to Sitting on Side of Bed CARE Score 1   Sit to Stand   Type of Assistance Needed Physical assistance   Physical Assistance Level Total assistance   Comment started off requiring ModA x2 but progressed to Min/ModAx2 with RW by end of session. Mutiple trials performed from various surfaces (recliner, WC, EOB)   Sit to Stand CARE Score 1   Bed-Chair Transfer   Type of Assistance Needed Physical assistance   Physical Assistance Level Total assistance   Comment ModA x2 with RW, VC cues for sequnecing and to engage L quad when shifting weight onto LLE, heavy reliance on UEs. poor weightshifting onto LLE   Chair/Bed-to-Chair Transfer CARE Score 1    Walk 10 Feet   Reason if not Attempted Safety concerns   Walk 10 Feet CARE Score 88   Walk 50 Feet with Two Turns   Reason if not Attempted Safety concerns   Walk 50 Feet with Two Turns CARE Score 88   Ambulation   Findings Plan was to trial ambulation in parallel bars but current WC does not fit between them for WC follow. Focused on standing tolerance.   Curb or Single Stair   Reason if not Attempted Safety concerns   1 Step (Curb) CARE Score 88   4 Steps   Reason if not Attempted Safety concerns   4 Steps CARE Score 88   12 Steps   Reason if not Attempted Safety concerns   12 Steps CARE Score 88   Picking Up Object   Reason if not Attempted Safety concerns   Picking Up Object CARE Score 88   Therapeutic Interventions   Strengthening seated knee extension extension into L heel slide   Flexibility Seated L knee extension stretch/hamstring stretch; L Calf stretch   Other Standing Tolerance at beginning of parallel bars x3 trials. Extremely limited in standing tolerance as pt reports pain in ball of L foot. Upon examination skin intact and pt reported no pain with palpation   Assessment   Treatment Assessment Patient participated in 60 minute skilled physical therapy session foucsing on bed mobility, transfers, and standing tolerance. Patient continues to present with significant LLE pain, poor weight bearing on LLE, poor activity tolerance, and poor standing tolerance resulting in siginificant assist for functional mobility. Pt is non ambulatory due to pain. Pain and decreased caregiver support are barriers to patient returning home at this time. Patient will benefit from continued skilled PT services to improve LLE weight bearing and strength to maximize safety and independence. Next session provide patient with HEP handout that he can perform in his room outisde of therapy sessions and continue to focus on standing tolerance and ambulating as tolerated.   Problem List Decreased strength;Decreased range of  motion;Impaired balance;Decreased endurance;Decreased mobility;Pain   Barriers to Discharge Decreased caregiver support   Plan   Treatment/Interventions Functional transfer training;LE strengthening/ROM;Therapeutic exercise;Endurance training;Patient/family training;Equipment eval/education;Bed mobility;Gait training   Progress Progressing toward goals   Discharge Recommendation   Rehab Resource Intensity Level, PT   (TBD pending progress)   PT Therapy Minutes   PT Time In 1100   PT Time Out 1200   PT Total Time (minutes) 60   PT Mode of treatment - Individual (minutes) 60   PT Mode of treatment - Concurrent (minutes) 0   PT Mode of treatment - Group (minutes) 0   PT Mode of treatment - Co-treat (minutes) 0   PT Mode of Treatment - Total time(minutes) 60 minutes   PT Cumulative Minutes 190     Sarah Mitchell, PT, DPT

## 2024-11-20 NOTE — PLAN OF CARE
Problem: PAIN - ADULT  Goal: Verbalizes/displays adequate comfort level or baseline comfort level  Description: Interventions:  - Encourage patient to monitor pain and request assistance  - Assess pain using appropriate pain scale  - Administer analgesics based on type and severity of pain and evaluate response  - Implement non-pharmacological measures as appropriate and evaluate response  - Consider cultural and social influences on pain and pain management  - Notify physician/advanced practitioner if interventions unsuccessful or patient reports new pain  Outcome: Progressing     Problem: INFECTION - ADULT  Goal: Absence or prevention of progression during hospitalization  Description: INTERVENTIONS:  - Assess and monitor for signs and symptoms of infection  - Monitor lab/diagnostic results  - Monitor all insertion sites, i.e. indwelling lines, tubes, and drains  - Monitor endotracheal if appropriate and nasal secretions for changes in amount and color  - [place] appropriate cooling/warming therapies per order  - Administer medications as ordered  - Instruct and encourage patient and family to use good hand hygiene technique  - Identify and instruct in appropriate isolation precautions for identified infection/condition  Outcome: Progressing     Problem: SAFETY ADULT  Goal: Patient will remain free of falls  Description: INTERVENTIONS:  - Educate patient/family on patient safety including physical limitations  - Instruct patient to call for assistance with activity   - Consult OT/PT to assist with strengthening/mobility   - Keep Call bell within reach  - Keep bed low and locked with side rails adjusted as appropriate  - Keep care items and personal belongings within reach  - Initiate and maintain comfort rounds  - Make Fall Risk Sign visible to staff  - Offer Toileting every  Hours, in advance of need  - Initiate/Maintain alarm  - Obtain necessary fall risk management equipment:   - Apply yellow socks and  bracelet for high fall risk patients  - Consider moving patient to room near nurses station  Outcome: Progressing  Goal: Maintain or return to baseline ADL function  Description: INTERVENTIONS:  -  Assess patient's ability to carry out ADLs; assess patient's baseline for ADL function and identify physical deficits which impact ability to perform ADLs (bathing, care of mouth/teeth, toileting, grooming, dressing, etc.)  - Assess/evaluate cause of self-care deficits   - Assess range of motion  - Assess patient's mobility; develop plan if impaired  - Assess patient's need for assistive devices and provide as appropriate  - Encourage maximum independence but intervene and supervise when necessary  - Involve family in performance of ADLs  - Assess for home care needs following discharge   - Consider OT consult to assist with ADL evaluation and planning for discharge  - Provide patient education as appropriate  Outcome: Progressing  Goal: Maintains/Returns to pre admission functional level  Description: INTERVENTIONS:  - Perform AM-PAC 6 Click Basic Mobility/ Daily Activity assessment daily.  - Set and communicate daily mobility goal to care team and patient/family/caregiver.   - Collaborate with rehabilitation services on mobility goals if consulted  - Perform Range of Motion  times a day.  - Reposition patient every  hours.  - Dangle patient  times a day  - Stand patient  times a day  - Ambulate patient  times a day  - Out of bed to chair  times a day   - Out of bed for meals  times a day  - Out of bed for toileting  - Record patient progress and toleration of activity level   Outcome: Progressing     Problem: DISCHARGE PLANNING  Goal: Discharge to home or other facility with appropriate resources  Description: INTERVENTIONS:  - Identify barriers to discharge w/patient and caregiver  - Arrange for needed discharge resources and transportation as appropriate  - Identify discharge learning needs (meds, wound care, etc.)  -  Arrange for interpretive services to assist at discharge as needed  - Refer to Case Management Department for coordinating discharge planning if the patient needs post-hospital services based on physician/advanced practitioner order or complex needs related to functional status, cognitive ability, or social support system  Outcome: Progressing     Problem: Prexisting or High Potential for Compromised Skin Integrity  Goal: Skin integrity is maintained or improved  Description: INTERVENTIONS:  - Identify patients at risk for skin breakdown  - Assess and monitor skin integrity  - Assess and monitor nutrition and hydration status  - Monitor labs   - Assess for incontinence   - Turn and reposition patient  - Assist with mobility/ambulation  - Relieve pressure over bony prominences  - Avoid friction and shearing  - Provide appropriate hygiene as needed including keeping skin clean and dry  - Evaluate need for skin moisturizer/barrier cream  - Collaborate with interdisciplinary team   - Patient/family teaching  - Consider wound care consult   Outcome: Progressing

## 2024-11-21 LAB
BACTERIA UR QL AUTO: ABNORMAL /HPF
BILIRUB UR QL STRIP: NEGATIVE
CLARITY UR: CLEAR
COLOR UR: YELLOW
GLUCOSE SERPL-MCNC: 155 MG/DL (ref 65–140)
GLUCOSE SERPL-MCNC: 157 MG/DL (ref 65–140)
GLUCOSE SERPL-MCNC: 174 MG/DL (ref 65–140)
GLUCOSE SERPL-MCNC: 176 MG/DL (ref 65–140)
GLUCOSE UR STRIP-MCNC: NEGATIVE MG/DL
HGB UR QL STRIP.AUTO: NEGATIVE
INR PPP: 2.44 (ref 0.85–1.19)
KETONES UR STRIP-MCNC: NEGATIVE MG/DL
LEUKOCYTE ESTERASE UR QL STRIP: NEGATIVE
MUCOUS THREADS UR QL AUTO: ABNORMAL
NITRITE UR QL STRIP: NEGATIVE
NON-SQ EPI CELLS URNS QL MICRO: ABNORMAL /HPF
PH UR STRIP.AUTO: 5.5 [PH]
PROT UR STRIP-MCNC: ABNORMAL MG/DL
PROTHROMBIN TIME: 26.4 SECONDS (ref 12.3–15)
RBC #/AREA URNS AUTO: ABNORMAL /HPF
SP GR UR STRIP.AUTO: 1.03 (ref 1–1.03)
UROBILINOGEN UR STRIP-ACNC: 3 MG/DL
WBC #/AREA URNS AUTO: ABNORMAL /HPF

## 2024-11-21 PROCEDURE — 99231 SBSQ HOSP IP/OBS SF/LOW 25: CPT | Performed by: PHYSICIAN ASSISTANT

## 2024-11-21 PROCEDURE — 81001 URINALYSIS AUTO W/SCOPE: CPT | Performed by: PHYSICAL MEDICINE & REHABILITATION

## 2024-11-21 PROCEDURE — 97116 GAIT TRAINING THERAPY: CPT

## 2024-11-21 PROCEDURE — 85610 PROTHROMBIN TIME: CPT | Performed by: FAMILY MEDICINE

## 2024-11-21 PROCEDURE — 97535 SELF CARE MNGMENT TRAINING: CPT

## 2024-11-21 PROCEDURE — 99233 SBSQ HOSP IP/OBS HIGH 50: CPT | Performed by: PHYSICAL MEDICINE & REHABILITATION

## 2024-11-21 PROCEDURE — 97110 THERAPEUTIC EXERCISES: CPT

## 2024-11-21 PROCEDURE — 82948 REAGENT STRIP/BLOOD GLUCOSE: CPT

## 2024-11-21 PROCEDURE — 97530 THERAPEUTIC ACTIVITIES: CPT

## 2024-11-21 RX ORDER — TAMSULOSIN HYDROCHLORIDE 0.4 MG/1
0.4 CAPSULE ORAL
Status: DISCONTINUED | OUTPATIENT
Start: 2024-11-21 | End: 2024-12-03 | Stop reason: HOSPADM

## 2024-11-21 RX ADMIN — INSULIN LISPRO 1 UNITS: 100 INJECTION, SOLUTION INTRAVENOUS; SUBCUTANEOUS at 08:42

## 2024-11-21 RX ADMIN — ACETAMINOPHEN 975 MG: 325 TABLET, FILM COATED ORAL at 13:34

## 2024-11-21 RX ADMIN — ACETAMINOPHEN 975 MG: 325 TABLET, FILM COATED ORAL at 21:05

## 2024-11-21 RX ADMIN — OXYCODONE HYDROCHLORIDE 10 MG: 10 TABLET ORAL at 01:24

## 2024-11-21 RX ADMIN — FOLIC ACID 1 MG: 1 TABLET ORAL at 08:42

## 2024-11-21 RX ADMIN — METHOCARBAMOL 500 MG: 500 TABLET ORAL at 06:01

## 2024-11-21 RX ADMIN — POLYETHYLENE GLYCOL 3350 17 G: 17 POWDER, FOR SOLUTION ORAL at 08:39

## 2024-11-21 RX ADMIN — INSULIN LISPRO 1 UNITS: 100 INJECTION, SOLUTION INTRAVENOUS; SUBCUTANEOUS at 16:54

## 2024-11-21 RX ADMIN — OXYCODONE HYDROCHLORIDE AND ACETAMINOPHEN 500 MG: 500 TABLET ORAL at 16:51

## 2024-11-21 RX ADMIN — BISACODYL 10 MG: 10 SUPPOSITORY RECTAL at 16:52

## 2024-11-21 RX ADMIN — WARFARIN SODIUM 5 MG: 5 TABLET ORAL at 16:51

## 2024-11-21 RX ADMIN — OXYCODONE HYDROCHLORIDE 10 MG: 10 TABLET ORAL at 06:01

## 2024-11-21 RX ADMIN — METOPROLOL SUCCINATE 25 MG: 25 TABLET, EXTENDED RELEASE ORAL at 21:06

## 2024-11-21 RX ADMIN — OXYCODONE HYDROCHLORIDE 10 MG: 10 TABLET ORAL at 13:34

## 2024-11-21 RX ADMIN — INSULIN LISPRO 1 UNITS: 100 INJECTION, SOLUTION INTRAVENOUS; SUBCUTANEOUS at 21:07

## 2024-11-21 RX ADMIN — SENNOSIDES AND DOCUSATE SODIUM 2 TABLET: 50; 8.6 TABLET ORAL at 08:40

## 2024-11-21 RX ADMIN — TAMSULOSIN HYDROCHLORIDE 0.4 MG: 0.4 CAPSULE ORAL at 16:50

## 2024-11-21 RX ADMIN — METHOCARBAMOL 500 MG: 500 TABLET ORAL at 21:06

## 2024-11-21 RX ADMIN — ACETAMINOPHEN 975 MG: 325 TABLET, FILM COATED ORAL at 06:00

## 2024-11-21 RX ADMIN — OXYCODONE HYDROCHLORIDE AND ACETAMINOPHEN 500 MG: 500 TABLET ORAL at 08:41

## 2024-11-21 RX ADMIN — INSULIN LISPRO 1 UNITS: 100 INJECTION, SOLUTION INTRAVENOUS; SUBCUTANEOUS at 12:30

## 2024-11-21 RX ADMIN — OXYCODONE HYDROCHLORIDE 10 MG: 10 TABLET ORAL at 09:44

## 2024-11-21 RX ADMIN — PRAVASTATIN SODIUM 40 MG: 40 TABLET ORAL at 16:52

## 2024-11-21 RX ADMIN — Medication 2000 UNITS: at 08:40

## 2024-11-21 RX ADMIN — METHOCARBAMOL 500 MG: 500 TABLET ORAL at 13:34

## 2024-11-21 NOTE — ASSESSMENT & PLAN NOTE
Patient was recently admitted to [facility] for elective left TKA on 11/13/24, discharged with home therapy, returned to ED as he was unable to ambulate or participate with home PT.   Orthopedic evaluation appreciated   Currently admitted to [facility] for rehab  Pain control and therapy per PMR

## 2024-11-21 NOTE — PROGRESS NOTES
11/21/24 1000   Pain Assessment   Pain Assessment Tool 0-10   Pain Score 8   Pain Location/Orientation Orientation: Left;Location: Knee   Restrictions/Precautions   Precautions Bed/chair alarms;Fall Risk;Pain;Supervision on toilet/commode;Cognitive   LLE Weight Bearing Per Order WBAT   Subjective   Subjective pt in supine bed with ice on left knee and agreeabel to perform inroom Skilled PT   Roll Left and Right   Type of Assistance Needed Physical assistance   Physical Assistance Level Total assistance   Roll Left and Right CARE Score 1   Sit to Lying   Type of Assistance Needed Physical assistance   Physical Assistance Level 26%-50%   Comment able to use leg  to complete sit-sup. MIN A for LLE to full place on bed. proided more instruction to complete movement of L LE in bed with .   Sit to Lying CARE Score 3   Lying to Sitting on Side of Bed   Type of Assistance Needed Physical assistance   Physical Assistance Level Total assistance   Comment with use of bed rail able to complete MaxAx1 for management of LEs and trunk. Without bed rail (baseline) requires Ax2   Lying to Sitting on Side of Bed CARE Score 1   Car Transfer   Reason if not Attempted Safety concerns   Car Transfer CARE Score 88   Therapeutic Interventions   Strengthening supine SAQ AP QS bridges 10-20 reps with AAROM   Flexibility manual stretch left knee supine in bed   Assessment   Treatment Assessment pt agreeable to perform skilled PT focus bed mobility and left knee manaul stretch to inc AAROM , Pt also instructed for control breathing and cont with his pain meds before left knee .Pt will cont ice pack left knee post treatment . Pt in bed with alarm on and all needs in reach   Barriers to Discharge Decreased caregiver support   Plan   Progress Progressing toward goals   PT Therapy Minutes   PT Time In 1000   PT Time Out 1030   PT Total Time (minutes) 30   PT Mode of treatment - Individual (minutes) 30   PT Mode of treatment -  Concurrent (minutes) 0   PT Mode of treatment - Group (minutes) 0   PT Mode of treatment - Co-treat (minutes) 0   PT Mode of Treatment - Total time(minutes) 30 minutes   PT Cumulative Minutes 220   Therapy Time missed   Time missed? No

## 2024-11-21 NOTE — ASSESSMENT & PLAN NOTE
Related to TKA and edema in RLE  ACE Wraps  Increased oxycodone to 5-10mg Q4hr PRN - monitor response  Tylenol scheduled  Robaxin scheduled   Monitor and adjust as appropriate.   -Continues to have pain that is hindering with therapy. Will consult APS at this time (11/21)

## 2024-11-21 NOTE — ASSESSMENT & PLAN NOTE
11/21 patient endorsing symptoms of urinary retention in setting of constipation.  -Denies dysuria but will order UA and follow up

## 2024-11-21 NOTE — PROGRESS NOTES
Progress Note - PMR   Name: Isauro Sow 79 y.o. male I MRN: [id number]  Unit/Bed#: -01 I Date of Admission: 11/18/2024   Date of Service: 11/21/2024 I Hospital Day: 3     Assessment & Plan  Status post total left knee replacement using cement  Performed by Dr. Braxton on 11/13.   Can remove surgical dressing POD 7 and replace with DSD.   WBAT.   Failed home therapies.   AROM/PROM/AAROM with no degree restriction.   PT/OT 3-5 hours/day, 5-6 days/week.   DVT Ppx fully anticoagulated on coumadin.   F/u Ortho (~11/27) for 2 week POV.   Acute pain  Related to TKA and edema in RLE  ACE Wraps  Increased oxycodone to 5-10mg Q4hr PRN - monitor response  Tylenol scheduled  Robaxin scheduled   Monitor and adjust as appropriate.   -Continues to have pain that is hindering with therapy. Will consult APS at this time (11/21)  Constipation  -infrequent and poor bowel movements in setting of narcotic use for recent L TKA  -bowel regimen in place - adjust as needed  -attempt to wean down narcotic usage as tolerated   - Last BM 11/18.   - 11/21 Patient constipated. Will give suppository this evening and monitor for BM   At risk for venous thromboembolism (VTE)  Fully anticoagulated on warfarin,SCDs.  Benign essential hypertension  Home: Toprol 25mg QHS,   here: Same.H Monitor and adjust as per IM.   Type 2 diabetes mellitus (HCC)  Lab Results   Component Value Date    HGBA1C 7.2 (H) 10/07/2024       Recent Labs     11/20/24  1051 11/20/24  1545 11/20/24  2123 11/21/24  0622   POCGLU 158* 237* 180* 155*       Blood Sugar Average: Last 72 hrs:  (P) 176.5    Home: Metformin 1000mg daily;   Here: CDI/Accuchecks.   If necessary, will try to get patient back on oral meds prior to discharge.   Diabetic Diet.   Management as per IM.     History of stroke  -Hx of stroke in 2017 w/ residual L sided deficits   -Monitor   Stage 3a chronic kidney disease (HCC)  Lab Results   Component Value Date    EGFR 67 11/19/2024    EGFR 65  11/18/2024    EGFR 64 11/16/2024    CREATININE 1.04 11/19/2024    CREATININE 1.07 11/18/2024    CREATININE 1.09 11/16/2024     -Baseline Cr 1.0-1.3  Currently within baseline.   Avoid nephrotoxic meds, relative hypotension.   Monitor BMP, I/O.   S/P TAVR (transcatheter aortic valve replacement)  -TAVR procedure done 9/2023  -Follows CTS outpatient   Chronic atrial flutter (HCC)  -Hx of a-flutter s/p PPM placement   -On Coumadin with Lovenox bridge  - Continue Toprol  -Trend INR, once therapeutic, can stop Lovenox   Urinary retention  11/21 patient endorsing symptoms of urinary retention in setting of constipation.  -Denies dysuria but will order UA and follow up     Health Maintenance  #Delirium/Sleep: At risk. Optimize sleep/wake, pain, bowel, bladder management. Avoid deliriogenic meds and physical restraint. Environmental/behavioral interventions.   #Skin/Pressure Injury Prevention: Turn Q2hr in bed, with weight shifts [id number] in wheelchair. Float heels in bed.  #Code Status: DNR/DNI  #FEN: Carb controlled diet   #Dispo: anticipate 1-2 week hospital course given debility from his recent surgery and co morbidities compounding adequate tissue healing and strength recovery. ADD 12/3      To Review:   Mr. Swo is a 80yo M with medical history of asthma, aflutter and SSS s/p medtronic PPM on coumadin, BPH, CAD, R ICA occlusion/L ICA stenosisk, CKD 3 (BL 1-1.3), COPD, T2DM, Gout, HLD, HTN, PERCY, Macular Degeneration, Severe Aortic Stenosis, previous CVA in 2017 who presented to [place] on 11/18 POD 2 L TKA failing discharge to home with increased LLE pain. LE Doppler negative for DVT.  Ortho saw patient and had no other recommendations at this time. He had one low grade temp during stay, but no further episodes, so no further work-up. He was recommend for [facility] given acute sequelae of his surgery, chronic comorbidities and extent of his weakness/impaired function. He was admitted to the [facility] on 11/18.      Chief  Complaint: Constipation     Interval History/Subjective: No acute events overnight. Patient reports symptoms of constipation this AM with LBM 11/18. Also endorsing minimal improvements in pain and difficulty with urination. We believe these symptoms all seem to be contributed by his constipation but will consult APS for further recommendations for patient's pain since this has been an ongoing issue since admission. No other concerns noted on examination     Functional Update:  PT: totA bed mobility  OT: independent eating, supervision oral hygiene, setup dressing, totA footwear, mod/maxA transfers.     Objective     Temp:  [98.3 °F (36.8 °C)-99.3 °F (37.4 °C)] 98.3 °F (36.8 °C)  HR:  [62-73] 64  Resp:  [17-18] 18  BP: (138-146)/(65-67) 138/65  SpO2:  [94 %-97 %] 96 %  Physical Exam  Constitutional:       Appearance: Normal appearance.   HENT:      Head: Normocephalic and atraumatic.   Eyes:      Extraocular Movements: Extraocular movements intact.      Pupils: Pupils are equal, round, and reactive to light.   Cardiovascular:      Rate and Rhythm: Normal rate and regular rhythm.      Pulses: Normal pulses.      Heart sounds: Normal heart sounds. No murmur heard.     No gallop.   Pulmonary:      Effort: Pulmonary effort is normal. No respiratory distress.      Breath sounds: Normal breath sounds. No wheezing.   Abdominal:      Tenderness: There is no abdominal tenderness. There is no guarding.   Musculoskeletal:      Left lower leg: Edema present.      Comments: LLE in ace wrap    Skin:     General: Skin is warm and dry.      Coloration: Skin is not jaundiced.   Neurological:      General: No focal deficit present.      Mental Status: He is alert and oriented to person, place, and time.   Psychiatric:         Mood and Affect: Mood normal.         Thought Content: Thought content normal.           Physical examination is otherwise unchanged from previous encounter, except as noted above.    Scheduled Meds:  Current  Facility-Administered Medications   Medication Dose Route Frequency Provider Last Rate    acetaminophen  975 mg Oral Q8H [facility] Sung Morales MD      ascorbic acid  500 mg Oral BID Sung Morales MD      bisacodyl  10 mg Rectal Daily PRN Willie Washington DO      Cholecalciferol  2,000 Units Oral Daily Sung Morales MD      diphenhydrAMINE-zinc acetate   Topical TID PRN Lisa Garcia PA-C      folic acid  1 mg Oral Daily Sung Morales MD      insulin lispro  1-5 Units Subcutaneous HS Sung Morales MD      insulin lispro  1-6 Units Subcutaneous TID AC Sung Morales MD      methocarbamol  500 mg Oral Q8H [facility] Ashley Depadua, MD      metoprolol succinate  25 mg Oral HS Sung Morales MD      nitroglycerin  0.4 mg Sublingual Q5 Min PRN Sung Morales MD      ondansetron  4 mg Oral Q6H PRN Ashley Depadua, MD      oxyCODONE  10 mg Oral Q4H PRN Ashley Depadua, MD      oxyCODONE  5 mg Oral Q4H PRN Ashley Depadua, MD      polyethylene glycol  17 g Oral Daily Sung Morales MD      pravastatin  40 mg Oral Daily With Dinner Sung Morales MD      senna-docusate sodium  2 tablet Oral Daily Sung Morales MD      warfarin  5 mg Oral Daily (warfarin) Sung Morales MD           Lab Results: I have reviewed the following results:  Results from last 7 days   Lab Units 11/19/24  0609 11/18/24  0453 11/16/24  0512   HEMOGLOBIN g/dL 9.6* 10.4* 10.6*   HEMATOCRIT % 30.5* 31.1* 32.6*   WBC Thousand/uL 7.49 7.19 9.80   PLATELETS Thousands/uL 159 136* 92*     Results from last 7 days   Lab Units 11/19/24  0609 11/18/24  0511 11/16/24  0512 11/15/24  1317   BUN mg/dL 22 19 16 16   SODIUM mmol/L 140 138 139 139   POTASSIUM mmol/L 4.1 3.9 4.1 4.3   CHLORIDE mmol/L 102 99 103 103   CREATININE mg/dL 1.04 1.07 1.09 1.12   AST U/L  --   --   --  18   ALT U/L  --   --   --  19       Results from last 7 days   Lab Units 11/21/24  0524 11/20/24  0544 11/19/24  0609   PROTIME seconds 26.4* 23.4* 20.9*   INR  2.44* 2.08* 1.78*        0658}      ** Please Note: Fluency  Direct voice to text software may have been used in the creation of this document. **

## 2024-11-21 NOTE — PCC NURSING
79 y.o. male with a PMH of coronary artery disease , status post TAVR, hypertension, type 2 diabetes mellitus who presents with left knee pain.  Patient had left TKA on 11/13.  Patient was admitted to [facility] and was discharged from the hospital yesterday.  PT OT evaluated the patient on 11/13 and cleared for discharge.  Patient reported that since he was at home he has hard time ambulating or standing due to pain.  Patient was seen by physical therapy today and recommended to come to the hospital.  Patient denies any chest pain shortness of breath.  Denies any fever.  Denies any nausea or vomiting.  Patient reported that most of the pain is located in the ankle area and also in his thighs.  Lower extremity Doppler is negative for DVT.  By reviewing the records patient was given Coumadin while inpatient but he was not discharged on Coumadin ..    This week we will continue to encourage independence with ADLs.  We will continue to monitor labs and vital signs.  We will continue to educate pt/family about repositioning to prevent skin breakdown.  We will continue to assist w repositioning and perform routine skin checks.  We will continue to monitor for adequate pain control.  We will continue to monitor for constipation and medicate pt as ordered.  We will continue to increase safety awareness and keep pt free from falls.    11/27: Pt remains alarmed for safety. Pt continues to complain of pain behind his left knee and partway down his LE. 2 week follow up was supposed to be 11/26. Dressing gets changed daily. Covered with gauze/abd. Healing great. WBAT LLE. B/l heel allevyns for prevention due 11/29. QID blood sugar. Continent of both bowel and bladder. Last bowel movement was 11/26. Mod ax2 stand pivot with RW. D/C plan is 12/2.

## 2024-11-21 NOTE — PCC PHYSICAL THERAPY
Isauro presents to [facility]C s/p L TKA. Currently he has a significant decline in function, needing Mod-MaxAx2 for transfers and bed mobility. He has very limited ability to WB on LLE, which is limiting stand pivot transfers, walking and stairs. Current PT POC is focusing on working with medical team to manage pain, swelling, increase L knee AROM/PROM, LLE WBing and improving overall activity tolerance to progress standing functional mobility. WC level goals made as a back up mobility plan if walking doesn't progress. Limited caregiver availability is a barrier to home as well, as his wife is unable to help him since he was helping her at home PTA. Will need to confirm with family availability for help and recommendations for home, as anticipating he will need help with all IADLs. Current goals are set for Jillian for household distance mobility with use of RW, however will need to reassess if WBing doesn't progress, as this will negatively impact safety and functional mobility.     11/26/24  Pt cont to be limited by increased L knee pain, limited gait, increased WB to BUE, limited caregiver support, swelling decreased ROM and decreased stair management. Pt is currently able to amb up to 35' with RW, he can vary CGA to MODA pending fatigue and pain levels. Pt did trial a step but his L knee did buckle and is now refusing to trial 2/2 fear. Pt required CGA to MODA with STS transfers as well 2/2 pain and fatigue levels. Pt will cont POC as tolerated with cont focus on increased I with all gait, transfers and stair management  with improved WB to LLE to decrease burden of care at home.

## 2024-11-21 NOTE — ASSESSMENT & PLAN NOTE
Preoperative cardiology consultation recommended Lovenox to Coumadin bridge  INR now therapeutic, Lovenox discontinued 11/20  Continue on 5 mg of Coumadin. Trend INR, goal 2-3.   Continue metoprolol for rate control

## 2024-11-21 NOTE — ASSESSMENT & PLAN NOTE
-infrequent and poor bowel movements in setting of narcotic use for recent L TKA  -bowel regimen in place - adjust as needed  -attempt to wean down narcotic usage as tolerated   - Last BM 11/18.   - 11/21 Patient constipated. Will give suppository this evening and monitor for BM

## 2024-11-21 NOTE — PROGRESS NOTES
[facility] Occupational Therapy Daily Note    Occupational Therapy LTG's  Eating: Eating Goal: 06. Independent - Patient completes the activity by him/herself with no assistance from a helper.   Oral Care: Oral Hygiene Goal: 06. Independent - Patient completes the activity by him/herself with no assistance from a helper.   Bathing: Shower/bathe self Goal: 06. Independent - Patient completes the activity by him/herself with no assistance from a helper.   UB Dressing: Upper body dressing Goal: 06. Independent - Patient completes the activity by him/herself with no assistance from a helper.   LB dressing: Lower body dressing Goal: 06. Independent - Patient completes the activity by him/herself with no assistance from a helper.   Footwear: Putting on/taking off footwear Goal: 06. Independent - Patient completes the activity by him/herself with no assistance from a helper.   Toileting: Toileting hygiene Goal: 06. Independent - Patient completes the activity by him/herself with no assistance from a helper.   Toilet Transfer:  Toilet transfer Goal: 06. Independent - Patient completes the activity by him/herself with no assistance from a helper.   IADL's: Assist Level: Independent   Medication management:       Discharge Plan:  Home with family/friends support , Daily Checks, and Home OT services pending progress.  DME:   Pt owns: Available Equipment: Roller Walker, Single Point Cane, Shower Chair     Recommended: TBD   Need to order: TBD   Ordered: TBD     11/21/24 0700   Pain Assessment   Pain Assessment Tool 0-10   Pain Score 8   Pain Location/Orientation Orientation: Left;Location: Knee   Hospital Pain Intervention(s) Cold applied;Repositioned   Restrictions/Precautions   Precautions Bed/chair alarms;Cognitive;Pain;Supervision on toilet/commode   Eating   Type of Assistance Needed Independent   Comment set up in chair mode in bed.   Eating CARE Score 6   Oral Hygiene   Type of Assistance Needed Set-up / clean-up   Comment  Seated EOB in high perch position.   Oral Hygiene CARE Score 5   Shower/Bathe Self   Type of Assistance Needed Physical assistance   Physical Assistance Level 51%-75%   Comment Seated edge of bed in high perch position. Sponge bath completed patient able to complete all upper body portions. MIN A in standing with roller walker for perineal bathing. Assistance required for lower body at this time, educated on use of long handled sponge.   Shower/Bathe Self CARE Score 2   Upper Body Dressing   Type of Assistance Needed Set-up / clean-up   Comment Seated EOB in high perch position   Upper Body Dressing CARE Score 5   Lower Body Dressing   Type of Assistance Needed Incidental touching   Physical Assistance Level 51%-75%   Comment Today initiated training with long handled reacher. Patient still had great difficulty lifting left leg off the floor to utilize the reacher. Physical assistance to help lift left leg while patient managed clothing with reacher. Focused on anterior weight, shifting and dynamic, reaching to angle area to continue to promote Forward sitting and weight-bearing through the knee.   Lower Body Dressing CARE Score 2   Putting On/Taking Off Footwear   Type of Assistance Needed Physical assistance   Physical Assistance Level Total assistance   Comment Assistance for Ace wrapping left lower extremity from toes above knee 4, 4” ace wraps. Did not complete long handled adaptive equipment training due to time constraints please consider training in upcoming sessions.   Putting On/Taking Off Footwear CARE Score 1   Lying to Sitting on Side of Bed   Type of Assistance Needed Physical assistance   Physical Assistance Level 26%-50%   Comment use of leg  to exit L side of bed. that is the side he gets out on at home.   Lying to Sitting on Side of Bed CARE Score 3   Sit to Stand   Type of Assistance Needed Physical assistance   Physical Assistance Level 51%-75%   Comment ranging with varying heights. does  req assist at times for VC's for hand placement.   Sit to Stand CARE Score 2   Toileting Hygiene   Type of Assistance Needed Physical assistance   Physical Assistance Level 76% or more   Comment Pt able to assist in doff/don lb clothing over R hip in stance with SUSY. attempted to urinate for 5 min in stance and high perch position.   Toileting Hygiene CARE Score 2   Toilet Transfer   Comment did not complete at this time   Cognition   Overall Cognitive Status Impaired   Additional Activities   Additional Activities Comments satnding weight shifting at EOB , limited tolerance for L LE weight bearing.   Assessment   Treatment Assessment Pt participated in skilled OT session focusing on functional ADL retraining, activity tolerance, activity modification, use of AE, and functional transfers. See above for details on ADL function. Pt continues to demonstrate severe high pain with all L LE movement and weight bearing. Today initiated session with ice, wrapped on, completed AAROM with leg  prior to getting to EOB. All ADLs completed EOB in high perch position to inc passive weight bearing. Today able to carryover and initiate use of LHAE, however limited 2* L LE strength deficits. Cont with compensatory training to complete LB dressing. Continue to incorporate ice at beginning of session, and at end of session.       Pt is currently limited by the following deficits: L LE PAIN. Higher level cognitive functions  (Judgment, executive functions, praxis, cognitive flexibility, insight), Attention, Memory , Impaired standing balance, Impaired righting reactions, impaired motor planning, Impaired sitting balance, Impaired vision, impaired Gross motor control, and limited activity tolerance. Pt continues to require skilled acute rehab OT services to increase overall functional independence and safety w/ I/ADLs and functional transfers.        Continued plan of care for OT sessions to focus on the following areas:  BASIC  ADL Retraining , LB Dressing,  LHAE education/training, Meal preparation, Functional Transfers, Functional Cognition, Functional Attention, Standing tolerance, Standing balance , Gross motor coordination, Gross motor strengthening , DME training/education, Family training/education, Energy conservation training/education, healthy coping education, Leisure and social pursuits, and Core/trunk control/strengthening .     Following OT sessions to cont to focus on: basic functional transfers , basic ADL participation/progression,compensation strategy training, ADL at EOB, ADL at sink, out of bed activity tolerance , sitting balance, Toileting sequence, and standing balance activities.   Prognosis Fair   Plan   Progress Slow progress, decreased activity tolerance   OT Therapy Minutes   OT Time In 0700   OT Time Out 0830   OT Total Time (minutes) 90   OT Mode of treatment - Individual (minutes) 90   OT Mode of treatment - Concurrent (minutes) 0   OT Mode of treatment - Group (minutes) 0   OT Mode of treatment - Co-treat (minutes) 0   OT Mode of Treatment - Total time(minutes) 90 minutes   OT Cumulative Minutes 270

## 2024-11-21 NOTE — ASSESSMENT & PLAN NOTE
Related to TKA and edema in RLE  ACE Wraps  Increased oxycodone to 5-10mg Q4hr PRN - monitor response  Tylenol scheduled  Robaxin scheduled   Monitor and adjust as appropriate.

## 2024-11-21 NOTE — ASSESSMENT & PLAN NOTE
Lab Results   Component Value Date    HGBA1C 7.2 (H) 10/07/2024       Recent Labs     11/20/24  1051 11/20/24  1545 11/20/24  2123 11/21/24  0622   POCGLU 158* 237* 180* 155*       Blood Sugar Average: Last 72 hrs:  (P) 176.5    Home: Metformin 1000mg daily;   Here: CDI/Accuchecks.   If necessary, will try to get patient back on oral meds prior to discharge.   Diabetic Diet.   Management as per IM.

## 2024-11-21 NOTE — ASSESSMENT & PLAN NOTE
Lab Results   Component Value Date    HGBA1C 7.2 (H) 10/07/2024       Recent Labs     11/20/24  1545 11/20/24  2123 11/21/24  0622 11/21/24  1040   POCGLU 237* 180* 155* 176*       Blood Sugar Average: Last 72 hrs:  (P) [id number]  Patient is on metformin as outpatient, currently on hold   Continue with sliding scale of insulin for now   Adjust insulin regimen per blood sugar  Carb controlled diet

## 2024-11-21 NOTE — DISCHARGE INSTR - AVS FIRST PAGE
Discharge Instructions - Orthopedics  Isauro Sow 79 y.o. male MRN: [id number]  Unit/Bed#: -01    Weight Bearing Status:                                           You may bear weight as tolerated on your Left Lower Extremity.    DVT prophylaxis:  Continue your home warfarin    Pain:  Continue pain medications as needed.    Dressing Instructions:   Please keep clean, dry and intact until follow up.    Appt Instructions:   If you do not have your appointment, please call the clinic at [phone]  Otherwise follow up as scheduled/instructed.    Contact the office sooner if you experience any increased numbness/tingling in the extremities.     DISCHARGE INSTRUCTIONS: [facility] [place]    Bring these instructions with you to your Skilled Nursing Facility Provider so they can order and follow-up any additional lab work or imaging recommended at time of discharge.    You remain a fall and injury risk which could be severe.  - Your risk of fall has decreased however since admission to acute rehab.  Caregiver training has been completed with our staff.  - Appropriate supervision +/- assistance as instructed during your rehab course is recommended to decrease risk of fall and injury.    - Continue skilled therapy as discussed after discharge to further decrease this risk    If you (or your health care proxy) have any questions or concerns regarding your acute rehabilitation stay including issues with medications, rehabilitation, and follow-up plan, please call:          [facility] in [place] at [phone] or [phone].    Should you develop fevers, chills, new weakness, changes in sensation, difficulty speaking, facial weakness, confusion, shortness of breath, chest pain, or other concerning symptoms please call 911 and/or obtain transportation to nearest ER immediately.    Should you develop worsening pain, swelling, or drainage notify  your surgeon right away or obtain transportation to nearest ER for evaluation.    Driving restrictions:  You are recommended against driving until cleared by an outpatient physician.      **You should NOT operate a motor vehicle while under the influence of an opiate medication, muscle relaxant, or other sedative.  Doing so may lead to an accident resulting in serious injury or death to yourself or others.  You have agreed to avoid driving when under the influence of this medication.      Work restrictions:  You should NOT return to work (if working) until cleared by an outpatient physician.    You should not operate heavy machinery (if applicable) until cleared by an outpatient physician.      Alcohol restrictions:  You are recommended to not drink alcohol at this time unless cleared by an outpatient physician.  Drinking alcohol in your current functional condition can increase your risk of injury which could be severe.  Drinking alcohol given your current health problems can lead to increased medical complications which could be severe.    Combining alcohol with your current medications can increase your risk of injury which could be severe.      Smoking restrictions:  You are recommended to not smoke nicotine.  Smoking increases your risk of heart attack, stroke, emphysema/COPD, and lung cancer.      MEDICATIONS:  Please see a full list of your medications outlined in the After Visit Summary that is attached to these Discharge Instructions.  Please note changes may have been made to your medications please refer to your discharge paperwork for your current medications and take this list with you to all your doctors appointments for your doctors to review.  Please do not resume a home medication unless the medication reconciliation sheet indicates to do so, please do not assume that a medication that you were given a prescription for is the same as a medication you have at home based on both medications having the  same name as dosages and frequency may have changed.      Unless specifically noted in your medication list provided to you in your discharge paper work do not resume prior vitamins, minerals, or supplements you may have been taking prior to your hospitalization unless instructed by an outpatient physician in the future.  Discuss with your primary care at next visit if applicable.       Blood thinners prescribed to optimize long and short term health and decrease risk of complications but with risks related to bleeding and other complications.    Coumadin/Warfarin instructions:  You have been prescribed warfarin (coumadin).  This medication is used to prevent clots which can cause severe disability and even death.      Discharge prescription ordered by rehab attending physician based on recommendations by internal medicine team who has been managing patient's anticoagulation (warfarin) throughout entire acute rehab course is:    Warfarin 7.5mg daily. Your INR on 12/3 is still low at 1.72. Please continue lovenox until INR in therapeutic range. Recommending repeat INR on Thursday at subacute rehab.    Warfarin is a strong anticoagulant (blood thinner).  It is being recommended for use by you (or your family) to decrease the risk of clotting which can be severely disabling and even life-threatening.  Even when provided as recommended it can cause severely disabling and even life-threatening bleeding.  Too much or too little warfarin further increases your risk of this medication causing serious and even life-threatening complications such as severe bleeding, clots, strokes and death.  With that said, at this time based on available evidence and consensus agreement, your physicians recommend you take warfarin medication based on your overall risks and benefits in your specific medical situation.      Warfarin levels are measured by lab work called PT/INR.  Too high number or too low number further increases your risks.       Your current PT/INR lab goal is 2.0-3.0.      You will need to have your PT/INR lab drawn frequently at first and followed closely by your outpatient doctor.  Check with your outpatient warfarin provider to ensure your warfarin dose does not need to be adjusted.  It is not uncommon to need changes in warfarin dosing particularly early on.  If you (or your family/caregiver) notice black stools, bloody stools, vomit blood, develop new weakness, slurred speech, confusion or have any other concerning symptoms call 911 or obtain transportation to nearest emergency room immediately.         Sedating Medications with increased risk of complications:    This(these) medication(s) was(were) started during your acute rehabilitation course as recommended by your prior physicians.  It was continued during your acute rehabilitation course.  This(these) medication(s) will need to be managed by your outpatient physician(s) after discharge.  Follow-up with the appropriate provider as soon as possible to ensure appropriate use.    Your goal will be to wean off of opiate medications and then onto acetaminophen only and then off of acetaminophen as well if possible.    Hydromorphone (opiate) pain medication has been used to help your acute pain.  You tolerated this medication adequately during your recent hospital stay.     ation has been used to help your acute pain.  You tolerated this medication adequately during your recent hospital stay.       You will be given a limited supply of opiate pain medications on discharge; should you require additional refills/medications, your PCP and/or surgeon may prescribe at his/her discretion.   This can be quite helpful particularly for severe acute pain.      There are risks associated with opioid medications. They can increase your risk of falling, injury, and breathing difficulties which can be severe and even life-threatening.  Note it is advisable to limit use of opiate pain  medications as they can become habit forming and lead to addiction.  Other potential side effects of the medication include, but are not limited to, constipation, drowsiness, impaired judgment and risk of fatal overdose if not taken as prescribed. You should not drive while taking this medication  The patient was warned against driving while taking sedation medications.  Sharing medications is a felony. At this point in time, the patient is showing no signs of addiction, abuse, diversion or suicidal ideation.  The patient (you/or relevant caregiver) understands and agrees to use this medication only as prescribed.    Methocarbamol (Robaxin) pain/muscle spasm medication has been used to help your acute pain and spasms.  You tolerated this medication adequately during your recent hospital stay.     - Do not take with alcohol (or marijuana/cannabis) while on this medication as this can cause increased confusion, breathing problems, falls, and severe injury.  - Follow-up with your primary care physician within 1-2 weeks as well as relevant specialty physician for additional management of your medical conditions, potential refills, or adjustments of this medication.        Gabapentin has been used to help pain.  You tolerated this medication adequately during your recent hospital stay.     - Take 100 mg 2 times per day.  - Do not take with alcohol (or marijuana/cannabis) while on this medication as this can cause increased confusion, breathing problems, falls, and severe injury.  - This medication can increase risk of confusion, fall, and injury although you did tolerate adequately during rehab course.      Please note a summary of your hospital stay with relevant information for your doctors will try to be sent to them.  Please confirm with your doctors at your follow up visits that they have received this summary and have them contact [facility] if they have not received them along with any other medical  records they may require.     [facility] Phone Number:  [phone]

## 2024-11-21 NOTE — TEAM CONFERENCE
Acute RehabilitationTeam Conference Note  Date: 11/21/2024   Time: 12:15 PM       Patient Name:  Isauro Sow       Medical Record Number: [id number]   YOB: 1945  Sex: Male          Room/Bed:  [place] [address]/[facility]4-01  Payor Info:  Payor: MEDICARE / Plan: MEDICARE A AND B / Product Type: Medicare A & B Fee for Service /      Admitting Diagnosis: Total knee replacement status, left [Z96.652]   Admit Date/Time:  11/18/2024  5:42 PM  Admission Comments: No comment available     Primary Diagnosis:  Status post total left knee replacement using cement  Principal Problem: Status post total left knee replacement using cement    Patient Active Problem List    Diagnosis Date Noted    Acute pain 11/20/2024    At risk for venous thromboembolism (VTE) 11/20/2024    Status post total left knee replacement using cement 11/15/2024    Anemia 11/15/2024    Preoperative evaluation of a medical condition to rule out surgical contraindications (TAR required) 10/31/2024    Chronic pain of left knee 08/22/2024    Lumbar spondylosis 03/14/2024    Decreased pedal pulses 03/07/2024    Stenosis of left carotid artery 02/14/2024    Other constipation 02/14/2024    Chronic midline low back pain without sciatica 12/07/2023    Platelets decreased ([facility]) 11/13/2023    Aortic valve stenosis 11/10/2023    Hx of cardiac pacemaker 09/22/2023    Chronic atrial flutter (HCC) 09/22/2023    Chronic diastolic CHF (congestive heart failure) ([facility]) 09/22/2023    S/P TAVR (transcatheter aortic valve replacement) 09/21/2023    Coronary artery disease involving native coronary artery 08/28/2023    Sick sinus syndrome (HCC) 01/24/2023    Continuous opioid dependence ([facility]) 10/24/2022    Dysuria 09/06/2022    L3 osteophyte fracture 08/16/2022    Fracture of thoracic transverse process (HCC) 08/15/2022    Lumbar transverse process fracture ([facility]) 08/15/2022    Stage 3a chronic kidney disease (HCC) 03/17/2022    RLS (restless legs syndrome) 06/17/2021     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 ([facility]) 05/07/2021    Chronic bilateral low back pain without sciatica 03/30/2021    Hemiplegia and hemiparesis following cerebral infarction affecting left non-dominant side ([facility]) 06/18/2020    Benign prostatic hyperplasia with nocturia 06/18/2020    PAD (peripheral artery disease) ([facility]) 04/19/2019    Primary osteoarthritis of left knee 01/10/2019    Bilateral carotid artery stenosis 10/08/2018    Dermatosis 10/08/2018    Plantar fasciitis 06/04/2018    Constipation 01/16/2018    Seborrheic dermatitis 11/10/2017    History of stroke 09/15/2017    Asthma 07/26/2017    Benign essential hypertension 07/26/2017    Type 2 diabetes mellitus ([facility]) 07/26/2017    Hyperlipidemia 07/26/2017    Diabetic nephropathy associated with type 2 diabetes mellitus ([facility]) 06/22/2017    Non-rheumatic aortic sclerosis 06/22/2017    Popliteal cyst, left 10/15/2015    Acquired hallux malleus of right foot 10/06/2015    Pre-ulcerative corn or callous 10/06/2015    Gout 12/11/2014    Allergic rhinitis 05/03/2012    Retina disorder 05/03/2012       Physical Therapy:    Weight Bearing Status: Weight Bearing as Tolerated  Transfers: Maximum Assistance, Assist of 2  Bed Mobility: Maximum Assistance, Assist of 2  Amulation Distance (ft): 2 feet  Ambulation: Maximum Assistance (chair follow assist present)  Discharge Recommendations: Home with:  DC Home with:: Outpatient Physical Therapy, Family Support, First Floor Setup    Isauro presents to [facility] s/p L TKA. Currently he has a significant decline in function, needing Mod-MaxAx2 for transfers and bed mobility. He has very limited ability to WB on LLE, which is limiting stand pivot transfers, walking and stairs. Current PT POC is focusing on working with medical team to manage pain, swelling, increase L knee AROM/PROM, LLE WBing and improving overall activity tolerance to progress standing functional mobility. WC level  goals made as a back up mobility plan if walking doesn't progress. Limited caregiver availability is a barrier to home as well, as his wife is unable to help him since he was helping her at home PTA. Will need to confirm with family availability for help and recommendations for home, as anticipating he will need help with all IADLs. Current goals are set for Jillian for household distance mobility with use of RW, however will need to reassess if WBing doesn't progress, as this will negatively impact safety and functional mobility.     Occupational Therapy:  Eating: Supervision  Grooming: Supervision  Bathing: Moderate Assistance  Bathing: Moderate Assistance  Upper Body Dressing: Supervision  Lower Body Dressing: Maximum Assistance  Toileting: Total Assistance, Assist of 2  Toilet Transfer: Total Assistance, Assist of 2  Discharge Recommendations: Home with:  DC Home with:: Home Occupational Therapy       Occupational Therapy Weekly Team Note    Pt continues to make good progress with skilled occupational therapy intervention and is progressing toward long term goals for ADL, IADL's, and functional transfers/mobility. Pts long term goals for ADLs are Independent with Rolling Walker. Pt is currently limited by the following deficits: L LE PAIN. Higher level cognitive functions  (Judgment, executive functions, praxis, cognitive flexibility, insight), Attention, Memory , Impaired standing balance, Impaired righting reactions, impaired motor planning, Impaired sitting balance, Impaired vision, impaired Gross motor control, and limited activity tolerance. Pt continues to require skilled acute rehab OT services to increase overall functional independence and safety w/ I/ADLs and functional transfers. Family training/education will be required prior to D/C.     Pt will continue to benefit from skilled acute rehab OT services to address above mentioned barriers and maximize functional independence in baseline areas of  occupation to meet established treatment goals with overall decreased burden of care. Continued plan of care for OT sessions to focus on the following areas:  ADL Retraining , LB Dressing,  LHAE education/training, Meal preparation, Functional Transfers, Functional Cognition, Functional Attention, Standing tolerance, Standing balance , Gross motor coordination, Gross motor strengthening , DME training/education, Family training/education, Energy conservation training/education, healthy coping education, Leisure and social pursuits, and Core/trunk control/strengthening . Following OT sessions to cont to focus on: basic functional transfers , basic ADL participation/progression, ADL at EOB, ADL at sink, out of bed activity tolerance , sitting balance, Toileting sequence, and standing balance activities .. Goals for the upcoming week are: continue to monitor basic vitals during ADLs and functional transfers, Out of bed tolerance, establish caregiver abilities, and establish DME needs  Anticipate Re-team at this time.. Home with family/friends support  and Home OT services             Speech Therapy:           No notes on file    Nursing Notes:  Appetite: Good  Diet Type: Diabetic                      Diet Patient/Family Education Complete: Yes                         Type of Wound Patient/Family Education: Yes  Bladder: Continent     Bladder Patient/Family Education: Yes  Bowel: Continent     Bowel Patient/Family Education: Yes  Pain Location/Orientation: Orientation: Left, Location: Knee  Pain Score: 8                       Hospital Pain Intervention(s): Medication (See MAR), Cold applied, Rest, Distraction: Movies, Repositioned  Pain Patient/Family Education: Yes  Medication Management/Safety  New Medication: None  Injectable: Insulin  IV Antibiotics (add duration): None  Safe Administration: Yes  Medication Patient/Family Education Complete: Yes     79 y.o. male with a PMH of coronary artery disease , status post  TAVR, hypertension, type 2 diabetes mellitus who presents with left knee pain.  Patient had left TKA on 11/13.  Patient was admitted to [facility] and was discharged from the hospital yesterday.  PT OT evaluated the patient on 11/13 and cleared for discharge.  Patient reported that since he was at home he has hard time ambulating or standing due to pain.  Patient was seen by physical therapy today and recommended to come to the hospital.  Patient denies any chest pain shortness of breath.  Denies any fever.  Denies any nausea or vomiting.  Patient reported that most of the pain is located in the ankle area and also in his thighs.  Lower extremity Doppler is negative for DVT.  By reviewing the records patient was given Coumadin while inpatient but he was not discharged on Coumadin ..         This week we will continue to encourage independence with ADLs.  We will continue to monitor labs and vital signs.  We will continue to educate pt/family about repositioning to prevent skin breakdown.  We will continue to assist w repositioning and perform routine skin checks.  We will continue to monitor for adequate pain control.  We will continue to monitor for constipation and medicate pt as ordered.  We will continue to increase safety awareness and keep pt free from falls.       Case Management:     Discharge Planning  Living Arrangements: Lives w/ Spouse/significant other, Lives w/ Son  Support Systems: Spouse/significant other, Son  Assistance Needed: TBD  Type of Current Residence: Private residence  Current Home Care Services: No  Pt admitted s/p tkr, resides with his wife in a one story home and expects to return home. Pt is familiar with [facility] and outpt therapy services from previous hospitalizations. Following to assist w/dc planning needs.     Is the patient actively participating in therapies? yes  List any modifications to the treatment plan:     Barriers Interventions   Activity tolerance, endurance Energy  conservation education   Pain swelling Ace wrap, medication adjustments   constipation Bowel meds applied   Decreased rom, standing tolerance Standing exercises, use of device   Gross motor coordination Motor planning exercises     Is the patient making expected progress toward goals? yes  List any update or changes to goals:     Medical Goals: Patient will be medically stable for discharge to [facility] [place] upon completion of rehab program and Patient will be able to manage medical conditions and comorbid conditions with medications and follow up upon completion of rehab program    Weekly Team Goals:   Rehab Team Goals  ADL Team Goal: Patient will be independent with ADLs with least restrictive device upon completion of rehab program  Transfer Team Goal: Patient will be independent with transfers with least restrictive device upon completion of rehab program  Locomotion Team Goal: Patient will be independent with locomotion with least restrictive device upon completion of rehab program    Discussion: pt presents with the above barriers and the team is utilizing the stated interventions to aid in progression of goals to return home. Pts pain and swelling are biggest barriers and inhibiting pt in his abilities to stand and ambulate. Pt is also constipated and having difficulty urinating even with offering different positions. Pt is a total a with adls, transfers and mobility. Timed voids with pvr's is to be initiated. Goals are for mod I with a walker household distance. Family assistance needs to be investigated. Estimated los two weeks.    Anticipated Discharge Date:  12/3/24  [facility] Team Members Present:The following team members are supervising care for this patient and were present during this Weekly Team Conference.    Physician: Dr. DePadua, MD  : JENELLE Chilel  Registered Nurse: Vargas Carroll RN  Physical Therapist: Fernanda Hsu DPT  Occupational Therapist: Hattie Freye,  MS, OTR/L  Speech Therapist:

## 2024-11-21 NOTE — PROGRESS NOTES
11/21/24 1400   Pain Assessment   Pain Assessment Tool 0-10   Pain Score 7   Pain Location/Orientation Orientation: Left;Location: Knee   Restrictions/Precautions   Precautions Bed/chair alarms;Fall Risk;Pain;Supervision on toilet/commode;Cognitive   LLE Weight Bearing Per Order WBAT   Subjective   Subjective pt agreeable to perform skilled PT   Roll Left and Right   Type of Assistance Needed Physical assistance   Physical Assistance Level Total assistance   Comment Mod-MaxAx2   Roll Left and Right CARE Score 1   Sit to Lying   Type of Assistance Needed Physical assistance   Physical Assistance Level 26%-50%   Sit to Lying CARE Score 3   Lying to Sitting on Side of Bed   Type of Assistance Needed Physical assistance   Physical Assistance Level Total assistance   Comment with use of bed rail able to complete MaxAx1 for management of LEs and trunk. Without bed rail (baseline) requires Ax2   Lying to Sitting on Side of Bed CARE Score 1   Sit to Stand   Type of Assistance Needed Physical assistance   Physical Assistance Level 51%-75%   Comment gait belt to RW   Sit to Stand CARE Score 2   Bed-Chair Transfer   Type of Assistance Needed Physical assistance;Adaptive equipment   Physical Assistance Level Total assistance   Comment AX2 present for safety. poor weightshifting onto LLE.   Chair/Bed-to-Chair Transfer CARE Score 1   Transfer Bed/Chair/Wheelchair   Limitations Noted In Pain Management;Problem Solving;LE Strength;Sequencing;Coordination;Balance   Adaptive Equipment Roller Walker   Car Transfer   Reason if not Attempted Safety concerns   Car Transfer CARE Score 88   Walk 10 Feet   Type of Assistance Needed Physical assistance;Adaptive equipment   Physical Assistance Level 51%-75%   Comment RW 15 feet with WC follow VC for RW control and step pattern and sequence   Walk 10 Feet CARE Score 2   Walk 50 Feet with Two Turns   Reason if not Attempted Safety concerns   Walk 50 Feet with Two Turns CARE Score 88   Walk  150 Feet   Reason if not Attempted Safety concerns   Walk 150 Feet CARE Score 88   Walking 10 Feet on Uneven Surfaces   Reason if not Attempted Safety concerns   Walking 10 Feet on Uneven Surfaces CARE Score 88   Ambulation   Primary Mode of Locomotion Prior to Admission Walk   Distance Walked (feet) 15 ft  (x2)   Assist Device Roller Walker   Does the patient walk? 2. Yes   Wheel 50 Feet with Two Turns   Comment (S)  trail next session   Wheel 150 Feet   Comment (S)  trail next session   Curb or Single Stair   Reason if not Attempted Safety concerns   1 Step (Curb) CARE Score 88   4 Steps   Reason if not Attempted Safety concerns   4 Steps CARE Score 88   12 Steps   Reason if not Attempted Safety concerns   12 Steps CARE Score 88   Picking Up Object   Reason if not Attempted Safety concerns   Picking Up Object CARE Score 88   Therapeutic Interventions   Strengthening LAQ AP QS  and AAROM LEFT KNEE 10-20 REPS   Flexibility manual stretch LE   Equipment Use   NuStep LVL 0 for AAROM 8 MIN  to inc ROM   Assessment   Treatment Assessment Pt focus on Getting OOB using leg  and EOB tolerance is a little better today .Pt also perform STS w RTW and then  SPT using RW Roxane to MaxA X2 using RW VC for bacing up into WC. Pt also able to peerform ambulation withRW 15 feet amnd then other 15 feet using RW and WC following.  LE leg during ambulation with RW VC for RW and step sequence to his tolerance . Pt then perform Nu Setp lvl 0 for 8 min inc inc AAROM .Pt back in his room with supine and ice on left knee with his family in his room. Pt nees to trail WC propl for 2nd primary mobility if walking is limited . Alarm on bed and all needs in reach   Barriers to Discharge Decreased caregiver support   Plan   Progress Progressing toward goals   Discharge Recommendation   Rehab Resource Intensity Level, PT   (TBD pending progress)   PT Therapy Minutes   PT Time In 1400   PT Time Out 1500   PT Total Time (minutes) 60   PT Mode of  treatment - Individual (minutes) 60   PT Mode of treatment - Concurrent (minutes) 0   PT Mode of treatment - Group (minutes) 0   PT Mode of treatment - Co-treat (minutes) 0   PT Mode of Treatment - Total time(minutes) 60 minutes   PT Cumulative Minutes 280   Therapy Time missed   Time missed? No

## 2024-11-21 NOTE — PROGRESS NOTES
Progress Note - Hospitalist   Name: Isauro Sow 79 y.o. male I MRN: [id number]  Unit/Bed#: -01 I Date of Admission: 11/18/2024   Date of Service: 11/21/2024 I Hospital Day: 3    Assessment & Plan  Status post total left knee replacement using cement  Patient was recently admitted to [facility] for elective left TKA on 11/13/24, discharged with home therapy, returned to ED as he was unable to ambulate or participate with home PT.   Orthopedic evaluation appreciated   Currently admitted to [facility] for rehab  Pain control and therapy per PMR  Benign essential hypertension  Blood pressure acceptable  Continue with home dose metoprolol  Type 2 diabetes mellitus (HCC)  Lab Results   Component Value Date    HGBA1C 7.2 (H) 10/07/2024       Recent Labs     11/20/24  1545 11/20/24  2123 11/21/24  0622 11/21/24  1040   POCGLU 237* 180* 155* 176*       Blood Sugar Average: Last 72 hrs:  (P) [id number]  Patient is on metformin as outpatient, currently on hold   Continue with sliding scale of insulin for now   Adjust insulin regimen per blood sugar  Carb controlled diet   Chronic atrial flutter (HCC)  Preoperative cardiology consultation recommended Lovenox to Coumadin bridge  INR now therapeutic, Lovenox discontinued 11/20  Continue on 5 mg of Coumadin. Trend INR, goal 2-3.   Continue metoprolol for rate control  Stage 3a chronic kidney disease (HCC)  Lab Results   Component Value Date    EGFR 67 11/19/2024    EGFR 65 11/18/2024    EGFR 64 11/16/2024    CREATININE 1.04 11/19/2024    CREATININE 1.07 11/18/2024    CREATININE 1.09 11/16/2024     Renal function at baseline  Avoid nephrotoxins  History of stroke  Continue statin and coumadin  S/P TAVR (transcatheter aortic valve replacement)  History of TAVR  noted   Followed by cardiology and CTS as outpatient    The above assessment and plan was reviewed and updated as determined by my evaluation of the patient on 11/21/2024.    History of Present Illness    Patient seen and examined. Patients overnight issues or events were reviewed with nursing staff. New or overnight issues include the following:   Patient notes poorly controlled pain.  Otherwise offers no specific complaints today.    Objective :  Temp:  [98.3 °F (36.8 °C)-99.3 °F (37.4 °C)] 98.3 °F (36.8 °C)  HR:  [62-73] 64  BP: (138-146)/(65-67) 138/65  Resp:  [17-18] 18  SpO2:  [94 %-97 %] 96 %  O2 Device: None (Room air)    Invasive Devices       None                   Physical Exam  Constitutional:       General: He is not in acute distress.     Appearance: He is obese.   Cardiovascular:      Rate and Rhythm: Normal rate.   Pulmonary:      Effort: Pulmonary effort is normal. No respiratory distress.   Musculoskeletal:      Left lower leg: Edema present.      Comments: LLE wrapped in ACE   Neurological:      General: No focal deficit present.      Mental Status: He is alert and oriented to person, place, and time. Mental status is at baseline.         The above physical exam was reviewed and updated as determined by my evaluation of the patient on 11/21/2024.      Lab Results: I have reviewed the following results:  Results from last 7 days   Lab Units 11/19/24  0609 11/18/24  0453   WBC Thousand/uL 7.49 7.19   HEMOGLOBIN g/dL 9.6* 10.4*   HEMATOCRIT % 30.5* 31.1*   PLATELETS Thousands/uL 159 136*     Results from last 7 days   Lab Units 11/19/24  0609 11/18/24  0511   SODIUM mmol/L 140 138   POTASSIUM mmol/L 4.1 3.9   CHLORIDE mmol/L 102 99   CO2 mmol/L 30 30   BUN mg/dL 22 19   CREATININE mg/dL 1.04 1.07   CALCIUM mg/dL 8.9 9.0         Results from last 7 days   Lab Units 11/21/24  0524 11/20/24  0544   INR  2.44* 2.08*     Results from last 7 days   Lab Units 11/21/24  1040 11/21/24  0622 11/20/24  2123   POC GLUCOSE mg/dl 176* 155* 180*       Imaging Results Review: No pertinent imaging studies reviewed.  Other Study Results Review: No additional pertinent studies reviewed.    Review of Scheduled Meds:  Medications  reviewed and reconciled as needed  Current Facility-Administered Medications   Medication Dose Route Frequency Provider Last Rate    acetaminophen  975 mg Oral Q8H [facility] Sung Morales MD      ascorbic acid  500 mg Oral BID Sung Morales MD      bisacodyl  10 mg Rectal Daily PRN Willie Washington DO      Cholecalciferol  2,000 Units Oral Daily Sung Morales MD      diphenhydrAMINE-zinc acetate   Topical TID PRN Lisa Garcia PA-C      folic acid  1 mg Oral Daily Sung Morales MD      insulin lispro  1-5 Units Subcutaneous HS Sung Morales MD      insulin lispro  1-6 Units Subcutaneous TID AC Sung Morales MD      methocarbamol  500 mg Oral Q8H [facility] Ashley Depadua, MD      metoprolol succinate  25 mg Oral HS Sung Morales MD      nitroglycerin  0.4 mg Sublingual Q5 Min PRN Sung Morales MD      ondansetron  4 mg Oral Q6H PRN Ashley Depadua, MD      oxyCODONE  10 mg Oral Q4H PRN Ashley Depadua, MD      oxyCODONE  5 mg Oral Q4H PRN Ashley Depadua, MD      polyethylene glycol  17 g Oral Daily Sung Morales MD      pravastatin  40 mg Oral Daily With Dinner Sung Morales MD      senna-docusate sodium  2 tablet Oral Daily Sung Morales MD      warfarin  5 mg Oral Daily (warfarin) Sung Morales MD         VTE Pharmacologic Prophylaxis: Coumadin   Code Status: Level 3 - DNAR and DNI  Current Length of Stay: 3 day(s)    Administrative Statements   I have spent a total time of 20 minutes in caring for this patient on the day of the visit/encounter including Counseling / Coordination of care, Documenting in the medical record, Reviewing / ordering tests, medicine, procedures  , Obtaining or reviewing history  , and Communicating with other healthcare professionals .  ** Please Note:  voice to text software may have been used in the creation of this document. Although proof errors in transcription or interpretation are a potential of such software**

## 2024-11-22 LAB
GLUCOSE SERPL-MCNC: 151 MG/DL (ref 65–140)
GLUCOSE SERPL-MCNC: 164 MG/DL (ref 65–140)
GLUCOSE SERPL-MCNC: 215 MG/DL (ref 65–140)
GLUCOSE SERPL-MCNC: 245 MG/DL (ref 65–140)
INR PPP: 2.63 (ref 0.85–1.19)
PROTHROMBIN TIME: 28 SECONDS (ref 12.3–15)

## 2024-11-22 PROCEDURE — 99232 SBSQ HOSP IP/OBS MODERATE 35: CPT | Performed by: PHYSICIAN ASSISTANT

## 2024-11-22 PROCEDURE — 99232 SBSQ HOSP IP/OBS MODERATE 35: CPT | Performed by: PHYSICAL MEDICINE & REHABILITATION

## 2024-11-22 PROCEDURE — 97116 GAIT TRAINING THERAPY: CPT

## 2024-11-22 PROCEDURE — 82948 REAGENT STRIP/BLOOD GLUCOSE: CPT

## 2024-11-22 PROCEDURE — 99222 1ST HOSP IP/OBS MODERATE 55: CPT

## 2024-11-22 PROCEDURE — 97530 THERAPEUTIC ACTIVITIES: CPT

## 2024-11-22 PROCEDURE — 97535 SELF CARE MNGMENT TRAINING: CPT

## 2024-11-22 PROCEDURE — 85610 PROTHROMBIN TIME: CPT | Performed by: PHYSICIAN ASSISTANT

## 2024-11-22 PROCEDURE — 97110 THERAPEUTIC EXERCISES: CPT

## 2024-11-22 RX ORDER — HYDROMORPHONE HYDROCHLORIDE 2 MG/1
1 TABLET ORAL EVERY 4 HOURS PRN
Refills: 0 | Status: DISCONTINUED | OUTPATIENT
Start: 2024-11-22 | End: 2024-12-03 | Stop reason: HOSPADM

## 2024-11-22 RX ORDER — AMOXICILLIN 250 MG
2 CAPSULE ORAL 2 TIMES DAILY
Status: DISCONTINUED | OUTPATIENT
Start: 2024-11-22 | End: 2024-12-03 | Stop reason: HOSPADM

## 2024-11-22 RX ORDER — GABAPENTIN 100 MG/1
100 CAPSULE ORAL
Status: DISCONTINUED | OUTPATIENT
Start: 2024-11-22 | End: 2024-11-25

## 2024-11-22 RX ORDER — HYDROMORPHONE HYDROCHLORIDE 2 MG/1
2 TABLET ORAL EVERY 4 HOURS PRN
Refills: 0 | Status: DISCONTINUED | OUTPATIENT
Start: 2024-11-22 | End: 2024-12-03 | Stop reason: HOSPADM

## 2024-11-22 RX ORDER — WARFARIN SODIUM 5 MG/1
5 TABLET ORAL
Status: DISCONTINUED | OUTPATIENT
Start: 2024-11-23 | End: 2024-11-23

## 2024-11-22 RX ORDER — WARFARIN SODIUM 2.5 MG/1
2.5 TABLET ORAL
Status: DISCONTINUED | OUTPATIENT
Start: 2024-11-22 | End: 2024-11-23

## 2024-11-22 RX ADMIN — OXYCODONE HYDROCHLORIDE 10 MG: 10 TABLET ORAL at 08:08

## 2024-11-22 RX ADMIN — HYDROMORPHONE HYDROCHLORIDE 2 MG: 2 TABLET ORAL at 17:01

## 2024-11-22 RX ADMIN — PRAVASTATIN SODIUM 40 MG: 40 TABLET ORAL at 17:02

## 2024-11-22 RX ADMIN — SENNOSIDES AND DOCUSATE SODIUM 2 TABLET: 50; 8.6 TABLET ORAL at 17:02

## 2024-11-22 RX ADMIN — METHOCARBAMOL 500 MG: 500 TABLET ORAL at 05:00

## 2024-11-22 RX ADMIN — OXYCODONE HYDROCHLORIDE AND ACETAMINOPHEN 500 MG: 500 TABLET ORAL at 17:03

## 2024-11-22 RX ADMIN — METHOCARBAMOL 500 MG: 500 TABLET ORAL at 14:00

## 2024-11-22 RX ADMIN — INSULIN LISPRO 2 UNITS: 100 INJECTION, SOLUTION INTRAVENOUS; SUBCUTANEOUS at 21:33

## 2024-11-22 RX ADMIN — METOPROLOL SUCCINATE 25 MG: 25 TABLET, EXTENDED RELEASE ORAL at 21:10

## 2024-11-22 RX ADMIN — HYDROMORPHONE HYDROCHLORIDE 2 MG: 2 TABLET ORAL at 12:20

## 2024-11-22 RX ADMIN — INSULIN LISPRO 1 UNITS: 100 INJECTION, SOLUTION INTRAVENOUS; SUBCUTANEOUS at 08:25

## 2024-11-22 RX ADMIN — Medication 2000 UNITS: at 08:08

## 2024-11-22 RX ADMIN — SENNOSIDES AND DOCUSATE SODIUM 2 TABLET: 50; 8.6 TABLET ORAL at 08:08

## 2024-11-22 RX ADMIN — WARFARIN SODIUM 2.5 MG: 2.5 TABLET ORAL at 17:03

## 2024-11-22 RX ADMIN — INSULIN LISPRO 3 UNITS: 100 INJECTION, SOLUTION INTRAVENOUS; SUBCUTANEOUS at 10:57

## 2024-11-22 RX ADMIN — TAMSULOSIN HYDROCHLORIDE 0.4 MG: 0.4 CAPSULE ORAL at 17:03

## 2024-11-22 RX ADMIN — ACETAMINOPHEN 975 MG: 325 TABLET, FILM COATED ORAL at 05:00

## 2024-11-22 RX ADMIN — POLYETHYLENE GLYCOL 3350 17 G: 17 POWDER, FOR SOLUTION ORAL at 08:08

## 2024-11-22 RX ADMIN — ACETAMINOPHEN 975 MG: 325 TABLET, FILM COATED ORAL at 14:01

## 2024-11-22 RX ADMIN — HYDROMORPHONE HYDROCHLORIDE 2 MG: 2 TABLET ORAL at 21:40

## 2024-11-22 RX ADMIN — GABAPENTIN 100 MG: 100 CAPSULE ORAL at 21:10

## 2024-11-22 RX ADMIN — FOLIC ACID 1 MG: 1 TABLET ORAL at 08:08

## 2024-11-22 RX ADMIN — INSULIN LISPRO 1 UNITS: 100 INJECTION, SOLUTION INTRAVENOUS; SUBCUTANEOUS at 17:04

## 2024-11-22 RX ADMIN — METHOCARBAMOL 500 MG: 500 TABLET ORAL at 21:10

## 2024-11-22 RX ADMIN — OXYCODONE HYDROCHLORIDE AND ACETAMINOPHEN 500 MG: 500 TABLET ORAL at 08:08

## 2024-11-22 RX ADMIN — ACETAMINOPHEN 975 MG: 325 TABLET, FILM COATED ORAL at 21:10

## 2024-11-22 NOTE — CONSULTS
Consultation - Acute Pain   Name: Isauro Sow 79 y.o. male I MRN: [id number]  Unit/Bed#: [facility] 974-01 I Date of Admission: 11/18/2024   Date of Service: 11/22/2024 I Hospital Day: 4   Inpatient consult to Acute Pain Service  Consult performed by: DONTE Varma  Consult ordered by: Willie Washington DO        Physician Requesting Evaluation: Paul Scott MD   Reason for Evaluation / Principal Problem: postop pain/status post LTKA    Assessment & Plan  Status post total left knee replacement using cement  Patient recently underwent an elective TKA at the [facility] on 11/13/2024.  Was discharged home however return to the emergency department with ambulatory dysfunction/unable to tolerate PT and worsening pain.  Currently admitted to [facility] for rehabilitation.  APS consulted for assistance in postoperative pain management.  Acute pain  Patient has been experiencing persistent left lower extremity pain following his surgery which has been limiting his ability to ambulate, work with both physical therapy and Occupational Therapy, and he has been unable to sleep at night..  Patient has been going long periods of time without receiving as needed opioid analgesics.  Despite this he reports that oxycodone has provided little to no pain relief following administration.    Multimodal analgesia:  Continue Tylenol 975 mg every 8 hours scheduled  Start gabapentin 100 mg daily at bedtime  Continue Robaxin 500 mg every 8 hours scheduled; monitor and hold for sedation  Continue oxycodone given ineffectiveness  Start oral Dilaudid 1 mg every 4 hours as needed for moderate pain  Start oral Dilaudid 2 mg every 4 hours as needed for severe pain  If pain control is still suboptimal over the weekend can consider further up titration of oral Dilaudid to 2 mg / 4 mg every 4 hours as needed for moderate/severe pain.  Educated patient on current regimen and encouraged him to ask for pain medications every 4 hours as  needed.  Discussed plan with nursing staff who will frequently evaluate patient's pain levels and medicate him appropriately.  Add Narcan as needed for respiratory depression/opioid reversal  Continue bowel regimen while utilizing opioids to prevent opioid-induced constipation.    Stage 3a chronic kidney disease (HCC)  Lab Results   Component Value Date    EGFR 67 11/19/2024    EGFR 65 11/18/2024    EGFR 64 11/16/2024    CREATININE 1.04 11/19/2024    CREATININE 1.07 11/18/2024    CREATININE 1.09 11/16/2024   Will attempt to minimize use of nephrotoxic analgesics  Will hold off on treatment with nsaids in setting of low gfr      Treatment plan and recommendations discussed with primary care service.  APS will continue to follow. Please contact Acute Pain Service - via SecureChat from [phone] with additional questions or concerns. See SecureChat or [place] for additional contacts and after hours information.     History of Present Illness    HPI: Isauro Sow is a 79 y.o. year old male with past medical history of CAD, TAVR, DM 2, HTN, recent TKA at [facility] on 11/13/2024.  Patient was discharged home following his surgery however return to the emergency department with ambulatory dysfunction and worsening pain.  He was then admitted to the acute rehab center at the [facility].  Acute pain service was consulted for assistance in postoperative pain management.    Patient seen at bedside this morning, resting in bed without acute distress.  He reports continued left lower extremity pain despite administration of oxycodone this morning.  He feels oxycodone has provided no pain relief which she has been tolerating without opioid-induced side effects/oversedation.  He worked with occupational therapy this morning which he felt did not go well secondary to pain.  We discussed trialing an opioid rotation oral Dilaudid as well as addition of gabapentin at that time and he is in agreement.    Current pain  location(s): Pain Score: 8  Pain Location/Orientation: Orientation: Left, Location: Leg  Pain Scale: Pain Assessment Tool: 0-10      Pain History: Denies    I have reviewed the patient's controlled substance dispensing history in the Prescription Drug Monitoring Program in compliance with the [facility] regulations before prescribing any controlled substances.     Review of Systems   Constitutional:  Positive for activity change.   Eyes: Negative.    Respiratory: Negative.     Cardiovascular: Negative.    Gastrointestinal: Negative.    Genitourinary: Negative.    Musculoskeletal:  Positive for arthralgias and myalgias. Negative for back pain and neck pain.   Neurological:  Positive for weakness. Negative for dizziness, light-headedness, numbness and headaches.   Psychiatric/Behavioral: Negative.     All other systems reviewed and are negative.    I have reviewed the patient's PMH, PSH, Social History, Family History, Meds, and Allergies    Objective :  Temp:  [97.9 °F (36.6 °C)-98.7 °F (37.1 °C)] 97.9 °F (36.6 °C)  HR:  [66-70] 70  BP: (121-163)/(60-73) 121/60  Resp:  [16-18] 18  SpO2:  [93 %-96 %] 93 %  O2 Device: None (Room air)    Physical Exam  Constitutional:       General: He is not in acute distress.  HENT:      Head: Normocephalic and atraumatic.   Cardiovascular:      Rate and Rhythm: Normal rate.   Pulmonary:      Effort: Pulmonary effort is normal. No respiratory distress.   Abdominal:      General: There is no distension.      Tenderness: There is no abdominal tenderness.   Musculoskeletal:         General: Tenderness (LLE) present.      Left lower leg: Edema present.   Skin:     General: Skin is warm and dry.      Findings: Bruising (LLE) present.   Neurological:      Mental Status: He is alert and oriented to person, place, and time. Mental status is at baseline.   Psychiatric:         Mood and Affect: Mood normal.         Behavior: Behavior normal.          Lab Results: I have reviewed the following  results:  Estimated Creatinine Clearance: 68.8 mL/min (by C-G formula based on SCr of 1.04 mg/dL).  Lab Results   Component Value Date    WBC 7.49 11/19/2024    WBC 9.2 11/10/2017    HGB 9.6 (L) 11/19/2024    HGB 15.0 11/10/2017    HCT 30.5 (L) 11/19/2024    HCT 45.1 11/10/2017     11/19/2024     11/10/2017         Component Value Date/Time     11/10/2017 0000    K 4.1 11/19/2024 0609    K 4.5 06/16/2022 1044     11/19/2024 0609     06/16/2022 1044    CO2 30 11/19/2024 0609    CO2 33 (H) 09/21/2023 1028    CO2 30 06/16/2022 1044    BUN 22 11/19/2024 0609    BUN 22 06/16/2022 1044    CREATININE 1.04 11/19/2024 0609    CREATININE 1.08 11/10/2017 0000         Component Value Date/Time    CALCIUM 8.9 11/19/2024 0609    CALCIUM 10.0 06/16/2022 1044    ALKPHOS 40 11/15/2024 1317    ALKPHOS 55 06/16/2022 1044    AST 18 11/15/2024 1317    AST 12 06/16/2022 1044    ALT 19 11/15/2024 1317    ALT 12 06/16/2022 1044    BILITOT 0.7 11/10/2017 0000    TP 5.9 (L) 11/15/2024 1317    TP 6.7 06/16/2022 1044    ALB 3.3 (L) 11/15/2024 1317    ALB 3.9 11/10/2017 0000       Imaging Results Review: No pertinent imaging studies reviewed.  Other Study Results Review: No additional pertinent studies reviewed.

## 2024-11-22 NOTE — CASE MANAGEMENT
Met w/pt and confirmed dc for 12/3. Pt in agreement but states today he has leg pain. Pt states he s/w doc about making changes to his pain med that is to be initiated today. Following to arrange dc recommended needs.

## 2024-11-22 NOTE — ASSESSMENT & PLAN NOTE
Patient recently underwent an elective TKA at the [facility] on 11/13/2024.  Was discharged home however return to the emergency department with ambulatory dysfunction/unable to tolerate PT and worsening pain.  Currently admitted to [facility] for rehabilitation.  APS consulted for assistance in postoperative pain management.

## 2024-11-22 NOTE — PROGRESS NOTES
"OT TREATMENT       11/22/24 0700   Pain Assessment   Pain Assessment Tool 0-10   Pain Score 7   Pain Location/Orientation Orientation: Left;Location: Knee;Location: Leg   Pain Onset/Description Onset: Ongoing;Frequency: Constant/Continuous   Effect of Pain on Daily Activities therapy within tolerance   Patient's Stated Pain Goal No pain   Hospital Pain Intervention(s) Repositioned;Emotional support;Rest   Restrictions/Precautions   Precautions Bed/chair alarms;Fall Risk;Pain;Supervision on toilet/commode   LLE Weight Bearing Per Order WBAT   Lifestyle   Autonomy \"I want my old knee back\"   Eating   Type of Assistance Needed Independent   Physical Assistance Level No physical assistance   Comment seated in recliner   Eating CARE Score 6   Oral Hygiene   Type of Assistance Needed Set-up / clean-up   Physical Assistance Level No physical assistance   Comment seated in recliner   Oral Hygiene CARE Score 5   Shower/Bathe Self   Type of Assistance Needed Physical assistance   Physical Assistance Level 26%-50%   Comment seated at EOB pt completed sponge bath. pt able to bathe UB after setup and B upper legs. TA required for B lower legs and feet. Pt able to complete groin/harry area in stance with intermittent UE support of RW. TA to wash rear.   Shower/Bathe Self CARE Score 3   Bathing   Assessed Bath Style Sponge Bath   Tub/Shower Transfer   Reason Not Assessed Sponge Bath   Upper Body Dressing   Type of Assistance Needed Set-up / clean-up   Physical Assistance Level No physical assistance   Comment seated EOB to don t-shirt   Upper Body Dressing CARE Score 5   Lower Body Dressing   Type of Assistance Needed Physical assistance   Physical Assistance Level 26%-50%   Comment Pt donned shorts seated at EOB with use of long handled reacher to thread BLE with extended time and verbal cuing for technique to thread LLE. Pt able to accelerate to upper thighs, Min A to complete threading over hips in stance with RW   Lower Body " Dressing CARE Score 3   Putting On/Taking Off Footwear   Type of Assistance Needed Physical assistance   Physical Assistance Level 26%-50%   Comment Pt donned B socks with use of wide sock aide, extended time for setup and incidental A to adjust after threading. TA for L ace wrap   Putting On/Taking Off Footwear CARE Score 3   Roll Left and Right   Type of Assistance Needed Physical assistance   Physical Assistance Level 26%-50%   Comment EXTENDED time for BLE management with use of bedrail. A for trunk management   Roll Left and Right CARE Score 3   Lying to Sitting on Side of Bed   Type of Assistance Needed Physical assistance   Physical Assistance Level 26%-50%   Comment with use of bedrail and leg  for LLE, A at trunk   Lying to Sitting on Side of Bed CARE Score 3   Sit to Stand   Type of Assistance Needed Physical assistance   Physical Assistance Level 26%-50%   Comment to RW   Sit to Stand CARE Score 3   Bed-Chair Transfer   Type of Assistance Needed Physical assistance   Physical Assistance Level 26%-50%   Comment SPT with RW, extended time and verbal cuing   Chair/Bed-to-Chair Transfer CARE Score 3   Cognition   Overall Cognitive Status Impaired   Arousal/Participation Cooperative   Attention Within functional limits   Orientation Level Oriented X4   Memory Within functional limits   Following Commands Follows one step commands without difficulty   Assessment   Treatment Assessment Pt participated in 90 min skilled OT session with focus on ADL retraining and functional transfers. Pt tolerated treatment well, pt remained seated in recliner with all immediate needs met, call bell accessible, chair alarm secured . Pt will continue to benefit from skilled OT services to ensure safe discharge. Continue plan of care with focus on standing tolerance, balance, ADL retraining and bed mobility.   Prognosis Fair   Problem List Decreased strength;Decreased range of motion;Decreased endurance;Impaired  balance;Decreased mobility;Pain   Barriers to Discharge Decreased caregiver support   Plan   Treatment/Interventions Functional transfer training;ADL retraining;Therapeutic exercise;Endurance training;Patient/family training;Equipment eval/education;Bed mobility;Compensatory technique education;Continued evaluation   Progress Progressing toward goals   Discharge Recommendation   Rehab Resource Intensity Level, OT   (pendign)   OT Therapy Minutes   OT Time In 0700   OT Time Out 0830   OT Total Time (minutes) 90   OT Mode of treatment - Individual (minutes) 90   OT Mode of treatment - Concurrent (minutes) 0   OT Mode of treatment - Group (minutes) 0   OT Mode of treatment - Co-treat (minutes) 0   OT Mode of Treatment - Total time(minutes) 90 minutes   OT Cumulative Minutes 360   Therapy Time missed   Time missed? No

## 2024-11-22 NOTE — ASSESSMENT & PLAN NOTE
Lab Results   Component Value Date    HGBA1C 7.2 (H) 10/07/2024       Recent Labs     11/21/24  1534 11/21/24  2034 11/22/24  0640 11/22/24  1037   POCGLU 174* 157* 151* 245*       Blood Sugar Average: Last 72 hrs:  (P) [id number]    Home: Metformin 1000mg daily;   Here: CDI/Accuchecks.   If necessary, will try to get patient back on oral meds prior to discharge.   Diabetic Diet.   Management as per IM.

## 2024-11-22 NOTE — PROGRESS NOTES
Met with pt at bedside to review dc date of 12/3, pt verbalized understanding. No issues or concerns at time of conversation. Pt reports he is excited to get home to wife.

## 2024-11-22 NOTE — PLAN OF CARE
Problem: PAIN - ADULT  Goal: Verbalizes/displays adequate comfort level or baseline comfort level  Description: Interventions:  - Encourage patient to monitor pain and request assistance  - Assess pain using appropriate pain scale  - Administer analgesics based on type and severity of pain and evaluate response  - Implement non-pharmacological measures as appropriate and evaluate response  - Consider cultural and social influences on pain and pain management  - Notify physician/advanced practitioner if interventions unsuccessful or patient reports new pain  Outcome: Progressing     Problem: INFECTION - ADULT  Goal: Absence or prevention of progression during hospitalization  Description: INTERVENTIONS:  - Assess and monitor for signs and symptoms of infection  - Monitor lab/diagnostic results  - Monitor all insertion sites, i.e. indwelling lines, tubes, and drains  - Monitor endotracheal if appropriate and nasal secretions for changes in amount and color  - [place] appropriate cooling/warming therapies per order  - Administer medications as ordered  - Instruct and encourage patient and family to use good hand hygiene technique  - Identify and instruct in appropriate isolation precautions for identified infection/condition  Outcome: Progressing     Problem: SAFETY ADULT  Goal: Patient will remain free of falls  Description: INTERVENTIONS:  - Educate patient/family on patient safety including physical limitations  - Instruct patient to call for assistance with activity   - Consult OT/PT to assist with strengthening/mobility   - Keep Call bell within reach  - Keep bed low and locked with side rails adjusted as appropriate  - Keep care items and personal belongings within reach  - Initiate and maintain comfort rounds  - Make Fall Risk Sign visible to staff  - Offer Toileting every 2 Hours, in advance of need  - Initiate/Maintain bed/chair alarm  - Obtain necessary fall risk management equipment: alarms   - Apply  yellow socks and bracelet for high fall risk patients  - Consider moving patient to room near nurses station  Outcome: Progressing  Goal: Maintain or return to baseline ADL function  Description: INTERVENTIONS:  -  Assess patient's ability to carry out ADLs; assess patient's baseline for ADL function and identify physical deficits which impact ability to perform ADLs (bathing, care of mouth/teeth, toileting, grooming, dressing, etc.)  - Assess/evaluate cause of self-care deficits   - Assess range of motion  - Assess patient's mobility; develop plan if impaired  - Assess patient's need for assistive devices and provide as appropriate  - Encourage maximum independence but intervene and supervise when necessary  - Involve family in performance of ADLs  - Assess for home care needs following discharge   - Consider OT consult to assist with ADL evaluation and planning for discharge  - Provide patient education as appropriate  Outcome: Progressing  Goal: Maintains/Returns to pre admission functional level  Description: INTERVENTIONS:  - Perform AM-PAC 6 Click Basic Mobility/ Daily Activity assessment daily.  - Set and communicate daily mobility goal to care team and patient/family/caregiver.   - Collaborate with rehabilitation services on mobility goals if consulted  - Perform Range of Motion 3 times a day.  - Reposition patient every 2 hours.  - Dangle patient 3 times a day  - Stand patient 3 times a day  - Ambulate patient 3 times a day  - Out of bed to chair 3 times a day   - Out of bed for meals 3 times a day  - Out of bed for toileting  - Record patient progress and toleration of activity level   Outcome: Progressing     Problem: DISCHARGE PLANNING  Goal: Discharge to home or other facility with appropriate resources  Description: INTERVENTIONS:  - Identify barriers to discharge w/patient and caregiver  - Arrange for needed discharge resources and transportation as appropriate  - Identify discharge learning needs  (meds, wound care, etc.)  - Arrange for interpretive services to assist at discharge as needed  - Refer to Case Management Department for coordinating discharge planning if the patient needs post-hospital services based on physician/advanced practitioner order or complex needs related to functional status, cognitive ability, or social support system  Outcome: Progressing     Problem: Prexisting or High Potential for Compromised Skin Integrity  Goal: Skin integrity is maintained or improved  Description: INTERVENTIONS:  - Identify patients at risk for skin breakdown  - Assess and monitor skin integrity  - Assess and monitor nutrition and hydration status  - Monitor labs   - Assess for incontinence   - Turn and reposition patient  - Assist with mobility/ambulation  - Relieve pressure over bony prominences  - Avoid friction and shearing  - Provide appropriate hygiene as needed including keeping skin clean and dry  - Evaluate need for skin moisturizer/barrier cream  - Collaborate with interdisciplinary team   - Patient/family teaching  - Consider wound care consult   Outcome: Progressing

## 2024-11-22 NOTE — ASSESSMENT & PLAN NOTE
Lab Results   Component Value Date    EGFR 67 11/19/2024    EGFR 65 11/18/2024    EGFR 64 11/16/2024    CREATININE 1.04 11/19/2024    CREATININE 1.07 11/18/2024    CREATININE 1.09 11/16/2024   Will attempt to minimize use of nephrotoxic analgesics  Will hold off on treatment with nsaids in setting of low gfr

## 2024-11-22 NOTE — ASSESSMENT & PLAN NOTE
Lab Results   Component Value Date    HGBA1C 7.2 (H) 10/07/2024       Recent Labs     11/21/24  1534 11/21/24  2034 11/22/24  0640 11/22/24  1037   POCGLU 174* 157* 151* 245*       Blood Sugar Average: Last 72 hrs:  (P) [id number]  Home: metformin 1000mg daily.  Here: SSI  Blood sugars elevated.  Will add back metformin starting 11/23/24  Continue accuchecks and SSI.  Carb controlled diet

## 2024-11-22 NOTE — ASSESSMENT & PLAN NOTE
Preoperative cardiology consultation recommended Lovenox to Coumadin bridge  INR 2.63. Trending up  Reduce Coumadin to 2.5mg daily.  Goal INR 2-3   Continue metoprolol for rate control

## 2024-11-22 NOTE — ASSESSMENT & PLAN NOTE
Patient has been experiencing persistent left lower extremity pain following his surgery which has been limiting his ability to ambulate, work with both physical therapy and Occupational Therapy, and he has been unable to sleep at night..  Patient has been going long periods of time without receiving as needed opioid analgesics.  Despite this he reports that oxycodone has provided little to no pain relief following administration.    Multimodal analgesia:  Continue Tylenol 975 mg every 8 hours scheduled  Start gabapentin 100 mg daily at bedtime  Continue Robaxin 500 mg every 8 hours scheduled; monitor and hold for sedation  Continue oxycodone given ineffectiveness  Start oral Dilaudid 1 mg every 4 hours as needed for moderate pain  Start oral Dilaudid 2 mg every 4 hours as needed for severe pain  If pain control is still suboptimal over the weekend can consider further up titration of oral Dilaudid to 2 mg / 4 mg every 4 hours as needed for moderate/severe pain.  Educated patient on current regimen and encouraged him to ask for pain medications every 4 hours as needed.  Discussed plan with nursing staff who will frequently evaluate patient's pain levels and medicate him appropriately.  Add Narcan as needed for respiratory depression/opioid reversal  Continue bowel regimen while utilizing opioids to prevent opioid-induced constipation.

## 2024-11-22 NOTE — PROGRESS NOTES
Progress Note - PMR   Name: Isauro Sow 79 y.o. male I MRN: [id number]  Unit/Bed#: -01 I Date of Admission: 11/18/2024   Date of Service: 11/22/2024 I Hospital Day: 4     Assessment & Plan  Status post total left knee replacement using cement  Performed by Dr. Braxton on 11/13.   Can remove surgical dressing POD 7 and replace with DSD.   WBAT.   Failed home therapies.   AROM/PROM/AAROM with no degree restriction.   PT/OT 3-5 hours/day, 5-6 days/week.   DVT Ppx fully anticoagulated on coumadin.   F/u Ortho (~11/27) for 2 week POV.   Acute pain  Related to TKA and edema in RLE  ACE Wraps  Increased oxycodone to 5-10mg Q4hr PRN - monitor response  Tylenol scheduled  Robaxin scheduled   Monitor and adjust as appropriate.   -Continues to have pain that is hindering with therapy. Will consult APS at this time (11/21)  Constipation  -infrequent and poor bowel movements in setting of narcotic use for recent L TKA  -bowel regimen in place - adjust as needed  -attempt to wean down narcotic usage as tolerated   - Last BM 11/18.   - 11/21 Patient constipated. Will give suppository this evening and monitor for BM   At risk for venous thromboembolism (VTE)  Fully anticoagulated on warfarin,SCDs.  Benign essential hypertension  Home: Toprol 25mg QHS,   here: Same.H Monitor and adjust as per IM.   Type 2 diabetes mellitus (HCC)  Lab Results   Component Value Date    HGBA1C 7.2 (H) 10/07/2024       Recent Labs     11/21/24  1534 11/21/24  2034 11/22/24  0640 11/22/24  1037   POCGLU 174* 157* 151* 245*       Blood Sugar Average: Last 72 hrs:  (P) 176.3181000404293235    Home: Metformin 1000mg daily;   Here: CDI/Accuchecks.   If necessary, will try to get patient back on oral meds prior to discharge.   Diabetic Diet.   Management as per IM.     History of stroke  -Hx of stroke in 2017 w/ residual L sided deficits   -Monitor   Stage 3a chronic kidney disease (HCC)  Lab Results   Component Value Date    EGFR 67 11/19/2024     EGFR 65 11/18/2024    EGFR 64 11/16/2024    CREATININE 1.04 11/19/2024    CREATININE 1.07 11/18/2024    CREATININE 1.09 11/16/2024     -Baseline Cr 1.0-1.3  Currently within baseline.   Avoid nephrotoxic meds, relative hypotension.   Monitor BMP, I/O.   S/P TAVR (transcatheter aortic valve replacement)  -TAVR procedure done 9/2023  -Follows CTS outpatient   Chronic atrial flutter (HCC)  -Hx of a-flutter s/p PPM placement   -On Coumadin with Lovenox bridge  - Continue Toprol  -Trend INR, once therapeutic, can stop Lovenox   Urinary retention  11/21 patient endorsing symptoms of urinary retention in setting of constipation.  -Denies dysuria but will order UA and follow up     Health Maintenance  #Delirium/Sleep: At risk. Optimize sleep/wake, pain, bowel, bladder management. Avoid deliriogenic meds and physical restraint. Environmental/behavioral interventions.   #Skin/Pressure Injury Prevention: Turn Q2hr in bed, with weight shifts [id number] in wheelchair. Float heels in bed.  #Code Status: DNR/DNI  #FEN: Carb controlled diet   #Dispo: anticipate 1-2 week hospital course given debility from his recent surgery and co morbidities compounding adequate tissue healing and strength recovery. ADD 12/3      To Review:   Mr. Sow is a 78yo M with medical history of asthma, aflutter and SSS s/p medtronic PPM on coumadin, BPH, CAD, R ICA occlusion/L ICA stenosisk, CKD 3 (BL 1-1.3), COPD, T2DM, Gout, HLD, HTN, PERCY, Macular Degeneration, Severe Aortic Stenosis, previous CVA in 2017 who presented to [place] on 11/18 POD 2 L TKA failing discharge to home with increased LLE pain. LE Doppler negative for DVT.  Ortho saw patient and had no other recommendations at this time. He had one low grade temp during stay, but no further episodes, so no further work-up. He was recommend for ARC given acute sequelae of his surgery, chronic comorbidities and extent of his weakness/impaired function. He was admitted to the [facility]C on 11/18.     To  Review: Mr. Sow is a 78yo M with medical history of asthma, aflutter and SSS s/p medtronic PPM on coumadin, BPH, CAD, R ICA occlusion/L ICA stenosisk, CKD 3 (BL 1-1.3), COPD, T2DM, Gout, HLD, HTN, PERCY, Macular Degeneration, Severe Aortic Stenosis, previous CVA in 2017 who presented to [facility] on 11/18 POD 2 L TKA failing discharge to home with increased LLE pain. LE Doppler negative for DVT. Ortho saw patient and had no other recommendations at this time. He had one low grade temp during stay, but no further episodes, so no further work-up. He was recommend for ARC given acute sequelae of his surgery, chronic comorbidities and extent of his weakness/impaired function. He was admitted to the ARC on 11/18.     Chief Complaint: Pain     Interval History/Subjective: No acute events overnight. Patient with complaints of pain eminating from his L posterior knee and radiating down his leg and up to his buttock. Describes it as 7/8-10 pain. Patient did make progress with ambulating while in therapy but still feels pain is a big hindrance. Patient also with complaints of point tenderness on his L mid foot. He does have a history of gout for which he takes prednisone at home for flairs. Awaiting APS recommendations at this time. No other concerns noted on examination. Patient remains continent of bowel and bladder and LBM 11/21     Functional Update:  PT: totA bed mobility, mod/maxA transfers, mod/maxA walk 30' with RW  OT: independent eating, supervision oral hygiene, setup dressing, totA footwear, min/mod shower/bathe, set up UBD, min/modA LBD and footwear, min/modA transfers.       Objective     Temp:  [97.9 °F (36.6 °C)-98.7 °F (37.1 °C)] 97.9 °F (36.6 °C)  HR:  [66-70] 70  Resp:  [16-18] 18  BP: (121-163)/(60-73) 121/60  SpO2:  [93 %-96 %] 93 %  Physical Exam  Constitutional:       Appearance: Normal appearance.   HENT:      Head: Normocephalic and atraumatic.   Eyes:      Extraocular Movements: Extraocular  movements intact.      Pupils: Pupils are equal, round, and reactive to light.   Cardiovascular:      Rate and Rhythm: Normal rate and regular rhythm.      Pulses: Normal pulses.      Heart sounds: Normal heart sounds. No murmur heard.     No gallop.   Pulmonary:      Effort: Pulmonary effort is normal. No respiratory distress.      Breath sounds: Normal breath sounds. No wheezing.   Abdominal:      Tenderness: There is no abdominal tenderness. There is no guarding.   Musculoskeletal:         General: Swelling and tenderness (tenderness to palpation to L dorsal midfoot) present.      Left lower leg: Edema present.      Comments: LLE in ace wrap    Skin:     General: Skin is warm and dry.      Coloration: Skin is not jaundiced.   Neurological:      General: No focal deficit present.      Mental Status: He is alert and oriented to person, place, and time.   Psychiatric:         Mood and Affect: Mood normal.         Thought Content: Thought content normal.         Physical examination is otherwise unchanged from previous encounter, except as noted above.    Scheduled Meds:  Current Facility-Administered Medications   Medication Dose Route Frequency Provider Last Rate    acetaminophen  975 mg Oral Q8H MOON Morales MD      ascorbic acid  500 mg Oral BID Sung Morales MD      bisacodyl  10 mg Rectal Daily PRN Willie Washington DO      Cholecalciferol  2,000 Units Oral Daily Sung Morales MD      diphenhydrAMINE-zinc acetate   Topical TID PRN Lisa Garcia PA-C      folic acid  1 mg Oral Daily Sung Morales MD      gabapentin  100 mg Oral HS DONTE Varma      HYDROmorphone  1 mg Oral Q4H PRN DONTE Varma      Or    HYDROmorphone  2 mg Oral Q4H PRN DONTE Varma      insulin lispro  1-5 Units Subcutaneous HS Sung Morales MD      insulin lispro  1-6 Units Subcutaneous TID AC Sung Morales MD      methocarbamol  500 mg Oral Q8H [facility] Ashley Depadua, MD      metoprolol succinate  25 mg  Oral HS Sung Morales MD      naloxone  0.04 mg Intravenous Q1MIN PRN DONTE Varma      nitroglycerin  0.4 mg Sublingual Q5 Min PRN Sung Morales MD      ondansetron  4 mg Oral Q6H PRN Ashley Depadua, MD      polyethylene glycol  17 g Oral Daily Sung Morales MD      pravastatin  40 mg Oral Daily With Dinner Sung Morales MD      senna-docusate sodium  2 tablet Oral Daily Sung Morales MD      tamsulosin  0.4 mg Oral Daily With Dinner Ashley Depadua, MD      warfarin  2.5 mg Oral Every Friday Ashley Depadua, MD      [START ON 11/23/2024] warfarin  5 mg Oral Once per day on Sunday Monday Tuesday Wednesday Thursday Saturday Ashley Depadua, MD           Lab Results: I have reviewed the following results:  Results from last 7 days   Lab Units 11/19/24  0609 11/18/24  0453 11/16/24  0512   HEMOGLOBIN g/dL 9.6* 10.4* 10.6*   HEMATOCRIT % 30.5* 31.1* 32.6*   WBC Thousand/uL 7.49 7.19 9.80   PLATELETS Thousands/uL 159 136* 92*     Results from last 7 days   Lab Units 11/19/24  0609 11/18/24  0511 11/16/24  0512 11/15/24  1317   BUN mg/dL 22 19 16 16   SODIUM mmol/L 140 138 139 139   POTASSIUM mmol/L 4.1 3.9 4.1 4.3   CHLORIDE mmol/L 102 99 103 103   CREATININE mg/dL 1.04 1.07 1.09 1.12   AST U/L  --   --   --  18   ALT U/L  --   --   --  19       Results from last 7 days   Lab Units 11/22/24  0632 11/21/24  0524 11/20/24  0544   PROTIME seconds 28.0* 26.4* 23.4*   INR  2.63* 2.44* 2.08*        0658}      ** Please Note: Fluency Direct voice to text software may have been used in the creation of this document. **

## 2024-11-22 NOTE — PROGRESS NOTES
Progress Note - Hospitalist   Name: Isauro Sow 79 y.o. male I MRN: [id number]  Unit/Bed#: -01 I Date of Admission: 11/18/2024   Date of Service: 11/22/2024 I Hospital Day: 4    Assessment & Plan  Status post total left knee replacement using cement  Patient was recently admitted to [facility] for elective left TKA on 11/13/24, discharged with home therapy, returned to ED as he was unable to ambulate or participate with home PT.   Orthopedic evaluation appreciated   Currently admitted to [facility] for rehab  Pain control and therapy per PMR  Benign essential hypertension  Blood pressure acceptable  Continue with home dose metoprolol  Type 2 diabetes mellitus (HCC)  Lab Results   Component Value Date    HGBA1C 7.2 (H) 10/07/2024       Recent Labs     11/21/24  1534 11/21/24  2034 11/22/24  0640 11/22/24  1037   POCGLU 174* 157* 151* 245*       Blood Sugar Average: Last 72 hrs:  (P) [id number]  Home: metformin 1000mg daily.  Here: SSI  Blood sugars elevated.  Will add back metformin starting 11/23/24  Continue accuchecks and SSI.  Carb controlled diet   Chronic atrial flutter (HCC)  Preoperative cardiology consultation recommended Lovenox to Coumadin bridge  INR 2.63. Trending up  Reduce Coumadin to 2.5mg daily.  Goal INR 2-3   Continue metoprolol for rate control  Stage 3a chronic kidney disease (HCC)  Lab Results   Component Value Date    EGFR 67 11/19/2024    EGFR 65 11/18/2024    EGFR 64 11/16/2024    CREATININE 1.04 11/19/2024    CREATININE 1.07 11/18/2024    CREATININE 1.09 11/16/2024     Renal function at baseline  Avoid nephrotoxins  History of stroke  Continue statin and coumadin  S/P TAVR (transcatheter aortic valve replacement)  History of TAVR  noted   Followed by cardiology and CTS as outpatient    The above assessment and plan was reviewed and updated as determined by my evaluation of the patient on 11/22/2024.    History of Present Illness   Patient seen and examined. Patients  overnight issues or events were reviewed with nursing staff. New or overnight issues include the following:     Pt seen in his room. He was seen by APS and reports improved pain control. He denies any other complaints.    A 10 point review of systems was negative except for what is noted in the HPI.    Objective :  Temp:  [97.9 °F (36.6 °C)-98.7 °F (37.1 °C)] 97.9 °F (36.6 °C)  HR:  [66-70] 70  BP: (121-163)/(60-73) 121/60  Resp:  [16-18] 18  SpO2:  [93 %-96 %] 93 %  O2 Device: None (Room air)    Invasive Devices       None                   Physical Exam  General Appearance: NAD; pleasant  HEENT: PERRLA, conjuctiva normal; mucous membranes moist; face symmetrical  Neck:  Supple  Lungs: clear bilaterally, normal respiratory effort, no retractions, expiratory effort normal, on room air  CV: regular rate and rhythm, no murmurs rubs or gallops noted   ABD: soft non tender, +BS x4  EXT: DP pulses intact, no lymphadenopathy, + edema. Lt LE ACE wrap  Skin: normal turgor, normal texture, no rash  Psych: affect normal, mood normal  Neuro: AAOx3      The above physical exam was reviewed and updated as determined by my evaluation of the patient on 11/22/2024.      Lab Results: I have reviewed the following results:  Results from last 7 days   Lab Units 11/19/24  0609 11/18/24  0453   WBC Thousand/uL 7.49 7.19   HEMOGLOBIN g/dL 9.6* 10.4*   HEMATOCRIT % 30.5* 31.1*   PLATELETS Thousands/uL 159 136*     Results from last 7 days   Lab Units 11/19/24  0609 11/18/24  0511   SODIUM mmol/L 140 138   POTASSIUM mmol/L 4.1 3.9   CHLORIDE mmol/L 102 99   CO2 mmol/L 30 30   BUN mg/dL 22 19   CREATININE mg/dL 1.04 1.07   CALCIUM mg/dL 8.9 9.0         Results from last 7 days   Lab Units 11/22/24  0632 11/21/24  0524   INR  2.63* 2.44*     Results from last 7 days   Lab Units 11/22/24  1037 11/22/24  0640 11/21/24  2034   POC GLUCOSE mg/dl 245* 151* 157*       Imaging Results Review: No pertinent imaging studies reviewed.  Other Study  Results Review: Other studies reviewed include: INR    Review of Scheduled Meds: Medications  reviewed and reconciled as needed  Current Facility-Administered Medications   Medication Dose Route Frequency Provider Last Rate    acetaminophen  975 mg Oral Q8H MOON Morales MD      ascorbic acid  500 mg Oral BID Sung Morales MD      bisacodyl  10 mg Rectal Daily PRN Willie Washington DO      Cholecalciferol  2,000 Units Oral Daily Sung Morales MD      diphenhydrAMINE-zinc acetate   Topical TID PRN Lisa Garcia PA-C      folic acid  1 mg Oral Daily Sung Morales MD      gabapentin  100 mg Oral HS DONTE Varma      HYDROmorphone  1 mg Oral Q4H PRN DONTE Varma      Or    HYDROmorphone  2 mg Oral Q4H PRN DONTE Varma      insulin lispro  1-5 Units Subcutaneous HS Sung Morales MD      insulin lispro  1-6 Units Subcutaneous TID AC Sung Morales MD      methocarbamol  500 mg Oral Q8H [facility] Ashley Depadua, MD      metoprolol succinate  25 mg Oral HS Sung Morales MD      naloxone  0.04 mg Intravenous Q1MIN PRN DONTE Varma      nitroglycerin  0.4 mg Sublingual Q5 Min PRN Sung Morales MD      ondansetron  4 mg Oral Q6H PRN Ashley Depadua, MD      polyethylene glycol  17 g Oral Daily Sung Morales MD      pravastatin  40 mg Oral Daily With Dinner Sung Morales MD      senna-docusate sodium  2 tablet Oral Daily Sung Morales MD      tamsulosin  0.4 mg Oral Daily With Dinner Ashley Depadua, MD      warfarin  2.5 mg Oral Every Friday Ashley Depadua, MD      [START ON 11/23/2024] warfarin  5 mg Oral Once per day on Sunday Monday Tuesday Wednesday Thursday Saturday Ashley Depadua, MD         VTE Pharmacologic Prophylaxis: Coumadin  Code Status: Level 3 - DNAR and DNI  Current Length of Stay: 4 day(s)    Administrative Statements   I have spent a total time of 30 minutes in caring for this patient on the day of the visit/encounter including Diagnostic results, Prognosis, Risks and  benefits of tx options, Instructions for management, Patient and family education, Importance of tx compliance, Risk factor reductions, Impressions, Counseling / Coordination of care, Documenting in the medical record, Reviewing / ordering tests, medicine, procedures  , Obtaining or reviewing history  , and Communicating with other healthcare professionals .  ** Please Note:  voice to text software may have been used in the creation of this document. Although proof errors in transcription or interpretation are a potential of such software**

## 2024-11-22 NOTE — PROGRESS NOTES
11/22/24 1400   Pain Assessment   Pain Assessment Tool 0-10   Pain Score 6   Pain Location/Orientation Orientation: Left;Location: Knee   Restrictions/Precautions   Precautions Bed/chair alarms;Fall Risk;Pain;Supervision on toilet/commode   LLE Weight Bearing Per Order WBAT   Subjective   Subjective pt agreeable to perform skilled PT 6/10 left knee   Roll Left and Right   Type of Assistance Needed Physical assistance   Physical Assistance Level 26%-50%   Roll Left and Right CARE Score 3   Sit to Lying   Type of Assistance Needed Physical assistance   Physical Assistance Level 26%-50%   Sit to Lying CARE Score 3   Lying to Sitting on Side of Bed   Type of Assistance Needed Physical assistance   Physical Assistance Level 26%-50%   Lying to Sitting on Side of Bed CARE Score 3   Sit to Stand   Type of Assistance Needed Physical assistance   Physical Assistance Level 26%-50%   Sit to Stand CARE Score 3   Bed-Chair Transfer   Type of Assistance Needed Physical assistance   Physical Assistance Level 26%-50%   Comment RW   Chair/Bed-to-Chair Transfer CARE Score 3   Transfer Bed/Chair/Wheelchair   Limitations Noted In Pain Management;Problem Solving;LE Strength;Sequencing;Coordination;Balance   Adaptive Equipment Roller Walker   Car Transfer   Reason if not Attempted Safety concerns   Car Transfer CARE Score 88   Walk 10 Feet   Type of Assistance Needed Physical assistance   Physical Assistance Level 51%-75%   Comment RW WC follow   Walk 10 Feet CARE Score 2   Walk 50 Feet with Two Turns   Type of Assistance Needed Physical assistance   Physical Assistance Level 51%-75%   Comment RW WC FOLLOW   Walk 50 Feet with Two Turns CARE Score 2   Walk 150 Feet   Reason if not Attempted Safety concerns   Walk 150 Feet CARE Score 88   Walking 10 Feet on Uneven Surfaces   Reason if not Attempted Safety concerns   Walking 10 Feet on Uneven Surfaces CARE Score 88   Ambulation   Primary Mode of Locomotion Prior to Admission Walk    Distance Walked (feet) 56 ft   Assist Device Roller Walker   Does the patient walk? 2. Yes   Wheel 50 Feet with Two Turns   Type of Assistance Needed Physical assistance   Physical Assistance Level 76% or more   Comment Poor left hand grasp d/t PMH stroke and LE not able to touch the floor   Wheel 50 Feet with Two Turns CARE Score 2   Wheel 150 Feet   Type of Assistance Needed Physical assistance   Physical Assistance Level 76% or more   Comment Poor left hand grasp d/t PMH stroke and LE not able to touch the floor   Wheel 150 Feet CARE Score 2   Curb or Single Stair   Reason if not Attempted Safety concerns   1 Step (Curb) CARE Score 88   4 Steps   Reason if not Attempted Safety concerns   4 Steps CARE Score 88   12 Steps   Reason if not Attempted Safety concerns   12 Steps CARE Score 88   Picking Up Object   Reason if not Attempted Safety concerns   Picking Up Object CARE Score 88   Therapeutic Interventions   Strengthening seated LAQ AP gluts marching hip flexion ball add 10-20 reps x3 sets   Flexibility extension stretch manual RLE and knee flexion stretch with seated slide and dowel .   Balance standing balance with reacher for markers using RW for support .   Equipment Use   NuStep lvl 1 for 13 min to inc LLE knee flexion and extension   Assessment   Treatment Assessment pt engaged in skilled PT and focus on bed mobility, STS to RW VC for hand placement , SPT RW and VC for sequence andrew backing up to chair or bed , pt needs to comnplete the turn and step  to chair or bed before sitting . Manual stretch flexion and extension in seated . Pt will cont to work on OOB , ambulation with RW and sitting in the recliner and limited lying supine in bed . Instructed not to put pillows under his left knee keep then knee in extension . Cont POC   Barriers to Discharge Decreased caregiver support   Plan   Progress Progressing toward goals   PT Therapy Minutes   PT Time In 1400   PT Time Out 1538   PT Total Time  (minutes) 98   PT Mode of treatment - Individual (minutes) 98   PT Mode of treatment - Concurrent (minutes) 0   PT Mode of treatment - Group (minutes) 0   PT Mode of treatment - Co-treat (minutes) 0   PT Mode of Treatment - Total time(minutes) 98 minutes   PT Cumulative Minutes 378   Therapy Time missed   Time missed? No

## 2024-11-22 NOTE — PLAN OF CARE
Problem: PAIN - ADULT  Goal: Verbalizes/displays adequate comfort level or baseline comfort level  Description: Interventions:  - Encourage patient to monitor pain and request assistance  - Assess pain using appropriate pain scale  - Administer analgesics based on type and severity of pain and evaluate response  - Implement non-pharmacological measures as appropriate and evaluate response  - Consider cultural and social influences on pain and pain management  - Notify physician/advanced practitioner if interventions unsuccessful or patient reports new pain  Outcome: Progressing     Problem: INFECTION - ADULT  Goal: Absence or prevention of progression during hospitalization  Description: INTERVENTIONS:  - Assess and monitor for signs and symptoms of infection  - Monitor lab/diagnostic results  - Monitor all insertion sites, i.e. indwelling lines, tubes, and drains  - Monitor endotracheal if appropriate and nasal secretions for changes in amount and color  - [place] appropriate cooling/warming therapies per order  - Administer medications as ordered  - Instruct and encourage patient and family to use good hand hygiene technique  - Identify and instruct in appropriate isolation precautions for identified infection/condition  Outcome: Progressing     Problem: SAFETY ADULT  Goal: Patient will remain free of falls  Description: INTERVENTIONS:  - Educate patient/family on patient safety including physical limitations  - Instruct patient to call for assistance with activity   - Consult OT/PT to assist with strengthening/mobility   - Keep Call bell within reach  - Keep bed low and locked with side rails adjusted as appropriate  - Keep care items and personal belongings within reach  - Initiate and maintain comfort rounds  - Make Fall Risk Sign visible to staff  - Offer Toileting every  Hours, in advance of need  - Initiate/Maintain alarm  - Obtain necessary fall risk management equipment:   - Apply yellow socks and  bracelet for high fall risk patients  - Consider moving patient to room near nurses station  Outcome: Progressing  Goal: Maintain or return to baseline ADL function  Description: INTERVENTIONS:  -  Assess patient's ability to carry out ADLs; assess patient's baseline for ADL function and identify physical deficits which impact ability to perform ADLs (bathing, care of mouth/teeth, toileting, grooming, dressing, etc.)  - Assess/evaluate cause of self-care deficits   - Assess range of motion  - Assess patient's mobility; develop plan if impaired  - Assess patient's need for assistive devices and provide as appropriate  - Encourage maximum independence but intervene and supervise when necessary  - Involve family in performance of ADLs  - Assess for home care needs following discharge   - Consider OT consult to assist with ADL evaluation and planning for discharge  - Provide patient education as appropriate  Outcome: Progressing  Goal: Maintains/Returns to pre admission functional level  Description: INTERVENTIONS:  - Perform AM-PAC 6 Click Basic Mobility/ Daily Activity assessment daily.  - Set and communicate daily mobility goal to care team and patient/family/caregiver.   - Collaborate with rehabilitation services on mobility goals if consulted  - Perform Range of Motion  times a day.  - Reposition patient every  hours.  - Dangle patient  times a day  - Stand patient  times a day  - Ambulate patient  times a day  - Out of bed to chair  times a day   - Out of bed for meals  times a day  - Out of bed for toileting  - Record patient progress and toleration of activity level   Outcome: Progressing     Problem: DISCHARGE PLANNING  Goal: Discharge to home or other facility with appropriate resources  Description: INTERVENTIONS:  - Identify barriers to discharge w/patient and caregiver  - Arrange for needed discharge resources and transportation as appropriate  - Identify discharge learning needs (meds, wound care, etc.)  -  Arrange for interpretive services to assist at discharge as needed  - Refer to Case Management Department for coordinating discharge planning if the patient needs post-hospital services based on physician/advanced practitioner order or complex needs related to functional status, cognitive ability, or social support system  Outcome: Progressing     Problem: Prexisting or High Potential for Compromised Skin Integrity  Goal: Skin integrity is maintained or improved  Description: INTERVENTIONS:  - Identify patients at risk for skin breakdown  - Assess and monitor skin integrity  - Assess and monitor nutrition and hydration status  - Monitor labs   - Assess for incontinence   - Turn and reposition patient  - Assist with mobility/ambulation  - Relieve pressure over bony prominences  - Avoid friction and shearing  - Provide appropriate hygiene as needed including keeping skin clean and dry  - Evaluate need for skin moisturizer/barrier cream  - Collaborate with interdisciplinary team   - Patient/family teaching  - Consider wound care consult   Outcome: Progressing

## 2024-11-22 NOTE — ASSESSMENT & PLAN NOTE
Lab Results   Component Value Date    HGBA1C 7.2 (H) 10/07/2024       Recent Labs     11/21/24  1040 11/21/24  1534 11/21/24 2034 11/22/24  0640   POCGLU 176* 174* 157* 151*       Blood Sugar Average: Last 72 hrs:  (P) [id number]

## 2024-11-22 NOTE — ASSESSMENT & PLAN NOTE
Patient was recently admitted to [facility] for elective left TKA on 11/13/24, discharged with home therapy, returned to ED as he was unable to ambulate or participate with home PT.   Orthopedic evaluation appreciated   Currently admitted to [facility] for rehab  Pain control and therapy per PMR

## 2024-11-23 LAB
GLUCOSE SERPL-MCNC: 144 MG/DL (ref 65–140)
GLUCOSE SERPL-MCNC: 158 MG/DL (ref 65–140)
GLUCOSE SERPL-MCNC: 187 MG/DL (ref 65–140)
GLUCOSE SERPL-MCNC: 236 MG/DL (ref 65–140)
INR PPP: 2.63 (ref 0.85–1.19)
PROTHROMBIN TIME: 28 SECONDS (ref 12.3–15)

## 2024-11-23 PROCEDURE — 82948 REAGENT STRIP/BLOOD GLUCOSE: CPT

## 2024-11-23 PROCEDURE — 99232 SBSQ HOSP IP/OBS MODERATE 35: CPT | Performed by: PHYSICIAN ASSISTANT

## 2024-11-23 PROCEDURE — 85610 PROTHROMBIN TIME: CPT | Performed by: PHYSICIAN ASSISTANT

## 2024-11-23 RX ORDER — WARFARIN SODIUM 2.5 MG/1
2.5 TABLET ORAL
Status: DISCONTINUED | OUTPATIENT
Start: 2024-11-23 | End: 2024-11-28

## 2024-11-23 RX ADMIN — METHOCARBAMOL 500 MG: 500 TABLET ORAL at 13:56

## 2024-11-23 RX ADMIN — SENNOSIDES AND DOCUSATE SODIUM 2 TABLET: 50; 8.6 TABLET ORAL at 08:41

## 2024-11-23 RX ADMIN — ACETAMINOPHEN 975 MG: 325 TABLET, FILM COATED ORAL at 13:56

## 2024-11-23 RX ADMIN — SENNOSIDES AND DOCUSATE SODIUM 2 TABLET: 50; 8.6 TABLET ORAL at 17:34

## 2024-11-23 RX ADMIN — GABAPENTIN 100 MG: 100 CAPSULE ORAL at 21:38

## 2024-11-23 RX ADMIN — ACETAMINOPHEN 975 MG: 325 TABLET, FILM COATED ORAL at 21:38

## 2024-11-23 RX ADMIN — HYDROMORPHONE HYDROCHLORIDE 2 MG: 2 TABLET ORAL at 17:34

## 2024-11-23 RX ADMIN — OXYCODONE HYDROCHLORIDE AND ACETAMINOPHEN 500 MG: 500 TABLET ORAL at 08:39

## 2024-11-23 RX ADMIN — HYDROMORPHONE HYDROCHLORIDE 2 MG: 2 TABLET ORAL at 06:40

## 2024-11-23 RX ADMIN — INSULIN LISPRO 3 UNITS: 100 INJECTION, SOLUTION INTRAVENOUS; SUBCUTANEOUS at 11:59

## 2024-11-23 RX ADMIN — TAMSULOSIN HYDROCHLORIDE 0.4 MG: 0.4 CAPSULE ORAL at 17:34

## 2024-11-23 RX ADMIN — Medication 2000 UNITS: at 08:38

## 2024-11-23 RX ADMIN — FOLIC ACID 1 MG: 1 TABLET ORAL at 08:39

## 2024-11-23 RX ADMIN — INSULIN LISPRO 1 UNITS: 100 INJECTION, SOLUTION INTRAVENOUS; SUBCUTANEOUS at 17:36

## 2024-11-23 RX ADMIN — METHOCARBAMOL 500 MG: 500 TABLET ORAL at 21:38

## 2024-11-23 RX ADMIN — INSULIN LISPRO 1 UNITS: 100 INJECTION, SOLUTION INTRAVENOUS; SUBCUTANEOUS at 21:40

## 2024-11-23 RX ADMIN — METFORMIN HYDROCHLORIDE 500 MG: 500 TABLET ORAL at 08:40

## 2024-11-23 RX ADMIN — METHOCARBAMOL 500 MG: 500 TABLET ORAL at 06:40

## 2024-11-23 RX ADMIN — METOPROLOL SUCCINATE 25 MG: 25 TABLET, EXTENDED RELEASE ORAL at 21:38

## 2024-11-23 RX ADMIN — HYDROMORPHONE HYDROCHLORIDE 2 MG: 2 TABLET ORAL at 11:57

## 2024-11-23 RX ADMIN — WARFARIN SODIUM 2.5 MG: 2.5 TABLET ORAL at 17:34

## 2024-11-23 RX ADMIN — PRAVASTATIN SODIUM 40 MG: 40 TABLET ORAL at 17:34

## 2024-11-23 RX ADMIN — POLYETHYLENE GLYCOL 3350 17 G: 17 POWDER, FOR SOLUTION ORAL at 08:41

## 2024-11-23 RX ADMIN — HYDROMORPHONE HYDROCHLORIDE 2 MG: 2 TABLET ORAL at 02:01

## 2024-11-23 RX ADMIN — OXYCODONE HYDROCHLORIDE AND ACETAMINOPHEN 500 MG: 500 TABLET ORAL at 17:34

## 2024-11-23 RX ADMIN — ACETAMINOPHEN 975 MG: 325 TABLET, FILM COATED ORAL at 06:39

## 2024-11-23 NOTE — PROGRESS NOTES
Progress Note - Hospitalist   Name: Isauro Sow 79 y.o. male I MRN: [id number]  Unit/Bed#: -01 I Date of Admission: 11/18/2024   Date of Service: 11/23/2024 I Hospital Day: 5    Assessment & Plan  Status post total left knee replacement using cement  Patient was recently admitted to [facility] for elective left TKA on 11/13/24, discharged with home therapy, returned to ED as he was unable to ambulate or participate with home PT.   Orthopedic evaluation appreciated   Currently admitted to [facility] for rehab  Pain control and therapy per PMR  Benign essential hypertension  Blood pressure acceptable  Continue with home dose metoprolol  Type 2 diabetes mellitus (HCC)  Lab Results   Component Value Date    HGBA1C 7.2 (H) 10/07/2024       Recent Labs     11/22/24  1638 11/22/24  2126 11/23/24  0647 11/23/24  1041   POCGLU 164* 215* 144* 236*       Blood Sugar Average: Last 72 hrs:  (P) [id number]  Home: metformin 1000mg daily.  Here: metformin 500mg 2x daily - restarted 11/22/24  Blood sugars elevated.  Continue accuchecks and SSI.  Carb controlled diet   Chronic atrial flutter (HCC)  Preoperative cardiology consultation recommended Lovenox to Coumadin bridge  INR 2.63. Trending up  Reduce Coumadin to 2.5mg daily through the weekend. Repeat INR Monday.  Goal INR 2-3   Continue metoprolol for rate control  Stage 3a chronic kidney disease (HCC)  Lab Results   Component Value Date    EGFR 67 11/19/2024    EGFR 65 11/18/2024    EGFR 64 11/16/2024    CREATININE 1.04 11/19/2024    CREATININE 1.07 11/18/2024    CREATININE 1.09 11/16/2024     Renal function at baseline  Avoid nephrotoxins  History of stroke  Continue statin and coumadin  S/P TAVR (transcatheter aortic valve replacement)  History of TAVR  noted   Followed by cardiology and CTS as outpatient    The above assessment and plan was reviewed and updated as determined by my evaluation of the patient on 11/23/2024.    History of Present Illness    Patient seen and examined. Patients overnight issues or events were reviewed with nursing staff. New or overnight issues include the following:     Pt seen in his room. Pain is his biggest complaint. He does feel as if he is making progress in therapy but does not have any therapy today. He denies any other complaints.    A 10 point review of systems was negative except for what is noted in the HPI.    Objective :  Temp:  [98.1 °F (36.7 °C)-98.6 °F (37 °C)] 98.6 °F (37 °C)  HR:  [66-69] 66  BP: (132-140)/(59-63) 132/59  Resp:  [16-18] 16  SpO2:  [96 %] 96 %  O2 Device: None (Room air)    Invasive Devices       None                   Physical Exam  General Appearance: NAD; pleasant  HEENT: PERRLA, conjuctiva normal; mucous membranes moist; face symmetrical  Neck:  Supple  Lungs: clear bilaterally, normal respiratory effort, no retractions, expiratory effort normal, on room air  CV: regular rate and rhythm, no murmurs rubs or gallops noted   ABD: soft non tender, +BS x4  EXT: DP pulses intact, no lymphadenopathy, no edema  Skin: normal turgor, normal texture, no rash  Psych: affect normal, mood normal  Neuro: AAOx3    The above physical exam was reviewed and updated as determined by my evaluation of the patient on 11/23/2024.      Lab Results: I have reviewed the following results:  Results from last 7 days   Lab Units 11/19/24  0609 11/18/24  0453   WBC Thousand/uL 7.49 7.19   HEMOGLOBIN g/dL 9.6* 10.4*   HEMATOCRIT % 30.5* 31.1*   PLATELETS Thousands/uL 159 136*     Results from last 7 days   Lab Units 11/19/24  0609 11/18/24  0511   SODIUM mmol/L 140 138   POTASSIUM mmol/L 4.1 3.9   CHLORIDE mmol/L 102 99   CO2 mmol/L 30 30   BUN mg/dL 22 19   CREATININE mg/dL 1.04 1.07   CALCIUM mg/dL 8.9 9.0         Results from last 7 days   Lab Units 11/22/24  0632 11/21/24  0524   INR  2.63* 2.44*     Results from last 7 days   Lab Units 11/23/24  1041 11/23/24  0647 11/22/24  2126   POC GLUCOSE mg/dl 236* 144* 215*        Imaging Results Review: No pertinent imaging studies reviewed.  Other Study Results Review: No additional pertinent studies reviewed.    Review of Scheduled Meds: Medications  reviewed and reconciled as needed  Current Facility-Administered Medications   Medication Dose Route Frequency Provider Last Rate    acetaminophen  975 mg Oral Q8H On license of [facility] Sung Morales MD      ascorbic acid  500 mg Oral BID Sung Morales MD      bisacodyl  10 mg Rectal Daily PRN Willie Washington DO      Cholecalciferol  2,000 Units Oral Daily Sung Morales MD      diphenhydrAMINE-zinc acetate   Topical TID PRN Lisa Garcia PA-C      folic acid  1 mg Oral Daily Sung Morales MD      gabapentin  100 mg Oral HS DONTE Varma      HYDROmorphone  1 mg Oral Q4H PRN DONTE Varma      Or    HYDROmorphone  2 mg Oral Q4H PRN DONTE Varma      insulin lispro  1-5 Units Subcutaneous HS Sung Morales MD      insulin lispro  1-6 Units Subcutaneous TID AC Sung Morales MD      metFORMIN  500 mg Oral Daily With Breakfast Christie Taylor PA-C      methocarbamol  500 mg Oral Q8H On license of [facility] Ashley Depadua, MD      metoprolol succinate  25 mg Oral HS Sung Morales MD      naloxone  0.04 mg Intravenous Q1MIN PRN DONTE Varma      nitroglycerin  0.4 mg Sublingual Q5 Min PRN Sung Morales MD      ondansetron  4 mg Oral Q6H PRN Ashley Depadua, MD      polyethylene glycol  17 g Oral Daily Sung Morales MD      pravastatin  40 mg Oral Daily With Dinner Sung Morales MD      senna-docusate sodium  2 tablet Oral BID Ashley Depadua, MD      tamsulosin  0.4 mg Oral Daily With Dinner Ashley Depadua, MD      warfarin  2.5 mg Oral Every Friday Ashley Depadua, MD      warfarin  5 mg Oral Once per day on Sunday Monday Tuesday Wednesday Thursday Saturday Ashley Depadua, MD         VTE Pharmacologic Prophylaxis: Coumadin  Code Status: Level 3 - DNAR and DNI  Current Length of Stay: 5 day(s)    Administrative Statements   I have spent  a total time of 30 minutes in caring for this patient on the day of the visit/encounter including Prognosis, Risks and benefits of tx options, Instructions for management, Patient and family education, Importance of tx compliance, Risk factor reductions, Impressions, Counseling / Coordination of care, Documenting in the medical record, Reviewing / ordering tests, medicine, procedures  , Obtaining or reviewing history  , and Communicating with other healthcare professionals .  ** Please Note:  voice to text software may have been used in the creation of this document. Although proof errors in transcription or interpretation are a potential of such software**

## 2024-11-23 NOTE — ASSESSMENT & PLAN NOTE
Lab Results   Component Value Date    HGBA1C 7.2 (H) 10/07/2024       Recent Labs     11/22/24  1638 11/22/24  2126 11/23/24  0647 11/23/24  1041   POCGLU 164* 215* 144* 236*       Blood Sugar Average: Last 72 hrs:  (P) [id number]  Home: metformin 1000mg daily.  Here: metformin 500mg 2x daily - restarted 11/22/24  Blood sugars elevated.  Continue accuchecks and SSI.  Carb controlled diet

## 2024-11-23 NOTE — ASSESSMENT & PLAN NOTE
Blood pressure acceptable  Continue with home dose metoprolol   [Follow-Up] : a follow-up evaluation of

## 2024-11-23 NOTE — PLAN OF CARE
Problem: PAIN - ADULT  Goal: Verbalizes/displays adequate comfort level or baseline comfort level  Description: Interventions:  - Encourage patient to monitor pain and request assistance  - Assess pain using appropriate pain scale  - Administer analgesics based on type and severity of pain and evaluate response  - Implement non-pharmacological measures as appropriate and evaluate response  - Consider cultural and social influences on pain and pain management  - Notify physician/advanced practitioner if interventions unsuccessful or patient reports new pain  Outcome: Progressing     Problem: INFECTION - ADULT  Goal: Absence or prevention of progression during hospitalization  Description: INTERVENTIONS:  - Assess and monitor for signs and symptoms of infection  - Monitor lab/diagnostic results  - Monitor all insertion sites, i.e. indwelling lines, tubes, and drains  - Monitor endotracheal if appropriate and nasal secretions for changes in amount and color  - [place] appropriate cooling/warming therapies per order  - Administer medications as ordered  - Instruct and encourage patient and family to use good hand hygiene technique  - Identify and instruct in appropriate isolation precautions for identified infection/condition  Outcome: Progressing     Problem: SAFETY ADULT  Goal: Patient will remain free of falls  Description: INTERVENTIONS:  - Educate patient/family on patient safety including physical limitations  - Instruct patient to call for assistance with activity   - Consult OT/PT to assist with strengthening/mobility   - Keep Call bell within reach  - Keep bed low and locked with side rails adjusted as appropriate  - Keep care items and personal belongings within reach  - Initiate and maintain comfort rounds  - Make Fall Risk Sign visible to staff  - Offer Toileting every  Hours, in advance of need  - Initiate/Maintain alarm  - Obtain necessary fall risk management equipment:   - Apply yellow socks and  bracelet for high fall risk patients  - Consider moving patient to room near nurses station  Outcome: Progressing  Goal: Maintain or return to baseline ADL function  Description: INTERVENTIONS:  -  Assess patient's ability to carry out ADLs; assess patient's baseline for ADL function and identify physical deficits which impact ability to perform ADLs (bathing, care of mouth/teeth, toileting, grooming, dressing, etc.)  - Assess/evaluate cause of self-care deficits   - Assess range of motion  - Assess patient's mobility; develop plan if impaired  - Assess patient's need for assistive devices and provide as appropriate  - Encourage maximum independence but intervene and supervise when necessary  - Involve family in performance of ADLs  - Assess for home care needs following discharge   - Consider OT consult to assist with ADL evaluation and planning for discharge  - Provide patient education as appropriate  Outcome: Progressing  Goal: Maintains/Returns to pre admission functional level  Description: INTERVENTIONS:  - Perform AM-PAC 6 Click Basic Mobility/ Daily Activity assessment daily.  - Set and communicate daily mobility goal to care team and patient/family/caregiver.   - Collaborate with rehabilitation services on mobility goals if consulted  - Perform Range of Motion  times a day.  - Reposition patient every  hours.  - Dangle patient  times a day  - Stand patient  times a day  - Ambulate patient  times a day  - Out of bed to chair  times a day   - Out of bed for meals  times a day  - Out of bed for toileting  - Record patient progress and toleration of activity level   Outcome: Progressing     Problem: DISCHARGE PLANNING  Goal: Discharge to home or other facility with appropriate resources  Description: INTERVENTIONS:  - Identify barriers to discharge w/patient and caregiver  - Arrange for needed discharge resources and transportation as appropriate  - Identify discharge learning needs (meds, wound care, etc.)  -  Arrange for interpretive services to assist at discharge as needed  - Refer to Case Management Department for coordinating discharge planning if the patient needs post-hospital services based on physician/advanced practitioner order or complex needs related to functional status, cognitive ability, or social support system  Outcome: Progressing     Problem: Prexisting or High Potential for Compromised Skin Integrity  Goal: Skin integrity is maintained or improved  Description: INTERVENTIONS:  - Identify patients at risk for skin breakdown  - Assess and monitor skin integrity  - Assess and monitor nutrition and hydration status  - Monitor labs   - Assess for incontinence   - Turn and reposition patient  - Assist with mobility/ambulation  - Relieve pressure over bony prominences  - Avoid friction and shearing  - Provide appropriate hygiene as needed including keeping skin clean and dry  - Evaluate need for skin moisturizer/barrier cream  - Collaborate with interdisciplinary team   - Patient/family teaching  - Consider wound care consult   Outcome: Progressing

## 2024-11-23 NOTE — ASSESSMENT & PLAN NOTE
Patient was recently admitted to [facility] for elective left TKA on 11/13/24, discharged with home therapy, returned to ED as he was unable to ambulate or participate with home PT.   Orthopedic evaluation appreciated   Currently admitted to [facility] for rehab  Pain control and therapy per PMR

## 2024-11-23 NOTE — ASSESSMENT & PLAN NOTE
Preoperative cardiology consultation recommended Lovenox to Coumadin bridge  INR 2.63. Trending up  Reduce Coumadin to 2.5mg daily through the weekend. Repeat INR Monday.  Goal INR 2-3   Continue metoprolol for rate control

## 2024-11-24 LAB
GLUCOSE SERPL-MCNC: 163 MG/DL (ref 65–140)
GLUCOSE SERPL-MCNC: 168 MG/DL (ref 65–140)
GLUCOSE SERPL-MCNC: 174 MG/DL (ref 65–140)
GLUCOSE SERPL-MCNC: 177 MG/DL (ref 65–140)

## 2024-11-24 PROCEDURE — 97530 THERAPEUTIC ACTIVITIES: CPT

## 2024-11-24 PROCEDURE — 97110 THERAPEUTIC EXERCISES: CPT

## 2024-11-24 PROCEDURE — 82948 REAGENT STRIP/BLOOD GLUCOSE: CPT

## 2024-11-24 PROCEDURE — 99232 SBSQ HOSP IP/OBS MODERATE 35: CPT | Performed by: NURSE PRACTITIONER

## 2024-11-24 PROCEDURE — 97535 SELF CARE MNGMENT TRAINING: CPT

## 2024-11-24 RX ADMIN — METFORMIN HYDROCHLORIDE 500 MG: 500 TABLET ORAL at 08:40

## 2024-11-24 RX ADMIN — Medication 2000 UNITS: at 08:37

## 2024-11-24 RX ADMIN — TAMSULOSIN HYDROCHLORIDE 0.4 MG: 0.4 CAPSULE ORAL at 17:09

## 2024-11-24 RX ADMIN — OXYCODONE HYDROCHLORIDE AND ACETAMINOPHEN 500 MG: 500 TABLET ORAL at 08:36

## 2024-11-24 RX ADMIN — SENNOSIDES AND DOCUSATE SODIUM 2 TABLET: 50; 8.6 TABLET ORAL at 17:09

## 2024-11-24 RX ADMIN — METHOCARBAMOL 500 MG: 500 TABLET ORAL at 13:56

## 2024-11-24 RX ADMIN — INSULIN LISPRO 1 UNITS: 100 INJECTION, SOLUTION INTRAVENOUS; SUBCUTANEOUS at 21:56

## 2024-11-24 RX ADMIN — HYDROMORPHONE HYDROCHLORIDE 2 MG: 2 TABLET ORAL at 05:40

## 2024-11-24 RX ADMIN — ONDANSETRON 4 MG: 4 TABLET, ORALLY DISINTEGRATING ORAL at 14:27

## 2024-11-24 RX ADMIN — WARFARIN SODIUM 2.5 MG: 2.5 TABLET ORAL at 17:09

## 2024-11-24 RX ADMIN — FOLIC ACID 1 MG: 1 TABLET ORAL at 08:37

## 2024-11-24 RX ADMIN — ACETAMINOPHEN 975 MG: 325 TABLET, FILM COATED ORAL at 21:55

## 2024-11-24 RX ADMIN — INSULIN LISPRO 1 UNITS: 100 INJECTION, SOLUTION INTRAVENOUS; SUBCUTANEOUS at 17:08

## 2024-11-24 RX ADMIN — PRAVASTATIN SODIUM 40 MG: 40 TABLET ORAL at 17:09

## 2024-11-24 RX ADMIN — HYDROMORPHONE HYDROCHLORIDE 2 MG: 2 TABLET ORAL at 11:23

## 2024-11-24 RX ADMIN — OXYCODONE HYDROCHLORIDE AND ACETAMINOPHEN 500 MG: 500 TABLET ORAL at 17:09

## 2024-11-24 RX ADMIN — METHOCARBAMOL 500 MG: 500 TABLET ORAL at 05:41

## 2024-11-24 RX ADMIN — ACETAMINOPHEN 975 MG: 325 TABLET, FILM COATED ORAL at 13:56

## 2024-11-24 RX ADMIN — INSULIN LISPRO 1 UNITS: 100 INJECTION, SOLUTION INTRAVENOUS; SUBCUTANEOUS at 08:50

## 2024-11-24 RX ADMIN — GABAPENTIN 100 MG: 100 CAPSULE ORAL at 21:55

## 2024-11-24 RX ADMIN — ACETAMINOPHEN 975 MG: 325 TABLET, FILM COATED ORAL at 05:41

## 2024-11-24 RX ADMIN — METHOCARBAMOL 500 MG: 500 TABLET ORAL at 21:55

## 2024-11-24 RX ADMIN — INSULIN LISPRO 1 UNITS: 100 INJECTION, SOLUTION INTRAVENOUS; SUBCUTANEOUS at 12:12

## 2024-11-24 RX ADMIN — METOPROLOL SUCCINATE 25 MG: 25 TABLET, EXTENDED RELEASE ORAL at 21:55

## 2024-11-24 NOTE — PROGRESS NOTES
11/24/24 0833   Pain Assessment   Pain Assessment Tool 0-10   Pain Score 7   Pain Location/Orientation Orientation: Right;Location: Knee   Restrictions/Precautions   Precautions Fall Risk;Pain;Supervision on toilet/commode   Weight Bearing Restrictions Yes   LLE Weight Bearing Per Order WBAT   ROM Restrictions No   Cognition   Overall Cognitive Status WFL   Arousal/Participation Alert;Cooperative   Attention Attends with cues to redirect   Orientation Level Oriented X4   Memory Within functional limits   Following Commands Follows one step commands with increased time or repetition   Lying to Sitting on Side of Bed   Type of Assistance Needed Physical assistance   Physical Assistance Level 51%-75%   Comment ModA for LLE and trunk management. Frequent VCs for hand placement and to not pull on therapist with transfers. Poor carryover noted as pt repeatedly pulled on therapist for support immediately after being told not to do so. Used leg .   Lying to Sitting on Side of Bed CARE Score 2   Sit to Stand   Type of Assistance Needed Physical assistance;Adaptive equipment   Physical Assistance Level 26%-50%   Comment [place] w/ RW, VCs for hand placement and sequencing.   Sit to Stand CARE Score 3   Bed-Chair Transfer   Type of Assistance Needed Physical assistance;Adaptive equipment   Physical Assistance Level 26%-50%   Comment [place] w/ RW, VCs for hand placement and sequencing. Increased difficulty noted with backing up into chair.   Chair/Bed-to-Chair Transfer CARE Score 3   Walk 10 Feet   Type of Assistance Needed Physical assistance;Adaptive equipment   Physical Assistance Level Total assistance   Comment ModA w/ RW and chair follow.   Walk 10 Feet CARE Score 1   Ambulation   Primary Mode of Locomotion Prior to Admission Walk   Distance Walked (feet) 10 ft   Assist Device Roller Walker   Gait Pattern Inconsistant Margarita;Slow Margarita;Decreased foot clearance;Step to;Step through;Decreased L stance;Improper weight  shift   Limitations Noted In Balance;Device Management;Endurance;Heel Strike;Posture;Sequencing;Speed;Strength;Swing   Provided Assistance with: Balance;Trunk Support;Weight Shift   Walk Assist Level Moderate Assist;Chair Follow   Findings ModA w/ RW. Assistance for trunk stability and balance. VCs to straighten out L LE/engage quad and to step closer to the walker. Second person brought WC behind for pt safety. Can progress from WC follow once pt demonstrates he can consistently ambulate 10+ feet. Distance greatly limited by pain, pt is also self-limiting.   Does the patient walk? 2. Yes   Therapeutic Interventions   Strengthening Ball squeezes: 2x10 w/ 5sec hold   Flexibility Manual PROM LLE: flexion/extension; AAROM L LE: flexion/extension using slide board on top of two weighted bars   Assessment   Treatment Assessment Pt completed 60-minute skilled PT session focused on ambulation, L knee flexion/extension ROM, and LE strengthening. Upon entering pt's room, therapist noticed residual bowel on rear from previous BM in bedpan last night. After aiding in rear hygiene, therapist educated pt on prioritizing using BSC as this is the plan for D/C home. Pt primary complaint is pain level which limits his activity tolerance and functional mobility progression. Without assist for all functional mobility, pt is at risk of falls. Pt requires frequent VCs for hand placement and to not pull on therapist during transfers with poor carryover, as pt repeatedly grasped at PT hands/arms for support immediately after being told not to do so. Pt will continue to benefit from skilled therapy for greater ease with ambulation, functional transfers, bed mobility, stair navigation and L LE ROM/strengthening for improved functional mobility, decreased caregiver burden and greater ease with community accessibility. For next session, focus on ambulation to further improve activity tolerance and increase weight bearing on LLE.   Problem  List Decreased strength;Decreased range of motion;Decreased endurance;Impaired balance;Decreased mobility;Pain   Barriers to Discharge Decreased caregiver support   Plan   Treatment/Interventions Functional transfer training;LE strengthening/ROM;Elevations;Therapeutic exercise;Endurance training;Cognitive reorientation;Patient/family training;Equipment eval/education;Bed mobility;Gait training   Progress Slow progress, decreased activity tolerance   PT Therapy Minutes   PT Time In 0830   PT Time Out 0930   PT Total Time (minutes) 60   PT Mode of treatment - Individual (minutes) 60   PT Mode of treatment - Concurrent (minutes) 0   PT Mode of treatment - Group (minutes) 0   PT Mode of treatment - Co-treat (minutes) 0   PT Mode of Treatment - Total time(minutes) 60 minutes   PT Cumulative Minutes 438     Kaylee Vidal, SPT

## 2024-11-24 NOTE — PROGRESS NOTES
11/24/24 1030   Pain Assessment   Pain Assessment Tool 0-10   Pain Score 8   Pain Location/Orientation Orientation: Right;Location: Knee   Restrictions/Precautions   Precautions Fall Risk;Pain;Supervision on toilet/commode   Weight Bearing Restrictions Yes   LLE Weight Bearing Per Order WBAT   ROM Restrictions No   Cognition   Overall Cognitive Status WFL   Arousal/Participation Alert;Cooperative   Attention Attends with cues to redirect   Orientation Level Oriented X4   Memory Within functional limits   Following Commands Follows one step commands with increased time or repetition   Sit to Lying   Type of Assistance Needed Physical assistance   Physical Assistance Level 51%-75%   Comment ModA for LLE and trunk management. Frequent VCs for hand placement and to not pull on therapist with transfers. Poor carryover noted as pt repeatedly pulled on therapist for support immediately after being told not to do so. Used leg .   Sit to Lying CARE Score 2   Lying to Sitting on Side of Bed   Type of Assistance Needed Physical assistance   Physical Assistance Level 51%-75%   Comment ModA for LLE and trunk management. Frequent VCs for hand placement and to not pull on therapist with transfers. Poor carryover noted as pt repeatedly pulled on therapist for support immediately after being told not to do so. Used leg .   Lying to Sitting on Side of Bed CARE Score 2   Sit to Stand   Type of Assistance Needed Physical assistance;Adaptive equipment   Physical Assistance Level 26%-50%   Comment Roxane w/ RW, VCs for hand placement and sequencing.   Sit to Stand CARE Score 3   Bed-Chair Transfer   Type of Assistance Needed Physical assistance;Adaptive equipment   Physical Assistance Level 26%-50%   Comment Roxane w/ RW, VCs for hand placement and sequencing. Increased difficulty noted with backing into chair.   Chair/Bed-to-Chair Transfer CARE Score 3   Walk 10 Feet   Type of Assistance Needed Physical assistance;Adaptive  equipment   Physical Assistance Level Total assistance   Comment ModA w/ RW and chair follow.   Walk 10 Feet CARE Score 1   Ambulation   Primary Mode of Locomotion Prior to Admission Walk   Distance Walked (feet) 15 ft   Assist Device Roller Walker   Gait Pattern Inconsistant Margarita;Slow Margarita;Decreased foot clearance;Step to;Step through;Decreased L stance;Improper weight shift   Limitations Noted In Balance;Device Management;Endurance;Heel Strike;Posture;Sequencing;Speed;Strength;Swing   Provided Assistance with: Balance;Trunk Support;Weight Shift   Walk Assist Level Moderate Assist;Chair Follow   Findings ModA w/ RW. Assistance for trunk stability and balance. VCs to straighten out L LE/engage quad and to step closer to the walker. Second person brought WC behind for pt safety. Can progress from WC follow once pt demonstrates he can consistently ambulate 10+ feet. Distance greatly limited by pain, pt is also self-limiting.   Does the patient walk? 2. Yes   Therapeutic Interventions   Strengthening Quad sets: L 2x10 w/ 5sec hold, AAROM SAQ L: 2x10, AAROM heel slides L: 2x10 using leg    Assessment   Treatment Assessment Pt completed 60-minute skilled PT session focused on ambulation, functional transfers and LLE strengthening/ROM. Pt primary complaint is pain level which limits his activity tolerance and functional mobility progression. Without assist for all functional mobility, pt is at risk of falls. Pt requires frequent VCs for hand placement and to not pull on therapist during transfers with poor carryover, as pt repeatedly grasped at PT hands/arms for support immediately after being told not to do so. Pt will continue to benefit from skilled therapy for greater ease with ambulation, functional transfers, bed mobility, stair navigation and L LE ROM/strengthening for improved functional mobility, decreased caregiver burden and greater ease with community accessibility. For next session, focus on  ambulation to further improve activity tolerance and increase weight bearing on LLE.   Problem List Decreased strength;Decreased range of motion;Decreased endurance;Impaired balance;Decreased mobility;Pain   Barriers to Discharge Decreased caregiver support   Plan   Treatment/Interventions Functional transfer training;LE strengthening/ROM;Elevations;Therapeutic exercise;Endurance training;Cognitive reorientation;Patient/family training;Equipment eval/education;Bed mobility;Gait training   Progress Slow progress, decreased activity tolerance   PT Therapy Minutes   PT Time In 1030   PT Time Out 1130   PT Total Time (minutes) 60   PT Mode of treatment - Individual (minutes) 60   PT Mode of treatment - Concurrent (minutes) 0   PT Mode of treatment - Group (minutes) 0   PT Mode of treatment - Co-treat (minutes) 0   PT Mode of Treatment - Total time(minutes) 60 minutes   PT Cumulative Minutes 438     Kaylee Vidal, SPT

## 2024-11-24 NOTE — ASSESSMENT & PLAN NOTE
Preoperative cardiology consultation recommended Lovenox to Coumadin bridge  INR 2.63. Trending up  Reduced Coumadin to 2.5mg daily through the weekend. Repeat INR Monday.  Goal INR 2-3   Continue Toprol for rate control

## 2024-11-24 NOTE — PROGRESS NOTES
"   11/24/24 1196   Pain Assessment   Pain Assessment Tool 0-10   Pain Score 8   Pain Location/Orientation Orientation: Left;Orientation: Lower;Location: Leg   Pain Onset/Description Onset: Ongoing   Effect of Pain on Daily Activities affects participation with ADLs/functional transfers   Hospital Pain Intervention(s) Rest;Emotional support;Repositioned   Multiple Pain Sites No   Restrictions/Precautions   Precautions Fall Risk;Pain;Supervision on toilet/commode   Weight Bearing Restrictions Yes   LLE Weight Bearing Per Order WBAT   ROM Restrictions No   Lifestyle   Autonomy \"I'm tired. I had two hours of PT.\"   Lower Body Dressing   Type of Assistance Needed Physical assistance   Physical Assistance Level 25% or less   Comment Seated EOB, min A to thread pants over LLE and S to doff off feet. Min A in stance for support with RW for CM over hips. Increased time required.   Lower Body Dressing CARE Score 3   Putting On/Taking Off Footwear   Type of Assistance Needed Physical assistance   Physical Assistance Level 26%-50%   Comment Seated EOB utilizing LHAE CS with cues for optimal technique to don/doff hospital socks. Therapist educated and demo'ed use of dressing stick vs. reacher to doff socks - pt with increased ability to complete independently with dressing stick. Pt able to don R shoe S seated and requires mod A to initiate donning L shoe utilizing leg  to fully don. Pt can kick off B shoes S. Pt reports he does not have sneaker socks here, only hospital socks at this time. Pt reports he has a pair of laceless sketchers at home he could wear.   Putting On/Taking Off Footwear CARE Score 3   Sit to Lying   Type of Assistance Needed Physical assistance   Physical Assistance Level 26%-50%   Comment LLE management, utilizes leg    Sit to Lying CARE Score 3   Lying to Sitting on Side of Bed   Type of Assistance Needed Physical assistance   Physical Assistance Level 26%-50%   Comment LLE management, " utilizes leg    Lying to Sitting on Side of Bed CARE Score 3   Sit to Stand   Type of Assistance Needed Physical assistance   Physical Assistance Level 26%-50%   Comment RW   Sit to Stand CARE Score 3   Toileting Hygiene   Type of Assistance Needed Physical assistance   Physical Assistance Level 25% or less   Comment min A for support with RW while pt manages CM and urinal   Toileting Hygiene CARE Score 3   Cognition   Overall Cognitive Status WFL   Arousal/Participation Alert;Cooperative   Attention Attends with cues to redirect   Memory Within functional limits   Following Commands Follows one step commands without difficulty   Activity Tolerance   Activity Tolerance Patient tolerated treatment well   Medical Staff Made Aware NATALIO Christian made aware of pt's pain level at beginning of session and pt receives pain meds. NATALIO Christian to reapply heal protectors end of session. NATALIO Christian made aware pt feeling somewhat nauseous end of session when pt left with all needs met lying in bed with basin provided.   Assessment   Treatment Assessment Patient engaged in 60 min treatment session with focus on bed mobility, ADL retraining utilizing LHAE, and STS transfers with RW. Pt with lower LLE pain on this date - NATALIO Christian aware and pt receives more pain meds. Upon arrival, pt reports fatigue from 2 hours of previous PT, pt willing to participate however d/t fatigue and pain increased time required to utilize LHAE and complete ADL tasks. Overall mod A for footwear and min A for LB dressing utilizing LHAE and RW.  Continue OT plan of care with focus on ADL retraining, functional transfers, standing balance/tolerance, and overall activity tolerance.   Prognosis Good   Problem List Decreased strength;Decreased range of motion;Decreased endurance;Impaired balance;Decreased mobility;Pain   Barriers to Discharge Decreased caregiver support   Plan   Treatment/Interventions ADL retraining;Functional transfer training;Therapeutic  exercise;Endurance training;Patient/family training;Equipment eval/education;Bed mobility;Compensatory technique education;Spoke to nursing   Progress Progressing toward goals   Discharge Recommendation   Rehab Resource Intensity Level, OT   (pending)   OT Therapy Minutes   OT Time In 1330   OT Time Out 1430   OT Total Time (minutes) 60   OT Mode of treatment - Individual (minutes) 60   OT Mode of treatment - Concurrent (minutes) 0   OT Mode of treatment - Group (minutes) 0   OT Mode of treatment - Co-treat (minutes) 0   OT Mode of Treatment - Total time(minutes) 60 minutes   OT Cumulative Minutes 420   Therapy Time missed   Time missed? No

## 2024-11-24 NOTE — PROGRESS NOTES
Progress Note - Hospitalist   Name: Isauro Sow 79 y.o. male I MRN: [id number]  Unit/Bed#: -01 I Date of Admission: 11/18/2024   Date of Service: 11/24/2024 I Hospital Day: 6    Assessment & Plan  Status post total left knee replacement using cement  Patient was recently admitted to [facility] for elective left TKA on 11/13/24, discharged with home therapy, returned to ED as he was unable to ambulate or participate with home PT.   Orthopedic evaluation appreciated   Currently admitted to [facility] for rehab  Pain control and therapy per PMR  Benign essential hypertension  Blood pressure acceptable  Continue with home dose Toprol 50mg qhs  Type 2 diabetes mellitus (HCC)  Lab Results   Component Value Date    HGBA1C 7.2 (H) 10/07/2024     Home: Metformin 1000mg daily.  Here: Metformin 500mg 2x daily - restarted 11/22/24  Continue QID Accuchecks/SSI and DM diet.  No changes today  Chronic atrial flutter (HCC)  Preoperative cardiology consultation recommended Lovenox to Coumadin bridge  INR 2.63. Trending up  Reduced Coumadin to 2.5mg daily through the weekend. Repeat INR Monday.  Goal INR 2-3   Continue Toprol for rate control  Stage 3a chronic kidney disease (HCC)  Renal function at baseline  Avoid nephrotoxins  History of stroke  Continue statin and coumadin  S/P TAVR (transcatheter aortic valve replacement)  Followed by cardiology and CTS as outpatient    The above assessment and plan was reviewed and updated as determined by my evaluation of the patient on 11/24/2024.    History of Present Illness   Patient seen and examined. Patients overnight issues or events were reviewed with nursing staff. New or overnight issues include the following:   No new or overnight issues.  Offers no complaints.  Still having pain but pain meds are helping.  He feels he has made progress since admission to the hospital    Review of Systems   All other systems reviewed and are negative.      Objective :  Temp:  [97.9 °F  (36.6 °C)-98.4 °F (36.9 °C)] 98.1 °F (36.7 °C)  HR:  [60-66] 60  BP: (128-185)/(63-76) 144/67  Resp:  [16-17] 17  SpO2:  [96 %-97 %] 96 %  O2 Device: None (Room air)    Invasive Devices       None                   Physical Exam  General Appearance: no distress, nontoxic appearing  HEENT:  External ear normal.  Nose normal w/o drainage. Mucous membranes are moist. Oropharynx is clear. Conjunctiva clear w/o icterus or redness.  Neck:  Supple, normal ROM  Lungs: BBS without crackles/wheeze/rhonchi; respirations unlabored with normal inspiratory/expiratory effort.  No retractions noted.  On RA  CV: regular rate and rhythm; no rubs/murmurs/gallops, PMI normal   ABD: Abdomen is soft.  Bowel sounds all quadrants.  Nontender with no distention.    EXT: no edema  Skin: normal turgor, normal texture  Psych: affect normal, mood normal  Neuro: AAO      The above physical exam was reviewed and updated as determined by my evaluation of the patient on 11/24/2024.      Lab Results: I have reviewed the following results:  Results from last 7 days   Lab Units 11/19/24  0609 11/18/24  0453   WBC Thousand/uL 7.49 7.19   HEMOGLOBIN g/dL 9.6* 10.4*   HEMATOCRIT % 30.5* 31.1*   PLATELETS Thousands/uL 159 136*     Results from last 7 days   Lab Units 11/19/24  0609 11/18/24  0511   SODIUM mmol/L 140 138   POTASSIUM mmol/L 4.1 3.9   CHLORIDE mmol/L 102 99   CO2 mmol/L 30 30   BUN mg/dL 22 19   CREATININE mg/dL 1.04 1.07   CALCIUM mg/dL 8.9 9.0         Results from last 7 days   Lab Units 11/23/24  1513 11/22/24  0632   INR  2.63* 2.63*     Results from last 7 days   Lab Units 11/24/24  1138 11/24/24  0543 11/23/24  2137   POC GLUCOSE mg/dl 174* 163* 187*       Imaging Results Review: No pertinent imaging studies reviewed.  Other Study Results Review: No additional pertinent studies reviewed.    Review of Scheduled Meds: Medications  reviewed and reconciled as needed  Current Facility-Administered Medications   Medication Dose Route  Frequency Provider Last Rate    acetaminophen  975 mg Oral Q8H MOON Morales MD      ascorbic acid  500 mg Oral BID Sung Morales MD      bisacodyl  10 mg Rectal Daily PRN Willie Washington DO      Cholecalciferol  2,000 Units Oral Daily Sung Morales MD      diphenhydrAMINE-zinc acetate   Topical TID PRN Lisa Garcia PA-C      folic acid  1 mg Oral Daily Sung Morales MD      gabapentin  100 mg Oral HS DONTE Varma      HYDROmorphone  1 mg Oral Q4H PRN DONTE Varma      Or    HYDROmorphone  2 mg Oral Q4H PRN DONTE Varma      insulin lispro  1-5 Units Subcutaneous HS Sung Morales MD      insulin lispro  1-6 Units Subcutaneous TID AC Sung Morales MD      metFORMIN  500 mg Oral Daily With Breakfast Christie Taylor PA-C      methocarbamol  500 mg Oral Q8H [facility] Ashley Depadua, MD      metoprolol succinate  25 mg Oral HS Sung Morales MD      naloxone  0.04 mg Intravenous Q1MIN PRN DONTE Varma      nitroglycerin  0.4 mg Sublingual Q5 Min PRN Sung Morales MD      ondansetron  4 mg Oral Q6H PRN Ashley Depadua, MD      polyethylene glycol  17 g Oral Daily Sung Morales MD      pravastatin  40 mg Oral Daily With Dinner Sung Morales MD      senna-docusate sodium  2 tablet Oral BID Ashley Depadua, MD      tamsulosin  0.4 mg Oral Daily With Dinner Ashley Depadua, MD      warfarin  2.5 mg Oral Daily (warfarin) Christie Taylor PA-C         VTE Pharmacologic Prophylaxis: Coumadin  Code Status: Level 3 - DNAR and DNI  Current Length of Stay: 6 day(s)    Administrative Statements     ** Please Note:  voice to text software may have been used in the creation of this document. Although proof errors in transcription or interpretation are a potential of such software**

## 2024-11-24 NOTE — PLAN OF CARE
Problem: PAIN - ADULT  Goal: Verbalizes/displays adequate comfort level or baseline comfort level  Description: Interventions:  - Encourage patient to monitor pain and request assistance  - Assess pain using appropriate pain scale  - Administer analgesics based on type and severity of pain and evaluate response  - Implement non-pharmacological measures as appropriate and evaluate response  - Consider cultural and social influences on pain and pain management  - Notify physician/advanced practitioner if interventions unsuccessful or patient reports new pain  Outcome: Progressing     Problem: INFECTION - ADULT  Goal: Absence or prevention of progression during hospitalization  Description: INTERVENTIONS:  - Assess and monitor for signs and symptoms of infection  - Monitor lab/diagnostic results  - Monitor all insertion sites, i.e. indwelling lines, tubes, and drains  - Monitor endotracheal if appropriate and nasal secretions for changes in amount and color  - [place] appropriate cooling/warming therapies per order  - Administer medications as ordered  - Instruct and encourage patient and family to use good hand hygiene technique  - Identify and instruct in appropriate isolation precautions for identified infection/condition  Outcome: Progressing     Problem: SAFETY ADULT  Goal: Patient will remain free of falls  Description: INTERVENTIONS:  - Educate patient/family on patient safety including physical limitations  - Instruct patient to call for assistance with activity   - Consult OT/PT to assist with strengthening/mobility   - Keep Call bell within reach  - Keep bed low and locked with side rails adjusted as appropriate  - Keep care items and personal belongings within reach  - Initiate and maintain comfort rounds  - Make Fall Risk Sign visible to staff  - Offer Toileting every  Hours, in advance of need  - Initiate/Maintain alarm  - Obtain necessary fall risk management equipment:   - Apply yellow socks and  bracelet for high fall risk patients  - Consider moving patient to room near nurses station  Outcome: Progressing  Goal: Maintain or return to baseline ADL function  Description: INTERVENTIONS:  -  Assess patient's ability to carry out ADLs; assess patient's baseline for ADL function and identify physical deficits which impact ability to perform ADLs (bathing, care of mouth/teeth, toileting, grooming, dressing, etc.)  - Assess/evaluate cause of self-care deficits   - Assess range of motion  - Assess patient's mobility; develop plan if impaired  - Assess patient's need for assistive devices and provide as appropriate  - Encourage maximum independence but intervene and supervise when necessary  - Involve family in performance of ADLs  - Assess for home care needs following discharge   - Consider OT consult to assist with ADL evaluation and planning for discharge  - Provide patient education as appropriate  Outcome: Progressing  Goal: Maintains/Returns to pre admission functional level  Description: INTERVENTIONS:  - Perform AM-PAC 6 Click Basic Mobility/ Daily Activity assessment daily.  - Set and communicate daily mobility goal to care team and patient/family/caregiver.   - Collaborate with rehabilitation services on mobility goals if consulted  - Perform Range of Motion  times a day.  - Reposition patient every  hours.  - Dangle patient  times a day  - Stand patient  times a day  - Ambulate patient  times a day  - Out of bed to chair  times a day   - Out of bed for meals  times a day  - Out of bed for toileting  - Record patient progress and toleration of activity level   Outcome: Progressing     Problem: DISCHARGE PLANNING  Goal: Discharge to home or other facility with appropriate resources  Description: INTERVENTIONS:  - Identify barriers to discharge w/patient and caregiver  - Arrange for needed discharge resources and transportation as appropriate  - Identify discharge learning needs (meds, wound care, etc.)  -  Arrange for interpretive services to assist at discharge as needed  - Refer to Case Management Department for coordinating discharge planning if the patient needs post-hospital services based on physician/advanced practitioner order or complex needs related to functional status, cognitive ability, or social support system  Outcome: Progressing     Problem: Prexisting or High Potential for Compromised Skin Integrity  Goal: Skin integrity is maintained or improved  Description: INTERVENTIONS:  - Identify patients at risk for skin breakdown  - Assess and monitor skin integrity  - Assess and monitor nutrition and hydration status  - Monitor labs   - Assess for incontinence   - Turn and reposition patient  - Assist with mobility/ambulation  - Relieve pressure over bony prominences  - Avoid friction and shearing  - Provide appropriate hygiene as needed including keeping skin clean and dry  - Evaluate need for skin moisturizer/barrier cream  - Collaborate with interdisciplinary team   - Patient/family teaching  - Consider wound care consult   Outcome: Progressing

## 2024-11-24 NOTE — ASSESSMENT & PLAN NOTE
Lab Results   Component Value Date    HGBA1C 7.2 (H) 10/07/2024     Home: Metformin 1000mg daily.  Here: Metformin 500mg 2x daily - restarted 11/22/24  Continue QID Accuchecks/SSI and DM diet.  No changes today

## 2024-11-24 NOTE — ASSESSMENT & PLAN NOTE
Patient was recently admitted to [facility] for elective left TKA on 11/13/24, discharged with home therapy, returned to ED as he was unable to ambulate or participate with home PT.   Orthopedic evaluation appreciated   Currently admitted to [facility] for rehab  Pain control and therapy per PMR

## 2024-11-25 LAB
GLUCOSE SERPL-MCNC: 143 MG/DL (ref 65–140)
GLUCOSE SERPL-MCNC: 163 MG/DL (ref 65–140)
GLUCOSE SERPL-MCNC: 198 MG/DL (ref 65–140)
GLUCOSE SERPL-MCNC: 212 MG/DL (ref 65–140)
INR PPP: 2.27 (ref 0.85–1.19)
PROTHROMBIN TIME: 25 SECONDS (ref 12.3–15)

## 2024-11-25 PROCEDURE — 99232 SBSQ HOSP IP/OBS MODERATE 35: CPT

## 2024-11-25 PROCEDURE — 99232 SBSQ HOSP IP/OBS MODERATE 35: CPT | Performed by: PHYSICAL MEDICINE & REHABILITATION

## 2024-11-25 PROCEDURE — 97110 THERAPEUTIC EXERCISES: CPT

## 2024-11-25 PROCEDURE — 97535 SELF CARE MNGMENT TRAINING: CPT

## 2024-11-25 PROCEDURE — 97530 THERAPEUTIC ACTIVITIES: CPT

## 2024-11-25 PROCEDURE — 97116 GAIT TRAINING THERAPY: CPT

## 2024-11-25 PROCEDURE — 82948 REAGENT STRIP/BLOOD GLUCOSE: CPT

## 2024-11-25 PROCEDURE — 85610 PROTHROMBIN TIME: CPT | Performed by: PHYSICIAN ASSISTANT

## 2024-11-25 PROCEDURE — 99232 SBSQ HOSP IP/OBS MODERATE 35: CPT | Performed by: PHYSICIAN ASSISTANT

## 2024-11-25 RX ORDER — GABAPENTIN 100 MG/1
100 CAPSULE ORAL EVERY 12 HOURS
Status: DISCONTINUED | OUTPATIENT
Start: 2024-11-25 | End: 2024-12-03 | Stop reason: HOSPADM

## 2024-11-25 RX ADMIN — Medication 2000 UNITS: at 08:07

## 2024-11-25 RX ADMIN — INSULIN LISPRO 1 UNITS: 100 INJECTION, SOLUTION INTRAVENOUS; SUBCUTANEOUS at 16:16

## 2024-11-25 RX ADMIN — METHOCARBAMOL 500 MG: 500 TABLET ORAL at 05:03

## 2024-11-25 RX ADMIN — SENNOSIDES AND DOCUSATE SODIUM 2 TABLET: 50; 8.6 TABLET ORAL at 17:17

## 2024-11-25 RX ADMIN — INSULIN LISPRO 1 UNITS: 100 INJECTION, SOLUTION INTRAVENOUS; SUBCUTANEOUS at 22:15

## 2024-11-25 RX ADMIN — TAMSULOSIN HYDROCHLORIDE 0.4 MG: 0.4 CAPSULE ORAL at 16:15

## 2024-11-25 RX ADMIN — POLYETHYLENE GLYCOL 3350 17 G: 17 POWDER, FOR SOLUTION ORAL at 08:07

## 2024-11-25 RX ADMIN — FOLIC ACID 1 MG: 1 TABLET ORAL at 08:07

## 2024-11-25 RX ADMIN — ACETAMINOPHEN 975 MG: 325 TABLET, FILM COATED ORAL at 14:31

## 2024-11-25 RX ADMIN — SENNOSIDES AND DOCUSATE SODIUM 2 TABLET: 50; 8.6 TABLET ORAL at 08:07

## 2024-11-25 RX ADMIN — METOPROLOL SUCCINATE 25 MG: 25 TABLET, EXTENDED RELEASE ORAL at 22:11

## 2024-11-25 RX ADMIN — INSULIN LISPRO 2 UNITS: 100 INJECTION, SOLUTION INTRAVENOUS; SUBCUTANEOUS at 11:51

## 2024-11-25 RX ADMIN — HYDROMORPHONE HYDROCHLORIDE 2 MG: 2 TABLET ORAL at 12:15

## 2024-11-25 RX ADMIN — PRAVASTATIN SODIUM 40 MG: 40 TABLET ORAL at 16:15

## 2024-11-25 RX ADMIN — OXYCODONE HYDROCHLORIDE AND ACETAMINOPHEN 500 MG: 500 TABLET ORAL at 17:17

## 2024-11-25 RX ADMIN — OXYCODONE HYDROCHLORIDE AND ACETAMINOPHEN 500 MG: 500 TABLET ORAL at 08:07

## 2024-11-25 RX ADMIN — HYDROMORPHONE HYDROCHLORIDE 2 MG: 2 TABLET ORAL at 22:13

## 2024-11-25 RX ADMIN — METFORMIN HYDROCHLORIDE 500 MG: 500 TABLET ORAL at 08:07

## 2024-11-25 RX ADMIN — ACETAMINOPHEN 975 MG: 325 TABLET, FILM COATED ORAL at 22:11

## 2024-11-25 RX ADMIN — ACETAMINOPHEN 975 MG: 325 TABLET, FILM COATED ORAL at 05:03

## 2024-11-25 RX ADMIN — METHOCARBAMOL 500 MG: 500 TABLET ORAL at 22:11

## 2024-11-25 RX ADMIN — WARFARIN SODIUM 2.5 MG: 2.5 TABLET ORAL at 17:17

## 2024-11-25 RX ADMIN — HYDROMORPHONE HYDROCHLORIDE 2 MG: 2 TABLET ORAL at 08:07

## 2024-11-25 RX ADMIN — METHOCARBAMOL 500 MG: 500 TABLET ORAL at 14:31

## 2024-11-25 RX ADMIN — GABAPENTIN 100 MG: 100 CAPSULE ORAL at 22:11

## 2024-11-25 RX ADMIN — HYDROMORPHONE HYDROCHLORIDE 2 MG: 2 TABLET ORAL at 16:15

## 2024-11-25 NOTE — PROGRESS NOTES
11/25/24 6504   Pain Assessment   Pain Assessment Tool 0-10   Pain Score 7   Pain Location/Orientation Orientation: Left;Location: Knee   Hospital Pain Intervention(s) Medication (See MAR)   Restrictions/Precautions   Precautions Fall Risk;Pain;Supervision on toilet/commode   Weight Bearing Restrictions Yes   LLE Weight Bearing Per Order WBAT   ROM Restrictions No   Cognition   Overall Cognitive Status WFL   Arousal/Participation Alert;Cooperative   Attention Attends with cues to redirect   Orientation Level Oriented X4   Memory Within functional limits   Following Commands Follows one step commands without difficulty   Sit to Stand   Type of Assistance Needed Physical assistance;Adaptive equipment   Physical Assistance Level 26%-50%   Comment Roxane w/ RW, VCs for hand placement and to stand erect.   Sit to Stand CARE Score 3   Bed-Chair Transfer   Type of Assistance Needed Physical assistance;Adaptive equipment   Physical Assistance Level 26%-50%   Comment Roxane w/ RW, VCs for hand placement and to stand erect. Difficulty with stepping backwards towards seat.   Chair/Bed-to-Chair Transfer CARE Score 3   Walk 10 Feet   Type of Assistance Needed Physical assistance;Adaptive equipment   Physical Assistance Level 26%-50%   Comment Roxane w/ RW.   Walk 10 Feet CARE Score 3   Walk 50 Feet with Two Turns   Type of Assistance Needed Physical assistance;Adaptive equipment   Physical Assistance Level Total assistance   Comment Roxane w/ RW and chair follow.   Walk 50 Feet with Two Turns CARE Score 1   Ambulation   Primary Mode of Locomotion Prior to Admission Walk   Distance Walked (feet) 65 ft  (50)   Assist Device Roller Walker   Gait Pattern Inconsistant Margarita;Slow Margarita;Decreased foot clearance;Step through;Improper weight shift   Limitations Noted In Balance;Endurance;Heel Strike;Device Management;Posture;Sequencing;Speed;Strength;Swing   Provided Assistance with: Balance;Trunk Support;Weight Shift   Walk Assist Level  "Minimum Assist   Findings Total assist for distances longer than 10ft (Roxane x 1 and chair follow), Roxane x 1 for 0-10 ft. Both w/ RW. Pt is self-limiting and benefits from being pushed by therapy staff to walk further without breaks. Pt asks to sit soon after beginning to ambulate but is able to continue with encouragement. Frequent VCs required to \"tighten/straighten L knee\" and \"plant L heel\" w/ fair carryover.   Does the patient walk? 2. Yes   Therapeutic Interventions   Flexibility AAROM using slide board on two weighted bars: 2x10 L knee flexion/extension   Other Measured PROM L knee flexion: 85 degrees, L knee extension: 15 degrees   Equipment Use   MakerBot 10min Level 0 BUE/LE   Assessment   Treatment Assessment Pt completed 60-minute skilled PT session focused on LLE ROM, functional transfers and ambulation of further distances. Pt requires assistance with all functional mobility due to pain limitations and poor weight shifting onto LLE, without assist pt is at risk of falls. Pt is self-limiting and benefits from verbal encouragement and pushing from therapy to continue walking. Pt will request to sit soon after beginning to ambulate but is able to continue with verbal cues from therapist. Pt will continue to benefit from skilled therapy for greater ease with ambulation, functional transfers, stair navigation and L LE ROM/strengthening for improved functional mobility, decreased caregiver burden and greater ease with community accessibility. For next session, focus on functional transfers and ambulation of further distances to decrease gait dysfunction.   Problem List Decreased strength;Decreased range of motion;Decreased endurance;Impaired balance;Decreased mobility;Pain   Barriers to Discharge Decreased caregiver support   Plan   Treatment/Interventions Functional transfer training;LE strengthening/ROM;Elevations;Therapeutic exercise;Endurance training;Cognitive reorientation;Patient/family " training;Equipment eval/education;Bed mobility;Gait training   Progress Progressing toward goals   PT Therapy Minutes   PT Time In 0930   PT Time Out 1045   PT Total Time (minutes) 75   PT Mode of treatment - Individual (minutes) 60   PT Mode of treatment - Concurrent (minutes) 15   PT Mode of treatment - Group (minutes) 0   PT Mode of treatment - Co-treat (minutes) 0   PT Mode of Treatment - Total time(minutes) 75 minutes   PT Cumulative Minutes 573     Kaylee Vidal, SPT

## 2024-11-25 NOTE — ASSESSMENT & PLAN NOTE
Patient recently underwent an elective TKA at the [facility] on 11/13/2024.  Was discharged home however return to the emergency department with ambulatory dysfunction/unable to tolerate PT and worsening pain.  Currently admitted to [facility] for rehabilitation.  APS consulted for assistance in postoperative pain management.

## 2024-11-25 NOTE — PROGRESS NOTES
11/25/24 0830   Pain Assessment   Pain Assessment Tool 0-10   Pain Score 7   Pain Location/Orientation Orientation: Left;Location: Knee   Restrictions/Precautions   Precautions Fall Risk;Pain;Supervision on toilet/commode   Weight Bearing Restrictions Yes   LLE Weight Bearing Per Order WBAT   ROM Restrictions No   Cognition   Overall Cognitive Status WFL   Arousal/Participation Alert;Cooperative   Attention Attends with cues to redirect   Orientation Level Oriented X4   Memory Within functional limits   Following Commands Follows one step commands without difficulty   Lying to Sitting on Side of Bed   Type of Assistance Needed Physical assistance;Adaptive equipment   Physical Assistance Level 26%-50%   Comment Roxane for trunk and LLE management, used bed rails and leg  (pt plans to get bed rails for home). VCs for hand placement and sequencing.   Lying to Sitting on Side of Bed CARE Score 3   Sit to Stand   Type of Assistance Needed Physical assistance;Adaptive equipment   Physical Assistance Level 26%-50%   Comment Roxane w/ RW, VCs for hand placement and to stand erect.   Sit to Stand CARE Score 3   Bed-Chair Transfer   Type of Assistance Needed Physical assistance;Adaptive equipment   Physical Assistance Level 26%-50%   Comment Roxane w/ RW, VCs for hand placement and to stand erect. Difficulty with stepping backwards towards seat.   Chair/Bed-to-Chair Transfer CARE Score 3   Toilet Transfer   Type of Assistance Needed Physical assistance;Adaptive equipment   Physical Assistance Level 26%-50%   Comment Roxane w/ RW from bed to BSC, VCs for hand placement and to stand erect. Difficulty with stepping backwards towards seat.   Toilet Transfer CARE Score 3   Assessment   Treatment Assessment Pt completed 30-minute skilled PT session focused on repeated sit-to-stands and stand-pivot transfers to BSC. Pt requires assistance with all functional mobility due to pain limitations and poor weight shifting onto LLE,  without assist pt is at risk of falls. Pt will continue to benefit from skilled therapy for greater ease with ambulation, functional transfers, stair navigation and L LE ROM/strengthening for improved functional mobility, decreased caregiver burden and greater ease with community accessibility. For next session, focus on ambulation of further distances to decrease gait dysfunction.   Problem List Decreased strength;Decreased range of motion;Decreased endurance;Impaired balance;Decreased mobility;Pain   Barriers to Discharge Decreased caregiver support   Plan   Treatment/Interventions Functional transfer training;LE strengthening/ROM;Elevations;Therapeutic exercise;Endurance training;Cognitive reorientation;Patient/family training;Equipment eval/education;Bed mobility;Gait training   Progress Progressing toward goals   PT Therapy Minutes   PT Time In 0830   PT Time Out 0900   PT Total Time (minutes) 30   PT Mode of treatment - Individual (minutes) 30   PT Mode of treatment - Concurrent (minutes) 0   PT Mode of treatment - Group (minutes) 0   PT Mode of treatment - Co-treat (minutes) 0   PT Mode of Treatment - Total time(minutes) 30 minutes   PT Cumulative Minutes 528     Kaylee Vidal, SPT

## 2024-11-25 NOTE — ASSESSMENT & PLAN NOTE
Patient was recently admitted to [facility] for elective left TKA on 11/13/24, discharged with home therapy, returned to ED as he was unable to ambulate or participate with home PT.   Orthopedic evaluation appreciated   Currently admitted to [facility] for rehab  Pain control and therapy per PMR

## 2024-11-25 NOTE — ASSESSMENT & PLAN NOTE
11/21 patient endorsing symptoms of urinary retention in setting of constipation.  -Denies dysuria but will order UA and follow up - UA with trace protein and urobilinogen but negative for UTI

## 2024-11-25 NOTE — ASSESSMENT & PLAN NOTE
Related to TKA and edema in RLE  ACE Wraps  Increased oxycodone to 5-10mg Q4hr PRN - monitor response  Tylenol scheduled  Robaxin scheduled   Monitor and adjust as appropriate.   -Continues to have pain that is hindering with therapy. Will consult APS at this time (11/21)  Per APS: DC oxycodone and start dilaudid 1/2 mg q4h PRN and escalate to 2/4 if needed. Start gabapentin 100 mg HS

## 2024-11-25 NOTE — ASSESSMENT & PLAN NOTE
Preoperative cardiology consultation recommended Lovenox to Coumadin bridge  INR 2.27  Continue Coumadin 2.5mg daily.  Goal INR 2-3   Continue Toprol for rate control  Repeat INR Thursday.

## 2024-11-25 NOTE — PROGRESS NOTES
Progress Note - PMR   Name: Isauro Sow 79 y.o. male I MRN: [id number]  Unit/Bed#: -01 I Date of Admission: 11/18/2024   Date of Service: 11/25/2024 I Hospital Day: 7     Assessment & Plan  Status post total left knee replacement using cement  Performed by Dr. Braxton on 11/13.   Can remove surgical dressing POD 7 and replace with DSD.   WBAT.   Failed home therapies.   AROM/PROM/AAROM with no degree restriction.   PT/OT 3-5 hours/day, 5-6 days/week.   DVT Ppx fully anticoagulated on coumadin.   F/u Ortho (~11/27) for 2 week POV.   Acute pain  Related to TKA and edema in RLE  ACE Wraps  Increased oxycodone to 5-10mg Q4hr PRN - monitor response  Tylenol scheduled  Robaxin scheduled   Monitor and adjust as appropriate.   -Continues to have pain that is hindering with therapy. Will consult APS at this time (11/21)  Per APS: DC oxycodone and start dilaudid 1/2 mg q4h PRN and escalate to 2/4 if needed. Start gabapentin 100 mg HS  Constipation  -infrequent and poor bowel movements in setting of narcotic use for recent L TKA  -bowel regimen in place - adjust as needed  -attempt to wean down narcotic usage as tolerated   - Last BM 11/18.   - 11/21 Patient constipated. Will give suppository this evening and monitor for BM   At risk for venous thromboembolism (VTE)  Fully anticoagulated on warfarin,SCDs.  Benign essential hypertension  Home: Toprol 25mg QHS,   here: Same.H Monitor and adjust as per IM.   Type 2 diabetes mellitus (HCC)  Lab Results   Component Value Date    HGBA1C 7.2 (H) 10/07/2024       Recent Labs     11/24/24  1138 11/24/24  1554 11/24/24  2033 11/25/24  0612   POCGLU 174* 168* 177* 143*       Blood Sugar Average: Last 72 hrs:  (P) [id number]    Home: Metformin 1000mg daily;   Here: CDI/Accuchecks.   If necessary, will try to get patient back on oral meds prior to discharge.   Diabetic Diet.   Management as per IM.     History of stroke  -Hx of stroke in 2017 w/ residual L sided  deficits   -Monitor   Stage 3a chronic kidney disease (HCC)  Lab Results   Component Value Date    EGFR 67 11/19/2024    EGFR 65 11/18/2024    EGFR 64 11/16/2024    CREATININE 1.04 11/19/2024    CREATININE 1.07 11/18/2024    CREATININE 1.09 11/16/2024     -Baseline Cr 1.0-1.3  Currently within baseline.   Avoid nephrotoxic meds, relative hypotension.   Monitor BMP, I/O.   S/P TAVR (transcatheter aortic valve replacement)  -TAVR procedure done 9/2023  -Follows CTS outpatient   Chronic atrial flutter (HCC)  -Hx of a-flutter s/p PPM placement   -On Coumadin with Lovenox bridge  - Continue Toprol  -Trend INR, once therapeutic, can stop Lovenox   Urinary retention  11/21 patient endorsing symptoms of urinary retention in setting of constipation.  -Denies dysuria but will order UA and follow up - UA with trace protein and urobilinogen but negative for UTI     Health Maintenance  #Delirium/Sleep: At risk. Optimize sleep/wake, pain, bowel, bladder management. Avoid deliriogenic meds and physical restraint. Environmental/behavioral interventions.   #Skin/Pressure Injury Prevention: Turn Q2hr in bed, with weight shifts [id number] in wheelchair. Float heels in bed.  #Code Status: DNR/DNI  #FEN: Carb controlled diet   #Dispo: anticipate 1-2 week hospital course given debility from his recent surgery and co morbidities compounding adequate tissue healing and strength recovery. ADD 12/3      To Review:   Mr. Sow is a 78yo M with medical history of asthma, aflutter and SSS s/p medtronic PPM on coumadin, BPH, CAD, R ICA occlusion/L ICA stenosisk, CKD 3 (BL 1-1.3), COPD, T2DM, Gout, HLD, HTN, PERCY, Macular Degeneration, Severe Aortic Stenosis, previous CVA in 2017 who presented to [facility] on 11/18 POD 2 L TKA failing discharge to home with increased LLE pain. LE Doppler negative for DVT.  Ortho saw patient and had no other recommendations at this time. He had one low grade temp during stay, but no further episodes, so no further  work-up. He was recommend for ARC given acute sequelae of his surgery, chronic comorbidities and extent of his weakness/impaired function. He was admitted to the [facility] on 11/18.     Chief Complaint: Pain     Interval History/Subjective: No acute events overnight. Was informed by nursing of episodes of urinary incontinence over the weekend but patient denies having any episodes. Still reporting significant pain along the posterior aspect of his L knee even with the recent medication adjustments. Patient due for orthopedic follow up this week. Will continue current plan of care. No other concerns noted on examination. [facility] 11/23     Functional Update:  PT: totA bed mobility, mod/maxA transfers, mod/maxA walk 30' with RW  OT: independent eating, supervision oral hygiene, setup dressing, totA footwear, min/mod shower/bathe, set up UBD, min/modA LBD and footwear, min/modA transfers.     Objective     Temp:  [98.4 °F (36.9 °C)-98.9 °F (37.2 °C)] 98.6 °F (37 °C)  HR:  [58-69] 60  Resp:  [16-18] 18  BP: (124-163)/(58-72) 124/58  SpO2:  [92 %-96 %] 92 %  Physical Exam  Constitutional:       Appearance: Normal appearance.      Comments: Patient seen participating in therapy gym. NAD    HENT:      Head: Normocephalic and atraumatic.   Eyes:      Extraocular Movements: Extraocular movements intact.      Pupils: Pupils are equal, round, and reactive to light.   Cardiovascular:      Rate and Rhythm: Normal rate and regular rhythm.      Pulses: Normal pulses.      Heart sounds: Normal heart sounds. No murmur heard.     No gallop.   Pulmonary:      Effort: Pulmonary effort is normal.      Breath sounds: Normal breath sounds. No wheezing, rhonchi or rales.   Musculoskeletal:         General: No deformity or signs of injury.   Skin:     General: Skin is warm and dry.   Neurological:      General: No focal deficit present.      Mental Status: He is alert. Mental status is at baseline.   Psychiatric:         Mood and Affect: Mood normal.          Thought Content: Thought content normal.       Physical examination is otherwise unchanged from previous encounter, except as noted above.    Scheduled Meds:  Current Facility-Administered Medications   Medication Dose Route Frequency Provider Last Rate    acetaminophen  975 mg Oral Q8H MOON Morales MD      ascorbic acid  500 mg Oral BID Sung Morales MD      bisacodyl  10 mg Rectal Daily PRN Willie Washington DO      Cholecalciferol  2,000 Units Oral Daily Sung Morales MD      diphenhydrAMINE-zinc acetate   Topical TID PRN Lisa Garcia PA-C      folic acid  1 mg Oral Daily Sung Morales MD      gabapentin  100 mg Oral HS DONTE Varma      HYDROmorphone  1 mg Oral Q4H PRN DONTE Varma      Or    HYDROmorphone  2 mg Oral Q4H PRN DONTE Varma      insulin lispro  1-5 Units Subcutaneous HS Sung Morales MD      insulin lispro  1-6 Units Subcutaneous TID AC Sung Morales MD      metFORMIN  500 mg Oral Daily With Breakfast Christie Taylor PA-C      methocarbamol  500 mg Oral Q8H [facility] Ashley Depadua, MD      metoprolol succinate  25 mg Oral HS Sung Morales MD      naloxone  0.04 mg Intravenous Q1MIN PRN DONTE Varma      nitroglycerin  0.4 mg Sublingual Q5 Min PRN Sung Morales MD      ondansetron  4 mg Oral Q6H PRN Ashley Depadua, MD      polyethylene glycol  17 g Oral Daily Sung Morales MD      pravastatin  40 mg Oral Daily With Dinner Sung Morales MD      senna-docusate sodium  2 tablet Oral BID Ashley Depadua, MD      tamsulosin  0.4 mg Oral Daily With Dinner Ashley Depadua, MD      warfarin  2.5 mg Oral Daily (warfarin) Christie Taylor PA-C           Lab Results: I have reviewed the following results:  Results from last 7 days   Lab Units 11/19/24  0609   HEMOGLOBIN g/dL 9.6*   HEMATOCRIT % 30.5*   WBC Thousand/uL 7.49   PLATELETS Thousands/uL 159     Results from last 7 days   Lab Units 11/19/24  0609   BUN mg/dL 22   SODIUM mmol/L 140    POTASSIUM mmol/L 4.1   CHLORIDE mmol/L 102   CREATININE mg/dL 1.04       Results from last 7 days   Lab Units 11/25/24  0518 11/23/24  1513 11/22/24  0632   PROTIME seconds 25.0* 28.0* 28.0*   INR  2.27* 2.63* 2.63*        0658}      ** Please Note: Fluency Direct voice to text software may have been used in the creation of this document. **

## 2024-11-25 NOTE — ASSESSMENT & PLAN NOTE
Multimodal analgesia:  Continue Tylenol 975 mg every 8 hours scheduled  Continue gabapentin 100 mg daily at bedtime  Continue Robaxin 500 mg every 8 hours scheduled; monitor and hold for sedation  Discontinued oxycodone given ineffectiveness 11/22  oral Dilaudid 1 mg every 4 hours as needed for moderate pain  oral Dilaudid 2 mg every 4 hours as needed for severe pain  Will continue current opioid regimen as ordered. Patient reports oral dilaudid has been more effective in reducing his pain in comparison to oxycodone.  Educated patient on current regimen and encouraged him to ask for pain medications every 4 hours as needed.  Discussed plan with nursing staff who will frequently evaluate patient's pain levels and medicate him appropriately.  Narcan as needed for respiratory depression/opioid reversal  Continue bowel regimen while utilizing opioids to prevent opioid-induced constipation.

## 2024-11-25 NOTE — PROGRESS NOTES
Progress Note - Acute Pain   Name: Isauro Sow 79 y.o. male I MRN: [id number]  Unit/Bed#: [facility] 974-01 I Date of Admission: 11/18/2024   Date of Service: 11/25/2024 I Hospital Day: 7    Assessment & Plan  Status post total left knee replacement using cement  Patient recently underwent an elective TKA at the [facility] on 11/13/2024.  Was discharged home however return to the emergency department with ambulatory dysfunction/unable to tolerate PT and worsening pain.  Currently admitted to [facility] for rehabilitation.  APS consulted for assistance in postoperative pain management.  Acute pain    Multimodal analgesia:  Continue Tylenol 975 mg every 8 hours scheduled  Continue gabapentin 100 mg daily at bedtime  Continue Robaxin 500 mg every 8 hours scheduled; monitor and hold for sedation  Discontinued oxycodone given ineffectiveness 11/22  oral Dilaudid 1 mg every 4 hours as needed for moderate pain  oral Dilaudid 2 mg every 4 hours as needed for severe pain  Will continue current opioid regimen as ordered. Patient reports oral dilaudid has been more effective in reducing his pain in comparison to oxycodone.  Educated patient on current regimen and encouraged him to ask for pain medications every 4 hours as needed.  Discussed plan with nursing staff who will frequently evaluate patient's pain levels and medicate him appropriately.  Narcan as needed for respiratory depression/opioid reversal  Continue bowel regimen while utilizing opioids to prevent opioid-induced constipation.    Stage 3a chronic kidney disease (HCC)  Lab Results   Component Value Date    EGFR 67 11/19/2024    EGFR 65 11/18/2024    EGFR 64 11/16/2024    CREATININE 1.04 11/19/2024    CREATININE 1.07 11/18/2024    CREATININE 1.09 11/16/2024   Will attempt to minimize use of nephrotoxic analgesics  Will hold off on treatment with nsaids in setting of low gfr    APS will sign off at this time. Thank you for the consult. All opioids and other  analgesics to be written at discretion of primary team. Please contact Acute Pain Service - via SecureChat from [phone] with additional questions or concerns. See SecureChat or Amion for additional contacts and after hours information.    Subjective   Isauro Sow is a 79 y.o. male with past medical history of CAD, TAVR, DM 2, HTN, recent TKA at [facility] on 11/13/2024. Patient was discharged home following his surgery however return to the emergency department with ambulatory dysfunction and worsening pain. He was then admitted to the acute rehab center at the [facility]. Acute pain service was consulted for assistance in postoperative pain management.     Pain History  Current pain location(s):  Pain Score: 7  Pain Location/Orientation: Orientation: Left, Location: Knee  Pain Scale: Pain Assessment Tool: 0-10  24 hour history: Seen at bedside this afternoon. Patient resting in be without acute distress and appears comfortable. He reports oral dilaudid products have been beneficial in alleviating his pain in comparison to oxycodone.  He continues to utilize his as needed medications infrequently; I again educated the patient that he may request a dose of as needed opioids every 4 hours if required.  He is tolerating the current regimen without opioid-induced side effects and feels he has been progressing with his mobility and with physical therapy.  He has no other acute complaints currently    Opioid requirement previous 24 hours:   2 mg oral Dilaudid x 3 doses    Meds/Allergies   all current active meds have been reviewed, current meds:   Current Facility-Administered Medications:     acetaminophen (TYLENOL) tablet 975 mg, Q8H MOON    ascorbic acid (VITAMIN C) tablet 500 mg, BID    bisacodyl (DULCOLAX) rectal suppository 10 mg, Daily PRN    Cholecalciferol (VITAMIN D3) tablet 2,000 Units, Daily    diphenhydrAMINE-zinc acetate (BENADRYL) 2-0.1 % cream, TID PRN    folic acid (FOLVITE) tablet 1 mg,  Daily    gabapentin (NEURONTIN) capsule 100 mg, HS    HYDROmorphone (DILAUDID) tablet 1 mg, Q4H PRN **OR** HYDROmorphone (DILAUDID) tablet 2 mg, Q4H PRN    insulin lispro (HumALOG/ADMELOG) 100 units/mL subcutaneous injection 1-5 Units, HS    insulin lispro (HumALOG/ADMELOG) 100 units/mL subcutaneous injection 1-6 Units, TID AC **AND** Fingerstick Glucose (POCT), TID AC    metFORMIN (GLUCOPHAGE) tablet 500 mg, Daily With Breakfast    methocarbamol (ROBAXIN) tablet 500 mg, Q8H MOON    metoprolol succinate (TOPROL-XL) 24 hr tablet 25 mg, HS    naloxone (NARCAN) 0.04 mg/mL syringe 0.04 mg, Q1MIN PRN    nitroglycerin (NITROSTAT) SL tablet 0.4 mg, Q5 Min PRN    ondansetron (ZOFRAN-ODT) dispersible tablet 4 mg, Q6H PRN    polyethylene glycol (MIRALAX) packet 17 g, Daily    pravastatin (PRAVACHOL) tablet 40 mg, Daily With Dinner    senna-docusate sodium (SENOKOT S) 8.6-50 mg per tablet 2 tablet, BID    tamsulosin (FLOMAX) capsule 0.4 mg, Daily With Dinner    warfarin (COUMADIN) tablet 2.5 mg, Daily (warfarin), and PTA meds:   Prior to Admission Medications   Prescriptions Last Dose Informant Patient Reported? Taking?   Cholecalciferol (VITAMIN D3) 1,000 units tablet   No No   Sig: Take 2 tablets (2,000 Units total) by mouth daily   FREESTYLE LITE test strip  Self Yes No   Sig: daily   acetaminophen (TYLENOL) 500 mg tablet   No No   Sig: Take 2 tablets (1,000 mg total) by mouth every 8 (eight) hours   ascorbic acid (VITAMIN C) 500 MG tablet   No No   Sig: Take 1 tablet (500 mg total) by mouth 2 (two) times a day   bisacodyl (DULCOLAX) 10 mg suppository   No No   Sig: Insert 1 suppository (10 mg total) into the rectum daily   enoxaparin (Lovenox) 100 mg/mL   No No   Sig: Inject 1 mL (100 mg total) under the skin every 12 (twelve) hours for 5 days   folic acid (FOLVITE) 1 mg tablet   No No   Sig: Take 1 tablet (1 mg total) by mouth daily   metFORMIN (GLUCOPHAGE) 1000 MG tablet   No No   Sig: TAKE ONE TABLET BY MOUTH EVERY DAY    metoprolol succinate (TOPROL-XL) 25 mg 24 hr tablet   No No   Sig: TAKE ONE TABLET BY MOUTH EVERY EVENING AT BEDTIME   mupirocin (BACTROBAN) 2 % ointment   No No   Sig: Apply topically 2 (two) times a day for 5 days   nitroglycerin (NITROSTAT) 0.4 mg SL tablet  Self No No   Sig: Place 1 tablet (0.4 mg total) under the tongue every 5 (five) minutes as needed for chest pain   oxyCODONE (Roxicodone) 5 immediate release tablet   No No   Sig: Take 1 tablet (5 mg total) by mouth every 4 (four) hours as needed for moderate pain or severe pain for up to 10 days Max Daily Amount: 30 mg   polyethylene glycol (MIRALAX) 17 g packet   No No   Sig: Take 17 g by mouth daily   rosuvastatin (CRESTOR) 5 mg tablet  Self No No   Sig: TAKE ONE TABLET BY MOUTH EVERY DAY   senna-docusate sodium (SENOKOT S) 8.6-50 mg per tablet   No No   Sig: Take 1 tablet by mouth daily   warfarin (COUMADIN) 5 mg tablet   No No   Sig: Take 1 tablet (5 mg total) by mouth daily      Facility-Administered Medications: None     Allergies   Allergen Reactions    Penicillins Hives     Objective :  Temp:  [98.4 °F (36.9 °C)-98.9 °F (37.2 °C)] 98.6 °F (37 °C)  HR:  [58-69] 60  BP: (124-163)/(58-72) 124/58  Resp:  [16-18] 18  SpO2:  [92 %-96 %] 92 %  O2 Device: None (Room air)    Physical Exam  Constitutional:       General: He is not in acute distress.  HENT:      Head: Normocephalic and atraumatic.   Cardiovascular:      Rate and Rhythm: Normal rate.   Pulmonary:      Effort: Pulmonary effort is normal. No respiratory distress.   Abdominal:      General: There is no distension.      Tenderness: There is no abdominal tenderness.   Musculoskeletal:         General: Tenderness (LLE) present.      Left lower leg: Edema present.   Skin:     General: Skin is warm and dry.      Findings: Bruising (LLE) present.   Neurological:      Mental Status: He is alert and oriented to person, place, and time. Mental status is at baseline.   Psychiatric:         Mood and Affect:  Mood normal.         Behavior: Behavior normal.            Lab Results: I have reviewed the following results:  Estimated Creatinine Clearance: 68.8 mL/min (by C-G formula based on SCr of 1.04 mg/dL).  Lab Results   Component Value Date    WBC 7.49 11/19/2024    WBC 9.2 11/10/2017    HGB 9.6 (L) 11/19/2024    HGB 15.0 11/10/2017    HCT 30.5 (L) 11/19/2024    HCT 45.1 11/10/2017     11/19/2024     11/10/2017         Component Value Date/Time     11/10/2017 0000    K 4.1 11/19/2024 0609    K 4.5 06/16/2022 1044     11/19/2024 0609     06/16/2022 1044    CO2 30 11/19/2024 0609    CO2 33 (H) 09/21/2023 1028    CO2 30 06/16/2022 1044    BUN 22 11/19/2024 0609    BUN 22 06/16/2022 1044    CREATININE 1.04 11/19/2024 0609    CREATININE 1.08 11/10/2017 0000         Component Value Date/Time    CALCIUM 8.9 11/19/2024 0609    CALCIUM 10.0 06/16/2022 1044    ALKPHOS 40 11/15/2024 1317    ALKPHOS 55 06/16/2022 1044    AST 18 11/15/2024 1317    AST 12 06/16/2022 1044    ALT 19 11/15/2024 1317    ALT 12 06/16/2022 1044    BILITOT 0.7 11/10/2017 0000    TP 5.9 (L) 11/15/2024 1317    TP 6.7 06/16/2022 1044    ALB 3.3 (L) 11/15/2024 1317    ALB 3.9 11/10/2017 0000       Imaging Results Review: No pertinent imaging studies reviewed.  Other Study Results Review: No additional pertinent studies reviewed.

## 2024-11-25 NOTE — PROGRESS NOTES
"   11/25/24 1500   Pain Assessment   Pain Assessment Tool 0-10   Pain Score 8   Pain Location/Orientation Location: Knee;Orientation: Left   Pain Onset/Description Frequency: Intermittent;Descriptor: Discomfort  (\"pulling\" type of pain)   Hospital Pain Intervention(s) Medication (See MAR);Cold applied;Repositioned   Restrictions/Precautions   Precautions Fall Risk;Pain   LLE Weight Bearing Per Order WBAT   Cognition   Overall Cognitive Status WFL   Arousal/Participation Alert;Responsive;Cooperative   Attention Within functional limits   Following Commands Follows two step commands without difficulty   Roll Left and Right   Type of Assistance Needed Incidental touching;Verbal cues   Comment used bedrail for support   Roll Left and Right CARE Score 4   Sit to Lying   Type of Assistance Needed Incidental touching;Verbal cues   Comment used bedrail and leg  for support   Sit to Lying CARE Score 4   Lying to Sitting on Side of Bed   Reason if not Attempted Activity not applicable   Lying to Sitting on Side of Bed CARE Score 9   Sit to Stand   Type of Assistance Needed Incidental touching;Verbal cues   Sit to Stand CARE Score 4   Bed-Chair Transfer   Reason if not Attempted Activity not applicable   Chair/Bed-to-Chair Transfer CARE Score 9   Car Transfer   Reason if not Attempted Activity not applicable   Car Transfer CARE Score 9   Walk 10 Feet   Type of Assistance Needed Verbal cues;Incidental touching   Comment used walker for support   Walk 10 Feet CARE Score 4   Walk 50 Feet with Two Turns   Type of Assistance Needed Incidental touching;Verbal cues   Comment used walker for support   Walk 50 Feet with Two Turns CARE Score 4   Walk 150 Feet   Reason if not Attempted Medical concerns   Walk 150 Feet CARE Score 88   Walking 10 Feet on Uneven Surfaces   Reason if not Attempted Medical concerns   Walking 10 Feet on Uneven Surfaces CARE Score 88   Ambulation   Does the patient walk? 2. Yes   Wheel 50 Feet with Two " Turns   Reason if not Attempted Activity not applicable   Wheel 50 Feet with Two Turns CARE Score 9   Wheel 150 Feet   Reason if not Attempted Activity not applicable   Wheel 150 Feet CARE Score 9   Wheelchair mobility   Does the patient use a wheelchair? 1. Yes   Type of Wheelchair Used 1. Manual   Curb or Single Stair   Style negotiated Single stair   Type of Assistance Needed Physical assistance   Physical Assistance Level 76% or more   Comment used BHR for support   1 Step (Curb) CARE Score 2   4 Steps   Reason if not Attempted Medical concerns   4 Steps CARE Score 88   12 Steps   Reason if not Attempted Medical concerns   12 Steps CARE Score 88   Picking Up Object   Reason if not Attempted Medical concerns   Picking Up Object CARE Score 88   Toilet Transfer   Reason if not Attempted Activity not applicable   Toilet Transfer CARE Score 9   Other Comments   Comments PT focused on sit <> stand transfer training to/from manual WC with cues for correct hand/foot placement and weight shifting over COG; sit to supine and rolling to left side with cues for correct task sequencing and use of AD for support; gait trained on level surfaces using FWW for support for up to 50, 30 and 10 feet x 1 round each with cues on proper foot placement (emphasis on heel to toe gait pattern, swing through), optimal pacing, weight shifting and proper posture during task performance; negotiated 1 step (6 inches in height) via non reciprocal or step to step pattern (forward step up, backward step down) using BHR for support with emphasis on proper foot placement and correct task sequencing.   Assessment   Treatment Assessment Patient actively participated in POT without c/o chest pain, SOB, palpitations, dizziness or headaches with tasks provided at hand. Given rest breaks post activity due to reports of feeling tired, weakness on his left leg and post surgical pain in his left knee graded as 7 to 9/10 on VPS. He required an extra time to  complete all mobility tasks. No LOB and left knee buckling during transfers and ambulation tasks but noted an antalgic gait with decreased weight shifting during stance phase, slowed gait velocity and reduced heel to toe pattern. He required increased assist from PT to complete 1 stair due to difficulty advancing the RLE forward and partial left knee buckling during single limb support phase.   PT Barriers   Physical Impairment Decreased strength;Decreased range of motion;Impaired balance;Decreased endurance;Decreased mobility;Decreased safety awareness;Pain   Functional Limitation Stair negotiation;Walking;Transfers   Plan   Treatment/Interventions Functional transfer training;Gait training;LE strengthening/ROM;Therapeutic exercise;Endurance training;Patient/family training;Bed mobility   Progress   (progressing slower than initially anticipated due to left knee pain, weakness, decreased activity tolerance)   PT Therapy Minutes   PT Time In 1355   PT Time Out 1425   PT Total Time (minutes) 30   PT Mode of treatment - Individual (minutes) 30   PT Mode of treatment - Concurrent (minutes) 0   PT Mode of treatment - Group (minutes) 0   PT Mode of treatment - Co-treat (minutes) 0   PT Mode of Treatment - Total time(minutes) 30 minutes   PT Cumulative Minutes 633

## 2024-11-25 NOTE — ASSESSMENT & PLAN NOTE
Lab Results   Component Value Date    HGBA1C 7.2 (H) 10/07/2024       Recent Labs     11/24/24  1138 11/24/24  1554 11/24/24 2033 11/25/24  0612   POCGLU 174* 168* 177* 143*       Blood Sugar Average: Last 72 hrs:  (P) [id number]    Home: Metformin 1000mg daily;   Here: CDI/Accuchecks.   If necessary, will try to get patient back on oral meds prior to discharge.   Diabetic Diet.   Management as per IM.

## 2024-11-25 NOTE — PROGRESS NOTES
Progress Note - Hospitalist   Name: Isauro Sow 79 y.o. male I MRN: [id number]  Unit/Bed#: -01 I Date of Admission: 11/18/2024   Date of Service: 11/25/2024 I Hospital Day: 7    Assessment & Plan  Status post total left knee replacement using cement  Patient was recently admitted to [facility] for elective left TKA on 11/13/24, discharged with home therapy, returned to ED as he was unable to ambulate or participate with home PT.   Orthopedic evaluation appreciated   Currently admitted to [facility] for rehab  Pain control and therapy per PMR  Benign essential hypertension  Blood pressure acceptable  Continue with home dose Toprol 50mg qhs  Type 2 diabetes mellitus (HCC)  Lab Results   Component Value Date    HGBA1C 7.2 (H) 10/07/2024     Home: Metformin 1000mg daily.  Here: Metformin 500mg 2x daily - restarted 11/22/24  Continue QID Accuchecks/SSI and DM diet.  No changes today  Chronic atrial flutter (HCC)  Preoperative cardiology consultation recommended Lovenox to Coumadin bridge  INR 2.27  Continue Coumadin 2.5mg daily.  Goal INR 2-3   Continue Toprol for rate control  Repeat INR Thursday.  Stage 3a chronic kidney disease (HCC)  Renal function at baseline  Avoid nephrotoxins  History of stroke  Continue statin and coumadin  S/P TAVR (transcatheter aortic valve replacement)  Followed by cardiology and CTS as outpatient    The above assessment and plan was reviewed and updated as determined by my evaluation of the patient on 11/25/2024.    History of Present Illness   Patient seen and examined. Patients overnight issues or events were reviewed with nursing staff. New or overnight issues include the following:     Pt seen in his room. He reports 9/10 pain upon returning from therapy. He denies any other complaints.    A 10 point review of systems was negative except for what is noted in the HPI.    Objective :  Temp:  [97.9 °F (36.6 °C)-98.9 °F (37.2 °C)] 97.9 °F (36.6 °C)  HR:  [58-78] 78  BP:  (124-151)/(58-71) 129/64  Resp:  [18] 18  SpO2:  [92 %-97 %] 97 %  O2 Device: None (Room air)    Invasive Devices       None                   Physical Exam  General Appearance: NAD; pleasant  HEENT: PERRLA, conjuctiva normal; mucous membranes moist; face symmetrical  Neck:  Supple  Lungs: clear bilaterally, normal respiratory effort, no retractions, expiratory effort normal, on room air  CV: regular rate and rhythm, no murmurs rubs or gallops noted   ABD: soft non tender, +BS x4  EXT:  no edema  Skin: normal turgor, normal texture, no rash  Psych: affect normal, mood normal  Neuro: AAOx3    The above physical exam was reviewed and updated as determined by my evaluation of the patient on 11/25/2024.      Lab Results: I have reviewed the following results:  Results from last 7 days   Lab Units 11/19/24  0609   WBC Thousand/uL 7.49   HEMOGLOBIN g/dL 9.6*   HEMATOCRIT % 30.5*   PLATELETS Thousands/uL 159     Results from last 7 days   Lab Units 11/19/24  0609   SODIUM mmol/L 140   POTASSIUM mmol/L 4.1   CHLORIDE mmol/L 102   CO2 mmol/L 30   BUN mg/dL 22   CREATININE mg/dL 1.04   CALCIUM mg/dL 8.9         Results from last 7 days   Lab Units 11/25/24  0518 11/23/24  1513   INR  2.27* 2.63*     Results from last 7 days   Lab Units 11/25/24  1102 11/25/24  0612 11/24/24  2033   POC GLUCOSE mg/dl 212* 143* 177*       Imaging Results Review: No pertinent imaging studies reviewed.  Other Study Results Review: Other studies reviewed include: INR, blood sugars.    Review of Scheduled Meds: Medications  reviewed and reconciled as needed  Current Facility-Administered Medications   Medication Dose Route Frequency Provider Last Rate    acetaminophen  975 mg Oral Q8H [facility] Sung Morales MD      ascorbic acid  500 mg Oral BID Sung Morales MD      bisacodyl  10 mg Rectal Daily PRN Willie Washington DO      Cholecalciferol  2,000 Units Oral Daily Sung Morales MD      diphenhydrAMINE-zinc acetate   Topical TID PRN Lisa Garcia PA-C       folic acid  1 mg Oral Daily Sung Morales MD      gabapentin  100 mg Oral HS DONTE Varma      HYDROmorphone  1 mg Oral Q4H PRN DONTE Varma      Or    HYDROmorphone  2 mg Oral Q4H PRN DONTE Varma      insulin lispro  1-5 Units Subcutaneous HS Sung Morales MD      insulin lispro  1-6 Units Subcutaneous TID AC Sung Morales MD      metFORMIN  500 mg Oral Daily With Breakfast Christie Taylor PA-C      methocarbamol  500 mg Oral Q8H MOON Ashley Depadua, MD      metoprolol succinate  25 mg Oral HS Sung Morales MD      naloxone  0.04 mg Intravenous Q1MIN PRN DONTE Varma      nitroglycerin  0.4 mg Sublingual Q5 Min PRN Sung Morales MD      ondansetron  4 mg Oral Q6H PRN Ashley Depadua, MD      polyethylene glycol  17 g Oral Daily Sung Morales MD      pravastatin  40 mg Oral Daily With Dinner Sung Morales MD      senna-docusate sodium  2 tablet Oral BID Ashley Depadua, MD      tamsulosin  0.4 mg Oral Daily With Dinner Ashley Depadua, MD      warfarin  2.5 mg Oral Daily (warfarin) Christie Taylor PA-C         VTE Pharmacologic Prophylaxis: Coumadin  Code Status: Level 3 - DNAR and DNI  Current Length of Stay: 7 day(s)    Administrative Statements   I have spent a total time of 60 minutes in caring for this patient on the day of the visit/encounter including Diagnostic results, Prognosis, Risks and benefits of tx options, Instructions for management, Patient and family education, Importance of tx compliance, Risk factor reductions, Impressions, Counseling / Coordination of care, Documenting in the medical record, Reviewing / ordering tests, medicine, procedures  , Obtaining or reviewing history  , and Communicating with other healthcare professionals .  ** Please Note:  voice to text software may have been used in the creation of this document. Although proof errors in transcription or interpretation are a potential of such software**

## 2024-11-25 NOTE — PROGRESS NOTES
[facility] Occupational Therapy Daily Note     Occupational Therapy LTG's  Eating: Eating Goal: 06. Independent - Patient completes the activity by him/herself with no assistance from a helper.   Oral Care: Oral Hygiene Goal: 06. Independent - Patient completes the activity by him/herself with no assistance from a helper.   Bathing: Shower/bathe self Goal: 06. Independent - Patient completes the activity by him/herself with no assistance from a helper.   UB Dressing: Upper body dressing Goal: 06. Independent - Patient completes the activity by him/herself with no assistance from a helper.   LB dressing: Lower body dressing Goal: 06. Independent - Patient completes the activity by him/herself with no assistance from a helper.   Footwear: Putting on/taking off footwear Goal: 06. Independent - Patient completes the activity by him/herself with no assistance from a helper.   Toileting: Toileting hygiene Goal: 06. Independent - Patient completes the activity by him/herself with no assistance from a helper.   Toilet Transfer:  Toilet transfer Goal: 06. Independent - Patient completes the activity by him/herself with no assistance from a helper.   IADL's: Assist Level: Independent   Medication management:       Discharge Plan:  Home with family/friends support , Daily Checks, and Home OT services pending progress vs skilled rehab  DME:   Pt owns: Available Equipment: Roller Walker, Single Point Cane, Shower Chair     Recommended: TBD   Need to order: TBD   Ordered: TBD      11/25/24 1230   Pain Assessment   Pain Assessment Tool 0-10   Effect of Pain on Daily Activities During weight bearing and walking   Hospital Pain Intervention(s) Repositioned   Pain Rating: FLACC (Activity) - Face 1   Pain Rating: FLACC (Activity) - Legs 2   Pain Rating: FLACC (Activity) - Activity 2   Pain Rating: FLACC (Activity) - Cry 1   Pain Rating: FLACC (Activity) - Consolability 2   Score: FLACC (Activity) 8   Restrictions/Precautions   Precautions  "Fall Risk;Pain;Supervision on toilet/commode;Cognitive   Lifestyle   Autonomy \"you arent going to make me stand are you?\"   Grooming   Findings pt wishing to engage in shaving task at sink. Pt not agreeable to compelte in therapeutic manor in stance as sugessted. pt comprimised iwth walk to sink from EOB at 20'.   Lower Body Dressing   Comment increased time to manage problem solving with donning shirt in supihne. first Pt states that he does not recall that his shorts were not on. encouraged him to compelte sitting EOB, states that it is easier to compelte in supine. with LH reacher able to thread  RLE. REq assistance for management of L LE with leg . unable to manage  and reacher simultaneously. applied ace wraps to from foot to thigh.   Putting On/Taking Off Footwear   Type of Assistance Needed Physical assistance   Physical Assistance Level 51%-75%   Comment able to place R foot into shoe.m R   Putting On/Taking Off Footwear CARE Score 2   Lying to Sitting on Side of Bed   Type of Assistance Needed Physical assistance   Physical Assistance Level 26%-50%   Lying to Sitting on Side of Bed CARE Score 3   Sit to Stand   Type of Assistance Needed Physical assistance   Physical Assistance Level 26%-50%   Sit to Stand CARE Score 3   Bed-Chair Transfer   Type of Assistance Needed Physical assistance   Physical Assistance Level 26%-50%   Comment W/ RW.   Chair/Bed-to-Chair Transfer CARE Score 3   Assessment   Treatment Assessment Pt participated in skilled OT treatment session with treatment focus on ADL re-training, fxnl xfers, and standing tolerance. Pt tolerated session well today with improved distance walking ~20' to sink from bed. Overall still req cues for initiation of tasks and req support for cognitive management.  Pt is currently limited by the following deficits: L LE PAIN. Higher level cognitive functions  (Judgment, executive functions, praxis, cognitive flexibility, insight), Attention, Memory " , Impaired standing balance, Impaired righting reactions, impaired motor planning, Impaired sitting balance, Impaired vision, impaired Gross motor control, and limited activity tolerance. Pt continues to require skilled acute rehab OT services to increase overall functional independence and safety w/ I/ADLs and functional transfers.            Continued plan of care for OT sessions to focus on the following areas:  BASIC ADL Retraining , LB Dressing,  LHAE education/training, Meal preparation, Functional Transfers, Functional Cognition, Functional Attention, Standing tolerance, Standing balance , Gross motor coordination, Gross motor strengthening , DME training/education, Family training/education, Energy conservation training/education, healthy coping education, Leisure and social pursuits, and Core/trunk control/strengthening .          Following OT sessions to cont to focus on: basic functional transfers , basic ADL participation/progression,compensation strategy training, ADL at EOB, ADL at sink, out of bed activity tolerance , sitting balance, Toileting sequence, and standing balance activities.   Prognosis Fair   Plan   Progress Slow progress, cognitive deficits   OT Therapy Minutes   OT Time In 1230   OT Time Out 1330   OT Total Time (minutes) 60   OT Mode of treatment - Individual (minutes) 60   OT Mode of treatment - Concurrent (minutes) 0   OT Mode of treatment - Group (minutes) 0   OT Mode of treatment - Co-treat (minutes) 0   OT Mode of Treatment - Total time(minutes) 60 minutes   OT Cumulative Minutes 480

## 2024-11-25 NOTE — PROGRESS NOTES
11/25/24 0400   Pain Assessment   Pain Assessment Tool 0-10   Pain Score 7   Pain Location/Orientation Orientation: Left;Location: Knee   Hospital Pain Intervention(s) Medication (See MAR)   Restrictions/Precautions   Precautions Fall Risk;Pain;Supervision on toilet/commode   Weight Bearing Restrictions Yes   LLE Weight Bearing Per Order WBAT   ROM Restrictions No   Cognition   Overall Cognitive Status WFL   Arousal/Participation Alert;Cooperative   Attention Attends with cues to redirect   Orientation Level Oriented X4   Memory Within functional limits   Following Commands Follows one step commands without difficulty   Sit to Lying   Type of Assistance Needed Incidental touching;Adaptive equipment   Comment CGA w/ leg .   Sit to Lying CARE Score 4   Sit to Stand   Type of Assistance Needed Physical assistance;Adaptive equipment   Physical Assistance Level 26%-50%   Comment Roxane w/ RW, VCs for hand placement and to stand erect.   Sit to Stand CARE Score 3   Bed-Chair Transfer   Type of Assistance Needed Physical assistance;Adaptive equipment   Physical Assistance Level 26%-50%   Comment Roxane w/ RW, VCs for hand placement and to stand erect. Difficulty with stepping backwards towards seat.   Chair/Bed-to-Chair Transfer CARE Score 3   Walk 10 Feet   Type of Assistance Needed Physical assistance;Adaptive equipment   Physical Assistance Level 26%-50%   Comment Roxane w/ RW.   Walk 10 Feet CARE Score 3   Walk 50 Feet with Two Turns   Type of Assistance Needed Physical assistance;Adaptive equipment   Physical Assistance Level Total assistance   Comment Roxane w/ RW and chair follow.   Walk 50 Feet with Two Turns CARE Score 1   Ambulation   Primary Mode of Locomotion Prior to Admission Walk   Distance Walked (feet) 65 ft  (50)   Assist Device Roller Walker   Gait Pattern Inconsistant Margarita;Slow Margarita;Decreased foot clearance;Step through;Improper weight shift   Limitations Noted In Balance;Endurance;Heel  "Strike;Device Management;Posture;Sequencing;Speed;Strength;Swing   Provided Assistance with: Balance;Trunk Support;Weight Shift   Walk Assist Level Minimum Assist   Findings Total assist for distances longer than 10ft (Roxane x 1 and chair follow), Roxane x 1 for 0-10 ft. Both w/ RW. Pt is self-limiting and benefits from being pushed by therapy staff to walk further without breaks. Pt asks to sit soon after beginning to ambulate but is able to continue with encouragement. Frequent VCs required to \"tighten/straighten L knee\" and \"plant L heel\" w/ fair carryover.   Does the patient walk? 2. Yes   Therapeutic Interventions   Flexibility AAROM using slide board on two weighted bars: 2x10 L knee flexion/extension   Other Measured PROM L knee flexion: 85 degrees, L knee extension: 15 degrees   Equipment Use   "[employer]" 10min Level 0 BUE/LE   Assessment   Treatment Assessment Pt completed 75-minute skilled PT session focused on LLE ROM, functional transfers and ambulation of further distances. Pt requires assistance with all functional mobility due to pain limitations and poor weight shifting onto LLE, without assist pt is at risk of falls. Pt is self-limiting and benefits from verbal encouragement and pushing from therapy to continue walking. Pt will request to sit soon after beginning to ambulate but is able to continue with verbal cues from therapist. Pt will continue to benefit from skilled therapy for greater ease with ambulation, functional transfers, stair navigation and L LE ROM/strengthening for improved functional mobility, decreased caregiver burden and greater ease with community accessibility. For next session, focus on functional transfers and ambulation of further distances to decrease gait dysfunction.   Problem List Decreased strength;Decreased range of motion;Decreased endurance;Impaired balance;Decreased mobility;Pain   Barriers to Discharge Decreased caregiver support   Plan   Treatment/Interventions " Functional transfer training;LE strengthening/ROM;Elevations;Therapeutic exercise;Endurance training;Cognitive reorientation;Patient/family training;Equipment eval/education;Bed mobility;Gait training   Progress Progressing toward goals   PT Therapy Minutes   PT Time In 0930   PT Time Out 1045   PT Total Time (minutes) 75   PT Mode of treatment - Individual (minutes) 60   PT Mode of treatment - Concurrent (minutes) 15   PT Mode of treatment - Group (minutes) 0   PT Mode of treatment - Co-treat (minutes) 0   PT Mode of Treatment - Total time(minutes) 75 minutes   PT Cumulative Minutes 573     Kaylee Vidal, SPT

## 2024-11-25 NOTE — PLAN OF CARE
Problem: PAIN - ADULT  Goal: Verbalizes/displays adequate comfort level or baseline comfort level  Description: Interventions:  - Encourage patient to monitor pain and request assistance  - Assess pain using appropriate pain scale  - Administer analgesics based on type and severity of pain and evaluate response  - Implement non-pharmacological measures as appropriate and evaluate response  - Consider cultural and social influences on pain and pain management  - Notify physician/advanced practitioner if interventions unsuccessful or patient reports new pain  Outcome: Progressing     Problem: INFECTION - ADULT  Goal: Absence or prevention of progression during hospitalization  Description: INTERVENTIONS:  - Assess and monitor for signs and symptoms of infection  - Monitor lab/diagnostic results  - Monitor all insertion sites, i.e. indwelling lines, tubes, and drains  - Monitor endotracheal if appropriate and nasal secretions for changes in amount and color  - Gerry appropriate cooling/warming therapies per order  - Administer medications as ordered  - Instruct and encourage patient and family to use good hand hygiene technique  - Identify and instruct in appropriate isolation precautions for identified infection/condition  Outcome: Progressing     Problem: SAFETY ADULT  Goal: Patient will remain free of falls  Description: INTERVENTIONS:  - Educate patient/family on patient safety including physical limitations  - Instruct patient to call for assistance with activity   - Consult OT/PT to assist with strengthening/mobility   - Keep Call bell within reach  - Keep bed low and locked with side rails adjusted as appropriate  - Keep care items and personal belongings within reach  - Initiate and maintain comfort rounds  - Make Fall Risk Sign visible to staff  - Offer Toileting every 2 Hours, in advance of need  - Initiate/Maintain bed/chair alarm  - Obtain necessary fall risk management equipment: alarms  - Apply  yellow socks and bracelet for high fall risk patients  - Consider moving patient to room near nurses station  Outcome: Progressing  Goal: Maintain or return to baseline ADL function  Description: INTERVENTIONS:  -  Assess patient's ability to carry out ADLs; assess patient's baseline for ADL function and identify physical deficits which impact ability to perform ADLs (bathing, care of mouth/teeth, toileting, grooming, dressing, etc.)  - Assess/evaluate cause of self-care deficits   - Assess range of motion  - Assess patient's mobility; develop plan if impaired  - Assess patient's need for assistive devices and provide as appropriate  - Encourage maximum independence but intervene and supervise when necessary  - Involve family in performance of ADLs  - Assess for home care needs following discharge   - Consider OT consult to assist with ADL evaluation and planning for discharge  - Provide patient education as appropriate  Outcome: Progressing  Goal: Maintains/Returns to pre admission functional level  Description: INTERVENTIONS:  - Perform AM-PAC 6 Click Basic Mobility/ Daily Activity assessment daily.  - Set and communicate daily mobility goal to care team and patient/family/caregiver.   - Collaborate with rehabilitation services on mobility goals if consulted  - Perform Range of Motion 3 times a day.  - Reposition patient every 2 hours.  - Dangle patient 3 times a day  - Stand patient 3 times a day  - Ambulate patient 3 times a day  - Out of bed to chair 3 times a day   - Out of bed for meals 3 times a day  - Out of bed for toileting  - Record patient progress and toleration of activity level   Outcome: Progressing     Problem: DISCHARGE PLANNING  Goal: Discharge to home or other facility with appropriate resources  Description: INTERVENTIONS:  - Identify barriers to discharge w/patient and caregiver  - Arrange for needed discharge resources and transportation as appropriate  - Identify discharge learning needs  (meds, wound care, etc.)  - Arrange for interpretive services to assist at discharge as needed  - Refer to Case Management Department for coordinating discharge planning if the patient needs post-hospital services based on physician/advanced practitioner order or complex needs related to functional status, cognitive ability, or social support system  Outcome: Progressing     Problem: Prexisting or High Potential for Compromised Skin Integrity  Goal: Skin integrity is maintained or improved  Description: INTERVENTIONS:  - Identify patients at risk for skin breakdown  - Assess and monitor skin integrity  - Assess and monitor nutrition and hydration status  - Monitor labs   - Assess for incontinence   - Turn and reposition patient  - Assist with mobility/ambulation  - Relieve pressure over bony prominences  - Avoid friction and shearing  - Provide appropriate hygiene as needed including keeping skin clean and dry  - Evaluate need for skin moisturizer/barrier cream  - Collaborate with interdisciplinary team   - Patient/family teaching  - Consider wound care consult   Outcome: Progressing

## 2024-11-26 ENCOUNTER — APPOINTMENT (INPATIENT)
Dept: RADIOLOGY | Facility: HOSPITAL | Age: 79
DRG: 560 | End: 2024-11-26
Payer: MEDICARE

## 2024-11-26 ENCOUNTER — PATIENT OUTREACH (OUTPATIENT)
Dept: CASE MANAGEMENT | Facility: OTHER | Age: 79
End: 2024-11-26

## 2024-11-26 LAB
GLUCOSE SERPL-MCNC: 154 MG/DL (ref 65–140)
GLUCOSE SERPL-MCNC: 170 MG/DL (ref 65–140)
GLUCOSE SERPL-MCNC: 180 MG/DL (ref 65–140)
GLUCOSE SERPL-MCNC: 221 MG/DL (ref 65–140)

## 2024-11-26 PROCEDURE — 99231 SBSQ HOSP IP/OBS SF/LOW 25: CPT | Performed by: PHYSICIAN ASSISTANT

## 2024-11-26 PROCEDURE — 97116 GAIT TRAINING THERAPY: CPT

## 2024-11-26 PROCEDURE — 97110 THERAPEUTIC EXERCISES: CPT

## 2024-11-26 PROCEDURE — 82948 REAGENT STRIP/BLOOD GLUCOSE: CPT

## 2024-11-26 PROCEDURE — 97530 THERAPEUTIC ACTIVITIES: CPT

## 2024-11-26 PROCEDURE — 73560 X-RAY EXAM OF KNEE 1 OR 2: CPT

## 2024-11-26 PROCEDURE — 97535 SELF CARE MNGMENT TRAINING: CPT

## 2024-11-26 PROCEDURE — 99232 SBSQ HOSP IP/OBS MODERATE 35: CPT | Performed by: PHYSICAL MEDICINE & REHABILITATION

## 2024-11-26 RX ADMIN — HYDROMORPHONE HYDROCHLORIDE 2 MG: 2 TABLET ORAL at 11:29

## 2024-11-26 RX ADMIN — ACETAMINOPHEN 975 MG: 325 TABLET, FILM COATED ORAL at 21:46

## 2024-11-26 RX ADMIN — PRAVASTATIN SODIUM 40 MG: 40 TABLET ORAL at 16:53

## 2024-11-26 RX ADMIN — ACETAMINOPHEN 975 MG: 325 TABLET, FILM COATED ORAL at 13:39

## 2024-11-26 RX ADMIN — GABAPENTIN 100 MG: 100 CAPSULE ORAL at 05:25

## 2024-11-26 RX ADMIN — WARFARIN SODIUM 2.5 MG: 2.5 TABLET ORAL at 16:54

## 2024-11-26 RX ADMIN — SENNOSIDES AND DOCUSATE SODIUM 2 TABLET: 50; 8.6 TABLET ORAL at 08:01

## 2024-11-26 RX ADMIN — INSULIN LISPRO 1 UNITS: 100 INJECTION, SOLUTION INTRAVENOUS; SUBCUTANEOUS at 16:55

## 2024-11-26 RX ADMIN — HYDROMORPHONE HYDROCHLORIDE 2 MG: 2 TABLET ORAL at 05:26

## 2024-11-26 RX ADMIN — METHOCARBAMOL 500 MG: 500 TABLET ORAL at 13:39

## 2024-11-26 RX ADMIN — SENNOSIDES AND DOCUSATE SODIUM 2 TABLET: 50; 8.6 TABLET ORAL at 16:53

## 2024-11-26 RX ADMIN — METHOCARBAMOL 500 MG: 500 TABLET ORAL at 21:46

## 2024-11-26 RX ADMIN — METFORMIN HYDROCHLORIDE 500 MG: 500 TABLET ORAL at 07:53

## 2024-11-26 RX ADMIN — FOLIC ACID 1 MG: 1 TABLET ORAL at 08:01

## 2024-11-26 RX ADMIN — OXYCODONE HYDROCHLORIDE AND ACETAMINOPHEN 500 MG: 500 TABLET ORAL at 16:53

## 2024-11-26 RX ADMIN — TAMSULOSIN HYDROCHLORIDE 0.4 MG: 0.4 CAPSULE ORAL at 16:53

## 2024-11-26 RX ADMIN — ACETAMINOPHEN 975 MG: 325 TABLET, FILM COATED ORAL at 05:25

## 2024-11-26 RX ADMIN — METHOCARBAMOL 500 MG: 500 TABLET ORAL at 05:25

## 2024-11-26 RX ADMIN — INSULIN LISPRO 2 UNITS: 100 INJECTION, SOLUTION INTRAVENOUS; SUBCUTANEOUS at 21:47

## 2024-11-26 RX ADMIN — GABAPENTIN 100 MG: 100 CAPSULE ORAL at 16:54

## 2024-11-26 RX ADMIN — INSULIN LISPRO 1 UNITS: 100 INJECTION, SOLUTION INTRAVENOUS; SUBCUTANEOUS at 07:53

## 2024-11-26 RX ADMIN — Medication 2000 UNITS: at 08:01

## 2024-11-26 RX ADMIN — INSULIN LISPRO 1 UNITS: 100 INJECTION, SOLUTION INTRAVENOUS; SUBCUTANEOUS at 11:29

## 2024-11-26 RX ADMIN — OXYCODONE HYDROCHLORIDE AND ACETAMINOPHEN 500 MG: 500 TABLET ORAL at 08:01

## 2024-11-26 RX ADMIN — HYDROMORPHONE HYDROCHLORIDE 2 MG: 2 TABLET ORAL at 18:05

## 2024-11-26 RX ADMIN — METOPROLOL SUCCINATE 25 MG: 25 TABLET, EXTENDED RELEASE ORAL at 21:46

## 2024-11-26 NOTE — PROGRESS NOTES
Progress Note - PMR   Name: Isauro Sow 79 y.o. male I MRN: [id number]  Unit/Bed#: -01 I Date of Admission: 11/18/2024   Date of Service: 11/26/2024 I Hospital Day: 8     Assessment & Plan  Status post total left knee replacement using cement  Performed by Dr. Braxton on 11/13.   Can remove surgical dressing POD 7 and replace with DSD.   WBAT.   Failed home therapies.   AROM/PROM/AAROM with no degree restriction.   PT/OT 3-5 hours/day, 5-6 days/week.   DVT Ppx fully anticoagulated on coumadin.   F/u Ortho (~11/27) for 2 week POV.   Acute pain  Related to TKA and edema in RLE  ACE Wraps  Increased oxycodone to 5-10mg Q4hr PRN - monitor response  Tylenol scheduled  Robaxin scheduled   Monitor and adjust as appropriate.   -Continues to have pain that is hindering with therapy. Will consult APS at this time (11/21)  Per APS: DC oxycodone and start dilaudid 1/2 mg q4h PRN and escalate to 2/4 if needed. Start gabapentin 100 mg HS - Gabapentin adjusted to 100 mg q12h.   Constipation  -infrequent and poor bowel movements in setting of narcotic use for recent L TKA  -bowel regimen in place - adjust as needed  -attempt to wean down narcotic usage as tolerated   - Last BM 11/18.   - 11/21 Patient constipated. Will give suppository this evening and monitor for BM   At risk for venous thromboembolism (VTE)  Fully anticoagulated on warfarin,SCDs.  Benign essential hypertension  Home: Toprol 25mg QHS,   here: Same.H Monitor and adjust as per IM.   Type 2 diabetes mellitus (HCC)  Lab Results   Component Value Date    HGBA1C 7.2 (H) 10/07/2024       Recent Labs     11/25/24  1102 11/25/24  1548 11/25/24  2123 11/26/24  0642   POCGLU 212* 163* 198* 154*       Blood Sugar Average: Last 72 hrs:  (P) [id number]    Home: Metformin 1000mg daily;   Here: CDI/Accuchecks.   If necessary, will try to get patient back on oral meds prior to discharge.   Diabetic Diet.   Management as per IM.     History of stroke  -Hx of  stroke in 2017 w/ residual L sided deficits   -Monitor   Stage 3a chronic kidney disease (HCC)  Lab Results   Component Value Date    EGFR 67 11/19/2024    EGFR 65 11/18/2024    EGFR 64 11/16/2024    CREATININE 1.04 11/19/2024    CREATININE 1.07 11/18/2024    CREATININE 1.09 11/16/2024     -Baseline Cr 1.0-1.3  Currently within baseline.   Avoid nephrotoxic meds, relative hypotension.   Monitor BMP, I/O.   S/P TAVR (transcatheter aortic valve replacement)  -TAVR procedure done 9/2023  -Follows [facility]TS outpatient   Chronic atrial flutter (HCC)  -Hx of a-flutter s/p PPM placement   -On Coumadin with Lovenox bridge  - Continue Toprol  -Trend INR, once therapeutic, can stop Lovenox   Urinary retention  11/21 patient endorsing symptoms of urinary retention in setting of constipation.  -Denies dysuria but will order UA and follow up - UA with trace protein and urobilinogen but negative for UTI     Health Maintenance  #Delirium/Sleep: At risk. Optimize sleep/wake, pain, bowel, bladder management. Avoid deliriogenic meds and physical restraint. Environmental/behavioral interventions.   #Skin/Pressure Injury Prevention: Turn Q2hr in bed, with weight shifts [id number] in wheelchair. Float heels in bed.  #Code Status: DNR/DNI  #FEN: Carb controlled diet   #Dispo: anticipate 1-2 week hospital course given debility from his recent surgery and co morbidities compounding adequate tissue healing and strength recovery. ADD 12/3      To Review:   Mr. Sow is a 78yo M with medical history of asthma, aflutter and SSS s/p medtronic PPM on coumadin, BPH, CAD, R ICA occlusion/L ICA stenosisk, CKD 3 (BL 1-1.3), COPD, T2DM, Gout, HLD, HTN, PERCY, Macular Degeneration, Severe Aortic Stenosis, previous CVA in 2017 who presented to [facility] on 11/18 POD 2 L TKA failing discharge to home with increased LLE pain. LE Doppler negative for DVT.  Ortho saw patient and had no other recommendations at this time. He had one low grade temp during stay, but  no further episodes, so no further work-up. He was recommend for [facility] given acute sequelae of his surgery, chronic comorbidities and extent of his weakness/impaired function. He was admitted to the [facility] on 11/18.     Chief Complaint: Pain     Interval History/Subjective: No acute events overnight. Patient with reports of pain even with medication adjustments made. Still describes it as achy and mostly behind his left knee and partway down his LE. Patient with no other concerns at this time. [facility] 11/25     Functional Update:  PT: Juan M bed mobility, IT transfers, min/modA walk 35' x 2 with roller walker.   OT: independent eating, supervision oral hygiene, setup dressing, totA footwear, min/mod shower/bathe, set up UBD, min/modA LBD and footwear, min/modA transfers     Objective     Temp:  [97.9 °F (36.6 °C)-98.2 °F (36.8 °C)] 98.2 °F (36.8 °C)  HR:  [60-78] 60  Resp:  [17-18] 18  BP: (120-129)/(60-64) 128/60  SpO2:  [94 %-97 %] 95 %  Physical Exam  Constitutional:       General: He is not in acute distress.     Appearance: Normal appearance. He is not toxic-appearing.   HENT:      Head: Normocephalic and atraumatic.   Eyes:      Extraocular Movements: Extraocular movements intact.      Pupils: Pupils are equal, round, and reactive to light.   Cardiovascular:      Rate and Rhythm: Normal rate and regular rhythm.      Pulses: Normal pulses.      Heart sounds: No murmur heard.     No gallop.   Pulmonary:      Effort: Pulmonary effort is normal.      Breath sounds: Normal breath sounds. No wheezing or rales.   Abdominal:      General: Abdomen is flat.      Palpations: Abdomen is soft.      Tenderness: There is no abdominal tenderness. There is no guarding.   Musculoskeletal:      Right lower leg: No edema.      Left lower leg: Edema (edema improving compared to previous encounter) present.      Comments: Ace wrap present over L knee    Skin:     General: Skin is warm and dry.   Neurological:      General: No focal deficit  present.      Mental Status: He is alert. Mental status is at baseline.   Psychiatric:         Mood and Affect: Mood normal.         Thought Content: Thought content normal.           Physical examination is otherwise unchanged from previous encounter, except as noted above.    Scheduled Meds:  Current Facility-Administered Medications   Medication Dose Route Frequency Provider Last Rate    acetaminophen  975 mg Oral Q8H MOON Morales MD      ascorbic acid  500 mg Oral BID Sung Morales MD      bisacodyl  10 mg Rectal Daily PRN Willie Washington DO      Cholecalciferol  2,000 Units Oral Daily Sung Morales MD      diphenhydrAMINE-zinc acetate   Topical TID PRN Lisa Garcia PA-C      folic acid  1 mg Oral Daily Sung Morales MD      gabapentin  100 mg Oral Q12H Ashley Depadua, MD      HYDROmorphone  1 mg Oral Q4H PRN DONTE Varma      Or    HYDROmorphone  2 mg Oral Q4H PRN DONTE Varma      insulin lispro  1-5 Units Subcutaneous HS Sung Morales MD      insulin lispro  1-6 Units Subcutaneous TID AC Sung Morales MD      metFORMIN  500 mg Oral Daily With Breakfast Christie Taylor PA-C      methocarbamol  500 mg Oral Q8H [facility] Ashley Depadua, MD      metoprolol succinate  25 mg Oral HS Sung Morales MD      naloxone  0.04 mg Intravenous Q1MIN PRN DONTE Varma      nitroglycerin  0.4 mg Sublingual Q5 Min PRN Sung Morales MD      ondansetron  4 mg Oral Q6H PRN Ashley Depadua, MD      polyethylene glycol  17 g Oral Daily Sung Morales MD      pravastatin  40 mg Oral Daily With Dinner Sung Morales MD      senna-docusate sodium  2 tablet Oral BID Ashley Depadua, MD      tamsulosin  0.4 mg Oral Daily With Dinner Ashley Depadua, MD      warfarin  2.5 mg Oral Daily (warfarin) Christie Taylor PA-C           Lab Results: I have reviewed the following results:            Results from last 7 days   Lab Units 11/25/24  0518 11/23/24  1513 11/22/24  0632   PROTIME seconds 25.0* 28.0*  28.0*   INR  2.27* 2.63* 2.63*        0658}      ** Please Note: Flu[employer] Direct voice to text software may have been used in the creation of this document. **

## 2024-11-26 NOTE — ASSESSMENT & PLAN NOTE
Related to TKA and edema in RLE  ACE Wraps  Increased oxycodone to 5-10mg Q4hr PRN - monitor response  Tylenol scheduled  Robaxin scheduled   Monitor and adjust as appropriate.   -Continues to have pain that is hindering with therapy. Will consult APS at this time (11/21)  Per APS: DC oxycodone and start dilaudid 1/2 mg q4h PRN and escalate to 2/4 if needed. Start gabapentin 100 mg HS - Gabapentin adjusted to 100 mg q12h.

## 2024-11-26 NOTE — PROGRESS NOTES
11/26/24 1005   Pain Assessment   Pain Assessment Tool 0-10   Pain Score 8   Pain Location/Orientation Orientation: Left;Location: Leg;Location: Knee   Pain Onset/Description Onset: Ongoing;Descriptor: Aching;Descriptor: Sore   Hospital Pain Intervention(s) Rest;Cold applied   Restrictions/Precautions   Precautions Fall Risk;Pain   LLE Weight Bearing Per Order WBAT   ROM Restrictions No   Cognition   Overall Cognitive Status WFL   Arousal/Participation Alert;Responsive;Cooperative   Attention Within functional limits   Orientation Level Oriented to person;Oriented to place;Oriented to situation   Memory Within functional limits   Following Commands Follows multistep commands without difficulty   Sit to Stand   Type of Assistance Needed Physical assistance;Verbal cues;Adaptive equipment;Incidental touching   Physical Assistance Level 26%-50%   Comment varied CGA to MIN/MODA this session   Sit to Stand CARE Score 3   Bed-Chair Transfer   Type of Assistance Needed Physical assistance;Verbal cues;Adaptive equipment   Physical Assistance Level 25% or less   Comment SUSY with RW   Chair/Bed-to-Chair Transfer CARE Score 3   Transfer Bed/Chair/Wheelchair   Adaptive Equipment Roller Walker   Car Transfer   Reason if not Attempted Safety concerns   Car Transfer CARE Score 88   Walk 10 Feet   Type of Assistance Needed Physical assistance;Verbal cues;Adaptive equipment   Physical Assistance Level 26%-50%   Comment varied MIN to CGA but decreased WB to LLE noted.   Walk 10 Feet CARE Score 3   Ambulation   Primary Mode of Locomotion Prior to Admission Walk   Distance Walked (feet) 35 ft  (x2)   Assist Device Roller Walker   Gait Pattern Inconsistant Margarita;Slow Margarita;Decreased foot clearance;R foot drag;Forward Flexion;L knee brenda;Step to;Decreased L stance;Improper weight shift   Limitations Noted In Balance;Endurance;Heel Strike;Posture;Safety;Speed;Strength   Provided Assistance with: Balance;Trunk Support   Walk  "Assist Level Minimum Assist   Does the patient walk? 2. Yes   Curb or Single Stair   Reason if not Attempted Refused to perform   1 Step (Curb) CARE Score 7   Picking Up Object   Reason if not Attempted Safety concerns   Picking Up Object CARE Score 88   Toilet Transfer   Reason if not Attempted Safety concerns   Toilet Transfer CARE Score 88   Therapeutic Interventions   Strengthening seated quad sets to LLE, x10 reps with 5 sec hold. BLE ankle DF/PF x30 reps. seated heel slides LLE increased hold in flex and ext.   Flexibility gentle ROM to L knee and ankle   Neuromuscular Re-Education RLE toe taps to 2\" step in // bars, L knee blocked  2 x10 reps. Poor WB to LLE and inability to stand erect.   Equipment Use   NuStep L2 x16 min for increased L knee ROM.   Assessment   Treatment Assessment Pt participated in skilled PT session with increased focus on gait, increased L knee ROM, increased WB to LLE and improved act tolerance. Pt was given a 1\" shoe lift in R shoe to increase ability to extend L knee in stance. Pt did feel it helped but cont to amb with flexed L knee and increased WB to BUE. Pt will cont POC as tolerated with cont focus on increased LLE ROM, increased WB to LLE, increased stair management and improved act tolerance to decrease burden of care.   Problem List Decreased strength;Decreased range of motion;Decreased endurance;Impaired balance;Decreased mobility;Decreased safety awareness;Pain   Barriers to Discharge Decreased caregiver support   PT Barriers   Functional Limitation Walking;Transfers   Plan   Treatment/Interventions Functional transfer training;LE strengthening/ROM;Therapeutic exercise;Endurance training;Bed mobility;Gait training   Progress Slow progress, decreased activity tolerance   PT Therapy Minutes   PT Time In 1005   PT Time Out 1125   PT Total Time (minutes) 80   PT Mode of treatment - Individual (minutes) 80   PT Mode of treatment - Concurrent (minutes) 0   PT Mode of treatment - " Group (minutes) 0   PT Mode of treatment - Co-treat (minutes) 0   PT Mode of Treatment - Total time(minutes) 80 minutes   PT Cumulative Minutes 773   Therapy Time missed   Time missed? No

## 2024-11-26 NOTE — ASSESSMENT & PLAN NOTE
Patient was recently admitted to [facility] for elective left TKA on 11/13/24, discharged with home therapy, returned to ED as he was unable to ambulate or participate with home PT.   Orthopedic evaluation appreciated   Currently admitted to [facility] for rehab  Pain control and therapy per PMR

## 2024-11-26 NOTE — PROGRESS NOTES
[facility] Occupational Therapy Daily Note     Occupational Therapy LTG's  Eating: Eating Goal: 06. Independent - Patient completes the activity by him/herself with no assistance from a helper.   Oral Care: Oral Hygiene Goal: 06. Independent - Patient completes the activity by him/herself with no assistance from a helper.   Bathing: Shower/bathe self Goal: 06. Independent - Patient completes the activity by him/herself with no assistance from a helper.   UB Dressing: Upper body dressing Goal: 06. Independent - Patient completes the activity by him/herself with no assistance from a helper.   LB dressing: Lower body dressing Goal: 06. Independent - Patient completes the activity by him/herself with no assistance from a helper.   Footwear: Putting on/taking off footwear Goal: 06. Independent - Patient completes the activity by him/herself with no assistance from a helper.   Toileting: Toileting hygiene Goal: 06. Independent - Patient completes the activity by him/herself with no assistance from a helper.   Toilet Transfer:  Toilet transfer Goal: 06. Independent - Patient completes the activity by him/herself with no assistance from a helper.   IADL's: Assist Level: Independent   Medication management:        Discharge Plan:  Home with family/friends support , Daily Checks, and Home OT services pending progress vs skilled rehab  DME:   Pt owns: Available Equipment: wheelchair, Roller Walker, Single Point Cane, Shower Chair     Recommended: TBD   Need to order: TBD   Ordered: TBD   11/26/24 1330   Pain Assessment   Pain Assessment Tool 0-10   Pain Score 8   Pain Location/Orientation Orientation: Left;Location: Knee   Hospital Pain Intervention(s) Cold applied;Repositioned   Restrictions/Precautions   Precautions Fall Risk;Bed/chair alarms;Pain   LLE Weight Bearing Per Order WBAT   Sit to Stand   Type of Assistance Needed Physical assistance   Physical Assistance Level 25% or less   Comment CGA with sit-stand with L UE on RW  arm. improved control to decend to chair   Sit to Stand CARE Score 3   Bed-Chair Transfer   Type of Assistance Needed Physical assistance   Physical Assistance Level 25% or less   Comment CGA Stand pivot to WC from recliner   Chair/Bed-to-Chair Transfer CARE Score 3   Functional Standing Tolerance   Time 2 min   Activity bean bag toss   Comments L side reaching to promote L weight shift- little success pt compensates a lot despite facilitation. focus on transfer of items from L-R hand to facilitate toileting clothing management. pt engages in 3 rounds with seated floor  with hand tongs to promote ant weight shift, L reaching and weight bearing through knee with passive flexion stretching.   Cognition   Overall Cognitive Status Impaired   Additional Activities   Additional Activities Comments focused a small amount on WC mobility in fuinctional activites as he still has a WC at home from his stroke. pt does req direct supervision and VC's for managment of brakes. with independent WC mobility pt was able to Maneuverr LE with increased time and small movements. would anticipate pt would need repetitive training   Activity Tolerance   Activity Tolerance Patient tolerated treatment well   Assessment   Treatment Assessment Pt participated in skilled OT treatment session with treatment focus on ADL re-training, fxnl xfers, standing tolerance, standing balance, and L Knee ROM . Pt tolerated session well, with improvements in overall sit-stand sequence and stand pivot transfers. Still remains a high fall risk and required moderate assistance for LB/standing ADLs. Family reporting they are unable to provide assistance at time of d/c and could not assist at this time. Would like to pursue SNF.  Pt is currently limited by the following deficits: L LE PAIN. Higher level cognitive functions  (Judgment, executive functions, praxis, cognitive flexibility, insight), Attention, Memory , Impaired standing balance, Impaired  righting reactions, impaired motor planning, Impaired sitting balance, Impaired vision, impaired Gross motor control, and limited activity tolerance. Pt continues to require skilled acute rehab OT services to increase overall functional independence and safety w/ I/ADLs and functional transfers.            Continued plan of care for OT sessions to focus on the following areas:  BASIC ADL Retraining , LB Dressing,  LHAE education/training, Meal preparation, Functional Transfers, Functional Cognition, Functional Attention, Standing tolerance, Standing balance , Gross motor coordination, Gross motor strengthening , DME training/education, Family training/education, Energy conservation training/education, healthy coping education, Leisure and social pursuits, and Core/trunk control/strengthening .           Following OT sessions to cont to focus on: basic functional transfers , basic ADL participation/progression,compensation strategy training, ADL at EOB, ADL at sink, out of bed activity tolerance , sitting balance, Toileting sequence, and standing balance activities.   OT Family training done with: met with family todday son, DIL and wife. they were so happy to see his progress with transfers and standing. today discussed needs for home and they are not able to provide assist at home and would like to pursue SNF.   Prognosis Fair   OT Therapy Minutes   OT Time In 1330   OT Time Out 1450   OT Total Time (minutes) 80   OT Mode of treatment - Individual (minutes) 80   OT Mode of treatment - Concurrent (minutes) 0   OT Mode of treatment - Group (minutes) 0   OT Mode of treatment - Co-treat (minutes) 0   OT Mode of Treatment - Total time(minutes) 80 minutes   OT Cumulative Minutes 560

## 2024-11-26 NOTE — PROGRESS NOTES
Chart review complete the patient is currently admitted to [facility] for STR. This Admin Coordinator will continue to monitor via chart review.

## 2024-11-26 NOTE — PROGRESS NOTES
"Progress Note - Hospitalist   Name: Isauro Sow 79 y.o. male I MRN: [id number]  Unit/Bed#: -01 I Date of Admission: 11/18/2024   Date of Service: 11/26/2024 I Hospital Day: 8    Assessment & Plan  Status post total left knee replacement using cement  Patient was recently admitted to [facility] for elective left TKA on 11/13/24, discharged with home therapy, returned to ED as he was unable to ambulate or participate with home PT.   Orthopedic evaluation appreciated   Currently admitted to [facility] for rehab  Pain control and therapy per PMR  Benign essential hypertension  Blood pressure acceptable  Continue with home dose Toprol 50mg qhs  Type 2 diabetes mellitus (HCC)  Lab Results   Component Value Date    HGBA1C 7.2 (H) 10/07/2024     Home: Metformin 1000mg daily.  Here: Metformin 500mg 2x daily - restarted 11/22/24  Continue QID Accuchecks/SSI and DM diet.  No changes today  Chronic atrial flutter (HCC)  Preoperative cardiology consultation recommended Lovenox to Coumadin bridge  INR 2.27  Continue Coumadin 2.5mg daily.  Goal INR 2-3   Continue Toprol for rate control  Repeat INR Thursday.  Stage 3a chronic kidney disease (HCC)  Renal function at baseline  Avoid nephrotoxins  History of stroke  Continue statin and coumadin  S/P TAVR (transcatheter aortic valve replacement)  Followed by cardiology and CTS as outpatient    The above assessment and plan was reviewed and updated as determined by my evaluation of the patient on 11/26/2024.    History of Present Illness   Patient seen and examined. Patients overnight issues or events were reviewed with nursing staff. New or overnight issues include the following:   Patient sitting comfortably in chair. States he's tired today, had a \"good workout yesterday\". Appetite good. No CP/SOB    Review of Systems    Objective :  Temp:  [97.9 °F (36.6 °C)-98.2 °F (36.8 °C)] 98.2 °F (36.8 °C)  HR:  [60-78] 60  BP: (120-129)/(60-64) 128/60  Resp:  [17-18] 18  SpO2:  " "[94 %-97 %] 95 %  O2 Device: None (Room air)    Invasive Devices       None                   Physical Exam  General Appearance: NAD; pleasant  HEENT: PERRLA, conjuctiva normal; mucous membranes moist; face symmetrical  Neck:  Supple  Lungs: clear bilaterally, normal respiratory effort, no retractions, expiratory effort normal, on room air  CV: regular rate   ABD: soft non tender, +BS x4  EXT: DP pulses intact, no lymphadenopathy, left leg wrapped in ACE bandage  Skin: normal turgor, normal texture, no rash  Psych: affect normal, mood normal  Neuro: AAOx3    The above physical exam was reviewed and updated as determined by my evaluation of the patient on 11/26/2024.      Lab Results: I have reviewed the following results:            Invalid input(s): \"LABGLOM\", \"CMP\"      Results from last 7 days   Lab Units 11/25/24  0518 11/23/24  1513   INR  2.27* 2.63*     Results from last 7 days   Lab Units 11/26/24  1049 11/26/24  0642 11/25/24  2123   POC GLUCOSE mg/dl 170* 154* 198*           Review of Scheduled Meds: Medications  reviewed and reconciled as needed  Current Facility-Administered Medications   Medication Dose Route Frequency Provider Last Rate    acetaminophen  975 mg Oral Q8H MOON Sung Morales MD      ascorbic acid  500 mg Oral BID Sung Morales MD      bisacodyl  10 mg Rectal Daily PRN Willie Washington DO      Cholecalciferol  2,000 Units Oral Daily Sung Morales MD      diphenhydrAMINE-zinc acetate   Topical TID PRN Lisa Garcia PA-C      folic acid  1 mg Oral Daily Sung Morales MD      gabapentin  100 mg Oral Q12H Ashley Depadua, MD      HYDROmorphone  1 mg Oral Q4H PRN DONTE Varma      Or    HYDROmorphone  2 mg Oral Q4H PRN DONTE Varma      insulin lispro  1-5 Units Subcutaneous HS Sung Morales MD      insulin lispro  1-6 Units Subcutaneous TID AC Sung Morales MD      metFORMIN  500 mg Oral Daily With Breakfast Christie Taylor PA-C      methocarbamol  500 mg Oral Q8H MOON " Ashley Depadua, MD      metoprolol succinate  25 mg Oral HS Sung Morales MD      naloxone  0.04 mg Intravenous Q1MIN PRN DONTE Varma      nitroglycerin  0.4 mg Sublingual Q5 Min PRN Sung Morales MD      ondansetron  4 mg Oral Q6H PRN Ashley Depadua, MD      polyethylene glycol  17 g Oral Daily Sung Morales MD      pravastatin  40 mg Oral Daily With Dinner Sung Morales MD      senna-docusate sodium  2 tablet Oral BID Ashley Depadua, MD      tamsulosin  0.4 mg Oral Daily With Dinner Ashley Depadua, MD      warfarin  2.5 mg Oral Daily (warfarin) Christie Taylor PA-C         VTE Pharmacologic Prophylaxis: coumadin  Code Status: Level 3 - DNAR and DNI  Current Length of Stay: 8 day(s)      ** Please Note:  voice to text software may have been used in the creation of this document. Although proof errors in transcription or interpretation are a potential of such software**

## 2024-11-26 NOTE — PROGRESS NOTES
11/26/24 0825   Pain Assessment   Pain Assessment Tool 0-10   Pain Score 8   Pain Location/Orientation Orientation: Left;Location: Knee   Pain Onset/Description Frequency: Intermittent;Descriptor: Sore;Descriptor: Discomfort  (pulling or stretching type of pain)   Hospital Pain Intervention(s) Medication (See MAR);Cold applied;Repositioned   Restrictions/Precautions   Precautions Fall Risk;Pain   LLE Weight Bearing Per Order WBAT   Cognition   Overall Cognitive Status WFL   Arousal/Participation Alert;Responsive;Cooperative   Attention Within functional limits   Orientation Level Oriented to person;Oriented to place;Oriented to situation   Following Commands Follows two step commands without difficulty   Subjective   Subjective It seems like the pain is not much better   Roll Left and Right   Type of Assistance Needed Physical assistance;Verbal cues   Physical Assistance Level 25% or less   Comment used bedrail for support; cues for proper limb positioning and correct use of AD for support   Roll Left and Right CARE Score 3   Sit to Lying   Reason if not Attempted Activity not applicable   Sit to Lying CARE Score 9   Lying to Sitting on Side of Bed   Type of Assistance Needed Physical assistance;Verbal cues   Physical Assistance Level 25% or less   Comment used bedrail for support; cues for proper limb positioning and correct use of AD for support   Lying to Sitting on Side of Bed CARE Score 3   Sit to Stand   Type of Assistance Needed Incidental touching;Verbal cues   Comment used walker for support; cues for correct hand/foot placement and weight shifting over COG   Sit to Stand CARE Score 4   Bed-Chair Transfer   Type of Assistance Needed Incidental touching;Verbal cues   Comment used walker for support; cues for correct hand/foot placement and weight shifting over COG   Chair/Bed-to-Chair Transfer CARE Score 4   Transfer Bed/Chair/Wheelchair   Stand Pivot Contact Guard; cues for correct hand/foot placement  and weight shifting over COG   Car Transfer   Reason if not Attempted Activity not applicable   Car Transfer CARE Score 9   Walk 10 Feet   Type of Assistance Needed Incidental touching;Verbal cues   Comment used walker for support   Walk 10 Feet CARE Score 4   Walk 50 Feet with Two Turns   Reason if not Attempted Medical concerns   Walk 50 Feet with Two Turns CARE Score 88   Walk 150 Feet   Reason if not Attempted Medical concerns   Walk 150 Feet CARE Score 88   Walking 10 Feet on Uneven Surfaces   Reason if not Attempted Medical concerns   Walking 10 Feet on Uneven Surfaces CARE Score 88   Ambulation   Does the patient walk? 2. Yes   Wheel 50 Feet with Two Turns   Reason if not Attempted Activity not applicable   Wheel 50 Feet with Two Turns CARE Score 9   Wheel 150 Feet   Reason if not Attempted Activity not applicable   Wheel 150 Feet CARE Score 9   Wheelchair mobility   Does the patient use a wheelchair? 1. Yes   Type of Wheelchair Used 1. Manual   Curb or Single Stair   Reason if not Attempted Refused to perform   1 Step (Curb) CARE Score 7   4 Steps   Reason if not Attempted Medical concerns   4 Steps CARE Score 88   12 Steps   Reason if not Attempted Medical concerns   12 Steps CARE Score 88   Picking Up Object   Reason if not Attempted Activity not applicable   Picking Up Object CARE Score 9   Toilet Transfer   Reason if not Attempted Activity not applicable   Toilet Transfer CARE Score 9   Therapeutic Interventions   Strengthening facilitated seated BLE AROM ankle pumps and hip abduction; hip flexion and knee LAQ's performed as AROM on RLE but AAROM on LLE; isometric hip adduction via pillow squeezes and gluteal isometrics - all exercises are performed for 3 sets of 10 reps each with 25% verbal, visual and tactile cues to LLE in order to ensure correct exercise form.   Equipment Use   NuStep facilitated BUE/BLE NU step machine for up to 10 minutes with light resistance with 25% verbal cues to improve  reciprocal movements and increase muscle performance.   Other Comments   Comments educated on energy conservation and activity pacing techniques; safety and sequencing techniques during bed mobility, sit <> stand and stand pivot transfers from bed to WC, WC <> NU step machine and WC to recliner; gait trained on level surfaces using FWW for support for up to 35 feet x 2 rounds + close WC follow with emphasis on LLE heel to toe pattern, knee stability during stance phase and optimal gait speed; instructed on safety and fall prevention precautions; taught on positioning or pressure relieving techniques.   Assessment   Treatment Assessment Patient participated in POT with reports of post surgical stiffness and pain in left knee which he rated as 7-8/10 on VPS. Denies of increased dizziness during supine to sit/sit to stand transitions, palpitations, chest pain, SOB, headaches or unusual fatigue with tasks provided at hand. He required extra time and increased effort to perform supine to sit and sit to stand transfers. No evidence of LOB or left knee buckling or instability during transfers and ambulation activities. He was given rest breaks post exercises and mobility tasks to help with energy conservation and recovery.   Problem List Decreased strength;Decreased range of motion;Decreased endurance;Impaired balance;Decreased mobility;Decreased safety awareness;Pain   PT Barriers   Functional Limitation Walking;Transfers   Plan   Treatment/Interventions Functional transfer training;LE strengthening/ROM;Therapeutic exercise;Endurance training;Bed mobility;Gait training  (Spoke to LASHELL Turner)   Progress Slow progress, decreased activity tolerance  (progress also impacted by pain, reduced safety awareness, balance and strength deficits)   PT Therapy Minutes   PT Time In 0825   PT Time Out 0925   PT Total Time (minutes) 60   PT Mode of treatment - Individual (minutes) 60   PT Mode of treatment - Concurrent (minutes) 0   PT  Mode of treatment - Group (minutes) 0   PT Mode of treatment - Co-treat (minutes) 0   PT Mode of Treatment - Total time(minutes) 60 minutes   PT Cumulative Minutes 693

## 2024-11-26 NOTE — ASSESSMENT & PLAN NOTE
Lab Results   Component Value Date    HGBA1C 7.2 (H) 10/07/2024       Recent Labs     11/25/24  1102 11/25/24  1548 11/25/24  2123 11/26/24  0642   POCGLU 212* 163* 198* 154*       Blood Sugar Average: Last 72 hrs:  (P) [id number]    Home: Metformin 1000mg daily;   Here: CDI/Accuchecks.   If necessary, will try to get patient back on oral meds prior to discharge.   Diabetic Diet.   Management as per IM.

## 2024-11-26 NOTE — PLAN OF CARE
Problem: PAIN - ADULT  Goal: Verbalizes/displays adequate comfort level or baseline comfort level  Description: Interventions:  - Encourage patient to monitor pain and request assistance  - Assess pain using appropriate pain scale  - Administer analgesics based on type and severity of pain and evaluate response  - Implement non-pharmacological measures as appropriate and evaluate response  - Consider cultural and social influences on pain and pain management  - Notify physician/advanced practitioner if interventions unsuccessful or patient reports new pain  Outcome: Progressing     Problem: INFECTION - ADULT  Goal: Absence or prevention of progression during hospitalization  Description: INTERVENTIONS:  - Assess and monitor for signs and symptoms of infection  - Monitor lab/diagnostic results  - Monitor all insertion sites, i.e. indwelling lines, tubes, and drains  - Monitor endotracheal if appropriate and nasal secretions for changes in amount and color  - [place] appropriate cooling/warming therapies per order  - Administer medications as ordered  - Instruct and encourage patient and family to use good hand hygiene technique  - Identify and instruct in appropriate isolation precautions for identified infection/condition  Outcome: Progressing     Problem: SAFETY ADULT  Goal: Patient will remain free of falls  Description: INTERVENTIONS:  - Educate patient/family on patient safety including physical limitations  - Instruct patient to call for assistance with activity   - Consult OT/PT to assist with strengthening/mobility   - Keep Call bell within reach  - Keep bed low and locked with side rails adjusted as appropriate  - Keep care items and personal belongings within reach  - Initiate and maintain comfort rounds  - Make Fall Risk Sign visible to staff  - Offer Toileting every 2 Hours, in advance of need  - Initiate/Maintain bed/chair alarm  - Obtain necessary fall risk management equipment: alarms  - Apply  yellow socks and bracelet for high fall risk patients  - Consider moving patient to room near nurses station  Outcome: Progressing  Goal: Maintain or return to baseline ADL function  Description: INTERVENTIONS:  -  Assess patient's ability to carry out ADLs; assess patient's baseline for ADL function and identify physical deficits which impact ability to perform ADLs (bathing, care of mouth/teeth, toileting, grooming, dressing, etc.)  - Assess/evaluate cause of self-care deficits   - Assess range of motion  - Assess patient's mobility; develop plan if impaired  - Assess patient's need for assistive devices and provide as appropriate  - Encourage maximum independence but intervene and supervise when necessary  - Involve family in performance of ADLs  - Assess for home care needs following discharge   - Consider OT consult to assist with ADL evaluation and planning for discharge  - Provide patient education as appropriate  Outcome: Progressing  Goal: Maintains/Returns to pre admission functional level  Description: INTERVENTIONS:  - Perform AM-PAC 6 Click Basic Mobility/ Daily Activity assessment daily.  - Set and communicate daily mobility goal to care team and patient/family/caregiver.   - Collaborate with rehabilitation services on mobility goals if consulted  - Perform Range of Motion 3 times a day.  - Reposition patient every 2 hours.  - Dangle patient 3 times a day  - Stand patient 3 times a day  - Ambulate patient 3 times a day  - Out of bed to chair 3 times a day   - Out of bed for meals 3 times a day  - Out of bed for toileting  - Record patient progress and toleration of activity level   Outcome: Progressing     Problem: DISCHARGE PLANNING  Goal: Discharge to home or other facility with appropriate resources  Description: INTERVENTIONS:  - Identify barriers to discharge w/patient and caregiver  - Arrange for needed discharge resources and transportation as appropriate  - Identify discharge learning needs  (meds, wound care, etc.)  - Arrange for interpretive services to assist at discharge as needed  - Refer to Case Management Department for coordinating discharge planning if the patient needs post-hospital services based on physician/advanced practitioner order or complex needs related to functional status, cognitive ability, or social support system  Outcome: Progressing     Problem: Prexisting or High Potential for Compromised Skin Integrity  Goal: Skin integrity is maintained or improved  Description: INTERVENTIONS:  - Identify patients at risk for skin breakdown  - Assess and monitor skin integrity  - Assess and monitor nutrition and hydration status  - Monitor labs   - Assess for incontinence   - Turn and reposition patient  - Assist with mobility/ambulation  - Relieve pressure over bony prominences  - Avoid friction and shearing  - Provide appropriate hygiene as needed including keeping skin clean and dry  - Evaluate need for skin moisturizer/barrier cream  - Collaborate with interdisciplinary team   - Patient/family teaching  - Consider wound care consult   Outcome: Progressing

## 2024-11-27 LAB
GLUCOSE SERPL-MCNC: 153 MG/DL (ref 65–140)
GLUCOSE SERPL-MCNC: 179 MG/DL (ref 65–140)
GLUCOSE SERPL-MCNC: 180 MG/DL (ref 65–140)
GLUCOSE SERPL-MCNC: 205 MG/DL (ref 65–140)

## 2024-11-27 PROCEDURE — 97530 THERAPEUTIC ACTIVITIES: CPT

## 2024-11-27 PROCEDURE — 99232 SBSQ HOSP IP/OBS MODERATE 35: CPT | Performed by: PHYSICIAN ASSISTANT

## 2024-11-27 PROCEDURE — 97535 SELF CARE MNGMENT TRAINING: CPT

## 2024-11-27 PROCEDURE — 99232 SBSQ HOSP IP/OBS MODERATE 35: CPT

## 2024-11-27 PROCEDURE — 82948 REAGENT STRIP/BLOOD GLUCOSE: CPT

## 2024-11-27 PROCEDURE — NC001 PR NO CHARGE: Performed by: STUDENT IN AN ORGANIZED HEALTH CARE EDUCATION/TRAINING PROGRAM

## 2024-11-27 PROCEDURE — 97110 THERAPEUTIC EXERCISES: CPT

## 2024-11-27 PROCEDURE — 97116 GAIT TRAINING THERAPY: CPT

## 2024-11-27 RX ADMIN — PRAVASTATIN SODIUM 40 MG: 40 TABLET ORAL at 16:27

## 2024-11-27 RX ADMIN — HYDROMORPHONE HYDROCHLORIDE 2 MG: 2 TABLET ORAL at 12:32

## 2024-11-27 RX ADMIN — TAMSULOSIN HYDROCHLORIDE 0.4 MG: 0.4 CAPSULE ORAL at 16:27

## 2024-11-27 RX ADMIN — INSULIN LISPRO 1 UNITS: 100 INJECTION, SOLUTION INTRAVENOUS; SUBCUTANEOUS at 07:30

## 2024-11-27 RX ADMIN — WARFARIN SODIUM 2.5 MG: 2.5 TABLET ORAL at 18:14

## 2024-11-27 RX ADMIN — METHOCARBAMOL 500 MG: 500 TABLET ORAL at 05:55

## 2024-11-27 RX ADMIN — ACETAMINOPHEN 975 MG: 325 TABLET, FILM COATED ORAL at 22:00

## 2024-11-27 RX ADMIN — INSULIN LISPRO 1 UNITS: 100 INJECTION, SOLUTION INTRAVENOUS; SUBCUTANEOUS at 22:03

## 2024-11-27 RX ADMIN — METOPROLOL SUCCINATE 25 MG: 25 TABLET, EXTENDED RELEASE ORAL at 22:01

## 2024-11-27 RX ADMIN — INSULIN LISPRO 1 UNITS: 100 INJECTION, SOLUTION INTRAVENOUS; SUBCUTANEOUS at 12:28

## 2024-11-27 RX ADMIN — FOLIC ACID 1 MG: 1 TABLET ORAL at 08:13

## 2024-11-27 RX ADMIN — HYDROMORPHONE HYDROCHLORIDE 2 MG: 2 TABLET ORAL at 00:19

## 2024-11-27 RX ADMIN — METHOCARBAMOL 500 MG: 500 TABLET ORAL at 22:00

## 2024-11-27 RX ADMIN — METHOCARBAMOL 500 MG: 500 TABLET ORAL at 15:11

## 2024-11-27 RX ADMIN — Medication 2000 UNITS: at 08:13

## 2024-11-27 RX ADMIN — ACETAMINOPHEN 975 MG: 325 TABLET, FILM COATED ORAL at 05:55

## 2024-11-27 RX ADMIN — OXYCODONE HYDROCHLORIDE AND ACETAMINOPHEN 500 MG: 500 TABLET ORAL at 08:13

## 2024-11-27 RX ADMIN — ACETAMINOPHEN 975 MG: 325 TABLET, FILM COATED ORAL at 15:11

## 2024-11-27 RX ADMIN — INSULIN LISPRO 2 UNITS: 100 INJECTION, SOLUTION INTRAVENOUS; SUBCUTANEOUS at 16:28

## 2024-11-27 RX ADMIN — OXYCODONE HYDROCHLORIDE AND ACETAMINOPHEN 500 MG: 500 TABLET ORAL at 18:15

## 2024-11-27 RX ADMIN — HYDROMORPHONE HYDROCHLORIDE 2 MG: 2 TABLET ORAL at 08:12

## 2024-11-27 RX ADMIN — SENNOSIDES AND DOCUSATE SODIUM 2 TABLET: 50; 8.6 TABLET ORAL at 08:13

## 2024-11-27 RX ADMIN — GABAPENTIN 100 MG: 100 CAPSULE ORAL at 05:55

## 2024-11-27 RX ADMIN — SENNOSIDES AND DOCUSATE SODIUM 2 TABLET: 50; 8.6 TABLET ORAL at 18:14

## 2024-11-27 RX ADMIN — GABAPENTIN 100 MG: 100 CAPSULE ORAL at 18:14

## 2024-11-27 RX ADMIN — METFORMIN HYDROCHLORIDE 500 MG: 500 TABLET ORAL at 07:29

## 2024-11-27 NOTE — ASSESSMENT & PLAN NOTE
Lab Results   Component Value Date    HGBA1C 7.2 (H) 10/07/2024       Recent Labs     11/26/24  1049 11/26/24  1637 11/26/24  2112 11/27/24  0652   POCGLU 170* 180* 221* 153*       Blood Sugar Average: Last 72 hrs:  (P) 175.0216313137589585    Home: Metformin 1000mg daily;   Here: Metformin 500m qday; CDI/Accuchecks.   If necessary, will try to get patient back on oral meds prior to discharge.   Diabetic Diet.   Management as per IM.

## 2024-11-27 NOTE — ASSESSMENT & PLAN NOTE
Assessment:   80 yo male 2 weeks s/p left total knee arthroplasty performed on 11/13/2024    Plan:   Continue multi-modal pain control  Weight bearing as tolerated  Continue home coumadin 2.5 mg  Incision care: Avoid scrubbing or submerging  Continue PT/OT  F/U in 4 weeks

## 2024-11-27 NOTE — ASSESSMENT & PLAN NOTE
Related to TKA and edema in RLE  ACE Wraps  Tylenol 975mg TID scheduled  Robaxin 500mg TID scheduled   Monitor and adjust as appropriate.   -Per APS who was consulted earlier in course: DC oxycodone and start dilaudid 1-2 mg q4h PRN and escalate to 2/4 if needed. Start gabapentin 100 mg HS  - Gabapentin adjusted to 100 mg q12h.

## 2024-11-27 NOTE — PLAN OF CARE
Problem: PAIN - ADULT  Goal: Verbalizes/displays adequate comfort level or baseline comfort level  Description: Interventions:  - Encourage patient to monitor pain and request assistance  - Assess pain using appropriate pain scale  - Administer analgesics based on type and severity of pain and evaluate response  - Implement non-pharmacological measures as appropriate and evaluate response  - Consider cultural and social influences on pain and pain management  - Notify physician/advanced practitioner if interventions unsuccessful or patient reports new pain  Outcome: Progressing     Problem: INFECTION - ADULT  Goal: Absence or prevention of progression during hospitalization  Description: INTERVENTIONS:  - Assess and monitor for signs and symptoms of infection  - Monitor lab/diagnostic results  - Monitor all insertion sites, i.e. indwelling lines, tubes, and drains  - Monitor endotracheal if appropriate and nasal secretions for changes in amount and color  - [place] appropriate cooling/warming therapies per order  - Administer medications as ordered  - Instruct and encourage patient and family to use good hand hygiene technique  - Identify and instruct in appropriate isolation precautions for identified infection/condition  Outcome: Progressing     Problem: SAFETY ADULT  Goal: Patient will remain free of falls  Description: INTERVENTIONS:  - Educate patient/family on patient safety including physical limitations  - Instruct patient to call for assistance with activity   - Consult OT/PT to assist with strengthening/mobility   - Keep Call bell within reach  - Keep bed low and locked with side rails adjusted as appropriate  - Keep care items and personal belongings within reach  - Initiate and maintain comfort rounds  - Make Fall Risk Sign visible to staff  - Offer Toileting every 2 Hours, in advance of need  - Initiate/Maintain bed.chair alarm  - Obtain necessary fall risk management equipment: nonskid socks  -  Apply yellow socks and bracelet for high fall risk patients  - Consider moving patient to room near nurses station  Outcome: Progressing  Goal: Maintain or return to baseline ADL function  Description: INTERVENTIONS:  -  Assess patient's ability to carry out ADLs; assess patient's baseline for ADL function and identify physical deficits which impact ability to perform ADLs (bathing, care of mouth/teeth, toileting, grooming, dressing, etc.)  - Assess/evaluate cause of self-care deficits   - Assess range of motion  - Assess patient's mobility; develop plan if impaired  - Assess patient's need for assistive devices and provide as appropriate  - Encourage maximum independence but intervene and supervise when necessary  - Involve family in performance of ADLs  - Assess for home care needs following discharge   - Consider OT consult to assist with ADL evaluation and planning for discharge  - Provide patient education as appropriate  Outcome: Progressing  Goal: Maintains/Returns to pre admission functional level  Description: INTERVENTIONS:  - Perform AM-PAC 6 Click Basic Mobility/ Daily Activity assessment daily.  - Set and communicate daily mobility goal to care team and patient/family/caregiver.   - Collaborate with rehabilitation services on mobility goals if consulted  - Perform Range of Motion 3 times a day.  - Reposition patient every 2 hours.  - Dangle patient 3 times a day  - Stand patient 3 times a day  - Ambulate patient 3 times a day  - Out of bed to chair 3 times a day   - Out of bed for meals 3 times a day  - Out of bed for toileting  - Record patient progress and toleration of activity level   Outcome: Progressing     Problem: DISCHARGE PLANNING  Goal: Discharge to home or other facility with appropriate resources  Description: INTERVENTIONS:  - Identify barriers to discharge w/patient and caregiver  - Arrange for needed discharge resources and transportation as appropriate  - Identify discharge learning  needs (meds, wound care, etc.)  - Arrange for interpretive services to assist at discharge as needed  - Refer to Case Management Department for coordinating discharge planning if the patient needs post-hospital services based on physician/advanced practitioner order or complex needs related to functional status, cognitive ability, or social support system  Outcome: Progressing     Problem: Prexisting or High Potential for Compromised Skin Integrity  Goal: Skin integrity is maintained or improved  Description: INTERVENTIONS:  - Identify patients at risk for skin breakdown  - Assess and monitor skin integrity  - Assess and monitor nutrition and hydration status  - Monitor labs   - Assess for incontinence   - Turn and reposition patient  - Assist with mobility/ambulation  - Relieve pressure over bony prominences  - Avoid friction and shearing  - Provide appropriate hygiene as needed including keeping skin clean and dry  - Evaluate need for skin moisturizer/barrier cream  - Collaborate with interdisciplinary team   - Patient/family teaching  - Consider wound care consult   Outcome: Progressing

## 2024-11-27 NOTE — ASSESSMENT & PLAN NOTE
Patient was recently admitted to [facility] for elective left TKA on 11/13/24, discharged with home therapy, returned to ED as he was unable to ambulate or participate with home PT.   Orthopedic evaluation appreciated-seen again by ortho today, xrays ordered and reviewed. Will need outpatient follow up in 4 weeks  Currently admitted to [facility] for rehab  Pain control and therapy per PMR

## 2024-11-27 NOTE — CASE MANAGEMENT
Met w/pt during OT and reviewed the potential dc plan for subacute rehab. Pt would like to stay here longer. Cm explained dc date is scheduled for Monday and does he feel he would achieve goals to go home by then. Pt stated no. Cm suggested a plan for subacute rehab be initiated. Cm confirmed pt went to [facility] but he wasn't happy with his therapy. Pt asked cm to phone his sons' girlfriend. Cm phoned Taylor at [phone] and received voice mail. [facility] left.     [id number] received call back from Taylor and explained the situation. She understands pt prefers [facility] to a snf due to the level of care. Hayden explained he needs to push himself to make his goals to return home. Taylor confirmed he needs to be independent as his spouse is not helpful. List emailed to taylor at [email or web address] and the list was also left in pts room. She will follow up on Friday with cb.

## 2024-11-27 NOTE — PROGRESS NOTES
Progress Note - PMR   Name: Isauro Sow 79 y.o. male I MRN: [id number]  Unit/Bed#: -01 I Date of Admission: 11/18/2024   Date of Service: 11/27/2024 I Hospital Day: 9    Assessment & Plan  Status post total left knee replacement using cement  Performed by Dr. Braxton on 11/13.  Function improving    WBAT.  AROM/PROM/AAROM with no degree restriction.   PT/OT 3-5 hours/day, 5-6 days/week.   DVT Ppx fully anticoagulated on coumadin.   F/u Ortho (~11/27) for 2 week POV - c/s placed   L Knee XR 11/26 No acute fracture or dislocation. Knee joint effusion.Satisfactory positioning of the arthroplasty.   No lytic or blastic osseous lesion. Soft tissue swelling along the ventral aspect of the distal thigh. Prior subcutaneous emphysema has resolved. IMPRESSION: Satisfactory appearance of the arthroplasty.  Acute pain  Related to TKA and edema in RLE  ACE Wraps  Tylenol 975mg TID scheduled  Robaxin 500mg TID scheduled   Monitor and adjust as appropriate.   -Per APS who was consulted earlier in course: DC oxycodone and start dilaudid 1-2 mg q4h PRN and escalate to 2/4 if needed. Start gabapentin 100 mg HS  - Gabapentin adjusted to 100 mg q12h.   Constipation  Improved   -bowel regimen in place - adjust as needed  -attempt to wean down narcotic usage as tolerated   - Last BM 11/25-26  - Colace-senna BID, miralax qday; PRN supp  At risk for venous thromboembolism (VTE)  Fully anticoagulated on warfarin,SCDs.  Benign essential hypertension  Home: Toprol 25mg QHS,   here: Same.H Monitor and adjust as per IM.   Type 2 diabetes mellitus (HCC)  Lab Results   Component Value Date    HGBA1C 7.2 (H) 10/07/2024       Recent Labs     11/26/24  1049 11/26/24  1637 11/26/24  2112 11/27/24  0652   POCGLU 170* 180* 221* 153*       Blood Sugar Average: Last 72 hrs:  (P) [id number]    Home: Metformin 1000mg daily;   Here: Metformin 500m qday; CDI/Accuchecks.   If necessary, will try to get patient back on oral meds prior to  discharge.   Diabetic Diet.   Management as per IM.     History of stroke  -Hx of stroke in 2017 w/ residual L sided deficits   -Monitor   Stage 3a chronic kidney disease (HCC)  Lab Results   Component Value Date    EGFR 67 11/19/2024    EGFR 65 11/18/2024    EGFR 64 11/16/2024    CREATININE 1.04 11/19/2024    CREATININE 1.07 11/18/2024    CREATININE 1.09 11/16/2024     -Baseline Cr 1.0-1.3  Currently within baseline.   Avoid nephrotoxic meds, relative hypotension.   Monitor BMP, I/O.   S/P TAVR (transcatheter aortic valve replacement)  -TAVR procedure done 9/2023  -Follows CTS outpatient   Chronic atrial flutter (HCC)  -Hx of a-flutter s/p PPM placement   -On Coumadin per IM   - Continue Toprol    Urinary retention  Improved   11/21 patient endorsing symptoms of urinary retention in setting of constipation.  -Denies dysuria but will order UA and follow up - UA with trace protein and urobilinogen but negative for UTI   Anemia  - Hb down to 9.6 on 11/19 - CBC Tomorrow   - Mgmt per primary team and recommend optimal mgmt during rehab process  - Monitor H/H, vitals, signs/symptoms of acute bleeding      VTE risk - warfarin    Subjective   On eval, patient reports knee pain similar today.  He denies fever, chills, SOB, calf pain, constipation or other new complaints.    Chief Complaint:  F/u knee sx    Objective :  Temp:  [98.3 °F (36.8 °C)-98.4 °F (36.9 °C)] 98.4 °F (36.9 °C)  HR:  [62] 62  BP: (130-147)/(58-63) 147/58  Resp:  [16-18] 18  SpO2:  [93 %-96 %] 96 %  O2 Device: None (Room air)    Functional Update:  Mobility: 10 ft mod assist   Transfers: min-mod assist  ADLs: Min assist to hygiene, LBD; mod assist bathing;     Physical Exam    General: Awake, alert in NAD  HENT: NCAT, MMM  Respiratory: Unlabored breathing  Cardiovascular: Regular rate   Gastrointestinal: Soft, non-distended  Genitourinary: No abbasi  SkiN/MSK/Extremities:  No calf edema, no calf tenderness to palpation; R knee with ace wrap in  place  Neurologic/Psych:   MENTAL STATUS: awake, oriented to person, place, time, and situation  Affect: Euthymic     Scheduled Meds:  Current Facility-Administered Medications   Medication Dose Route Frequency Provider Last Rate    acetaminophen  975 mg Oral Q8H MOON Morales MD      ascorbic acid  500 mg Oral BID Sung Morales MD      bisacodyl  10 mg Rectal Daily PRN Willie Washington DO      Cholecalciferol  2,000 Units Oral Daily Sung Morales MD      diphenhydrAMINE-zinc acetate   Topical TID PRN Lisa Garcia PA-C      folic acid  1 mg Oral Daily Sung Morales MD      gabapentin  100 mg Oral Q12H Ashley Depadua, MD      HYDROmorphone  1 mg Oral Q4H PRN DONTE Varma      Or    HYDROmorphone  2 mg Oral Q4H PRN DONTE Varma      insulin lispro  1-5 Units Subcutaneous HS Sung Morales MD      insulin lispro  1-6 Units Subcutaneous TID AC Sung Morales MD      metFORMIN  500 mg Oral Daily With Breakfast Christie Taylor PA-C      methocarbamol  500 mg Oral Q8H [facility] Ashley Depadua, MD      metoprolol succinate  25 mg Oral HS Sung Morales MD      naloxone  0.04 mg Intravenous Q1MIN PRN DONTE Varma      nitroglycerin  0.4 mg Sublingual Q5 Min PRN Sung Morales MD      ondansetron  4 mg Oral Q6H PRN Ashley Depadua, MD      polyethylene glycol  17 g Oral Daily Sung Morales MD      pravastatin  40 mg Oral Daily With Dinner Sung Morales MD      senna-docusate sodium  2 tablet Oral BID Ashley Depadua, MD      tamsulosin  0.4 mg Oral Daily With Dinner Ashley Depadua, MD      warfarin  2.5 mg Oral Daily (warfarin) Christie Taylor PA-C           Lab Results: I have reviewed the following results:            Results from last 7 days   Lab Units 11/25/24  0518 11/23/24  1513 11/22/24  0632   PROTIME seconds 25.0* 28.0* 28.0*   INR  2.27* 2.63* 2.63*

## 2024-11-27 NOTE — TEAM CONFERENCE
Acute RehabilitationTeam Conference Note  Date: 11/27/2024   Time: 11:50 AM       Patient Name:  Isauro Sow       Medical Record Number: [id number]   YOB: 1945  Sex: Male          Room/Bed:  [place] [address]/[facility]4-01  Payor Info:  Payor: MEDICARE / Plan: MEDICARE A AND B / Product Type: Medicare A & B Fee for Service /      Admitting Diagnosis: Total knee replacement status, left [Z96.652]   Admit Date/Time:  11/18/2024  5:42 PM  Admission Comments: No comment available     Primary Diagnosis:  Status post total left knee replacement using cement  Principal Problem: Status post total left knee replacement using cement    Patient Active Problem List    Diagnosis Date Noted    Acute pain 11/20/2024    At risk for venous thromboembolism (VTE) 11/20/2024    Status post total left knee replacement using cement 11/15/2024    Anemia 11/15/2024    Preoperative evaluation of a medical condition to rule out surgical contraindications (TAR required) 10/31/2024    Chronic pain of left knee 08/22/2024    Lumbar spondylosis 03/14/2024    Decreased pedal pulses 03/07/2024    Stenosis of left carotid artery 02/14/2024    Other constipation 02/14/2024    Chronic midline low back pain without sciatica 12/07/2023    Platelets decreased ([facility]) 11/13/2023    Aortic valve stenosis 11/10/2023    Hx of cardiac pacemaker 09/22/2023    Chronic atrial flutter (HCC) 09/22/2023    Chronic diastolic CHF (congestive heart failure) ([facility]) 09/22/2023    S/P TAVR (transcatheter aortic valve replacement) 09/21/2023    Coronary artery disease involving native coronary artery 08/28/2023    Sick sinus syndrome (HCC) 01/24/2023    Continuous opioid dependence (HCC) 10/24/2022    Dysuria 09/06/2022    Urinary retention 08/19/2022    L3 osteophyte fracture 08/16/2022    Fracture of thoracic transverse process (HCC) 08/15/2022    Lumbar transverse process fracture (HCC) 08/15/2022    Stage 3a chronic kidney disease (HCC) 03/17/2022    RLS  (restless legs syndrome) 06/17/2021    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 ([facility]) 05/07/2021    Chronic bilateral low back pain without sciatica 03/30/2021    Hemiplegia and hemiparesis following cerebral infarction affecting left non-dominant side ([facility]) 06/18/2020    Benign prostatic hyperplasia with nocturia 06/18/2020    PAD (peripheral artery disease) ([facility]) 04/19/2019    Primary osteoarthritis of left knee 01/10/2019    Bilateral carotid artery stenosis 10/08/2018    Dermatosis 10/08/2018    Plantar fasciitis 06/04/2018    Constipation 01/16/2018    Seborrheic dermatitis 11/10/2017    History of stroke 09/15/2017    Asthma 07/26/2017    Benign essential hypertension 07/26/2017    Type 2 diabetes mellitus ([facility]) 07/26/2017    Hyperlipidemia 07/26/2017    Diabetic nephropathy associated with type 2 diabetes mellitus ([facility]) 06/22/2017    Non-rheumatic aortic sclerosis 06/22/2017    Popliteal cyst, left 10/15/2015    Acquired hallux malleus of right foot 10/06/2015    Pre-ulcerative corn or callous 10/06/2015    Gout 12/11/2014    Allergic rhinitis 05/03/2012    Retina disorder 05/03/2012       Physical Therapy:    Weight Bearing Status: Weight Bearing as Tolerated  Transfers: Minimal Assistance, Moderate Assistance  Bed Mobility: Moderate Assistance  Amulation Distance (ft): 35 feet  Ambulation: Moderate Assistance  Assistive Device for Ambulation: Roller Walker  Assistive Device for Stairs: Bilateral Hand Rails  Discharge Recommendations: Home with:  DC Home with:: Family Support, Home Physical Therapy, First Floor Setup    Isauro presents to [facility] s/p L TKA. Currently he has a significant decline in function, needing Mod-MaxAx2 for transfers and bed mobility. He has very limited ability to WB on LLE, which is limiting stand pivot transfers, walking and stairs. Current PT POC is focusing on working with medical team to manage pain, swelling, increase L knee AROM/PROM,  LLE WBing and improving overall activity tolerance to progress standing functional mobility. WC level goals made as a back up mobility plan if walking doesn't progress. Limited caregiver availability is a barrier to home as well, as his wife is unable to help him since he was helping her at home PTA. Will need to confirm with family availability for help and recommendations for home, as anticipating he will need help with all IADLs. Current goals are set for Jillian for household distance mobility with use of RW, however will need to reassess if WBing doesn't progress, as this will negatively impact safety and functional mobility.     11/26/24  Pt cont to be limited by increased L knee pain, limited gait, increased WB to BUE, limited caregiver support, swelling decreased ROM and decreased stair management. Pt is currently able to amb up to 35' with RW, he can vary CGA to MODA pending fatigue and pain levels. Pt did trial a step but his L knee did buckle and is now refusing to trial 2/2 fear. Pt required CGA to MODA with STS transfers as well 2/2 pain and fatigue levels. Pt will cont POC as tolerated with cont focus on increased I with all gait, transfers and stair management  with improved WB to LLE to decrease burden of care at home.    Occupational Therapy:  Eating: Independent  Grooming: Supervision  Bathing: Minimal Assistance  Bathing: Minimal Assistance  Upper Body Dressing: Supervision  Lower Body Dressing: Moderate Assistance  Toileting: Moderate Assistance  Toilet Transfer: Moderate Assistance  Cognition: Exceptions to WNL  Cognition: Decreased Memory, Decreased Executive Functions, Decreased Attention, Behavioral Considerations  Orientation: Person, Place, Time, Situation  Discharge Recommendations: Home with:  DC Home with:: Family Support, Home Occupational Therapy       Occupational Therapy Weekly Team Note    Pt continues to make good progress with skilled occupational therapy intervention and is  progressing toward long term goals for ADL, IADL's, and functional transfers/mobility. Pts long term goals for ADLs are Independent with Rolling Walker. Pt is currently limited by the following deficits: L LE PAIN. Higher level cognitive functions  (Judgment, executive functions, praxis, cognitive flexibility, insight), Attention, Memory , Impaired standing balance, Impaired righting reactions, impaired motor planning, Impaired sitting balance, Impaired vision, impaired Gross motor control, and limited activity tolerance. Pt continues to require skilled acute rehab OT services to increase overall functional independence and safety w/ I/ADLs and functional transfers. Family training/education will be required prior to D/C.      Pt will continue to benefit from skilled acute rehab OT services to address above mentioned barriers and maximize functional independence in baseline areas of occupation to meet established treatment goals with overall decreased burden of care. Continued plan of care for OT sessions to focus on the following areas:  ADL Retraining , LB Dressing,  LHAE education/training, Meal preparation, Functional Transfers, Functional Cognition, Functional Attention, Standing tolerance, Standing balance , Gross motor coordination, Gross motor strengthening , DME training/education, Family training/education, Energy conservation training/education, healthy coping education, Leisure and social pursuits, and Core/trunk control/strengthening . Following OT sessions to cont to focus on: basic functional transfers , basic ADL participation/progression, ADL at EOB, ADL at sink, out of bed activity tolerance , sitting balance, Toileting sequence, and standing balance activities .. Goals for the upcoming week are: continue to monitor basic vitals during ADLs and functional transfers, Out of bed tolerance, establish caregiver abilities, and establish DME needs  Anticipate Re-team at this time.. Home with  family/friends support  and Home OT services             Speech Therapy:           No notes on file    Nursing Notes:  Appetite: Good  Diet Type: Diabetic                      Diet Patient/Family Education Complete: Yes    Type of Wound (LDA): Wound                    Type of Wound Patient/Family Education: Yes  Bladder: Continent     Bladder Patient/Family Education: Yes  Bowel: Continent     Bowel Patient/Family Education: Yes  Pain Location/Orientation: Orientation: Left, Location: Knee  Pain Score: 6                       Hospital Pain Intervention(s): Medication (See MAR)  Pain Patient/Family Education: Yes  Medication Management/Safety  New Medication: None  Injectable: Insulin  IV Antibiotics (add duration): None  Safe Administration: Yes  Medication Patient/Family Education Complete: Yes    79 y.o. male with a PMH of coronary artery disease , status post TAVR, hypertension, type 2 diabetes mellitus who presents with left knee pain.  Patient had left TKA on 11/13.  Patient was admitted to [facility] and was discharged from the hospital yesterday.  PT OT evaluated the patient on 11/13 and cleared for discharge.  Patient reported that since he was at home he has hard time ambulating or standing due to pain.  Patient was seen by physical therapy today and recommended to come to the hospital.  Patient denies any chest pain shortness of breath.  Denies any fever.  Denies any nausea or vomiting.  Patient reported that most of the pain is located in the ankle area and also in his thighs.  Lower extremity Doppler is negative for DVT.  By reviewing the records patient was given Coumadin while inpatient but he was not discharged on Coumadin ..    This week we will continue to encourage independence with ADLs.  We will continue to monitor labs and vital signs.  We will continue to educate pt/family about repositioning to prevent skin breakdown.  We will continue to assist w repositioning and perform routine skin  checks.  We will continue to monitor for adequate pain control.  We will continue to monitor for constipation and medicate pt as ordered.  We will continue to increase safety awareness and keep pt free from falls.    11/27: Pt remains alarmed for safety. Pt continues to complain of pain behind his left knee and partway down his LE. 2 week follow up was supposed to be 11/26. Dressing gets changed daily. Covered with gauze/abd. Healing great. WBAT LLE. B/l heel allevyns for prevention due 11/29. QID blood sugar. Continent of both bowel and bladder. Last bowel movement was 11/26. Mod ax2 stand pivot with RW. D/C plan is 12/2.     Case Management:     Discharge Planning  Living Arrangements: Lives w/ Spouse/significant other, Lives w/ Son  Support Systems: Spouse/significant other, Son  Assistance Needed: TBD  Type of Current Residence: Private residence  Current Home Care Services: No  Pt is making slow gains and will not attain goals to return home by the end of his acute rehab stay. Pt and family aware and in agreement with [facility] subacute rehab. Cm following to initiate referral process.     Is the patient actively participating in therapies? yes  List any modifications to the treatment plan:     Barriers Interventions   pain Medications adjusted as needed   Motor planning, coordination Motor planning activities   Activity tolerance endurance Endurance exercises, strengthening exercises             Is the patient making expected progress toward goals? no  List any update or changes to goals:     Medical Goals: Patient will be medically stable for discharge to [facility] restrictive envrionment upon completion of rehab program and Patient will be able to manage medical conditions and comorbid conditions with medications and follow up upon completion of rehab program    Weekly Team Goals:   Rehab Team Goals  ADL Team Goal: Patient will be independent with ADLs with least restrictive device upon completion of rehab  program  Transfer Team Goal: Patient will be independent with transfers with least restrictive device upon completion of rehab program  Locomotion Team Goal: Patient will be independent with locomotion with least restrictive device upon completion of rehab program    Discussion: pt presents with the above barriers and is not progressing towards goals to return home. Team discussion occurred with pt re: potential subacute setting on dc and he is in agreement. Pt is unable to bear weight on his knee despite changes in medication. Pt is  min to mod a with walker for transfers. Pt has a tough time starting in the morning and can present better in the afternoons. Pt states ice is relieving but it does not help in his ability to bear weight. Pt also has poor knee rom.  Family in yesterday and observed pt with therapy. They are also in ageement with subacute setting.     Anticipated Discharge Date:  12/2/24  [facility] Team Members Present:The following team members are supervising care for this patient and were present during this Weekly Team Conference.    Physician: Dr. Clay MD  : JENELLE Chilel  Registered Nurse: Paris Myers RN  Physical Therapist: Sarah Mitchell DPT  Occupational Therapist: Hattie Freye, MS, OTR/L  Speech Therapist:

## 2024-11-27 NOTE — ASSESSMENT & PLAN NOTE
Improved   -bowel regimen in place - adjust as needed  -attempt to wean down narcotic usage as tolerated   - Last BM 11/25-26  - Colace-senna BID, miralax qday; PRN supp

## 2024-11-27 NOTE — PROGRESS NOTES
Progress Note - Hospitalist   Name: Isauro Sow 79 y.o. male I MRN: [id number]  Unit/Bed#: -01 I Date of Admission: 11/18/2024   Date of Service: 11/27/2024 I Hospital Day: 9    Assessment & Plan  Status post total left knee replacement using cement  Patient was recently admitted to [facility] for elective left TKA on 11/13/24, discharged with home therapy, returned to ED as he was unable to ambulate or participate with home PT.   Orthopedic evaluation appreciated-seen again by ortho today, xrays ordered and reviewed. Will need outpatient follow up in 4 weeks  Currently admitted to [facility] for rehab  Pain control and therapy per PMR  Benign essential hypertension  Blood pressure acceptable  Continue with home dose Toprol 50mg qhs  Type 2 diabetes mellitus (HCC)  Lab Results   Component Value Date    HGBA1C 7.2 (H) 10/07/2024     Home: Metformin 1000mg daily.  Here: Metformin 500mg 2x daily - restarted 11/22/24  Continue QID Accuchecks/SSI and DM diet.  No changes today  Chronic atrial flutter (HCC)  Preoperative cardiology consultation recommended Lovenox to Coumadin bridge  INR 2.27  Continue Coumadin 2.5mg daily.  Goal INR 2-3   Continue Toprol for rate control  Repeat INR Thursday.  Stage 3a chronic kidney disease (HCC)  Renal function at baseline  Avoid nephrotoxins  History of stroke  Continue statin and coumadin  S/P TAVR (transcatheter aortic valve replacement)  Followed by cardiology and CTS as outpatient  Anemia  Post op anemia  Continue to monitor    The above assessment and plan was reviewed and updated as determined by my evaluation of the patient on 11/27/2024.    History of Present Illness   Patient seen and examined. Patients overnight issues or events were reviewed with nursing staff. New or overnight issues include the following:   Patient feels well. Pain tolerable. Seen by ortho for follow up. No CP/SOB    Review of Systems    Objective :  Temp:  [98.3 °F (36.8 °C)-98.4 °F (36.9  "°C)] 98.4 °F (36.9 °C)  HR:  [62] 62  BP: (130-147)/(58-63) 147/58  Resp:  [16-18] 18  SpO2:  [93 %-96 %] 96 %  O2 Device: None (Room air)    Invasive Devices       None                   Physical Exam  General Appearance: NAD; pleasant  HEENT: PERRLA, conjuctiva normal; mucous membranes moist; face symmetrical  Neck:  Supple  Lungs: clear bilaterally, normal respiratory effort, no retractions, expiratory effort normal, on room air  CV: regular rate and rhythm, no murmurs rubs or gallops noted   ABD: soft non tender, +BS x4  EXT: DP pulses intact, no lymphadenopathy, mild left leg edema, improving  Skin: normal turgor, normal texture, no rash  Psych: affect normal, mood normal  Neuro: AAOx3    The above physical exam was reviewed and updated as determined by my evaluation of the patient on 11/27/2024.      Lab Results: I have reviewed the following results:            Invalid input(s): \"LABGLOM\", \"CMP\"      Results from last 7 days   Lab Units 11/25/24  0518 11/23/24  1513   INR  2.27* 2.63*     Results from last 7 days   Lab Units 11/27/24  1051 11/27/24  0652 11/26/24  2112   POC GLUCOSE mg/dl 180* 153* 221*       Imaging Results Review: I reviewed radiology reports from this admission including: xray(s).      Review of Scheduled Meds: Medications  reviewed and reconciled as needed  Current Facility-Administered Medications   Medication Dose Route Frequency Provider Last Rate    acetaminophen  975 mg Oral Q8H [facility] Sung Morales MD      ascorbic acid  500 mg Oral BID Sung Morales MD      bisacodyl  10 mg Rectal Daily PRN Willie Washington DO      Cholecalciferol  2,000 Units Oral Daily Sung Morales MD      diphenhydrAMINE-zinc acetate   Topical TID PRN Lisa Garcia PA-C      folic acid  1 mg Oral Daily Sung Morales MD      gabapentin  100 mg Oral Q12H Ashley Depadua, MD      HYDROmorphone  1 mg Oral Q4H PRN DONTE Varma      Or    HYDROmorphone  2 mg Oral Q4H PRN DONTE Varma      insulin " lispro  1-5 Units Subcutaneous HS Sung Morales MD      insulin lispro  1-6 Units Subcutaneous TID AC Sung Morales MD      metFORMIN  500 mg Oral Daily With Breakfast Christie Taylor PA-C      methocarbamol  500 mg Oral Q8H [facility] Ashley Depadua, MD      metoprolol succinate  25 mg Oral HS Sung Morales MD      naloxone  0.04 mg Intravenous Q1MIN PRN DONTE Varma      nitroglycerin  0.4 mg Sublingual Q5 Min PRN Sung Morales MD      ondansetron  4 mg Oral Q6H PRN Ashley Depadua, MD      polyethylene glycol  17 g Oral Daily Sung Morales MD      pravastatin  40 mg Oral Daily With Dinner Sung Morales MD      senna-docusate sodium  2 tablet Oral BID Ashley Depadua, MD      tamsulosin  0.4 mg Oral Daily With Dinner Ashley Depadua, MD      warfarin  2.5 mg Oral Daily (warfarin) Christie Taylor PA-C         VTE Pharmacologic Prophylaxis: coumadin  Code Status: Level 3 - DNAR and DNI  Current Length of Stay: 9 day(s)      ** Please Note:  voice to text software may have been used in the creation of this document. Although proof errors in transcription or interpretation are a potential of such software**

## 2024-11-27 NOTE — PROGRESS NOTES
"Progress Note - Orthopedics   Name: Isauro Sow 79 y.o. male I MRN: [id number]  Unit/Bed#: -01 I Date of Admission: 11/18/2024   Date of Service: 11/27/2024 I Hospital Day: 9    Assessment & Plan  Status post total left knee replacement using cement  Assessment:   78 yo male 2 weeks s/p left total knee arthroplasty performed on 11/13/2024    Plan:   Continue multi-modal pain control  Weight bearing as tolerated  Continue home coumadin 2.5 mg  Incision care: Avoid scrubbing or submerging  Continue PT/OT  F/U in 4 weeks      Subjective   This is a 79 y.o. male seen for 2 week follow up s/p left total knee arthroplasty performed on 11/13/2024. He is experiencing some thigh pain, medial knee pain, and shin pain mainly with movement. Pain is well controlled. Progressing well in physical therapy. Incision without drainage. Denies fevers or chills, CP/SOB. No complaints at this time.    Objective :  Temp:  [98.3 °F (36.8 °C)-98.4 °F (36.9 °C)] 98.4 °F (36.9 °C)  HR:  [62] 62  BP: (130-147)/(58-63) 147/58  Resp:  [16-18] 18  SpO2:  [93 %-96 %] 96 %  O2 Device: None (Room air)    Physical Exam  Musculoskeletal: left lower extremity  Skin intact with harry incisional drainage or erythema   Mildly TTP along incision   ROM 0-85 degrees  5/5 IP/Q/HS/TA/GS  SILT DP/SP/S/S/TN  2+ DP/PT pulse  Musculature is soft and compressible, no pain with passive stretch    Radiology:   I personally reviewed the films.  X-rays AP/Lateral views left knee shows cemented CR total knee arthroplasty, components in good position.  No fracture or dislocation      Lab Results: I have reviewed the following results:  Recent Labs     11/25/24  0518   INR 2.27*     Blood Culture:    Lab Results   Component Value Date    BLOODCX No Growth After 5 Days. 10/20/2022     Wound Culture: No results found for: \"WOUNDCULT\"  "

## 2024-11-27 NOTE — ASSESSMENT & PLAN NOTE
- Hb down to 9.6 on 11/19 - CBC Tomorrow   - Mgmt per primary team and recommend optimal mgmt during rehab process  - Monitor H/H, vitals, signs/symptoms of acute bleeding

## 2024-11-27 NOTE — ASSESSMENT & PLAN NOTE
Performed by Dr. Braxton on 11/13.  Function improving    WBAT.  AROM/PROM/AAROM with no degree restriction.   PT/OT 3-5 hours/day, 5-6 days/week.   DVT Ppx fully anticoagulated on coumadin.   F/u Ortho (~11/27) for 2 week POV - c/s placed   L Knee XR 11/26 No acute fracture or dislocation. Knee joint effusion.Satisfactory positioning of the arthroplasty.   No lytic or blastic osseous lesion. Soft tissue swelling along the ventral aspect of the distal thigh. Prior subcutaneous emphysema has resolved. IMPRESSION: Satisfactory appearance of the arthroplasty.

## 2024-11-27 NOTE — ASSESSMENT & PLAN NOTE
Improved   11/21 patient endorsing symptoms of urinary retention in setting of constipation.  -Denies dysuria but will order UA and follow up - UA with trace protein and urobilinogen but negative for UTI

## 2024-11-27 NOTE — PROGRESS NOTES
"OT Treatment Note       11/27/24 1300   Pain Assessment   Pain Assessment Tool 0-10   Pain Score 7   Pain Location/Orientation Orientation: Left;Location: Knee   Pain Onset/Description Onset: Ongoing   Hospital Pain Intervention(s) Repositioned;Ambulation/increased activity;Rest  (reports receiving pain meds prior to session)   Restrictions/Precautions   Precautions Fall Risk;Pain   Weight Bearing Restrictions Yes   LLE Weight Bearing Per Order WBAT   ROM Restrictions No   Lifestyle   Autonomy \"I'm doing a lot better than a week ago but it doesn't feel like it.\"   Grooming   Findings requests to shave, encouraged to complete in stance at sink as he did PTA, but pt declines. Pt does complete short distance functional mobility to sink using RW with CGA.   Sit to Lying   Type of Assistance Needed Physical assistance;Adaptive equipment   Physical Assistance Level 25% or less   Comment very minimal assist LLE with pt using leg ; vc's for technique   Sit to Lying CARE Score 3   Sit to Stand   Type of Assistance Needed Incidental touching   Physical Assistance Level No physical assistance   Comment using RW, extended time for pt to prep for transfer but did not require vc's for technique   Sit to Stand CARE Score 4   Bed-Chair Transfer   Type of Assistance Needed Incidental touching   Physical Assistance Level No physical assistance   Comment using RW   Chair/Bed-to-Chair Transfer CARE Score 4   Toileting Hygiene   Type of Assistance Needed Physical assistance   Physical Assistance Level 26%-50%   Comment able to manage shorts up/down with CGA, requires assist with BM hygiene - reports difficulty with this PTA as well   Toileting Hygiene CARE Score 3   Toilet Transfer   Type of Assistance Needed Incidental touching   Physical Assistance Level No physical assistance   Comment CGA WC<->BSC over toilet   Toilet Transfer CARE Score 4   Cognition   Overall Cognitive Status WFL   Arousal/Participation Alert;Cooperative "   Attention Within functional limits   Orientation Level Oriented to person;Oriented to place;Oriented to situation;Oriented to time   Memory Within functional limits   Following Commands Follows multistep commands without difficulty   Additional Activities   Additional Activities Comments Sitting in WC in prep for functional transfers, pt completing functional reaching towards floor level to retrieve items and place them on tabletop, encouraging increased L knee flexion and WB throughout task; tolerated fairly. For increased standing balance and tolerance in prep for ADLs and decreased reliance on BUE In stance, pt standing at tabletop and passing pegs around back and passing them to other hand to place in pegboard to prep for CM/LBD tasks. Pt instructed to complete task with staggered stance for increased WB through LLE, with pt tolerating. Pt able to tolerate 2-3min in stance at a time with CGA. Pt also completing laundry task in stance with staggered stance again to encourage increased WB through LLE, able to fold 3-4 items of clothing at a time with CGA before requriing seated rest.   Activity Tolerance   Activity Tolerance Patient tolerated treatment well   Other Comments   Assessment Pt functionally mobilizing household distances (~45'x2 and ~30'x1) during session with CGA using RW, with cues and multiple bouts, noting increased step through gait.   Assessment   Treatment Assessment Pt seen for 90 min OT session focusing on functional transfers/mobility, standing balance/tolerance, and ADL tasks. Pt demo'ing slightly increased tolerance for WB through LLE today as noted by tolerance of staggered stance without UE support and step through gait. OT to continue POC to maximize functional IND and safety prior to d/c.   Prognosis Fair   Problem List Decreased strength;Decreased range of motion;Decreased endurance;Impaired balance;Decreased mobility;Decreased safety awareness;Pain   Barriers to Discharge Decreased  caregiver support   Plan   Treatment/Interventions ADL retraining;Functional transfer training;Therapeutic exercise;Endurance training;Patient/family training;Equipment eval/education;Bed mobility;Gait training;Compensatory technique education   Progress Progressing toward goals   Discharge Recommendation   Rehab Resource Intensity Level, OT   (anticipate SNF)   OT Therapy Minutes   OT Time In 1300   OT Time Out 1430   OT Total Time (minutes) 90   OT Mode of treatment - Individual (minutes) 90   OT Mode of treatment - Concurrent (minutes) 0   OT Mode of treatment - Group (minutes) 0   OT Mode of treatment - Co-treat (minutes) 0   OT Mode of Treatment - Total time(minutes) 90 minutes   OT Cumulative Minutes 650   Therapy Time missed   Time missed? No

## 2024-11-27 NOTE — PROGRESS NOTES
11/27/24 0827   Pain Assessment   Pain Assessment Tool 0-10   Pain Score 6   Pain Location/Orientation Orientation: Left;Location: Knee   Pain Onset/Description Frequency: Intermittent;Descriptor: Sore  (pulling or stretching type of pain)   Restrictions/Precautions   Precautions Fall Risk;Pain   LLE Weight Bearing Per Order WBAT   Cognition   Overall Cognitive Status WFL   Arousal/Participation Alert;Responsive;Cooperative   Attention Within functional limits   Orientation Level Oriented to person;Oriented to place;Oriented to situation   Following Commands Follows two step commands without difficulty   Subjective   Subjective The pain is better, I guess the timing is better today when they give me my medications   Roll Left and Right   Type of Assistance Needed Verbal cues;Supervision   Comment used bedrail for support with cues for proper limb positioning and correct task sequencing with SBA   Roll Left and Right CARE Score 4   Lying to Sitting on Side of Bed   Type of Assistance Needed Verbal cues;Supervision   Comment used bedrail for support with cues for proper limb positioning and correct task sequencing with SBA   Lying to Sitting on Side of Bed CARE Score 4   Sit to Stand   Type of Assistance Needed Incidental touching;Verbal cues   Comment used walker for support with cues for correct hand/foot placement and weight shifting over COG varying from SBA to CGA as needed to/from EOB and manual WC   Sit to Stand CARE Score 4   Bed-Chair Transfer   Type of Assistance Needed Incidental touching;Verbal cues   Comment used walker for support with cues for correct hand/foot placement and weight shifting over COG during bed to WC and WC to WC transfers requiring CGA   Chair/Bed-to-Chair Transfer CARE Score 4   Walk 10 Feet   Type of Assistance Needed Incidental touching;Verbal cues   Comment gait trained for up to 10, 30 and 45 feet x 1 round each on level surfaces using FWW for support with CGA with cues for heel  to toe pattern and knee stability during stance phase on LLE, for optimal pacing or speed control, directional changes, weight shifting and proper posture in relation to AD   Walk 10 Feet CARE Score 4   Ambulation   Does the patient walk? 2. Yes   Therapeutic Interventions   Strengthening facilitated seated BLE hip flexion and knee LAQ's using 3 lbs ankle weight on RLE but as AAROM on LLE; isometric hip adduction via pillow squeezes and gluteal isometrics, AROM hip abduction and ankle pumps - all are performed for 3 sets of 10 reps each with 25% verbal, visual and tactile cues to LLE in order to ensure correct exercise form.   Equipment Use   NuStep faciliated BUE/BLE NU step machine for up to 16 minutes at level 1 intensity (rest breaks as needed) with cues to increase muscle performance and improve reciprocal limb movements   Other Comments   Comments educated on energy conservation and activity pacing techniques; safety and sequencing techniques during bed mobility, transfers and ambulation tasks; fall prevention and safety strategies with use of AD; scooting, pressure relieving and positioning techniques.   Assessment   Treatment Assessment patient actively participated in POT without reports of chest pain, dizziness with supine to sit/sit to stand transitions, headaches, palpiations and unusual weakness with the tasks provided at hand. Given rest breaks post exercises/activity and mobility tasks due to his c/o mild SOB with exertion, minimal fatigue and post surgical pain in left knee which he rated as 6/10 on VPS. He required an extra time and increased effort in order to complete supine to sit and sit to stand transfers. Noted minimal difficulty with lateral weight shifting during stand pivot transfers due to pain with increased weight bearing. There is no LOB or left knee buckling/instability noted during transfers & ambulation activities. He will continue to require further education in order to improve  carryover of skills and correct teach back for a safe transfers and gait mechanics.   Problem List Decreased strength;Decreased endurance;Impaired balance;Decreased mobility;Decreased safety awareness;Pain   Plan   Treatment/Interventions Functional transfer training;LE strengthening/ROM;Therapeutic exercise;Endurance training;Bed mobility;Gait training   Progress Progressing toward goals  (patient is progressing slowly but steadily towards stated goals; progress is impacted by post surgical left knee pain, strength and balance deficits, mobility limitations and impairement in endurance/activity tolerance)   PT Therapy Minutes   PT Time In 0827   PT Time Out 0957   PT Total Time (minutes) 90   PT Mode of treatment - Individual (minutes) 90   PT Mode of treatment - Concurrent (minutes) 0   PT Mode of treatment - Group (minutes) 0   PT Mode of treatment - Co-treat (minutes) 0   PT Mode of Treatment - Total time(minutes) 90 minutes   PT Cumulative Minutes 863

## 2024-11-28 LAB
ANION GAP SERPL CALCULATED.3IONS-SCNC: 8 MMOL/L (ref 4–13)
BASOPHILS # BLD AUTO: 0.03 THOUSANDS/ΜL (ref 0–0.1)
BASOPHILS NFR BLD AUTO: 0 % (ref 0–1)
BUN SERPL-MCNC: 18 MG/DL (ref 5–25)
CALCIUM SERPL-MCNC: 9.6 MG/DL (ref 8.4–10.2)
CHLORIDE SERPL-SCNC: 102 MMOL/L (ref 96–108)
CO2 SERPL-SCNC: 31 MMOL/L (ref 21–32)
CREAT SERPL-MCNC: 1.07 MG/DL (ref 0.6–1.3)
EOSINOPHIL # BLD AUTO: 0.11 THOUSAND/ΜL (ref 0–0.61)
EOSINOPHIL NFR BLD AUTO: 1 % (ref 0–6)
ERYTHROCYTE [DISTWIDTH] IN BLOOD BY AUTOMATED COUNT: 14 % (ref 11.6–15.1)
GFR SERPL CREATININE-BSD FRML MDRD: 65 ML/MIN/1.73SQ M
GLUCOSE P FAST SERPL-MCNC: 152 MG/DL (ref 65–99)
GLUCOSE SERPL-MCNC: 135 MG/DL (ref 65–140)
GLUCOSE SERPL-MCNC: 140 MG/DL (ref 65–140)
GLUCOSE SERPL-MCNC: 152 MG/DL (ref 65–140)
GLUCOSE SERPL-MCNC: 154 MG/DL (ref 65–140)
GLUCOSE SERPL-MCNC: 194 MG/DL (ref 65–140)
HCT VFR BLD AUTO: 34.8 % (ref 36.5–49.3)
HGB BLD-MCNC: 10.8 G/DL (ref 12–17)
IMM GRANULOCYTES # BLD AUTO: 0.07 THOUSAND/UL (ref 0–0.2)
IMM GRANULOCYTES NFR BLD AUTO: 1 % (ref 0–2)
INR PPP: 1.75 (ref 0.85–1.19)
LYMPHOCYTES # BLD AUTO: 1.66 THOUSANDS/ΜL (ref 0.6–4.47)
LYMPHOCYTES NFR BLD AUTO: 22 % (ref 14–44)
MCH RBC QN AUTO: 28.3 PG (ref 26.8–34.3)
MCHC RBC AUTO-ENTMCNC: 31 G/DL (ref 31.4–37.4)
MCV RBC AUTO: 91 FL (ref 82–98)
MONOCYTES # BLD AUTO: 0.56 THOUSAND/ΜL (ref 0.17–1.22)
MONOCYTES NFR BLD AUTO: 7 % (ref 4–12)
NEUTROPHILS # BLD AUTO: 5.27 THOUSANDS/ΜL (ref 1.85–7.62)
NEUTS SEG NFR BLD AUTO: 69 % (ref 43–75)
NRBC BLD AUTO-RTO: 0 /100 WBCS
PLATELET # BLD AUTO: 383 THOUSANDS/UL (ref 149–390)
PMV BLD AUTO: 10.1 FL (ref 8.9–12.7)
POTASSIUM SERPL-SCNC: 4.2 MMOL/L (ref 3.5–5.3)
PROTHROMBIN TIME: 20.6 SECONDS (ref 12.3–15)
RBC # BLD AUTO: 3.82 MILLION/UL (ref 3.88–5.62)
SODIUM SERPL-SCNC: 141 MMOL/L (ref 135–147)
WBC # BLD AUTO: 7.7 THOUSAND/UL (ref 4.31–10.16)

## 2024-11-28 PROCEDURE — 99232 SBSQ HOSP IP/OBS MODERATE 35: CPT | Performed by: STUDENT IN AN ORGANIZED HEALTH CARE EDUCATION/TRAINING PROGRAM

## 2024-11-28 PROCEDURE — 80048 BASIC METABOLIC PNL TOTAL CA: CPT | Performed by: PHYSICIAN ASSISTANT

## 2024-11-28 PROCEDURE — 82948 REAGENT STRIP/BLOOD GLUCOSE: CPT

## 2024-11-28 PROCEDURE — 99232 SBSQ HOSP IP/OBS MODERATE 35: CPT | Performed by: PHYSICIAN ASSISTANT

## 2024-11-28 PROCEDURE — 85025 COMPLETE CBC W/AUTO DIFF WBC: CPT | Performed by: PHYSICIAN ASSISTANT

## 2024-11-28 PROCEDURE — 85610 PROTHROMBIN TIME: CPT | Performed by: PHYSICIAN ASSISTANT

## 2024-11-28 RX ORDER — WARFARIN SODIUM 1 MG/1
4 TABLET ORAL
Status: DISCONTINUED | OUTPATIENT
Start: 2024-11-28 | End: 2024-11-30

## 2024-11-28 RX ADMIN — ACETAMINOPHEN 975 MG: 325 TABLET, FILM COATED ORAL at 21:46

## 2024-11-28 RX ADMIN — ACETAMINOPHEN 975 MG: 325 TABLET, FILM COATED ORAL at 05:45

## 2024-11-28 RX ADMIN — METHOCARBAMOL 500 MG: 500 TABLET ORAL at 05:45

## 2024-11-28 RX ADMIN — Medication 2000 UNITS: at 08:10

## 2024-11-28 RX ADMIN — OXYCODONE HYDROCHLORIDE AND ACETAMINOPHEN 500 MG: 500 TABLET ORAL at 08:10

## 2024-11-28 RX ADMIN — FOLIC ACID 1 MG: 1 TABLET ORAL at 08:10

## 2024-11-28 RX ADMIN — METOPROLOL SUCCINATE 25 MG: 25 TABLET, EXTENDED RELEASE ORAL at 21:46

## 2024-11-28 RX ADMIN — ACETAMINOPHEN 975 MG: 325 TABLET, FILM COATED ORAL at 14:24

## 2024-11-28 RX ADMIN — METFORMIN HYDROCHLORIDE 500 MG: 500 TABLET ORAL at 07:31

## 2024-11-28 RX ADMIN — POLYETHYLENE GLYCOL 3350 17 G: 17 POWDER, FOR SOLUTION ORAL at 08:10

## 2024-11-28 RX ADMIN — SENNOSIDES AND DOCUSATE SODIUM 2 TABLET: 50; 8.6 TABLET ORAL at 08:10

## 2024-11-28 RX ADMIN — METHOCARBAMOL 500 MG: 500 TABLET ORAL at 14:25

## 2024-11-28 RX ADMIN — HYDROMORPHONE HYDROCHLORIDE 2 MG: 2 TABLET ORAL at 16:46

## 2024-11-28 RX ADMIN — HYDROMORPHONE HYDROCHLORIDE 2 MG: 2 TABLET ORAL at 21:46

## 2024-11-28 RX ADMIN — INSULIN LISPRO 1 UNITS: 100 INJECTION, SOLUTION INTRAVENOUS; SUBCUTANEOUS at 21:48

## 2024-11-28 RX ADMIN — WARFARIN SODIUM 4 MG: 1 TABLET ORAL at 16:47

## 2024-11-28 RX ADMIN — GABAPENTIN 100 MG: 100 CAPSULE ORAL at 16:47

## 2024-11-28 RX ADMIN — PRAVASTATIN SODIUM 40 MG: 40 TABLET ORAL at 16:46

## 2024-11-28 RX ADMIN — GABAPENTIN 100 MG: 100 CAPSULE ORAL at 05:45

## 2024-11-28 RX ADMIN — INSULIN LISPRO 1 UNITS: 100 INJECTION, SOLUTION INTRAVENOUS; SUBCUTANEOUS at 07:31

## 2024-11-28 RX ADMIN — METHOCARBAMOL 500 MG: 500 TABLET ORAL at 21:46

## 2024-11-28 RX ADMIN — TAMSULOSIN HYDROCHLORIDE 0.4 MG: 0.4 CAPSULE ORAL at 16:47

## 2024-11-28 RX ADMIN — OXYCODONE HYDROCHLORIDE AND ACETAMINOPHEN 500 MG: 500 TABLET ORAL at 16:47

## 2024-11-28 NOTE — PLAN OF CARE
Problem: PAIN - ADULT  Goal: Verbalizes/displays adequate comfort level or baseline comfort level  Description: Interventions:  - Encourage patient to monitor pain and request assistance  - Assess pain using appropriate pain scale  - Administer analgesics based on type and severity of pain and evaluate response  - Implement non-pharmacological measures as appropriate and evaluate response  - Consider cultural and social influences on pain and pain management  - Notify physician/advanced practitioner if interventions unsuccessful or patient reports new pain  Outcome: Progressing     Problem: INFECTION - ADULT  Goal: Absence or prevention of progression during hospitalization  Description: INTERVENTIONS:  - Assess and monitor for signs and symptoms of infection  - Monitor lab/diagnostic results  - Monitor all insertion sites, i.e. indwelling lines, tubes, and drains  - Monitor endotracheal if appropriate and nasal secretions for changes in amount and color  - [place] appropriate cooling/warming therapies per order  - Administer medications as ordered  - Instruct and encourage patient and family to use good hand hygiene technique  - Identify and instruct in appropriate isolation precautions for identified infection/condition  Outcome: Progressing     Problem: SAFETY ADULT  Goal: Patient will remain free of falls  Description: INTERVENTIONS:  - Educate patient/family on patient safety including physical limitations  - Instruct patient to call for assistance with activity   - Consult OT/PT to assist with strengthening/mobility   - Keep Call bell within reach  - Keep bed low and locked with side rails adjusted as appropriate  - Keep care items and personal belongings within reach  - Initiate and maintain comfort rounds  - Make Fall Risk Sign visible to staff  - Offer Toileting every 2 Hours, in advance of need  - Initiate/Maintain bed/chair alarm  - Obtain necessary fall risk management equipment: nonskid socks  -  Apply yellow socks and bracelet for high fall risk patients  - Consider moving patient to room near nurses station  Outcome: Progressing  Goal: Maintain or return to baseline ADL function  Description: INTERVENTIONS:  -  Assess patient's ability to carry out ADLs; assess patient's baseline for ADL function and identify physical deficits which impact ability to perform ADLs (bathing, care of mouth/teeth, toileting, grooming, dressing, etc.)  - Assess/evaluate cause of self-care deficits   - Assess range of motion  - Assess patient's mobility; develop plan if impaired  - Assess patient's need for assistive devices and provide as appropriate  - Encourage maximum independence but intervene and supervise when necessary  - Involve family in performance of ADLs  - Assess for home care needs following discharge   - Consider OT consult to assist with ADL evaluation and planning for discharge  - Provide patient education as appropriate  Outcome: Progressing  Goal: Maintains/Returns to pre admission functional level  Description: INTERVENTIONS:  - Perform AM-PAC 6 Click Basic Mobility/ Daily Activity assessment daily.  - Set and communicate daily mobility goal to care team and patient/family/caregiver.   - Collaborate with rehabilitation services on mobility goals if consulted  - Perform Range of Motion 3 times a day.  - Reposition patient every 2 hours.  - Dangle patient 3 times a day  - Stand patient 3 times a day  - Ambulate patient 3 times a day  - Out of bed to chair 3 times a day   - Out of bed for meals 3 times a day  - Out of bed for toileting  - Record patient progress and toleration of activity level   Outcome: Progressing     Problem: DISCHARGE PLANNING  Goal: Discharge to home or other facility with appropriate resources  Description: INTERVENTIONS:  - Identify barriers to discharge w/patient and caregiver  - Arrange for needed discharge resources and transportation as appropriate  - Identify discharge learning  needs (meds, wound care, etc.)  - Arrange for interpretive services to assist at discharge as needed  - Refer to Case Management Department for coordinating discharge planning if the patient needs post-hospital services based on physician/advanced practitioner order or complex needs related to functional status, cognitive ability, or social support system  Outcome: Progressing     Problem: Prexisting or High Potential for Compromised Skin Integrity  Goal: Skin integrity is maintained or improved  Description: INTERVENTIONS:  - Identify patients at risk for skin breakdown  - Assess and monitor skin integrity  - Assess and monitor nutrition and hydration status  - Monitor labs   - Assess for incontinence   - Turn and reposition patient  - Assist with mobility/ambulation  - Relieve pressure over bony prominences  - Avoid friction and shearing  - Provide appropriate hygiene as needed including keeping skin clean and dry  - Evaluate need for skin moisturizer/barrier cream  - Collaborate with interdisciplinary team   - Patient/family teaching  - Consider wound care consult   Outcome: Progressing

## 2024-11-28 NOTE — ASSESSMENT & PLAN NOTE
Preoperative cardiology consultation recommended Lovenox to Coumadin bridge  INR 1.75  Increase Coumadin to 4mg daily.  Goal INR 2-3   Continue Toprol for rate control  INR Saturday.

## 2024-11-28 NOTE — ASSESSMENT & PLAN NOTE
Lab Results   Component Value Date    EGFR 65 11/28/2024    EGFR 67 11/19/2024    EGFR 65 11/18/2024    CREATININE 1.07 11/28/2024    CREATININE 1.04 11/19/2024    CREATININE 1.07 11/18/2024     -Baseline Cr 1.0-1.3  Currently within baseline.   Avoid nephrotoxic meds, relative hypotension.   Monitor BMP, I/O.

## 2024-11-28 NOTE — PLAN OF CARE
Problem: INFECTION - ADULT  Goal: Absence or prevention of progression during hospitalization  Description: INTERVENTIONS:  - Assess and monitor for signs and symptoms of infection  - Monitor lab/diagnostic results  - Monitor all insertion sites, i.e. indwelling lines, tubes, and drains  - Monitor endotracheal if appropriate and nasal secretions for changes in amount and color  - [place] appropriate cooling/warming therapies per order  - Administer medications as ordered  - Instruct and encourage patient and family to use good hand hygiene technique  - Identify and instruct in appropriate isolation precautions for identified infection/condition  Outcome: Progressing     Problem: SAFETY ADULT  Goal: Patient will remain free of falls  Description: INTERVENTIONS:  - Educate patient/family on patient safety including physical limitations  - Instruct patient to call for assistance with activity   - Consult OT/PT to assist with strengthening/mobility   - Keep Call bell within reach  - Keep bed low and locked with side rails adjusted as appropriate  - Keep care items and personal belongings within reach  - Initiate and maintain comfort rounds  - Make Fall Risk Sign visible to staff  - Offer Toileting every 2 Hours, in advance of need  - Initiate/Maintain alarm  - Obtain necessary fall risk management equipment:   - Apply yellow socks and bracelet for high fall risk patients  - Consider moving patient to room near nurses station  Outcome: Progressing

## 2024-11-28 NOTE — ASSESSMENT & PLAN NOTE
Lab Results   Component Value Date    HGBA1C 7.2 (H) 10/07/2024       Recent Labs     11/27/24  1608 11/27/24  2106 11/28/24  0630 11/28/24  1124   POCGLU 205* 179* 154* 140       Blood Sugar Average: Last 72 hrs:  (P) [id number]    Home: Metformin 1000mg daily;   Here: Metformin 500m qday; CDI/Accuchecks.   If necessary, will try to get patient back on oral meds prior to discharge.   Diabetic Diet.   Management as per IM.

## 2024-11-28 NOTE — PROGRESS NOTES
Progress Note - PMR   Name: Isauro Sow 79 y.o. male I MRN: [id number]  Unit/Bed#: -01 I Date of Admission: 11/18/2024   Date of Service: 11/28/2024 I Hospital Day: 10     Assessment & Plan  Status post total left knee replacement using cement  Performed by Dr. Braxton on 11/13.  Function improving    WBAT.  AROM/PROM/AAROM with no degree restriction.   PT/OT 3-5 hours/day, 5-6 days/week.   DVT Ppx fully anticoagulated on coumadin.   F/u Ortho (~11/27) for 2 week POV - c/s placed   L Knee XR 11/26 No acute fracture or dislocation. Knee joint effusion.Satisfactory positioning of the arthroplasty.   No lytic or blastic osseous lesion. Soft tissue swelling along the ventral aspect of the distal thigh. Prior subcutaneous emphysema has resolved. IMPRESSION: Satisfactory appearance of the arthroplasty.  Acute pain  Related to TKA and edema in RLE  ACE Wraps  Tylenol 975mg TID scheduled  Robaxin 500mg TID scheduled   Monitor and adjust as appropriate.   -Per APS who was consulted earlier in course: DC oxycodone and start dilaudid 1-2 mg q4h PRN and escalate to 2/4 if needed. Start gabapentin 100 mg HS  - Gabapentin adjusted to 100 mg q12h.   Constipation  Improved   -bowel regimen in place - adjust as needed  -attempt to wean down narcotic usage as tolerated   - Last BM 11/25-26  - Colace-senna BID, miralax qday; PRN supp  At risk for venous thromboembolism (VTE)  Fully anticoagulated on warfarin,SCDs.  Benign essential hypertension  Home: Toprol 25mg QHS,   here: Same.H Monitor and adjust as per IM.   Type 2 diabetes mellitus (HCC)  Lab Results   Component Value Date    HGBA1C 7.2 (H) 10/07/2024       Recent Labs     11/27/24  1608 11/27/24  2106 11/28/24  0630 11/28/24  1124   POCGLU 205* 179* 154* 140       Blood Sugar Average: Last 72 hrs:  (P) [id number]    Home: Metformin 1000mg daily;   Here: Metformin 500m qday; CDI/Accuchecks.   If necessary, will try to get patient back on oral meds prior to  discharge.   Diabetic Diet.   Management as per IM.     History of stroke  -Hx of stroke in 2017 w/ residual L sided deficits   -Monitor   Stage 3a chronic kidney disease (HCC)  Lab Results   Component Value Date    EGFR 65 11/28/2024    EGFR 67 11/19/2024    EGFR 65 11/18/2024    CREATININE 1.07 11/28/2024    CREATININE 1.04 11/19/2024    CREATININE 1.07 11/18/2024     -Baseline Cr 1.0-1.3  Currently within baseline.   Avoid nephrotoxic meds, relative hypotension.   Monitor BMP, I/O.   S/P TAVR (transcatheter aortic valve replacement)  -TAVR procedure done 9/2023  -Follows CTS outpatient   Chronic atrial flutter (HCC)  -Hx of a-flutter s/p PPM placement   -On Coumadin per IM   - Continue Toprol    Urinary retention  Improved   11/21 patient endorsing symptoms of urinary retention in setting of constipation.  -Denies dysuria but will order UA and follow up - UA with trace protein and urobilinogen but negative for UTI   Anemia  - Hb down to 9.6 on 11/19 - CBC Tomorrow   - Mgmt per primary team and recommend optimal mgmt during rehab process  - Monitor H/H, vitals, signs/symptoms of acute bleeding      Subjective   Patient is a 79 year old male with PMH of CVA (2017) with residual L sided deficits, a-flutter/SSS s/p [employer] PPM placement, hx aortic valve replacement, CHF, HTN, CAD s/p PCI with CHARMAINE x 1 to RCA (8/23), PAD, COPD, DMT2 with neuropathy, CKD3, obesity, macular degeneration, and RLS who presents after recent hospitalization at [facility] for worsening pain after undergoing elective L TKA on 11/13 at [facility] under care of Dr Braxton. Patient was discharged home the following day but presented to the ED here at [place] on 11/15 due to pain and swelling in his LLE radiating from his left calf to thigh inhibiting his ability to participate in therapy/ADLs. Doppler's negative for DVT and radiographic imaging revealed post-op tissue swelling, atherosclerotic changes, and alignment of arthroplasty. Patient was on Lovenox  at home but while admitted was restarted on Coumadin with Lovenox bridge.. Hospital course complicated by ABLA not requiring transfusion and low grade fever that resolved spontaneously. Patient evaluated by PT/OT and recommended acute inpatient rehabilitation at the [facility]. Patient admitted to the [facility] on 11/18     Chief Complaint: f/u ambulatory dysfunction    Interval: Patient seen and evaluated in room.  Brother is also available and all questions were answered.  We discussed different types of exercises and stretches he can be doing in the room while not in therapy in order to help maintain good range of motion at the knee and in the ankles.  Pain overall is in good control with the current regiment which we reviewed today no fevers, chills, nausea, vomiting, cough pressures with, diarrhea constipation.  Last bowel movement on 11/26.  Did not modify pain regimen or uptitrate gabapentin at this time as the patient has just had an opioid rotation and is doing fairly well at this moment.    Labs reviewed today and BMP all within normal limits and CBC shows an improvement in hemoglobin from 9.6 up to 10.8 although on prior readings has been around 10.4-10.6.  Overall hemoglobin is stable.  INR subtherapeutic at 1.75      Objective :  Temp:  [98.2 °F (36.8 °C)-98.3 °F (36.8 °C)] 98.3 °F (36.8 °C)  HR:  [58-68] 68  BP: (129-148)/(61-67) 141/65  Resp:  [17-18] 18  SpO2:  [93 %-97 %] 93 %  O2 Device: None (Room air)    Functional Update:  Physical Therapy Occupational Therapy Speech Therapy   Weight Bearing Status: Weight Bearing as Tolerated  Transfers: Minimal Assistance, Moderate Assistance  Bed Mobility: Moderate Assistance  Amulation Distance (ft): 35 feet  Ambulation: Moderate Assistance  Assistive Device for Ambulation: Roller Walker  Assistive Device for Stairs: Bilateral Hand Rails  Discharge Recommendations: Home with:  DC Home with:: Family Support, Home Physical Therapy, First Floor Setup   Eating:  Independent  Grooming: Supervision  Bathing: Minimal Assistance  Bathing: Minimal Assistance  Upper Body Dressing: Supervision  Lower Body Dressing: Moderate Assistance  Toileting: Moderate Assistance  Toilet Transfer: Moderate Assistance  Cognition: Exceptions to WNL  Cognition: Decreased Memory, Decreased Executive Functions, Decreased Attention, Behavioral Considerations  Orientation: Person, Place, Time, Situation               Physical Exam  Vitals reviewed.   Constitutional:       General: He is not in acute distress.  HENT:      Head: Normocephalic and atraumatic.      Right Ear: External ear normal.      Left Ear: External ear normal.      Nose: Nose normal. No rhinorrhea.      Mouth/Throat:      Mouth: Mucous membranes are moist.      Pharynx: Oropharynx is clear.   Eyes:      General: No scleral icterus.  Cardiovascular:      Rate and Rhythm: Normal rate.   Pulmonary:      Effort: Pulmonary effort is normal. No respiratory distress.   Abdominal:      General: There is no distension.      Palpations: Abdomen is soft.   Musculoskeletal:      Right lower leg: No edema.      Left lower leg: No edema.   Skin:     General: Skin is warm and dry.      Comments: Left anterior incision site on the left leg not directly visualized due to Ace wrap   Neurological:      Mental Status: He is alert and oriented to person, place, and time.   Psychiatric:         Mood and Affect: Mood normal.         Behavior: Behavior normal.           Scheduled Meds:  Current Facility-Administered Medications   Medication Dose Route Frequency Provider Last Rate    acetaminophen  975 mg Oral Q8H [facility] Sung Morales MD      ascorbic acid  500 mg Oral BID Sung Morales MD      bisacodyl  10 mg Rectal Daily PRN Willie Washington DO      Cholecalciferol  2,000 Units Oral Daily Sung Morales MD      diphenhydrAMINE-zinc acetate   Topical TID PRN Lisa Garcia PA-C      folic acid  1 mg Oral Daily Sung Morales MD      gabapentin  100 mg Oral Q12H  Ashley Depadua, MD      HYDROmorphone  1 mg Oral Q4H PRN DONTE Varma      Or    HYDROmorphone  2 mg Oral Q4H PRN DONTE Varma      insulin lispro  1-5 Units Subcutaneous HS Sung Morales MD      insulin lispro  1-6 Units Subcutaneous TID AC Sung Morales MD      metFORMIN  500 mg Oral Daily With Breakfast Christie Taylor PA-C      methocarbamol  500 mg Oral Q8H MOON Ashley Depadua, MD      metoprolol succinate  25 mg Oral HS Sung Moarles MD      naloxone  0.04 mg Intravenous Q1MIN PRN DONTE Varma      nitroglycerin  0.4 mg Sublingual Q5 Min PRN Sung Morales MD      ondansetron  4 mg Oral Q6H PRN Ashley Depadua, MD      polyethylene glycol  17 g Oral Daily Sung Morales MD      pravastatin  40 mg Oral Daily With Dinner Sung Morales MD      senna-docusate sodium  2 tablet Oral BID Ashley Depadua, MD      tamsulosin  0.4 mg Oral Daily With Dinner Ashley Depadua, MD      warfarin  2.5 mg Oral Daily (warfarin) Christie Taylor PA-C           Lab Results: I have reviewed the following results:  Results from last 7 days   Lab Units 11/28/24  0634   HEMOGLOBIN g/dL 10.8*   HEMATOCRIT % 34.8*   WBC Thousand/uL 7.70   PLATELETS Thousands/uL 383     Results from last 7 days   Lab Units 11/28/24  0634   BUN mg/dL 18   SODIUM mmol/L 141   POTASSIUM mmol/L 4.2   CHLORIDE mmol/L 102   CREATININE mg/dL 1.07       Results from last 7 days   Lab Units 11/28/24  0634 11/25/24  0518 11/23/24  1513   PROTIME seconds 20.6* 25.0* 28.0*   INR  1.75* 2.27* 2.63*          Terrence Russell, DO  Physical Medicine and Rehabilitation  [facility]    I have spent a total time of 35 minutes in caring for this patient on the day of the visit/encounter including Counseling / Coordination of care, Documenting in the medical record, Reviewing / ordering tests, medicine, procedures  , and Communicating with other healthcare professionals .'

## 2024-11-28 NOTE — PLAN OF CARE
Problem: PAIN - ADULT  Goal: Verbalizes/displays adequate comfort level or baseline comfort level  Description: Interventions:  - Encourage patient to monitor pain and request assistance  - Assess pain using appropriate pain scale  - Administer analgesics based on type and severity of pain and evaluate response  - Implement non-pharmacological measures as appropriate and evaluate response  - Consider cultural and social influences on pain and pain management  - Notify physician/advanced practitioner if interventions unsuccessful or patient reports new pain  11/27/2024 2256 by Gabbie Hester RN  Outcome: Progressing  11/27/2024 2124 by Gabbie Hester RN  Outcome: Progressing     Problem: INFECTION - ADULT  Goal: Absence or prevention of progression during hospitalization  Description: INTERVENTIONS:  - Assess and monitor for signs and symptoms of infection  - Monitor lab/diagnostic results  - Monitor all insertion sites, i.e. indwelling lines, tubes, and drains  - Monitor endotracheal if appropriate and nasal secretions for changes in amount and color  - [place] appropriate cooling/warming therapies per order  - Administer medications as ordered  - Instruct and encourage patient and family to use good hand hygiene technique  - Identify and instruct in appropriate isolation precautions for identified infection/condition  11/27/2024 2256 by Gabbie Hester RN  Outcome: Progressing  11/27/2024 2124 by Gabbie Hester RN  Outcome: Progressing     Problem: DISCHARGE PLANNING  Goal: Discharge to home or other facility with appropriate resources  Description: INTERVENTIONS:  - Identify barriers to discharge w/patient and caregiver  - Arrange for needed discharge resources and transportation as appropriate  - Identify discharge learning needs (meds, wound care, etc.)  - Arrange for interpretive services to assist at discharge as needed  - Refer to Case Management Department for coordinating discharge planning if  the patient needs post-hospital services based on physician/advanced practitioner order or complex needs related to functional status, cognitive ability, or social support system  11/27/2024 2256 by Gabbie Hester RN  Outcome: Progressing  11/27/2024 2124 by Gabbie Hester, RN  Outcome: Progressing

## 2024-11-28 NOTE — ASSESSMENT & PLAN NOTE
Patient was recently admitted to [facility] for elective left TKA on 11/13/24, discharged with home therapy, returned to ED as he was unable to ambulate or participate with home PT.   Orthopedic evaluation appreciated-seen again by ortho today, xrays ordered and reviewed. Will need outpatient follow up in 4 weeks  Currently admitted to [facility] for rehab  Pain control and therapy per PMR

## 2024-11-28 NOTE — PROGRESS NOTES
Progress Note - Hospitalist   Name: Isauro Sow 79 y.o. male I MRN: [id number]  Unit/Bed#: -01 I Date of Admission: 11/18/2024   Date of Service: 11/28/2024 I Hospital Day: 10    Assessment & Plan  Status post total left knee replacement using cement  Patient was recently admitted to [facility] for elective left TKA on 11/13/24, discharged with home therapy, returned to ED as he was unable to ambulate or participate with home PT.   Orthopedic evaluation appreciated-seen again by ortho today, xrays ordered and reviewed. Will need outpatient follow up in 4 weeks  Currently admitted to [facility] for rehab  Pain control and therapy per PMR  Benign essential hypertension  Blood pressure acceptable  Continue with home dose Toprol 50mg qhs  Type 2 diabetes mellitus (HCC)  Lab Results   Component Value Date    HGBA1C 7.2 (H) 10/07/2024     Home: Metformin 1000mg daily.  Here: Metformin 500mg 2x daily - restarted 11/22/24  Continue QID Accuchecks/SSI and DM diet.  No changes today  Chronic atrial flutter (HCC)  Preoperative cardiology consultation recommended Lovenox to Coumadin bridge  INR 1.75  Increase Coumadin to 4mg daily.  Goal INR 2-3   Continue Toprol for rate control  INR Saturday.  Stage 3a chronic kidney disease (HCC)  Renal function at baseline  Avoid nephrotoxins  History of stroke  Continue statin and coumadin  S/P TAVR (transcatheter aortic valve replacement)  Followed by cardiology and CTS as outpatient  Anemia  Post op anemia  Continue to monitor  Hgb 10.8    The above assessment and plan was reviewed and updated as determined by my evaluation of the patient on 11/28/2024.    History of Present Illness   Patient seen and examined. Patients overnight issues or events were reviewed with nursing staff. New or overnight issues include the following:     Pt seen in his room. He denies any current complaints.    A 10 point review of systems was negative except for what is noted in the  HPI.    Objective :  Temp:  [98.2 °F (36.8 °C)-98.3 °F (36.8 °C)] 98.3 °F (36.8 °C)  HR:  [60-68] 68  BP: (129-141)/(61-65) 141/65  Resp:  [18] 18  SpO2:  [93 %-97 %] 93 %  O2 Device: None (Room air)    Invasive Devices       None                   Physical Exam  General Appearance: NAD; pleasant  HEENT: PERRLA, conjuctiva normal; mucous membranes moist; face symmetrical  Neck:  Supple  Lungs: clear bilaterally, normal respiratory effort, no retractions, expiratory effort normal, on room air  CV: regular rate and rhythm, no murmurs rubs or gallops noted   ABD: soft non tender, +BS x4  EXT: DP pulses intact, no lymphadenopathy, +edema Lt LE  Skin: normal turgor, normal texture, no rash  Psych: affect normal, mood normal  Neuro: AAOx3    The above physical exam was reviewed and updated as determined by my evaluation of the patient on 11/28/2024.      Lab Results: I have reviewed the following results:  Results from last 7 days   Lab Units 11/28/24  0634   WBC Thousand/uL 7.70   HEMOGLOBIN g/dL 10.8*   HEMATOCRIT % 34.8*   PLATELETS Thousands/uL 383     Results from last 7 days   Lab Units 11/28/24  0634   SODIUM mmol/L 141   POTASSIUM mmol/L 4.2   CHLORIDE mmol/L 102   CO2 mmol/L 31   BUN mg/dL 18   CREATININE mg/dL 1.07   CALCIUM mg/dL 9.6         Results from last 7 days   Lab Units 11/28/24  0634 11/25/24  0518   INR  1.75* 2.27*     Results from last 7 days   Lab Units 11/28/24  1124 11/28/24  0630 11/27/24  2106   POC GLUCOSE mg/dl 140 154* 179*       Imaging Results Review: No pertinent imaging studies reviewed.  Other Study Results Review: Other studies reviewed include: blood work    Review of Scheduled Meds: Medications  reviewed and reconciled as needed  Current Facility-Administered Medications   Medication Dose Route Frequency Provider Last Rate    acetaminophen  975 mg Oral Q8H [facility] Sung Morales MD      ascorbic acid  500 mg Oral BID Sung Morales MD      bisacodyl  10 mg Rectal Daily PRN Willie Washington DO       Cholecalciferol  2,000 Units Oral Daily Sung Morales MD      diphenhydrAMINE-zinc acetate   Topical TID PRN Lisa Garcia PA-C      folic acid  1 mg Oral Daily Sung Morales MD      gabapentin  100 mg Oral Q12H Ashley Depadua, MD      HYDROmorphone  1 mg Oral Q4H PRN DONTE Varma      Or    HYDROmorphone  2 mg Oral Q4H PRN DONTE Varma      insulin lispro  1-5 Units Subcutaneous HS Sung Morales MD      insulin lispro  1-6 Units Subcutaneous TID AC Sung Morales MD      metFORMIN  500 mg Oral Daily With Breakfast Christie Taylor PA-C      methocarbamol  500 mg Oral Q8H MOON Ashley Depadua, MD      metoprolol succinate  25 mg Oral HS Sung Morales MD      naloxone  0.04 mg Intravenous Q1MIN PRN DONTE Varma      nitroglycerin  0.4 mg Sublingual Q5 Min PRN Sung Morales MD      ondansetron  4 mg Oral Q6H PRN Ashley Depadua, MD      polyethylene glycol  17 g Oral Daily Sung Morales MD      pravastatin  40 mg Oral Daily With Dinner Sung Morales MD      senna-docusate sodium  2 tablet Oral BID Ashley Depadua, MD      tamsulosin  0.4 mg Oral Daily With Dinner Ashley Depadua, MD      warfarin  2.5 mg Oral Daily (warfarin) Christie Taylor PA-C         VTE Pharmacologic Prophylaxis: Coumadin  Code Status: Level 3 - DNAR and DNI  Current Length of Stay: 10 day(s)    Administrative Statements   I have spent a total time of 30 minutes in caring for this patient on the day of the visit/encounter including Diagnostic results, Prognosis, Risks and benefits of tx options, Instructions for management, Patient and family education, Importance of tx compliance, Risk factor reductions, Impressions, Counseling / Coordination of care, Documenting in the medical record, Reviewing / ordering tests, medicine, procedures  , Obtaining or reviewing history  , and Communicating with other healthcare professionals .  ** Please Note:  voice to text software may have been used in the creation  of this document. Although proof errors in transcription or interpretation are a potential of such software**

## 2024-11-29 LAB
FLUAV AG UPPER RESP QL IA.RAPID: NEGATIVE
FLUBV AG UPPER RESP QL IA.RAPID: NEGATIVE
GLUCOSE SERPL-MCNC: 126 MG/DL (ref 65–140)
GLUCOSE SERPL-MCNC: 139 MG/DL (ref 65–140)
GLUCOSE SERPL-MCNC: 188 MG/DL (ref 65–140)
GLUCOSE SERPL-MCNC: 233 MG/DL (ref 65–140)
SARS-COV+SARS-COV-2 AG RESP QL IA.RAPID: NEGATIVE

## 2024-11-29 PROCEDURE — 87804 INFLUENZA ASSAY W/OPTIC: CPT | Performed by: PHYSICIAN ASSISTANT

## 2024-11-29 PROCEDURE — 82948 REAGENT STRIP/BLOOD GLUCOSE: CPT

## 2024-11-29 PROCEDURE — 97116 GAIT TRAINING THERAPY: CPT

## 2024-11-29 PROCEDURE — 97535 SELF CARE MNGMENT TRAINING: CPT

## 2024-11-29 PROCEDURE — 97110 THERAPEUTIC EXERCISES: CPT

## 2024-11-29 PROCEDURE — 99232 SBSQ HOSP IP/OBS MODERATE 35: CPT

## 2024-11-29 PROCEDURE — 99231 SBSQ HOSP IP/OBS SF/LOW 25: CPT | Performed by: PHYSICIAN ASSISTANT

## 2024-11-29 PROCEDURE — 97530 THERAPEUTIC ACTIVITIES: CPT

## 2024-11-29 PROCEDURE — 87811 SARS-COV-2 COVID19 W/OPTIC: CPT | Performed by: PHYSICIAN ASSISTANT

## 2024-11-29 RX ORDER — POLYETHYLENE GLYCOL 3350 17 G/17G
17 POWDER, FOR SOLUTION ORAL DAILY PRN
Status: DISCONTINUED | OUTPATIENT
Start: 2024-11-29 | End: 2024-12-03 | Stop reason: HOSPADM

## 2024-11-29 RX ADMIN — OXYCODONE HYDROCHLORIDE AND ACETAMINOPHEN 500 MG: 500 TABLET ORAL at 17:16

## 2024-11-29 RX ADMIN — HYDROMORPHONE HYDROCHLORIDE 2 MG: 2 TABLET ORAL at 03:52

## 2024-11-29 RX ADMIN — ACETAMINOPHEN 975 MG: 325 TABLET, FILM COATED ORAL at 13:46

## 2024-11-29 RX ADMIN — FOLIC ACID 1 MG: 1 TABLET ORAL at 08:32

## 2024-11-29 RX ADMIN — GABAPENTIN 100 MG: 100 CAPSULE ORAL at 06:06

## 2024-11-29 RX ADMIN — INSULIN LISPRO 2 UNITS: 100 INJECTION, SOLUTION INTRAVENOUS; SUBCUTANEOUS at 21:43

## 2024-11-29 RX ADMIN — WARFARIN SODIUM 4 MG: 1 TABLET ORAL at 17:15

## 2024-11-29 RX ADMIN — TAMSULOSIN HYDROCHLORIDE 0.4 MG: 0.4 CAPSULE ORAL at 17:16

## 2024-11-29 RX ADMIN — HYDROMORPHONE HYDROCHLORIDE 2 MG: 2 TABLET ORAL at 12:53

## 2024-11-29 RX ADMIN — ACETAMINOPHEN 975 MG: 325 TABLET, FILM COATED ORAL at 21:38

## 2024-11-29 RX ADMIN — METOPROLOL SUCCINATE 25 MG: 25 TABLET, EXTENDED RELEASE ORAL at 21:39

## 2024-11-29 RX ADMIN — INSULIN LISPRO 1 UNITS: 100 INJECTION, SOLUTION INTRAVENOUS; SUBCUTANEOUS at 12:53

## 2024-11-29 RX ADMIN — METHOCARBAMOL 500 MG: 500 TABLET ORAL at 21:39

## 2024-11-29 RX ADMIN — PRAVASTATIN SODIUM 40 MG: 40 TABLET ORAL at 17:16

## 2024-11-29 RX ADMIN — ACETAMINOPHEN 975 MG: 325 TABLET, FILM COATED ORAL at 06:06

## 2024-11-29 RX ADMIN — METHOCARBAMOL 500 MG: 500 TABLET ORAL at 13:46

## 2024-11-29 RX ADMIN — OXYCODONE HYDROCHLORIDE AND ACETAMINOPHEN 500 MG: 500 TABLET ORAL at 08:28

## 2024-11-29 RX ADMIN — HYDROMORPHONE HYDROCHLORIDE 2 MG: 2 TABLET ORAL at 08:28

## 2024-11-29 RX ADMIN — METHOCARBAMOL 500 MG: 500 TABLET ORAL at 06:06

## 2024-11-29 RX ADMIN — Medication 2000 UNITS: at 08:28

## 2024-11-29 RX ADMIN — METFORMIN HYDROCHLORIDE 500 MG: 500 TABLET ORAL at 08:28

## 2024-11-29 RX ADMIN — HYDROMORPHONE HYDROCHLORIDE 2 MG: 2 TABLET ORAL at 17:24

## 2024-11-29 RX ADMIN — GABAPENTIN 100 MG: 100 CAPSULE ORAL at 17:15

## 2024-11-29 NOTE — ASSESSMENT & PLAN NOTE
- Hb improved to 10.8 on 11/28  - Mgmt per primary team and recommend optimal mgmt during rehab process  - Monitor H/H, vitals, signs/symptoms of acute bleeding

## 2024-11-29 NOTE — ASSESSMENT & PLAN NOTE
Preoperative cardiology consultation recommended Lovenox to Coumadin bridge  INR 1.75 yesterday  Increased Coumadin to 4mg daily.  Goal INR 2-3   Continue Toprol for rate control  INR Saturday.

## 2024-11-29 NOTE — PROGRESS NOTES
"   11/29/24 0705   Pain Assessment   Pain Assessment Tool 0-10   Pain Score 7   Pain Location/Orientation Orientation: Left;Location: Knee   Pain Onset/Description Onset: Ongoing   Restrictions/Precautions   Precautions Fall Risk;Pain   Weight Bearing Restrictions Yes   LLE Weight Bearing Per Order WBAT   ROM Restrictions No   Lifestyle   Autonomy \"I would never have had this done if I knew it was going to be this bad\"   Eating   Type of Assistance Needed Set-up / clean-up   Physical Assistance Level No physical assistance   Comment Seated OOB in recliner post session with breakfast and all needs in reach   Eating CARE Score 5   Oral Hygiene   Type of Assistance Needed Physical assistance   Physical Assistance Level 25% or less   Comment CGA/Min A to complete in stance at sink with RW   Oral Hygiene CARE Score 3   Grooming   Able To Initiate Tasks;Brush/Clean Teeth;Wash/Dry Hands   Limitation Noted In Timeliness  (pain)   Shower/Bathe Self   Type of Assistance Needed Physical assistance   Physical Assistance Level 26%-50%   Comment completed SB routine seated EOB per pt request vs. in bathroom - required assist to wash feet and rear.  Discussed option for LH sponge to maximize independence with functional reach.  Able to stand and complete unilateral release when washing perineal with overall CGA   Shower/Bathe Self CARE Score 3   Bathing   Assessed Bath Style Sponge Bath   Anticipated D/C Bath Style Shower   Able to Gather/Transport No   Able to Adjust Water Temperature No   Able to Wash/Rinse/Dry (body part) Left Arm;Right Arm;L Upper Leg;R Upper Leg;Chest;Abdomen;Perineal Area   Upper Body Dressing   Type of Assistance Needed Set-up / clean-up   Physical Assistance Level No physical assistance   Comment seated EOB to don OH shirt   Upper Body Dressing CARE Score 5   Lower Body Dressing   Type of Assistance Needed Physical assistance;Adaptive equipment   Physical Assistance Level 25% or less   Comment Utilized " LHAE for LBD.  Provided w/ further demonstration/education regarding utilizing reacher to thread LE's into pants - required mod prompting/cueing to carryover education and properly position AE.  Able to stand w/ Min/CGA to manage shorts up - MIn prompting for alternating unilateral release to fully manage shorts   Lower Body Dressing CARE Score 3   Putting On/Taking Off Footwear   Type of Assistance Needed Adaptive equipment;Physical assistance   Physical Assistance Level 26%-50%   Comment Setup with sock aide; able to don slipper socks using device with min promping for technique.  Max A to don sneakers and manage ties.  Total A for ace wrapping to L knee   Putting On/Taking Off Footwear CARE Score 3   Lying to Sitting on Side of Bed   Type of Assistance Needed Supervision;Adaptive equipment   Physical Assistance Level No physical assistance   Comment utilized leg  to assist LLE off the bed; encouraged to attempt to  himself and move off the bed.  Able to utilize bed rails and manage trunk with overall supervision and increased time   Lying to Sitting on Side of Bed CARE Score 4   Sit to Stand   Type of Assistance Needed Incidental touching   Physical Assistance Level No physical assistance   Comment VCs for UE positioning prior to transfer; completed with overall CS to RW   Sit to Stand CARE Score 4   Bed-Chair Transfer   Type of Assistance Needed Incidental touching   Physical Assistance Level No physical assistance   Comment using RW   Chair/Bed-to-Chair Transfer CARE Score 4   Toileting Hygiene   Type of Assistance Needed Incidental touching   Physical Assistance Level No physical assistance   Comment CGA to stand and void into urinal due to urgency; able to manage clothing with unilateral release and overall CGA; stood with support of RW for duration of urinal use with min complaints of increased pain   Toileting Hygiene CARE Score 4   Toilet Transfer   Comment did not perform; pt stood to void at  "bedside due to urgency   Cognition   Overall Cognitive Status WFL   Arousal/Participation Alert;Cooperative   Attention Within functional limits   Orientation Level Oriented to person;Oriented to place;Oriented to situation;Oriented to time   Memory Within functional limits   Following Commands Follows multistep commands without difficulty   Activity Tolerance   Activity Tolerance Patient tolerated treatment well   Assessment   Treatment Assessment Pt participated in 90 minute OT treatment focusing on ADL re-training, bed mob., functional transfers/mobility, compensatory education, activity tolerance, and standing balance/tolerance.  Details regarding functional performance throughout session noted above.  Pt tolerated session fairly well; 7/10 pain in knee throughout which appeared to be ongoing; however noted level of pain did not impact functional engagement on this date.  Pt reported he felt \"better\" today overall.  Pt w/ noted reliance on leg- during bed mob. tasks and adjustement of positioning when sitting EOB.  pt encouraged to start to initiate completing on his own vs. having device consistently assist.  Pt receptive to LHAE training and will continue to benefit from same during inpatient stay.  continues to require prompting for staggered stance during functional standing tasks to maximize tolerance to WBing through L knee as well as assist w/ overall dynamic balance training.  Pt will continue to benefit from skilled OT services during inpatient stay.  Reports he does 'everything\" at home due to his wifes previous stroke and feels he needs further skilled therapy following [facility].  Will continue to assess appropriate d/c recommendation.  Plan to continue w/ established OT POC at this time focusing on noted functional deficits to maximize safety and functional independence w/ I/ADLs prior to d/c home.   Prognosis Fair   Problem List Decreased strength;Decreased range of motion;Impaired " balance;Decreased mobility;Pain;Impaired vision   Barriers to Discharge Decreased caregiver support   Plan   Treatment/Interventions ADL retraining;Functional transfer training;Therapeutic exercise;Endurance training;Patient/family training;Equipment eval/education;Bed mobility;Compensatory technique education   Progress Progressing toward goals   OT Therapy Minutes   OT Time In 0705   OT Time Out 0835   OT Total Time (minutes) 90   OT Mode of treatment - Individual (minutes) 90   OT Mode of treatment - Concurrent (minutes) 0   OT Mode of treatment - Group (minutes) 0   OT Mode of treatment - Co-treat (minutes) 0   OT Mode of Treatment - Total time(minutes) 90 minutes   OT Cumulative Minutes 740   Therapy Time missed   Time missed? No

## 2024-11-29 NOTE — CASE MANAGEMENT
CM NOTE      Spoke with pt's son's girlfriend Farzana this morning around 8 AM regarding subacute rehab choices. She was in agreement to referrals being sent via [employer] and then CM to review with her list of accepting facilities. Around 12:30 spoke with Edwige again and informed her of accepting facilties, she would be present around 3pm today to speak with pt. CM met with pt/Farzana at 3:15 and they have chosen [facility]. Reserved via [facility] and informed them of plan for d/c Tuesday to subacute rehab. CM to follow up Monday regarding transportation and confirming d/c. Farzana would like to then me made aware when confirmation of d/c date and time is set.

## 2024-11-29 NOTE — PROGRESS NOTES
Progress Note - PMR   Name: Isauro Sow 79 y.o. male I MRN: [id number]  Unit/Bed#: -01 I Date of Admission: 11/18/2024   Date of Service: 11/29/2024 I Hospital Day: 11    Assessment & Plan  Status post total left knee replacement using cement  Performed by Dr. Braxton on 11/13.  Function improving - may need SNF if does not make adequate gains over the weekend     WBAT.  AROM/PROM/AAROM with no degree restriction.   PT/OT 3-5 hours/day, 5-6 days/week.   DVT Ppx fully anticoagulated on coumadin.  F/u Ortho 11/27 - ROM as tolerated, pillow/blankets under achilles not behind knee while in bed; follow-up in 10-14 days  L Knee XR 11/26 No acute fracture or dislocation. Knee joint effusion.Satisfactory positioning of the arthroplasty.   No lytic or blastic osseous lesion. Soft tissue swelling along the ventral aspect of the distal thigh. Prior subcutaneous emphysema has resolved. IMPRESSION: Satisfactory appearance of the arthroplasty.  Acute pain  Improving some   Related to TKA and edema in RLE  ACE Wraps  Tylenol 975mg TID scheduled  Robaxin 500mg TID scheduled   Gabapentin 100mg BID for sensitized pain   Monitor and adjust as appropriate.   -Per APS who was consulted earlier in course: DC oxycodone and start dilaudid 1-2 mg q4h PRN and escalate to 2/4 if needed. Start gabapentin 100 mg HS  - Gabapentin adjusted to 100 mg q12h.   Constipation  Improved; now on softer side  -Colace-senna BID, PRN supp or PRN miralax   At risk for venous thromboembolism (VTE)  Anticoagulated on warfarin per IM,SCDs.  Benign essential hypertension  Home: Toprol 25mg QHS,   here: Same.H Monitor and adjust as per IM.   Type 2 diabetes mellitus (HCC)  Lab Results   Component Value Date    HGBA1C 7.2 (H) 10/07/2024       Recent Labs     11/28/24  1124 11/28/24  1547 11/28/24  2031 11/29/24  0612   POCGLU 140 135 194* 139       Blood Sugar Average: Last 72 hrs:  (P) [id number]    Home: Metformin 1000mg daily;   Here:  Metformin 500m qday; CDI/Accuchecks.   If necessary, will try to get patient back on oral meds prior to discharge.   Diabetic Diet.   Management as per IM.     History of stroke  -Hx of stroke in 2017 w/ residual L sided deficits   -Monitor   Stage 3a chronic kidney disease (HCC)  Lab Results   Component Value Date    EGFR 65 11/28/2024    EGFR 67 11/19/2024    EGFR 65 11/18/2024    CREATININE 1.07 11/28/2024    CREATININE 1.04 11/19/2024    CREATININE 1.07 11/18/2024     -Baseline Cr 1.0-1.3  Currently within baseline.   Avoid nephrotoxic meds, relative hypotension.   Monitor BMP, I/O.   S/P TAVR (transcatheter aortic valve replacement)  -TAVR procedure done 9/2023  -Follows CTS outpatient   Chronic atrial flutter (HCC)  -Hx of a-flutter s/p PPM placement   -On Coumadin per IM   - Continue Toprol    Urinary retention  Improved   11/21 patient endorsing symptoms of urinary retention in setting of constipation.  -Denies dysuria but will order UA and follow up - UA with trace protein and urobilinogen but negative for UTI   Anemia  - Hb improved to 10.8 on 11/28  - Mgmt per primary team and recommend optimal mgmt during rehab process  - Monitor H/H, vitals, signs/symptoms of acute bleeding      VTE risk - warfarin    Subjective   On eval, patient reports R knee pain improving some.  He denies SOB, sore throat, fever, chills, constipation, or other new complaints.     Chief Complaint:  F/u knee sx    Objective :  Temp:  [97.7 °F (36.5 °C)-98 °F (36.7 °C)] 98 °F (36.7 °C)  HR:  [61-70] 61  BP: (132-149)/(62-70) 149/70  Resp:  [16-18] 17  SpO2:  [94 %-96 %] 96 %  O2 Device: None (Room air)    Functional Update: improving mobility   Mobility: 10 ft CG assist   Transfers: CGA  ADLs: Mod assist toileting hygiene and bathing; Min assist LBD    Physical Exam    General: Awake, alert in NAD  HENT: NCAT, MMM  Respiratory: Unlabored breathing  Cardiovascular: Regular rate   Gastrointestinal: Soft, non-distended  Genitourinary: No  elroy  SkiN/MSK/Extremities:  No calf edema, no calf tenderness to palpation; R knee without increased edema, erythema; Improving ROM   Neurologic/Psych:   MENTAL STATUS: awake, orientation intact  Affect: Euthymic     Scheduled Meds:  Current Facility-Administered Medications   Medication Dose Route Frequency Provider Last Rate    acetaminophen  975 mg Oral Q8H MOON Morales MD      ascorbic acid  500 mg Oral BID Sung Morales MD      bisacodyl  10 mg Rectal Daily PRN Willie Washington DO      Cholecalciferol  2,000 Units Oral Daily Sung Morales MD      diphenhydrAMINE-zinc acetate   Topical TID PRN Lisa Garcia PA-C      folic acid  1 mg Oral Daily Sung Morales MD      gabapentin  100 mg Oral Q12H Ashley Depadua, MD      HYDROmorphone  1 mg Oral Q4H PRN DONTE Varma      Or    HYDROmorphone  2 mg Oral Q4H PRN DONTE Varma      insulin lispro  1-5 Units Subcutaneous HS Sung Morales MD      insulin lispro  1-6 Units Subcutaneous TID AC Sung Morales MD      metFORMIN  500 mg Oral Daily With Breakfast Christie Taylor PA-C      methocarbamol  500 mg Oral Q8H [facility] Ashley Depadua, MD      metoprolol succinate  25 mg Oral HS Sung Morales MD      naloxone  0.04 mg Intravenous Q1MIN PRN DONTE Varma      nitroglycerin  0.4 mg Sublingual Q5 Min PRN Sung Morales MD      ondansetron  4 mg Oral Q6H PRN Ashley Depadua, MD      polyethylene glycol  17 g Oral Daily Sung Morales MD      pravastatin  40 mg Oral Daily With Dinner Sung Morales MD      senna-docusate sodium  2 tablet Oral BID Ashley Depadua, MD      tamsulosin  0.4 mg Oral Daily With Dinner Ashley Depadua, MD      warfarin  4 mg Oral Daily (warfarin) Christie Taylor PA-C           Lab Results: I have reviewed the following results:  Results from last 7 days   Lab Units 11/28/24  0634   HEMOGLOBIN g/dL 10.8*   HEMATOCRIT % 34.8*   WBC Thousand/uL 7.70   PLATELETS Thousands/uL 383     Results from last 7 days   Lab  Units 11/28/24  0634   BUN mg/dL 18   SODIUM mmol/L 141   POTASSIUM mmol/L 4.2   CHLORIDE mmol/L 102   CREATININE mg/dL 1.07       Results from last 7 days   Lab Units 11/28/24  0634 11/25/24  0518 11/23/24  1513   PROTIME seconds 20.6* 25.0* 28.0*   INR  1.75* 2.27* 2.63*        Updated spouse by phone.

## 2024-11-29 NOTE — PROGRESS NOTES
11/29/24 1230   Pain Assessment   Pain Assessment Tool 0-10   Pain Score 6   Pain Location/Orientation Orientation: Left;Location: Knee   Restrictions/Precautions   Precautions Fall Risk;Bed/chair alarms;Supervision on toilet/commode;Pain   LLE Weight Bearing Per Order WBAT   Sit to Stand   Type of Assistance Needed Incidental touching;Adaptive equipment   Sit to Stand CARE Score 4   Bed-Chair Transfer   Type of Assistance Needed Incidental touching;Adaptive equipment   Comment stand pivot with RW   Chair/Bed-to-Chair Transfer CARE Score 4   Walk 10 Feet   Type of Assistance Needed Physical assistance;Adaptive equipment   Physical Assistance Level 26%-50%   Walk 10 Feet CARE Score 3   Walk 50 Feet with Two Turns   Type of Assistance Needed Physical assistance;Adaptive equipment   Physical Assistance Level Total assistance   Comment Mod A with chair follow   Walk 50 Feet with Two Turns CARE Score 1   Walk 150 Feet   Reason if not Attempted Safety concerns   Walk 150 Feet CARE Score 88   Walking 10 Feet on Uneven Surfaces   Reason if not Attempted Safety concerns   Walking 10 Feet on Uneven Surfaces CARE Score 88   Ambulation   Primary Mode of Locomotion Prior to Admission Walk   Distance Walked (feet) 65 ft  (65 x 1, 35 x 2)   Gait Pattern Step to;Antalgic;Improper weight shift;Decreased L stance;Decreased foot clearance;Slow Margarita   Limitations Noted In Strength;Posture;Heel Strike;Endurance;Balance   Provided Assistance with: Balance   Findings walking fwd bkwd in P bars after stretching and prior to walking with RW. Pt with improving wt acceptance LLE, however still with flexed knee, remains high fall risk   Does the patient walk? 2. Yes   Therapeutic Interventions   Strengthening Seated AA LAQ LLE 3 x 10 with overstretch by therapist for TKE   Flexibility in standing working on P bars, therapist blocking at hip and knee for extension while pt lifting R LE on/off floor. Pt seated in w/c performing self flxion  "stretch with feet on floor, rolling chair forward and holding 5 x 30 sec.   Equipment Use   NuStep pt working a self pace for slow stretching of LLE, pausing in ext & flex for 5 sec, total of 15 mn   Assessment   Treatment Assessment Pt with reports having \"less pain\" today. PROM contines to improve, but Aactive left knee extension reamins limited by weakness. Gait slowly improving, continues to have flexed knee with slowly improving weight  bearing. Remains High fall risk. Pt would benefit from extended rehab in subacute setting  at this time to maximize his function and safety.   PT Therapy Minutes   PT Time In 1230   PT Time Out 1400   PT Total Time (minutes) 90   PT Mode of treatment - Individual (minutes) 0   PT Mode of treatment - Concurrent (minutes) 90   PT Mode of treatment - Group (minutes) 0   PT Mode of treatment - Co-treat (minutes) 0   PT Mode of Treatment - Total time(minutes) 90 minutes   PT Cumulative Minutes 953   Therapy Time missed   Time missed? No       "

## 2024-11-29 NOTE — ASSESSMENT & PLAN NOTE
Patient was recently admitted to [facility] for elective left TKA on 11/13/24, discharged with home therapy, returned to ED as he was unable to ambulate or participate with home PT.   Orthopedic evaluation appreciated-seen again by ortho 11/27, xrays ordered and reviewed. Will need outpatient follow up in 4 weeks  Currently admitted to [facility] for rehab  Pain control and therapy per PMR

## 2024-11-29 NOTE — PROGRESS NOTES
Progress Note - Hospitalist   Name: Isauro Sow 79 y.o. male I MRN: [id number]  Unit/Bed#: -01 I Date of Admission: 11/18/2024   Date of Service: 11/29/2024 I Hospital Day: 11    Assessment & Plan  Status post total left knee replacement using cement  Patient was recently admitted to [facility] for elective left TKA on 11/13/24, discharged with home therapy, returned to ED as he was unable to ambulate or participate with home PT.   Orthopedic evaluation appreciated-seen again by ortho 11/27, xrays ordered and reviewed. Will need outpatient follow up in 4 weeks  Currently admitted to [facility] for rehab  Pain control and therapy per PMR  Benign essential hypertension  Blood pressure acceptable  Continue with home dose Toprol 50mg qhs  Type 2 diabetes mellitus (HCC)  Lab Results   Component Value Date    HGBA1C 7.2 (H) 10/07/2024     Home: Metformin 1000mg daily.  Here: Metformin 500mg 2x daily - restarted 11/22/24  Continue QID Accuchecks/SSI and DM diet.  No changes today  Chronic atrial flutter (HCC)  Preoperative cardiology consultation recommended Lovenox to Coumadin bridge  INR 1.75 yesterday  Increased Coumadin to 4mg daily.  Goal INR 2-3   Continue Toprol for rate control  INR Saturday.  Stage 3a chronic kidney disease (HCC)  Renal function at baseline  Avoid nephrotoxins  History of stroke  Continue statin and coumadin  S/P TAVR (transcatheter aortic valve replacement)  Followed by cardiology and CTS as outpatient  Anemia  Post op anemia  Continue to monitor  Hgb 10.8    The above assessment and plan was reviewed and updated as determined by my evaluation of the patient on 11/29/2024.    History of Present Illness   Patient seen and examined. Patients overnight issues or events were reviewed with nursing staff. New or overnight issues include the following:   Patient seen sitting comfortably in chair. Pain controlled. Appetite good. No CP/SOB    Review of Systems    Objective :  Temp:  [97.7 °F  (36.5 °C)-98 °F (36.7 °C)] 98 °F (36.7 °C)  HR:  [61-70] 61  BP: (132-149)/(62-70) 149/70  Resp:  [16-18] 17  SpO2:  [94 %-96 %] 96 %  O2 Device: None (Room air)    Invasive Devices       None                   Physical Exam  General Appearance: NAD; pleasant  HEENT: PERRLA, conjuctiva normal; mucous membranes moist; face symmetrical  Neck:  Supple  Lungs: clear bilaterally, normal respiratory effort, no retractions, expiratory effort normal, on room air  CV: regular rate    ABD: soft non tender, +BS x4  EXT: DP pulses intact, no lymphadenopathy, left LE wrapped with ace bandage  Skin: normal turgor, normal texture, no rash  Psych: affect normal, mood normal  Neuro: AAOx3    The above physical exam was reviewed and updated as determined by my evaluation of the patient on 11/29/2024.      Lab Results: I have reviewed the following results:  Results from last 7 days   Lab Units 11/28/24  0634   WBC Thousand/uL 7.70   HEMOGLOBIN g/dL 10.8*   HEMATOCRIT % 34.8*   PLATELETS Thousands/uL 383     Results from last 7 days   Lab Units 11/28/24  0634   SODIUM mmol/L 141   POTASSIUM mmol/L 4.2   CHLORIDE mmol/L 102   CO2 mmol/L 31   BUN mg/dL 18   CREATININE mg/dL 1.07   CALCIUM mg/dL 9.6         Results from last 7 days   Lab Units 11/28/24  0634 11/25/24  0518   INR  1.75* 2.27*     Results from last 7 days   Lab Units 11/29/24  0612 11/28/24  2031 11/28/24  1547   POC GLUCOSE mg/dl 139 194* 135           Review of Scheduled Meds: Medications  reviewed and reconciled as needed  Current Facility-Administered Medications   Medication Dose Route Frequency Provider Last Rate    acetaminophen  975 mg Oral Q8H [facility] Sung Morales MD      ascorbic acid  500 mg Oral BID Sung Morales MD      bisacodyl  10 mg Rectal Daily PRN Willie Washington DO      Cholecalciferol  2,000 Units Oral Daily Sung Morales MD      diphenhydrAMINE-zinc acetate   Topical TID PRN Lisa Garcia PA-C      folic acid  1 mg Oral Daily Sung Morales MD       gabapentin  100 mg Oral Q12H Ashley Depadua, MD      HYDROmorphone  1 mg Oral Q4H PRN DONTE Varma      Or    HYDROmorphone  2 mg Oral Q4H PRN DONTE Varma      insulin lispro  1-5 Units Subcutaneous HS Sung Morales MD      insulin lispro  1-6 Units Subcutaneous TID AC Sung Morales MD      metFORMIN  500 mg Oral Daily With Breakfast Christie Taylor PA-C      methocarbamol  500 mg Oral Q8H [facility] Ashley Depadua, MD      metoprolol succinate  25 mg Oral HS Sung Morales MD      naloxone  0.04 mg Intravenous Q1MIN PRN DONTE Varma      nitroglycerin  0.4 mg Sublingual Q5 Min PRN Sung Morales MD      ondansetron  4 mg Oral Q6H PRN Ashley Depadua, MD      polyethylene glycol  17 g Oral Daily Sung Morales MD      pravastatin  40 mg Oral Daily With Dinner Sung Morales MD      senna-docusate sodium  2 tablet Oral BID Ashley Depadua, MD      tamsulosin  0.4 mg Oral Daily With Dinner Ashley Depadua, MD      warfarin  4 mg Oral Daily (warfarin) Christie Taylor PA-C         VTE Pharmacologic Prophylaxis: coumadin  Code Status: Level 3 - DNAR and DNI  Current Length of Stay: 11 day(s)      ** Please Note:  voice to text software may have been used in the creation of this document. Although proof errors in transcription or interpretation are a potential of such software**

## 2024-11-29 NOTE — ASSESSMENT & PLAN NOTE
Lab Results   Component Value Date    HGBA1C 7.2 (H) 10/07/2024       Recent Labs     11/28/24  1124 11/28/24  1547 11/28/24 2031 11/29/24  0612   POCGLU 140 135 194* 139       Blood Sugar Average: Last 72 hrs:  (P) [id number]    Home: Metformin 1000mg daily;   Here: Metformin 500m qday; CDI/Accuchecks.   If necessary, will try to get patient back on oral meds prior to discharge.   Diabetic Diet.   Management as per IM.

## 2024-11-29 NOTE — ASSESSMENT & PLAN NOTE
Performed by Dr. Braxton on 11/13.  Function improving - may need SNF if does not make adequate gains over the weekend     WBAT.  AROM/PROM/AAROM with no degree restriction.   PT/OT 3-5 hours/day, 5-6 days/week.   DVT Ppx fully anticoagulated on coumadin.  F/u Ortho 11/27 - ROM as tolerated, pillow/blankets under achilles not behind knee while in bed; follow-up in 10-14 days  L Knee XR 11/26 No acute fracture or dislocation. Knee joint effusion.Satisfactory positioning of the arthroplasty.   No lytic or blastic osseous lesion. Soft tissue swelling along the ventral aspect of the distal thigh. Prior subcutaneous emphysema has resolved. IMPRESSION: Satisfactory appearance of the arthroplasty.

## 2024-11-29 NOTE — ASSESSMENT & PLAN NOTE
Improving some   Related to TKA and edema in RLE  ACE Wraps  Tylenol 975mg TID scheduled  Robaxin 500mg TID scheduled   Gabapentin 100mg BID for sensitized pain   Monitor and adjust as appropriate.   -Per APS who was consulted earlier in course: DC oxycodone and start dilaudid 1-2 mg q4h PRN and escalate to 2/4 if needed. Start gabapentin 100 mg HS  - Gabapentin adjusted to 100 mg q12h.

## 2024-11-30 LAB
GLUCOSE SERPL-MCNC: 133 MG/DL (ref 65–140)
GLUCOSE SERPL-MCNC: 141 MG/DL (ref 65–140)
GLUCOSE SERPL-MCNC: 163 MG/DL (ref 65–140)
GLUCOSE SERPL-MCNC: 168 MG/DL (ref 65–140)
INR PPP: 1.73 (ref 0.85–1.19)
PROTHROMBIN TIME: 20.4 SECONDS (ref 12.3–15)

## 2024-11-30 PROCEDURE — 85610 PROTHROMBIN TIME: CPT | Performed by: PHYSICIAN ASSISTANT

## 2024-11-30 PROCEDURE — 82948 REAGENT STRIP/BLOOD GLUCOSE: CPT

## 2024-11-30 PROCEDURE — 99232 SBSQ HOSP IP/OBS MODERATE 35: CPT | Performed by: PHYSICIAN ASSISTANT

## 2024-11-30 RX ORDER — ENOXAPARIN SODIUM 100 MG/ML
1 INJECTION SUBCUTANEOUS EVERY 12 HOURS SCHEDULED
Status: DISCONTINUED | OUTPATIENT
Start: 2024-11-30 | End: 2024-12-03 | Stop reason: HOSPADM

## 2024-11-30 RX ORDER — WARFARIN SODIUM 5 MG/1
5 TABLET ORAL
Status: DISCONTINUED | OUTPATIENT
Start: 2024-11-30 | End: 2024-12-02

## 2024-11-30 RX ADMIN — WARFARIN SODIUM 5 MG: 5 TABLET ORAL at 16:35

## 2024-11-30 RX ADMIN — HYDROMORPHONE HYDROCHLORIDE 2 MG: 2 TABLET ORAL at 04:17

## 2024-11-30 RX ADMIN — METHOCARBAMOL 500 MG: 500 TABLET ORAL at 13:37

## 2024-11-30 RX ADMIN — ACETAMINOPHEN 975 MG: 325 TABLET, FILM COATED ORAL at 21:07

## 2024-11-30 RX ADMIN — GABAPENTIN 100 MG: 100 CAPSULE ORAL at 16:35

## 2024-11-30 RX ADMIN — ENOXAPARIN SODIUM 100 MG: 100 INJECTION SUBCUTANEOUS at 13:38

## 2024-11-30 RX ADMIN — HYDROMORPHONE HYDROCHLORIDE 1 MG: 2 TABLET ORAL at 09:15

## 2024-11-30 RX ADMIN — OXYCODONE HYDROCHLORIDE AND ACETAMINOPHEN 500 MG: 500 TABLET ORAL at 16:36

## 2024-11-30 RX ADMIN — METFORMIN HYDROCHLORIDE 500 MG: 500 TABLET ORAL at 09:15

## 2024-11-30 RX ADMIN — PRAVASTATIN SODIUM 40 MG: 40 TABLET ORAL at 16:41

## 2024-11-30 RX ADMIN — INSULIN LISPRO 1 UNITS: 100 INJECTION, SOLUTION INTRAVENOUS; SUBCUTANEOUS at 16:36

## 2024-11-30 RX ADMIN — Medication 2000 UNITS: at 09:15

## 2024-11-30 RX ADMIN — OXYCODONE HYDROCHLORIDE AND ACETAMINOPHEN 500 MG: 500 TABLET ORAL at 09:15

## 2024-11-30 RX ADMIN — METHOCARBAMOL 500 MG: 500 TABLET ORAL at 21:07

## 2024-11-30 RX ADMIN — SENNOSIDES AND DOCUSATE SODIUM 2 TABLET: 50; 8.6 TABLET ORAL at 16:36

## 2024-11-30 RX ADMIN — SENNOSIDES AND DOCUSATE SODIUM 2 TABLET: 50; 8.6 TABLET ORAL at 09:15

## 2024-11-30 RX ADMIN — TAMSULOSIN HYDROCHLORIDE 0.4 MG: 0.4 CAPSULE ORAL at 16:41

## 2024-11-30 RX ADMIN — ACETAMINOPHEN 975 MG: 325 TABLET, FILM COATED ORAL at 06:04

## 2024-11-30 RX ADMIN — ENOXAPARIN SODIUM 100 MG: 100 INJECTION SUBCUTANEOUS at 21:07

## 2024-11-30 RX ADMIN — FOLIC ACID 1 MG: 1 TABLET ORAL at 09:15

## 2024-11-30 RX ADMIN — METOPROLOL SUCCINATE 25 MG: 25 TABLET, EXTENDED RELEASE ORAL at 21:07

## 2024-11-30 RX ADMIN — HYDROMORPHONE HYDROCHLORIDE 2 MG: 2 TABLET ORAL at 19:33

## 2024-11-30 RX ADMIN — ACETAMINOPHEN 975 MG: 325 TABLET, FILM COATED ORAL at 13:37

## 2024-11-30 RX ADMIN — INSULIN LISPRO 1 UNITS: 100 INJECTION, SOLUTION INTRAVENOUS; SUBCUTANEOUS at 12:11

## 2024-11-30 RX ADMIN — GABAPENTIN 100 MG: 100 CAPSULE ORAL at 06:02

## 2024-11-30 RX ADMIN — METHOCARBAMOL 500 MG: 500 TABLET ORAL at 06:02

## 2024-11-30 NOTE — ASSESSMENT & PLAN NOTE
Preoperative cardiology consultation recommended Lovenox to Coumadin bridge  Increase Coumadin to 5mg daily. Restart Lovenox bridge.  Goal INR 2-3   Continue Toprol for rate control  INR 1.73

## 2024-11-30 NOTE — ASSESSMENT & PLAN NOTE
Lab Results   Component Value Date    HGBA1C 7.2 (H) 10/07/2024     Home: Metformin 1000mg daily.  Here: Metformin 500mg 2x daily - restarted 11/22/24  Continue QID Accuchecks/SSI and DM diet.  No changes today   present

## 2024-11-30 NOTE — PROGRESS NOTES
Progress Note - Hospitalist   Name: Isauro Sow 79 y.o. male I MRN: [id number]  Unit/Bed#: -01 I Date of Admission: 11/18/2024   Date of Service: 11/30/2024 I Hospital Day: 12    Assessment & Plan  Status post total left knee replacement using cement  Patient was recently admitted to [facility] for elective left TKA on 11/13/24, discharged with home therapy, returned to ED as he was unable to ambulate or participate with home PT.   Orthopedic evaluation appreciated-seen again by ortho 11/27, xrays ordered and reviewed. Will need outpatient follow up in 4 weeks  Currently admitted to [facility] for rehab  Pain control and therapy per PMR  Benign essential hypertension  Blood pressure acceptable  Continue with home dose Toprol 50mg qhs  Type 2 diabetes mellitus (HCC)  Lab Results   Component Value Date    HGBA1C 7.2 (H) 10/07/2024     Home: Metformin 1000mg daily.  Here: Metformin 500mg 2x daily - restarted 11/22/24  Continue QID Accuchecks/SSI and DM diet.  No changes today  Chronic atrial flutter (HCC)  Preoperative cardiology consultation recommended Lovenox to Coumadin bridge  Increase Coumadin to 5mg daily. Restart Lovenox bridge.  Goal INR 2-3   Continue Toprol for rate control  INR 1.73  Stage 3a chronic kidney disease (HCC)  Renal function at baseline  Avoid nephrotoxins  History of stroke  Continue statin and coumadin  S/P TAVR (transcatheter aortic valve replacement)  Followed by cardiology and CTS as outpatient  Anemia  Post op anemia  Continue to monitor  Hgb 10.8    The above assessment and plan was reviewed and updated as determined by my evaluation of the patient on 11/30/2024.    History of Present Illness   Patient seen and examined. Patients overnight issues or events were reviewed with nursing staff. New or overnight issues include the following:     Pt seen in his room. Discussed adding Lovenox back until INR is therapeutic. He denies any current complaints.    A 10 point review of  systems was negative except for what is noted in the HPI.      Objective :  Temp:  [97.5 °F (36.4 °C)-99.3 °F (37.4 °C)] 97.5 °F (36.4 °C)  HR:  [65-73] 65  BP: (112-140)/(59-66) 140/66  Resp:  [16-17] 16  SpO2:  [93 %-99 %] 94 %  O2 Device: None (Room air)    Invasive Devices       None                   Physical Exam  General Appearance: NAD; pleasant  HEENT: PERRLA, conjuctiva normal; mucous membranes moist; face symmetrical  Neck:  Supple  Lungs: clear bilaterally, normal respiratory effort, no retractions, expiratory effort normal, on room air  CV: regular rate and rhythm, no murmurs rubs or gallops noted   ABD: soft non tender, +BS x4  EXT: DP pulses intact, no lymphadenopathy, no edema  Skin: normal turgor, normal texture, no rash  Psych: affect normal, mood normal  Neuro: AAOx3    The above physical exam was reviewed and updated as determined by my evaluation of the patient on 11/30/2024.      Lab Results: I have reviewed the following results:  Results from last 7 days   Lab Units 11/28/24  0634   WBC Thousand/uL 7.70   HEMOGLOBIN g/dL 10.8*   HEMATOCRIT % 34.8*   PLATELETS Thousands/uL 383     Results from last 7 days   Lab Units 11/28/24  0634   SODIUM mmol/L 141   POTASSIUM mmol/L 4.2   CHLORIDE mmol/L 102   CO2 mmol/L 31   BUN mg/dL 18   CREATININE mg/dL 1.07   CALCIUM mg/dL 9.6         Results from last 7 days   Lab Units 11/30/24  0615 11/28/24  0634   INR  1.73* 1.75*     Results from last 7 days   Lab Units 11/30/24  1202 11/30/24  0621 11/29/24  2122   POC GLUCOSE mg/dl 163* 141* 233*       Imaging Results Review: No pertinent imaging studies reviewed.  Other Study Results Review: Other studies reviewed include: INR    Review of Scheduled Meds: Medications  reviewed and reconciled as needed  Current Facility-Administered Medications   Medication Dose Route Frequency Provider Last Rate    acetaminophen  975 mg Oral Q8H [facility] Sung Morales MD      ascorbic acid  500 mg Oral BID Sung Morales MD       bisacodyl  10 mg Rectal Daily PRN Willie Washington DO      Cholecalciferol  2,000 Units Oral Daily Sung Morales MD      diphenhydrAMINE-zinc acetate   Topical TID PRN Lisa Garcia PA-C      folic acid  1 mg Oral Daily Sung Morales MD      gabapentin  100 mg Oral Q12H Ashley Depadua, MD      HYDROmorphone  1 mg Oral Q4H PRN DONTE Varma      Or    HYDROmorphone  2 mg Oral Q4H PRN DONTE Varma      insulin lispro  1-5 Units Subcutaneous HS Sung Morales MD      insulin lispro  1-6 Units Subcutaneous TID AC Sung Morales MD      metFORMIN  500 mg Oral Daily With Breakfast Christie Taylor PA-C      methocarbamol  500 mg Oral Q8H [facility] Ashley Depadua, MD      metoprolol succinate  25 mg Oral HS Sung Morales MD      naloxone  0.04 mg Intravenous Q1MIN PRN DONTE Varma      nitroglycerin  0.4 mg Sublingual Q5 Min PRN Sung Morales MD      ondansetron  4 mg Oral Q6H PRN Ashley Depadua, MD      polyethylene glycol  17 g Oral Daily PRN Tristen Williamson MD      pravastatin  40 mg Oral Daily With Dinner Sung Morales MD      senna-docusate sodium  2 tablet Oral BID Ashley Depadua, MD      tamsulosin  0.4 mg Oral Daily With Dinner Ashley Depadua, MD      warfarin  4 mg Oral Daily (warfarin) Christie Taylor PA-C         VTE Pharmacologic Prophylaxis: Lovenox bridge to Coumadin  Code Status: Level 3 - DNAR and DNI  Current Length of Stay: 12 day(s)    Administrative Statements   I have spent a total time of 30 minutes in caring for this patient on the day of the visit/encounter including Diagnostic results, Prognosis, Risks and benefits of tx options, Instructions for management, Patient and family education, Importance of tx compliance, Risk factor reductions, Impressions, Counseling / Coordination of care, Documenting in the medical record, Reviewing / ordering tests, medicine, procedures  , Obtaining or reviewing history  , and Communicating with other healthcare professionals  .  ** Please Note:  voice to text software may have been used in the creation of this document. Although proof errors in transcription or interpretation are a potential of such software**

## 2024-11-30 NOTE — ASSESSMENT & PLAN NOTE
Patient was recently admitted to [facility] for elective left TKA on 11/13/24, discharged with home therapy, returned to ED as he was unable to ambulate or participate with home PT.   Orthopedic evaluation appreciated-seen again by ortho 11/27, xrays ordered and reviewed. Will need outpatient follow up in 4 weeks  Currently admitted to [facility] for rehab  Pain control and therapy per PMR

## 2024-12-01 LAB
FLUAV AG UPPER RESP QL IA.RAPID: NEGATIVE
FLUBV AG UPPER RESP QL IA.RAPID: NEGATIVE
GLUCOSE SERPL-MCNC: 122 MG/DL (ref 65–140)
GLUCOSE SERPL-MCNC: 141 MG/DL (ref 65–140)
GLUCOSE SERPL-MCNC: 157 MG/DL (ref 65–140)
GLUCOSE SERPL-MCNC: 222 MG/DL (ref 65–140)
INR PPP: 1.69 (ref 0.85–1.19)
PROTHROMBIN TIME: 20 SECONDS (ref 12.3–15)
SARS-COV+SARS-COV-2 AG RESP QL IA.RAPID: NEGATIVE

## 2024-12-01 PROCEDURE — 99232 SBSQ HOSP IP/OBS MODERATE 35: CPT | Performed by: PHYSICIAN ASSISTANT

## 2024-12-01 PROCEDURE — 97110 THERAPEUTIC EXERCISES: CPT

## 2024-12-01 PROCEDURE — 87804 INFLUENZA ASSAY W/OPTIC: CPT | Performed by: PHYSICIAN ASSISTANT

## 2024-12-01 PROCEDURE — 87811 SARS-COV-2 COVID19 W/OPTIC: CPT | Performed by: PHYSICIAN ASSISTANT

## 2024-12-01 PROCEDURE — 97530 THERAPEUTIC ACTIVITIES: CPT

## 2024-12-01 PROCEDURE — 85610 PROTHROMBIN TIME: CPT | Performed by: PHYSICIAN ASSISTANT

## 2024-12-01 PROCEDURE — 97535 SELF CARE MNGMENT TRAINING: CPT

## 2024-12-01 PROCEDURE — 97116 GAIT TRAINING THERAPY: CPT

## 2024-12-01 PROCEDURE — 82948 REAGENT STRIP/BLOOD GLUCOSE: CPT

## 2024-12-01 RX ADMIN — METHOCARBAMOL 500 MG: 500 TABLET ORAL at 21:37

## 2024-12-01 RX ADMIN — INSULIN LISPRO 1 UNITS: 100 INJECTION, SOLUTION INTRAVENOUS; SUBCUTANEOUS at 12:04

## 2024-12-01 RX ADMIN — METHOCARBAMOL 500 MG: 500 TABLET ORAL at 15:32

## 2024-12-01 RX ADMIN — SENNOSIDES AND DOCUSATE SODIUM 2 TABLET: 50; 8.6 TABLET ORAL at 16:48

## 2024-12-01 RX ADMIN — HYDROMORPHONE HYDROCHLORIDE 2 MG: 2 TABLET ORAL at 00:23

## 2024-12-01 RX ADMIN — GABAPENTIN 100 MG: 100 CAPSULE ORAL at 06:11

## 2024-12-01 RX ADMIN — Medication 2000 UNITS: at 09:35

## 2024-12-01 RX ADMIN — ACETAMINOPHEN 975 MG: 325 TABLET, FILM COATED ORAL at 21:36

## 2024-12-01 RX ADMIN — HYDROMORPHONE HYDROCHLORIDE 2 MG: 2 TABLET ORAL at 06:11

## 2024-12-01 RX ADMIN — HYDROMORPHONE HYDROCHLORIDE 2 MG: 2 TABLET ORAL at 15:37

## 2024-12-01 RX ADMIN — OXYCODONE HYDROCHLORIDE AND ACETAMINOPHEN 500 MG: 500 TABLET ORAL at 16:47

## 2024-12-01 RX ADMIN — ENOXAPARIN SODIUM 100 MG: 100 INJECTION SUBCUTANEOUS at 21:38

## 2024-12-01 RX ADMIN — INSULIN LISPRO 2 UNITS: 100 INJECTION, SOLUTION INTRAVENOUS; SUBCUTANEOUS at 21:38

## 2024-12-01 RX ADMIN — GABAPENTIN 100 MG: 100 CAPSULE ORAL at 16:47

## 2024-12-01 RX ADMIN — ENOXAPARIN SODIUM 100 MG: 100 INJECTION SUBCUTANEOUS at 09:39

## 2024-12-01 RX ADMIN — METHOCARBAMOL 500 MG: 500 TABLET ORAL at 06:10

## 2024-12-01 RX ADMIN — SENNOSIDES AND DOCUSATE SODIUM 2 TABLET: 50; 8.6 TABLET ORAL at 09:35

## 2024-12-01 RX ADMIN — FOLIC ACID 1 MG: 1 TABLET ORAL at 09:34

## 2024-12-01 RX ADMIN — HYDROMORPHONE HYDROCHLORIDE 2 MG: 2 TABLET ORAL at 21:37

## 2024-12-01 RX ADMIN — OXYCODONE HYDROCHLORIDE AND ACETAMINOPHEN 500 MG: 500 TABLET ORAL at 09:35

## 2024-12-01 RX ADMIN — PRAVASTATIN SODIUM 40 MG: 40 TABLET ORAL at 15:38

## 2024-12-01 RX ADMIN — WARFARIN SODIUM 5 MG: 5 TABLET ORAL at 16:48

## 2024-12-01 RX ADMIN — ACETAMINOPHEN 975 MG: 325 TABLET, FILM COATED ORAL at 15:32

## 2024-12-01 RX ADMIN — METOPROLOL SUCCINATE 25 MG: 25 TABLET, EXTENDED RELEASE ORAL at 21:37

## 2024-12-01 RX ADMIN — TAMSULOSIN HYDROCHLORIDE 0.4 MG: 0.4 CAPSULE ORAL at 16:48

## 2024-12-01 RX ADMIN — METFORMIN HYDROCHLORIDE 500 MG: 500 TABLET ORAL at 09:35

## 2024-12-01 RX ADMIN — ACETAMINOPHEN 975 MG: 325 TABLET, FILM COATED ORAL at 06:10

## 2024-12-01 NOTE — PROGRESS NOTES
Progress Note - Hospitalist   Name: Isauro Sow 79 y.o. male I MRN: [id number]  Unit/Bed#: -01 I Date of Admission: 11/18/2024   Date of Service: 12/1/2024 I Hospital Day: 13    Assessment & Plan  Status post total left knee replacement using cement  Patient was recently admitted to [facility] for elective left TKA on 11/13/24, discharged with home therapy, returned to ED as he was unable to ambulate or participate with home PT.   Orthopedic evaluation appreciated-seen again by ortho 11/27, xrays ordered and reviewed. Will need outpatient follow up in 4 weeks  Currently admitted to [facility] for rehab  Pain control and therapy per PMR  Benign essential hypertension  Blood pressure acceptable  Continue with home dose Toprol 50mg qhs  Type 2 diabetes mellitus (HCC)  Lab Results   Component Value Date    HGBA1C 7.2 (H) 10/07/2024     Home: Metformin 1000mg daily.  Here: Metformin 500mg 2x daily - restarted 11/22/24  Continue QID Accuchecks/SSI and DM diet.  No changes today  Chronic atrial flutter (HCC)  Preoperative cardiology consultation recommended Lovenox to Coumadin bridge  Increased Coumadin to 5mg daily. Restart Lovenox bridge.  Goal INR 2-3   Continue Toprol for rate control  INR 1.69  Stage 3a chronic kidney disease (HCC)  Renal function at baseline  Avoid nephrotoxins  History of stroke  Continue statin and coumadin  S/P TAVR (transcatheter aortic valve replacement)  Followed by cardiology and CTS as outpatient  Anemia  Post op anemia  Continue to monitor  Hgb 10.8    The above assessment and plan was reviewed and updated as determined by my evaluation of the patient on 12/1/2024.    History of Present Illness   Patient seen and examined. Patients overnight issues or events were reviewed with nursing staff. New or overnight issues include the following:     Pt seen in his room with visitors at the bedside. He states that he is doing well. He denies any current complaints.    A 10 point review of  systems was negative except for what is noted in the HPI.    Objective :  Temp:  [97.9 °F (36.6 °C)-98.4 °F (36.9 °C)] 97.9 °F (36.6 °C)  HR:  [60-67] 60  BP: (133-143)/(63-79) 134/65  Resp:  [18-19] 19  SpO2:  [96 %-98 %] 98 %  O2 Device: None (Room air)    Invasive Devices       None                   Physical Exam  General Appearance: NAD; pleasant  HEENT: PERRLA, conjuctiva normal; mucous membranes moist; face symmetrical  Neck:  Supple  Lungs: clear bilaterally, normal respiratory effort, no retractions, expiratory effort normal, on room air  CV: regular rate and rhythm, no murmurs rubs or gallops noted   ABD: soft non tender, +BS x4  EXT: DP pulses intact, no lymphadenopathy, no edema  Skin: normal turgor, normal texture, no rash  Psych: affect normal, mood normal  Neuro: AAOx3    The above physical exam was reviewed and updated as determined by my evaluation of the patient on 12/1/2024.      Lab Results: I have reviewed the following results:  Results from last 7 days   Lab Units 11/28/24  0634   WBC Thousand/uL 7.70   HEMOGLOBIN g/dL 10.8*   HEMATOCRIT % 34.8*   PLATELETS Thousands/uL 383     Results from last 7 days   Lab Units 11/28/24  0634   SODIUM mmol/L 141   POTASSIUM mmol/L 4.2   CHLORIDE mmol/L 102   CO2 mmol/L 31   BUN mg/dL 18   CREATININE mg/dL 1.07   CALCIUM mg/dL 9.6         Results from last 7 days   Lab Units 12/01/24  0612 11/30/24  0615   INR  1.69* 1.73*     Results from last 7 days   Lab Units 12/01/24  1127 12/01/24  0608 11/30/24  2054   POC GLUCOSE mg/dl 157* 122 133       Imaging Results Review: No pertinent imaging studies reviewed.  Other Study Results Review: Other studies reviewed include: INR, COVID/influenza antigen    Review of Scheduled Meds: Medications  reviewed and reconciled as needed  Current Facility-Administered Medications   Medication Dose Route Frequency Provider Last Rate    acetaminophen  975 mg Oral Q8H [facility] Sung Morales MD      ascorbic acid  500 mg Oral BID Sung  MD Andrew      bisacodyl  10 mg Rectal Daily PRN Willie Washington DO      Cholecalciferol  2,000 Units Oral Daily Sung Morales MD      diphenhydrAMINE-zinc acetate   Topical TID PRN Lisa Garcia PA-C      enoxaparin  1 mg/kg Subcutaneous Q12H [facility] Christie Taylor PA-C      folic acid  1 mg Oral Daily Sung Morales MD      gabapentin  100 mg Oral Q12H Ashley Depadua, MD      HYDROmorphone  1 mg Oral Q4H PRN DONTE Varma      Or    HYDROmorphone  2 mg Oral Q4H PRN DONTE Varma      insulin lispro  1-5 Units Subcutaneous HS Sung Morales MD      insulin lispro  1-6 Units Subcutaneous TID AC Sung Morales MD      metFORMIN  500 mg Oral Daily With Breakfast Christie Taylor PA-C      methocarbamol  500 mg Oral Q8H [facility] Ashley Depadua, MD      metoprolol succinate  25 mg Oral HS Sung Morales MD      naloxone  0.04 mg Intravenous Q1MIN PRN DONTE Varma      nitroglycerin  0.4 mg Sublingual Q5 Min PRN Sung Morales MD      ondansetron  4 mg Oral Q6H PRN Ashley Depadua, MD      polyethylene glycol  17 g Oral Daily PRN Tristen Williamson MD      pravastatin  40 mg Oral Daily With Dinner Sung Morales MD      senna-docusate sodium  2 tablet Oral BID Ashley Depadua, MD      tamsulosin  0.4 mg Oral Daily With Dinner Ashley Depadua, MD      warfarin  5 mg Oral Daily (warfarin) Christie Taylor PA-C         VTE Pharmacologic Prophylaxis: lovenox bridged to Coumadin  Code Status: Level 3 - DNAR and DNI  Current Length of Stay: 13 day(s)    Administrative Statements   I have spent a total time of 30 minutes in caring for this patient on the day of the visit/encounter including Diagnostic results, Prognosis, Risks and benefits of tx options, Instructions for management, Patient and family education, Importance of tx compliance, Risk factor reductions, Impressions, Counseling / Coordination of care, Documenting in the medical record, Reviewing / ordering tests, medicine, procedures  ,  Obtaining or reviewing history  , and Communicating with other healthcare professionals .  ** Please Note:  voice to text software may have been used in the creation of this document. Although proof errors in transcription or interpretation are a potential of such software**

## 2024-12-01 NOTE — ASSESSMENT & PLAN NOTE
Patient was recently admitted to [facility] for elective left TKA on 11/13/24, discharged with home therapy, returned to ED as he was unable to ambulate or participate with home PT.   Orthopedic evaluation appreciated-seen again by ortho 11/27, xrays ordered and reviewed. Will need outpatient follow up in 4 weeks  Currently admitted to [facility] for rehab  Pain control and therapy per PMR

## 2024-12-01 NOTE — PROGRESS NOTES
12/01/24 0700   Pain Assessment   Pain Assessment Tool 0-10   Pain Score 9   Pain Location/Orientation Orientation: Left;Location: Knee   Hospital Pain Intervention(s) Repositioned;Emotional support   Restrictions/Precautions   Precautions Fall Risk;Bed/chair alarms;Pain;Supervision on toilet/commode   LLE Weight Bearing Per Order WBAT   ROM Restrictions No   Eating   Type of Assistance Needed Independent   Physical Assistance Level No physical assistance   Eating CARE Score 6   Oral Hygiene   Type of Assistance Needed Incidental touching   Physical Assistance Level No physical assistance   Comment CGA while in stance at sink.   Oral Hygiene CARE Score 4   Shower/Bathe Self   Type of Assistance Needed Physical assistance   Physical Assistance Level 26%-50%   Comment Pt agreeable to perform SBing while seated at sink. Pt continues to require A for thoroughness with rear hygiene in stance and for distal BLE bathing. Pt remains heavily reliant on UB while in stance and is limited with ability to perform UE release to bathe.   Shower/Bathe Self CARE Score 3   Tub/Shower Transfer   Reason Not Assessed Sponge Bath   Findings Currently no active shower order. Will benefit from f/u.   Upper Body Dressing   Type of Assistance Needed Set-up / clean-up   Physical Assistance Level No physical assistance   Comment seated   Upper Body Dressing CARE Score 5   Lower Body Dressing   Type of Assistance Needed Physical assistance   Physical Assistance Level 25% or less   Comment Pt continues to be limited with CM up over hips, again because he's heavily reliant on UB while in stance to maintain balance. Pt requires cues to reinforce alternating UE release. Self initiates use of reacher to thread this session.   Lower Body Dressing CARE Score 3   Putting On/Taking Off Footwear   Type of Assistance Needed Physical assistance   Physical Assistance Level 26%-50%   Comment Setup with sock aide; able to don slipper socks using device  with min promping for technique.  Max A to don sneakers and manage ties.  Total A for ace wrapping to L knee   Putting On/Taking Off Footwear CARE Score 3   Sit to Stand   Type of Assistance Needed Physical assistance   Physical Assistance Level 25% or less   Comment due to inc pain this session pt did require Juan M for sit to stands with favoring of LLE.   Sit to Stand CARE Score 3   Bed-Chair Transfer   Type of Assistance Needed Physical assistance   Physical Assistance Level 25% or less   Comment SPT with RW, increased A needed at this time 2* to inc pain in LLE. Fades to CGA by end of session but most often was Juan M this session.   Chair/Bed-to-Chair Transfer CARE Score 3   Cognition   Overall Cognitive Status WFL   Arousal/Participation Alert;Cooperative   Attention Within functional limits   Orientation Level Oriented X4   Memory Within functional limits   Following Commands Follows multistep commands without difficulty   Activity Tolerance   Activity Tolerance Patient limited by pain   Medical Staff Made Aware RN had just given pain medication prior to start of session and is aware.   Assessment   Treatment Assessment Pt participated in skilled OT services with focus on ADL retraining, functional txfers. Pt expressed increased pain in LLE this session, nursing already aware. This limited pt's independence in stance and with transfers. By end of session pain was improving and faded to CGA in stance. Pt offered ice pack to L knee but deferred at this time until post breakfast. LLE was wrapped from toes to thigh. Pt remains limited by pain, dec act tita, dec endurance, dec strength, dec functional mobility. Pt will continue to benefit from skilled OT services with focus on above mentioned deficits to inc safety and independence with ADL tasks.   Prognosis Fair   Problem List Decreased strength;Decreased range of motion;Impaired balance;Decreased mobility;Pain;Impaired vision   Plan   Treatment/Interventions ADL  retraining;Functional transfer training;Therapeutic exercise;Endurance training;Cognitive reorientation;Patient/family training;Equipment eval/education;Bed mobility;Compensatory technique education   Progress Progressing toward goals   Discharge Recommendation   Rehab Resource Intensity Level, OT   (subacute rehab)   OT Therapy Minutes   OT Time In 0700   OT Time Out 0830   OT Total Time (minutes) 90   OT Mode of treatment - Individual (minutes) 90   OT Mode of treatment - Concurrent (minutes) 0   OT Mode of treatment - Group (minutes) 0   OT Mode of treatment - Co-treat (minutes) 0   OT Mode of Treatment - Total time(minutes) 90 minutes   OT Cumulative Minutes 830   Therapy Time missed   Time missed? No

## 2024-12-01 NOTE — PROGRESS NOTES
12/01/24 1000   Pain Assessment   Pain Assessment Tool 0-10   Pain Score 9   Pain Location/Orientation Orientation: Left;Location: Leg   Restrictions/Precautions   Precautions Fall Risk;Bed/chair alarms;Supervision on toilet/commode;Pain   LLE Weight Bearing Per Order WBAT   Cognition   Overall Cognitive Status WFL   Arousal/Participation Alert;Cooperative   Attention Within functional limits   Orientation Level Oriented X4   Memory Within functional limits   Following Commands Follows multistep commands with increased time or repetition   Subjective   Subjective Patient in room ready for therapy today.   Roll Left and Right   Type of Assistance Needed Supervision   Physical Assistance Level No physical assistance   Comment monitor with L LE pain   Roll Left and Right CARE Score 4   Sit to Lying   Type of Assistance Needed Supervision   Physical Assistance Level No physical assistance   Comment monitor with L LE pain   Sit to Lying CARE Score 4   Lying to Sitting on Side of Bed   Type of Assistance Needed Supervision   Physical Assistance Level No physical assistance   Comment monitor with L LE pain   Lying to Sitting on Side of Bed CARE Score 4   Sit to Stand   Type of Assistance Needed Supervision   Physical Assistance Level No physical assistance   Comment RW with raised seat   Sit to Stand CARE Score 4   Bed-Chair Transfer   Type of Assistance Needed Supervision   Physical Assistance Level No physical assistance   Comment monitor L LE WB   Chair/Bed-to-Chair Transfer CARE Score 4   Walk 10 Feet   Type of Assistance Needed Physical assistance   Physical Assistance Level 25% or less   Comment RW with WC follow, improvements in indepedence at end of session with increased L LE mobility   Walk 10 Feet CARE Score 3   Walk 50 Feet with Two Turns   Type of Assistance Needed Physical assistance   Physical Assistance Level 25% or less   Comment RW with WC follow, improvements in indepedence at end of session with  increased L LE mobility   Walk 50 Feet with Two Turns CARE Score 3   Walk 150 Feet   Type of Assistance Needed Physical assistance   Physical Assistance Level 25% or less   Comment RW with WC follow, improvements in indepedence at end of session with increased L LE mobility   Walk 150 Feet CARE Score 3   Ambulation   Primary Mode of Locomotion Prior to Admission Walk   Distance Walked (feet) 200 ft  (100, 50, 25, 25)   Assist Device Roller Walker   Gait Pattern Step to;Antalgic;Improper weight shift;Decreased L stance;Decreased foot clearance;Slow Margarita   Limitations Noted In Strength;Posture;Heel Strike;Endurance;Balance   Provided Assistance with: Balance   Does the patient walk? 2. Yes   Wheel 50 Feet with Two Turns   Reason if not Attempted Activity not applicable   Wheel 50 Feet with Two Turns CARE Score 9   Wheel 150 Feet   Reason if not Attempted Activity not applicable   Wheel 150 Feet CARE Score 9   Wheelchair mobility   Does the patient use a wheelchair? 0. No   Therapeutic Interventions   Strengthening seated bilaterally 20 reps, 3 second holds- LAQ from rest, LAQ from extended position; hip abduction with MRE; step taps onto 4 inch step with R and L LE weight bearing- 20 reps each LE;   Flexibility Seated L LE knee extension and gastroc stretch 15' total   Other Ambulation, transfers, mobility, and functional activties per objective   Equipment Use   NuStep 15 minutes for ROM, strength, endurance, and working on terminal knee extension of L LE, hold for 5 seconds each time L LE is in extension   Parallel Bars Standing in // bars for 3 minutes 3 sets, hold terminal knee extension and engage quad   Assessment   Treatment Assessment Patient tolerated session well, session focused on WB on L LE, ambulation tolerance, functional activities, and proper engagement of quad and L LE weight bearing. Patient functional independence improved in objective. Patient fatigued post session. Patient continually  demonstrating difficulties with L LE weight bearing and gait mechanics as well as L LE ROM, challenged with terminal extension. Patient requires skilled PT to maximize function.   Family/Caregiver Present no   PT Family training done with: no   Problem List Decreased strength;Decreased range of motion;Impaired balance;Decreased mobility;Pain;Impaired vision   Barriers to Discharge Decreased caregiver support   Plan   Treatment/Interventions ADL retraining;Functional transfer training;Therapeutic exercise;Endurance training;Patient/family training;Equipment eval/education;Bed mobility;Compensatory technique education   Progress Progressing toward goals   PT Therapy Minutes   PT Time In 1000   PT Time Out 1130   PT Total Time (minutes) 90   PT Mode of treatment - Individual (minutes) 90   PT Mode of treatment - Concurrent (minutes) 0   PT Mode of treatment - Group (minutes) 0   PT Mode of treatment - Co-treat (minutes) 0   PT Mode of Treatment - Total time(minutes) 90 minutes   PT Cumulative Minutes 1043   Therapy Time missed   Time missed? No

## 2024-12-01 NOTE — ASSESSMENT & PLAN NOTE
Preoperative cardiology consultation recommended Lovenox to Coumadin bridge  Increased Coumadin to 5mg daily. Restart Lovenox bridge.  Goal INR 2-3   Continue Toprol for rate control  INR 1.69

## 2024-12-01 NOTE — PLAN OF CARE
Problem: PAIN - ADULT  Goal: Verbalizes/displays adequate comfort level or baseline comfort level  Description: Interventions:  - Encourage patient to monitor pain and request assistance  - Assess pain using appropriate pain scale  - Administer analgesics based on type and severity of pain and evaluate response  - Implement non-pharmacological measures as appropriate and evaluate response  - Consider cultural and social influences on pain and pain management  - Notify physician/advanced practitioner if interventions unsuccessful or patient reports new pain  Outcome: Progressing     Problem: INFECTION - ADULT  Goal: Absence or prevention of progression during hospitalization  Description: INTERVENTIONS:  - Assess and monitor for signs and symptoms of infection  - Monitor lab/diagnostic results  - Monitor all insertion sites, i.e. indwelling lines, tubes, and drains  - Monitor endotracheal if appropriate and nasal secretions for changes in amount and color  - [place] appropriate cooling/warming therapies per order  - Administer medications as ordered  - Instruct and encourage patient and family to use good hand hygiene technique  - Identify and instruct in appropriate isolation precautions for identified infection/condition  Outcome: Progressing

## 2024-12-02 PROBLEM — R33.9 URINARY RETENTION: Status: RESOLVED | Noted: 2022-08-19 | Resolved: 2024-12-02

## 2024-12-02 LAB
GLUCOSE SERPL-MCNC: 135 MG/DL (ref 65–140)
GLUCOSE SERPL-MCNC: 156 MG/DL (ref 65–140)
GLUCOSE SERPL-MCNC: 159 MG/DL (ref 65–140)
GLUCOSE SERPL-MCNC: 173 MG/DL (ref 65–140)
INR PPP: 1.7 (ref 0.85–1.19)
PROTHROMBIN TIME: 20.1 SECONDS (ref 12.3–15)

## 2024-12-02 PROCEDURE — 97530 THERAPEUTIC ACTIVITIES: CPT

## 2024-12-02 PROCEDURE — 82948 REAGENT STRIP/BLOOD GLUCOSE: CPT

## 2024-12-02 PROCEDURE — 97535 SELF CARE MNGMENT TRAINING: CPT

## 2024-12-02 PROCEDURE — 97116 GAIT TRAINING THERAPY: CPT

## 2024-12-02 PROCEDURE — 99232 SBSQ HOSP IP/OBS MODERATE 35: CPT | Performed by: PHYSICIAN ASSISTANT

## 2024-12-02 PROCEDURE — 85610 PROTHROMBIN TIME: CPT | Performed by: PHYSICIAN ASSISTANT

## 2024-12-02 PROCEDURE — 99232 SBSQ HOSP IP/OBS MODERATE 35: CPT | Performed by: PHYSICAL MEDICINE & REHABILITATION

## 2024-12-02 PROCEDURE — 97110 THERAPEUTIC EXERCISES: CPT

## 2024-12-02 RX ORDER — WARFARIN SODIUM 7.5 MG/1
7.5 TABLET ORAL
Status: DISCONTINUED | OUTPATIENT
Start: 2024-12-02 | End: 2024-12-03 | Stop reason: HOSPADM

## 2024-12-02 RX ADMIN — ACETAMINOPHEN 975 MG: 325 TABLET, FILM COATED ORAL at 13:38

## 2024-12-02 RX ADMIN — ENOXAPARIN SODIUM 100 MG: 100 INJECTION SUBCUTANEOUS at 21:48

## 2024-12-02 RX ADMIN — METHOCARBAMOL 500 MG: 500 TABLET ORAL at 21:47

## 2024-12-02 RX ADMIN — Medication 2000 UNITS: at 09:55

## 2024-12-02 RX ADMIN — HYDROMORPHONE HYDROCHLORIDE 2 MG: 2 TABLET ORAL at 04:40

## 2024-12-02 RX ADMIN — INSULIN LISPRO 1 UNITS: 100 INJECTION, SOLUTION INTRAVENOUS; SUBCUTANEOUS at 21:48

## 2024-12-02 RX ADMIN — INSULIN LISPRO 1 UNITS: 100 INJECTION, SOLUTION INTRAVENOUS; SUBCUTANEOUS at 16:36

## 2024-12-02 RX ADMIN — ENOXAPARIN SODIUM 100 MG: 100 INJECTION SUBCUTANEOUS at 09:59

## 2024-12-02 RX ADMIN — METHOCARBAMOL 500 MG: 500 TABLET ORAL at 04:40

## 2024-12-02 RX ADMIN — ACETAMINOPHEN 975 MG: 325 TABLET, FILM COATED ORAL at 04:40

## 2024-12-02 RX ADMIN — FOLIC ACID 1 MG: 1 TABLET ORAL at 09:55

## 2024-12-02 RX ADMIN — OXYCODONE HYDROCHLORIDE AND ACETAMINOPHEN 500 MG: 500 TABLET ORAL at 18:15

## 2024-12-02 RX ADMIN — SENNOSIDES AND DOCUSATE SODIUM 2 TABLET: 50; 8.6 TABLET ORAL at 18:15

## 2024-12-02 RX ADMIN — WARFARIN SODIUM 7.5 MG: 7.5 TABLET ORAL at 18:15

## 2024-12-02 RX ADMIN — POLYETHYLENE GLYCOL 3350 17 G: 17 POWDER, FOR SOLUTION ORAL at 09:59

## 2024-12-02 RX ADMIN — OXYCODONE HYDROCHLORIDE AND ACETAMINOPHEN 500 MG: 500 TABLET ORAL at 09:55

## 2024-12-02 RX ADMIN — METFORMIN HYDROCHLORIDE 500 MG: 500 TABLET ORAL at 09:55

## 2024-12-02 RX ADMIN — SENNOSIDES AND DOCUSATE SODIUM 2 TABLET: 50; 8.6 TABLET ORAL at 09:55

## 2024-12-02 RX ADMIN — ACETAMINOPHEN 975 MG: 325 TABLET, FILM COATED ORAL at 21:47

## 2024-12-02 RX ADMIN — HYDROMORPHONE HYDROCHLORIDE 2 MG: 2 TABLET ORAL at 13:38

## 2024-12-02 RX ADMIN — GABAPENTIN 100 MG: 100 CAPSULE ORAL at 18:15

## 2024-12-02 RX ADMIN — INSULIN LISPRO 1 UNITS: 100 INJECTION, SOLUTION INTRAVENOUS; SUBCUTANEOUS at 12:14

## 2024-12-02 RX ADMIN — METOPROLOL SUCCINATE 25 MG: 25 TABLET, EXTENDED RELEASE ORAL at 21:47

## 2024-12-02 RX ADMIN — GABAPENTIN 100 MG: 100 CAPSULE ORAL at 04:40

## 2024-12-02 RX ADMIN — PRAVASTATIN SODIUM 40 MG: 40 TABLET ORAL at 16:35

## 2024-12-02 RX ADMIN — METHOCARBAMOL 500 MG: 500 TABLET ORAL at 13:38

## 2024-12-02 RX ADMIN — TAMSULOSIN HYDROCHLORIDE 0.4 MG: 0.4 CAPSULE ORAL at 16:35

## 2024-12-02 NOTE — ASSESSMENT & PLAN NOTE
Lab Results   Component Value Date    HGBA1C 7.2 (H) 10/07/2024       Recent Labs     12/01/24  2051 12/02/24  0631 12/02/24  1132 12/02/24  1546   POCGLU 222* 135 173* 159*       Blood Sugar Average: Last 72 hrs:  (P) 160    Home: Metformin 1000mg daily;   Here: Metformin 500m qday; CDI/Accuchecks.   If necessary, will try to get patient back on oral meds prior to discharge.   Diabetic Diet.   Management as per IM.

## 2024-12-02 NOTE — CASE MANAGEMENT
Pt is approved at [facility] and there is a bed available tomorrow. W/c van request made via roundtrip for 10 30 am, awaiting confirmation.     1110 [facility] is asking for a 1pm transport time to allow time for their dc.  Request changed in roundtrip.     1150 cm made aware transport is arranged for 1pm tomorrow with [employer] [facility] services w/c van. Met w/pt and made him aware. Cm phoned pts taylor whatley and left a mssg on her voice mail. Mssg sent via aidin to [facility].

## 2024-12-02 NOTE — PROGRESS NOTES
Progress Note - Hospitalist   Name: Isauro Sow 79 y.o. male I MRN: [id number]  Unit/Bed#: -01 I Date of Admission: 11/18/2024   Date of Service: 12/2/2024 I Hospital Day: 14    Assessment & Plan  Status post total left knee replacement using cement  Patient was recently admitted to [facility] for elective left TKA on 11/13/24, discharged with home therapy, returned to ED as he was unable to ambulate or participate with home PT.   Orthopedic evaluation appreciated-seen again by ortho 11/27, xrays ordered and reviewed. Will need outpatient follow up in 4 weeks  Currently admitted to [facility] for rehab  Pain control and therapy per PMR  Benign essential hypertension  Blood pressure acceptable  Continue with home dose Toprol 50mg qhs  Type 2 diabetes mellitus (HCC)  Lab Results   Component Value Date    HGBA1C 7.2 (H) 10/07/2024     Home: Metformin 1000mg daily.  Here: Metformin 500mg 2x daily - restarted 11/22/24  Continue QID Accuchecks/SSI and DM diet.  No changes today  Chronic atrial flutter (HCC)  Preoperative cardiology consultation recommended Lovenox to Coumadin bridge  Increased Coumadin to 7.5mg daily. Restarted Lovenox bridge.  Goal INR 2-3   Continue Toprol for rate control  INR 1.7  Stage 3a chronic kidney disease (HCC)  Renal function at baseline  Avoid nephrotoxins  History of stroke  Continue statin and coumadin  S/P TAVR (transcatheter aortic valve replacement)  Followed by cardiology and CTS as outpatient  Anemia  Post op anemia  Continue to monitor  Hgb 10.8    The above assessment and plan was reviewed and updated as determined by my evaluation of the patient on 12/2/2024.    History of Present Illness   Patient seen and examined. Patients overnight issues or events were reviewed with nursing staff. New or overnight issues include the following:     Pt seen in PT. He is agreeable to DC to subacute rehab tomorrow. He will have a covid test in the morning. He does report pain in the Lt  calf which was present prior to admission. He did have a negative Doppler 11/15/24. He is on therapeutic Lovenox at this time as well. He denies any other complaints.    A 10 point review of systems was negative except for what is noted in the HPI.    Objective :  Temp:  [97.4 °F (36.3 °C)-97.9 °F (36.6 °C)] 97.9 °F (36.6 °C)  HR:  [60-82] 60  BP: (126-170)/(62-76) 126/63  Resp:  [17-18] 17  SpO2:  [95 %] 95 %  O2 Device: None (Room air)    Invasive Devices       None                   Physical Exam  General Appearance: NAD; pleasant  HEENT: PERRLA, conjuctiva normal; mucous membranes moist; face symmetrical  Neck:  Supple  Lungs: clear bilaterally, normal respiratory effort, no retractions, expiratory effort normal, on room air  CV: regular rate and rhythm, no murmurs rubs or gallops noted   ABD: soft non tender, +BS x4  EXT: DP pulses intact, no lymphadenopathy, no edema. Lt LE with ACE wrap  Skin: normal turgor, normal texture, no rash  Psych: affect normal, mood normal  Neuro: AAOx3      The above physical exam was reviewed and updated as determined by my evaluation of the patient on 12/2/2024.      Lab Results: I have reviewed the following results:  Results from last 7 days   Lab Units 11/28/24  0634   WBC Thousand/uL 7.70   HEMOGLOBIN g/dL 10.8*   HEMATOCRIT % 34.8*   PLATELETS Thousands/uL 383     Results from last 7 days   Lab Units 11/28/24  0634   SODIUM mmol/L 141   POTASSIUM mmol/L 4.2   CHLORIDE mmol/L 102   CO2 mmol/L 31   BUN mg/dL 18   CREATININE mg/dL 1.07   CALCIUM mg/dL 9.6         Results from last 7 days   Lab Units 12/02/24  0544 12/01/24  0612   INR  1.70* 1.69*     Results from last 7 days   Lab Units 12/02/24  0631 12/01/24 2051 12/01/24  1543   POC GLUCOSE mg/dl 135 222* 141*       Imaging Results Review: No pertinent imaging studies reviewed.  Other Study Results Review: Other studies reviewed include: INR    Review of Scheduled Meds: Medications  reviewed and reconciled as  needed  Current Facility-Administered Medications   Medication Dose Route Frequency Provider Last Rate    acetaminophen  975 mg Oral Q8H [facility] Sung Morales MD      ascorbic acid  500 mg Oral BID Sung Morales MD      bisacodyl  10 mg Rectal Daily PRN Willie Washington DO      Cholecalciferol  2,000 Units Oral Daily Sung Morales MD      diphenhydrAMINE-zinc acetate   Topical TID PRN Lisa Garcia PA-C      enoxaparin  1 mg/kg Subcutaneous Q12H [facility] Christie Taylor PA-C      folic acid  1 mg Oral Daily Sung Morales MD      gabapentin  100 mg Oral Q12H Ashley Depadua, MD      HYDROmorphone  1 mg Oral Q4H PRN DONTE Varma      Or    HYDROmorphone  2 mg Oral Q4H PRN DONTE Varma      insulin lispro  1-5 Units Subcutaneous HS Sung Morales MD      insulin lispro  1-6 Units Subcutaneous TID AC Sung Morales MD      metFORMIN  500 mg Oral Daily With Breakfast Christie Taylor PA-C      methocarbamol  500 mg Oral Q8H [facility] Ashley Depadua, MD      metoprolol succinate  25 mg Oral HS Sung Morales MD      naloxone  0.04 mg Intravenous Q1MIN PRN DONTE Varma      nitroglycerin  0.4 mg Sublingual Q5 Min PRN Sung Morales MD      ondansetron  4 mg Oral Q6H PRN Ashley Depadua, MD      polyethylene glycol  17 g Oral Daily PRN Tristen Williamson MD      pravastatin  40 mg Oral Daily With Dinner Sung Morales MD      senna-docusate sodium  2 tablet Oral BID Ashley Depadua, MD      tamsulosin  0.4 mg Oral Daily With Dinner Ashley Depadua, MD      warfarin  5 mg Oral Daily (warfarin) Christie Taylor PA-C         VTE Pharmacologic Prophylaxis: Lovenox to Coumadin  Code Status: Level 3 - DNAR and DNI  Current Length of Stay: 14 day(s)    Administrative Statements   I have spent a total time of 35 minutes in caring for this patient on the day of the visit/encounter including Diagnostic results, Prognosis, Risks and benefits of tx options, Instructions for management, Patient and family  education, Importance of tx compliance, Risk factor reductions, Impressions, Counseling / Coordination of care, Documenting in the medical record, Reviewing / ordering tests, medicine, procedures  , Obtaining or reviewing history  , and Communicating with other healthcare professionals .  ** Please Note:  voice to text software may have been used in the creation of this document. Although proof errors in transcription or interpretation are a potential of such software**

## 2024-12-02 NOTE — ASSESSMENT & PLAN NOTE
Performed by Dr. Braxton on 11/13.  Function improving - patient can be self-limiting. Plan for SNF/[facility] on 12/2.    WBAT.  AROM/PROM/AAROM with no degree restriction.   PT/OT 3-5 hours/day, 5-6 days/week.   DVT Ppx fully anticoagulated on coumadin.  F/u Ortho 11/27 - ROM as tolerated, pillow/blankets under achilles not behind knee while in bed; follow-up in 10-14 days  L Knee XR 11/26 No acute fracture or dislocation. Knee joint effusion.Satisfactory positioning of the arthroplasty.   No lytic or blastic osseous lesion. Soft tissue swelling along the ventral aspect of the distal thigh. Prior subcutaneous emphysema has resolved. IMPRESSION: Satisfactory appearance of the arthroplasty.

## 2024-12-02 NOTE — ASSESSMENT & PLAN NOTE
Patient was recently admitted to [facility] for elective left TKA on 11/13/24, discharged with home therapy, returned to ED as he was unable to ambulate or participate with home PT.   Orthopedic evaluation appreciated-seen again by ortho 11/27, xrays ordered and reviewed. Will need outpatient follow up in 4 weeks  Currently admitted to [facility] for rehab  Pain control and therapy per PMR

## 2024-12-02 NOTE — ASSESSMENT & PLAN NOTE
Resolved   11/21 patient endorsing symptoms of urinary retention in setting of constipation.  -Denies dysuria but will order UA and follow up - UA with trace protein and urobilinogen but negative for UTI

## 2024-12-02 NOTE — PLAN OF CARE
Problem: PAIN - ADULT  Goal: Verbalizes/displays adequate comfort level or baseline comfort level  Description: Interventions:  - Encourage patient to monitor pain and request assistance  - Assess pain using appropriate pain scale  - Administer analgesics based on type and severity of pain and evaluate response  - Implement non-pharmacological measures as appropriate and evaluate response  - Consider cultural and social influences on pain and pain management  - Notify physician/advanced practitioner if interventions unsuccessful or patient reports new pain  Outcome: Progressing     Problem: INFECTION - ADULT  Goal: Absence or prevention of progression during hospitalization  Description: INTERVENTIONS:  - Assess and monitor for signs and symptoms of infection  - Monitor lab/diagnostic results  - Monitor all insertion sites, i.e. indwelling lines, tubes, and drains  - Monitor endotracheal if appropriate and nasal secretions for changes in amount and color  - [place] appropriate cooling/warming therapies per order  - Administer medications as ordered  - Instruct and encourage patient and family to use good hand hygiene technique  - Identify and instruct in appropriate isolation precautions for identified infection/condition  Outcome: Progressing     Problem: SAFETY ADULT  Goal: Patient will remain free of falls  Description: INTERVENTIONS:  - Educate patient/family on patient safety including physical limitations  - Instruct patient to call for assistance with activity   - Consult OT/PT to assist with strengthening/mobility   - Keep Call bell within reach  - Keep bed low and locked with side rails adjusted as appropriate  - Keep care items and personal belongings within reach  - Initiate and maintain comfort rounds  - Make Fall Risk Sign visible to staff  - Offer Toileting every 2 Hours, in advance of need  - Initiate/Maintain bed/chair alarm  - Obtain necessary fall risk management equipment: non skid footwear  -  Apply yellow socks and bracelet for high fall risk patients  - Consider moving patient to room near nurses station  Outcome: Progressing  Goal: Maintain or return to baseline ADL function  Description: INTERVENTIONS:  -  Assess patient's ability to carry out ADLs; assess patient's baseline for ADL function and identify physical deficits which impact ability to perform ADLs (bathing, care of mouth/teeth, toileting, grooming, dressing, etc.)  - Assess/evaluate cause of self-care deficits   - Assess range of motion  - Assess patient's mobility; develop plan if impaired  - Assess patient's need for assistive devices and provide as appropriate  - Encourage maximum independence but intervene and supervise when necessary  - Involve family in performance of ADLs  - Assess for home care needs following discharge   - Consider OT consult to assist with ADL evaluation and planning for discharge  - Provide patient education as appropriate  Outcome: Progressing  Goal: Maintains/Returns to pre admission functional level  Description: INTERVENTIONS:  - Perform AM-PAC 6 Click Basic Mobility/ Daily Activity assessment daily.  - Set and communicate daily mobility goal to care team and patient/family/caregiver.   - Collaborate with rehabilitation services on mobility goals if consulted  - Perform Range of Motion 3 times a day.  - Reposition patient every 2 hours.  - Dangle patient 3 times a day  - Stand patient 3 times a day  - Ambulate patient 3 times a day  - Out of bed to chair 3 times a day   - Out of bed for meals 3 times a day  - Out of bed for toileting  - Record patient progress and toleration of activity level   Outcome: Progressing     Problem: DISCHARGE PLANNING  Goal: Discharge to home or other facility with appropriate resources  Description: INTERVENTIONS:  - Identify barriers to discharge w/patient and caregiver  - Arrange for needed discharge resources and transportation as appropriate  - Identify discharge learning  needs (meds, wound care, etc.)  - Arrange for interpretive services to assist at discharge as needed  - Refer to Case Management Department for coordinating discharge planning if the patient needs post-hospital services based on physician/advanced practitioner order or complex needs related to functional status, cognitive ability, or social support system  Outcome: Progressing     Problem: Prexisting or High Potential for Compromised Skin Integrity  Goal: Skin integrity is maintained or improved  Description: INTERVENTIONS:  - Identify patients at risk for skin breakdown  - Assess and monitor skin integrity  - Assess and monitor nutrition and hydration status  - Monitor labs   - Assess for incontinence   - Turn and reposition patient  - Assist with mobility/ambulation  - Relieve pressure over bony prominences  - Avoid friction and shearing  - Provide appropriate hygiene as needed including keeping skin clean and dry  - Evaluate need for skin moisturizer/barrier cream  - Collaborate with interdisciplinary team   - Patient/family teaching  - Consider wound care consult   Outcome: Progressing

## 2024-12-02 NOTE — PROGRESS NOTES
12/02/24 1330   Pain Assessment   Pain Assessment Tool 0-10   Pain Score 7   Pain Location/Orientation Orientation: Left;Location: Knee   Pain Onset/Description Onset: Ongoing;Descriptor: Sore   Patient's Stated Pain Goal No pain   Hospital Pain Intervention(s) Rest;Cold applied   Restrictions/Precautions   Precautions Bed/chair alarms;Cognitive;Fall Risk;Pain;Supervision on toilet/commode  (LLE ace wrap)   LLE Weight Bearing Per Order WBAT   ROM Restrictions No   Cognition   Overall Cognitive Status WFL   Arousal/Participation Alert;Cooperative   Attention Within functional limits   Orientation Level Oriented X4   Memory Within functional limits   Following Commands Follows multistep commands with increased time or repetition   Sit to Stand   Type of Assistance Needed Incidental touching;Physical assistance;Adaptive equipment   Physical Assistance Level 25% or less   Comment [place]ane/CGA w/RW   Sit to Stand CARE Score 3   Bed-Chair Transfer   Type of Assistance Needed Incidental touching;Adaptive equipment   Comment CGA with SPT and RW   Chair/Bed-to-Chair Transfer CARE Score 4   Transfer Bed/Chair/Wheelchair   Adaptive Equipment Roller Walker   Car Transfer   Comment (S)  trial next session   Walk 10 Feet   Type of Assistance Needed Incidental touching;Adaptive equipment   Comment with RW   Walk 10 Feet CARE Score 4   Walk 50 Feet with Two Turns   Type of Assistance Needed Physical assistance;Incidental touching;Adaptive equipment   Physical Assistance Level 25% or less   Comment CG/ROXANE with RW, WC pulled behind for longer distances   Walk 50 Feet with Two Turns CARE Score 3   Walking 10 Feet on Uneven Surfaces   Comment (S)  trial next session   Ambulation   Primary Mode of Locomotion Prior to Admission Walk   Distance Walked (feet) 120 ft  (50' x2)   Assist Device Roller Walker   Gait Pattern Antalgic;Decreased foot clearance;Slow Margarita;R foot drag;Forward Flexion;Narrow KUMAR;Shuffle;Step to;Decreased L  stance;Improper weight shift   Limitations Noted In Endurance;Heel Strike;Posture;Safety;Speed;Strength;Swing   Provided Assistance with: Balance;Direction   Walk Assist Level Contact Guard;Minimum Assist   Does the patient walk? 2. Yes   Wheel 50 Feet with Two Turns   Reason if not Attempted Activity not applicable   Wheel 50 Feet with Two Turns CARE Score 9   Wheel 150 Feet   Reason if not Attempted Activity not applicable   Wheel 150 Feet CARE Score 9   Wheelchair mobility   Does the patient use a wheelchair? 0. No   Picking Up Object   Reason if not Attempted Safety concerns   Picking Up Object CARE Score 88   Toilet Transfer   Type of Assistance Needed Incidental touching;Adaptive equipment   Comment SPT to toilet with L grab bar   Toilet Transfer CARE Score 4   Toilet Transfer   Surface Assessed Standard Toilet   Adaptive Equipment Grab Bar   Findings stood at RW to use urinal with CGA for balance   Equipment Use   NuStep x10 min for ROM, requested by pt   Assessment   Treatment Assessment Pt participated in skilled PT session with increased focus on ROM and gait this session. Pt required use of the bathroom midway through NuStep use. Pt was able to amb to bathroom and back with no WC follow this session. Pt cont to work on increased ext in L knee which is evident when seated. Pt will benefit from L hip flex stretch as he maintains flexed once standing in L knee and hip. At this time pt has been most consistent with amb 40'-50' with RW which is house hold distances. For home pt will need to be MI with use of RW and will be transitioning to SNF for cont rehab services. Cont POC as tolerated.   Problem List Decreased strength;Decreased range of motion;Decreased endurance;Impaired balance;Decreased mobility;Pain   Barriers to Discharge Decreased caregiver support   PT Barriers   Functional Limitation Walking;Transfers   Plan   Treatment/Interventions Functional transfer training;LE strengthening/ROM;Therapeutic  exercise;Endurance training;Bed mobility;Gait training   Progress Progressing toward goals   Discharge Recommendation   Equipment Recommended Walker   PT Therapy Minutes   PT Time In 1330   PT Time Out 1430   PT Total Time (minutes) 60   PT Mode of treatment - Individual (minutes) 60   PT Mode of treatment - Concurrent (minutes) 0   PT Mode of treatment - Group (minutes) 0   PT Mode of treatment - Co-treat (minutes) 0   PT Mode of Treatment - Total time(minutes) 60 minutes   PT Cumulative Minutes 1163   Therapy Time missed   Time missed? No

## 2024-12-02 NOTE — ASSESSMENT & PLAN NOTE
Improving some   Related to TKA and edema in RLE  ACE Wraps  Tylenol 975mg TID scheduled  Robaxin 500mg TID scheduled   Gabapentin 100mg Q12hr for sensitized pain   Monitor and adjust as appropriate.   -Per APS who was consulted earlier in course: DC oxycodone and start dilaudid 1-2 mg q4h PRN and escalate to 2/4 if needed.

## 2024-12-02 NOTE — ASSESSMENT & PLAN NOTE
Preoperative cardiology consultation recommended Lovenox to Coumadin bridge  Increased Coumadin to 7.5mg daily. Restarted Lovenox bridge.  Goal INR 2-3   Continue Toprol for rate control  INR 1.7

## 2024-12-02 NOTE — ASSESSMENT & PLAN NOTE
Patient was recently admitted to [facility] for elective left TKA on 11/13/24, discharged with home therapy, returned to ED as he was unable to ambulate or participate with home PT.   Orthopedic evaluation appreciated-seen again by ortho 11/27, xrays ordered and reviewed. Will need outpatient follow up in 4 weeks  Currently admitted to [facility] for rehab  Pain control and therapy per PMR

## 2024-12-02 NOTE — PROGRESS NOTES
12/02/24 0735   Pain Assessment   Pain Assessment Tool 0-10   Pain Score 9  (9 at start of session, 7 by end of session)   Pain Location/Orientation Orientation: Left;Location: Knee;Location: Leg   Pain Onset/Description Onset: Ongoing;Frequency: Constant/Continuous   Hospital Pain Intervention(s) Cold applied;Repositioned  (pt received pain med just prior to start of OT session. Ice applied at end of OT session while pt eating breakfast in bed. Educated pt on 20min on/ 20min off rule.)   Restrictions/Precautions   Precautions Bed/chair alarms;Cognitive;Fall Risk;Pain;Supervision on toilet/commode  (LLE ace wrap)   Weight Bearing Restrictions Yes   LLE Weight Bearing Per Order WBAT   ROM Restrictions No   Eating   Type of Assistance Needed Independent   Physical Assistance Level No physical assistance   Comment seated fully upright in bed for breakfast meal at end of OT session   Eating CARE Score 6   Oral Hygiene   Type of Assistance Needed Set-up / clean-up;Supervision   Physical Assistance Level No physical assistance   Comment seated in w/c at sink to brush teeth   Oral Hygiene CARE Score 4   Shower/Bathe Self   Type of Assistance Needed Physical assistance;Verbal cues   Physical Assistance Level 26%-50%   Comment Pt engaged in sponge bath while seated in w/c at sink, able to wash 8/10 parts, washing UB and B/L upper legs while seated with Sup and vc's for initiation/thoroughness. Pt cont to require A to wash B/L lower legs/feet. Educated pt on LH sponge to increase IND post D/C, pt receptive. CGA in stance at sink with unilateral UE release to wash harry/rear, no LOB. A for rear thoroughness.   Shower/Bathe Self CARE Score 3   Bathing   Assessed Bath Style Sponge Bath   Able to Gather/Transport No   Able to Adjust Water Temperature Yes   Able to Wash/Rinse/Dry (body part) Left Arm;Right Arm;L Upper Leg;R Upper Leg;Chest;Abdomen;Perineal Area;Buttocks   Limitations Noted in Balance;Endurance;ROM;Problem  Solving;Safety;Sequencing;Strength;Timeliness   Positioning Seated;Standing   Tub/Shower Transfer   Reason Not Assessed Sponge Bath  (no active shower orders)   Upper Body Dressing   Type of Assistance Needed Set-up / clean-up   Physical Assistance Level No physical assistance   Comment seated   Upper Body Dressing CARE Score 5   Lower Body Dressing   Type of Assistance Needed Physical assistance;Verbal cues;Adaptive equipment   Physical Assistance Level 25% or less   Comment Sup seated with vc's to initiate using LHR to thread LEs through shorts, pt able to thread w/increased time and vc's for technique. CGA-Roxane in stance at sink with unilateral UE release to complete CM up with increased time   Lower Body Dressing CARE Score 3   Putting On/Taking Off Footwear   Type of Assistance Needed Physical assistance;Verbal cues;Adaptive equipment   Physical Assistance Level 26%-50%   Comment seated w/Sup and vc's to doff socks using dressing stick, then sock aide to don with frequent vc's for correct technique. TA to ace wrap LLE from just above toes to lower thigh area to manage swelling   Putting On/Taking Off Footwear CARE Score 3   Dressing/Undressing Clothing   Remove UB Clothes Pullover Shirt   Don UB Clothes Pullover Shirt   Remove LB Clothes Socks   Don LB Clothes Shorts;Socks   Limitations Noted In Balance;Endurance;Problem Solving;Safety;Sequencing;Strength;ROM;Timeliness   Adaptive Equipment Reacher;Dressing Stick;Sock Aide   Positioning Supported Sit;Standing   Roll Left and Right   Type of Assistance Needed Supervision   Physical Assistance Level No physical assistance   Roll Left and Right CARE Score 4   Sit to Lying   Type of Assistance Needed Supervision;Adaptive equipment   Physical Assistance Level No physical assistance   Comment leg  to manage LLE w/increased time   Sit to Lying CARE Score 4   Lying to Sitting on Side of Bed   Type of Assistance Needed Supervision;Adaptive equipment   Physical  Assistance Level No physical assistance   Comment leg  to manage LLE w/increased time   Lying to Sitting on Side of Bed CARE Score 4   Sit to Stand   Type of Assistance Needed Physical assistance;Verbal cues;Adaptive equipment   Physical Assistance Level 25% or less   Comment Roxane-CGA w/RW, (Roxane at the start of session 2* stiffness/pain, then CGA by end of session)   Sit to Stand CARE Score 3   Bed-Chair Transfer   Type of Assistance Needed Incidental touching;Verbal cues;Adaptive equipment   Physical Assistance Level No physical assistance   Comment CGA SPTs w/RW, pt declined to complete fxl mob w/RW from EOB to bathroom 2* max pain and stiffness this morning   Chair/Bed-to-Chair Transfer CARE Score 4   Toileting Hygiene   Comment offered toileting, pt declined   Toilet Transfer   Comment offered toileting, pt declined   Cognition   Overall Cognitive Status WFL   Arousal/Participation Alert;Cooperative   Attention Within functional limits   Orientation Level Oriented X4   Memory Within functional limits   Following Commands Follows multistep commands with increased time or repetition   Activity Tolerance   Activity Tolerance Patient limited by pain   Medical Staff Made Aware nsg staff already aware of pt pain level, as pt received pain med just prior to start of OT session   Assessment   Treatment Assessment Pt seen for 85min skilled OT session focused on ADL retraining (sponge bath at sink), bed mobility w/leg , fxl sit<>stands/transfers w/RW, LHAE training for LB dressing/footwear mgmt, ace wrapping LLE, and pain management for increased independence w/ADLs and decreased caregiver burden. See detailed descriptions of fxl performance above. Pt tolerated session but continues to report high pain in L knee, managed with pain medication, ice, and frequent rest breaks. Pt continues to fluctuate between Roxane-CGA for fxl STS, CGA for transfers this morning. Pt cont to be limited by pain, decreased activity  tolerance, endurance, strength, standing balance/tolerance. Pt has not met IND goals, however, pt is anticipated to D/C [facility] ([facility]) tomorrow for continued rehab.   Prognosis Fair   Problem List Decreased strength;Decreased range of motion;Decreased endurance;Impaired balance;Decreased mobility;Pain   Discharge Recommendation   Rehab Resource Intensity Level, OT   ([facility])   OT Therapy Minutes   OT Time In 0735   OT Time Out 0900   OT Total Time (minutes) 85   OT Mode of treatment - Individual (minutes) 85   OT Mode of treatment - Concurrent (minutes) 0   OT Mode of treatment - Group (minutes) 0   OT Mode of treatment - Co-treat (minutes) 0   OT Mode of Treatment - Total time(minutes) 85 minutes   OT Cumulative Minutes 915   Therapy Time missed   Time missed? No

## 2024-12-02 NOTE — PROGRESS NOTES
Progress Note - PMR   Name: Isauro Sow 79 y.o. male I MRN: [id number]  Unit/Bed#: -01 I Date of Admission: 11/18/2024   Date of Service: 12/2/2024 I Hospital Day: 14     Assessment & Plan  Status post total left knee replacement using cement  Performed by Dr. Braxton on 11/13.  Function improving - patient can be self-limiting. Plan for SNF/[facility] on 12/2.    WBAT.  AROM/PROM/AAROM with no degree restriction.   PT/OT 3-5 hours/day, 5-6 days/week.   DVT Ppx fully anticoagulated on coumadin.  F/u Ortho 11/27 - ROM as tolerated, pillow/blankets under achilles not behind knee while in bed; follow-up in 10-14 days  L Knee XR 11/26 No acute fracture or dislocation. Knee joint effusion.Satisfactory positioning of the arthroplasty.   No lytic or blastic osseous lesion. Soft tissue swelling along the ventral aspect of the distal thigh. Prior subcutaneous emphysema has resolved. IMPRESSION: Satisfactory appearance of the arthroplasty.  Acute pain  Improving some   Related to TKA and edema in RLE  ACE Wraps  Tylenol 975mg TID scheduled  Robaxin 500mg TID scheduled   Gabapentin 100mg Q12hr for sensitized pain   Monitor and adjust as appropriate.   -Per APS who was consulted earlier in course: DC oxycodone and start dilaudid 1-2 mg q4h PRN and escalate to 2/4 if needed.  Constipation  Improved; now on softer side  Last BM 11/30   -Colace-senna BID, PRN supp or PRN miralax   At risk for venous thromboembolism (VTE)  Anticoagulated on warfarin per IM,SCDs.  Benign essential hypertension  Home: Toprol 25mg QHS,   here: Same.H Monitor and adjust as per IM.   Type 2 diabetes mellitus (HCC)  Lab Results   Component Value Date    HGBA1C 7.2 (H) 10/07/2024       Recent Labs     12/01/24  2051 12/02/24  0631 12/02/24  1132 12/02/24  1546   POCGLU 222* 135 173* 159*       Blood Sugar Average: Last 72 hrs:  (P) 160    Home: Metformin 1000mg daily;   Here: Metformin 500m qday; CDI/Accuchecks.   If necessary, will try to get patient  back on oral meds prior to discharge.   Diabetic Diet.   Management as per IM.     History of stroke  -Hx of stroke in 2017 w/ residual L sided deficits   -Monitor   Stage 3a chronic kidney disease (HCC)  Lab Results   Component Value Date    EGFR 65 11/28/2024    EGFR 67 11/19/2024    EGFR 65 11/18/2024    CREATININE 1.07 11/28/2024    CREATININE 1.04 11/19/2024    CREATININE 1.07 11/18/2024     -Baseline Cr 1.0-1.3  Currently within baseline.   Avoid nephrotoxic meds, relative hypotension.   Monitor BMP, I/O.   S/P TAVR (transcatheter aortic valve replacement)  -TAVR procedure done 9/2023  -Follows CTS outpatient   Chronic atrial flutter (HCC)  -Hx of a-flutter s/p PPM placement   -On Coumadin per IM   - Continue Toprol    Urinary retention (Resolved: 12/2/2024)  Resolved   11/21 patient endorsing symptoms of urinary retention in setting of constipation.  -Denies dysuria but will order UA and follow up - UA with trace protein and urobilinogen but negative for UTI   Anemia  - Hb improved to 10.8 on 11/28  - Mgmt per primary team and recommend optimal mgmt during rehab process  - Monitor H/H, vitals, signs/symptoms of acute bleeding      Health Maintenance  #Delirium/Sleep: At risk. Optimize sleep/wake, pain, bowel, bladder management. Avoid deliriogenic meds and physical restraint. Environmental/behavioral interventions.   #Skin/Pressure Injury Prevention: Turn Q2hr in bed, with weight shifts X52-70ruc in wheelchair. Float heels in bed.  #Code Status: DNR/DNI  #FEN: Carb controlled diet   #Dispo: Team 11/27: ADD 12/3 to SNF/[facility], given slow rate of progress and need for functional independence given limited support and cares for his wife who had a stroke.     To Review: Mr. Sow is a 80yo M with medical history of asthma, aflutter and SSS s/p medtronic PPM on coumadin, BPH, CAD, R ICA occlusion/L ICA stenosisk, CKD 3 (BL 1-1.3), COPD, T2DM, Gout, HLD, HTN, PERCY, Macular Degeneration, Severe Aortic Stenosis,  previous CVA in 2017 who presented to [place] on 11/18 POD 2 L TKA failing discharge to home with increased LLE pain. LE Doppler negative for DVT. Ortho saw patient and had no other recommendations at this time. He had one low grade temp during stay, but no further episodes, so no further work-up. He was recommend for [facility] given acute sequelae of his surgery, chronic comorbidities and extent of his weakness/impaired function. He was admitted to the [facility] on 11/18.     Chief Complaint: Feeling fine.     Interval History/Subjective:  No acute events overnight. Last BM was 11/30. Pain is better controlled. Using Dilaudid 2mg at least 4-5x a day. No new Cp, SOB, fevers, chills, N/V, abdominal pain, cough. Continues with decreased ROM in L knee with an extension lag. Tolerating stretching with therapy.    Functional Update:  PT: Sup-CGA bed mobility, CGA transfers, CGA ambulation 45' with RW . Not able to do stairs yet.   OT: Ind eating, Set-up oral hygiene, Juan M shower/bathe, Set-up Ub dressing, Juan M LB dressing, Juan M footwear      Objective     Temp:  [97.8 °F (36.6 °C)-97.9 °F (36.6 °C)] 97.9 °F (36.6 °C)  HR:  [60-82] 68  Resp:  [17-18] 17  BP: (125-140)/(59-76) 125/59  SpO2:  [94 %-95 %] 94 %    Gen: No acute distress, Well-nourished, well-appearing.  HEENT: Moist mucus membranes, Normocephalic/Atraumatic  Cardiovascular: Regular rate, rhythm, S1/S2. Distal pulses palpable  Heme/Extr: No edema  Pulmonary: Non-labored breathing  : No abbasi  GI: Soft, non-tender, non-distended. BS+  MSK: Decreased knee extension with extension lag of 10 degrees   Integumentary: Skin is warm, dry.   Neuro: AAOx3,  Speech is intact. Appropriate to questioning.  Psych: Normal mood and affect.     Physical examination is otherwise unchanged from previous encounter, except as noted above.    Scheduled Meds:  Current Facility-Administered Medications   Medication Dose Route Frequency Provider Last Rate    acetaminophen  975 mg Oral Q8H MOON  Sung Morales MD      ascorbic acid  500 mg Oral BID Sung Morales MD      bisacodyl  10 mg Rectal Daily PRN Willie Washington DO      Cholecalciferol  2,000 Units Oral Daily Sung Morales MD      diphenhydrAMINE-zinc acetate   Topical TID PRN Lisa Garcia PA-C      enoxaparin  1 mg/kg Subcutaneous Q12H MOON Taylor PA-C      folic acid  1 mg Oral Daily Sung Morales MD      gabapentin  100 mg Oral Q12H Ashley Depadua, MD      HYDROmorphone  1 mg Oral Q4H PRN DONTE Varma      Or    HYDROmorphone  2 mg Oral Q4H PRN DONTE Varma      insulin lispro  1-5 Units Subcutaneous HS Sung Morales MD      insulin lispro  1-6 Units Subcutaneous TID AC Sung Morales MD      metFORMIN  500 mg Oral Daily With Breakfast Christie Taylor PA-C      methocarbamol  500 mg Oral Q8H [facility] Ashley Depadua, MD      metoprolol succinate  25 mg Oral HS Sung Morales MD      naloxone  0.04 mg Intravenous Q1MIN PRN DONTE Varma      nitroglycerin  0.4 mg Sublingual Q5 Min PRN Sung Morales MD      ondansetron  4 mg Oral Q6H PRN Ashley Depadua, MD      polyethylene glycol  17 g Oral Daily PRN Tristen Williamson MD      pravastatin  40 mg Oral Daily With Dinner Sung Morales MD      senna-docusate sodium  2 tablet Oral BID Ashley Depadua, MD      tamsulosin  0.4 mg Oral Daily With Dinner Ashley Depadua, MD      warfarin  7.5 mg Oral Daily (warfarin) Christie Taylor PA-C       Imaging: Reviewed, no new imaging.      Lab Results: I have reviewed the following results:  Results from last 7 days   Lab Units 11/28/24  0634   HEMOGLOBIN g/dL 10.8*   HEMATOCRIT % 34.8*   WBC Thousand/uL 7.70   PLATELETS Thousands/uL 383     Results from last 7 days   Lab Units 11/28/24  0634   BUN mg/dL 18   SODIUM mmol/L 141   POTASSIUM mmol/L 4.2   CHLORIDE mmol/L 102   CREATININE mg/dL 1.07       Results from last 7 days   Lab Units 12/02/24  0544 12/01/24  0612 11/30/24  0615   PROTIME seconds 20.1* 20.0* 20.4*    INR  1.70* 1.69* 1.73*        0658}      ** Please Note: Fluency Direct voice to text software may have been used in the creation of this document. **

## 2024-12-02 NOTE — DISCHARGE SUMMARY
Discharge Summary - Hospitalist   Name: Isauro Sow 79 y.o. male I MRN: [id number]  Unit/Bed#: -01 I Date of Admission: 11/18/2024   Date of Service: 12/2/2024 I Hospital Day: 14     Assessment & Plan  Status post total left knee replacement using cement  Patient was recently admitted to [facility] for elective left TKA on 11/13/24, discharged with home therapy, returned to ED as he was unable to ambulate or participate with home PT.   Orthopedic evaluation appreciated-seen again by ortho 11/27, xrays ordered and reviewed. Will need outpatient follow up in 4 weeks  Currently admitted to [facility] for rehab  Pain control and therapy per PMR  Benign essential hypertension  Blood pressure acceptable  Continue with home dose Toprol 50mg qhs  Type 2 diabetes mellitus (HCC)  Lab Results   Component Value Date    HGBA1C 7.2 (H) 10/07/2024     Home: Metformin 1000mg daily.  Here: Metformin 500mg 2x daily - restarted 11/22/24  Continue QID Accuchecks/SSI and DM diet.  No changes today  Chronic atrial flutter (HCC)  Preoperative cardiology consultation recommended Lovenox to Coumadin bridge  Increased Coumadin to 7.5mg daily. Restarted Lovenox bridge.  Goal INR 2-3   Continue Toprol for rate control  INR 1.7  Stage 3a chronic kidney disease (HCC)  Renal function at baseline  Avoid nephrotoxins  History of stroke  Continue statin and coumadin  S/P TAVR (transcatheter aortic valve replacement)  Followed by cardiology and CTS as outpatient  Anemia  Post op anemia  Continue to monitor  Hgb 10.8     Discharge Summary   Isauro Sow 79 y.o. male MRN: [id number]  Unit/Bed#: -01 Encounter: [id number]  Admission Date: 11/18/2024     Discharge Date: 12/3/2024    Discharging Provider: Edith Moser PA-C    PCP:  [phone]      HPI: Refer to previous hospitalization for complete record    Summary of [facility] Course: Isauro Sow is a 80yo male, with CAD, s/p TAVR, DM type 2, atrial flutter, and HTN, who was  "admitted to the [facility] 11/18/24 after being admitted with uncontrolled pain and ambulatory dysfunction after recent TKA. He underwent an intensive inpatient rehabilitation program. Pain continued to be an issue and he was seen by APS. Pain control did improve but he still required more assistance than could be provided by his family. He was therefore discharged to [facility] subacute rehab.    Discharge Physical Examination:  /63 (BP Location: Left arm)   Pulse 60   Temp 97.9 °F (36.6 °C) (Oral)   Resp 17   Ht 5' 11\" (1.803 m)   Wt 96.8 kg (213 lb 6.5 oz)   SpO2 95%   BMI 29.76 kg/m²     Physical Exam  Vitals reviewed.   Constitutional:       General: He is not in acute distress.     Appearance: He is not ill-appearing or diaphoretic.   HENT:      Head: Normocephalic and atraumatic.      Nose: Nose normal.      Mouth/Throat:      Pharynx: Oropharynx is clear.   Cardiovascular:      Rate and Rhythm: Normal rate.   Pulmonary:      Effort: Pulmonary effort is normal. No respiratory distress.   Abdominal:      General: Bowel sounds are normal. There is no distension.      Palpations: Abdomen is soft.      Tenderness: There is no abdominal tenderness.   Musculoskeletal:         General: No deformity or signs of injury.   Skin:     General: Skin is warm and dry.   Neurological:      Mental Status: He is alert and oriented to person, place, and time.          Significant Findings, Care, Treatment and Services Provided: Acute comprehensive interdisciplinary inpatient rehabilitation including PT, OT, SLP, RN, CM, SW, dietary, psychology, etc.      Physiatry Additions/Comorbidities:    Status post total left knee replacement using cement  Performed by Dr. Braxton on 11/13.  Function improving - patient can be self-limiting. Plan for SNF/[facility] on 12/2.    WBAT.  AROM/PROM/AAROM with no degree restriction.   PT/OT 3-5 hours/day, 5-6 days/week.   DVT Ppx fully anticoagulated on coumadin.  F/u Ortho 11/27 - ROM " as tolerated, pillow/blankets under achilles not behind knee while in bed; follow-up in 10-14 days  L Knee XR 11/26 No acute fracture or dislocation. Knee joint effusion.Satisfactory positioning of the arthroplasty.   No lytic or blastic osseous lesion. Soft tissue swelling along the ventral aspect of the distal thigh. Prior subcutaneous emphysema has resolved. IMPRESSION: Satisfactory appearance of the arthroplasty.    Acute pain  Improving some   Related to TKA and edema in RLE  ACE Wraps  Tylenol 975mg TID scheduled  Robaxin 500mg TID scheduled   Gabapentin 100mg Q12hr for sensitized pain   Monitor and adjust as appropriate.   -Per APS who was consulted earlier in course: DC oxycodone and start dilaudid 1-2 mg q4h PRN and escalate to 2/4 if needed.    Constipation  Improved; now on softer side  Last BM 11/30   -Colace-senna BID, PRN supp or PRN miralax     At risk for venous thromboembolism (VTE)  Anticoagulated on warfarin per IM,SCDs.    Acute Rehabilitation Center Course: Patient participated in a comprehensive interdisciplinary inpatient rehabilitation program which included involvment of Rehab MD, therapies (PT, OT, and/or SLP), RN, CM, SW, dietary, and psychology services.     Etiologic/Rehabilitation Diagnosis: Impairment of mobility, safety and Activities of Daily Living (ADLs) due to Orthopedic Disorders:  08.61  Unilateral Knee Replacement    Functional Status Upon Admission to [facility]:  PT: Juan M bed mobility, modA transfers, modA ambulation (+ chair follow)  OT: mod Ind eating, Sup grooming, Sup UB bathing/dressing, maxA LB bathing/dressing, maxA toileting    Functional Status Upon Discharge from [facility]:   PT: Sup-CGA bed mobility, CGA transfers, CGA ambulation 45' with RW . Not able to do stairs yet.   OT: Ind eating, Set-up oral hygiene, Juan M shower/bathe, Set-up Ub dressing, Juan M LB dressing, Juan M footwear    Condition at Discharge: good     Discharge instructions/Information to patient and family:   See  after visit summary for information provided to patient and family.      Provisions for Follow-Up Care:  See after visit summary for information related to follow-up care and any pertinent home health orders.      Future Appointments   Date Time Provider Department Center   1/3/2025  1:00 PM DEVICE IN PERSON [place] [facility]   2/17/2025 11:40 AM Johnson Browning MD CARD [facility]   3/17/2025 11:00 AM [facility] VASCULAR 2 [facility] [address]   4/4/2025 10:00 AM DEVICE REMOTE [place] [facility]   5/22/2025 10:30 AM BE [facility] VASCULAR 2 BE [facility] Car [facility] [address]           Disposition: Short-term rehab at [facility]    Planned Readmission: No    Discharge Statement   I spent 60 minutes or more discharging the patient.  I had direct contact with the patient on the day of discharge. Greater than 50% of the total time was spent examining patient, answering all patient questions, arranging and discussing plan of care with patient and care team as well as directly providing post-discharge instructions. Additional time then spent on discharge activities.    Discharge Medications:  See after visit summary for reconciled discharge medications provided to patient and family.

## 2024-12-02 NOTE — PROGRESS NOTES
12/02/24 1000   Pain Assessment   Pain Assessment Tool 0-10   Pain Score 6   Pain Location/Orientation Orientation: Left;Location: Knee   Pain Onset/Description Onset: Ongoing;Descriptor: Sore   Patient's Stated Pain Goal No pain   Hospital Pain Intervention(s) Rest   Restrictions/Precautions   Precautions Bed/chair alarms;Cognitive;Fall Risk;Pain;Supervision on toilet/commode  (LLE ace wrap)   LLE Weight Bearing Per Order WBAT   ROM Restrictions No   Cognition   Overall Cognitive Status WFL   Arousal/Participation Alert;Cooperative   Attention Within functional limits   Orientation Level Oriented X4   Memory Within functional limits   Following Commands Follows multistep commands with increased time or repetition   Sit to Lying   Type of Assistance Needed Supervision   Sit to Lying CARE Score 4   Lying to Sitting on Side of Bed   Type of Assistance Needed Supervision   Lying to Sitting on Side of Bed CARE Score 4   Sit to Stand   Type of Assistance Needed Incidental touching;Adaptive equipment;Physical assistance   Physical Assistance Level 25% or less   Comment Roxane/CGA w/RW   Sit to Stand CARE Score 3   Bed-Chair Transfer   Type of Assistance Needed Incidental touching;Adaptive equipment   Comment CGA with SPT and RW   Chair/Bed-to-Chair Transfer CARE Score 4   Transfer Bed/Chair/Wheelchair   Adaptive Equipment Roller Walker   Walk 10 Feet   Type of Assistance Needed Incidental touching;Adaptive equipment;Verbal cues   Comment with RW, WC pulled behind for safety   Walk 10 Feet CARE Score 4   Ambulation   Primary Mode of Locomotion Prior to Admission Walk   Distance Walked (feet) 45 ft  (10' x2)   Assist Device Roller Walker   Gait Pattern Antalgic;Inconsistant Margarita;Slow Margarita;Decreased foot clearance;R foot drag;Forward Flexion;Narrow KUMAR;Step to;Decreased L stance;Improper weight shift   Limitations Noted In Strength;Swing;Speed;Safety;Posture;Endurance   Provided Assistance with: Balance;Direction    Walk Assist Level Contact Guard   Does the patient walk? 2. Yes   Wheel 50 Feet with Two Turns   Reason if not Attempted Activity not applicable   Wheel 50 Feet with Two Turns CARE Score 9   Wheel 150 Feet   Reason if not Attempted Activity not applicable   Wheel 150 Feet CARE Score 9   Wheelchair mobility   Does the patient use a wheelchair? 0. No   Picking Up Object   Reason if not Attempted Safety concerns   Picking Up Object CARE Score 88   Toilet Transfer   Type of Assistance Needed Incidental touching;Adaptive equipment   Comment SPT with RW to BSC.   Toilet Transfer CARE Score 4   Toilet Transfer   Surface Assessed Bedside Commode   Therapeutic Interventions   Strengthening LLE quad sets with increased hold x10 reps.   Flexibility L HS/HC stretch   Equipment Use   NuStep x12 min increased focus on LLE ROM in ext   Assessment   Treatment Assessment Pt participated in skilled 60 min PT session with increased focus on gait, increased ROM, increased functional transfers and bed mobility. Pt cont to be limited by increased L knee pain and decreased ROM. Pt noted increased ext with quad sets but once WB to LLE it maintains flexed. Pt will cont POC as tolerated with cont focus on gait, increased consistency with act tolerance, increased LE strength and functional transfers to decrease burden of care. Possible discharge tomorrow 12/3/24 to SNF for cont rehab services to increased I.   Problem List Decreased strength;Decreased range of motion;Decreased endurance;Impaired balance;Decreased mobility;Pain   Barriers to Discharge Decreased caregiver support   PT Barriers   Functional Limitation Walking;Transfers   Plan   Treatment/Interventions Functional transfer training;LE strengthening/ROM;Therapeutic exercise;Endurance training;Cognitive reorientation;Gait training;Bed mobility   Progress Progressing toward goals   PT Therapy Minutes   PT Time In 1000   PT Time Out 1100   PT Total Time (minutes) 60   PT Mode of  treatment - Individual (minutes) 60   PT Mode of treatment - Concurrent (minutes) 0   PT Mode of treatment - Group (minutes) 0   PT Mode of treatment - Co-treat (minutes) 0   PT Mode of Treatment - Total time(minutes) 60 minutes   PT Cumulative Minutes 1103   Therapy Time missed   Time missed? No

## 2024-12-03 ENCOUNTER — TRANSITIONAL CARE MANAGEMENT (OUTPATIENT)
Dept: FAMILY MEDICINE CLINIC | Facility: CLINIC | Age: 79
End: 2024-12-03

## 2024-12-03 ENCOUNTER — PATIENT OUTREACH (OUTPATIENT)
Dept: CASE MANAGEMENT | Facility: OTHER | Age: 79
End: 2024-12-03

## 2024-12-03 VITALS
OXYGEN SATURATION: 95 % | HEART RATE: 62 BPM | WEIGHT: 213.41 LBS | BODY MASS INDEX: 29.88 KG/M2 | RESPIRATION RATE: 18 BRPM | TEMPERATURE: 98 F | SYSTOLIC BLOOD PRESSURE: 158 MMHG | DIASTOLIC BLOOD PRESSURE: 71 MMHG | HEIGHT: 71 IN

## 2024-12-03 LAB
FLUAV AG UPPER RESP QL IA.RAPID: NEGATIVE
FLUBV AG UPPER RESP QL IA.RAPID: NEGATIVE
GLUCOSE SERPL-MCNC: 141 MG/DL (ref 65–140)
GLUCOSE SERPL-MCNC: 145 MG/DL (ref 65–140)
INR PPP: 1.72 (ref 0.85–1.19)
PROTHROMBIN TIME: 20.3 SECONDS (ref 12.3–15)
SARS-COV+SARS-COV-2 AG RESP QL IA.RAPID: NEGATIVE

## 2024-12-03 PROCEDURE — 99239 HOSP IP/OBS DSCHRG MGMT >30: CPT | Performed by: PHYSICIAN ASSISTANT

## 2024-12-03 PROCEDURE — 85610 PROTHROMBIN TIME: CPT | Performed by: PHYSICIAN ASSISTANT

## 2024-12-03 PROCEDURE — 87804 INFLUENZA ASSAY W/OPTIC: CPT | Performed by: PHYSICIAN ASSISTANT

## 2024-12-03 PROCEDURE — 82948 REAGENT STRIP/BLOOD GLUCOSE: CPT

## 2024-12-03 PROCEDURE — 87811 SARS-COV-2 COVID19 W/OPTIC: CPT | Performed by: PHYSICIAN ASSISTANT

## 2024-12-03 PROCEDURE — 99232 SBSQ HOSP IP/OBS MODERATE 35: CPT | Performed by: PHYSICAL MEDICINE & REHABILITATION

## 2024-12-03 RX ORDER — POLYETHYLENE GLYCOL 3350 17 G/17G
17 POWDER, FOR SOLUTION ORAL DAILY PRN
Start: 2024-12-03

## 2024-12-03 RX ORDER — METHOCARBAMOL 500 MG/1
500 TABLET, FILM COATED ORAL EVERY 8 HOURS SCHEDULED
Start: 2024-12-03 | End: 2024-12-18

## 2024-12-03 RX ORDER — BISACODYL 10 MG
10 SUPPOSITORY, RECTAL RECTAL DAILY PRN
Qty: 12 SUPPOSITORY | Refills: 0
Start: 2024-12-03

## 2024-12-03 RX ORDER — GABAPENTIN 100 MG/1
100 CAPSULE ORAL EVERY 12 HOURS
Start: 2024-12-03

## 2024-12-03 RX ORDER — HYDROMORPHONE HYDROCHLORIDE 2 MG/1
2 TABLET ORAL EVERY 6 HOURS PRN
Qty: 28 TABLET | Refills: 0 | Status: SHIPPED | OUTPATIENT
Start: 2024-12-03 | End: 2024-12-13

## 2024-12-03 RX ORDER — AMOXICILLIN 250 MG
2 CAPSULE ORAL 2 TIMES DAILY
Start: 2024-12-03

## 2024-12-03 RX ORDER — WARFARIN SODIUM 7.5 MG/1
TABLET ORAL
Start: 2024-12-03 | End: 2024-12-18 | Stop reason: DRUGHIGH

## 2024-12-03 RX ORDER — TAMSULOSIN HYDROCHLORIDE 0.4 MG/1
0.4 CAPSULE ORAL
Start: 2024-12-03

## 2024-12-03 RX ORDER — ONDANSETRON 4 MG/1
4 TABLET, ORALLY DISINTEGRATING ORAL EVERY 6 HOURS PRN
Start: 2024-12-03 | End: 2024-12-18 | Stop reason: ALTCHOICE

## 2024-12-03 RX ORDER — ENOXAPARIN SODIUM 100 MG/ML
100 INJECTION SUBCUTANEOUS EVERY 12 HOURS
Start: 2024-12-03 | End: 2024-12-18 | Stop reason: ALTCHOICE

## 2024-12-03 RX ORDER — ACETAMINOPHEN 325 MG/1
975 TABLET ORAL EVERY 8 HOURS SCHEDULED
Start: 2024-12-03

## 2024-12-03 RX ADMIN — METHOCARBAMOL 500 MG: 500 TABLET ORAL at 06:16

## 2024-12-03 RX ADMIN — SENNOSIDES AND DOCUSATE SODIUM 2 TABLET: 50; 8.6 TABLET ORAL at 09:00

## 2024-12-03 RX ADMIN — ENOXAPARIN SODIUM 100 MG: 100 INJECTION SUBCUTANEOUS at 09:00

## 2024-12-03 RX ADMIN — GABAPENTIN 100 MG: 100 CAPSULE ORAL at 06:16

## 2024-12-03 RX ADMIN — Medication 2000 UNITS: at 09:00

## 2024-12-03 RX ADMIN — FOLIC ACID 1 MG: 1 TABLET ORAL at 09:00

## 2024-12-03 RX ADMIN — OXYCODONE HYDROCHLORIDE AND ACETAMINOPHEN 500 MG: 500 TABLET ORAL at 09:00

## 2024-12-03 RX ADMIN — METFORMIN HYDROCHLORIDE 500 MG: 500 TABLET ORAL at 09:00

## 2024-12-03 RX ADMIN — ACETAMINOPHEN 975 MG: 325 TABLET, FILM COATED ORAL at 06:16

## 2024-12-03 NOTE — ASSESSMENT & PLAN NOTE
Lab Results   Component Value Date    HGBA1C 7.2 (H) 10/07/2024       Recent Labs     12/02/24  1546 12/02/24  2132 12/03/24  0657 12/03/24  1049   POCGLU 159* 156* 141* 145*       Blood Sugar Average: Last 72 hrs:  (P) [id number]    Home: Metformin 1000mg daily;   Here: Metformin 500m qday; CDI/Accuchecks.   If necessary, will try to get patient back on oral meds prior to discharge.   Diabetic Diet.   Management as per IM.

## 2024-12-03 NOTE — ASSESSMENT & PLAN NOTE
Performed by Dr. Braxton on 11/13.  Function improving - patient can be self-limiting. Plan for SNF/[facility] on 12/2.    WBAT.  AROM/PROM/AAROM with no degree restriction.   PT/OT 3-5 hours/day, 5-6 days/week completed  - transitioning to SNF/[facility].   DVT Ppx fully anticoagulated on coumadin.  F/u Ortho 11/27 - ROM as tolerated, pillow/blankets under achilles not behind knee while in bed; follow-up in 10-14 days  L Knee XR 11/26 No acute fracture or dislocation. Knee joint effusion.Satisfactory positioning of the arthroplasty.   No lytic or blastic osseous lesion. Soft tissue swelling along the ventral aspect of the distal thigh. Prior subcutaneous emphysema has resolved. IMPRESSION: Satisfactory appearance of the arthroplasty.

## 2024-12-03 NOTE — ASSESSMENT & PLAN NOTE
Improving some   Related to TKA and edema in RLE  ACE Wraps  Tylenol 975mg TID scheduled  Robaxin 500mg TID scheduled   Gabapentin 100mg Q12hr for sensitized pain   Dilaudid 2mg Q6hr PRN   Monitor and adjust as appropriate.   Was seen by APS who started Gabapentin and rotated opioids from oxycodone to Dilaudid.    - Provided 7 day script for SNF/[facility].
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
## 2024-12-03 NOTE — PROGRESS NOTES
ADT alert received the patient discharged 12/3/24 to [facility]. Email sent to facility to inform them the patient is on the [facility] and I will be following them during their skilled stay.  This Admin Coordinator will continue to monitor via chart review.

## 2024-12-03 NOTE — PROGRESS NOTES
Progress Note - PMR   Name: Isauro Sow 79 y.o. male I MRN: [id number]  Unit/Bed#: -01 I Date of Admission: 11/18/2024   Date of Service: 12/3/2024 I Hospital Day: 15     Assessment & Plan  Status post total left knee replacement using cement  Performed by Dr. Braxton on 11/13.  Function improving - patient can be self-limiting. Plan for SNF/[facility] on 12/2.    WBAT.  AROM/PROM/AAROM with no degree restriction.   PT/OT 3-5 hours/day, 5-6 days/week completed  - transitioning to SNF/[facility].   DVT Ppx fully anticoagulated on coumadin.  F/u Ortho 11/27 - ROM as tolerated, pillow/blankets under achilles not behind knee while in bed; follow-up in 10-14 days  L Knee XR 11/26 No acute fracture or dislocation. Knee joint effusion.Satisfactory positioning of the arthroplasty.   No lytic or blastic osseous lesion. Soft tissue swelling along the ventral aspect of the distal thigh. Prior subcutaneous emphysema has resolved. IMPRESSION: Satisfactory appearance of the arthroplasty.  Acute pain  Improving some   Related to TKA and edema in RLE  ACE Wraps  Tylenol 975mg TID scheduled  Robaxin 500mg TID scheduled   Gabapentin 100mg Q12hr for sensitized pain   Dilaudid 2mg Q6hr PRN   Monitor and adjust as appropriate.   Was seen by APS who started Gabapentin and rotated opioids from oxycodone to Dilaudid.    - Provided 7 day script for SNF/[facility].   Constipation  Improved; now on softer side  Last BM 12/2  -Colace-senna BID, PRN supp or PRN miralax   At risk for venous thromboembolism (VTE)  Anticoagulated on warfarin per IM,SCDs.  Benign essential hypertension  Home: Toprol 25mg QHS,   here: Same.H Monitor and adjust as per IM.   Type 2 diabetes mellitus (HCC)  Lab Results   Component Value Date    HGBA1C 7.2 (H) 10/07/2024       Recent Labs     12/02/24  1546 12/02/24  2132 12/03/24  0657 12/03/24  1049   POCGLU 159* 156* 141* 145*       Blood Sugar Average: Last 72 hrs:  (P) [id number]    Home: Metformin 1000mg daily;    Here: Metformin 500m qday; CDI/Accuchecks.   If necessary, will try to get patient back on oral meds prior to discharge.   Diabetic Diet.   Management as per IM.     History of stroke  -Hx of stroke in 2017 w/ residual L sided deficits   -Monitor   Stage 3a chronic kidney disease (HCC)  Lab Results   Component Value Date    EGFR 65 11/28/2024    EGFR 67 11/19/2024    EGFR 65 11/18/2024    CREATININE 1.07 11/28/2024    CREATININE 1.04 11/19/2024    CREATININE 1.07 11/18/2024     -Baseline Cr 1.0-1.3  Currently within baseline.   Avoid nephrotoxic meds, relative hypotension.   Monitor BMP, I/O.   S/P TAVR (transcatheter aortic valve replacement)  -TAVR procedure done 9/2023  -Follows [facility]TS outpatient   Chronic atrial flutter (HCC)  -Hx of a-flutter s/p PPM placement   -On Coumadin per IM   - Continue Toprol    Anemia  - Hb improved to 10.8 on 11/28  - Mgmt per primary team and recommend optimal mgmt during rehab process  - Monitor H/H, vitals, signs/symptoms of acute bleeding      Health Maintenance  #Delirium/Sleep: At risk. Optimize sleep/wake, pain, bowel, bladder management. Avoid deliriogenic meds and physical restraint. Environmental/behavioral interventions.   #Skin/Pressure Injury Prevention: Turn Q2hr in bed, with weight shifts O90-24hex in wheelchair. Float heels in bed.  #Code Status: DNR/DNI  #FEN: Carb controlled diet   #Dispo: Team 11/27: ADD 12/3 to SNF/[facility], given slow rate of progress and need for functional independence given limited support and cares for his wife who had a stroke.     Total visit time: 35 minutes, with more than 50% spent counseling/coordinating care. Counseling includes discussion with patient re: progress in therapies, functional issues observed by therapy staff, and discussion with patient regarding their current medical state and wellbeing. Coordination of patient's care was performed in conjunction with Internal Medicine service to monitor patient's labs, vitals, and management of  their comorbidities. Preparing discharge and coordinating with team/patient.       To Review: Mr. Sow is a 80yo M with medical history of asthma, aflutter and SSS s/p medtronic PPM on coumadin, BPH, CAD, R ICA occlusion/L ICA stenosisk, CKD 3 (BL 1-1.3), COPD, T2DM, Gout, HLD, HTN, PERCY, Macular Degeneration, Severe Aortic Stenosis, previous CVA in 2017 who presented to [facility] on 11/18 POD 2 L TKA failing discharge to home with increased LLE pain. LE Doppler negative for DVT. Ortho saw patient and had no other recommendations at this time. He had one low grade temp during stay, but no further episodes, so no further work-up. He was recommend for [facility] given acute sequelae of his surgery, chronic comorbidities and extent of his weakness/impaired function. He was admitted to the [facility] on 11/18.     Chief Complaint: Ready for discharge.     Interval History/Subjective:  No acute events overnight. Last BM 12/2. No new Cp, SOB, fevers, chills, N/V, abdominal pain. LE edema RLE has improved. Knee is healing. Range of motion is better. Still with some residual pain, but clinically with gradual improvement.     Functional Update:  PT: Sup-CGA bed mobility, CGA transfers, CGA ambulation 45' with RW . Not able to do stairs yet.   OT: Ind eating, Set-up oral hygiene, Juan M shower/bathe, Set-up Ub dressing, Juan M LB dressing, Juan M footwear      Objective     Temp:  [97.9 °F (36.6 °C)-98.9 °F (37.2 °C)] 98 °F (36.7 °C)  HR:  [62-68] 62  Resp:  [17-18] 18  BP: (125-158)/(59-71) 158/71  SpO2:  [94 %-95 %] 95 %    Gen: No acute distress, Well-nourished, well-appearing.  HEENT: Moist mucus membranes, Normocephalic/Atraumatic  Cardiovascular: Regular rate, rhythm, S1/S2. Distal pulses palpable  Heme/Extr: No edema  Pulmonary: Non-labored breathing. Lungs CTAB  : No abbasi  GI: Soft, non-tender, non-distended. BS+  MSK: Improved extension lag L knee by about 5 degrees short full extension.   Integumentary: Skin is warm, dry.   Neuro:  AAOx3,Sensation intact to light touch throughout - including L femoral, saphenous, sural, tibial, deep/superficial peroneal. Speech is intact. Appropriate to questioning. Tone is normal.   MMT:   Strength:   Right  Left  Site  Right  Left  Site    5 5  S Ab: Shoulder Abductors  5  4- HF: Hip Flexors    5 5  EF: Elbow Flexors  5  4- KF: Knee Flexors    5  5  EE: Elbow Extensors  5  4- KE: Knee Extensors    5  5  WE: Wrist Extensors  5  4+ DR: Dorsi Flexors    5  5  FF: Finger Flexors  5  5  PF: Plantar Flexors    5  5  HI: Hand Intrinsics  5  5  EHL: Extensor Hallucis Longus   Psych: Normal mood and affect.       Physical examination is otherwise unchanged from previous encounter, except as noted above.    Scheduled Meds:  Current Facility-Administered Medications   Medication Dose Route Frequency Provider Last Rate    acetaminophen  975 mg Oral Q8H MOON Morales MD      ascorbic acid  500 mg Oral BID Sung Morales MD      bisacodyl  10 mg Rectal Daily PRN Willie Washington DO      Cholecalciferol  2,000 Units Oral Daily Sung Morales MD      diphenhydrAMINE-zinc acetate   Topical TID PRN Lisa Garcia PA-C      enoxaparin  1 mg/kg Subcutaneous Q12H [facility] Christie Taylor PA-C      folic acid  1 mg Oral Daily Sung Morales MD      gabapentin  100 mg Oral Q12H Ashley Depadua, MD      HYDROmorphone  1 mg Oral Q4H PRN DONTE Varma      Or    HYDROmorphone  2 mg Oral Q4H PRN DONTE Varma      insulin lispro  1-5 Units Subcutaneous HS Sung Morales MD      insulin lispro  1-6 Units Subcutaneous TID AC Sung Morales MD      metFORMIN  500 mg Oral Daily With Breakfast Christie Taylor PA-C      methocarbamol  500 mg Oral Q8H [facility] Ashley Depadua, MD      metoprolol succinate  25 mg Oral HS Sung Morales MD      naloxone  0.04 mg Intravenous Q1MIN PRN DONTE Varma      nitroglycerin  0.4 mg Sublingual Q5 Min PRN Sung Morales MD      ondansetron  4 mg Oral Q6H PRN Ashley Depadua, MD       polyethylene glycol  17 g Oral Daily PRN Tristen Williamson MD      pravastatin  40 mg Oral Daily With Dinner Sung Morales MD      senna-docusate sodium  2 tablet Oral BID Ashley Depadua, MD      tamsulosin  0.4 mg Oral Daily With Dinner Ashley Depadua, MD      warfarin  7.5 mg Oral Daily (warfarin) Christie Taylor PA-C       Imaging: Reviewed, no new imaging       Lab Results: Reviewed   Results from last 7 days   Lab Units 11/28/24  0634   HEMOGLOBIN g/dL 10.8*   HEMATOCRIT % 34.8*   WBC Thousand/uL 7.70   PLATELETS Thousands/uL 383     Results from last 7 days   Lab Units 11/28/24  0634   BUN mg/dL 18   SODIUM mmol/L 141   POTASSIUM mmol/L 4.2   CHLORIDE mmol/L 102   CREATININE mg/dL 1.07       Results from last 7 days   Lab Units 12/03/24  0626 12/02/24  0544 12/01/24  0612   PROTIME seconds 20.3* 20.1* 20.0*   INR  1.72* 1.70* 1.69*        0658}      ** Please Note: Fluency Direct voice to text software may have been used in the creation of this document. **     Patient

## 2024-12-04 ENCOUNTER — TELEPHONE (OUTPATIENT)
Age: 79
End: 2024-12-04

## 2024-12-04 ENCOUNTER — NURSING HOME VISIT (OUTPATIENT)
Dept: GERIATRICS | Facility: OTHER | Age: 79
End: 2024-12-04
Payer: MEDICARE

## 2024-12-04 DIAGNOSIS — N40.1 BENIGN PROSTATIC HYPERPLASIA WITH URINARY RETENTION: ICD-10-CM

## 2024-12-04 DIAGNOSIS — K59.01 SLOW TRANSIT CONSTIPATION: ICD-10-CM

## 2024-12-04 DIAGNOSIS — E11.22 TYPE 2 DIABETES MELLITUS WITH STAGE 3A CHRONIC KIDNEY DISEASE, WITHOUT LONG-TERM CURRENT USE OF INSULIN (HCC): Chronic | ICD-10-CM

## 2024-12-04 DIAGNOSIS — Z79.01 ANTICOAGULATED ON COUMADIN: ICD-10-CM

## 2024-12-04 DIAGNOSIS — I48.92 CHRONIC ATRIAL FLUTTER (HCC): ICD-10-CM

## 2024-12-04 DIAGNOSIS — R52 ACUTE PAIN: ICD-10-CM

## 2024-12-04 DIAGNOSIS — N18.31 STAGE 3A CHRONIC KIDNEY DISEASE (HCC): ICD-10-CM

## 2024-12-04 DIAGNOSIS — R33.8 BENIGN PROSTATIC HYPERPLASIA WITH URINARY RETENTION: ICD-10-CM

## 2024-12-04 DIAGNOSIS — R26.2 AMBULATORY DYSFUNCTION: ICD-10-CM

## 2024-12-04 DIAGNOSIS — D62 ANEMIA DUE TO ACUTE BLOOD LOSS: ICD-10-CM

## 2024-12-04 DIAGNOSIS — Z95.5 S/P DRUG ELUTING CORONARY STENT PLACEMENT: ICD-10-CM

## 2024-12-04 DIAGNOSIS — N18.31 TYPE 2 DIABETES MELLITUS WITH STAGE 3A CHRONIC KIDNEY DISEASE, WITHOUT LONG-TERM CURRENT USE OF INSULIN (HCC): Chronic | ICD-10-CM

## 2024-12-04 DIAGNOSIS — M17.12 PRIMARY OSTEOARTHRITIS OF LEFT KNEE: Primary | ICD-10-CM

## 2024-12-04 DIAGNOSIS — Z96.652 STATUS POST TOTAL LEFT KNEE REPLACEMENT USING CEMENT: ICD-10-CM

## 2024-12-04 PROCEDURE — 99306 1ST NF CARE HIGH MDM 50: CPT | Performed by: FAMILY MEDICINE

## 2024-12-04 NOTE — TELEPHONE ENCOUNTER
Caller: Pascale Ramos Milligan    Doctor: Fox    Reason for call: Called to schedule patients post op appointment. Patient had surgery 11/13. Would prefer [place] location. Please assist scheduling.    Call back#: [phone]

## 2024-12-04 NOTE — CASE MANAGEMENT
Team dc summary - pt made gains but was not yet able to return home w/spouse. Pt and family in agreement with subacute setting and selected [facility]. Pt approved and transport arranged with shan terrell. Pt and family aware of fee for service. Pt family facility and staff aware of all dc arrangements.

## 2024-12-05 PROBLEM — R30.0 DYSURIA: Status: RESOLVED | Noted: 2022-09-06 | Resolved: 2024-12-05

## 2024-12-05 PROBLEM — Z79.01 ANTICOAGULATED ON COUMADIN: Status: ACTIVE | Noted: 2024-12-05

## 2024-12-05 PROBLEM — R26.2 AMBULATORY DYSFUNCTION: Status: ACTIVE | Noted: 2024-12-05

## 2024-12-05 PROBLEM — Z01.818 PREOPERATIVE EVALUATION OF A MEDICAL CONDITION TO RULE OUT SURGICAL CONTRAINDICATIONS (TAR REQUIRED): Status: RESOLVED | Noted: 2024-10-31 | Resolved: 2024-12-05

## 2024-12-05 PROBLEM — R09.89 DECREASED PEDAL PULSES: Status: RESOLVED | Noted: 2024-03-07 | Resolved: 2024-12-05

## 2024-12-05 PROBLEM — N40.0 BPH (BENIGN PROSTATIC HYPERPLASIA): Status: ACTIVE | Noted: 2024-12-05

## 2024-12-05 RX ORDER — NITROGLYCERIN 0.4 MG/1
0.4 TABLET SUBLINGUAL
Start: 2024-12-05

## 2024-12-05 NOTE — ASSESSMENT & PLAN NOTE
Currently bridging with lovenox to coumadin  D/c lovenox when INR >2  INR subtherapeutic at 1.8 on 12/4  Coumadin 7.5mg daily 12/4 and 12/5  Repeat INR on 12/6  Continue metoprolol for rate control

## 2024-12-05 NOTE — ASSESSMENT & PLAN NOTE
With component of mild acute blood loss in setting of recent orthopedic surgery  Most recent Hgb 10.8  CBC ordered

## 2024-12-05 NOTE — TELEPHONE ENCOUNTER
Caller: Pascale Garcia    Reason for call: Calling back to be forced on the [place] schedule. Per Thony garcia discharge instructions said to follow up in 1 month in [place] Office. Patients surgery looks like it was on 11/13/24. I do not have force on capabilities, I reached out to Beatris via teams chat and she will call Thony Garcia back to assist with scheduling.     Call back#: [phone]

## 2024-12-05 NOTE — ASSESSMENT & PLAN NOTE
Lab Results   Component Value Date    HGBA1C 7.2 (H) 10/07/2024   Continue metformin 500mg daily  Monitor accuchecks fasting + QHS- ranging 131-179 since SNF admission

## 2024-12-05 NOTE — TELEPHONE ENCOUNTER
Caller: Pascale Ramos [facility]     Doctor: Fox     Reason for call: Returning call to [facility] patient.    Call back#: [phone]

## 2024-12-05 NOTE — ASSESSMENT & PLAN NOTE
Admit to SNF for rehab  PT and OT consults placed- evaluate and treat  Supportive care, nutritional support, ADL support  Fall precautions

## 2024-12-05 NOTE — ASSESSMENT & PLAN NOTE
S/p left total knee replacement on 11/13/24  Follow up with orthopedics- needs follow up the week of 12/23 per [facility] discharge summary  Continue multimodal pain regimen:  -Acetaminophen 975mg TID  -Methocarbamol 500mg TID- change to PRN as symptoms improve  -Hydromorphone 2mg Q6h PRN for severe pain- GDR as able as pain improves  -Gabapentin 100mg Q12h, consider uptitration if pain not well controlled  No changes made to pain regimen at time of admission exam  Ice x15 minutes TID + TID PRN for pain added  Continue lovenox bridge to coumadin for DVT prophylaxis

## 2024-12-06 ENCOUNTER — NURSING HOME VISIT (OUTPATIENT)
Dept: GERIATRICS | Facility: OTHER | Age: 79
End: 2024-12-06
Payer: MEDICARE

## 2024-12-06 DIAGNOSIS — Z96.652 STATUS POST TOTAL LEFT KNEE REPLACEMENT USING CEMENT: Primary | ICD-10-CM

## 2024-12-06 DIAGNOSIS — R26.2 AMBULATORY DYSFUNCTION: ICD-10-CM

## 2024-12-06 DIAGNOSIS — I48.92 CHRONIC ATRIAL FLUTTER (HCC): ICD-10-CM

## 2024-12-06 DIAGNOSIS — N18.31 TYPE 2 DIABETES MELLITUS WITH STAGE 3A CHRONIC KIDNEY DISEASE, WITHOUT LONG-TERM CURRENT USE OF INSULIN (HCC): Chronic | ICD-10-CM

## 2024-12-06 DIAGNOSIS — E11.22 TYPE 2 DIABETES MELLITUS WITH STAGE 3A CHRONIC KIDNEY DISEASE, WITHOUT LONG-TERM CURRENT USE OF INSULIN (HCC): Chronic | ICD-10-CM

## 2024-12-06 PROCEDURE — 99309 SBSQ NF CARE MODERATE MDM 30: CPT | Performed by: NURSE PRACTITIONER
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
## 2024-12-09 ENCOUNTER — OFFICE VISIT (OUTPATIENT)
Age: 79
End: 2024-12-09

## 2024-12-09 VITALS
HEIGHT: 71 IN | DIASTOLIC BLOOD PRESSURE: 62 MMHG | HEART RATE: 62 BPM | BODY MASS INDEX: 29.82 KG/M2 | WEIGHT: 213 LBS | SYSTOLIC BLOOD PRESSURE: 132 MMHG

## 2024-12-09 DIAGNOSIS — Z96.652 STATUS POST TOTAL LEFT KNEE REPLACEMENT USING CEMENT: ICD-10-CM

## 2024-12-09 DIAGNOSIS — Z96.652 STATUS POST LEFT KNEE REPLACEMENT: Primary | ICD-10-CM

## 2024-12-09 PROCEDURE — 99024 POSTOP FOLLOW-UP VISIT: CPT | Performed by: STUDENT IN AN ORGANIZED HEALTH CARE EDUCATION/TRAINING PROGRAM

## 2024-12-09 RX ORDER — ACTIVATED CHARCOAL 208 MG/ML
SUSPENSION ORAL
COMMUNITY

## 2024-12-09 NOTE — PROGRESS NOTES
Subjective: Patient seen and examined. Pain not well controlled, he is struggling at night time. Incision without drainage.  He presents today in a wheelchair.  He is getting physical therapy at [facility] after [facility]. Denies fevers or chills.  He notes swelling in his knee.  He is struggling with getting his knee in full extension.    Physical Exam:  Incision: CDI, no erythema or drainage noted  ROM: * (pre-op 5-115)  5/5 IP/Q/HS/TA/GS, 2+ DP/PT, SILT DP/SP/S/S/TN    XR left knee: cemented CR attune TKA, components in good position. No fracture or dislocation.      Assessment/Plan:  78 y/o male 3.5 weeks s/p Left TKA from 11/13/24.     - continue multi-modal pain control   - Weight bearing status: WBAT LLE  - DVT ppx: Coumadin  - Incision care: May shower, do not scrub or submerge incision  - PT/OT  - Continue ice to the knee  - Reviewed extension exercises, pillow under achilles and not under knee to work on his extension. Counseled on the importance of extension exercises to improve ambulation   - F/U 4 weeks      Scribe Attestation      I,:  Pau Julian PA-C am acting as a scribe while in the presence of the attending physician.:       I,:  Dre Braxton DO personally performed the services described in this documentation    as scribed in my presence.:

## 2024-12-09 NOTE — PROGRESS NOTES
OT discharge Note   Pt has made fair Progress in Occupational Therapy. Pt PARTIALLY MET long term goals. Pt progressed to Minimum assistance  level for functional transfers with RW. Minimum/Mod assistance level for ADL function with use of RW.  Pt discharged to SNF for further rehab prior to return home

## 2024-12-10 ENCOUNTER — NURSING HOME VISIT (OUTPATIENT)
Dept: GERIATRICS | Facility: OTHER | Age: 79
End: 2024-12-10
Payer: MEDICARE

## 2024-12-10 ENCOUNTER — PATIENT OUTREACH (OUTPATIENT)
Dept: CASE MANAGEMENT | Facility: OTHER | Age: 79
End: 2024-12-10

## 2024-12-10 DIAGNOSIS — E11.22 TYPE 2 DIABETES MELLITUS WITH STAGE 3A CHRONIC KIDNEY DISEASE, WITHOUT LONG-TERM CURRENT USE OF INSULIN (HCC): Chronic | ICD-10-CM

## 2024-12-10 DIAGNOSIS — R26.2 AMBULATORY DYSFUNCTION: ICD-10-CM

## 2024-12-10 DIAGNOSIS — Z96.652 STATUS POST TOTAL LEFT KNEE REPLACEMENT USING CEMENT: ICD-10-CM

## 2024-12-10 DIAGNOSIS — I48.92 CHRONIC ATRIAL FLUTTER (HCC): ICD-10-CM

## 2024-12-10 DIAGNOSIS — M17.12 PRIMARY OSTEOARTHRITIS OF LEFT KNEE: Primary | ICD-10-CM

## 2024-12-10 DIAGNOSIS — N18.31 TYPE 2 DIABETES MELLITUS WITH STAGE 3A CHRONIC KIDNEY DISEASE, WITHOUT LONG-TERM CURRENT USE OF INSULIN (HCC): Chronic | ICD-10-CM

## 2024-12-10 DIAGNOSIS — N18.31 STAGE 3A CHRONIC KIDNEY DISEASE (HCC): ICD-10-CM

## 2024-12-10 DIAGNOSIS — Z79.01 ANTICOAGULATED ON COUMADIN: ICD-10-CM

## 2024-12-10 PROCEDURE — 99309 SBSQ NF CARE MODERATE MDM 30: CPT | Performed by: FAMILY MEDICINE

## 2024-12-10 NOTE — PROGRESS NOTES
[facility]  Progress Note  POS: SNF-31    Chief Complaint/Reason for visit: STR follow up  History of Present Illness: 79 year old male evaluated for STR follow up. Notes improvement in pain of his knee and mobility overall since [facility] admission; has used PRN hydromorphone x6 since SNF admission (has been taking in the morning prior to therapy).        Review of systems: Review of Systems   Constitutional:  Negative for chills and fever.   Musculoskeletal:  Positive for arthralgias and gait problem.      Medications: No changes made  Consults reviewed:PT, OT, and Speech; Orthopedics (12/9/24)  Labs/Diagnostics (reviewed by this provider): Copy in Chart  INR (12/9/24) 2.4  BMP, CBC (12/5/24)  Na 141 K 4.1 BUN 20 Creat 1.01  Hgb 11.7 WBC 5.6 Plt 203    Physical Exam    Vitals reviewed in SNF EMR    Physical Exam  Vitals and nursing note reviewed.   Constitutional:       General: He is awake. He is not in acute distress.     Appearance: He is well-groomed. He is not ill-appearing, toxic-appearing or diaphoretic.   HENT:      Head: Normocephalic and atraumatic.      Mouth/Throat:      Mouth: Mucous membranes are moist.   Eyes:      General:         Right eye: No discharge.         Left eye: No discharge.   Pulmonary:      Effort: Pulmonary effort is normal. No respiratory distress.   Musculoskeletal:      Left knee: Swelling present. No erythema or ecchymosis.      Left lower leg: Edema present.      Comments: Left knee incision well healing without erythema or drainage; improvement in edema noted   Skin:     Coloration: Skin is not jaundiced or pale.   Neurological:      Mental Status: He is alert. Mental status is at baseline.   Psychiatric:         Attention and Perception: Attention and perception normal.         Behavior: Behavior is cooperative.       Assessment/Plan:  79 year old male with:    Primary osteoarthritis of left knee  S/p left total knee replacement on  11/13/24  Follow up with orthopedics as scheduled  Continue multimodal pain regimen:  -Acetaminophen 975mg TID  -Methocarbamol 500mg TID- consider change to PRN next week as pain symptoms improve  -Hydromorphone 2mg Q6h PRN for severe pain- GDR as able as pain improves  -Gabapentin 100mg Q12h, consider uptitration if pain not well controlled  -Ice x15 minutes TID + TID PRN for pain  Continue coumadin for DVT prophylaxis    Chronic atrial flutter (HCC)  INR therapeutic at 2.4 on 12/9- repeat INR scheduled for 12/12  Lovenox bridge for INR <1.8 with goal INR 2-3  Continue metoprolol for rate control    Type 2 diabetes mellitus (HCC)    Lab Results   Component Value Date    HGBA1C 7.2 (H) 10/07/2024   Continue metformin 500mg daily  Monitor accuchecks fasting + QHS- ranging 141-188 over past 72h    Stage 3a chronic kidney disease (HCC)  Baseline creatinine 1-1.3  BMP 12/5 reviewed and at baseline    Ambulatory dysfunction  Continue SNF rehab  Continue therapy services  Supportive care, nutritional support, ADL support  Fall precautions      Siria Harris,   12/10/24

## 2024-12-12 ENCOUNTER — NURSING HOME VISIT (OUTPATIENT)
Dept: GERIATRICS | Facility: OTHER | Age: 79
End: 2024-12-12
Payer: MEDICARE

## 2024-12-12 DIAGNOSIS — Z96.652 STATUS POST TOTAL LEFT KNEE REPLACEMENT USING CEMENT: Primary | ICD-10-CM

## 2024-12-12 DIAGNOSIS — E11.22 TYPE 2 DIABETES MELLITUS WITH STAGE 3A CHRONIC KIDNEY DISEASE, WITHOUT LONG-TERM CURRENT USE OF INSULIN (HCC): Chronic | ICD-10-CM

## 2024-12-12 DIAGNOSIS — R26.2 AMBULATORY DYSFUNCTION: ICD-10-CM

## 2024-12-12 DIAGNOSIS — N18.31 TYPE 2 DIABETES MELLITUS WITH STAGE 3A CHRONIC KIDNEY DISEASE, WITHOUT LONG-TERM CURRENT USE OF INSULIN (HCC): Chronic | ICD-10-CM

## 2024-12-12 DIAGNOSIS — I48.92 CHRONIC ATRIAL FLUTTER (HCC): ICD-10-CM

## 2024-12-12 PROCEDURE — 99309 SBSQ NF CARE MODERATE MDM 30: CPT | Performed by: NURSE PRACTITIONER
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
## 2024-12-14 PROBLEM — D69.6 PLATELETS DECREASED (HCC): Status: RESOLVED | Noted: 2023-11-13 | Resolved: 2024-12-14

## 2024-12-14 PROBLEM — F11.20 CONTINUOUS OPIOID DEPENDENCE (HCC): Status: RESOLVED | Noted: 2022-10-24 | Resolved: 2024-12-14

## 2024-12-14 NOTE — ASSESSMENT & PLAN NOTE
Lab Results   Component Value Date    HGBA1C 7.2 (H) 10/07/2024   Continue metformin 500mg daily  Monitor accuchecks fasting + QHS- ranging 141-188 over past 72h

## 2024-12-14 NOTE — ASSESSMENT & PLAN NOTE
Continue SNF rehab  Continue therapy services  Supportive care, nutritional support, ADL support  Fall precautions

## 2024-12-14 NOTE — ASSESSMENT & PLAN NOTE
S/p left total knee replacement on 11/13/24  Follow up with orthopedics as scheduled  Continue multimodal pain regimen:  -Acetaminophen 975mg TID  -Methocarbamol 500mg TID- consider change to PRN next week as pain symptoms improve  -Hydromorphone 2mg Q6h PRN for severe pain- GDR as able as pain improves  -Gabapentin 100mg Q12h, consider uptitration if pain not well controlled  -Ice x15 minutes TID + TID PRN for pain  Continue coumadin for DVT prophylaxis

## 2024-12-14 NOTE — ASSESSMENT & PLAN NOTE
INR therapeutic at 2.4 on 12/9- repeat INR scheduled for 12/12  Lovenox bridge for INR <1.8 with goal INR 2-3  Continue metoprolol for rate control

## 2024-12-16 ENCOUNTER — TELEPHONE (OUTPATIENT)
Dept: LAB | Facility: HOSPITAL | Age: 79
End: 2024-12-16

## 2024-12-16 ENCOUNTER — NURSING HOME VISIT (OUTPATIENT)
Dept: GERIATRICS | Facility: OTHER | Age: 79
End: 2024-12-16
Payer: MEDICARE

## 2024-12-16 DIAGNOSIS — I48.92 CHRONIC ATRIAL FLUTTER (HCC): ICD-10-CM

## 2024-12-16 DIAGNOSIS — R26.2 AMBULATORY DYSFUNCTION: ICD-10-CM

## 2024-12-16 DIAGNOSIS — R33.8 BENIGN PROSTATIC HYPERPLASIA WITH URINARY RETENTION: ICD-10-CM

## 2024-12-16 DIAGNOSIS — N18.31 STAGE 3A CHRONIC KIDNEY DISEASE (HCC): ICD-10-CM

## 2024-12-16 DIAGNOSIS — E11.22 TYPE 2 DIABETES MELLITUS WITH STAGE 3A CHRONIC KIDNEY DISEASE, WITHOUT LONG-TERM CURRENT USE OF INSULIN (HCC): Chronic | ICD-10-CM

## 2024-12-16 DIAGNOSIS — Z96.652 STATUS POST TOTAL LEFT KNEE REPLACEMENT USING CEMENT: ICD-10-CM

## 2024-12-16 DIAGNOSIS — N18.31 TYPE 2 DIABETES MELLITUS WITH STAGE 3A CHRONIC KIDNEY DISEASE, WITHOUT LONG-TERM CURRENT USE OF INSULIN (HCC): Chronic | ICD-10-CM

## 2024-12-16 DIAGNOSIS — R52 ACUTE PAIN: ICD-10-CM

## 2024-12-16 DIAGNOSIS — D62 ANEMIA DUE TO ACUTE BLOOD LOSS: ICD-10-CM

## 2024-12-16 DIAGNOSIS — M17.12 PRIMARY OSTEOARTHRITIS OF LEFT KNEE: Primary | ICD-10-CM

## 2024-12-16 DIAGNOSIS — N40.1 BENIGN PROSTATIC HYPERPLASIA WITH URINARY RETENTION: ICD-10-CM

## 2024-12-16 PROCEDURE — 99316 NF DSCHRG MGMT 30 MIN+: CPT | Performed by: NURSE PRACTITIONER
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
## 2024-12-17 NOTE — TELEPHONE ENCOUNTER
12/17 - caller stated that pt did not need bloodwork, still in [facility], no appt made at this time

## 2024-12-18 RX ORDER — WARFARIN SODIUM 5 MG/1
TABLET ORAL
Start: 2024-12-18

## 2024-12-18 RX ORDER — METHOCARBAMOL 500 MG/1
500 TABLET, FILM COATED ORAL EVERY 8 HOURS PRN
Start: 2024-12-18

## 2024-12-18 NOTE — ASSESSMENT & PLAN NOTE
Lab Results   Component Value Date    EGFR 65 11/28/2024    EGFR 67 11/19/2024    EGFR 65 11/18/2024    CREATININE 1.07 11/28/2024    CREATININE 1.04 11/19/2024    CREATININE 1.07 11/18/2024   Baseline creatinine 1.3  Cr on 12/12/24 stable at 0.93  Adequate hydration encouraged  Avoid nephrotoxins as possible

## 2024-12-18 NOTE — ASSESSMENT & PLAN NOTE
Lab Results   Component Value Date    HGBA1C 7.2 (H) 10/07/2024   A1C controlled given age and co-morbidities, goal <8-8.5.  Continue metformin 500 mg po daily  Continue to monitor accu-checks. Patient ranging 131-201 over the past 2 days.  Continue lifestyle modifications

## 2024-12-18 NOTE — ASSESSMENT & PLAN NOTE
Lab Results   Component Value Date    HGBA1C 7.2 (H) 10/07/2024   A1C controlled given age and co-morbidities, goal <8-8.5.  Continue metformin 500 mg po daily  Continue to monitor accu-checks. Patient averaging mid 100s to high 100s during stay in SNF rehab.  Continue lifestyle modifications

## 2024-12-18 NOTE — ASSESSMENT & PLAN NOTE
Symptoms of urinary retention during [facility]C stay that was exacerbated by constipation.  No reported issues during SNF stay  Continue senna-s twice daily and prn miralax  Continue tamsulosin.

## 2024-12-18 NOTE — ASSESSMENT & PLAN NOTE
Component of mild acute blood loss anemia in the setting of recent orthopedic surgery.  Most recent Hgb improved to 11.8 on 12/12/2024  Follow-up with PCP for continued monitoring

## 2024-12-18 NOTE — ASSESSMENT & PLAN NOTE
Pain improved  Patient no longer taking dilaudid.  Continue gabapentin and scheduled tylenol outpatient  Recommend taking robaxin prn muscle spasms  Continue ice as needed.

## 2024-12-18 NOTE — ASSESSMENT & PLAN NOTE
S/P left total knee replacement on 11/13/24  Continue pain management with scheduled tylenol and gabapentin.  Robaxin as needed on discharge  Continue Coumadin for anticoagulation, patient therapeutic, INR 2.3. Lovenox restarted due to INR of 1.5 on 12/13/24  Will discontinue lovenox today due to therapeutic INR  Repeat level on Wed 12/18 ordered.   Continue Coumadin 5 mg po Mon, Tues, Wed, Fri, Sat and  7.5 mg po Thurs and Sunday.   Continue PT/OT services with [facility] home Rehab services.  Follow-up with Ortho as scheduled.

## 2024-12-18 NOTE — ASSESSMENT & PLAN NOTE
In the setting of above  Continue SNF rehab services  Continue ADL,nutritional and psychosocial support  Maintain home fall precautions.

## 2024-12-18 NOTE — ASSESSMENT & PLAN NOTE
S/p LTKR 11/13  Continue PT/OT services in SNF rehab  Continue anticoagulation with Coumadin. Level currently therapeutic at 2.6. Discontinue Lovenox injections.  Continue multimodal pain medication regime  Follow-up with Ortho as scheduled

## 2024-12-18 NOTE — ASSESSMENT & PLAN NOTE
In the setting of above  Continue SNF rehab services  Continue ADL, nutritional and psychosocial support  Maintain fall precautions.

## 2024-12-18 NOTE — ASSESSMENT & PLAN NOTE
INR therapeutic today 12/06 at 2.6  Lovenox bridge discontinued  Continue Coumadin for anticoagulation and metoprolol for rate control.

## 2024-12-19 ENCOUNTER — PATIENT OUTREACH (OUTPATIENT)
Dept: CASE MANAGEMENT | Facility: OTHER | Age: 79
End: 2024-12-19

## 2024-12-20 ENCOUNTER — TRANSITIONAL CARE MANAGEMENT (OUTPATIENT)
Dept: FAMILY MEDICINE CLINIC | Facility: CLINIC | Age: 79
End: 2024-12-20

## 2024-12-22 NOTE — PHYSICAL THERAPY NOTE
PT [facility] DISCHARGE SUMMARY    Isauro Sow presented to the [facility] following hospitalization for L TKA  resulting in significant decline in functional mobility.  Physical therapy plan of care focused on bed mobility, transfer training, gait training, stair navigation, standing balance training , neuromuscular re education , LE strengthening , activities to improve endurance/activity tolerance, and pain management . Patient made fair  progress during their stay. Goals were not met  and patient transition to subacute rehabilitation facility to further work on his goals..

## 2024-12-23 ENCOUNTER — PATIENT OUTREACH (OUTPATIENT)
Dept: FAMILY MEDICINE CLINIC | Facility: CLINIC | Age: 79
End: 2024-12-23

## 2024-12-23 DIAGNOSIS — Z71.89 COMPLEX CARE COORDINATION: Primary | ICD-10-CM

## 2024-12-23 NOTE — PROGRESS NOTES
Chart review complete the patient discharged 12/16/2024 to Home. I have removed myself off of the care team and sent an inbasket to the appropriate care  to notifying them of the SNF discharge and LB/SNF Pathway. Ambulatory referral placed for complex care management.

## 2024-12-24 ENCOUNTER — PATIENT OUTREACH (OUTPATIENT)
Dept: CASE MANAGEMENT | Facility: OTHER | Age: 79
End: 2024-12-24

## 2024-12-24 NOTE — PROGRESS NOTES
Arley returned my phone call reports his dad is doing well. Working with [facility] for PT three times a week.  To see PCP on 1/7/25.  No questions or concerns about medications or discharge instructions   Follows low sugar low carb heart healthy diet.   Declines further outreach at this time Feels they are managing his health well.

## 2024-12-30 ENCOUNTER — TELEPHONE (OUTPATIENT)
Age: 79
End: 2024-12-30

## 2024-12-30 ENCOUNTER — TELEPHONE (OUTPATIENT)
Dept: CARDIOLOGY CLINIC | Facility: CLINIC | Age: 79
End: 2024-12-30

## 2024-12-30 NOTE — TELEPHONE ENCOUNTER
Patient's Daughter in law, Edwige, calling regarding questions about Coumadin blood draw. Warm transferred to [facility] at Coumadin clinic.

## 2024-12-31 ENCOUNTER — ANTICOAG VISIT (OUTPATIENT)
Dept: CARDIOLOGY CLINIC | Facility: CLINIC | Age: 79
End: 2024-12-31

## 2024-12-31 ENCOUNTER — APPOINTMENT (OUTPATIENT)
Dept: LAB | Facility: HOSPITAL | Age: 79
End: 2024-12-31
Attending: INTERNAL MEDICINE
Payer: MEDICARE

## 2024-12-31 ENCOUNTER — TELEPHONE (OUTPATIENT)
Dept: CARDIOLOGY CLINIC | Facility: CLINIC | Age: 79
End: 2024-12-31

## 2024-12-31 DIAGNOSIS — I48.92 ATRIAL FLUTTER, UNSPECIFIED TYPE (HCC): ICD-10-CM

## 2024-12-31 LAB
INR PPP: 3.49 (ref 0.85–1.19)
PROTHROMBIN TIME: 34.5 SECONDS (ref 12.3–15)

## 2024-12-31 PROCEDURE — 36415 COLL VENOUS BLD VENIPUNCTURE: CPT

## 2024-12-31 PROCEDURE — 85610 PROTHROMBIN TIME: CPT

## 2025-01-07 ENCOUNTER — OFFICE VISIT (OUTPATIENT)
Dept: FAMILY MEDICINE CLINIC | Facility: CLINIC | Age: 80
End: 2025-01-07
Payer: MEDICARE

## 2025-01-07 ENCOUNTER — IN-CLINIC DEVICE VISIT (OUTPATIENT)
Dept: CARDIOLOGY CLINIC | Facility: CLINIC | Age: 80
End: 2025-01-07
Payer: MEDICARE

## 2025-01-07 ENCOUNTER — OFFICE VISIT (OUTPATIENT)
Age: 80
End: 2025-01-07

## 2025-01-07 VITALS
HEIGHT: 71 IN | OXYGEN SATURATION: 97 % | BODY MASS INDEX: 28.61 KG/M2 | TEMPERATURE: 97.6 F | HEART RATE: 85 BPM | WEIGHT: 204.38 LBS | DIASTOLIC BLOOD PRESSURE: 60 MMHG | SYSTOLIC BLOOD PRESSURE: 110 MMHG

## 2025-01-07 VITALS — WEIGHT: 213 LBS | HEIGHT: 71 IN | BODY MASS INDEX: 29.82 KG/M2

## 2025-01-07 DIAGNOSIS — I10 BENIGN ESSENTIAL HYPERTENSION: Chronic | ICD-10-CM

## 2025-01-07 DIAGNOSIS — M25.562 LEFT KNEE PAIN, UNSPECIFIED CHRONICITY: ICD-10-CM

## 2025-01-07 DIAGNOSIS — I69.954 HEMIPLEGIA AND HEMIPARESIS FOLLOWING UNSPECIFIED CEREBROVASCULAR DISEASE AFFECTING LEFT NON-DOMINANT SIDE (HCC): ICD-10-CM

## 2025-01-07 DIAGNOSIS — I48.92 CHRONIC ATRIAL FLUTTER (HCC): ICD-10-CM

## 2025-01-07 DIAGNOSIS — Z96.652 STATUS POST TOTAL LEFT KNEE REPLACEMENT USING CEMENT: ICD-10-CM

## 2025-01-07 DIAGNOSIS — I73.9 PERIPHERAL VASCULAR DISEASE, UNSPECIFIED (HCC): ICD-10-CM

## 2025-01-07 DIAGNOSIS — Z95.0 PRESENCE OF CARDIAC PACEMAKER: ICD-10-CM

## 2025-01-07 DIAGNOSIS — N18.31 CHRONIC KIDNEY DISEASE, STAGE 3A (HCC): ICD-10-CM

## 2025-01-07 DIAGNOSIS — I50.32 CHRONIC DIASTOLIC (CONGESTIVE) HEART FAILURE (HCC): ICD-10-CM

## 2025-01-07 DIAGNOSIS — N18.30 TYPE 2 DIABETES MELLITUS WITH STAGE 3 CHRONIC KIDNEY DISEASE, WITHOUT LONG-TERM CURRENT USE OF INSULIN, UNSPECIFIED WHETHER STAGE 3A OR 3B CKD (HCC): ICD-10-CM

## 2025-01-07 DIAGNOSIS — D64.9 ANEMIA, UNSPECIFIED TYPE: Primary | ICD-10-CM

## 2025-01-07 DIAGNOSIS — Z96.652 STATUS POST TOTAL LEFT KNEE REPLACEMENT USING CEMENT: Primary | ICD-10-CM

## 2025-01-07 DIAGNOSIS — E11.22 TYPE 2 DIABETES MELLITUS WITH STAGE 3 CHRONIC KIDNEY DISEASE, WITHOUT LONG-TERM CURRENT USE OF INSULIN, UNSPECIFIED WHETHER STAGE 3A OR 3B CKD (HCC): ICD-10-CM

## 2025-01-07 DIAGNOSIS — I49.5 SICK SINUS SYNDROME (HCC): Primary | Chronic | ICD-10-CM

## 2025-01-07 PROCEDURE — 99024 POSTOP FOLLOW-UP VISIT: CPT | Performed by: STUDENT IN AN ORGANIZED HEALTH CARE EDUCATION/TRAINING PROGRAM

## 2025-01-07 PROCEDURE — 99495 TRANSJ CARE MGMT MOD F2F 14D: CPT | Performed by: FAMILY MEDICINE

## 2025-01-07 PROCEDURE — 93280 PM DEVICE PROGR EVAL DUAL: CPT | Performed by: INTERNAL MEDICINE

## 2025-01-07 NOTE — PROGRESS NOTES
Subjective: Patient seen and examined. Pain control is improving. Incision is well-healed.  He presents today ambulating with a rolling walker.  He is getting physical therapy at [facility] after [facility] and they feel that he is improving. Denies fevers or chills. He is struggling with getting his knee in full extension.    Physical Exam:  Incision: CDI, no erythema or drainage noted  ROM: 7-120* passive, * active  (pre-op 5-115)  5/5 IP/Q/HS/TA/GS, 2+ DP/PT, SILT DP/SP/S/S/TN      Assessment/Plan:  80 y/o male almost 8 weeks s/p Left TKA from 11/13/24.     - continue multi-modal pain control   - Weight bearing status: WBAT LLE  - DVT ppx: Coumadin  - Incision care: No restrictions  - PT/OT  - May transition to cane at discretion of patient and physical therapy  - Continue ice to the knee  - Reviewed extension exercises, pillow under achilles and not under knee to work on his extension. Counseled on the importance of extension exercises to improve ambulation.  Extensionator ordered today to help improve his extension.  - F/U 6 weeks      Scribe Attestation      I,:  Pau Julian PA-C am acting as a scribe while in the presence of the attending physician.:       I,:  Dre Braxton DO personally performed the services described in this documentation    as scribed in my presence.:

## 2025-01-07 NOTE — ASSESSMENT & PLAN NOTE
Anemia.  Patient was slightly anemic post surgery after left knee replacement.  He will repeat CBC at this time to reevaluate.

## 2025-01-07 NOTE — ASSESSMENT & PLAN NOTE
Left knee replacement surgery.  Patient progressing as expected and will continue with physical therapy as well as follow-ups to his orthopedist as recommended

## 2025-01-07 NOTE — PROGRESS NOTES
Results for orders placed or performed in visit on 01/07/25   Cardiac EP device report    Narrative    MDT DUAL CHAMBER PPM (MVP ON) - ACTIVE SYSTEM IS MRI CONDITIONAL  DEVICE INTERROGATED IN THE [place] OFFICE.  BATTERY VOLTAGE ADEQUATE (9.8 YRS).  AP 84.3%    90% (>40% SSS/MVP ON).  ALL LEAD PARAMETERS WITHIN NORMAL LIMITS.  1 VT NS EPISODE, EGM SHOWS NSVT, 10 BEATS @ 214 BPM. 2 NEW AT/AF EPISODES,  EGM'S SHOW AF, LONGEST EPISODE 3 HRS 33 MINS.   PT TAKES WARFARIN, METOPROLOL SUCC.   EF 55% (ECHO 8/30/2024).  TIME IN AT/AF <0.1%.   NO PROGRAMMING CHANGES MADE TO DEVICE PARAMETERS.  NORMAL DEVICE FUNCTION.  PAS

## 2025-01-07 NOTE — ASSESSMENT & PLAN NOTE
Diabetes.  Patient will check A1c blood work and continue with current regimen of medications  Lab Results   Component Value Date    HGBA1C 7.2 (H) 10/07/2024
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
## 2025-01-14 ENCOUNTER — APPOINTMENT (OUTPATIENT)
Dept: LAB | Facility: HOSPITAL | Age: 80
End: 2025-01-14
Attending: INTERNAL MEDICINE
Payer: MEDICARE

## 2025-01-14 ENCOUNTER — TELEPHONE (OUTPATIENT)
Dept: OBGYN CLINIC | Facility: HOSPITAL | Age: 80
End: 2025-01-14

## 2025-01-14 ENCOUNTER — RESULTS FOLLOW-UP (OUTPATIENT)
Dept: CARDIOLOGY CLINIC | Facility: CLINIC | Age: 80
End: 2025-01-14

## 2025-01-14 DIAGNOSIS — I48.92 CHRONIC ATRIAL FLUTTER (HCC): ICD-10-CM

## 2025-01-14 DIAGNOSIS — D64.9 ANEMIA, UNSPECIFIED TYPE: ICD-10-CM

## 2025-01-14 DIAGNOSIS — E11.22 TYPE 2 DIABETES MELLITUS WITH STAGE 3 CHRONIC KIDNEY DISEASE, WITHOUT LONG-TERM CURRENT USE OF INSULIN, UNSPECIFIED WHETHER STAGE 3A OR 3B CKD (HCC): ICD-10-CM

## 2025-01-14 DIAGNOSIS — N18.30 TYPE 2 DIABETES MELLITUS WITH STAGE 3 CHRONIC KIDNEY DISEASE, WITHOUT LONG-TERM CURRENT USE OF INSULIN, UNSPECIFIED WHETHER STAGE 3A OR 3B CKD (HCC): ICD-10-CM

## 2025-01-14 DIAGNOSIS — I48.92 ATRIAL FLUTTER, UNSPECIFIED TYPE (HCC): ICD-10-CM

## 2025-01-14 LAB
ALBUMIN SERPL BCG-MCNC: 4.1 G/DL (ref 3.5–5)
ALP SERPL-CCNC: 54 U/L (ref 34–104)
ALT SERPL W P-5'-P-CCNC: 13 U/L (ref 7–52)
ANION GAP SERPL CALCULATED.3IONS-SCNC: 6 MMOL/L (ref 4–13)
AST SERPL W P-5'-P-CCNC: 14 U/L (ref 13–39)
BILIRUB SERPL-MCNC: 0.69 MG/DL (ref 0.2–1)
BUN SERPL-MCNC: 13 MG/DL (ref 5–25)
CALCIUM SERPL-MCNC: 10 MG/DL (ref 8.4–10.2)
CHLORIDE SERPL-SCNC: 102 MMOL/L (ref 96–108)
CO2 SERPL-SCNC: 33 MMOL/L (ref 21–32)
CREAT SERPL-MCNC: 0.99 MG/DL (ref 0.6–1.3)
ERYTHROCYTE [DISTWIDTH] IN BLOOD BY AUTOMATED COUNT: 13.5 % (ref 11.6–15.1)
EST. AVERAGE GLUCOSE BLD GHB EST-MCNC: 154 MG/DL
GFR SERPL CREATININE-BSD FRML MDRD: 72 ML/MIN/1.73SQ M
GLUCOSE P FAST SERPL-MCNC: 145 MG/DL (ref 65–99)
HBA1C MFR BLD: 7 %
HCT VFR BLD AUTO: 48.1 % (ref 36.5–49.3)
HGB BLD-MCNC: 14.9 G/DL (ref 12–17)
INR PPP: 1.7 (ref 0.85–1.19)
MCH RBC QN AUTO: 27 PG (ref 26.8–34.3)
MCHC RBC AUTO-ENTMCNC: 31 G/DL (ref 31.4–37.4)
MCV RBC AUTO: 87 FL (ref 82–98)
PLATELET # BLD AUTO: 185 THOUSANDS/UL (ref 149–390)
PMV BLD AUTO: 10.9 FL (ref 8.9–12.7)
POTASSIUM SERPL-SCNC: 4.1 MMOL/L (ref 3.5–5.3)
PROT SERPL-MCNC: 7.6 G/DL (ref 6.4–8.4)
PROTHROMBIN TIME: 20.7 SECONDS (ref 12.3–15)
RBC # BLD AUTO: 5.51 MILLION/UL (ref 3.88–5.62)
SODIUM SERPL-SCNC: 141 MMOL/L (ref 135–147)
WBC # BLD AUTO: 7.52 THOUSAND/UL (ref 4.31–10.16)

## 2025-01-14 PROCEDURE — 83036 HEMOGLOBIN GLYCOSYLATED A1C: CPT

## 2025-01-14 PROCEDURE — 36415 COLL VENOUS BLD VENIPUNCTURE: CPT

## 2025-01-14 PROCEDURE — 80053 COMPREHEN METABOLIC PANEL: CPT

## 2025-01-14 PROCEDURE — 85610 PROTHROMBIN TIME: CPT

## 2025-01-14 PROCEDURE — 85027 COMPLETE CBC AUTOMATED: CPT

## 2025-01-14 NOTE — TELEPHONE ENCOUNTER
Rep for the company's name is Stuart Weichintandelvis, phone number is [phone].  They can try to reach out to him to see if he has a time/date for when he will be coming.  Thank you!

## 2025-01-14 NOTE — TELEPHONE ENCOUNTER
"Caller: Arley (Son)    Doctor: Dr. Braxton    Reason for call: Calling because patient was order a special knee brace for after surgery per order it stated \"Extensionator\". Son has not hear anything from the company this is being ordered from, no updates, and has not received the brace.    Please call.     Call back#: [phone]    "

## 2025-01-16 ENCOUNTER — ANTICOAG VISIT (OUTPATIENT)
Dept: CARDIOLOGY CLINIC | Facility: CLINIC | Age: 80
End: 2025-01-16

## 2025-01-22 NOTE — TELEPHONE ENCOUNTER
Caller: Stuart from [facility]     Doctor: Fox     Reason for call: asked for Op report, and office note, office note was sent  Fax- [phone]

## 2025-01-28 ENCOUNTER — TELEPHONE (OUTPATIENT)
Age: 80
End: 2025-01-28

## 2025-01-28 NOTE — TELEPHONE ENCOUNTER
Caller: Isauro    Doctor: Fox    Reason for call: requesting transportation to appointment on 2/18/2025    Sent email         Call back#: [phone]

## 2025-01-31 ENCOUNTER — TELEPHONE (OUTPATIENT)
Dept: OBGYN CLINIC | Facility: CLINIC | Age: 80
End: 2025-01-31

## 2025-01-31 NOTE — TELEPHONE ENCOUNTER
Received in coming fax from [employer] for Standard Written Order form to be signed and faxed back to [phone]. Faxed information received confirmation.

## 2025-02-04 ENCOUNTER — TELEPHONE (OUTPATIENT)
Dept: LAB | Facility: HOSPITAL | Age: 80
End: 2025-02-04

## 2025-02-11 ENCOUNTER — APPOINTMENT (OUTPATIENT)
Dept: LAB | Facility: HOSPITAL | Age: 80
End: 2025-02-11
Attending: INTERNAL MEDICINE
Payer: MEDICARE

## 2025-02-11 ENCOUNTER — ANTICOAG VISIT (OUTPATIENT)
Dept: CARDIOLOGY CLINIC | Facility: CLINIC | Age: 80
End: 2025-02-11

## 2025-02-11 DIAGNOSIS — I48.92 ATRIAL FLUTTER, UNSPECIFIED TYPE (HCC): ICD-10-CM

## 2025-02-11 LAB
INR PPP: 2.36 (ref 0.85–1.19)
PROTHROMBIN TIME: 25.7 SECONDS (ref 12.3–15)

## 2025-02-11 PROCEDURE — 85610 PROTHROMBIN TIME: CPT

## 2025-02-11 PROCEDURE — 36415 COLL VENOUS BLD VENIPUNCTURE: CPT

## 2025-02-12 ENCOUNTER — TELEPHONE (OUTPATIENT)
Dept: CARDIOLOGY CLINIC | Facility: CLINIC | Age: 80
End: 2025-02-12

## 2025-02-17 ENCOUNTER — OFFICE VISIT (OUTPATIENT)
Dept: CARDIOLOGY CLINIC | Facility: CLINIC | Age: 80
End: 2025-02-17
Payer: MEDICARE

## 2025-02-17 VITALS
HEART RATE: 66 BPM | HEIGHT: 71 IN | OXYGEN SATURATION: 98 % | DIASTOLIC BLOOD PRESSURE: 68 MMHG | BODY MASS INDEX: 28.56 KG/M2 | WEIGHT: 204 LBS | SYSTOLIC BLOOD PRESSURE: 132 MMHG

## 2025-02-17 DIAGNOSIS — I47.29 NSVT (NONSUSTAINED VENTRICULAR TACHYCARDIA) (HCC): ICD-10-CM

## 2025-02-17 DIAGNOSIS — I49.5 SICK SINUS SYNDROME (HCC): ICD-10-CM

## 2025-02-17 DIAGNOSIS — I25.10 CORONARY ARTERY DISEASE INVOLVING NATIVE CORONARY ARTERY OF NATIVE HEART WITHOUT ANGINA PECTORIS: ICD-10-CM

## 2025-02-17 DIAGNOSIS — E78.2 MIXED HYPERLIPIDEMIA: ICD-10-CM

## 2025-02-17 DIAGNOSIS — Z95.2 S/P TAVR (TRANSCATHETER AORTIC VALVE REPLACEMENT): ICD-10-CM

## 2025-02-17 DIAGNOSIS — Z95.5 S/P DRUG ELUTING CORONARY STENT PLACEMENT: ICD-10-CM

## 2025-02-17 DIAGNOSIS — Z95.0 PRESENCE OF CARDIAC PACEMAKER: ICD-10-CM

## 2025-02-17 DIAGNOSIS — I35.0 SEVERE AORTIC STENOSIS: Primary | ICD-10-CM

## 2025-02-17 DIAGNOSIS — M17.12 PRIMARY OSTEOARTHRITIS OF LEFT KNEE: ICD-10-CM

## 2025-02-17 DIAGNOSIS — I10 BENIGN ESSENTIAL HYPERTENSION: ICD-10-CM

## 2025-02-17 DIAGNOSIS — I48.92 ATRIAL FLUTTER, UNSPECIFIED TYPE (HCC): ICD-10-CM

## 2025-02-17 PROCEDURE — G2211 COMPLEX E/M VISIT ADD ON: HCPCS | Performed by: INTERNAL MEDICINE

## 2025-02-17 PROCEDURE — 99214 OFFICE O/P EST MOD 30 MIN: CPT | Performed by: INTERNAL MEDICINE

## 2025-02-17 RX ORDER — ASPIRIN 81 MG/1
81 TABLET, CHEWABLE ORAL DAILY
Start: 2025-02-17

## 2025-02-17 RX ORDER — METOPROLOL SUCCINATE 50 MG/1
50 TABLET, EXTENDED RELEASE ORAL
Qty: 30 TABLET | Refills: 11 | Status: SHIPPED | OUTPATIENT
Start: 2025-02-17 | End: 2025-02-17 | Stop reason: SDUPTHER

## 2025-02-17 RX ORDER — METOPROLOL SUCCINATE 50 MG/1
50 TABLET, EXTENDED RELEASE ORAL
Qty: 30 TABLET | Refills: 11 | Status: SHIPPED | OUTPATIENT
Start: 2025-02-17

## 2025-02-17 NOTE — PATIENT INSTRUCTIONS
You were seen today in the Cardiology office for follow up evaluation.     Below is a summary of the recommendations based upon today's visit:    1. Dietary modifications - Follow a Mediterranean diet - one that is low in saturated fats (avoid red meat, dairy, cheeses), emphasize chicken, fish, turkey, tofu, vegetables, fruit (moderate quantities); also avoid simple carbs/starch/sugars, use whole wheat/grain/bran/oatmeal, snack on small quantities of nuts and fruits.     2. Exercise is very important. Ideally, AT LEAST four to five times weekly for 30 to 60 minutes per session - both cardiovascular and resistance work is OK. Listen to your body and rest when needed.    3. Continue all present medications, with the following changes as noted below:  Increase Metoprolol to 50mg once daily.     4. Testing prior to your next visit includes: Echocardiogram.    5. Remember the ABCDEs to cardiovascular health for patients:  A: Awareness of cardiovascular symptoms  B: Blood pressure control  C: Cholesterol control  C: Cigarette avoidance  D: Diabetes control  D: Dietary choices  E: Exercise    6. Return in 12 months     Any testing ordered in office today will be available for patient review via K[facility], and reviewed with me at the follow-up office visit as scheduled.    Thank you for choosing [facility].    Please call our office or use K[facility] with any questions.
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
## 2025-02-18 ENCOUNTER — OFFICE VISIT (OUTPATIENT)
Age: 80
End: 2025-02-18
Payer: MEDICARE

## 2025-02-18 VITALS — WEIGHT: 204 LBS | BODY MASS INDEX: 28.56 KG/M2 | HEIGHT: 71 IN

## 2025-02-18 DIAGNOSIS — Z96.652 STATUS POST TOTAL LEFT KNEE REPLACEMENT USING CEMENT: Primary | ICD-10-CM

## 2025-02-18 PROCEDURE — 99213 OFFICE O/P EST LOW 20 MIN: CPT | Performed by: STUDENT IN AN ORGANIZED HEALTH CARE EDUCATION/TRAINING PROGRAM

## 2025-02-18 NOTE — ASSESSMENT & PLAN NOTE
Findings today are consistent with 3 months s/p left total knee arthroplasty performed on 11/13/2024. He was encouraged to continue his physical therapy exercises and the extensionator as he is continuing to improve extension. He may continue activities as tolerated. Follow up in 3 months for re-check.
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
## 2025-02-27 ENCOUNTER — TELEPHONE (OUTPATIENT)
Age: 80
End: 2025-02-27

## 2025-02-27 NOTE — TELEPHONE ENCOUNTER
Caller: Chris    Doctor/Office: Dr Braxton    CB#: [phone]      What needs to be faxed: OVN from 1/7/25 regarding extensionator    ATTN to: Stuart    Fax#:       Documents were successfully e-faxed

## 2025-02-28 ENCOUNTER — OFFICE VISIT (OUTPATIENT)
Dept: FAMILY MEDICINE CLINIC | Facility: CLINIC | Age: 80
End: 2025-02-28
Payer: MEDICARE

## 2025-02-28 VITALS
DIASTOLIC BLOOD PRESSURE: 80 MMHG | WEIGHT: 206 LBS | BODY MASS INDEX: 28.73 KG/M2 | SYSTOLIC BLOOD PRESSURE: 142 MMHG | TEMPERATURE: 98 F | OXYGEN SATURATION: 99 % | HEART RATE: 68 BPM | RESPIRATION RATE: 16 BRPM

## 2025-02-28 DIAGNOSIS — L30.9 DERMATITIS: Primary | ICD-10-CM

## 2025-02-28 PROCEDURE — G2211 COMPLEX E/M VISIT ADD ON: HCPCS | Performed by: FAMILY MEDICINE

## 2025-02-28 PROCEDURE — 99213 OFFICE O/P EST LOW 20 MIN: CPT | Performed by: FAMILY MEDICINE

## 2025-02-28 RX ORDER — SULFAMETHOXAZOLE AND TRIMETHOPRIM 800; 160 MG/1; MG/1
1 TABLET ORAL EVERY 12 HOURS SCHEDULED
Qty: 14 TABLET | Refills: 0 | Status: SHIPPED | OUTPATIENT
Start: 2025-02-28 | End: 2025-03-07

## 2025-02-28 RX ORDER — PREDNISONE 20 MG/1
TABLET ORAL
Qty: 13 TABLET | Refills: 0 | Status: SHIPPED | OUTPATIENT
Start: 2025-02-28

## 2025-02-28 NOTE — ASSESSMENT & PLAN NOTE
Dermatitis.  Patient with dermatitis possibly related to eczema.  He was given prescription for prednisone tapering dose consisting of 60 mg x 2 days, 40 x 2, 20 x 2, 10 x 2 days.  He may use over-the-counter hydrocortisone cream topically twice daily.  Patient given prescription for Bactrim DS to use twice daily for possible early cellulitis.  Patient will call if symptoms persist after medication completed
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
## 2025-03-06 ENCOUNTER — NEW PATIENT (OUTPATIENT)
Dept: URBAN - METROPOLITAN AREA CLINIC 6 | Facility: CLINIC | Age: 80
End: 2025-03-06

## 2025-03-06 DIAGNOSIS — H35.3221: ICD-10-CM

## 2025-03-06 DIAGNOSIS — Z96.1: ICD-10-CM

## 2025-03-06 DIAGNOSIS — E11.9: ICD-10-CM

## 2025-03-06 DIAGNOSIS — Z98.890: ICD-10-CM

## 2025-03-06 DIAGNOSIS — H35.372: ICD-10-CM

## 2025-03-06 DIAGNOSIS — H31.011: ICD-10-CM

## 2025-03-06 DIAGNOSIS — H54.8: ICD-10-CM

## 2025-03-06 PROCEDURE — 92004 COMPRE OPH EXAM NEW PT 1/>: CPT

## 2025-03-06 PROCEDURE — 92134 CPTRZ OPH DX IMG PST SGM RTA: CPT

## 2025-03-06 ASSESSMENT — TONOMETRY
OS_IOP_MMHG: 8
OD_IOP_MMHG: 8

## 2025-03-06 ASSESSMENT — VISUAL ACUITY
OS_SC: 20/300
OD_SC: 20/300-2

## 2025-03-11 ENCOUNTER — TELEPHONE (OUTPATIENT)
Dept: CARDIOLOGY CLINIC | Facility: CLINIC | Age: 80
End: 2025-03-11

## 2025-03-11 ENCOUNTER — APPOINTMENT (OUTPATIENT)
Dept: LAB | Facility: HOSPITAL | Age: 80
End: 2025-03-11
Attending: INTERNAL MEDICINE
Payer: MEDICARE

## 2025-03-11 ENCOUNTER — ANTICOAG VISIT (OUTPATIENT)
Dept: CARDIOLOGY CLINIC | Facility: CLINIC | Age: 80
End: 2025-03-11

## 2025-03-11 DIAGNOSIS — I48.92 ATRIAL FLUTTER, UNSPECIFIED TYPE (HCC): ICD-10-CM

## 2025-03-11 LAB
INR PPP: 2.29 (ref 0.85–1.19)
PROTHROMBIN TIME: 25.9 SECONDS (ref 12.3–15)

## 2025-03-11 PROCEDURE — 85610 PROTHROMBIN TIME: CPT

## 2025-03-11 PROCEDURE — 36415 COLL VENOUS BLD VENIPUNCTURE: CPT

## 2025-03-13 DIAGNOSIS — E11.49 TYPE 2 DIABETES MELLITUS WITH OTHER NEUROLOGIC COMPLICATION, WITHOUT LONG-TERM CURRENT USE OF INSULIN (HCC): ICD-10-CM

## 2025-03-14 RX ORDER — ROSUVASTATIN CALCIUM 5 MG/1
5 TABLET, COATED ORAL DAILY
Qty: 30 TABLET | Refills: 0 | Status: SHIPPED | OUTPATIENT
Start: 2025-03-14

## 2025-03-16 DIAGNOSIS — E11.49 TYPE 2 DIABETES MELLITUS WITH OTHER NEUROLOGIC COMPLICATION, WITHOUT LONG-TERM CURRENT USE OF INSULIN (HCC): ICD-10-CM

## 2025-03-17 ENCOUNTER — HOSPITAL ENCOUNTER (OUTPATIENT)
Dept: NON INVASIVE DIAGNOSTICS | Facility: CLINIC | Age: 80
Discharge: HOME | End: 2025-03-17
Payer: MEDICARE

## 2025-03-17 DIAGNOSIS — I65.23 BILATERAL CAROTID ARTERY STENOSIS: ICD-10-CM

## 2025-03-17 PROCEDURE — 93880 EXTRACRANIAL BILAT STUDY: CPT | Performed by: SURGERY

## 2025-03-17 PROCEDURE — 93880 EXTRACRANIAL BILAT STUDY: CPT

## 2025-03-19 ENCOUNTER — TRANSCRIBE ORDERS (OUTPATIENT)
Dept: VASCULAR SURGERY | Facility: CLINIC | Age: 80
End: 2025-03-19

## 2025-03-19 DIAGNOSIS — I65.23 BILATERAL CAROTID ARTERY STENOSIS: Primary | ICD-10-CM

## 2025-03-21 ENCOUNTER — RESULTS FOLLOW-UP (OUTPATIENT)
Dept: OTHER | Facility: HOSPITAL | Age: 80
End: 2025-03-21

## 2025-03-25 DIAGNOSIS — L30.9 DERMATITIS: Primary | ICD-10-CM

## 2025-03-25 RX ORDER — HYDROXYZINE HYDROCHLORIDE 10 MG/1
10 TABLET, FILM COATED ORAL EVERY 8 HOURS PRN
Qty: 30 TABLET | Refills: 0 | Status: SHIPPED | OUTPATIENT
Start: 2025-03-25

## 2025-03-28 DIAGNOSIS — E11.49 TYPE 2 DIABETES MELLITUS WITH OTHER NEUROLOGIC COMPLICATION, WITHOUT LONG-TERM CURRENT USE OF INSULIN (HCC): ICD-10-CM

## 2025-04-08 ENCOUNTER — ANTICOAG VISIT (OUTPATIENT)
Dept: CARDIOLOGY CLINIC | Facility: CLINIC | Age: 80
End: 2025-04-08

## 2025-04-08 ENCOUNTER — APPOINTMENT (OUTPATIENT)
Dept: LAB | Facility: HOSPITAL | Age: 80
End: 2025-04-08
Payer: MEDICARE

## 2025-04-08 ENCOUNTER — TELEPHONE (OUTPATIENT)
Dept: CARDIOLOGY CLINIC | Facility: CLINIC | Age: 80
End: 2025-04-08

## 2025-04-08 DIAGNOSIS — I48.92 ATRIAL FLUTTER, UNSPECIFIED TYPE (HCC): ICD-10-CM

## 2025-04-08 LAB
INR PPP: 1.75 (ref 0.85–1.19)
PROTHROMBIN TIME: 21.2 SECONDS (ref 12.3–15)

## 2025-04-08 PROCEDURE — 85610 PROTHROMBIN TIME: CPT

## 2025-04-08 PROCEDURE — 36415 COLL VENOUS BLD VENIPUNCTURE: CPT

## 2025-04-09 NOTE — TELEPHONE ENCOUNTER
Caller: Isauro Sow    Doctor: Dr. Browning    Call back #: [phone]    Reason for call: Patient called back Dilia to let her know that April 22nd or April 23rd does not work for him and that he will need to reschedule his INR visit. I spoke to Ronna and she stated that Dilia will call back patient once she returns. I provided patient with the coumadin clinic number and asked him to give us a call back if he doesn't get a call back by noon. Patient understood. Thank you!

## 2025-04-15 DIAGNOSIS — E11.49 TYPE 2 DIABETES MELLITUS WITH OTHER NEUROLOGIC COMPLICATION, WITHOUT LONG-TERM CURRENT USE OF INSULIN (HCC): ICD-10-CM

## 2025-04-17 RX ORDER — ROSUVASTATIN CALCIUM 5 MG/1
5 TABLET, COATED ORAL DAILY
Qty: 30 TABLET | Refills: 5 | Status: SHIPPED | OUTPATIENT
Start: 2025-04-17

## 2025-04-24 ENCOUNTER — REMOTE DEVICE CLINIC VISIT (OUTPATIENT)
Dept: CARDIOLOGY CLINIC | Facility: CLINIC | Age: 80
End: 2025-04-24
Payer: MEDICARE

## 2025-04-24 DIAGNOSIS — Z95.0 CARDIAC PACEMAKER IN SITU: Primary | ICD-10-CM

## 2025-04-24 PROCEDURE — 93296 REM INTERROG EVL PM/IDS: CPT | Performed by: INTERNAL MEDICINE

## 2025-04-24 PROCEDURE — 93294 REM INTERROG EVL PM/LDLS PM: CPT | Performed by: INTERNAL MEDICINE

## 2025-04-24 NOTE — PROGRESS NOTES
Results for orders placed or performed in visit on 04/24/25   Cardiac EP device report    Narrative    MDT DUAL CHAMBER PPM (MVP ON) - ACTIVE SYSTEM IS MRI CONDITIONAL  CARELINK TRANSMISSION: BATTERY VOLTAGE ADEQUATE (9.4 YRS). AP-89%, -99% (>40% MVPR@60PPM). ALL AVAILABLE LEAD PARAMETERS WITHIN NORMAL LIMITS. 1 NSVT EPISODE- 6 BEATS, AVG CL~350MS. EF-55% (ECHO 8/30/24; ECHO SCHEDULED 8/20/25). 1 AF EPISODE, AVG HR- 64 BPM, LASTING 22.3 HRS. AF BURDEN SINCE 1/7/25- 0.9%. PT ON WARFARIN, ASA 81MG & METORPOLOL. PVC SINGLES COUNT SINCE 1/7/25-39.3/H. NORMAL DEVICE FUNCTION. GV

## 2025-04-26 ENCOUNTER — RESULTS FOLLOW-UP (OUTPATIENT)
Dept: NON INVASIVE DIAGNOSTICS | Facility: HOSPITAL | Age: 80
End: 2025-04-26

## 2025-04-29 ENCOUNTER — APPOINTMENT (OUTPATIENT)
Dept: LAB | Facility: HOSPITAL | Age: 80
End: 2025-04-29
Attending: INTERNAL MEDICINE
Payer: MEDICARE

## 2025-04-29 ENCOUNTER — ANTICOAG VISIT (OUTPATIENT)
Dept: CARDIOLOGY CLINIC | Facility: CLINIC | Age: 80
End: 2025-04-29

## 2025-04-29 ENCOUNTER — TELEPHONE (OUTPATIENT)
Dept: CARDIOLOGY CLINIC | Facility: CLINIC | Age: 80
End: 2025-04-29

## 2025-04-29 DIAGNOSIS — I48.92 ATRIAL FLUTTER, UNSPECIFIED TYPE (HCC): ICD-10-CM

## 2025-04-29 LAB
INR PPP: 1.93 (ref 0.85–1.19)
PROTHROMBIN TIME: 22.8 SECONDS (ref 12.3–15)

## 2025-04-29 PROCEDURE — 36415 COLL VENOUS BLD VENIPUNCTURE: CPT

## 2025-04-29 PROCEDURE — 85610 PROTHROMBIN TIME: CPT

## 2025-05-13 ENCOUNTER — ANTICOAG VISIT (OUTPATIENT)
Dept: CARDIOLOGY CLINIC | Facility: CLINIC | Age: 80
End: 2025-05-13

## 2025-05-13 ENCOUNTER — TELEPHONE (OUTPATIENT)
Dept: CARDIOLOGY CLINIC | Facility: CLINIC | Age: 80
End: 2025-05-13

## 2025-05-13 ENCOUNTER — APPOINTMENT (OUTPATIENT)
Dept: LAB | Facility: HOSPITAL | Age: 80
End: 2025-05-13
Attending: INTERNAL MEDICINE
Payer: MEDICARE

## 2025-05-13 DIAGNOSIS — I48.92 ATRIAL FLUTTER, UNSPECIFIED TYPE (HCC): ICD-10-CM

## 2025-05-13 LAB
INR PPP: 2.19 (ref 0.85–1.19)
PROTHROMBIN TIME: 25.1 SECONDS (ref 12.3–15)

## 2025-05-13 PROCEDURE — 85610 PROTHROMBIN TIME: CPT

## 2025-05-13 PROCEDURE — 36415 COLL VENOUS BLD VENIPUNCTURE: CPT

## 2025-05-19 DIAGNOSIS — I48.92 CHRONIC ATRIAL FLUTTER (HCC): ICD-10-CM

## 2025-05-19 RX ORDER — WARFARIN SODIUM 5 MG/1
TABLET ORAL
Qty: 135 TABLET | Refills: 3 | Status: SHIPPED | OUTPATIENT
Start: 2025-05-19 | End: 2025-05-19 | Stop reason: SDUPTHER

## 2025-05-20 ENCOUNTER — OFFICE VISIT (OUTPATIENT)
Age: 80
End: 2025-05-20
Payer: MEDICARE

## 2025-05-20 ENCOUNTER — TELEPHONE (OUTPATIENT)
Age: 80
End: 2025-05-20

## 2025-05-20 VITALS — BODY MASS INDEX: 28.73 KG/M2 | HEIGHT: 71 IN

## 2025-05-20 DIAGNOSIS — Z96.652 STATUS POST TOTAL LEFT KNEE REPLACEMENT USING CEMENT: Primary | ICD-10-CM

## 2025-05-20 PROCEDURE — 99213 OFFICE O/P EST LOW 20 MIN: CPT | Performed by: STUDENT IN AN ORGANIZED HEALTH CARE EDUCATION/TRAINING PROGRAM

## 2025-05-20 RX ORDER — WARFARIN SODIUM 5 MG/1
TABLET ORAL
Qty: 135 TABLET | Refills: 3 | Status: SHIPPED | OUTPATIENT
Start: 2025-05-20
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
## 2025-05-27 ENCOUNTER — APPOINTMENT (OUTPATIENT)
Dept: LAB | Facility: HOSPITAL | Age: 80
End: 2025-05-27
Attending: INTERNAL MEDICINE
Payer: MEDICARE

## 2025-05-27 ENCOUNTER — TELEPHONE (OUTPATIENT)
Age: 80
End: 2025-05-27

## 2025-05-27 ENCOUNTER — TELEPHONE (OUTPATIENT)
Dept: CARDIOLOGY CLINIC | Facility: CLINIC | Age: 80
End: 2025-05-27

## 2025-05-27 ENCOUNTER — ANTICOAG VISIT (OUTPATIENT)
Dept: CARDIOLOGY CLINIC | Facility: CLINIC | Age: 80
End: 2025-05-27

## 2025-05-27 DIAGNOSIS — I48.92 ATRIAL FLUTTER, UNSPECIFIED TYPE (HCC): ICD-10-CM

## 2025-05-27 LAB
INR PPP: 2.01 (ref 0.85–1.19)
PROTHROMBIN TIME: 23.5 SECONDS (ref 12.3–15)

## 2025-05-27 PROCEDURE — 36415 COLL VENOUS BLD VENIPUNCTURE: CPT

## 2025-05-27 PROCEDURE — 85610 PROTHROMBIN TIME: CPT

## 2025-05-27 NOTE — TELEPHONE ENCOUNTER
Caller:  Isauro    Doctor: Lisa    Reason for call:  Patient calling in states he called in last week for a Lyft to be scheduled and was told a call back would be made to him; Per review I do not see a note about a Lyft being requested; Called office  and was able to speak with Brenda to have a Lyft scheduled for patient;      and Drop off address; [address] [place] [address] [place] [id number]     Call back#: [phone]

## 2025-06-03 ENCOUNTER — HOSPITAL ENCOUNTER (OUTPATIENT)
Dept: NON INVASIVE DIAGNOSTICS | Facility: CLINIC | Age: 80
Discharge: HOME | End: 2025-06-03
Attending: NURSE PRACTITIONER
Payer: MEDICARE

## 2025-06-03 DIAGNOSIS — I65.23 BILATERAL CAROTID ARTERY STENOSIS: ICD-10-CM

## 2025-06-03 DIAGNOSIS — I73.9 PAD (PERIPHERAL ARTERY DISEASE) (HCC): ICD-10-CM

## 2025-06-03 PROCEDURE — 93880 EXTRACRANIAL BILAT STUDY: CPT

## 2025-06-03 PROCEDURE — 93923 UPR/LXTR ART STDY 3+ LVLS: CPT

## 2025-06-03 PROCEDURE — 93925 LOWER EXTREMITY STUDY: CPT

## 2025-06-04 PROCEDURE — 93880 EXTRACRANIAL BILAT STUDY: CPT | Performed by: SURGERY

## 2025-06-04 PROCEDURE — 93925 LOWER EXTREMITY STUDY: CPT | Performed by: SURGERY

## 2025-06-04 PROCEDURE — 93922 UPR/L XTREMITY ART 2 LEVELS: CPT | Performed by: SURGERY

## 2025-06-05 ENCOUNTER — RESULTS FOLLOW-UP (OUTPATIENT)
Dept: VASCULAR SURGERY | Facility: CLINIC | Age: 80
End: 2025-06-05

## 2025-06-05 ENCOUNTER — TRANSCRIBE ORDERS (OUTPATIENT)
Dept: VASCULAR SURGERY | Facility: CLINIC | Age: 80
End: 2025-06-05

## 2025-06-05 DIAGNOSIS — I73.9 PAD (PERIPHERAL ARTERY DISEASE) (HCC): Primary | ICD-10-CM

## 2025-06-05 DIAGNOSIS — I65.23 BILATERAL CAROTID ARTERY STENOSIS: Primary | ICD-10-CM

## 2025-06-17 ENCOUNTER — ANTICOAG VISIT (OUTPATIENT)
Dept: CARDIOLOGY CLINIC | Facility: CLINIC | Age: 80
End: 2025-06-17

## 2025-06-17 ENCOUNTER — TELEPHONE (OUTPATIENT)
Dept: CARDIOLOGY CLINIC | Facility: CLINIC | Age: 80
End: 2025-06-17

## 2025-06-17 ENCOUNTER — APPOINTMENT (OUTPATIENT)
Dept: LAB | Facility: HOSPITAL | Age: 80
End: 2025-06-17
Payer: MEDICARE

## 2025-06-17 DIAGNOSIS — I48.92 ATRIAL FLUTTER, UNSPECIFIED TYPE (HCC): ICD-10-CM

## 2025-06-17 LAB
INR PPP: 3.09 (ref 0.85–1.19)
PROTHROMBIN TIME: 31.5 SECONDS (ref 12.3–15)

## 2025-06-17 PROCEDURE — 85610 PROTHROMBIN TIME: CPT

## 2025-06-17 PROCEDURE — 36415 COLL VENOUS BLD VENIPUNCTURE: CPT

## 2025-07-01 ENCOUNTER — TELEPHONE (OUTPATIENT)
Dept: CARDIOLOGY CLINIC | Facility: CLINIC | Age: 80
End: 2025-07-01

## 2025-07-01 ENCOUNTER — ANTICOAG VISIT (OUTPATIENT)
Dept: CARDIOLOGY CLINIC | Facility: CLINIC | Age: 80
End: 2025-07-01

## 2025-07-01 ENCOUNTER — APPOINTMENT (OUTPATIENT)
Dept: LAB | Facility: HOSPITAL | Age: 80
End: 2025-07-01
Payer: MEDICARE

## 2025-07-01 DIAGNOSIS — I48.92 ATRIAL FLUTTER, UNSPECIFIED TYPE (HCC): ICD-10-CM

## 2025-07-01 LAB
INR PPP: 2.35 (ref 0.85–1.19)
PROTHROMBIN TIME: 26.4 SECONDS (ref 12.3–15)

## 2025-07-01 PROCEDURE — 36415 COLL VENOUS BLD VENIPUNCTURE: CPT

## 2025-07-01 PROCEDURE — 85610 PROTHROMBIN TIME: CPT

## 2025-07-15 ENCOUNTER — APPOINTMENT (OUTPATIENT)
Dept: LAB | Facility: HOSPITAL | Age: 80
End: 2025-07-15
Payer: MEDICARE

## 2025-07-15 ENCOUNTER — TELEPHONE (OUTPATIENT)
Dept: CARDIOLOGY CLINIC | Facility: CLINIC | Age: 80
End: 2025-07-15

## 2025-07-15 ENCOUNTER — ANTICOAG VISIT (OUTPATIENT)
Dept: CARDIOLOGY CLINIC | Facility: CLINIC | Age: 80
End: 2025-07-15

## 2025-07-15 DIAGNOSIS — I48.92 ATRIAL FLUTTER, UNSPECIFIED TYPE (HCC): ICD-10-CM

## 2025-07-15 LAB
INR PPP: 2.87 (ref 0.85–1.19)
PROTHROMBIN TIME: 29.9 SECONDS (ref 12.3–15)

## 2025-07-15 PROCEDURE — 36415 COLL VENOUS BLD VENIPUNCTURE: CPT

## 2025-07-15 PROCEDURE — 85610 PROTHROMBIN TIME: CPT

## 2025-07-16 DIAGNOSIS — I48.92 CHRONIC ATRIAL FLUTTER (HCC): Primary | ICD-10-CM

## 2025-07-16 NOTE — TELEPHONE ENCOUNTER
Good Morning, Isauro is scheduled for 7/29 between 10 and 12 -we need new orders placed the order for his INR is expiring before his appt - thank you!

## 2025-07-24 ENCOUNTER — REMOTE DEVICE CLINIC VISIT (OUTPATIENT)
Dept: CARDIOLOGY CLINIC | Facility: CLINIC | Age: 80
End: 2025-07-24
Payer: MEDICARE

## 2025-07-24 DIAGNOSIS — I49.5 SSS (SICK SINUS SYNDROME) (HCC): Primary | ICD-10-CM

## 2025-07-24 PROCEDURE — 93296 REM INTERROG EVL PM/IDS: CPT | Performed by: INTERNAL MEDICINE

## 2025-07-24 PROCEDURE — 93294 REM INTERROG EVL PM/LDLS PM: CPT | Performed by: INTERNAL MEDICINE

## 2025-07-24 NOTE — PROGRESS NOTES
Results for orders placed or performed in visit on 07/24/25   Cardiac EP device report    Narrative    MDT DUAL CHAMBER PPM (MVP ON) - ACTIVE SYSTEM IS MRI CONDITIONAL  CARELINK TRANSMISSION: BATTERY VOLTAGE ADEQUATE (9.2 YRS). AP: 93.4%. : 99% (>40%~MVP-ON/60). ALL AVAILABLE LEAD PARAMETERS WITHIN NORMAL LIMITS. NO SIGNIFICANT HIGH RATE EPISODES. NORMAL DEVICE FUNCTION. CH

## 2025-07-29 ENCOUNTER — TELEPHONE (OUTPATIENT)
Dept: CARDIOLOGY CLINIC | Facility: CLINIC | Age: 80
End: 2025-07-29

## 2025-07-29 ENCOUNTER — ANTICOAG VISIT (OUTPATIENT)
Dept: CARDIOLOGY CLINIC | Facility: CLINIC | Age: 80
End: 2025-07-29

## 2025-07-29 ENCOUNTER — APPOINTMENT (OUTPATIENT)
Dept: LAB | Facility: HOSPITAL | Age: 80
End: 2025-07-29
Payer: MEDICARE

## 2025-07-29 DIAGNOSIS — I48.92 CHRONIC ATRIAL FLUTTER (HCC): ICD-10-CM

## 2025-07-29 LAB
INR PPP: 2.3 (ref 0.85–1.19)
PROTHROMBIN TIME: 26 SECONDS (ref 12.3–15)

## 2025-07-29 PROCEDURE — 85610 PROTHROMBIN TIME: CPT

## 2025-07-29 PROCEDURE — 36415 COLL VENOUS BLD VENIPUNCTURE: CPT

## 2025-07-30 ENCOUNTER — TELEPHONE (OUTPATIENT)
Age: 80
End: 2025-07-30

## 2025-08-14 ENCOUNTER — OFFICE VISIT (OUTPATIENT)
Dept: VASCULAR SURGERY | Facility: CLINIC | Age: 80
End: 2025-08-14
Payer: MEDICARE

## 2025-08-14 PROBLEM — I65.21 ICAO (INTERNAL CAROTID ARTERY OCCLUSION), RIGHT: Status: ACTIVE | Noted: 2025-08-14

## 2025-08-15 ENCOUNTER — TELEPHONE (OUTPATIENT)
Dept: CARDIOLOGY CLINIC | Facility: CLINIC | Age: 80
End: 2025-08-15

## (undated) DEVICE — GUIDEWIRE LUNDERQUIST TSCMG-35-300-LESDC

## (undated) DEVICE — YELLOW BOAT

## (undated) DEVICE — COBAN 6 IN STERILE

## (undated) DEVICE — INTRO SHEATH PEEL AWAY 7FR

## (undated) DEVICE — GLOVE SRG BIOGEL 6.5

## (undated) DEVICE — SURGICAL GOWN, XL SMARTSLEEVE: Brand: CONVERTORS

## (undated) DEVICE — SUT STRATAFIX SPIRAL 3-0 PGA/PCL 30 X 30 CM SXMD2B408

## (undated) DEVICE — DGW .035 FC J3MM 260CM TEF: Brand: EMERALD

## (undated) DEVICE — SUT SILK 0 CT-1 30 IN 424H

## (undated) DEVICE — CATH DIAG 5FR IMPULSE 110CM 6S PIG 145 ANG

## (undated) DEVICE — CUFF TOURNIQUET 30 X 4 IN QUICK CONNECT DISP 1BLA

## (undated) DEVICE — DGW .035 FC J3MM 150CM TEF: Brand: EMERALD

## (undated) DEVICE — CARDIO PERI-GROIN: Brand: CONVERTORS

## (undated) DEVICE — 3M™ STERI-DRAPE™ U-DRAPE 1015: Brand: STERI-DRAPE™

## (undated) DEVICE — CHLORAPREP HI-LITE 26ML ORANGE

## (undated) DEVICE — GLOVE INDICATOR PI UNDERGLOVE SZ 8.5 BLUE

## (undated) DEVICE — GLIDESHEATH BASIC HYDROPHILIC COATED INTRODUCER SHEATH: Brand: GLIDESHEATH

## (undated) DEVICE — IMPERVIOUS STOCKINETTE: Brand: DEROYAL

## (undated) DEVICE — SPONGE SCRUB 4 PCT CHLORHEXIDINE

## (undated) DEVICE — THE VASC BAND HEMOSTAT IS A COMPRESSION DEVICE TO ASSIST HEMOSTASIS OF ARTERIAL, VENOUS AND HEMODIALYSIS PERCUTANEOUS ACCESS SITES.: Brand: VASC BAND™ HEMOSTAT

## (undated) DEVICE — INTENDED FOR TISSUE SEPARATION, AND OTHER PROCEDURES THAT REQUIRE A SHARP SURGICAL BLADE TO PUNCTURE OR CUT.: Brand: BARD-PARKER ® CARBON RIB-BACK BLADES

## (undated) DEVICE — RUNTHROUGH NS EXTRA FLOPPY PTCA GUIDEWIRE: Brand: RUNTHROUGH

## (undated) DEVICE — PINNACLE INTRODUCER SHEATH: Brand: PINNACLE

## (undated) DEVICE — INTENDED FOR TISSUE SEPARATION, AND OTHER PROCEDURES THAT REQUIRE A SHARP SURGICAL BLADE TO PUNCTURE OR CUT.: Brand: BARD-PARKER SAFETY BLADES SIZE 10, STERILE

## (undated) DEVICE — ADHESIVE SKIN HIGH VISCOSITY EXOFIN 1ML

## (undated) DEVICE — GAUZE SPONGES,16 PLY: Brand: CURITY

## (undated) DEVICE — PLUMEPEN PRO 10FT

## (undated) DEVICE — CARDIOVASCULAR SPLIT DRAPE: Brand: CONVERTORS

## (undated) DEVICE — DUAL CUT SAGITTAL BLADE

## (undated) DEVICE — GLOVE SRG BIOGEL ECLIPSE 7

## (undated) DEVICE — CAPIT KNEE ATTUNE FB W/DOM

## (undated) DEVICE — HOOD: Brand: FLYTE, SURGICOOL

## (undated) DEVICE — TREK CORONARY DILATATION CATHETER 3.0 MM X 12 MM / RAPID-EXCHANGE: Brand: TREK

## (undated) DEVICE — DRESSING MEPILEX AG BORDER POST-OP 4 X 12 IN

## (undated) DEVICE — NC TREK NEO™ CORONARY DILATATION CATHETER 4.00 MM X 8 MM / RAPID-EXCHANGE: Brand: NC TREK NEO™

## (undated) DEVICE — GLOVE SRG BIOGEL 8.5

## (undated) DEVICE — NEEDLE 18 G X 1 1/2

## (undated) DEVICE — CATH GUIDE LAUNCHER 6FR JR4 110CM

## (undated) DEVICE — SPONGE 4 X 4 XRAY 16 PLY STRL LF RFD

## (undated) DEVICE — CEMENT MIXING SYSTEM WITH FEMORAL BREAKWAY NOZZLE: Brand: REVOLUTION

## (undated) DEVICE — SLITTER ADJUSTABLE

## (undated) DEVICE — GLOVE INDICATOR PI UNDERGLOVE SZ 6.5 BLUE

## (undated) DEVICE — 3M™ TEGADERM™ TRANSPARENT FILM DRESSING FRAME STYLE, 1626W, 4 IN X 4-3/4 IN (10 CM X 12 CM), 50/CT 4CT/CASE: Brand: 3M™ TEGADERM™

## (undated) DEVICE — AMPLATZ EXTRA STIFF WIRE GUIDE: Brand: AMPLATZ

## (undated) DEVICE — DGW .035 FC STR 150CM TEF: Brand: EMERALD

## (undated) DEVICE — THERMOFLECT BLANKET, L, 25EA                               TS THERMOFLECT BLANKET, 48" X 84", SILVER, 5/BG, 5 BG/CS NW: Brand: THERMOFLECT

## (undated) DEVICE — HEAVY DUTY TABLE COVER: Brand: CONVERTORS

## (undated) DEVICE — Device

## (undated) DEVICE — GUIDEWIRE .035 260CM 3MM J TIP

## (undated) DEVICE — SUT STRATAFIX SPIRAL 1-0 PDO 36 X 36 CM SXPD2B405

## (undated) DEVICE — 450 ML BOTTLE OF 0.05% CHLORHEXIDINE GLUCONATE IN 99.95% STERILE WATER FOR IRRIGATION, USP AND APPLICATOR.: Brand: IRRISEPT ANTIMICROBIAL WOUND LAVAGE

## (undated) DEVICE — ANTIBACTERIAL UNDYED BRAIDED (POLYGLACTIN 910), SYNTHETIC ABSORBABLE SUTURE: Brand: COATED VICRYL

## (undated) DEVICE — MINI TREK CORONARY DILATATION CATHETER 2.0 MM X 15 MM / RAPID-EXCHANGE: Brand: MINI TREK

## (undated) DEVICE — TRAY FOLEY 16FR SURESTEP TEMP SENS URIMETER STAT LOK

## (undated) DEVICE — SWAN-GANZ BIPOLAR PACING CATHETER: Brand: SWAN-GANZ

## (undated) DEVICE — HANDPIECE SET WITH RETRACTABLE COAXIAL FAN SPRAY TIP AND SUCTION TUBE: Brand: INTERPULSE

## (undated) DEVICE — BETHLEHEM UNIV MAJ EXT ,KIT: Brand: CARDINAL HEALTH

## (undated) DEVICE — Device: Brand: ASAHI SILVERWAY

## (undated) DEVICE — GLOVE INDICATOR PI UNDERGLOVE SZ 7 BLUE

## (undated) DEVICE — PAD GROUNDING ADULT

## (undated) DEVICE — RADIFOCUS OPTITORQUE ANGIOGRAPHIC CATHETER: Brand: OPTITORQUE

## (undated) DEVICE — SYRINGE 3ML LL

## (undated) DEVICE — NC TREK NEO™ CORONARY DILATATION CATHETER 3.75 MM X 12 MM / RAPID-EXCHANGE: Brand: NC TREK NEO™

## (undated) DEVICE — GLOVE SRG BIOGEL ORTHOPEDIC 8.5

## (undated) DEVICE — BETHLEHEM MAJOR GENERAL PACK: Brand: CARDINAL HEALTH

## (undated) DEVICE — ANTIBACTERIAL VIOLET BRAIDED (POLYGLACTIN 910), SYNTHETIC ABSORBABLE SURGICAL SUTURE: Brand: COATED VICRYL

## (undated) DEVICE — ACE WRAP 6 IN UNSTERILE

## (undated) DEVICE — EXOFIN PRECISION PEN HIGH VISCOSITY TOPICAL SKIN ADHESIVE: Brand: EXOFIN PRECISION PEN, 1G

## (undated) DEVICE — 3000CC GUARDIAN II: Brand: GUARDIAN

## (undated) DEVICE — SYRINGE 30ML LL

## (undated) DEVICE — CATH DIAG 5FR IMPULSE 100CM FR4

## (undated) DEVICE — CATH GUIDING FIXED SHAPE 43CM -NC